# Patient Record
Sex: FEMALE | Race: BLACK OR AFRICAN AMERICAN | NOT HISPANIC OR LATINO | Employment: OTHER | URBAN - METROPOLITAN AREA
[De-identification: names, ages, dates, MRNs, and addresses within clinical notes are randomized per-mention and may not be internally consistent; named-entity substitution may affect disease eponyms.]

---

## 2017-03-09 DIAGNOSIS — J06.9 UPPER RESPIRATORY TRACT INFECTION, UNSPECIFIED TYPE: Primary | ICD-10-CM

## 2017-03-09 RX ORDER — AZITHROMYCIN 250 MG/1
TABLET, FILM COATED ORAL
Qty: 6 TABLET | Refills: 0 | Status: SHIPPED | OUTPATIENT
Start: 2017-03-09 | End: 2017-03-14

## 2017-05-11 ENCOUNTER — HOSPITAL ENCOUNTER (EMERGENCY)
Facility: HOSPITAL | Age: 42
Discharge: HOME | End: 2017-05-11
Attending: EMERGENCY MEDICINE
Payer: COMMERCIAL

## 2017-05-11 VITALS
DIASTOLIC BLOOD PRESSURE: 90 MMHG | RESPIRATION RATE: 16 BRPM | WEIGHT: 245 LBS | TEMPERATURE: 99 F | HEART RATE: 103 BPM | SYSTOLIC BLOOD PRESSURE: 188 MMHG | BODY MASS INDEX: 39.37 KG/M2 | HEIGHT: 66 IN | OXYGEN SATURATION: 98 %
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
## 2017-05-11 DIAGNOSIS — M54.6 ACUTE RIGHT-SIDED THORACIC BACK PAIN: ICD-10-CM

## 2017-05-11 DIAGNOSIS — S50.11XA CONTUSION OF RIGHT FOREARM, INITIAL ENCOUNTER: Primary | ICD-10-CM

## 2017-05-11 LAB
B-HCG UR QL: NEGATIVE
CTP QC/QA: YES

## 2017-05-11 PROCEDURE — 25000003 PHARM REV CODE 250: Performed by: EMERGENCY MEDICINE

## 2017-05-11 PROCEDURE — 99283 EMERGENCY DEPT VISIT LOW MDM: CPT | Mod: ,,, | Performed by: EMERGENCY MEDICINE

## 2017-05-11 PROCEDURE — 81025 URINE PREGNANCY TEST: CPT | Performed by: EMERGENCY MEDICINE

## 2017-05-11 PROCEDURE — 99283 EMERGENCY DEPT VISIT LOW MDM: CPT | Mod: 25

## 2017-05-11 RX ORDER — NAPROXEN 500 MG/1
500 TABLET ORAL 2 TIMES DAILY PRN
Qty: 20 TABLET | Refills: 0 | Status: SHIPPED | OUTPATIENT
Start: 2017-05-11 | End: 2017-05-21

## 2017-05-11 RX ORDER — IBUPROFEN 600 MG/1
600 TABLET ORAL
Status: COMPLETED | OUTPATIENT
Start: 2017-05-11 | End: 2017-05-11

## 2017-05-11 RX ADMIN — IBUPROFEN 600 MG: 600 TABLET, FILM COATED ORAL at 02:05

## 2017-05-11 NOTE — DISCHARGE INSTRUCTIONS
Back Pain (Acute or Chronic)    Back pain is one of the most common problems. The good news is that most people feel better in 1 to 2 weeks, and most of the rest in 1 to 2 months. Most people can remain active.  People experience and describe pain differently; not everyone is the same.  · The pain can be sharp, stabbing, shooting, aching, cramping or burning.  · Movement, standing, bending, lifting, sitting, or walking may worsen pain.  · It can be localized to one spot or area, or it can be more generalized.  · It can spread or radiate upwards, to the front, or go down your arms or legs (sciatica).  · It can cause muscle spasm.  Most of the time, mechanical problems with the muscles or spine cause the pain. Mechanical problems are usually caused by an injury to the muscles or ligaments. While illness can cause back pain, it is usually not caused by a serious illness. Mechanical problems include:   · Physical activity such as sports, exercise, work, or normal activity  · Overexertion, lifting, pushing, pulling incorrectly or too aggressively  · Sudden twisting, bending, or stretching from an accident, or accidental movement  · Poor posture  · Stretching or moving wrong, without noticing pain at the time  · Poor coordination, lack of regular exercise (check with your doctor about this)  · Spinal disc disease or arthritis  · Stress  Pain can also be related to pregnancy, or illness like appendicitis, bladder or kidney infections, pelvic infections, and many other things.  Acute back pain usually gets better in 1 to 2 weeks. Back pain related to disk disease, arthritis in the spinal joints or spinal stenosis (narrowing of the spinal canal) can become chronic and last for months or years.  Unless you had a physical injury (for example, a car accident or fall) X-rays are usually not needed for the initial evaluation of back pain. If pain continues and does not respond to medical treatment, X-rays and other tests may be  needed.  Home care  Try these home care recommendations:  · When in bed, try to find a position of comfort. A firm mattress is best. Try lying flat on your back with pillows under your knees. You can also try lying on your side with your knees bent up towards your chest and a pillow between your knees.  · At first, do not try to stretch out the sore spots. If there is a strain, it is not like the good soreness you get after exercising without an injury. In this case, stretching may make it worse.  · Avoid prolong sitting, long car rides, or travel. This puts more stress on the lower back than standing or walking.  · During the first 24 to 72 hours after an acute injury or flare up of chronic back pain, apply an ice pack to the painful area for 20 minutes and then remove it for 20 minutes. Do this over a period of 60 to 90 minutes or several times a day. This will reduce swelling and pain. Wrap the ice pack in a thin towel or plastic to protect your skin.  · You can start with ice, then switch to heat. Heat (hot shower, hot bath, or heating pad) reduces pain and works well for muscle spasms. Heat can be applied to the painful area for 20 minutes then remove it for 20 minutes. Do this over a period of 60 to 90 minutes or several times a day. Do not sleep on a heating pad. It can lead to skin burns or tissue damage.  · You can alternate ice and heat therapy. Talk with your doctor about the best treatment for your back pain.  · Therapeutic massage can help relax the back muscles without stretching them.  · Be aware of safe lifting methods and do not lift anything without stretching first.  Medicines  Talk to your doctor before using medicine, especially if you have other medical problems or are taking other medicines.  · You may use over-the-counter medicine as directed on the bottle to control pain, unless another pain medicine was prescribed. If you have chronic conditions like diabetes, liver or kidney disease,  stomach ulcers, or gastrointestinal bleeding, or are taking blood thinners, talk to your doctor before taking any medicine.  · Be careful if you are given a prescription medicines, narcotics, or medicine for muscle spasms. They can cause drowsiness, affect your coordination, reflexes, and judgement. Do not drive or operate heavy machinery.  Follow-up care  Follow up with your healthcare provider, or as advised.   A radiologist will review any X-rays that were taken. Your provide will notify you of any new findings that may affect your care.  Call 911  Call emergency services if any of the following occur:  · Trouble breathing  · Confusion  · Very drowsy or trouble awakening  · Fainting or loss of consciousness  · Rapid or very slow heart rate  · Loss of bowel or bladder control  When to seek medical advice  Call your healthcare provider right away if any of these occur:   · Pain becomes worse or spreads to your legs  · Weakness or numbness in one or both legs  · Numbness in the groin or genital area  Date Last Reviewed: 7/1/2016  © 4855-4074 [employer], [place]. [address] [place] [address], [place] [address]. All rights reserved. This information is not intended as a substitute for professional medical care. Always follow your healthcare professional's instructions.
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
## 2017-05-11 NOTE — ED AVS SNAPSHOT
[facility]  [address] [facility] [address]               Velma Lopez   2017  1:27 PM   ED    Description:  Female : 1975   Department:  [facility]           Your Care was Coordinated By:     Provider Role From To    Dana Dorman MD Attending Provider 17 1416 --      Reason for Visit     Arm Pain           Diagnoses this Visit        Comments    Contusion of right forearm, initial encounter    -  Primary     Acute right-sided thoracic back pain           ED Disposition     None           To Do List           Follow-up Information     Follow up with Aftab Martinez MD In 3 days.    Specialty:  Family Medicine    Contact information:    [address]  [facility] [address]  [facility] [address]  [phone]         These Medications        Disp Refills Start End    naproxen (NAPROSYN) 500 MG tablet 20 tablet 0 2017    Take 1 tablet (500 mg total) by mouth 2 (two) times daily as needed. - Oral    Pharmacy: [facility] - [place], [place] - [address] Ph #: [phone]         [facility]     [facility] Care Line -  Assistance  Unless otherwise directed by your provider, please contact [facility], our nurse care line that is available for  assistance.     Registered nurses in the [facility] provide: appointment scheduling, clinical advisement, health education, and other advisory services.  Call: [phone] (toll free)               Medications           Message regarding Medications     Verify the changes and/or additions to your medication regime listed below are the same as discussed with your clinician today.  If any of these changes or additions are incorrect, please notify your healthcare provider.        START taking these NEW medications        Refills    naproxen (NAPROSYN) 500 MG tablet 0    Sig: Take 1 tablet (500 mg  "total) by mouth 2 (two) times daily as needed.    Class: Print    Route: Oral      These medications were administered today        Dose Freq    ibuprofen tablet 600 mg 600 mg ED 1 Time    Sig: Take 1 tablet (600 mg total) by mouth ED 1 Time.    Class: Normal    Route: Oral           Verify that the below list of medications is an accurate representation of the medications you are currently taking.  If none reported, the list may be blank. If incorrect, please contact your healthcare provider. Carry this list with you in case of emergency.           Current Medications     medroxyPROGESTERone (DEPO-PROVERA) 150 mg/mL Syrg Inject 1 mL (150 mg total) into the muscle every 3 (three) months.    amitriptyline (ELAVIL) 25 MG tablet Take 25 mg by mouth nightly as needed.    cyclobenzaprine (FLEXERIL) 5 MG tablet Take 5 mg by mouth 3 (three) times daily as needed for Muscle spasms.    ibuprofen tablet 600 mg Take 1 tablet (600 mg total) by mouth ED 1 Time.    meloxicam (MOBIC) 15 MG tablet Take 1 tablet (15 mg total) by mouth once daily. With first meal of day for inflammation    naproxen (NAPROSYN) 500 MG tablet Take 1 tablet (500 mg total) by mouth 2 (two) times daily as needed.           Clinical Reference Information           Your Vitals Were     BP Pulse Temp Resp Height Weight    188/90 103 98.8 °F (37.1 °C) (Oral) 16 5' 6" (1.676 m) 111.1 kg (245 lb)    Last Period SpO2 BMI          (LMP Unknown) 98% 39.54 kg/m2        Allergies as of 5/11/2017     No Known Allergies      Immunizations Administered on Date of Encounter - 5/11/2017     None      ED Micro, Lab, POCT     None      ED Imaging Orders     None        Discharge Instructions         Back Pain (Acute or Chronic)    Back pain is one of the most common problems. The good news is that most people feel better in 1 to 2 weeks, and most of the rest in 1 to 2 months. Most people can remain active.  People experience and describe pain differently; not everyone is " the same.  · The pain can be sharp, stabbing, shooting, aching, cramping or burning.  · Movement, standing, bending, lifting, sitting, or walking may worsen pain.  · It can be localized to one spot or area, or it can be more generalized.  · It can spread or radiate upwards, to the front, or go down your arms or legs (sciatica).  · It can cause muscle spasm.  Most of the time, mechanical problems with the muscles or spine cause the pain. Mechanical problems are usually caused by an injury to the muscles or ligaments. While illness can cause back pain, it is usually not caused by a serious illness. Mechanical problems include:   · Physical activity such as sports, exercise, work, or normal activity  · Overexertion, lifting, pushing, pulling incorrectly or too aggressively  · Sudden twisting, bending, or stretching from an accident, or accidental movement  · Poor posture  · Stretching or moving wrong, without noticing pain at the time  · Poor coordination, lack of regular exercise (check with your doctor about this)  · Spinal disc disease or arthritis  · Stress  Pain can also be related to pregnancy, or illness like appendicitis, bladder or kidney infections, pelvic infections, and many other things.  Acute back pain usually gets better in 1 to 2 weeks. Back pain related to disk disease, arthritis in the spinal joints or spinal stenosis (narrowing of the spinal canal) can become chronic and last for months or years.  Unless you had a physical injury (for example, a car accident or fall) X-rays are usually not needed for the initial evaluation of back pain. If pain continues and does not respond to medical treatment, X-rays and other tests may be needed.  Home care  Try these home care recommendations:  · When in bed, try to find a position of comfort. A firm mattress is best. Try lying flat on your back with pillows under your knees. You can also try lying on your side with your knees bent up towards your chest and a  pillow between your knees.  · At first, do not try to stretch out the sore spots. If there is a strain, it is not like the good soreness you get after exercising without an injury. In this case, stretching may make it worse.  · Avoid prolong sitting, long car rides, or travel. This puts more stress on the lower back than standing or walking.  · During the first 24 to 72 hours after an acute injury or flare up of chronic back pain, apply an ice pack to the painful area for 20 minutes and then remove it for 20 minutes. Do this over a period of 60 to 90 minutes or several times a day. This will reduce swelling and pain. Wrap the ice pack in a thin towel or plastic to protect your skin.  · You can start with ice, then switch to heat. Heat (hot shower, hot bath, or heating pad) reduces pain and works well for muscle spasms. Heat can be applied to the painful area for 20 minutes then remove it for 20 minutes. Do this over a period of 60 to 90 minutes or several times a day. Do not sleep on a heating pad. It can lead to skin burns or tissue damage.  · You can alternate ice and heat therapy. Talk with your doctor about the best treatment for your back pain.  · Therapeutic massage can help relax the back muscles without stretching them.  · Be aware of safe lifting methods and do not lift anything without stretching first.  Medicines  Talk to your doctor before using medicine, especially if you have other medical problems or are taking other medicines.  · You may use over-the-counter medicine as directed on the bottle to control pain, unless another pain medicine was prescribed. If you have chronic conditions like diabetes, liver or kidney disease, stomach ulcers, or gastrointestinal bleeding, or are taking blood thinners, talk to your doctor before taking any medicine.  · Be careful if you are given a prescription medicines, narcotics, or medicine for muscle spasms. They can cause drowsiness, affect your coordination,  reflexes, and judgement. Do not drive or operate heavy machinery.  Follow-up care  Follow up with your healthcare provider, or as advised.   A radiologist will review any X-rays that were taken. Your provide will notify you of any new findings that may affect your care.  Call 911  Call emergency services if any of the following occur:  · Trouble breathing  · Confusion  · Very drowsy or trouble awakening  · Fainting or loss of consciousness  · Rapid or very slow heart rate  · Loss of bowel or bladder control  When to seek medical advice  Call your healthcare provider right away if any of these occur:   · Pain becomes worse or spreads to your legs  · Weakness or numbness in one or both legs  · Numbness in the groin or genital area  Date Last Reviewed: 7/1/2016 © 2000-2016 [employer]. [address] [place] [address], [place] [address]. All rights reserved. This information is not intended as a substitute for professional medical care. Always follow your healthcare professional's instructions.           [facility] complies with applicable Federal civil rights laws and does not discriminate on the basis of race, color, national origin, age, disability, or sex.        Language Assistance Services     ATTENTION: Language assistance services are available, free of charge. Please call [phone].      ATENCIÓN: Si habla español, tiene a polo disposición servicios gratuitos de asistencia lingüística. Llame al [phone].     CHÚ Ý: N?u b?n nói Ti?ng Vi?t, có các d?ch v? h? tr? ngôn ng? mi?n phí dành cho b?n. G?i s? [phone].

## 2017-05-25 ENCOUNTER — HOSPITAL ENCOUNTER (EMERGENCY)
Facility: HOSPITAL | Age: 42
Discharge: HOME | End: 2017-05-25
Attending: EMERGENCY MEDICINE
Payer: COMMERCIAL

## 2017-05-25 ENCOUNTER — TELEPHONE (OUTPATIENT)
Dept: INTERNAL MEDICINE | Facility: CLINIC | Age: 42
End: 2017-05-25

## 2017-05-25 ENCOUNTER — NURSE TRIAGE (OUTPATIENT)
Dept: ADMINISTRATIVE | Facility: CLINIC | Age: 42
End: 2017-05-25

## 2017-05-25 VITALS
SYSTOLIC BLOOD PRESSURE: 122 MMHG | HEART RATE: 82 BPM | HEIGHT: 66 IN | RESPIRATION RATE: 18 BRPM | WEIGHT: 250 LBS | TEMPERATURE: 99 F | OXYGEN SATURATION: 98 % | DIASTOLIC BLOOD PRESSURE: 74 MMHG | BODY MASS INDEX: 40.18 KG/M2

## 2017-05-25 DIAGNOSIS — M72.2 PLANTAR FASCIITIS: Primary | ICD-10-CM

## 2017-05-25 DIAGNOSIS — S46.912A LEFT SHOULDER STRAIN, INITIAL ENCOUNTER: ICD-10-CM

## 2017-05-25 DIAGNOSIS — R07.89 ATYPICAL CHEST PAIN: ICD-10-CM

## 2017-05-25 DIAGNOSIS — M79.89 LEG SWELLING: ICD-10-CM

## 2017-05-25 DIAGNOSIS — M79.662 PAIN OF LEFT CALF: ICD-10-CM

## 2017-05-25 LAB
ALBUMIN SERPL BCP-MCNC: 3.7 G/DL
ALP SERPL-CCNC: 70 U/L
ALT SERPL W/O P-5'-P-CCNC: 16 U/L
ANION GAP SERPL CALC-SCNC: 11 MMOL/L
AST SERPL-CCNC: 13 U/L
BASOPHILS # BLD AUTO: 0.07 K/UL
BASOPHILS NFR BLD: 0.7 %
BILIRUB SERPL-MCNC: 0.4 MG/DL
BUN SERPL-MCNC: 10 MG/DL
CALCIUM SERPL-MCNC: 9.8 MG/DL
CHLORIDE SERPL-SCNC: 107 MMOL/L
CO2 SERPL-SCNC: 20 MMOL/L
CREAT SERPL-MCNC: 0.8 MG/DL
DIFFERENTIAL METHOD: ABNORMAL
EOSINOPHIL # BLD AUTO: 0.5 K/UL
EOSINOPHIL NFR BLD: 5.1 %
ERYTHROCYTE [DISTWIDTH] IN BLOOD BY AUTOMATED COUNT: 13 %
EST. GFR  (AFRICAN AMERICAN): >60 ML/MIN/1.73 M^2
EST. GFR  (NON AFRICAN AMERICAN): >60 ML/MIN/1.73 M^2
GLUCOSE SERPL-MCNC: 107 MG/DL
HCT VFR BLD AUTO: 41.6 %
HGB BLD-MCNC: 13.5 G/DL
LYMPHOCYTES # BLD AUTO: 3.7 K/UL
LYMPHOCYTES NFR BLD: 37.1 %
MCH RBC QN AUTO: 30.8 PG
MCHC RBC AUTO-ENTMCNC: 32.5 %
MCV RBC AUTO: 95 FL
MONOCYTES # BLD AUTO: 0.5 K/UL
MONOCYTES NFR BLD: 5.4 %
NEUTROPHILS # BLD AUTO: 5.2 K/UL
NEUTROPHILS NFR BLD: 51.5 %
PLATELET # BLD AUTO: 388 K/UL
PMV BLD AUTO: 9.5 FL
POTASSIUM SERPL-SCNC: 4 MMOL/L
PROT SERPL-MCNC: 7.4 G/DL
RBC # BLD AUTO: 4.38 M/UL
SODIUM SERPL-SCNC: 138 MMOL/L
TROPONIN I SERPL DL<=0.01 NG/ML-MCNC: <0.006 NG/ML
WBC # BLD AUTO: 10.05 K/UL

## 2017-05-25 PROCEDURE — 96374 THER/PROPH/DIAG INJ IV PUSH: CPT

## 2017-05-25 PROCEDURE — 99284 EMERGENCY DEPT VISIT MOD MDM: CPT | Mod: 25

## 2017-05-25 PROCEDURE — 84484 ASSAY OF TROPONIN QUANT: CPT

## 2017-05-25 PROCEDURE — 85025 COMPLETE CBC W/AUTO DIFF WBC: CPT

## 2017-05-25 PROCEDURE — 63600175 PHARM REV CODE 636 W HCPCS: Performed by: EMERGENCY MEDICINE

## 2017-05-25 PROCEDURE — 93005 ELECTROCARDIOGRAM TRACING: CPT

## 2017-05-25 PROCEDURE — 80053 COMPREHEN METABOLIC PANEL: CPT

## 2017-05-25 RX ORDER — PHENTERMINE HYDROCHLORIDE 37.5 MG/1
37.5 CAPSULE ORAL EVERY MORNING
COMMUNITY
End: 2019-02-01 | Stop reason: SDUPTHER

## 2017-05-25 RX ORDER — IBUPROFEN 800 MG/1
800 TABLET ORAL 3 TIMES DAILY
Qty: 21 TABLET | Refills: 0 | Status: SHIPPED | OUTPATIENT
Start: 2017-05-25 | End: 2017-06-04

## 2017-05-25 RX ORDER — KETOROLAC TROMETHAMINE 30 MG/ML
30 INJECTION, SOLUTION INTRAMUSCULAR; INTRAVENOUS
Status: COMPLETED | OUTPATIENT
Start: 2017-05-25 | End: 2017-05-25

## 2017-05-25 RX ADMIN — KETOROLAC TROMETHAMINE 30 MG: 30 INJECTION, SOLUTION INTRAMUSCULAR at 09:05

## 2017-05-25 NOTE — TELEPHONE ENCOUNTER
I spoke to pt and advised that first available appointment for new patient would be on 05/31/2017. CF

## 2017-05-25 NOTE — TELEPHONE ENCOUNTER
Pt at work at [employer]. Had called Dr Martinez's office today for appt but since not seen in several yrs they advised the earliest appt they could give was May 31.   She is concerned about ongoing problems with pain in her left calf x 2 wks. Pain constant, rated a 5, no radiation and not worsened by walking. No swelling/discoloration reported. Also had left foot pain, on the sole, which has progressed to a numb feeling in the same time frame. In past week now has intermittent pain in left arm rated a 5. Is between elbow and shoulder-not affected by movement but when she lays in bed at night on her right side the arm throbs. Denies any known trauma.   Is concerned since sx's have been continuing for a while and wants to be seen for evaluation. Has questions about urgent care.    Reason for Disposition   Thigh or calf pain in only one leg and present > 1 hour    Protocols used: ST LEG PAIN-A-[place]    No available appt at [place] [facility]. [facility] in [place]. Advised to call back with any questions/concerns.

## 2017-05-25 NOTE — TELEPHONE ENCOUNTER
----- Message from Benedicto Shankar sent at 5/25/2017  1:36 PM CDT -----  Contact: pt  x_ 1st Request   _ 2nd Request   _ 3rd Request     Who: PATRICIO CRANE [[id number]]    Why: Former patient 9/13 of Dr Martinez is requesting a call back in reference to being seen sooner than next available new patient appointment on 5/31.    What Number to Call Back: [phone]    When to Expect a call back: (Before the end of the day)   -- if call after 3:00 call back will be tomorrow.

## 2017-05-26 NOTE — ED PROVIDER NOTES
Chief complaint:  Leg Swelling (sent from urgent care for cramping and swelling to left calf. Pt states she then began having some chest pain and SOB after going to urgent care)      HPI:  Velma Lopez is a 42 y.o. female presenting with acute onset of several complaints.  She was seen at the urgent care earlier today and was sent in here for a DVT workup.  She has been having some numbness that started in her left foot and has gone up her leg that has been present for about a week.  She also has been complaining of some discomfort that is aching in her left calf.  She did not notice any swelling but the nurse practitioner at the urgent care Center was some swelling and was concerned about a DVT.  While she was driving here she states that she had a few episodes of sharp chest pain to her anterior chest and that made her nervous and made her feel short of breath slightly.  All that has resolved.  She also has been having some soreness and pain to her left upper arm that has been present for about 2 weeks.    ROS: As per HPI and below:  Constitutional:  No fevers, no chills  Eyes: no visual changes  Cardiac: Sharp chest pain  Respiratory: Minimal shortness of breath  Abdominal: no abdominal pain, no nausea, no vomiting  Genitourinary: No dysuria, no frequency  Skin: no rash  Heme: no bleeding  Musculoskeletal: Left shoulder pain, left calf pain  Neuro: Left foot numbness, no focal weakness  Pyschological: no depression      Review of patient's allergies indicates:  No Known Allergies    No current facility-administered medications on file prior to encounter.      Current Outpatient Prescriptions on File Prior to Encounter   Medication Sig Dispense Refill    medroxyPROGESTERone (DEPO-PROVERA) 150 mg/mL Syrg Inject 1 mL (150 mg total) into the muscle every 3 (three) months. 1 mL 2       PMH:  As per HPI and below:  History reviewed. No pertinent past medical history.  Past Surgical History:   Procedure  Laterality Date    CYST REMOVAL      DILATION AND CURETTAGE OF UTERUS      WISDOM TOOTH EXTRACTION         Social History     Social History    Marital status: Single     Spouse name: N/A    Number of children: N/A    Years of education: N/A     Social History Main Topics    Smoking status: Current Every Day Smoker     Packs/day: 0.50     Types: Cigarettes    Smokeless tobacco: Never Used    Alcohol use Yes      Comment: social    Drug use: No    Sexual activity: Yes     Partners: Male     Birth control/ protection: Injection     Other Topics Concern    None     Social History Narrative    None       Family History   Problem Relation Age of Onset    Cancer Maternal Grandmother     Diabetes Mother        Physical Exam:    Vitals:    05/25/17 2126   BP: 122/74   Pulse: 82   Resp: 18   Temp: 98.8 °F (37.1 °C)     Constitutional: Well-nourished, well-developed, in no acute distress, not cachectic  Eyes: PERRLA, EOMI, normal conjunctiva, normal sclera  ENT: Moist Mucous membranes  Respiratory: Clear to auscultation bilaterally, no wheezes, no crackles, no rhonchi  Cardiovascular: Regular rate and rhythm, no murmurs, no rubs, no gallops  Abdominal: Soft, nontender, nondistended, no guarding, no rebound  Musculoskeletal: Normal range of motion, no obvious deformity, normal capillary refill, head atraumatic, neck supple, no meningismus, left sided calf tenderness, no obvious swelling, no tenderness to palpation to her left shoulder or left lower extremity  Skin: no rash, no ecchymosis, no errythema, no discharge  Neurologic: Cranial nerves II through XII intact, no motor deficits, subjective left leg numbness, no cerebellar deficits  Psychological: Alert, oriented x3, normal affect, normal mood    Orders Placed This Encounter   Procedures    US Lower Extremity Veins Left    CBC auto differential    Comprehensive metabolic panel    Troponin I    EKG 12-lead       Medications   ketorolac injection 30 mg  (30 mg Intravenous Given 5/25/17 2115)         Labs Reviewed   CBC W/ AUTO DIFFERENTIAL - Abnormal; Notable for the following:        Result Value    Platelets 388 (*)     All other components within normal limits   COMPREHENSIVE METABOLIC PANEL - Abnormal; Notable for the following:     CO2 20 (*)     All other components within normal limits   TROPONIN I                   ASSESSMENT  1. Plantar fasciitis    2. Leg swelling    3. Pain of left calf    4. Atypical chest pain    5. Left shoulder strain, initial encounter          Disposition:  Discharge    Discharge Medication List as of 5/25/2017  8:48 PM      START taking these medications    Details   ibuprofen (ADVIL,MOTRIN) 800 MG tablet Take 1 tablet (800 mg total) by mouth 3 (three) times daily., Starting Thu 5/25/2017, Until Sun 6/4/2017, Print           Discharge Medication List as of 5/25/2017  8:48 PM        Discharge Medication List as of 5/25/2017  8:48 PM        MDM  Number of Diagnoses or Management Options  Atypical chest pain:   Left shoulder strain, initial encounter:   Leg swelling:   Pain of left calf:   Plantar fasciitis:   Diagnosis management comments: Differential diagnosis includes ACS, PE, DVT, neuropathy, shoulder strain    Patient presents with multiple complaints.  I do not see any obvious swelling to her left lower extremity but she is having some calf tenderness and was sent for a DVT study so I will order an ultrasound although my suspicion is quite low.  Additionally she had fleeting episodes of chest discomfort and is concerned about the possibility of her being diabetic.  We'll obtain lab work including a troponin to rule out cardiac etiology.  My suspicion for PE is quite low given normal vitals.  She also complains of some numbness to her left foot which sounds more neuropathic in nature.  She also has some left shoulder discomfort which sounds more musculoskeletal in nature.    Patient's laboratories and ultrasound are  unremarkable.  We'll discharge home.       Amount and/or Complexity of Data Reviewed  Clinical lab tests: ordered and reviewed  Tests in the radiology section of CPT®: ordered  Tests in the medicine section of CPT®: ordered  Discussion of test results with the performing providers: yes (Lower ext US: nad)  Decide to obtain previous medical records or to obtain history from someone other than the patient: yes  Independent visualization of images, tracings, or specimens: yes (EKG: nsr at 94, nl axis, nl intervals, no st elevation or depression    Lower ext US: nad)           Gunner Cast III, MD  05/30/17 2832

## 2017-05-26 NOTE — ED NOTES
"Pt presents to ED states that X 2 weeks has been having pain to left calf and numbness to left foot. Pt reports that she called her PCP, but was unable to get an appointment. States she called the on call nurse who recommended go to urgent care. Reports that urgent care recommended pt come to ED for further evaluation. Pain is mid calf. Pt reports that she has pain to left arm X 2 weeks. States that while in route to ED she had chest pain, states pain was sharp quick pain, intermittent pain. Pt reports that she is not feeling SOB, states "I feel like I need to catch my breath every now and then". 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   "

## 2017-06-09 ENCOUNTER — HOSPITAL ENCOUNTER (OUTPATIENT)
Dept: RADIOLOGY | Facility: HOSPITAL | Age: 42
Discharge: HOME | End: 2017-06-09
Attending: ORTHOPAEDIC SURGERY
Payer: COMMERCIAL

## 2017-06-09 ENCOUNTER — OFFICE VISIT (OUTPATIENT)
Dept: ORTHOPEDICS | Facility: CLINIC | Age: 42
End: 2017-06-09
Payer: COMMERCIAL

## 2017-06-09 VITALS — HEIGHT: 66 IN | BODY MASS INDEX: 42.62 KG/M2 | WEIGHT: 265.19 LBS

## 2017-06-09 DIAGNOSIS — M79.672 LEFT FOOT PAIN: ICD-10-CM

## 2017-06-09 DIAGNOSIS — M79.672 LEFT FOOT PAIN: Primary | ICD-10-CM

## 2017-06-09 PROCEDURE — 73630 X-RAY EXAM OF FOOT: CPT | Mod: TC,LT

## 2017-06-09 PROCEDURE — 99999 PR PBB SHADOW E&M-EST. PATIENT-LVL III: CPT | Mod: PBBFAC,,, | Performed by: PHYSICIAN ASSISTANT

## 2017-06-09 PROCEDURE — 99203 OFFICE O/P NEW LOW 30 MIN: CPT | Mod: S$GLB,,, | Performed by: PHYSICIAN ASSISTANT

## 2017-06-09 PROCEDURE — 73630 X-RAY EXAM OF FOOT: CPT | Mod: 26,LT,, | Performed by: RADIOLOGY

## 2017-06-09 RX ORDER — MELOXICAM 15 MG/1
15 TABLET ORAL DAILY
Qty: 30 TABLET | Refills: 3 | Status: SHIPPED | OUTPATIENT
Start: 2017-06-09 | End: 2017-07-09

## 2017-06-09 NOTE — PROGRESS NOTES
Subjective:      Patient ID: Velma Lopez is a 42 y.o. female.    Chief Complaint: No chief complaint on file.    HPI  42 year old female presents with chief complaint of left foot pain x 3 weeks. She denies trauma. Pain is at the heel and lateral foot. It is worse with prolonged sit to stand and when she gets out of bed in the mornings. It eases as she walks it out. She took ibuprofen with no relief. She does not wear inserts.   Review of Systems   Constitution: Negative for chills, fever and night sweats.   Cardiovascular: Negative for chest pain.   Respiratory: Negative for cough and shortness of breath.    Hematologic/Lymphatic: Does not bruise/bleed easily.   Skin: Negative for color change.   Gastrointestinal: Negative for heartburn.   Genitourinary: Negative for dysuria.   Neurological: Negative for numbness and paresthesias.   Psychiatric/Behavioral: Negative for altered mental status.   Allergic/Immunologic: Negative for persistent infections.         Objective:            General    Vitals reviewed.  Constitutional: She is oriented to person, place, and time. She appears well-developed and well-nourished.   Cardiovascular: Normal rate.    Neurological: She is alert and oriented to person, place, and time.         Left Ankle/Foot Exam     Inspection  Erythema: absent    Range of Motion   The patient has normal left ankle ROM.   Hutchinson Test:  normal    Muscle Strength   The patient has normal left ankle strength.    Tests   Anterior drawer: negative  Varus tilt: negative    Other   Sensation: normal    Comments:  TTP lateral heel, lateral foot, and medial calcaneal tuberosity.       Vascular Exam       Left Pulses  Dorsalis Pedis:      2+              X-ray: ordered and reviewed by myself. There is a small ossific focus exophytically extending from the anteroinferior aspect of the calcaneus suggestive for possible degenerative spur. Clinical relation and further evaluation as warranted.. No  evidence for acute fracture or dislocation left foot. 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 Incidental prominent trigonal process.        Assessment:       Encounter Diagnosis   Name Primary?    Left foot pain Yes          Plan:       Discussed treatment options with patient. Recommend Spenco orthotics. HEP given. Ice foot. Prescription for mobic sent to pharmacy. RTC prn.

## 2017-10-12 ENCOUNTER — OFFICE VISIT (OUTPATIENT)
Dept: SLEEP MEDICINE | Facility: CLINIC | Age: 42
End: 2017-10-12
Payer: COMMERCIAL

## 2017-10-12 VITALS
HEART RATE: 98 BPM | BODY MASS INDEX: 43.29 KG/M2 | HEIGHT: 66 IN | DIASTOLIC BLOOD PRESSURE: 70 MMHG | WEIGHT: 269.38 LBS | SYSTOLIC BLOOD PRESSURE: 118 MMHG

## 2017-10-12 DIAGNOSIS — G47.30 SLEEP APNEA, UNSPECIFIED TYPE: Primary | ICD-10-CM

## 2017-10-12 DIAGNOSIS — R53.83 FATIGUE, UNSPECIFIED TYPE: ICD-10-CM

## 2017-10-12 PROCEDURE — 99999 PR PBB SHADOW E&M-EST. PATIENT-LVL III: CPT | Mod: PBBFAC,,, | Performed by: NURSE PRACTITIONER

## 2017-10-12 PROCEDURE — 99204 OFFICE O/P NEW MOD 45 MIN: CPT | Mod: S$GLB,,, | Performed by: NURSE PRACTITIONER

## 2017-10-12 NOTE — PROGRESS NOTES
"Velma Lopez was seen as a new patient,self-referred, for the evaluation of obstructive sleep apnea.     CHIEF COMPLAINT: Snoring, excessive daytime sleepiness    HISTORY OF PRESENT ILLNESS:Velma Lopez a 42 y.o.  female presents for the evaluation of obstructive sleep apnea. She has never had a sleep study. ++ snoring loudly, more noticeable the last 6-mos. Her mouth falls open when asleep now. Sleeps alone. Sleep is not refreshing. She wakes up exhausted. Always tired. "Not getting sleep". Tosses and turns during sleep, frequent disrupted sleep. Nocturia 1x. Denies oral dyring in am. Denies am headaches. Denies sinus  Congestion. 1 recent episode waking up gasping for air. SIDE sleeper mostly. Either shoulder can begin to hurt and she rotate to other side. She feels so drained, despite 9h sleep time.     Denies symptoms of restless legs or kicking during sleep.   Adipex prn, had insomnia most often despite 1/ 2tab    On todays [place] Sleepiness Scale the patient scores a 15/24.     FAMILY HISTORY: No known sleep disorders    SOCIAL HISTORY: Single, 17yo college/sees often.  Social ETOH, occas  Tobacco. 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  Otherwise, a balance of 10 systems reviewed is negative        PHYSICAL EXAM:   /70   Pulse 98   Ht 5' 6" (1.676 m)   Wt 122.2 kg (269 lb 6.4 oz)   BMI 43.48 kg/m²   GENERAL: morbid obese body habitus, well groomed   HEENT: Conjunctivae are non-erythematous; Pupils equal, round, and reactive to light; Nose is symmetrical; Nasal mucosa is normal; Septum is midline; Inferior turbinates are normal; Nasal airflow is normal; Posterior pharynx is pink; Modified Mallampati: IV; Posterior palate is low; Tonsils 1+; Uvula is long/thick and pink;Tongue is high, " broad; Dentition is fair; No TMJ tenderness; Jaw opening and protrusion without click and without discomfort.   NECK: Supple. Neck circumference is 15 inches. No thyromegaly. No palpable nodes.   SKIN: On face and neck: No abrasions, no rashes, no lesions. No subcutaneous nodules are palpable.   RESPIRATORY: Chest is clear to auscultation. Normal chest expansion and non-labored breathing at rest.   CARDIOVASCULAR: Normal S1, S2. No murmurs, gallops or rubs. No carotid bruits bilaterally.   EXTREMITIES: No edema. No clubbing. No cyanosis. Station normal. Gait normal.   NEURO/PSYCH: Oriented to time, place and person. Normal attention span and concentration. Affect is full. Mood is normal.       ASSESSMENT:     Unspecified Sleep Apnea, with symptoms of snoring, recent apneic pause, un-refreshing frequent disrupted sleep and excessive daytime sleepiness, with exam findings of a crowded oral airway, with medical comorbidities of morbid obesity, hypertension. Warrants further investigation for untreated sleep apnea.     Fatigue    PLAN:   1. Polysomnogram, discussed plan of care   2. Discussed etiology of MILEY and potential ramifications of untreated MILEY, including stroke, heart disease, HTN.  We discussed potential treatment options, which could include weight loss (10-15%), body positioning, continuous positive airway pressure (CPAP-definitive), mandibular advancement splint by dentist, or referral for surgical consideration.   3. The patient was advised to abstain from driving should she feel sleepy or drowsy.   4. Check TSH, Vit D other etiological factors

## 2017-10-12 NOTE — PATIENT INSTRUCTIONS
Obstructive Sleep Apnea  Obstructive sleep apnea is a condition that causes your air passages to become narrowed or blocked during sleep. As a result, breathing stops for short periods. Your body wakes up enough for breathing to begin again, though you don't remember it. The cycle of stopped breathing and brief awakenings can repeat dozens of times a night. This prevents the body from getting to the deeper stages of sleep that are needed for good rest and may cause your body's oxygen level to fall.  Signs of sleep apnea include loud snoring, noisy breathing, and gasping sounds during sleep. Daytime symptoms include waking up tired after a full night's sleep, waking up with headaches, feeling very sleepy or falling asleep during the day, and having problems with memory or concentration.  Risk factors for sleep apnea include:  · Being overweight  · Being a man, or a woman in menopause  · Smoking  · Using alcohol or sedating medicines  · Having enlarged structures in the nose or throat  Home care  Lifestyle changes that can help treat snoring and sleep apnea include the following:  · If you are overweight, lose weight. Talk to your healthcare provider about a weight-loss plan for you.  · Avoid alcohol for 3 to 4 hours before bedtime. Avoid sedating medications. Ask your healthcare provider about the medicines you take.  · If you smoke, talk to your healthcare provider about ways to quit.  · Sleep on your side. This can help prevent gravity from pulling relaxed throat tissues into your breathing passages.  · If you have allergies or sinus problems that block your nose, ask your healthcare provider for help.  Follow-up care  Follow up with your healthcare provider, or as advised. A diagnosis of sleep apnea is made with a sleep study. Your healthcare provider can tell you more about this test.  When to seek medical advice  Sleep apnea can make you more likely to have certain health problems. These include high blood  pressure, heart attack, stroke, and sexual dysfunction. If you have sleep apnea, talk to your healthcare provider about the best treatments for you.  Date Last Reviewed: 4/1/2017  © 4313-7210 [employer] [address] [place] [address], [place] [address]. All rights reserved. This information is not intended as a substitute for professional medical care. Always follow your healthcare professional's instructions.      Jasmine or Shiva will contact you to schedule your sleep study. Their number is [phone] (ext 2). The [facility] Lab is located on 7th floor of the [facility].    We will call you when the sleep study results are ready - if you have not heard from us by 2 weeks from the date of the study, please call [phone] (ext 1).    You are advised to abstain from driving should you feel sleepy or drowsy.

## 2017-10-13 ENCOUNTER — LAB VISIT (OUTPATIENT)
Dept: LAB | Facility: HOSPITAL | Age: 42
End: 2017-10-13
Attending: NURSE PRACTITIONER
Payer: COMMERCIAL

## 2017-10-13 DIAGNOSIS — R53.83 FATIGUE, UNSPECIFIED TYPE: ICD-10-CM

## 2017-10-13 DIAGNOSIS — E55.9 VITAMIN D DEFICIENCY: Primary | ICD-10-CM

## 2017-10-13 LAB
25(OH)D3+25(OH)D2 SERPL-MCNC: 16 NG/ML
TSH SERPL DL<=0.005 MIU/L-ACNC: 2.26 UIU/ML

## 2017-10-13 PROCEDURE — 36415 COLL VENOUS BLD VENIPUNCTURE: CPT

## 2017-10-13 PROCEDURE — 84443 ASSAY THYROID STIM HORMONE: CPT

## 2017-10-13 PROCEDURE — 82306 VITAMIN D 25 HYDROXY: CPT

## 2017-10-13 RX ORDER — ERGOCALCIFEROL 1.25 MG/1
50000 CAPSULE ORAL
Qty: 16 CAPSULE | Refills: 1 | Status: SHIPPED | OUTPATIENT
Start: 2017-10-13 | End: 2019-02-06

## 2017-10-18 ENCOUNTER — TELEPHONE (OUTPATIENT)
Dept: SLEEP MEDICINE | Facility: OTHER | Age: 42
End: 2017-10-18

## 2017-10-24 ENCOUNTER — HOSPITAL ENCOUNTER (OUTPATIENT)
Dept: SLEEP MEDICINE | Facility: OTHER | Age: 42
Discharge: HOME | End: 2017-10-24
Attending: FAMILY MEDICINE
Payer: COMMERCIAL

## 2017-10-24 DIAGNOSIS — G47.33 OSA (OBSTRUCTIVE SLEEP APNEA): ICD-10-CM

## 2017-10-24 DIAGNOSIS — G47.30 SLEEP APNEA, UNSPECIFIED TYPE: ICD-10-CM

## 2017-10-24 PROCEDURE — 95810 POLYSOM 6/> YRS 4/> PARAM: CPT

## 2017-10-24 PROCEDURE — 95810 POLYSOM 6/> YRS 4/> PARAM: CPT | Mod: 26,,, | Performed by: PSYCHIATRY & NEUROLOGY

## 2017-10-25 NOTE — PROGRESS NOTES
This is a Screen study for 42 year old Velma Lopez.  The procedure was explained to the patient upon arrival. This included the set-up process and what to expect during the night. It was also explained to the patient that the test will be interpreted by a physician and the results will be sent to her PCP.  Mild-moderate snoring observed.  EKG appeared to be NSR.  Pt did not want to sleep supine.   Sleep disorder breathing observed most significant in REM.  Pt stated the nasal cannulas and thermistor were bothering her and were keeping her awake.  A thank you letter was given to the patient upon leaving the sleep lab.

## 2017-11-01 ENCOUNTER — PATIENT MESSAGE (OUTPATIENT)
Dept: SLEEP MEDICINE | Facility: CLINIC | Age: 42
End: 2017-11-01

## 2017-11-07 ENCOUNTER — TELEPHONE (OUTPATIENT)
Dept: SLEEP MEDICINE | Facility: CLINIC | Age: 42
End: 2017-11-07

## 2017-11-09 ENCOUNTER — PATIENT MESSAGE (OUTPATIENT)
Dept: SLEEP MEDICINE | Facility: CLINIC | Age: 42
End: 2017-11-09

## 2017-11-15 ENCOUNTER — OFFICE VISIT (OUTPATIENT)
Dept: SLEEP MEDICINE | Facility: CLINIC | Age: 42
End: 2017-11-15
Payer: COMMERCIAL

## 2017-11-15 VITALS
BODY MASS INDEX: 43.3 KG/M2 | HEART RATE: 87 BPM | WEIGHT: 268.31 LBS | OXYGEN SATURATION: 99 % | DIASTOLIC BLOOD PRESSURE: 92 MMHG | SYSTOLIC BLOOD PRESSURE: 112 MMHG

## 2017-11-15 DIAGNOSIS — E55.9 VITAMIN D INSUFFICIENCY: ICD-10-CM

## 2017-11-15 DIAGNOSIS — E66.01 SEVERE OBESITY (BMI >= 40): ICD-10-CM

## 2017-11-15 DIAGNOSIS — G47.33 OBSTRUCTIVE SLEEP APNEA: Primary | ICD-10-CM

## 2017-11-15 PROCEDURE — 99214 OFFICE O/P EST MOD 30 MIN: CPT | Mod: S$GLB,,, | Performed by: NURSE PRACTITIONER

## 2017-11-15 PROCEDURE — 99999 PR PBB SHADOW E&M-EST. PATIENT-LVL III: CPT | Mod: PBBFAC,,, | Performed by: NURSE PRACTITIONER

## 2017-11-15 RX ORDER — MELOXICAM 15 MG/1
15 TABLET ORAL DAILY PRN
COMMUNITY
End: 2019-02-03 | Stop reason: ALTCHOICE

## 2017-11-15 NOTE — PROGRESS NOTES
"Velma Lopez was seen in follow-up to discuss sleep study results and potential treatment options. Last seen in clinic by ANDREA Boo NP 10/12/2017. This is her initial visit with me.     10/12/2017 ANDREA Boo NP: CHIEF COMPLAINT: Snoring, excessive daytime sleepiness    HISTORY OF PRESENT ILLNESS:Velma Lopez a 42 y.o.  female presents for the evaluation of obstructive sleep apnea. She has never had a sleep study. ++ snoring loudly, more noticeable the last 6-mos. Her mouth falls open when asleep now. Sleeps alone. Sleep is not refreshing. She wakes up exhausted. Always tired. "Not getting sleep". Tosses and turns during sleep, frequent disrupted sleep. Nocturia 1x. Denies oral dyring in am. Denies am headaches. Denies sinus  Congestion. 1 recent episode waking up gasping for air. SIDE sleeper mostly. Either shoulder can begin to hurt and she rotate to other side. She feels so drained, despite 9h sleep time.     Denies symptoms of restless legs or kicking during sleep.   Adipex prn, had insomnia most often despite 1/ 2tab    On todays [place] Sleepiness Scale the patient scores a 15/24.     INTERVAL HISTORY:    11/15/2017 DESTINY Angulo NP: Discussed 10/24/2017 in-lab sleep study results in detail. Pt complaints of snoring, excessive daytime sleepiness, and unrefreshing sleep remain the same. Pt is a student and reports difficulty retaining information. ESS 18.     Also has been taking OTC Vit D per ANDREA Boo's recommendation since Vit D levels insufficient. Unable to tell whether this has helped with sleepiness/fatigue during the day.     Baseline Sleep Study: 10/24/2017  lb. The overall AHI was 5.0 with an oxygen eneida of 91.0%. The supine AHI was 0.0 and the REM AHI was 10.0.    FAMILY HISTORY: No known sleep disorders    SOCIAL HISTORY: Single, 17yo college/sees often.  Social ETOH, occas  Tobacco. [employer]    REVIEW OF SYSTEMS:  Sleep related symptoms as per HPI; +overweight; " denies sinus congestion; Denies dyspnea; Denies palpitations; Denies acid reflux; Denies polyuria; Denies headaches;Denies mood disturbance.  Otherwise, a balance of 10 systems reviewed is negative    PHYSICAL EXAM:   BP (!) 112/92 (BP Location: Left arm, Patient Position: Sitting, BP Method: Large (Manual))   Pulse 87   Wt 121.7 kg (268 lb 4.8 oz)   SpO2 99%   BMI 43.30 kg/m²   GENERAL: morbid obese body habitus, well groomed   HEENT: Conjunctivae are non-erythematous; Pupils equal, round, and reactive to light; Nose is symmetrical; Nasal mucosa is normal; Septum is midline; Inferior turbinates are normal; Nasal airflow is normal; Posterior pharynx is pink; Modified Mallampati: IV; Posterior palate is low; Tonsils 1+; Uvula is long/thick and pink;Tongue is high, broad; Dentition is fair; No TMJ tenderness; Jaw opening and protrusion without click and without discomfort.   NECK: Supple. Neck circumference is 15 inches. No thyromegaly. No palpable nodes.   SKIN: On face and neck: No abrasions, no rashes, no lesions. No subcutaneous nodules are palpable.   RESPIRATORY: Chest is clear to auscultation. Normal chest expansion and non-labored breathing at rest.   CARDIOVASCULAR: Normal S1, S2. No murmurs, gallops or rubs. No carotid bruits bilaterally.   EXTREMITIES: No edema. No clubbing. No cyanosis. Station normal. Gait normal.   NEURO/PSYCH: Oriented to time, place and person. Normal attention span and concentration. Affect is full. Mood is normal.       ASSESSMENT:     Obstructive sleep apnea, mild by AHI. The patient symptomatically has snoring, excessive daytime sleepiness, and unrefreshing sleep. Medical co-morbidities: obesity and systemic HTN. This warrants treatment for MILEY.     Obesity, BMI >40, discussed relationship between MILEY and weight    Vitamin D insufficiency, on OTC Vit D supplements    PLAN:     Education: During our discussion today, we talked about the etiology of obstructive sleep apnea as  well as the potential ramifications of untreated sleep apnea, which could include daytime sleepiness, hypertension, heart disease and/or stroke. We discussed potential treatment options, which could include weight loss, body positioning, continuous positive airway pressure (CPAP), OA (will not be able to tolerate oral device in sleep), EPAP, or referral for surgical consideration.     Treatment:  -trial APAP 6 - 20 cm with nasal mask  -If patient has difficulty with CPAP adherence or ongoing MILEY symptoms despite CPAP adherence, then consider an in-lab titration sleep study in order to determine optimal fixed CPAP setting.  -RTC 31 - 90 days after   -continue OTC Vit D supplements; assist today with making f/u appt with PCP for continued management      Precautions: The patient was advised to abstain from driving should they feel sleepy  or drowsy.

## 2017-11-15 NOTE — PATIENT INSTRUCTIONS
Your prescription for CPAP has been sent through our system. You should receive a call from [facility] in the next few days regarding your CPAP set up.     For any questions regarding your machine or supplies, please contact [facility] at [phone] (Ext [phone]).

## 2017-11-16 ENCOUNTER — DOCUMENTATION ONLY (OUTPATIENT)
Dept: BARIATRICS | Facility: CLINIC | Age: 42
End: 2017-11-16

## 2017-11-16 NOTE — PROGRESS NOTES
Bariatric Surgery Online Course Form Submission  Someone has submitted a Bariatric Surgery Online Course Form Submission on this page.  Date: 2017-11-16 - 11:15  Patient's Name: Velma Gonzalez  YOB: 1975 Email: [email or web address]  Phone: [phone]   Patient Address: [address] [place] [address] [place] [address]  Preferred Surgical Location: [facility]   I certify that I am 18 years of age or older:   Confirmation Code: [id number]  Verification of Bariatric Seminar: y  Insurance Information  Insurance or Self Pay? Insurance - Fill out fields below  Insurance Company Name: Blue cross Blue shield   Type of Coverage/Coverage Plan (i.e. PPO, HMO): ppo   Group Name: [id number]   Subscriber Name:   Member Name (patient's name)   Member ID/Policy #:[id number]   Plan Effective Date:   Card Issuance date: 12/15/16

## 2017-11-20 ENCOUNTER — TELEPHONE (OUTPATIENT)
Dept: BARIATRICS | Facility: CLINIC | Age: 42
End: 2017-11-20

## 2017-11-20 DIAGNOSIS — G47.33 OSA (OBSTRUCTIVE SLEEP APNEA): ICD-10-CM

## 2017-11-20 DIAGNOSIS — E66.01 MORBID OBESITY: Primary | ICD-10-CM

## 2017-11-20 NOTE — TELEPHONE ENCOUNTER
----- Message from Kris Miller sent at 11/16/2017 11:43 AM CST -----  Regarding: RE: benefits please  Covered to proceed  ----- Message -----  From: Aristides Baugh RN  Sent: 11/16/2017  11:21 AM  To: Aristides Baugh RN, Kris Miller  Subject: benefits please

## 2017-11-20 NOTE — TELEPHONE ENCOUNTER
Scheduled consult appt and time /dates agreed upon. Advised of pt health [facility] and mailed packet out as well.

## 2017-11-27 DIAGNOSIS — Z12.31 VISIT FOR SCREENING MAMMOGRAM: Primary | ICD-10-CM

## 2017-12-14 ENCOUNTER — HOSPITAL ENCOUNTER (OUTPATIENT)
Dept: RADIOLOGY | Facility: HOSPITAL | Age: 42
Discharge: HOME | End: 2017-12-14
Attending: NURSE PRACTITIONER
Payer: COMMERCIAL

## 2017-12-14 VITALS — BODY MASS INDEX: 43.07 KG/M2 | HEIGHT: 66 IN | WEIGHT: 268 LBS

## 2017-12-14 DIAGNOSIS — Z12.31 VISIT FOR SCREENING MAMMOGRAM: ICD-10-CM

## 2017-12-14 PROCEDURE — 77067 SCR MAMMO BI INCL CAD: CPT | Mod: TC

## 2017-12-14 PROCEDURE — 77067 SCR MAMMO BI INCL CAD: CPT | Mod: 26,,, | Performed by: RADIOLOGY

## 2017-12-21 ENCOUNTER — TELEPHONE (OUTPATIENT)
Dept: BARIATRICS | Facility: CLINIC | Age: 42
End: 2017-12-21

## 2018-01-03 ENCOUNTER — HOSPITAL ENCOUNTER (OUTPATIENT)
Dept: RADIOLOGY | Facility: HOSPITAL | Age: 43
Discharge: HOME | End: 2018-01-03
Attending: INTERNAL MEDICINE
Payer: COMMERCIAL

## 2018-01-03 DIAGNOSIS — R05.9 COUGH: Primary | ICD-10-CM

## 2018-01-03 DIAGNOSIS — R05.9 COUGH: ICD-10-CM

## 2018-01-03 PROCEDURE — 71046 X-RAY EXAM CHEST 2 VIEWS: CPT | Mod: TC,FY

## 2018-01-03 PROCEDURE — 71046 X-RAY EXAM CHEST 2 VIEWS: CPT | Mod: FY,,, | Performed by: RADIOLOGY

## 2018-01-04 ENCOUNTER — NURSE TRIAGE (OUTPATIENT)
Dept: ADMINISTRATIVE | Facility: CLINIC | Age: 43
End: 2018-01-04

## 2018-01-04 ENCOUNTER — OFFICE VISIT (OUTPATIENT)
Dept: FAMILY MEDICINE | Facility: CLINIC | Age: 43
End: 2018-01-04
Payer: COMMERCIAL

## 2018-01-04 VITALS
SYSTOLIC BLOOD PRESSURE: 116 MMHG | OXYGEN SATURATION: 97 % | TEMPERATURE: 99 F | WEIGHT: 273.81 LBS | BODY MASS INDEX: 44.01 KG/M2 | DIASTOLIC BLOOD PRESSURE: 78 MMHG | HEIGHT: 66 IN | HEART RATE: 91 BPM

## 2018-01-04 DIAGNOSIS — J06.9 VIRAL URI WITH COUGH: Primary | ICD-10-CM

## 2018-01-04 DIAGNOSIS — R52 BODY ACHES: ICD-10-CM

## 2018-01-04 LAB
CTP QC/QA: YES
FLUAV AG NPH QL: NEGATIVE
FLUBV AG NPH QL: NEGATIVE

## 2018-01-04 PROCEDURE — 87804 INFLUENZA ASSAY W/OPTIC: CPT | Mod: QW,S$GLB,, | Performed by: NURSE PRACTITIONER

## 2018-01-04 PROCEDURE — 99213 OFFICE O/P EST LOW 20 MIN: CPT | Mod: 25,S$GLB,, | Performed by: NURSE PRACTITIONER

## 2018-01-04 PROCEDURE — 99999 PR PBB SHADOW E&M-EST. PATIENT-LVL V: CPT | Mod: PBBFAC,,, | Performed by: NURSE PRACTITIONER

## 2018-01-04 RX ORDER — BROMPHENIRAMINE MALEATE, PSEUDOEPHEDRINE HYDROCHLORIDE, AND DEXTROMETHORPHAN HYDROBROMIDE 2; 30; 10 MG/5ML; MG/5ML; MG/5ML
10 SYRUP ORAL 4 TIMES DAILY
Qty: 240 ML | Refills: 0 | Status: SHIPPED | OUTPATIENT
Start: 2018-01-04 | End: 2019-01-31

## 2018-01-04 RX ORDER — IBUPROFEN 600 MG/1
600 TABLET ORAL 4 TIMES DAILY
Qty: 40 TABLET | Refills: 0 | Status: SHIPPED | OUTPATIENT
Start: 2018-01-04 | End: 2018-02-03

## 2018-01-04 NOTE — TELEPHONE ENCOUNTER
Having some symptoms and wanting to know where she can get swabbed  for flu. appt scheduled.    Reason for Disposition   Requesting regular office appointment    Protocols used: ST INFORMATION ONLY CALL-A-AH

## 2018-01-04 NOTE — PATIENT INSTRUCTIONS
Viral Upper Respiratory Illness (Adult)  You have a viral upper respiratory illness (URI), which is another term for the common cold. This illness is contagious during the first few days. It is spread through the air by coughing and sneezing. It may also be spread by direct contact (touching the sick person and then touching your own eyes, nose, or mouth). Frequent handwashing will decrease risk of spread. Most viral illnesses go away within 7 to 10 days with rest and simple home remedies. Sometimes the illness may last for several weeks. Antibiotics will not kill a virus, and they are generally not prescribed for this condition.    Home care  · If symptoms are severe, rest at home for the first 2 to 3 days. When you resume activity, don't let yourself get too tired.  · Avoid being exposed to cigarette smoke (yours or others).  · You may use acetaminophen or ibuprofen to control pain and fever, unless another medicine was prescribed. (Note: If you have chronic liver or kidney disease, have ever had a stomach ulcer or gastrointestinal bleeding, or are taking blood-thinning medicines, talk with your healthcare provider before using these medicines.) Aspirin should never be given to anyone under 18 years of age who is ill with a viral infection or fever. It may cause severe liver or brain damage.  · Your appetite may be poor, so a light diet is fine. Avoid dehydration by drinking 6 to 8 glasses of fluids per day (water, soft drinks, juices, tea, or soup). Extra fluids will help loosen secretions in the nose and lungs.  · Over-the-counter cold medicines will not shorten the length of time youre sick, but they may be helpful for the following symptoms: cough, sore throat, and nasal and sinus congestion. (Note: Do not use decongestants if you have high blood pressure.)  Follow-up care  Follow up with your healthcare provider, or as advised.  When to seek medical advice  Call your healthcare provider right away if any  of these occur:  · Cough with lots of colored sputum (mucus)  · Severe headache; face, neck, or ear pain  · Difficulty swallowing due to throat pain  · Fever of 100.4°F (38°C)  Call 911, or get immediate medical care  Call emergency services right away if any of these occur:  · Chest pain, shortness of breath, wheezing, or difficulty breathing  · Coughing up blood  · Inability to swallow due to throat pain  Date Last Reviewed: 9/13/2015  © 6005-5055 [employer]. [address] [place] [address]. All rights reserved. This information is not intended as a substitute for professional medical care. Always follow your healthcare professional's instructions.

## 2018-01-04 NOTE — PROGRESS NOTES
Subjective:       Patient ID: Velma Lopez is a 42 y.o. female.    Chief Complaint: URI (started Tuesday with bad cough, bodyaches fever of 99.9 on yesterday)    ##URGENT CARE VISIT - patient of Dr. Martinez###      URI    This is a new problem. Episode onset: 1/2/18 - today is day 3. The problem has been rapidly worsening. Maximum temperature: low-grade 99 to 100. Associated symptoms include congestion, coughing, headaches, rhinorrhea and sinus pain. Pertinent negatives include no abdominal pain, chest pain, diarrhea, dysuria, ear pain, nausea, rash, sneezing, sore throat or vomiting. Associated symptoms comments: Cough is mostly nonproductive with occasional small amount of mucus.  Nasal congestion with minimal nasal discharge. Body aches. Treatments tried: Did have flu vaccine this season.  Nyquil, Dayquil - mild help.  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 The treatment provided mild relief.       Previous Medications    ERGOCALCIFEROL (ERGOCALCIFEROL) 50,000 UNIT CAP    Take 1 capsule (50,000 Units total) by mouth every 7 days.    MEDROXYPROGESTERONE (DEPO-PROVERA) 150 MG/ML SYRG    Inject 1 mL (150 mg total) into the muscle every 3 (three) months.    MELOXICAM (MOBIC) 15 MG TABLET    Take 15 mg by mouth daily as needed for Pain.    PHENTERMINE 37.5 MG CAPSULE    Take 37.5 mg by mouth every morning.       History reviewed. No pertinent past medical history.    Past Surgical History:   Procedure Laterality Date    CYST REMOVAL      DILATION AND CURETTAGE OF UTERUS      WISDOM TOOTH EXTRACTION         Family History   Problem Relation Age of Onset    Cancer Maternal Grandmother     Diabetes Mother     No Known Problems Sister        Social History     Social History    Marital status: Single     Spouse name: N/A    Number of children: N/A    Years of education: N/A     Social History Main Topics    Smoking status: Current Every Day Smoker     Packs/day: 0.50  "    Types: Cigarettes    Smokeless tobacco: Never Used    Alcohol use Yes      Comment: social    Drug use: No    Sexual activity: Yes     Partners: Male     Birth control/ protection: Injection     Other Topics Concern    None     Social History Narrative    None       Review of Systems   Constitutional: Positive for fatigue and fever. Negative for appetite change, chills and unexpected weight change.        Low grade temp 99 to 100   HENT: Positive for congestion, postnasal drip, rhinorrhea, sinus pain and sinus pressure. Negative for ear pain, mouth sores, nosebleeds, sneezing, sore throat, trouble swallowing and voice change.    Eyes: Negative for photophobia, pain, discharge, redness, itching and visual disturbance.   Respiratory: Positive for cough. Negative for chest tightness and shortness of breath.    Cardiovascular: Negative for chest pain, palpitations and leg swelling.   Gastrointestinal: Negative for abdominal pain, blood in stool, constipation, diarrhea, nausea and vomiting.   Genitourinary: Negative for dysuria, frequency, hematuria and urgency.   Musculoskeletal: Positive for myalgias. Negative for arthralgias, back pain and joint swelling.   Skin: Negative for color change and rash.   Allergic/Immunologic: Negative for immunocompromised state.   Neurological: Positive for headaches. Negative for dizziness, seizures, syncope and weakness.   Hematological: Negative for adenopathy. Does not bruise/bleed easily.   Psychiatric/Behavioral: Negative for agitation, dysphoric mood, sleep disturbance and suicidal ideas. The patient is not nervous/anxious.          Objective:     Vitals:    01/04/18 1058   BP: 116/78   BP Location: Right arm   Patient Position: Sitting   BP Method: Large (Manual)   Pulse: 91   Temp: 99.3 °F (37.4 °C)   TempSrc: Oral   SpO2: 97%   Weight: 124.2 kg (273 lb 13 oz)   Height: 5' 6" (1.676 m)          Physical Exam   Constitutional: She is oriented to person, place, and time. " She appears well-developed and well-nourished.   HENT:   Head: Normocephalic and atraumatic.   Right Ear: External ear normal.   Left Ear: External ear normal.   Nose: Mucosal edema and rhinorrhea present.   Mouth/Throat: Posterior oropharyngeal erythema present. No oropharyngeal exudate or posterior oropharyngeal edema.   Eyes: EOM are normal. Pupils are equal, round, and reactive to light.   Neck: Normal range of motion. Neck supple. No tracheal deviation present. No thyromegaly present.   Cardiovascular: Normal rate and regular rhythm.    Pulmonary/Chest: Effort normal and breath sounds normal. No respiratory distress. She has no wheezes. She has no rales.   Abdominal: Soft. She exhibits no distension.   Musculoskeletal: Normal range of motion. She exhibits no edema.   Lymphadenopathy:     She has no cervical adenopathy.   Neurological: She is alert and oriented to person, place, and time. No cranial nerve deficit. Coordination normal.   Skin: Skin is warm and dry. No rash noted.   Psychiatric: She has a normal mood and affect.           Assessment:         ICD-10-CM ICD-9-CM   1. Viral URI with cough J06.9 465.9    B97.89    2. Body aches R52 780.96       Plan:       Viral URI with cough  -  Swab negative for flu.  Viral URI/head cold - treat with Bromfed DM and Ibuprofen.  May take Tylenol between doses.  Rest and increase fluids, see handout.  -  Return for worsening.  -     brompheniramine-pseudoeph-DM 2-30-10 mg/5 mL Syrp; Take 10 mLs by mouth 4 (four) times daily.  Dispense: 240 mL; Refill: 0  -     ibuprofen (ADVIL,MOTRIN) 600 MG tablet; Take 1 tablet (600 mg total) by mouth 4 (four) times daily.  Dispense: 40 tablet; Refill: 0    Body aches      Return if symptoms worsen or fail to improve.     Patient's Medications   New Prescriptions    BROMPHENIRAMINE-PSEUDOEPH-DM 2-30-10 MG/5 ML SYRP    Take 10 mLs by mouth 4 (four) times daily.    IBUPROFEN (ADVIL,MOTRIN) 600 MG TABLET    Take 1 tablet (600 mg total)  by mouth 4 (four) times daily.   Previous Medications    ERGOCALCIFEROL (ERGOCALCIFEROL) 50,000 UNIT CAP    Take 1 capsule (50,000 Units total) by mouth every 7 days.    MEDROXYPROGESTERONE (DEPO-PROVERA) 150 MG/ML SYRG    Inject 1 mL (150 mg total) into the muscle every 3 (three) months.    MELOXICAM (MOBIC) 15 MG TABLET    Take 15 mg by mouth daily as needed for Pain.    PHENTERMINE 37.5 MG CAPSULE    Take 37.5 mg by mouth every morning.   Modified Medications    No medications on file   Discontinued Medications    No medications on file

## 2018-01-09 ENCOUNTER — INITIAL CONSULT (OUTPATIENT)
Dept: BARIATRICS | Facility: CLINIC | Age: 43
End: 2018-01-09
Payer: COMMERCIAL

## 2018-01-09 ENCOUNTER — CLINICAL SUPPORT (OUTPATIENT)
Dept: BARIATRICS | Facility: CLINIC | Age: 43
End: 2018-01-09
Payer: COMMERCIAL

## 2018-01-09 ENCOUNTER — HOSPITAL ENCOUNTER (OUTPATIENT)
Dept: CARDIOLOGY | Facility: CLINIC | Age: 43
Discharge: HOME | End: 2018-01-09
Payer: COMMERCIAL

## 2018-01-09 VITALS
HEART RATE: 75 BPM | DIASTOLIC BLOOD PRESSURE: 75 MMHG | BODY MASS INDEX: 43.05 KG/M2 | WEIGHT: 267.88 LBS | HEIGHT: 66 IN | SYSTOLIC BLOOD PRESSURE: 108 MMHG | BODY MASS INDEX: 43.23 KG/M2 | WEIGHT: 267.88 LBS

## 2018-01-09 DIAGNOSIS — G47.33 OSA (OBSTRUCTIVE SLEEP APNEA): ICD-10-CM

## 2018-01-09 DIAGNOSIS — E66.01 MORBID OBESITY DUE TO EXCESS CALORIES: ICD-10-CM

## 2018-01-09 DIAGNOSIS — E66.01 MORBID OBESITY: ICD-10-CM

## 2018-01-09 DIAGNOSIS — E66.01 SEVERE OBESITY (BMI >= 40): ICD-10-CM

## 2018-01-09 DIAGNOSIS — G47.33 OSA (OBSTRUCTIVE SLEEP APNEA): Primary | ICD-10-CM

## 2018-01-09 PROCEDURE — 99499 UNLISTED E&M SERVICE: CPT | Mod: S$GLB,,, | Performed by: DIETITIAN, REGISTERED

## 2018-01-09 PROCEDURE — 99204 OFFICE O/P NEW MOD 45 MIN: CPT | Mod: S$GLB,,, | Performed by: PHYSICIAN ASSISTANT

## 2018-01-09 PROCEDURE — 93000 ELECTROCARDIOGRAM COMPLETE: CPT | Mod: S$GLB,,, | Performed by: INTERNAL MEDICINE

## 2018-01-09 PROCEDURE — 99999 PR PBB SHADOW E&M-EST. PATIENT-LVL II: CPT | Mod: PBBFAC,,, | Performed by: DIETITIAN, REGISTERED

## 2018-01-09 PROCEDURE — 99999 PR PBB SHADOW E&M-EST. PATIENT-LVL III: CPT | Mod: PBBFAC,,, | Performed by: PHYSICIAN ASSISTANT

## 2018-01-09 NOTE — PATIENT INSTRUCTIONS
Prior to surgery you will need to complete:  - Dietitian consult and clearance  - PCP clearance  - Labs  - Chest X-ray  - EKG  - Psychological evaluation, Please call psychiatry [phone] to schedule    In preparation for bariatric surgery, please complete the following:   · Discuss your current medications with your primary care provider, remember your medications will need to be crushed, chewable, or in liquid form for the first 3-6 months after a gastric bypass or sleeve.  For a gastric band, your medications will need to be crushed indefinitely.    · If you take a blood thinner such as: Coumadin (warfarin), Pradaxa (dabigatran), or Plavix (clopidogrel), you will need to speak with your prescribing provider on how or if this medication can be stopped before surgery.   · If you take a medication for depression or anxiety, you will need to begin crushing or opening the capsule 1-3 months prior to surgery.  Remember to discuss this with the psychologist or psychiatrist that you see.   · If you take medication for arthritis on a daily basis that is considered a non-steroidal anti-inflammatory (NSAID), please discuss with your prescribing physician an alternative medication.  After having gastric bypass or gastric sleeve, this group of medications is not appropriate to take due to increased risk of bleeding stomach ulcers.      DEFINITIONS  Appointments: Pre-scheduled meetings or consultations with any physician, advanced practice provider, dietitian, or psychologist, and labs, imaging studies, sleep studies, etc.   Late cancellation: Cancelling an appointment 24-48 hours prior to scheduled time.  No-Show appointment:  is when    You do NOT arrive to your appointment at the time its scheduled.   You call to cancel or cancel via [employer] less than 24 hours in advance of your scheduled appointment   You show up 15 minutes AFTER your scheduled appointment time without any notification of being late.      POLICY  1. You are allowed up to 3 cancellations for appointments.    After 3 cancellations your case will be placed on hold for 2 months and after that time you can resume the program.   2. You are allowed only 1 no-show for an appointment.    You will be re-scheduled one time and if there is a 2nd no-show at any point, your case will be placed on hold for 3 months.  After 3 months you can resume the program.     3. Upon resuming the program after being placed on hold for either above mentioned reasons, if you have a late cancel or no show for any appointment, the bariatric team will review if youre an appropriate candidate for surgery at the monthly meeting.

## 2018-01-09 NOTE — PROGRESS NOTES
BARIATRIC NEW PATIENT EVALUATION    CHIEF COMPLAINT:   Morbid obesity Body mass index is 43.23 kg/m². and inability to maintain weight loss.    HISTORY OF PRESENT ILLNESS:  Velma Lopez  is a 42 y.o. female presenting for morbid obesity Body mass index is 43.23 kg/m². and inability to maintain weight loss. The patient has tried Adipex, Dee César, Tess John, Dexatrim, Tinuate (lost 62 pounds), and other fad.  She states that she is usually able to lose 10-15 pounds but it is becoming harder.  She states that the MILEY has her very tired and her energy levels are low.  She reports that she has struggled with excess weight since age 13.  She states that her mother was over 300 pounds and had gastric bypass with Dr Dolan: Latisha Lopez.       PAST MEDICAL HISTORY:  Past Medical History:   Diagnosis Date    Chronic back pain     Morbid obesity with BMI of 40.0-44.9, adult     MILEY on CPAP     Plantar fasciitis of left foot          PAST SURGICAL HISTORY:  Past Surgical History:   Procedure Laterality Date    CYST REMOVAL      ovarian cyst removal at age 12    DILATION AND CURETTAGE OF UTERUS      WISDOM TOOTH EXTRACTION         FAMILY HISTORY:  Family History   Problem Relation Age of Onset    Cancer Maternal Grandmother     Diabetes Mother     No Known Problems Sister     No Known Problems Sister     No Known Problems Sister        SOCIAL HISTORY:  Social History     Social History    Marital status: Single     Spouse name: N/A    Number of children: N/A    Years of education: N/A     Occupational History    Not on file.     Social History Main Topics    Smoking status: Current Every Day Smoker     Packs/day: 0.50     Types: Cigarettes    Smokeless tobacco: Never Used    Alcohol use Yes      Comment: social    Drug use: No    Sexual activity: Yes     Partners: Male     Birth control/ protection: Injection     Other Topics Concern    Not on file     Social History Narrative     "Single.  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        MEDICATIONS:  Current Outpatient Prescriptions   Medication Sig Dispense Refill    brompheniramine-pseudoeph-DM 2-30-10 mg/5 mL Syrp Take 10 mLs by mouth 4 (four) times daily. 240 mL 0    ergocalciferol (ERGOCALCIFEROL) 50,000 unit Cap Take 1 capsule (50,000 Units total) by mouth every 7 days. 16 capsule 1    ibuprofen (ADVIL,MOTRIN) 600 MG tablet Take 1 tablet (600 mg total) by mouth 4 (four) times daily. 40 tablet 0    medroxyPROGESTERone (DEPO-PROVERA) 150 mg/mL Syrg Inject 1 mL (150 mg total) into the muscle every 3 (three) months. 1 mL 2    meloxicam (MOBIC) 15 MG tablet Take 15 mg by mouth daily as needed for Pain.      phentermine 37.5 MG capsule Take 37.5 mg by mouth every morning.       No current facility-administered medications for this visit.        ALLERGIES:  Review of patient's allergies indicates:  No Known Allergies    ROS:  Review of Systems   Constitutional: Negative for chills, fever and weight loss.   Eyes: Negative for blurred vision, double vision and pain.   Respiratory: Negative for cough, shortness of breath and wheezing.    Cardiovascular: Negative for chest pain, palpitations and leg swelling.   Gastrointestinal: Negative for abdominal pain, blood in stool, constipation, diarrhea, heartburn, melena, nausea and vomiting.   Genitourinary: Negative for dysuria, frequency and hematuria.   Skin: Negative for itching and rash.   Neurological: Negative for headaches.   Psychiatric/Behavioral: Negative for depression and suicidal ideas.       PE:  Vitals:    01/09/18 1309   BP: 108/75   Pulse: 75   Weight: 121.5 kg (267 lb 13.7 oz)   Height: 5' 6" (1.676 m)       Physical Exam   Constitutional: She is oriented to person, place, and time. She appears well-developed and well-nourished. No distress.   Morbidly obese   HENT:   Head: Normocephalic and atraumatic.   Eyes: Conjunctivae are normal. Right eye exhibits no " discharge. Left eye exhibits no discharge. No scleral icterus.   Neck: Neck supple.   Cardiovascular: Normal rate, regular rhythm and normal heart sounds.  Exam reveals no gallop and no friction rub.    No murmur heard.  Pulmonary/Chest: Effort normal and breath sounds normal. No respiratory distress. She has no wheezes. She has no rales.   Abdominal: Soft. Bowel sounds are normal. She exhibits no distension and no mass. There is no tenderness. There is no rebound and no guarding. No hernia.   Musculoskeletal: Normal range of motion. She exhibits no edema.   Neurological: She is alert and oriented to person, place, and time.   Skin: Skin is warm, dry and intact. Capillary refill takes less than 2 seconds. No rash noted. She is not diaphoretic. No erythema. No pallor.   Psychiatric: She has a normal mood and affect. Her speech is normal and behavior is normal. Judgment and thought content normal.   Nursing note and vitals reviewed.       DIAGNOSIS:  1. Morbid obesity with Body mass index is 43.23 kg/m². and inability to lose weight.  2. Co-morbidities: MILEY on CPAP.    PLAN:  The patient is a good candidate for Bariatric Surgery. she is interested in sleeve gastrectomy with Dr. Dolan. The surgery and post-op care were discussed in detail with the patient. All questions were answered.    she understands that bariatric surgery is a tool to aid in weight loss and that she needs to be committed to the diet and exercise post-operatively for successful weight loss.  Discussed expected weight loss outcomes after surgery which is 50% of the excess weight on her frame.  Goal weight is 200#s.  Discussed with patient that bariatric surgery is not the easy way out and that it will take plenty of dedication on the patient's part to be successful. Also discussed the possibility of weight regain if the patient strays from the diet guidelines or exercise requirements. Patient verbalized understanding and wishes to proceed with  the work-up.    ORDERS:  1. Chest X-Ray and EKG.  2. Psychological Consult, Bariatric Dietician Consult and PCP Clearance.  3. Bariatric Labs: BMP, CBC, Folate Serum, H. pylori, HgA1C, Hepatic Panel/LFT, Iron & TIBC, Lipid Profile, Magnesium, Phosphate, T3, T4, TSH, Free T4, Vitamin B12, and Vitamin B1.  4. Quit Smoking.     Primary Physician: Aftab Martinez MD  RTC: As scheduled.    Blue packet reviewed, pertinent information entered in Epic.    45 minute visit, over 50% of time spent counseling patient face to face on surgical options, risks, benefits, expected diet, recommended exercise regimen, and expected weight loss.

## 2018-01-09 NOTE — Clinical Note
January 9, 2018      Aftab Martinez MD  [address]  [address]  [facility] [address]           [facility]  [address]  [facility] [address]  Phone: [phone]  Fax: [phone]          Patient: Velma Lopez   MR Number: [id number]   YOB: 1975   Date of Visit: 1/9/2018       Dear Dr. Aftab Martinez:    Thank you for referring Velma Lopez to me for evaluation. Attached you will find relevant portions of my assessment and plan of care.    If you have questions, please do not hesitate to call me. I look forward to following Velma Lopez along with you.    Sincerely,    SARAH Osunaosure  CC:  No Recipients    If you would like to receive this communication electronically, please contact [email or web address] or [phone] to request more information on [address] [facility] access.    For providers and/or their staff who would like to refer a patient to [facility], please contact us through our one-stop-shop provider referral line, [facility], at [phone].    If you feel you have received this communication in error or would no longer like to receive these types of communications, please e-mail [email or web address]

## 2018-01-09 NOTE — LETTER
[facility]  [address]  [facility] [address]  Phone: [phone]  Fax: [phone] January 9, 2018      Aftab Martinez MD  [address]  [facility] [address]  [facility] [address]    Patient: Velma Lopez   MR Number: [id number]   YOB: 1975   Date of Visit: 1/9/2018     Dear Dr. Martinez:    Thank you for referring Velma Lopez to me for evaluation. Below are the relevant portions of my assessment and plan of care.    ASSESSMENT:   Velma Lopez  is a 42-year-old female presenting for morbid obesity Body mass index is 43.23 kg/m², and inability to maintain weight loss. The patient has tried Adipex, Dee César, Tess John, Dexatrim, Tinuate (lost 62 pounds), and other fad.  She states that she is usually able to lose 10-15 pounds but it is becoming harder.  She states that the MILEY has her very tired and her energy levels are low.  She reports that she has struggled with excess weight since age 13.  She states that her mother was over 300 pounds and had gastric bypass with Dr Dolan: Latisha Lopez.     DIAGNOSIS:  1. Morbid obesity with Body mass index is 43.23 kg/m². and inability to lose weight.  2. Co-morbidities: MILEY on CPAP.     PLAN:  The patient is a good candidate for Bariatric Surgery. she is interested in sleeve gastrectomy with Dr. Dolan. The surgery and post-op care were discussed in detail with the patient. All questions were answered.     She understands that bariatric surgery is a tool to aid in weight loss and that she needs to be committed to the diet and exercise post-operatively for successful weight loss.  Discussed expected weight loss outcomes after surgery which is 50% of the excess weight on her frame.  Goal weight is 200#s.  Discussed with patient that bariatric surgery is not the easy way out and that it will take plenty of dedication on the patient's part to be successful. Also discussed the possibility of  weight regain if the patient strays from the diet guidelines or exercise requirements. Patient verbalized understanding and wishes to proceed with the work-up.     ORDERS:  1. Chest X-Ray and EKG.  2. Psychological Consult, Bariatric Dietician Consult and PCP Clearance.  3. Bariatric Labs: BMP, CBC, Folate Serum, H. pylori, HgA1C, Hepatic Panel/LFT, Iron & TIBC, Lipid Profile, Magnesium, Phosphate, T3, T4, TSH, Free T4, Vitamin B12, and Vitamin B1.  4. Quit Smoking.     If you have questions, please do not hesitate to call me. I look forward to following Velma along with you.    Sincerely,      Bernice Marie PA-C   Section of Bariatric Surgery  [facility]    ANDREW/amado

## 2018-01-09 NOTE — PATIENT INSTRUCTIONS
Continue to review Bariatric Nutrition Guidebook at home and call with any questions.  Work on Bariatric Nutrition Checklist.  Work on expanding variety of vegetables.  Work on gradually cutting back on starchy CHO in the diet.  1200-calorie diet.  5-6 meals per day.  Start including protein supplements in the diet plan daily.  Reduce frequency of dining out.  More grocery shopping and meal preparation at home.  Increase exercise.  Start shopping for bariatric vitamins & minerals.  Return to clinic.  1200- 1500 calorie Sample meal plan  80-120g protein per day.   Protein drinks and bars: 0-4 grams sugar  Drink lots of water throughout the day and exercise!  MENU # 1  Breakfast: 2 eggs, 1 turkey sausage esau, 1 apple  Snack: Atkins bar  Lunch: 2 roll-ups (sliced turkey, low-fat sliced cheese, mustard), 12 baby carrots dipped in 1 Tbsp natural peanut butter  Mid-Day Snack: ¼ cup unsalted almonds, ½ cup fruit  Dinner: 1 chicken thigh simmered in tomato sauce + 2 Tbsp mozzarella cheese, ½ cup black beans, 1/2 cup steamed carrots  Evening Snack: 1/2 cup grapes with 4 cubes low-fat cheddar cheese   ___________________________________________________  MENU # 2  Breakfast: 200 calorie protein drink  Mid-morning snack : ¼ cup unsalted nuts, medium banana  Lunch: 3oz tuna or chicken salad made with 2 tbsp light burrell, over salad with tomatoes and cucumbers.   Snack: low-fat cheese stick  Dinner: 3oz hamburger esau, slice of low-fat cheese, 1 cup boiled yellow squash and zucchini.   Snack: 6oz light yogurt  _______________________________________________________  Menu #3  Breakfast: 6oz plain Greek yogurt + fruit (½ banana, ½ cup fruit - pineapple, blueberries, strawberries, peach), may add Splenda to antione.  Lunch: Grilled  chicken breast w/ slice low-fat pepper fatou cheese, 1/2 cup grilled/baked asparagus and small salad with Salad Spritzer.    Mid-Day snack: 200 calorie protein drink   Dinner: 4oz Grilled fish, ½ cup  white beans, ½ cup cooked spinach  Evening Snack: fudgsicle no-sugar-added    Menu # 4  Breakfast: 1 scoop vanilla protein powder + 4oz skim milk + 4oz coffee   Snack: Pure protein bar  Lunch: 2 Lettuce tacos: 3oz seasoned ground turkey wrapped in a Art lettuce leaves with 1 Tbsp shredded cheese and dollop low-fat sour cream  Snack: ½ cup cottage cheese, ½ cup pineapple chunks  Dinner: Shrimp omelet: 2 eggs, ½ cup shrimp, green onions, and shredded cheese  ______________________________________________________  Menu #5  Breakfast: 1 cup low-fat cottage cheese, ½ cup peaches (no sugar added)  Snack: 1 apple, 4 cubes cheese  Lunch: 3oz baked pork chop, 1 cup okra and tomato stew  Snack: 1/2 cup black beans + salsa + dollop light sour cream  Dinner: Caprese chicken salad: 3 oz chicken breast, 1oz fresh mozzarella, sliced tomato, 1 Tbsp olive oil, basil  Snack: sugar-free popsicle    Menu #6  Breakfast: ½ cup part-skim ricotta cheese, 2 Tbsp sugar-free strawberry preserves, sprinkle of slivered almonds  Snack: 1 orange  Lunch: 3 oz canned chicken, 1oz shredded cheddar cheese, ½  cup black beans, 2 Tbsp salsa  Snack: 200 calorie Protein drink  Dinner: 4 oz grilled salmon steak, over mixed green salad with 2 tbsp light dressing    Meal Ideas for Regular Bariatric Diet  *Recipes and products available at [email or web address]      Breakfast: (15-20g protein)    - Egg white omelet: 2 egg whites or ½ cup Egg Beaters. (Optional proteins: cheese, shrimp, black beans, chicken, sliced turkey) (Optional veggies: tomatoes, salsa, spinach, mushrooms, onions, green peppers, or small slice avocado)     - Egg and sausage: 1 egg or ¼ cup Egg Beaters (any variety), with 1 esau or 2 links of Turkey sausage or Veggie breakfast sausage (Kinsights or MedClimate)    - Crust-less breakfast quiche: To make a glass pie dish, mix 4oz part skim Ricotta, 1 cup skim milk, and 2 eggs as your base. Add protein: shredded cheese, sliced lean  ham or turkey, turkey cash/sausage. Add veggies: tomato, onion, green onion, mushroom, green pepper, spinach, etc.    - Yogurt parfait: Mix 1 - 6oz container Dannon Light N Fit vanilla yogurt, with ¼ cup crushed unsalted nuts    - Cottage cheese and fruit: ½ cup part-skim cottage cheese or ricotta cheese topped with fresh fruit or sugar free preserves     - Aspen Verdugo's Vanilla Egg custard* (add 2 Tbsp instant coffee granules to make Cappuccino Custard*)    - Hi-Protein café latte (skim milk, decaf coffee, 1 scoop protein powder). Optional to add Sugar free syrup or extract flavoring.    - Breakfast Lox: spread fat free cream cheese on slices of smoked salmon. Serve over scrambled or egg over easy (sauteed with nonstick cookspray) OR on a cucumber slice    - Eggwhich: Scramble or cook 1 large egg over easy using nonstick cookspray. Place between 2 slices of R[place] cash and low fat cheese.     Lunch: (20-30g protein)    - ½ cup Black bean soup (Homemade or Progresso), with ¼ cup shredded low-fat cheese. Top with chopped tomato or fresh salsa.     - Lean deli turkey breast and low-fat sliced cheese, mustard or light burrell to moisten, rolled up together, or wrapped in a Art lettuce leaf    - Chicken salad made from dinner leftovers, moisten with low-fat salad dressing or light burrell. Also try leftover salmon, shrimp, tuna or boiled eggs. Serve ½ cup over dark green salad    - Fat-free canned refried beans, topped with ¼ cup shredded low-fat cheese. Top with chopped tomato or fresh salsa.     - Greek salad: Top mixed greens with 1-2oz grilled chicken, tomatoes, red onions, 2-3 kalamata olives, and sprinkle lightly with feta cheese. Spritz with Balsamic vinegar to taste.     - Crust-less lunch quiche: To make a glass pie dish, mix 4oz part skim Ricotta, 1 cup skim milk, and 2 eggs as your base. Add protein: shredded cheese, sliced lean ham or turkey, shrimp, chicken. Add veggies: tomato, onion, green onion,  mushroom, green pepper, spinach, artichoke, broccoli, etc.    - Pizza bake: spread a  mary ava mushroom with tomato sauce, low-fat shredded mozzarella and turkey pepperoni or Cypriot cash. Add any veggies. Roast for 10-15 minutes, until cheese melted.     - Cucumber crab bites: Spread ¼ cup crab dip (lump crabmeat + light cream cheese and green onions) over sliced cucumber.     - Chicken with light spinach and artichoke dip*: Puree in : 6oz cooked and drained spinach, 2 cloves garlic, 1 can cannelloni beans, ½ cup chopped green onions, 1 can drained artichoke hearts (not marinated in oil), lemon juice and basil. Mix in 2oz chopped up chicken.    Supper: (20-30g protein)    - Serve grilled fish over dark green salad tossed with low-fat dressing, served with grilled asparagus person     - Rotisserie chicken salad: served with sliced strawberries, walnuts, fat-free feta cheese crumbles and 1 tbsp Pedrozas Own Light Raspberry McRae Vinaigrette    - Shrimp cocktail: Dip cold boiled shrimp in homemade low-sugar cocktail sauce (1/2 cup Arnold One Carb ketchup, 2 tbsp horseradish, 1/4 tsp hot sauce, 1 tsp Worcestershire sauce, 1 tbsp freshly-squeezed lemon juice). Serve with dark green salad, walnuts, and crumbled blue cheese drizzled with olive oil and Balsamic vinegar    - Tuna Melt: Spread tuna salad onto 2 thick slices of tomato. Top with low-fat cheese and broil until cheese is melted. May also be made with chicken salad of shrimp salad. Rulo with different types of cheeses.    - Chicken or beef fajitas (no tortilla, rice, beans, chips). Top meat and veggies w/ fresh salsa, fat free sour cream.     - Homemade low-fat Chili using extra lean ground beef or ground turkey. Top with shredded cheese and salsa as desired. May add dollop fat-free sour cream if desired    - Chicken parmesan: Top chicken breast w/ low sugar marinara sauce, mozzarella cheese and bake until chicken reaches 165*.  Serve w/  spaghetti SQUASH or Persian cut green beans    - Dinner Omelet with shrimp or chicken and onion, green peppers and chives.    - No noodle lasagna: Use sliced zucchini or eggplant in place of noodles.  Layer with part skim ricotta cheese and low sugar meat sauce (use very lean ground beef or ground turkey).    - Mexican chicken bake: Bake chunks of chicken breast or thigh with taco seasoning, Pace brand enchilada sauce, green onions and low-fat cheese. Serve with ¼ cup black beans or fat free refried beans topped with chopped tomatoes or salsa.    - Jos frozen meatballs, simmered in Classico Marinara sauce. Different flavors of salsa or spaghetti sauce create different dishes! Sprinkle with parmesan cheese. Serve with grilled or steamed veggies, or a dark green salad.    - Simmer boneless skinless chicken thigh chunks in Classico Marinara sauce or roasted salsa until tender with chopped onion, bell pepper, garlic, mushrooms, spinach, etc.     - Hamburger or veggie burger, without the bun, dressed the way you like. Served with grilled or steamed veggies.    - Eggplant parmesan: Bake slices of eggplant at 350 degrees for 15 minutes. Layer tomato sauce, sliced eggplant and low-fat mozzarella cheese in a baking dish and cover with foil. Bake 30-40 more minutes or until bubbly. Uncover and bake at 400 degrees for about 15 more minutes, or until top is slightly crisp.    - Fish tacos: grilled/baked white fish, wrapped in Art lettuce leaf, topped with salsa, shredded low-fat cheese, and light coleslaw.    - Chicken aurea: Sprinkle chicken w/ 1 tsp of hidden valley ranch dip mix. Then grill chicken and top with black beans, salsa and 1 tsp fat free sour cream.     - Cauliflower pizza crust: Use cauliflower as crust (see recipe on pinterest, no flour!). Top w/ low fat cheese, turkey pepperoni and veggies and bake again    - chicken or turkey crust pizza: use ground chicken or turkey instead of cauliflower, spread  in [place] and bake at 350 for about 20-30 minutes(may want to add garlic, black pepper, oregano and other herbs to ground meat mixture).  Remove and top w/ low fat cheese, turkey pepperoni and veggies and bake again for another 10 minutes or until cheese is browned.     Snacks: (100-200 calories; >5g protein)    - 1 low-fat cheese stick with 8 cherry tomatoes or 1 serving fresh fruit  - 4 thin slices fat-free turkey breast and 1 slice low-fat cheese  - 4 thin slices fat-free honey ham with wedge of melon  - 6-8 edamame pods (equivalent to about 1/4 cup edamame without pods).   - 1/4 cup unsalted nuts with ½ cup fruit  - 6-oz container Dannon Light n Fit vanilla yogurt, topped with 1oz unsalted nuts         - apple, celery or baby carrots spread with 2 Tbsp PB2  - apple slices with 1 oz slice low-fat cheese  - Apple slices dipped in 2 Tbsp of PB2  - celery, cucumber, bell pepper or baby carrots dipped in ¼ cup hummus bean spread or light spinach and artichoke dip (*recipe in lunch section)  - celery, cucumber, baby carrots dipped in high protein greek yogurt (Mix 16 oz plain greek yogurt + 1 packet of hidden valley ranch dip mix)  - Scottie Links Beef Steak - 14g protein! (similar to beef jerky)  - 2 wedges Laughing Cow - Light Herb & Garlic Cheese with sliced cucumber or green bell pepper  - 1/2 cup low-fat cottage cheese with ¼ cup fruit or ¼ cup salsa  - RTD Protein drinks: Atkins, Low Carb Slim Fast, EAS light, Muscle Milk Light, etc.  - Homemade Protein drinks: GNC Soy95, Isopure, Nectar, UNJURY, Whey Gourmet, etc. Mix 1 scoop powder with 8oz skim/1% milk or light soymilk.  - Protein bars: Atkins, EAS, Pure Protein, Think Thin, Detour, etc. Must have 0-4 grams sugar - Read the label.    Takeout Options: No more than twice/week  Deli - Salads (no pasta or rice), meats, cheeses. Roasted chicken. Lox (salmon)    Mexican - Platters which don't include tortillas, chips, or rice. Go easy on the beans. Example: Erick  without the tortillas. Ask the  not to bring chips to the table if they are too tempting.    Greek - Meat or fish and vegetable, but no bread or rice. Including hummus, baba ganoush, etc, is OK. Most sit-down Greek restaurants can provide you with cucumber slices for dipping instead of sofya bread.    Fast Food (Avoid as much as possible) - Salads (no croutons and limit salad dressing to 2 tbsp), grilled chicken sandwich without the bun and ask for no burrell. Jessicas low fat chili or Taco Bell pintos and cheese.    BBQ - The meats are fine if you ask for sauces on the side, but most of the traditional side dishes are loaded with carbs. Kingsley slaw, baked beans and BBQ sauce are typically made with sugar.    Chinese - Nothing deep-fried, no rice or noodles. Many Chinese sauces have starch and sugar in them, so you'll have to use your judgement. If you find that these sauces trigger cravings, or cause Dumping, you can ask for the sauce to be made without sugar or just use soy sauce.

## 2018-01-09 NOTE — PROGRESS NOTES
NUTRITIONAL CONSULT    Referring Physician: Sean Dolan M.D.  Reason for MNT Referral: Initial assessment for sleeve gastrectomy work-up    PAST MEDICAL HISTORY:   42 y.o. female presents with a BMI of Body mass index is 43.23 kg/m².  Weight history includes she reports that she has struggled with excess weight since age 14-15.  She states that her mother was over 300 pounds and had gastric bypass with Dr Dolan.     Dieting attempts include The patient has tried Adipex, Dee César, Tess Ojhn, Dexatrim, Tinuate (lost 62 pounds), and other fad.  She states that she is usually able to lose 10-15 pounds but it is becoming harder.  She states that the MILEY has her very tired and her energy levels are low.     Past Medical History:   Diagnosis Date    Chronic back pain     Morbid obesity with BMI of 40.0-44.9, adult     MILEY on CPAP     Plantar fasciitis of left foot        CLINICAL DATA:  42 y.o.-year-old Black or  female.  Height: 5 ft 6 in  Weight: 267 lbs  IBW: 144 lbs  BMI: 43  The patient's goal weight (50% EBW): 205 lbs  Personal goal weight: 170 lbs    Goal for Bariatric Surgery: to improve health, to improve quality of life, to lose weight and to prevent future medical conditions    NUTRITION & HEALTH HISTORY:  Greatest challenge: dining out frequency, starchy CHO, portion control, irregular meal patterns and high-fat diet    Current diet recall:   Yesterday  Brk: skipped  Billie: tuna salad with Ritz crackers  Di: homemade large bowl spaghetti and meatballs    Current Diet:  Meal pattern: 2-3 meals/day (irregular meal pattern)  Protein supplements: protein bars in the past   Snackin / day on occasion (chocolate, chips, pig lips, Snickers, peanut M&M's, peanuts, cereal with milk)  Vegetables: Likes a variety. Eats 2-3 times per week.  Fruits: Likes a variety. Eats once per week.  Beverages: water, sugar-free beverages, diet tea and 2% milk  Dining out: Daily. Weekly. (1-3  times per week) Mostly fast food, restaurants and take-out. ([employer], [employer], [place] [facility] [place]  Cooking at home: Weekly. (cooks every Sunday, 1-2 times per week) Mostly baked, grilled and fried meat, fish, starchy CHO and vegetables.    Exercise:  Past exercise: Adequate (when she was dieting)    Current exercise: None  Restrictions to exercise: none    Vitamins / Minerals / Herbs:   Calcium  Vitamin D-3    Food Allergies:   No known    Social:  Works regular daytime shifts.  Lives with son.  Grocery shopping and food prep done by patient.  Patient believes the household will be supportive after surgery.   Alcohol: Socially. (twice/month and holidays)   Smoking: 3 packs per week. Trying to quit.    ASSESSMENT:  · Patient reports attempts at weight loss, only to regain lost weight.  · Patient demonstrated knowledge of healthy eating behaviors and exercise patterns; admits to not eating healthy and not exercising at this point.  · Patient states willingness to change lifestyle and make behavior modifications.  · Expect good  compliance after surgery at this time.    Insurance requires NO medically supervised diet prior to consideration for bariatric surgery.    BARIATRIC DIET DISCUSSION:  Discussed diet after surgery and related to patients food record.  Reviewed diet progression before and after surgery.  Reinforced that surgery is not a magic bullet and importance of low fat foods and no snacking.  Stressed importance of exercise and its role in achieving weight loss goals.  Answered all questions.    RECOMMENDATIONS:  Patient is a potential candidate for bariatric surgery-sleeve.    Needs additional visit(s) with RD for dietary modifications in preparation for bariatric surgery.    PLAN:  Continue to review Bariatric Nutrition Guidebook at home and call with any questions.  Work on Bariatric Nutrition Checklist.  Work on expanding variety of vegetables.  Work on gradually cutting back on starchy CHO in the  diet.  1200-calorie diet.  5-6 meals per day.  Start including protein supplements in the diet plan daily.  Reduce frequency of dining out.  More grocery shopping and meal preparation at home.  Increase exercise.  Start shopping for bariatric vitamins & minerals.  Return to clinic.    SESSION TIME:  60 minutes

## 2018-01-10 ENCOUNTER — PATIENT MESSAGE (OUTPATIENT)
Dept: BARIATRICS | Facility: CLINIC | Age: 43
End: 2018-01-10

## 2018-01-12 ENCOUNTER — LAB VISIT (OUTPATIENT)
Dept: LAB | Facility: HOSPITAL | Age: 43
End: 2018-01-12
Attending: SURGERY
Payer: COMMERCIAL

## 2018-01-12 DIAGNOSIS — E66.01 MORBID OBESITY: ICD-10-CM

## 2018-01-12 DIAGNOSIS — G47.33 OSA (OBSTRUCTIVE SLEEP APNEA): ICD-10-CM

## 2018-01-12 LAB
ALBUMIN SERPL BCP-MCNC: 3.3 G/DL
ALP SERPL-CCNC: 71 U/L
ALT SERPL W/O P-5'-P-CCNC: 18 U/L
ANION GAP SERPL CALC-SCNC: 9 MMOL/L
AST SERPL-CCNC: 13 U/L
BASOPHILS # BLD AUTO: 0.08 K/UL
BASOPHILS NFR BLD: 1 %
BILIRUB DIRECT SERPL-MCNC: 0.3 MG/DL
BILIRUB SERPL-MCNC: 0.5 MG/DL
BUN SERPL-MCNC: 10 MG/DL
CALCIUM SERPL-MCNC: 9.1 MG/DL
CHLORIDE SERPL-SCNC: 113 MMOL/L
CHOLEST SERPL-MCNC: 128 MG/DL
CHOLEST/HDLC SERPL: 3.4 {RATIO}
CO2 SERPL-SCNC: 21 MMOL/L
CREAT SERPL-MCNC: 0.8 MG/DL
DIFFERENTIAL METHOD: ABNORMAL
EOSINOPHIL # BLD AUTO: 0.4 K/UL
EOSINOPHIL NFR BLD: 5.1 %
ERYTHROCYTE [DISTWIDTH] IN BLOOD BY AUTOMATED COUNT: 12 %
EST. GFR  (AFRICAN AMERICAN): >60 ML/MIN/1.73 M^2
EST. GFR  (NON AFRICAN AMERICAN): >60 ML/MIN/1.73 M^2
ESTIMATED AVG GLUCOSE: 131 MG/DL
FOLATE SERPL-MCNC: 9.6 NG/ML
GLUCOSE SERPL-MCNC: 171 MG/DL
HBA1C MFR BLD HPLC: 6.2 %
HCT VFR BLD AUTO: 39.8 %
HDLC SERPL-MCNC: 38 MG/DL
HDLC SERPL: 29.7 %
HGB BLD-MCNC: 12.8 G/DL
IMM GRANULOCYTES # BLD AUTO: 0.03 K/UL
IMM GRANULOCYTES NFR BLD AUTO: 0.4 %
IRON SERPL-MCNC: 95 UG/DL
LDLC SERPL CALC-MCNC: 75.4 MG/DL
LYMPHOCYTES # BLD AUTO: 2.9 K/UL
LYMPHOCYTES NFR BLD: 36.7 %
MAGNESIUM SERPL-MCNC: 1.8 MG/DL
MCH RBC QN AUTO: 30.5 PG
MCHC RBC AUTO-ENTMCNC: 32.2 G/DL
MCV RBC AUTO: 95 FL
MONOCYTES # BLD AUTO: 0.7 K/UL
MONOCYTES NFR BLD: 8.1 %
NEUTROPHILS # BLD AUTO: 3.9 K/UL
NEUTROPHILS NFR BLD: 48.7 %
NONHDLC SERPL-MCNC: 90 MG/DL
NRBC BLD-RTO: 0 /100 WBC
PHOSPHATE SERPL-MCNC: 3.1 MG/DL
PLATELET # BLD AUTO: 377 K/UL
PMV BLD AUTO: 10.2 FL
POTASSIUM SERPL-SCNC: 3.9 MMOL/L
PROT SERPL-MCNC: 6.8 G/DL
RBC # BLD AUTO: 4.2 M/UL
SATURATED IRON: 25 %
SODIUM SERPL-SCNC: 143 MMOL/L
T3 SERPL-MCNC: 94 NG/DL
T4 FREE SERPL-MCNC: 0.95 NG/DL
T4 SERPL-MCNC: 7.3 UG/DL
TOTAL IRON BINDING CAPACITY: 382 UG/DL
TRANSFERRIN SERPL-MCNC: 258 MG/DL
TRIGL SERPL-MCNC: 73 MG/DL
TSH SERPL DL<=0.005 MIU/L-ACNC: 0.88 UIU/ML
VIT B12 SERPL-MCNC: 789 PG/ML
WBC # BLD AUTO: 8.01 K/UL

## 2018-01-12 PROCEDURE — 80048 BASIC METABOLIC PNL TOTAL CA: CPT

## 2018-01-12 PROCEDURE — 84443 ASSAY THYROID STIM HORMONE: CPT

## 2018-01-12 PROCEDURE — 84480 ASSAY TRIIODOTHYRONINE (T3): CPT

## 2018-01-12 PROCEDURE — 82607 VITAMIN B-12: CPT

## 2018-01-12 PROCEDURE — 80061 LIPID PANEL: CPT

## 2018-01-12 PROCEDURE — 83036 HEMOGLOBIN GLYCOSYLATED A1C: CPT

## 2018-01-12 PROCEDURE — 83540 ASSAY OF IRON: CPT

## 2018-01-12 PROCEDURE — 84100 ASSAY OF PHOSPHORUS: CPT

## 2018-01-12 PROCEDURE — 82746 ASSAY OF FOLIC ACID SERUM: CPT

## 2018-01-12 PROCEDURE — 84425 ASSAY OF VITAMIN B-1: CPT

## 2018-01-12 PROCEDURE — 83735 ASSAY OF MAGNESIUM: CPT

## 2018-01-12 PROCEDURE — 84436 ASSAY OF TOTAL THYROXINE: CPT

## 2018-01-12 PROCEDURE — 85025 COMPLETE CBC W/AUTO DIFF WBC: CPT

## 2018-01-12 PROCEDURE — 86677 HELICOBACTER PYLORI ANTIBODY: CPT

## 2018-01-12 PROCEDURE — 80076 HEPATIC FUNCTION PANEL: CPT

## 2018-01-12 PROCEDURE — 84439 ASSAY OF FREE THYROXINE: CPT

## 2018-01-15 LAB — H PYLORI IGG SERPL QL IA: NEGATIVE

## 2018-01-16 LAB — VIT B1 SERPL-MCNC: 43 UG/L (ref 38–122)

## 2018-01-19 ENCOUNTER — OFFICE VISIT (OUTPATIENT)
Dept: SLEEP MEDICINE | Facility: CLINIC | Age: 43
End: 2018-01-19
Payer: COMMERCIAL

## 2018-01-19 VITALS
HEART RATE: 85 BPM | BODY MASS INDEX: 43.72 KG/M2 | DIASTOLIC BLOOD PRESSURE: 82 MMHG | HEIGHT: 66 IN | SYSTOLIC BLOOD PRESSURE: 119 MMHG | WEIGHT: 272.06 LBS

## 2018-01-19 DIAGNOSIS — G47.9 SLEEP DISTURBANCE: ICD-10-CM

## 2018-01-19 DIAGNOSIS — G47.33 OBSTRUCTIVE SLEEP APNEA: Primary | ICD-10-CM

## 2018-01-19 PROCEDURE — 99999 PR PBB SHADOW E&M-EST. PATIENT-LVL III: CPT | Mod: PBBFAC,,, | Performed by: NURSE PRACTITIONER

## 2018-01-19 PROCEDURE — 99214 OFFICE O/P EST MOD 30 MIN: CPT | Mod: S$GLB,,, | Performed by: NURSE PRACTITIONER

## 2018-01-19 RX ORDER — ZOLPIDEM TARTRATE 5 MG/1
5 TABLET ORAL NIGHTLY PRN
Qty: 30 TABLET | Refills: 0 | Status: SHIPPED | OUTPATIENT
Start: 2018-01-19 | End: 2019-01-31
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
## 2018-01-23 ENCOUNTER — OFFICE VISIT (OUTPATIENT)
Dept: INTERNAL MEDICINE | Facility: CLINIC | Age: 43
End: 2018-01-23
Attending: FAMILY MEDICINE
Payer: COMMERCIAL

## 2018-01-23 VITALS
SYSTOLIC BLOOD PRESSURE: 116 MMHG | BODY MASS INDEX: 43.82 KG/M2 | HEIGHT: 66 IN | DIASTOLIC BLOOD PRESSURE: 74 MMHG | HEART RATE: 88 BPM | OXYGEN SATURATION: 100 % | WEIGHT: 272.69 LBS

## 2018-01-23 DIAGNOSIS — G47.33 OSA (OBSTRUCTIVE SLEEP APNEA): ICD-10-CM

## 2018-01-23 DIAGNOSIS — E66.01 SEVERE OBESITY (BMI >= 40): ICD-10-CM

## 2018-01-23 DIAGNOSIS — Z00.00 PREVENTATIVE HEALTH CARE: Primary | ICD-10-CM

## 2018-01-23 PROCEDURE — 99999 PR PBB SHADOW E&M-EST. PATIENT-LVL III: CPT | Mod: PBBFAC,,, | Performed by: FAMILY MEDICINE

## 2018-01-23 PROCEDURE — 99396 PREV VISIT EST AGE 40-64: CPT | Mod: S$GLB,,, | Performed by: FAMILY MEDICINE

## 2018-01-23 NOTE — PROGRESS NOTES
"CHIEF COMPLAINT: Re-establish a primary care physician    HISTORY OF PRESENT ILLNESS: The patient is a morbidly obese 43 year-old black female.  She recently began seeing bariatric surgery.  She is considering gastric sleeve.  She is really on the fence about it at this time.  There is no reason she could not undergo the procedure.  She would probably benefit substantially from it.  As part of her workup for the bariatric surgery she was found to be vitamin D deficient.  She is on high-dose vitamin D replacement at this time.    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      The patient has a long history of intermittent low back pain.    REVIEW OF SYSTEMS:  GENERAL: No fever, chills, fatigability or weight loss.  SKIN: No rashes, itching or changes in color or texture of skin.  HEAD: No headaches or recent head trauma.  EYES: Visual acuity fine. No photophobia, ocular pain or diplopia.  EARS: Denies ear pain, discharge or vertigo.  NOSE: No loss of smell, no epistaxis or postnasal drip.  MOUTH & THROAT: No hoarseness or change in voice. No excessive gum bleeding.  NODES: Denies swollen glands.  CHEST: Denies JONES, cyanosis, wheezing, cough and sputum production.  CARDIOVASCULAR: Denies chest pain, PND, orthopnea or reduced exercise tolerance.  ABDOMEN: Appetite fine. No weight loss. Denies diarrhea, abdominal pain, hematemesis or blood in stool.  URINARY: No flank pain, dysuria or hematuria.  PERIPHERAL VASCULAR: No claudication or cyanosis.  MUSCULOSKELETAL: No joint stiffness or swelling. She does have chronic intermittent back pain.  NEUROLOGIC: No history of seizures, paralysis, alteration of gait or coordination.    SOCIAL HISTORY: The patient does not smoke.  The patient consumes alcohol socially. 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    PHYSICAL EXAMINATION:     Blood pressure 116/74, pulse 88, height 5' 6" (1.676 m), weight 123.7 kg (272 lb 11.3 oz), " last menstrual period 01/09/2018, SpO2 100 %.    APPEARANCE: Well nourished, well developed, in no acute distress.    HEAD: Normocephalic, atraumatic.  EYES: PERRL. EOMI.  Conjunctivae without injection and  anicteric  EARS: TM's intact. Light reflex normal. No retraction or perforation.    NOSE: Mucosa pink. Airway clear.  MOUTH & THROAT: No tonsillar enlargement. No pharyngeal erythema or exudate. No stridor.  NECK: Supple.   NODES: No cervical, axillary or inguinal lymph node enlargement.  CHEST: Lungs clear to auscultation.  No retractions are noted.  No rales or rhonchi are present.  CARDIOVASCULAR: Normal S1, S2. No rubs, murmurs or gallops.  ABDOMEN: Bowel sounds normal. Not distended. Soft. No tenderness or masses.  No ascites is noted.  MUSCULOSKELETAL:  There is no clubbing, cyanosis, or edema of the extremities x4.  There is full range of motion of the lumbar spine.  There is full range of motion of the extremities x4.  There is no deformity noted.    NEUROLOGIC:       Normal speech development.      Hearing normal.      Normal gait.      Motor and sensory exams grossly normal.      DTR's normal.  PSYCHIATRIC: Patient is alert and oriented x3.  Thought processes are all normal.  There is no homicidality.  There is no suicidality.  There is no evidence of psychosis.    LABORATORY/RADIOLOGY:   Chart reviewed.      ASSESSMENT:   Annual exam   Morbid obesity with BMI of 44  Chronic low back pain  Concern about diabetes    PLAN:  She is medically cleared and optimized for bariatric surgery and general anesthesia  We reviewed her recent blood work.  The only abnormality is the low vitamin D.    Return to clinic in 12 months

## 2018-11-13 ENCOUNTER — PATIENT MESSAGE (OUTPATIENT)
Dept: ADMINISTRATIVE | Facility: OTHER | Age: 43
End: 2018-11-13

## 2018-11-13 ENCOUNTER — DOCUMENTATION ONLY (OUTPATIENT)
Dept: BARIATRICS | Facility: CLINIC | Age: 43
End: 2018-11-13

## 2019-01-14 ENCOUNTER — TELEPHONE (OUTPATIENT)
Dept: INTERNAL MEDICINE | Facility: CLINIC | Age: 44
End: 2019-01-14

## 2019-01-14 DIAGNOSIS — Z12.31 VISIT FOR SCREENING MAMMOGRAM: Primary | ICD-10-CM

## 2019-01-14 NOTE — TELEPHONE ENCOUNTER
----- Message from Sabina Whyte sent at 1/14/2019  4:47 PM CST -----  Contact: pt   Name of Who is Calling: PATRICIO CRANE [[id number]]      What is the request in detail:   Patient is returning a call in regards to scheduling for her annual appointment.......Please contact to further discuss and advise.       Can the clinic reply by [facility]:  no       What Number to Call Back if not in [facility]:  [phone]

## 2019-01-14 NOTE — TELEPHONE ENCOUNTER
----- Message from Richardson Perez sent at 1/14/2019  3:52 PM CST -----  Contact: PATRICIO CRANE [[id number]]  Name of Who is Calling: PATRICIO CRANE [[id number]]      What is the request in detail: (m) Patient would like to speak with staff in regards to scheduling her annual. Please call      Can the clinic reply by [facility]: yes      What Number to Call Back if not in [facility]: [phone]

## 2019-01-15 ENCOUNTER — HOSPITAL ENCOUNTER (OUTPATIENT)
Dept: RADIOLOGY | Facility: HOSPITAL | Age: 44
Discharge: HOME | End: 2019-01-15
Attending: NURSE PRACTITIONER
Payer: MEDICAID

## 2019-01-15 VITALS — WEIGHT: 272 LBS | BODY MASS INDEX: 43.71 KG/M2 | HEIGHT: 66 IN

## 2019-01-15 DIAGNOSIS — Z12.31 VISIT FOR SCREENING MAMMOGRAM: ICD-10-CM

## 2019-01-15 PROCEDURE — 77067 MAMMO DIGITAL SCREENING BILAT WITH CAD: ICD-10-PCS | Mod: 26,,, | Performed by: RADIOLOGY

## 2019-01-15 PROCEDURE — 77067 SCR MAMMO BI INCL CAD: CPT | Mod: TC

## 2019-01-15 PROCEDURE — 77067 SCR MAMMO BI INCL CAD: CPT | Mod: 26,,, | Performed by: RADIOLOGY

## 2019-01-23 ENCOUNTER — OFFICE VISIT (OUTPATIENT)
Dept: INTERNAL MEDICINE | Facility: CLINIC | Age: 44
End: 2019-01-23
Attending: FAMILY MEDICINE
Payer: MEDICAID

## 2019-01-23 VITALS
DIASTOLIC BLOOD PRESSURE: 72 MMHG | BODY MASS INDEX: 43.9 KG/M2 | SYSTOLIC BLOOD PRESSURE: 120 MMHG | HEART RATE: 89 BPM | HEIGHT: 66 IN | WEIGHT: 273.13 LBS | OXYGEN SATURATION: 96 %

## 2019-01-23 DIAGNOSIS — Z12.4 PAP SMEAR FOR CERVICAL CANCER SCREENING: ICD-10-CM

## 2019-01-23 DIAGNOSIS — L50.9 URTICARIA: ICD-10-CM

## 2019-01-23 DIAGNOSIS — R05.9 COUGH: Primary | ICD-10-CM

## 2019-01-23 PROCEDURE — 99999 PR PBB SHADOW E&M-EST. PATIENT-LVL IV: CPT | Mod: PBBFAC,,, | Performed by: FAMILY MEDICINE

## 2019-01-23 PROCEDURE — 99214 PR OFFICE/OUTPT VISIT, EST, LEVL IV, 30-39 MIN: ICD-10-PCS | Mod: S$PBB,,, | Performed by: FAMILY MEDICINE

## 2019-01-23 PROCEDURE — 99999 PR PBB SHADOW E&M-EST. PATIENT-LVL IV: ICD-10-PCS | Mod: PBBFAC,,, | Performed by: FAMILY MEDICINE

## 2019-01-23 PROCEDURE — 99214 OFFICE O/P EST MOD 30 MIN: CPT | Mod: S$PBB,,, | Performed by: FAMILY MEDICINE

## 2019-01-23 PROCEDURE — 99214 OFFICE O/P EST MOD 30 MIN: CPT | Mod: PBBFAC | Performed by: FAMILY MEDICINE

## 2019-01-23 RX ORDER — PROMETHAZINE HYDROCHLORIDE AND DEXTROMETHORPHAN HYDROBROMIDE 6.25; 15 MG/5ML; MG/5ML
5 SYRUP ORAL EVERY 4 HOURS PRN
Qty: 180 ML | Refills: 0 | Status: SHIPPED | OUTPATIENT
Start: 2019-01-23 | End: 2019-02-03 | Stop reason: ALTCHOICE

## 2019-01-23 NOTE — PROGRESS NOTES
"CHIEF COMPLAINT:  Cough and hives    HISTORY OF PRESENT ILLNESS: The patient is a morbidly obese 44 year-old black female.  She reports being told that she has high blood pressure at the health clinic.  It is normal here.  She is reassured.    She has an 8 day history of cough with no other symptoms.    She has a long history of intermittent urticaria.  She has not been able to identify a specific trigger for them.  I suspect she has chronic idiopathic urticaria.  She would like to see the allergy clinic.  We will arrange for this today.    She works as an MA outside of the health system.      The patient has a long history of intermittent low back pain.    REVIEW OF SYSTEMS:  GENERAL: No fever, chills, fatigability or weight loss.  SKIN: No rashes, itching or changes in color or texture of skin.  Except as mentioned above  HEAD: No headaches or recent head trauma.  EYES: Visual acuity fine. No photophobia, ocular pain or diplopia.  EARS: Denies ear pain, discharge or vertigo.  NOSE: No loss of smell, no epistaxis or postnasal drip.  MOUTH & THROAT: No hoarseness or change in voice. No excessive gum bleeding.  NODES: Denies swollen glands.  CHEST: Denies JONES, cyanosis, wheezing and sputum production.  CARDIOVASCULAR: Denies chest pain, PND, orthopnea or reduced exercise tolerance.  ABDOMEN: Appetite fine. No weight loss. Denies diarrhea, abdominal pain, hematemesis or blood in stool.  URINARY: No flank pain, dysuria or hematuria.  PERIPHERAL VASCULAR: No claudication or cyanosis.  MUSCULOSKELETAL: No joint stiffness or swelling. She does have chronic intermittent back pain.  NEUROLOGIC: No history of seizures, paralysis, alteration of gait or coordination.    SOCIAL HISTORY: The patient does not smoke.  The patient consumes alcohol socially. The patient works as an MA outside of the health system.    PHYSICAL EXAMINATION:     Blood pressure 120/72, pulse 89, height 5' 6" (1.676 m), weight 123.9 kg (273 lb 2.4 oz), " SpO2 96 %.    APPEARANCE: Well nourished, well developed, in no acute distress.    HEAD: Normocephalic, atraumatic.  EYES: PERRL. EOMI.  Conjunctivae without injection and  anicteric  EARS: TM's intact. Light reflex normal. No retraction or perforation.    NOSE: Mucosa pink. Airway clear.  MOUTH & THROAT: No tonsillar enlargement. No pharyngeal erythema or exudate. No stridor.  NECK: Supple.   NODES: No cervical, axillary or inguinal lymph node enlargement.  CHEST: Lungs clear to auscultation.  No retractions are noted.  No rales or rhonchi are present.  CARDIOVASCULAR: Normal S1, S2. No rubs, murmurs or gallops.  ABDOMEN: Bowel sounds normal. Not distended. Soft. No tenderness or masses.  No ascites is noted.  MUSCULOSKELETAL:  There is no clubbing, cyanosis, or edema of the extremities x4.  There is full range of motion of the lumbar spine.  There is full range of motion of the extremities x4.  There is no deformity noted.    NEUROLOGIC:       Normal speech development.      Hearing normal.      Normal gait.      Motor and sensory exams grossly normal.      DTR's normal.  PSYCHIATRIC: Patient is alert and oriented x3.  Thought processes are all normal.  There is no homicidality.  There is no suicidality.  There is no evidence of psychosis.    LABORATORY/RADIOLOGY:   Chart reviewed.      ASSESSMENT:   Morbid obesity with BMI of 44  Chronic low back pain  Chronic idiopathic urticaria  Viral URI    PLAN:  Allergy referral  Phenergan DM    Return to clinic in 12 months

## 2019-01-30 ENCOUNTER — OFFICE VISIT (OUTPATIENT)
Dept: ALLERGY | Facility: CLINIC | Age: 44
End: 2019-01-30
Payer: MEDICAID

## 2019-01-30 VITALS — HEIGHT: 66 IN | WEIGHT: 280 LBS | BODY MASS INDEX: 45 KG/M2 | TEMPERATURE: 98 F

## 2019-01-30 DIAGNOSIS — H10.423 SIMPLE CHRONIC CONJUNCTIVITIS OF BOTH EYES: ICD-10-CM

## 2019-01-30 DIAGNOSIS — G89.29 CHRONIC LOW BACK PAIN, UNSPECIFIED BACK PAIN LATERALITY, WITH SCIATICA PRESENCE UNSPECIFIED: ICD-10-CM

## 2019-01-30 DIAGNOSIS — R05.9 COUGH: ICD-10-CM

## 2019-01-30 DIAGNOSIS — L50.1 IDIOPATHIC URTICARIA: Primary | ICD-10-CM

## 2019-01-30 DIAGNOSIS — J31.0 CHRONIC RHINITIS: ICD-10-CM

## 2019-01-30 DIAGNOSIS — G47.33 OSA (OBSTRUCTIVE SLEEP APNEA): ICD-10-CM

## 2019-01-30 DIAGNOSIS — T78.3XXA ANGIOEDEMA, INITIAL ENCOUNTER: ICD-10-CM

## 2019-01-30 DIAGNOSIS — M54.5 CHRONIC LOW BACK PAIN, UNSPECIFIED BACK PAIN LATERALITY, WITH SCIATICA PRESENCE UNSPECIFIED: ICD-10-CM

## 2019-01-30 PROCEDURE — 99204 OFFICE O/P NEW MOD 45 MIN: CPT | Mod: S$PBB,,, | Performed by: ALLERGY & IMMUNOLOGY

## 2019-01-30 PROCEDURE — 99999 PR PBB SHADOW E&M-EST. PATIENT-LVL III: ICD-10-PCS | Mod: PBBFAC,,, | Performed by: ALLERGY & IMMUNOLOGY

## 2019-01-30 PROCEDURE — 99213 OFFICE O/P EST LOW 20 MIN: CPT | Mod: PBBFAC,25 | Performed by: ALLERGY & IMMUNOLOGY

## 2019-01-30 PROCEDURE — 99204 PR OFFICE/OUTPT VISIT, NEW, LEVL IV, 45-59 MIN: ICD-10-PCS | Mod: S$PBB,,, | Performed by: ALLERGY & IMMUNOLOGY

## 2019-01-30 PROCEDURE — 99999 PR PBB SHADOW E&M-EST. PATIENT-LVL III: CPT | Mod: PBBFAC,,, | Performed by: ALLERGY & IMMUNOLOGY

## 2019-01-30 NOTE — LETTER
January 30, 2019      Aftab Martinez MD  [address]  [address]  [facility] [address]           [facility]  [address]  [facility] [address]  Phone: [phone]  Fax: [phone]          Patient: Velma Lopez   MR Number: [id number]   YOB: 1975   Date of Visit: 1/30/2019       Dear Dr. Aftab Martinez:    Thank you for referring Velma Lopez to me for evaluation. Attached you will find relevant portions of my assessment and plan of care.    If you have questions, please do not hesitate to call me. I look forward to following Velma Lopez along with you.    Sincerely,    CEDRICK Alonzo III, MD    Enclosure  CC:  No Recipients    If you would like to receive this communication electronically, please contact [email or web address] or [phone] to request more information on [email or web address] Link access.    For providers and/or their staff who would like to refer a patient to [facility], please contact us through our one-stop-shop provider referral line, [facility], at [phone].    If you feel you have received this communication in error or would no longer like to receive these types of communications, please e-mail [email or web address]
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
## 2019-01-31 ENCOUNTER — PATIENT MESSAGE (OUTPATIENT)
Dept: INTERNAL MEDICINE | Facility: CLINIC | Age: 44
End: 2019-01-31

## 2019-01-31 ENCOUNTER — OFFICE VISIT (OUTPATIENT)
Dept: SLEEP MEDICINE | Facility: CLINIC | Age: 44
End: 2019-01-31
Payer: MEDICAID

## 2019-01-31 VITALS
WEIGHT: 278.31 LBS | HEIGHT: 66 IN | BODY MASS INDEX: 44.73 KG/M2 | HEART RATE: 90 BPM | SYSTOLIC BLOOD PRESSURE: 130 MMHG | DIASTOLIC BLOOD PRESSURE: 74 MMHG

## 2019-01-31 DIAGNOSIS — G47.00 INSOMNIA, UNSPECIFIED TYPE: ICD-10-CM

## 2019-01-31 DIAGNOSIS — R63.4 WEIGHT LOSS: Primary | ICD-10-CM

## 2019-01-31 DIAGNOSIS — G47.33 OSA (OBSTRUCTIVE SLEEP APNEA): Primary | ICD-10-CM

## 2019-01-31 DIAGNOSIS — E66.01 SEVERE OBESITY (BMI >= 40): ICD-10-CM

## 2019-01-31 PROCEDURE — 99999 PR PBB SHADOW E&M-EST. PATIENT-LVL III: CPT | Mod: PBBFAC,,, | Performed by: NURSE PRACTITIONER

## 2019-01-31 PROCEDURE — 99213 OFFICE O/P EST LOW 20 MIN: CPT | Mod: PBBFAC,PO | Performed by: NURSE PRACTITIONER

## 2019-01-31 PROCEDURE — 99214 OFFICE O/P EST MOD 30 MIN: CPT | Mod: S$PBB,,, | Performed by: NURSE PRACTITIONER

## 2019-01-31 PROCEDURE — 99999 PR PBB SHADOW E&M-EST. PATIENT-LVL III: ICD-10-PCS | Mod: PBBFAC,,, | Performed by: NURSE PRACTITIONER

## 2019-01-31 PROCEDURE — 99214 PR OFFICE/OUTPT VISIT, EST, LEVL IV, 30-39 MIN: ICD-10-PCS | Mod: S$PBB,,, | Performed by: NURSE PRACTITIONER
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
## 2019-02-01 ENCOUNTER — PATIENT MESSAGE (OUTPATIENT)
Dept: INTERNAL MEDICINE | Facility: CLINIC | Age: 44
End: 2019-02-01

## 2019-02-01 DIAGNOSIS — R63.4 WEIGHT LOSS: Primary | ICD-10-CM

## 2019-02-01 RX ORDER — PHENTERMINE HYDROCHLORIDE 37.5 MG/1
37.5 CAPSULE ORAL EVERY MORNING
Qty: 30 CAPSULE | Refills: 3 | Status: SHIPPED | OUTPATIENT
Start: 2019-02-01 | End: 2020-12-22

## 2019-02-01 RX ORDER — PHENTERMINE HYDROCHLORIDE 37.5 MG/1
37.5 TABLET ORAL
Qty: 30 TABLET | Refills: 1 | Status: SHIPPED | OUTPATIENT
Start: 2019-02-01 | End: 2019-08-23 | Stop reason: SDUPTHER

## 2019-02-03 ENCOUNTER — HOSPITAL ENCOUNTER (EMERGENCY)
Facility: HOSPITAL | Age: 44
Discharge: HOME | End: 2019-02-03
Attending: EMERGENCY MEDICINE
Payer: MEDICAID

## 2019-02-03 VITALS
DIASTOLIC BLOOD PRESSURE: 78 MMHG | BODY MASS INDEX: 44.39 KG/M2 | OXYGEN SATURATION: 100 % | TEMPERATURE: 99 F | RESPIRATION RATE: 20 BRPM | SYSTOLIC BLOOD PRESSURE: 129 MMHG | WEIGHT: 275 LBS | HEART RATE: 86 BPM

## 2019-02-03 DIAGNOSIS — R05.3 CHRONIC COUGH: ICD-10-CM

## 2019-02-03 DIAGNOSIS — M94.0 ACUTE COSTOCHONDRITIS: Primary | ICD-10-CM

## 2019-02-03 DIAGNOSIS — R07.9 CHEST PAIN: ICD-10-CM

## 2019-02-03 LAB
ALBUMIN SERPL BCP-MCNC: 3.6 G/DL
ALP SERPL-CCNC: 65 U/L
ALT SERPL W/O P-5'-P-CCNC: 19 U/L
ANION GAP SERPL CALC-SCNC: 8 MMOL/L
AST SERPL-CCNC: 14 U/L
BACTERIA #/AREA URNS AUTO: ABNORMAL /HPF
BASOPHILS # BLD AUTO: 0.03 K/UL
BASOPHILS NFR BLD: 0.3 %
BILIRUB SERPL-MCNC: 0.5 MG/DL
BILIRUB UR QL STRIP: NEGATIVE
BUN SERPL-MCNC: 9 MG/DL
CALCIUM SERPL-MCNC: 9.5 MG/DL
CHLORIDE SERPL-SCNC: 107 MMOL/L
CLARITY UR REFRACT.AUTO: CLEAR
CO2 SERPL-SCNC: 23 MMOL/L
COLOR UR AUTO: ABNORMAL
CREAT SERPL-MCNC: 0.7 MG/DL
DIFFERENTIAL METHOD: ABNORMAL
EOSINOPHIL # BLD AUTO: 0.2 K/UL
EOSINOPHIL NFR BLD: 2.1 %
ERYTHROCYTE [DISTWIDTH] IN BLOOD BY AUTOMATED COUNT: 13 %
EST. GFR  (AFRICAN AMERICAN): >60 ML/MIN/1.73 M^2
EST. GFR  (NON AFRICAN AMERICAN): >60 ML/MIN/1.73 M^2
GLUCOSE SERPL-MCNC: 212 MG/DL
GLUCOSE UR QL STRIP: NEGATIVE
HCT VFR BLD AUTO: 39.1 %
HGB BLD-MCNC: 12.5 G/DL
HGB UR QL STRIP: NEGATIVE
HYALINE CASTS UR QL AUTO: 2 /LPF
KETONES UR QL STRIP: ABNORMAL
LEUKOCYTE ESTERASE UR QL STRIP: ABNORMAL
LYMPHOCYTES # BLD AUTO: 3.1 K/UL
LYMPHOCYTES NFR BLD: 32 %
MCH RBC QN AUTO: 30.5 PG
MCHC RBC AUTO-ENTMCNC: 32 G/DL
MCV RBC AUTO: 95 FL
MICROSCOPIC COMMENT: ABNORMAL
MONOCYTES # BLD AUTO: 0.9 K/UL
MONOCYTES NFR BLD: 9.1 %
NEUTROPHILS # BLD AUTO: 5.4 K/UL
NEUTROPHILS NFR BLD: 56.3 %
NITRITE UR QL STRIP: NEGATIVE
NT-PROBNP: 44 PG/ML
PH UR STRIP: 5 [PH] (ref 5–8)
PLATELET # BLD AUTO: 407 K/UL
PMV BLD AUTO: 9.9 FL
POTASSIUM SERPL-SCNC: 3.9 MMOL/L
PROT SERPL-MCNC: 6.4 G/DL
PROT UR QL STRIP: NEGATIVE
RBC # BLD AUTO: 4.1 M/UL
RBC #/AREA URNS AUTO: 2 /HPF (ref 0–4)
SODIUM SERPL-SCNC: 138 MMOL/L
SP GR UR STRIP: 1.03 (ref 1–1.03)
SQUAMOUS #/AREA URNS AUTO: ABNORMAL /HPF
TROPONIN I SERPL DL<=0.01 NG/ML-MCNC: <0.012 NG/ML
URN SPEC COLLECT METH UR: ABNORMAL
UROBILINOGEN UR STRIP-ACNC: 1 EU/DL
WBC # BLD AUTO: 9.66 K/UL
WBC #/AREA URNS AUTO: 5 /HPF (ref 0–5)

## 2019-02-03 PROCEDURE — 99285 EMERGENCY DEPT VISIT HI MDM: CPT | Mod: ER

## 2019-02-03 PROCEDURE — 85025 COMPLETE CBC W/AUTO DIFF WBC: CPT | Mod: ER

## 2019-02-03 PROCEDURE — 84484 ASSAY OF TROPONIN QUANT: CPT | Mod: ER

## 2019-02-03 PROCEDURE — 93010 EKG 12-LEAD: ICD-10-PCS | Mod: ,,, | Performed by: INTERNAL MEDICINE

## 2019-02-03 PROCEDURE — 80053 COMPREHEN METABOLIC PANEL: CPT | Mod: ER

## 2019-02-03 PROCEDURE — 25000003 PHARM REV CODE 250: Mod: ER | Performed by: EMERGENCY MEDICINE

## 2019-02-03 PROCEDURE — 83880 ASSAY OF NATRIURETIC PEPTIDE: CPT | Mod: ER

## 2019-02-03 PROCEDURE — 81000 URINALYSIS NONAUTO W/SCOPE: CPT | Mod: ER

## 2019-02-03 PROCEDURE — 93005 ELECTROCARDIOGRAM TRACING: CPT | Mod: ER

## 2019-02-03 PROCEDURE — 93010 ELECTROCARDIOGRAM REPORT: CPT | Mod: ,,, | Performed by: INTERNAL MEDICINE

## 2019-02-03 RX ORDER — HYDROCODONE BITARTRATE AND ACETAMINOPHEN 5; 325 MG/1; MG/1
1 TABLET ORAL EVERY 4 HOURS PRN
Qty: 14 TABLET | Refills: 0 | Status: ON HOLD | OUTPATIENT
Start: 2019-02-03 | End: 2020-07-27 | Stop reason: HOSPADM

## 2019-02-03 RX ORDER — IBUPROFEN 800 MG/1
800 TABLET ORAL EVERY 6 HOURS PRN
Qty: 20 TABLET | Refills: 0 | Status: SHIPPED | OUTPATIENT
Start: 2019-02-03 | End: 2019-07-27

## 2019-02-03 RX ORDER — BENZONATATE 100 MG/1
200 CAPSULE ORAL 2 TIMES DAILY PRN
Qty: 20 CAPSULE | Refills: 0 | Status: SHIPPED | OUTPATIENT
Start: 2019-02-03 | End: 2019-04-24

## 2019-02-03 RX ORDER — CETIRIZINE HYDROCHLORIDE 10 MG/1
10 TABLET ORAL DAILY
COMMUNITY

## 2019-02-03 RX ORDER — ASPIRIN 325 MG
325 TABLET ORAL
Status: COMPLETED | OUTPATIENT
Start: 2019-02-03 | End: 2019-02-03

## 2019-02-03 RX ORDER — FLUTICASONE PROPIONATE 50 MCG
1 SPRAY, SUSPENSION (ML) NASAL 2 TIMES DAILY
Qty: 15 G | Refills: 1 | Status: ON HOLD | OUTPATIENT
Start: 2019-02-03 | End: 2020-07-27 | Stop reason: HOSPADM

## 2019-02-03 RX ADMIN — LIDOCAINE HYDROCHLORIDE: 20 SOLUTION ORAL; TOPICAL at 02:02

## 2019-02-03 RX ADMIN — ASPIRIN 325 MG: 325 TABLET, COATED ORAL at 02:02

## 2019-02-03 NOTE — ED PROVIDER NOTES
"Encounter Date: 2/3/2019       History     Chief Complaint   Patient presents with    Chest Pain     pt reports midsternal chest pain that radiates to back; pt reports pain began tonight. +Sensation of "feeling like I need to swallow but it won't go down" x 4 days. Chest pain described as "something sitting on my chest"     44-year-old female presents complaining of midsternal chest pain that travels to her back.  Patient reports pain began tonight.  Patient reports pain is worse with deep breath and worse with movement.  Patient also feels like she possibly has something in her throat..  Patient also has history of chronic back pain. Chest pain is described as sharp and 6/10.          Review of patient's allergies indicates:  No Known Allergies  Past Medical History:   Diagnosis Date    Chronic back pain     Morbid obesity with BMI of 40.0-44.9, adult     MILEY on CPAP     Plantar fasciitis of left foot     Urticaria      Past Surgical History:   Procedure Laterality Date    CYST REMOVAL      ovarian cyst removal at age 12    DILATION AND CURETTAGE OF UTERUS      WISDOM TOOTH EXTRACTION       Family History   Problem Relation Age of Onset    Cancer Maternal Grandmother     Diabetes Mother     No Known Problems Sister     No Known Problems Sister     No Known Problems Sister      Social History     Tobacco Use    Smoking status: Current Every Day Smoker     Packs/day: 0.50     Types: Cigarettes    Smokeless tobacco: Never Used   Substance Use Topics    Alcohol use: Yes     Comment: social    Drug use: No     Review of Systems   Cardiovascular: Positive for chest pain.   All other systems reviewed and are negative.      Physical Exam     Initial Vitals [02/03/19 0111]   BP Pulse Resp Temp SpO2   124/89 96 20 99.2 °F (37.3 °C) 100 %      MAP       --         Physical Exam    Nursing note and vitals reviewed.  Constitutional: She appears well-developed and well-nourished.   HENT:   Head: Normocephalic " and atraumatic.   Right Ear: External ear normal.   Left Ear: External ear normal.   Nose: Nose normal.   Mouth/Throat: Oropharynx is clear and moist.   Eyes: Conjunctivae and EOM are normal. Pupils are equal, round, and reactive to light.   Neck: Normal range of motion. Neck supple.   Cardiovascular: Normal rate, regular rhythm, normal heart sounds and intact distal pulses.   Pulmonary/Chest: Breath sounds normal. She exhibits tenderness.   Abdominal: Soft. Bowel sounds are normal.   Musculoskeletal: Normal range of motion.   Neurological: She is alert. GCS score is 15. GCS eye subscore is 4. GCS verbal subscore is 5. GCS motor subscore is 6.   Skin: Skin is warm. Capillary refill takes less than 2 seconds.         ED Course   Procedures  Labs Reviewed   CBC W/ AUTO DIFFERENTIAL - Abnormal; Notable for the following components:       Result Value    Platelets 407 (*)     All other components within normal limits   COMPREHENSIVE METABOLIC PANEL - Abnormal; Notable for the following components:    Glucose 212 (*)     AST 14 (*)     All other components within normal limits   URINALYSIS, REFLEX TO URINE CULTURE - Abnormal; Notable for the following components:    Ketones, UA 1+ (*)     Leukocytes, UA 1+ (*)     All other components within normal limits    Narrative:     Preferred Collection Type->Urine, Clean Catch   URINALYSIS MICROSCOPIC - Abnormal; Notable for the following components:    Bacteria, UA Few (*)     Hyaline Casts, UA 2 (*)     All other components within normal limits    Narrative:     Preferred Collection Type->Urine, Clean Catch   TROPONIN I   NT-PRO NATRIURETIC PEPTIDE     EKG Readings: (Independently Interpreted)   Initial Reading: No STEMI. Rhythm: Normal Sinus Rhythm. Heart Rate: 95. Ectopy: No Ectopy. Conduction: Normal. ST Segments: Normal ST Segments. T Waves: Normal. Axis: Normal. Other Impression: Normal EKG       Imaging Results          X-Ray Chest AP Portable (Final result)  Result time  02/03/19 09:29:09    Final result by Michele Lancaster III, MD (02/03/19 09:29:09)                 Impression:      No acute abnormality identified in the chest.      Electronically signed by: Michele Lancaster MD  Date:    02/03/2019  Time:    09:29             Narrative:    EXAMINATION:  XR CHEST AP PORTABLE    CLINICAL HISTORY:  Chest Pain;    COMPARISON:  01/03/2018    FINDINGS:  The cardiomediastinal silhouette is within normal limits for AP technique. The lungs appear clear of active disease. No acute appearing infiltrate, pleural effusion or pneumothorax identified.                                                      Clinical Impression:   The primary encounter diagnosis was Acute costochondritis. Diagnoses of Chest pain and Chronic cough were also pertinent to this visit.                             Solomon Mcdonough MD  02/04/19 [id number]

## 2019-02-03 NOTE — ED TRIAGE NOTES
"pt reports midsternal chest pain that radiates to back; pt reports pain began tonight. +Sensation of "feeling like I need to swallow but it won't go down" x 4 days. Chest pain described as "something sitting on my chest"  "

## 2019-02-03 NOTE — ED NOTES
Pt reports that she recently began taking her adipex prescription again 2 days prior. Last dose taken today.

## 2019-02-06 ENCOUNTER — OFFICE VISIT (OUTPATIENT)
Dept: ALLERGY | Facility: CLINIC | Age: 44
End: 2019-02-06
Payer: MEDICAID

## 2019-02-06 VITALS
BODY MASS INDEX: 44.26 KG/M2 | HEIGHT: 66 IN | DIASTOLIC BLOOD PRESSURE: 88 MMHG | SYSTOLIC BLOOD PRESSURE: 132 MMHG | WEIGHT: 275.38 LBS

## 2019-02-06 DIAGNOSIS — L50.1 IDIOPATHIC URTICARIA: Primary | ICD-10-CM

## 2019-02-06 DIAGNOSIS — J31.0 CHRONIC RHINITIS: ICD-10-CM

## 2019-02-06 DIAGNOSIS — R05.9 COUGH: ICD-10-CM

## 2019-02-06 DIAGNOSIS — T78.3XXD ANGIOEDEMA, SUBSEQUENT ENCOUNTER: ICD-10-CM

## 2019-02-06 PROCEDURE — 99214 PR OFFICE/OUTPT VISIT, EST, LEVL IV, 30-39 MIN: ICD-10-PCS | Mod: S$PBB,,, | Performed by: ALLERGY & IMMUNOLOGY

## 2019-02-06 PROCEDURE — 99214 OFFICE O/P EST MOD 30 MIN: CPT | Mod: S$PBB,,, | Performed by: ALLERGY & IMMUNOLOGY

## 2019-02-06 PROCEDURE — 99999 PR PBB SHADOW E&M-EST. PATIENT-LVL II: CPT | Mod: PBBFAC,,, | Performed by: ALLERGY & IMMUNOLOGY

## 2019-02-06 PROCEDURE — 99999 PR PBB SHADOW E&M-EST. PATIENT-LVL II: ICD-10-PCS | Mod: PBBFAC,,, | Performed by: ALLERGY & IMMUNOLOGY

## 2019-02-06 PROCEDURE — 99212 OFFICE O/P EST SF 10 MIN: CPT | Mod: PBBFAC | Performed by: ALLERGY & IMMUNOLOGY

## 2019-02-06 RX ORDER — CHOLECALCIFEROL (VITAMIN D3) 25 MCG
1000 TABLET ORAL DAILY
COMMUNITY
End: 2019-04-24

## 2019-02-06 NOTE — PROGRESS NOTES
Velma Lopez returns to clinic today for continued evaluation of urticaria and angioedema.  She is here alone.  She was last seen July 30, 2019.    After she left here, she developed urticarial lesions on her legs that were red, raised, and pruritic.    She started taking Zyrtec and has been taking and daily since then.  She has not had any further urticaria.  She has not had any angioedema.    There is no association with any food, contact, or ingestion.      She has continued to have a cough but it is improving in severity.  She was seen in the emergency room on February 3, 2019 for chest pain.  It was diagnosed as acute costochondritis.  That is improving as well.    She continues to have some mild rhinitis with clear rhinorrhea and congestion.    She denies any conjunctivitis.  She denies any wheezing or shortness of breath.    [facility] PEQ ALLERGY QUESTIONNAIRE SHORT 2/6/2019   Are you taking any new medications since your last visit? Yes   Constitution: No changes since my last visit with this provider   Head or facial pain: No changes since my last visit with this provider   Eyes: No symptoms   Ears: No symptoms   Nose: No symptoms   Throat: No symptoms   Sinuses: No symptoms   Lungs: Cough   Skin: Itching, Hives   Cardiovascular: Chest pain   Gastrointestinal: No symptoms   Genital/ urinary No symptoms   Musculoskeletal: No symptoms   Neurologic: No symptoms   Endocrine: No symptoms   Hematologic: No symptoms     Physical Examination:  General: Well-developed, well-nourished, no acute distress.  Head: No sinus tenderness.  Eyes: Conjunctivae:  No bulbar or palpebral conjunctival injection.  Ears: EAC's clear.  TM's clear.  No pre-auricular nodes.  Nose: Nasal Mucosa:  Pink.  Septum: No apparent deviation.  Turbinates:  No significant edema.  Polyps/Mass:  None visible.  Teeth/Gums:  No bleeding noted.  Oropharynx: No exudates.  Neck: Supple without thyromegaly. No cervical lymphadenopathy.     Respiratory/Chest: Effort: Good.  Auscultation:  Clear bilaterally.  Skin: Good turgor.  No urticaria or angioedema.  Neuro/Psych: Oriented x 3.    Pictures are reviewed on her cell phone on January 30, 2019 and they are consistent with urticaria.    Laboratory 01/30/2019:  IgE level:  609.  ImmunoCAP:  Negative.  TSH:  1.527.  Thyroid peroxidase antibody level:  Less than 6.0.  Thyroglobulin antibody level:  Less than 4.0.  Serum tryptase:  9.9.  Vitamin-D Level:  23.  Anti IgE receptor antibody level:  Pending.  SPEP:  Normal.    Assessment:  1.  Chronic urticaria angioedema, probably idiopathic, improved.  2.  Chronic rhinitis, consider allergic with elevated IgE level.  3.  Chronic conjunctivitis, consider allergic.  4.  Cough, probably viral associated, resolving.  5.  Vitamin-D insufficiency.  6.  Current smoker.  7.  Obesity.  8.  Sleep apnea.  9.  Chronic low back pain.    Recommendations:  1.  Continue cetirizine 10 mg a day.  2.  Vitamin-D 63165 units a week.  3.  Continue Tessalon Perles.  4.  Return to clinic in 2 to 3 months or sooner if needed.  5.  Consider skin testing in the future.    Allergic mechanisms and treatment options were reviewed in detail.  Urticaria angioedema were reviewed.  4.  Return to clinic in one week.

## 2019-02-08 ENCOUNTER — TELEPHONE (OUTPATIENT)
Dept: ALLERGY | Facility: CLINIC | Age: 44
End: 2019-02-08

## 2019-02-08 ENCOUNTER — PATIENT MESSAGE (OUTPATIENT)
Dept: ALLERGY | Facility: CLINIC | Age: 44
End: 2019-02-08

## 2019-02-08 RX ORDER — ERGOCALCIFEROL 1.25 MG/1
50000 CAPSULE ORAL
Qty: 12 CAPSULE | Refills: 1 | Status: SHIPPED | OUTPATIENT
Start: 2019-02-08 | End: 2019-10-22 | Stop reason: SDUPTHER

## 2019-02-12 ENCOUNTER — PATIENT MESSAGE (OUTPATIENT)
Dept: SLEEP MEDICINE | Facility: CLINIC | Age: 44
End: 2019-02-12

## 2019-02-12 ENCOUNTER — PATIENT MESSAGE (OUTPATIENT)
Dept: INTERNAL MEDICINE | Facility: CLINIC | Age: 44
End: 2019-02-12

## 2019-02-12 DIAGNOSIS — T78.40XA ALLERGIC STATE, INITIAL ENCOUNTER: Primary | ICD-10-CM

## 2019-02-12 RX ORDER — PREDNISONE 10 MG/1
10 TABLET ORAL 2 TIMES DAILY
Qty: 14 TABLET | Refills: 0 | Status: SHIPPED | OUTPATIENT
Start: 2019-02-12 | End: 2019-02-19

## 2019-02-13 DIAGNOSIS — G47.00 INSOMNIA, UNSPECIFIED TYPE: Primary | ICD-10-CM

## 2019-02-13 RX ORDER — ZOLPIDEM TARTRATE 5 MG/1
5 TABLET ORAL NIGHTLY PRN
Qty: 30 TABLET | Refills: 3 | Status: SHIPPED | OUTPATIENT
Start: 2019-02-13 | End: 2019-08-23 | Stop reason: SDUPTHER

## 2019-02-14 ENCOUNTER — PATIENT MESSAGE (OUTPATIENT)
Dept: INTERNAL MEDICINE | Facility: CLINIC | Age: 44
End: 2019-02-14

## 2019-04-04 ENCOUNTER — PATIENT MESSAGE (OUTPATIENT)
Dept: INTERNAL MEDICINE | Facility: CLINIC | Age: 44
End: 2019-04-04

## 2019-04-04 DIAGNOSIS — K21.9 GASTROESOPHAGEAL REFLUX DISEASE, ESOPHAGITIS PRESENCE NOT SPECIFIED: Primary | ICD-10-CM

## 2019-04-09 ENCOUNTER — PATIENT MESSAGE (OUTPATIENT)
Dept: INTERNAL MEDICINE | Facility: CLINIC | Age: 44
End: 2019-04-09

## 2019-04-10 ENCOUNTER — TELEPHONE (OUTPATIENT)
Dept: GASTROENTEROLOGY | Facility: CLINIC | Age: 44
End: 2019-04-10

## 2019-04-10 NOTE — TELEPHONE ENCOUNTER
Ma spoke with pt , pt was informed that the first available appt , she will be added to the waitlist if someone cxl

## 2019-04-10 NOTE — TELEPHONE ENCOUNTER
----- Message from Fariha Pearce sent at 4/10/2019  2:01 PM CDT -----  Contact: [phone]/self  Patient would like to be seen sooner than the next available appointment. Please advise.

## 2019-04-24 ENCOUNTER — OFFICE VISIT (OUTPATIENT)
Dept: GASTROENTEROLOGY | Facility: CLINIC | Age: 44
End: 2019-04-24
Payer: MEDICAID

## 2019-04-24 VITALS
DIASTOLIC BLOOD PRESSURE: 78 MMHG | WEIGHT: 275.56 LBS | SYSTOLIC BLOOD PRESSURE: 132 MMHG | HEART RATE: 100 BPM | BODY MASS INDEX: 44.29 KG/M2 | HEIGHT: 66 IN

## 2019-04-24 DIAGNOSIS — R06.02 SOB (SHORTNESS OF BREATH): ICD-10-CM

## 2019-04-24 DIAGNOSIS — R13.19 ESOPHAGEAL DYSPHAGIA: ICD-10-CM

## 2019-04-24 DIAGNOSIS — R07.9 CHEST PAIN, UNSPECIFIED TYPE: ICD-10-CM

## 2019-04-24 DIAGNOSIS — K21.9 GASTROESOPHAGEAL REFLUX DISEASE, ESOPHAGITIS PRESENCE NOT SPECIFIED: Primary | ICD-10-CM

## 2019-04-24 PROCEDURE — 99204 PR OFFICE/OUTPT VISIT, NEW, LEVL IV, 45-59 MIN: ICD-10-PCS | Mod: S$PBB,,, | Performed by: NURSE PRACTITIONER

## 2019-04-24 PROCEDURE — 99214 OFFICE O/P EST MOD 30 MIN: CPT | Mod: PBBFAC | Performed by: NURSE PRACTITIONER

## 2019-04-24 PROCEDURE — 99999 PR PBB SHADOW E&M-EST. PATIENT-LVL IV: ICD-10-PCS | Mod: PBBFAC,,, | Performed by: NURSE PRACTITIONER

## 2019-04-24 PROCEDURE — 99999 PR PBB SHADOW E&M-EST. PATIENT-LVL IV: CPT | Mod: PBBFAC,,, | Performed by: NURSE PRACTITIONER

## 2019-04-24 PROCEDURE — 99204 OFFICE O/P NEW MOD 45 MIN: CPT | Mod: S$PBB,,, | Performed by: NURSE PRACTITIONER

## 2019-04-24 RX ORDER — OMEPRAZOLE 40 MG/1
40 CAPSULE, DELAYED RELEASE ORAL DAILY
Qty: 30 CAPSULE | Refills: 11 | Status: SHIPPED | OUTPATIENT
Start: 2019-04-24 | End: 2019-11-26

## 2019-04-24 NOTE — LETTER
April 24, 2019      Aftab Martinez MD  [address]  [facility] [address]  [facility] [address]           [facility]  [address]  [facility] [address]  Phone: [phone]  Fax: [phone]          Patient: Velma Lopez   MR Number: [id number]   YOB: 1975   Date of Visit: 4/24/2019       Dear Dr. Aftab Martinez:    Thank you for referring Velma Lopez to me for evaluation. Attached you will find relevant portions of my assessment and plan of care.    If you have questions, please do not hesitate to call me. I look forward to following Velma Lopez along with you.    Sincerely,    Africa Wan, KASSIE    Enclosure  CC:  No Recipients    If you would like to receive this communication electronically, please contact [email or web address] or [phone] to request more information on [employer] Link access.    For providers and/or their staff who would like to refer a patient to [facility], please contact us through our one-stop-shop provider referral line, [facility], at [phone].    If you feel you have received this communication in error or would no longer like to receive these types of communications, please e-mail [email or web address]

## 2019-04-24 NOTE — PATIENT INSTRUCTIONS
[email or web address]  Dropping Acid The Reflux Diet Cookbook and Cure -  Shiva Spencer M.D.    GERD  Worst Foods for Acid Reflux  Chocolate (milk chocolate worse than dark chocolate)  Soda (all carbonated beverages)  Alcohol (beer, liquor, wine)  Fried foods  Pal, sausage, ribs  Cream sauce  Fatty meats (beef)  Butter, margarine, lard, shortening  Coffee, tea  Mint   High fat nuts  Hot sauces and pepper  Citrus fruit/juices      Acidic foods (pH - 1 is MORE acidic, 5 is LESS acidic)     Do not eat or drink these (lower numbers are worse)    Induction diet - For 2 weeks eat nothing below pH 5     Lemon juice 2.3  Grape cranberry juice 2.5  Stomach Acid 2.5  Gelatin Dessert 2.6  Lemon/lime 2.9/2.7  Vinegar 2.9  Gatorade 3.0  Fruits - plums, apricots, strawberries, cherries 3.0  Vitamin C (ascorbic acid) 3.0  Iced tea, Snapple 3.1  Mustard 3.2  Soft drinks 3.3  Nectarines 3.3  Pomegranate 3.3  Applesauce 3.4  Grapefruit 3.4  Kiwi 3.4  Barbecue sauce 3.4  Caesar dressing 3.5  Thousand island dressing 3.6  Strawberries 3.5  Pineapple juice 3.5  Beer 3.5  Wine 3.5  Grape 3.6  Apples 3.6  Pineapple 3.7  Pickle 3.7  Blackberries, blueberries 3.7  [place] 3.7  Orange 3.8  Cherries 3.9  Red Bull 3.9  Tomatoes 4.2  Coffee 5.1      These are Safe foods:  Agave  Aloe Vera  Apple (only red)  Bagels  Banana (worsens reflux in 1%)  Beans - black, red, lima, lentils  Bread - whole grain, rye  Caramel  Celery  Chamomile tea  Chicken - skinless, never fried  Chicken stock or bouillon  Coffee - one cup/day with milk  Fennel  Fish  [place]  Green vegetables (no green peppers)  Herbs  Honey  Melon  Milk - skin, soy, or Lactaid skim milk  Mushrooms  Oatmeal  Olive oil  Parsley  Pasta  Pears  Popcorn  Potatoes  Red bell peppers  Rice  Soups  Tofu  Turkey Breast  Turnip  Vegetables - no onion, tomatoes, peppers  Vinaigrette  Water - non carbonated  Whole grain breads, crackers, breakfast cereals      Best Foods for Acid  Reflux  Whole grain breads  Oatmeal  Aloe Vera  Salad (no tomatoes, onions, cheese, or high fat dressing)  Banana  Melon  Fennel  Chicken and turkey (skinless, never fried)  Fish/seafood (never fried)  Celery  Parsley  Couscous and Rice    Maybe bad foods (Everyone is unique)  Tomatoes  Garlic  Onion  Nuts (macadamia nuts)  Apples (especially green)  Cucumber  Green peppers  Spicy food  Some herbal teas    GERD tips  Change what you eat:  Eat smaller meals  Eat slowly and chew thoroughly until food is almost liquid  Cut down on junk carbohydrates such as sugar and white flour  Use herbs in your cooking  Eat more raw foods (more than 10 ingredients is not a raw food)  Avoid trans fats and partially hydrogenated oils  Eat more fish and switch to grass fed beef  Switch your cooking oil to macadamia nut or olive oil  Watch extremes of salt intake (too high or too low is bad)    If just cutting out acidic foods is not enough, change how you eat:  Large breakfast, medium lunch, light dinner  Dont mix fruit juices, sweet fruits, and refined starches with meats and heavy food  Dont wash your food down with a lot of liquid      Change these habits:  Stop smoking  Eat dinner earlier (3-4 hours before lying down to sleep)  Elevate the head of your bed 6 inches (blocks under the head of the bed are better than pillows) OR [employer] sells a special thrdPlace Reflux relief system  That may be purchased online for a comfortable, individual solution for raising the head of the bed.  Exercise (but wait 2 hours after eating)  Drink more water (between meals)    Take these supplements:  Multi vitamin  Probiotic  Fish oil    Most common food allergens: milk, eggs, peanuts, tree nuts, fish, shellfish, wheat, and soy    All natural immediate relief:  Chew 2-3 soft probiotic capsules - Dr. Juarez's Probiotics 12 Plus  Chew chewable DGL licorice tablet  Chew papaya tablet with high protein meal - [employer]  Drink 2 ounces of aloe  vera juice  Swedish bitters  Prelief- reduce the acid in food to keep it form burning sensitive tissue  Iberogast  [place]  Drink Chamomile Tea  Teaspoon of baking soda in water  Spoonful of vinegar in water      All natural ulcer healers:  Zinc carnosine - 75.5 mg with food twice a day x 8 weeks   Jeremy by Karissa - $8 for 60 pills  DGL (deglycyrrhizinated licorice) - 2 tablets before meals. Heals stomach lining   [employer] brand, Enzymatic therapy brand.  Aloe Vera juice  - 2 to 8 ounces a day   [place] or [place]

## 2019-04-24 NOTE — PROGRESS NOTES
[facility] Consultation Note    Reason for Consult:  The primary encounter diagnosis was Gastroesophageal reflux disease, esophagitis presence not specified. Diagnoses of Esophageal dysphagia, Chest pain, unspecified type, and SOB (shortness of breath) were also pertinent to this visit.    PCP:   Aftab Martinez   [address] / [place] [address]    Referring MD:  Aftab Martinez Md  [address]  [place] [address]    HPI:  This is a 44 y.o. female here for evaluation of GERD. She is a new patient.     GERD -   + Belching, Hiccups, a lot of gas  Onset: Jan 2019 when she had a cough for 5 weeks. Went to the ER for CP that was shooting to the back and she also felt something was stuck in her chest and that stayed for weeks.She is uncomfortable.  Prednisone for a while for the cough, cough resolved.  She use to eat late and immediately lay down.  Last sx were on Saturday. This was after she ate breakfast and laid down.  Took OTC Prilosec x 28 days.   Has tried making dietary changes. Trying to stay away form spicy foods, alcohol, fatty foods.  Pyrosis- no  Regurgitation- no  Nausea - no  Vomiting - no  Dysphagia- yes, when it was really bad.   Odynophagia- no  Treatments tried - Prilosec, fidelina seltzer and ranitidine  No abdominal pain, no melena. Good appetite  No epigastric pain  Takes ibuprofen rarely    ROS:  Constitutional: No fevers, chills, No weight loss  ENT: No allergies  CV: + chest pain, couple times a week. Associated with sweating, SOB, +JONES  Pulm: No cough, + shortness of breath  Ophtho: + vision changes, She reports she is going to call eye doctor  GI: see HPI  Derm: - rash  Heme: No lymphadenopathy, + bruising  MSK: No arthritis  : No dysuria, No hematuria  Endo: No hot or cold intolerance  Neuro: No syncope, No seizure  Psych: No anxiety, No depression    Medical History:  has a past medical history of Chronic back pain, Morbid  obesity with BMI of 40.0-44.9, adult, MILEY on CPAP, Plantar fasciitis of left foot, and Urticaria.    Surgical History:  has a past surgical history that includes Nerinx tooth extraction; Cyst Removal; and Dilation and curettage of uterus.    Family History: family history includes Cancer in her maternal grandmother; Diabetes in her mother; No Known Problems in her sister, sister, and sister..     Social History:  reports that she has been smoking cigarettes.  She has been smoking about 0.50 packs per day. She has never used smokeless tobacco. She reports that she drinks alcohol. She reports that she does not use drugs.    Review of patient's allergies indicates:  No Known Allergies    Current Outpatient Medications on File Prior to Visit   Medication Sig Dispense Refill    cetirizine (ZYRTEC) 10 MG tablet Take 10 mg by mouth once daily.      ergocalciferol (ERGOCALCIFEROL) 50,000 unit Cap Take 1 capsule (50,000 Units total) by mouth every 7 days. 12 capsule 1    fluticasone (FLONASE) 50 mcg/actuation nasal spray 1 spray (50 mcg total) by Each Nare route 2 (two) times daily. 15 g 1    HYDROcodone-acetaminophen (NORCO) 5-325 mg per tablet Take 1 tablet by mouth every 4 (four) hours as needed for Pain (or cough). 14 tablet 0    ibuprofen (ADVIL,MOTRIN) 800 MG tablet Take 1 tablet (800 mg total) by mouth every 6 (six) hours as needed for Pain. 20 tablet 0    medroxyPROGESTERone (DEPO-PROVERA) 150 mg/mL Syrg Inject 1 mL (150 mg total) into the muscle every 3 (three) months. 1 mL 2    phentermine 37.5 MG capsule Take 1 capsule (37.5 mg total) by mouth every morning. 30 capsule 3    zolpidem (AMBIEN) 5 MG Tab Take 1 tablet (5 mg total) by mouth nightly as needed. 30 tablet 3    [DISCONTINUED] benzonatate (TESSALON) 100 MG capsule Take 2 capsules (200 mg total) by mouth 2 (two) times daily as needed for Cough. 20 capsule 0    [DISCONTINUED] vitamin D (VITAMIN D3) 1000 units Tab Take 1,000 Units by mouth once  "daily.       No current facility-administered medications on file prior to visit.          Objective Findings:    Vital Signs:  /78   Pulse 100   Ht 5' 6" (1.676 m)   Wt 125 kg (275 lb 9.2 oz)   BMI 44.48 kg/m²   Body mass index is 44.48 kg/m².    Physical Exam:  General Appearance: Well appearing in no acute distress  Head:   Normocephalic, without obvious abnormality  Eyes:    No scleral icterus  ENT: Neck supple  Lungs: CTA bilaterally in anterior and posterior fields, no wheezes, no crackles.  Heart:  Regular rate and rhythm, S1, S2 normal, no murmurs heard  Abdomen: Soft, non tender, non distended with positive bowel sounds in all four quadrants. No hepatosplenomegaly, ascites, or mass  Extremities: No edema  Skin: No rash  Neurologic: AAO x 3      Labs:  Lab Results   Component Value Date    WBC 9.66 02/03/2019    HGB 12.5 02/03/2019    HCT 39.1 02/03/2019     (H) 02/03/2019    CHOL 128 01/12/2018    TRIG 73 01/12/2018    HDL 38 (L) 01/12/2018    ALT 19 02/03/2019    AST 14 (L) 02/03/2019     02/03/2019    K 3.9 02/03/2019     02/03/2019    CREATININE 0.70 02/03/2019    BUN 9 02/03/2019    CO2 23 02/03/2019    TSH 1.527 01/30/2019    GLUF 114 (H) 05/31/2010    HGBA1C 6.2 (H) 01/12/2018       Imaging:  none  Endoscopy:    None    Assessment:    Ms. Lopez is a 44 y.o. BF with:    1. Gastroesophageal reflux disease, esophagitis presence not specified   This is a new onset. Earlier this year developed cough and CP that brought her to the ER 2/2019, cardiac work up negative. She then developed a lot of belching and dysphagia. She did have some mild improvement with OTC prilosec.   2. Esophageal dysphagia   Has not had food get stuck, she just feels that it slowly passes through.   3. Chest pain, unspecified type   She states she feels she gets a CP not related to eating or other reflux sx she experiences. I discussed with patient she should see a cardiologist to workup to r/o cardio " etiology. She understood   4. SOB (shortness of breath)   It can be at rest or with exertion. Happens with her CP too          Recommendations:  1. Increase prilosec to 40 mg QD. GERD diet discussed and lifestyle modifications. Go to ER if food actually gets stuck and she is not able to swallow saliva. EGD when she gets cleared from cardiology.  2. Referral to cardiology.    F/u in 8 weeks    Order summary:  Orders Placed This Encounter    Ambulatory Referral to Cardiology    omeprazole (PRILOSEC) 40 MG capsule    Case request GI: EGD (ESOPHAGOGASTRODUODENOSCOPY)         Thank you so much for allowing me to participate in the care of Velma Wan, KARENP-C

## 2019-05-06 ENCOUNTER — OFFICE VISIT (OUTPATIENT)
Dept: CARDIOLOGY | Facility: CLINIC | Age: 44
End: 2019-05-06
Payer: MEDICAID

## 2019-05-06 VITALS
BODY MASS INDEX: 44.52 KG/M2 | HEART RATE: 89 BPM | DIASTOLIC BLOOD PRESSURE: 82 MMHG | WEIGHT: 277 LBS | OXYGEN SATURATION: 96 % | SYSTOLIC BLOOD PRESSURE: 108 MMHG | HEIGHT: 66 IN

## 2019-05-06 DIAGNOSIS — R07.89 OTHER CHEST PAIN: Primary | ICD-10-CM

## 2019-05-06 DIAGNOSIS — E66.01 SEVERE OBESITY (BMI >= 40): ICD-10-CM

## 2019-05-06 DIAGNOSIS — Z01.810 PREOPERATIVE CARDIOVASCULAR EXAMINATION: ICD-10-CM

## 2019-05-06 DIAGNOSIS — G47.33 OSA (OBSTRUCTIVE SLEEP APNEA): ICD-10-CM

## 2019-05-06 DIAGNOSIS — F17.200 SMOKING: ICD-10-CM

## 2019-05-06 DIAGNOSIS — Z91.199 NONCOMPLIANCE WITH CPAP TREATMENT: ICD-10-CM

## 2019-05-06 DIAGNOSIS — R06.09 DOE (DYSPNEA ON EXERTION): ICD-10-CM

## 2019-05-06 PROBLEM — R07.9 CHEST PAIN: Status: ACTIVE | Noted: 2019-05-06

## 2019-05-06 PROCEDURE — 99214 OFFICE O/P EST MOD 30 MIN: CPT | Mod: PBBFAC,PN | Performed by: INTERNAL MEDICINE

## 2019-05-06 PROCEDURE — 99205 PR OFFICE/OUTPT VISIT, NEW, LEVL V, 60-74 MIN: ICD-10-PCS | Mod: S$PBB,,, | Performed by: INTERNAL MEDICINE

## 2019-05-06 PROCEDURE — 99205 OFFICE O/P NEW HI 60 MIN: CPT | Mod: S$PBB,,, | Performed by: INTERNAL MEDICINE

## 2019-05-06 PROCEDURE — 99999 PR PBB SHADOW E&M-EST. PATIENT-LVL IV: ICD-10-PCS | Mod: PBBFAC,,, | Performed by: INTERNAL MEDICINE

## 2019-05-06 PROCEDURE — 99999 PR PBB SHADOW E&M-EST. PATIENT-LVL IV: CPT | Mod: PBBFAC,,, | Performed by: INTERNAL MEDICINE

## 2019-05-06 NOTE — LETTER
May 6, 2019      Africa Wan, NP  [address]  [facility] [address]           [facility]  [address]  [place]   [place] [address]  Phone: [phone]  Fax: [phone]          Patient: Velma Lopez   MR Number: [id number]   YOB: 1975   Date of Visit: 5/6/2019       Dear Africa Wan:    Thank you for referring Velma Lopez to me for evaluation. Attached you will find relevant portions of my assessment and plan of care.    If you have questions, please do not hesitate to call me. I look forward to following Velma Lopez along with you.    Sincerely,    Sean Gomez MD PhD    Enclosure  CC:  No Recipients    If you would like to receive this communication electronically, please contact [email or web address] or [phone] to request more information on [employer] Link access.    For providers and/or their staff who would like to refer a patient to [facility], please contact us through our one-stop-shop provider referral line, [facility], at [phone].    If you feel you have received this communication in error or would no longer like to receive these types of communications, please e-mail [email or web address]

## 2019-05-06 NOTE — PROGRESS NOTES
"[facility]      Chief Complaint   Patient presents with    Chest Pain     occ.    Shortness of Breath     on exertion       Patient ID: Velma Lopez is a 44 y.o. female with a past medical history of morbid obesity, MILEY (on CPAP), smoking, who presents for an initial appointment.  Pertinent history/events are as follows:     -Pt kindly referred by Africa Wan NP for evaluation of chest pain and SOB.    -Pt has upcoming EGD by GI.    HPI:  Mrs. Lopez reports first noticing chest pain approximately 3 months ago.  Chest pain occurs at rest and on exertion.  Chest pain described as "dull", located at mid-chest, non-radiating, 7/10 in intensity, lasting 30 seconds.  Positive for associated diaphoresis.  Also reports SOB when walking which started 2 months ago.  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  Reports family history of MI in maternal grandfather at age 66.  No family history of sudden cardiac death.  Ten year ASCVD risk score is low at 1.5%.    Past Medical History:   Diagnosis Date    Chronic back pain     Morbid obesity with BMI of 40.0-44.9, adult     MILEY on CPAP     Plantar fasciitis of left foot     Urticaria      Past Surgical History:   Procedure Laterality Date    CYST REMOVAL      ovarian cyst removal at age 12    DILATION AND CURETTAGE OF UTERUS      WISDOM TOOTH EXTRACTION       Social History     Socioeconomic History    Marital status: Single     Spouse name: Not on file    Number of children: Not on file    Years of education: Not on file    Highest education level: Not on file   Occupational History    Not on file   Social Needs    Financial resource strain: Not on file    Food insecurity:     Worry: Not on file     Inability: Not on file    Transportation needs:     Medical: Not on file     Non-medical: Not on file   Tobacco Use    Smoking status: Current Every Day Smoker     Packs/day: 0.50     Types: Cigarettes    Smokeless " tobacco: Never Used   Substance and Sexual Activity    Alcohol use: Yes     Comment: social    Drug use: No    Sexual activity: Yes     Partners: Male     Birth control/protection: Injection   Lifestyle    Physical activity:     Days per week: Not on file     Minutes per session: Not on file    Stress: Not on file   Relationships    Social connections:     Talks on phone: Not on file     Gets together: Not on file     Attends Islam service: Not on file     Active member of club or organization: Not on file     Attends meetings of clubs or organizations: Not on file     Relationship status: Not on file   Other Topics Concern    Not on file   Social History Narrative    Single.  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      Family History   Problem Relation Age of Onset    Cancer Maternal Grandmother     Diabetes Mother     No Known Problems Sister     No Known Problems Sister     No Known Problems Sister     Colon cancer Neg Hx     Stomach cancer Neg Hx     Inflammatory bowel disease Neg Hx        Review of patient's allergies indicates:  No Known Allergies    Medication List with Changes/Refills   Current Medications    CETIRIZINE (ZYRTEC) 10 MG TABLET    Take 10 mg by mouth once daily.    ERGOCALCIFEROL (ERGOCALCIFEROL) 50,000 UNIT CAP    Take 1 capsule (50,000 Units total) by mouth every 7 days.    FLUTICASONE (FLONASE) 50 MCG/ACTUATION NASAL SPRAY    1 spray (50 mcg total) by Each Nare route 2 (two) times daily.    HYDROCODONE-ACETAMINOPHEN (NORCO) 5-325 MG PER TABLET    Take 1 tablet by mouth every 4 (four) hours as needed for Pain (or cough).    IBUPROFEN (ADVIL,MOTRIN) 800 MG TABLET    Take 1 tablet (800 mg total) by mouth every 6 (six) hours as needed for Pain.    MEDROXYPROGESTERONE (DEPO-PROVERA) 150 MG/ML SYRG    Inject 1 mL (150 mg total) into the muscle every 3 (three) months.    OMEPRAZOLE (PRILOSEC) 40 MG CAPSULE    Take 1 capsule (40 mg total) by mouth once daily.     "PHENTERMINE 37.5 MG CAPSULE    Take 1 capsule (37.5 mg total) by mouth every morning.    ZOLPIDEM (AMBIEN) 5 MG TAB    Take 1 tablet (5 mg total) by mouth nightly as needed.       Review of Systems  Constitution: Denies chills, fever, and sweats.  HENT: Denies headaches or blurry vision.  Cardiovascular: Positive for chest pain.  Respiratory: Positive for shortness of breath on exertion.  Gastrointestinal: Denies abdominal pain, nausea, or vomiting.  Musculoskeletal: Denies muscle cramps.  Neurological: Denies dizziness or focal weakness.  Psychiatric/Behavioral: Normal mental status.  Hematologic/Lymphatic: Denies bleeding problem or easy bruising/bleeding.  Skin: Denies rash or suspicious lesions    Physical Examination  /82 (BP Location: Left arm, Patient Position: Sitting, BP Method: X-Large (Manual))   Pulse 89   Ht 5' 6" (1.676 m)   Wt 125.6 kg (277 lb)   SpO2 96%   BMI 44.71 kg/m²     Constitutional: No acute distress, conversant  HEENT: Sclera anicteric, Pupils equal, round and reactive to light, extraocular motions intact, Oropharynx clear  Neck: No JVD, no carotid bruits  Cardiovascular: regular rate and rhythm, no murmur, rubs or gallops, normal S1/S2  Pulmonary: Clear to auscultation bilaterally  Abdominal: Abdomen soft, nontender, nondistended, positive bowel sounds  Extremities: No lower extremity edema,   Pulses:  Carotid pulses are 2+ on the right side, and 2+ on the left side.  Radial pulses are 2+ on the right side, and 2+ on the left side.   Femoral pulses are 2+ on the right side, and 2+ on the left side.  Popliteal pulses are 2+ on the right side, and 2+ on the left side.   Dorsalis pedis pulses are 2+ on the right side, and 2+ on the left side.   Posterior tibial pulses are 2+ on the right side, and 2+ on the left side.    Skin: No ecchymosis, erythema, or ulcers  Psych: Alert and oriented x 3, appropriate affect  Neuro: CNII-XII intact, no focal deficits    Labs:  Most Recent " Data  CBC:   Lab Results   Component Value Date    WBC 9.66 02/03/2019    HGB 12.5 02/03/2019    HCT 39.1 02/03/2019     (H) 02/03/2019    MCV 95 02/03/2019    RDW 13.0 02/03/2019     BMP:   Lab Results   Component Value Date     02/03/2019    K 3.9 02/03/2019     02/03/2019    CO2 23 02/03/2019    BUN 9 02/03/2019    CREATININE 0.70 02/03/2019     (H) 02/03/2019    CALCIUM 9.5 02/03/2019    MG 1.8 01/12/2018    PHOS 3.1 01/12/2018     LFTS;   Lab Results   Component Value Date    PROT 6.4 02/03/2019    ALBUMIN 3.6 02/03/2019    BILITOT 0.5 02/03/2019    AST 14 (L) 02/03/2019    ALKPHOS 65 02/03/2019    ALT 19 02/03/2019     COAGS: No results found for: INR, PROTIME, PTT  FLP:   Lab Results   Component Value Date    CHOL 128 01/12/2018    HDL 38 (L) 01/12/2018    LDLCALC 75.4 01/12/2018    TRIG 73 01/12/2018    CHOLHDL 29.7 01/12/2018     CARDIAC:   Lab Results   Component Value Date    TROPONINI <0.012 02/03/2019       EKG 2/3/2019:  Normal sinus rhythm    Assessment/Plan:   Velma Lopez is a 44 y.o. female with a past medical history of morbid obesity, MILEY (on CPAP), smoking, who presents for an initial appointment.    1. Chest Pain/JONES- Pt with several risk factors for CAD, including morbid obesity.  Given body habitus, check PET stress test and echo to evaluate further.     2. Morbid Obesity- Refer to nutrition for assistance with weight loss.      3. MILEY- Pt states she's  not using CPAP because its uncomfortable.  Encourage compliance with CPAP    4. ASCVD- Ten year ASCVD risk score is low at 1.5%.  Check updated lipid panel.      5. Smoking- Continue to encourage smoking cessation.  Pt states she does not want to be referred to smoking cessation at this time.     6. Preoperative Cardiac Risk Stratification prior to EGD- Check PET stress test and echo to complete preoperative cardiac risk stratification.      7. Morbid Obesity- Refer to nutrition for assistance with weight  loss.     Follow up in 2 weeks    Total duration of face to face visit time 30 minutes.  Total time spent counseling greater than fifty percent of total visit time.  Counseling included discussion regarding imaging findings, diagnosis, possibilities, treatment options, risks and benefits.  The patient had many questions regarding the options and long-term effects.    Sean Gomez MD, PhD  Interventional Cardiology

## 2019-05-06 NOTE — PATIENT INSTRUCTIONS
Assessment/Plan:   Velma Lopez is a 44 y.o. female with a past medical history of morbid obesity, MILEY (on CPAP), smoking, who presents for an initial appointment.    1. Chest Pain/JONES- Pt with several risk factors for CAD, including morbid obesity.  Given body habitus, check PET stress test and echo to evaluate further.     2. Morbid Obesity- Refer to nutrition for assistance with weight loss.      3. MILYE- Pt states she's  not using CPAP because its uncomfortable.  Encourage compliance with CPAP    4. ASCVD- Ten year ASCVD risk score is low at 1.5%.  Check updated lipid panel.      5. Smoking- Continue to encourage smoking cessation.  Pt states she does not want to be referred to smoking cessation at this time.     6. Preoperative Cardiac Risk Stratification prior to EGD- Check PET stress test and echo to complete preoperative cardiac risk stratification.      7. Morbid Obesity- Refer to nutrition for assistance with weight loss.     Follow up in 2 weeks

## 2019-05-17 ENCOUNTER — TELEPHONE (OUTPATIENT)
Dept: BARIATRICS | Facility: CLINIC | Age: 44
End: 2019-05-17

## 2019-05-17 NOTE — TELEPHONE ENCOUNTER
----- Message from Elsy Morris sent at 5/17/2019 11:02 AM CDT -----  Contact: Patient   Needs Advice    Reason for call: Patient is currently an est patient, that would like to continue her work up for surgery. Patient lost her previous insurance and now has medicaid. I know this is a gray area, as she is an est. Patient.         Communication Preference: [phone]     Additional Information: please contact the patient to consult with her on this matter

## 2019-05-17 NOTE — TELEPHONE ENCOUNTER
Called patient an informed her we cannot accept her insurance. Referred to [facility] patient understands and states she will google number

## 2019-05-29 ENCOUNTER — TELEPHONE (OUTPATIENT)
Dept: CARDIOLOGY | Facility: CLINIC | Age: 44
End: 2019-05-29

## 2019-05-29 ENCOUNTER — HOSPITAL ENCOUNTER (OUTPATIENT)
Dept: CARDIOLOGY | Facility: HOSPITAL | Age: 44
Discharge: HOME | End: 2019-05-29
Attending: INTERNAL MEDICINE
Payer: MEDICAID

## 2019-05-29 DIAGNOSIS — R07.89 OTHER CHEST PAIN: ICD-10-CM

## 2019-05-29 DIAGNOSIS — E66.01 SEVERE OBESITY (BMI >= 40): ICD-10-CM

## 2019-05-29 DIAGNOSIS — G47.33 OSA (OBSTRUCTIVE SLEEP APNEA): ICD-10-CM

## 2019-05-29 DIAGNOSIS — F17.200 SMOKING: ICD-10-CM

## 2019-05-29 DIAGNOSIS — Z91.199 NONCOMPLIANCE WITH CPAP TREATMENT: ICD-10-CM

## 2019-05-29 DIAGNOSIS — R06.09 DOE (DYSPNEA ON EXERTION): ICD-10-CM

## 2019-05-29 LAB
AORTIC ROOT ANNULUS: 3.51 CM
AORTIC VALVE CUSP SEPERATION: 1.84 CM
AV INDEX (PROSTH): 1.21
AV MEAN GRADIENT: 2.96 MMHG
AV PEAK GRADIENT: 5.11 MMHG
AV VALVE AREA: 3.88 CM2
AV VELOCITY RATIO: 1.09
CV ECHO LV RWT: 0.31 CM
DOP CALC AO PEAK VEL: 1.13 M/S
DOP CALC AO VTI: 22.89 CM
DOP CALC LVOT AREA: 3.2 CM2
DOP CALC LVOT DIAMETER: 2.02 CM
DOP CALC LVOT PEAK VEL: 1.23 M/S
DOP CALC LVOT STROKE VOLUME: 88.82 CM3
DOP CALCLVOT PEAK VEL VTI: 27.73 CM
E WAVE DECELERATION TIME: 184.48 MSEC
E/A RATIO: 1.11
ECHO LV POSTERIOR WALL: 0.78 CM (ref 0.6–1.1)
FRACTIONAL SHORTENING: 34 % (ref 28–44)
INTERVENTRICULAR SEPTUM: 0.69 CM (ref 0.6–1.1)
IVRT: 0.07 MSEC
LA MAJOR: 4.41 CM
LA MINOR: 5.16 CM
LA WIDTH: 2.81 CM
LEFT ATRIUM SIZE: 2.92 CM
LEFT ATRIUM VOLUME: 33.17 CM3
LEFT INTERNAL DIMENSION IN SYSTOLE: 3.37 CM (ref 2.1–4)
LEFT VENTRICLE DIASTOLIC VOLUME: 123.04 ML
LEFT VENTRICLE SYSTOLIC VOLUME: 46.48 ML
LEFT VENTRICULAR INTERNAL DIMENSION IN DIASTOLE: 5.09 CM (ref 3.5–6)
LEFT VENTRICULAR MASS: 125.76 G
MV PEAK A VEL: 0.72 M/S
MV PEAK E VEL: 0.8 M/S
PISA TR MAX VEL: 1.81 M/S
PULM VEIN S/D RATIO: 1.53
PV PEAK D VEL: 0.4 M/S
PV PEAK S VEL: 0.61 M/S
PV PEAK VELOCITY: 1.15 CM/S
RA MAJOR: 4.53 CM
RA PRESSURE: 8 MMHG
RIGHT VENTRICULAR END-DIASTOLIC DIMENSION: 2.92 CM
TR MAX PG: 13.1 MMHG
TV REST PULMONARY ARTERY PRESSURE: 21 MMHG

## 2019-05-29 PROCEDURE — 93306 TTE W/DOPPLER COMPLETE: CPT

## 2019-05-29 PROCEDURE — 93306 TTE W/DOPPLER COMPLETE: CPT | Mod: 26,,, | Performed by: STUDENT IN AN ORGANIZED HEALTH CARE EDUCATION/TRAINING PROGRAM

## 2019-05-29 PROCEDURE — 93306 TRANSTHORACIC ECHO (TTE) COMPLETE (CUPID ONLY): ICD-10-PCS | Mod: 26,,, | Performed by: STUDENT IN AN ORGANIZED HEALTH CARE EDUCATION/TRAINING PROGRAM

## 2019-05-29 NOTE — TELEPHONE ENCOUNTER
----- Message from Zena Mcgovern sent at 5/29/2019  4:14 PM CDT -----  Call patient to rescheduled her Nutrition referral, pt does not want to.

## 2019-06-10 ENCOUNTER — CLINICAL SUPPORT (OUTPATIENT)
Dept: CARDIOLOGY | Facility: CLINIC | Age: 44
End: 2019-06-10
Attending: INTERNAL MEDICINE
Payer: MEDICAID

## 2019-06-10 VITALS — SYSTOLIC BLOOD PRESSURE: 130 MMHG | DIASTOLIC BLOOD PRESSURE: 75 MMHG

## 2019-06-10 DIAGNOSIS — R06.09 DOE (DYSPNEA ON EXERTION): ICD-10-CM

## 2019-06-10 DIAGNOSIS — Z91.199 NONCOMPLIANCE WITH CPAP TREATMENT: ICD-10-CM

## 2019-06-10 DIAGNOSIS — E66.01 SEVERE OBESITY (BMI >= 40): ICD-10-CM

## 2019-06-10 DIAGNOSIS — F17.200 SMOKING: ICD-10-CM

## 2019-06-10 DIAGNOSIS — G47.33 OSA (OBSTRUCTIVE SLEEP APNEA): ICD-10-CM

## 2019-06-10 DIAGNOSIS — R07.89 OTHER CHEST PAIN: ICD-10-CM

## 2019-06-10 LAB
CFR FLOW - ANTERIOR: 2.07 CC/MIN/G
CFR FLOW - INFERIOR: 2.11 CC/MIN/G
CFR FLOW - LATERAL: 1.8 CC/MIN/G
CFR FLOW - MAX: 3.2 CC/MIN/G
CFR FLOW - MIN: 1.3 CC/MIN/G
CFR FLOW - SEPTAL: 1.94 CC/MIN/G
CFR FLOW - WHOLE HEART: 1.98 CC/MIN/G
CV STRESS BASE HR: 84 BPM
DIASTOLIC BLOOD PRESSURE: 69 MMHG
END DIASTOLIC INDEX-HIGH: 170 ML/M2
END SYSTOLIC INDEX-HIGH: 70 ML/M2
NUC REST DIASTOLIC VOLUME INDEX: 84
NUC REST EJECTION FRACTION: 71
NUC REST SYSTOLIC VOLUME INDEX: 25
NUC STRESS DIASTOLIC VOLUME INDEX: 79
NUC STRESS EJECTION FRACTION: 69 %
NUC STRESS SYSTOLIC VOLUME INDEX: 24
OHS CV CPX 85 PERCENT MAX PREDICTED HEART RATE MALE: 141
OHS CV CPX MAX PREDICTED HEART RATE: 166
OHS CV CPX PATIENT IS FEMALE: 1
OHS CV CPX PATIENT IS MALE: 0
OHS CV CPX PEAK DIASTOLIC BLOOD PRESSURE: 77 MMHG
OHS CV CPX PEAK HEAR RATE: 83 BPM
OHS CV CPX PEAK RATE PRESSURE PRODUCT: 9960
OHS CV CPX PEAK SYSTOLIC BLOOD PRESSURE: 120 MMHG
OHS CV CPX PERCENT MAX PREDICTED HEART RATE ACHIEVED: 50
OHS CV CPX RATE PRESSURE PRODUCT PRESENTING: 8988
REST FLOW - ANTERIOR: 0.97 CC/MIN/G
REST FLOW - INFERIOR: 0.93 CC/MIN/G
REST FLOW - LATERAL: 1.26 CC/MIN/G
REST FLOW - MAX: 1.5 CC/MIN/G
REST FLOW - MIN: 0.5 CC/MIN/G
REST FLOW - SEPTAL: 0.94 CC/MIN/G
REST FLOW - WHOLE HEART: 1.03
RETIRED EF AND QEF - SEE NOTES: 51 %
STRESS ECHO TARGET HR: 147.9 BPM
STRESS FLOW - ANTERIOR: 1.98 CC/MIN/G
STRESS FLOW - INFERIOR: 1.9 CC/MIN/G
STRESS FLOW - LATERAL: 2.25 CC/MIN/G
STRESS FLOW - MAX: 2.7 CC/MIN/G
STRESS FLOW - MIN: 1.3 CC/MIN/G
STRESS FLOW - SEPTAL: 1.8 CC/MIN/G
STRESS FLOW - WHOLE HEART: 1.98 CC/MIN/G
SYSTOLIC BLOOD PRESSURE: 107 MMHG

## 2019-06-10 PROCEDURE — 93018 CV STRESS TEST I&R ONLY: CPT | Mod: S$PBB,,, | Performed by: INTERNAL MEDICINE

## 2019-06-10 PROCEDURE — 78492 MYOCRD IMG PET MLT RST&STRS: CPT | Mod: PBBFAC | Performed by: INTERNAL MEDICINE

## 2019-06-10 PROCEDURE — 93016 CV STRESS TEST SUPVJ ONLY: CPT | Mod: S$PBB,,, | Performed by: INTERNAL MEDICINE

## 2019-06-10 PROCEDURE — 78492 PET STRESS (CUPID ONLY): ICD-10-PCS | Mod: 26,S$PBB,, | Performed by: INTERNAL MEDICINE

## 2019-06-10 PROCEDURE — 93016 PET STRESS (CUPID ONLY): ICD-10-PCS | Mod: S$PBB,,, | Performed by: INTERNAL MEDICINE

## 2019-06-10 PROCEDURE — 99211 OFF/OP EST MAY X REQ PHY/QHP: CPT | Mod: PBBFAC,25

## 2019-06-10 PROCEDURE — 99999 PR PBB SHADOW E&M-EST. PATIENT-LVL I: CPT | Mod: PBBFAC,,,

## 2019-06-10 PROCEDURE — 99999 PR PBB SHADOW E&M-EST. PATIENT-LVL I: ICD-10-PCS | Mod: PBBFAC,,,

## 2019-06-10 PROCEDURE — 93018 PET STRESS (CUPID ONLY): ICD-10-PCS | Mod: S$PBB,,, | Performed by: INTERNAL MEDICINE

## 2019-06-10 RX ORDER — DIPYRIDAMOLE 5 MG/ML
60 INJECTION INTRAVENOUS
Status: COMPLETED | OUTPATIENT
Start: 2019-06-10 | End: 2019-06-10

## 2019-06-10 RX ADMIN — DIPYRIDAMOLE 60 MG: 5 INJECTION INTRAVENOUS at 08:06

## 2019-06-12 ENCOUNTER — OFFICE VISIT (OUTPATIENT)
Dept: CARDIOLOGY | Facility: CLINIC | Age: 44
End: 2019-06-12
Payer: MEDICAID

## 2019-06-12 VITALS
SYSTOLIC BLOOD PRESSURE: 128 MMHG | OXYGEN SATURATION: 96 % | WEIGHT: 274.81 LBS | BODY MASS INDEX: 44.16 KG/M2 | HEART RATE: 102 BPM | DIASTOLIC BLOOD PRESSURE: 82 MMHG | HEIGHT: 66 IN

## 2019-06-12 DIAGNOSIS — R07.89 OTHER CHEST PAIN: ICD-10-CM

## 2019-06-12 DIAGNOSIS — E66.01 SEVERE OBESITY (BMI >= 40): ICD-10-CM

## 2019-06-12 DIAGNOSIS — Z01.810 PREOPERATIVE CARDIOVASCULAR EXAMINATION: Primary | ICD-10-CM

## 2019-06-12 DIAGNOSIS — G47.33 OSA (OBSTRUCTIVE SLEEP APNEA): ICD-10-CM

## 2019-06-12 DIAGNOSIS — Z91.199 NONCOMPLIANCE WITH CPAP TREATMENT: ICD-10-CM

## 2019-06-12 DIAGNOSIS — F17.200 SMOKING: ICD-10-CM

## 2019-06-12 DIAGNOSIS — R06.09 DOE (DYSPNEA ON EXERTION): ICD-10-CM

## 2019-06-12 PROCEDURE — 99215 PR OFFICE/OUTPT VISIT, EST, LEVL V, 40-54 MIN: ICD-10-PCS | Mod: S$PBB,,, | Performed by: INTERNAL MEDICINE

## 2019-06-12 PROCEDURE — 99999 PR PBB SHADOW E&M-EST. PATIENT-LVL III: CPT | Mod: PBBFAC,,, | Performed by: INTERNAL MEDICINE

## 2019-06-12 PROCEDURE — 99213 OFFICE O/P EST LOW 20 MIN: CPT | Mod: PBBFAC,PN | Performed by: INTERNAL MEDICINE

## 2019-06-12 PROCEDURE — 99999 PR PBB SHADOW E&M-EST. PATIENT-LVL III: ICD-10-PCS | Mod: PBBFAC,,, | Performed by: INTERNAL MEDICINE

## 2019-06-12 PROCEDURE — 99215 OFFICE O/P EST HI 40 MIN: CPT | Mod: S$PBB,,, | Performed by: INTERNAL MEDICINE

## 2019-06-12 NOTE — PATIENT INSTRUCTIONS
"Assessment/Plan:   Velma Lopez is a 44 y.o. female with a past medical history of morbid obesity, MILEY (on CPAP), smoking, who presents for a follow up appointment.    1. Chest Pain/JONES- Mrs. Lopez reports continued episodes of chest pain, which she states " I think this is acid reflux".  PET stress test from 6/10/2019 shows no clinically significant regional resting or stress induced perfusion defects.  Echo from 5/29/2019 shows normal left ventricular systolic function with EF of 55%; eccentric left ventricular hypertrophy; normal LV diastolic function; normal right ventricular systolic function; estimated PA systolic pressure is 21 mm Hg; intermediate central venous pressure (8 mm Hg).  Continue aggressive risk factor modification, including weight loss, smoking cessation, and CPAP compliance.     2. Morbid Obesity- Pt referred to nutrition for assistance with weight loss.      3. MILEY- Pt states she's  not using CPAP because its uncomfortable.  Encourage compliance with CPAP    4. ASCVD- Ten year ASCVD risk score is low at 1.5%.  Check updated lipid panel.      5. Smoking- Continue to encourage smoking cessation.  Pt states she does not want to be referred to smoking cessation at this time.     6. Preoperative Cardiac Risk Stratification prior to EGD- Mrs. Lopez has no heart failure symptoms and no documented arrhythmias.  PET stress test from 6/10/2019 shows no clinically significant regional resting or stress induced perfusion defects.  Echo from 5/29/2019 shows normal left ventricular systolic function with EF of 55%; eccentric left ventricular hypertrophy; normal LV diastolic function; normal right ventricular systolic function; estimated PA systolic pressure is 21 mm Hg; intermediate central venous pressure (8 mm Hg).  Hence, she is at low risk for a perioperative major adverse cardiac event during this low risk procedure.  Revised cardiac risk index of 3.9%.  No further testing indicated.  "     7. Morbid Obesity- Refer to nutrition for assistance with weight loss.

## 2019-06-12 NOTE — PROGRESS NOTES
"[facility]      Chief Complaint   Patient presents with    Results     Echo. PET       Patient ID: Velma Lopez is a 44 y.o. female with a past medical history of morbid obesity, MILEY (on CPAP), smoking, who presents for a follow up appointment.  Pertinent history/events are as follows:     -Pt kindly referred by Africa Wan NP for evaluation of chest pain and SOB.     -Pt has upcoming EGD by GI.    -At our initial clinic visit on 5/6/2019, Mrs. Lopez reported first noticing chest pain approximately 3 months ago.  Chest pain occurs at rest and on exertion.  Chest pain described as "dull", located at mid-chest, non-radiating, 7/10 in intensity, lasting 30 seconds.  Positive for associated diaphoresis.  Also reports SOB when walking which started 2 months ago.  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  Reports family history of MI in maternal grandfather at age 66.  No family history of sudden cardiac death.  Ten year ASCVD risk score is low at 1.5%.  Plan:   Chest Pain/JONES- Pt with several risk factors for CAD, including morbid obesity.  Given body habitus, check PET stress test and echo to evaluate further.   Morbid Obesity- Refer to nutrition for assistance with weight loss.    MILEY- Pt states she's  not using CPAP because its uncomfortable.  Encourage compliance with CPAP  ASCVD- Ten year ASCVD risk score is low at 1.5%.  Check updated lipid panel.    Smoking- Continue to encourage smoking cessation.  Pt states she does not want to be referred to smoking cessation at this time.   Preoperative Cardiac Risk Stratification prior to EGD- Check PET stress test and echo to complete preoperative cardiac risk stratification.    Morbid Obesity- Refer to nutrition for assistance with weight loss.     HPI:  Mrs. Lopez reports continued episodes of chest pain, which she states " I think this is acid reflux".  PET stress test from 6/10/2019 shows no clinically significant " regional resting or stress induced perfusion defects.  Echo from 5/29/2019 shows normal left ventricular systolic function with EF of 55%; eccentric left ventricular hypertrophy; normal LV diastolic function; normal right ventricular systolic function; estimated PA systolic pressure is 21 mm Hg; intermediate central venous pressure (8 mm Hg).    Past Medical History:   Diagnosis Date    Chronic back pain     Morbid obesity with BMI of 40.0-44.9, adult     MILEY on CPAP     Plantar fasciitis of left foot     Urticaria      Past Surgical History:   Procedure Laterality Date    CYST REMOVAL      ovarian cyst removal at age 12    DILATION AND CURETTAGE OF UTERUS      WISDOM TOOTH EXTRACTION       Social History     Socioeconomic History    Marital status: Single     Spouse name: Not on file    Number of children: Not on file    Years of education: Not on file    Highest education level: Not on file   Occupational History    Not on file   Social Needs    Financial resource strain: Not on file    Food insecurity:     Worry: Not on file     Inability: Not on file    Transportation needs:     Medical: Not on file     Non-medical: Not on file   Tobacco Use    Smoking status: Current Every Day Smoker     Packs/day: 0.50     Types: Cigarettes    Smokeless tobacco: Never Used   Substance and Sexual Activity    Alcohol use: Yes     Comment: social    Drug use: No    Sexual activity: Yes     Partners: Male     Birth control/protection: Injection   Lifestyle    Physical activity:     Days per week: Not on file     Minutes per session: Not on file    Stress: Not on file   Relationships    Social connections:     Talks on phone: Not on file     Gets together: Not on file     Attends Yarsanism service: Not on file     Active member of club or organization: Not on file     Attends meetings of clubs or organizations: Not on file     Relationship status: Not on file   Other Topics Concern    Not on file   Social  History Narrative    Single.  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      Family History   Problem Relation Age of Onset    Cancer Maternal Grandmother     Diabetes Mother     No Known Problems Sister     No Known Problems Sister     No Known Problems Sister     Colon cancer Neg Hx     Stomach cancer Neg Hx     Inflammatory bowel disease Neg Hx        Review of patient's allergies indicates:  No Known Allergies    Medication List with Changes/Refills   Current Medications    CETIRIZINE (ZYRTEC) 10 MG TABLET    Take 10 mg by mouth once daily.    ERGOCALCIFEROL (ERGOCALCIFEROL) 50,000 UNIT CAP    Take 1 capsule (50,000 Units total) by mouth every 7 days.    FLUTICASONE (FLONASE) 50 MCG/ACTUATION NASAL SPRAY    1 spray (50 mcg total) by Each Nare route 2 (two) times daily.    HYDROCODONE-ACETAMINOPHEN (NORCO) 5-325 MG PER TABLET    Take 1 tablet by mouth every 4 (four) hours as needed for Pain (or cough).    IBUPROFEN (ADVIL,MOTRIN) 800 MG TABLET    Take 1 tablet (800 mg total) by mouth every 6 (six) hours as needed for Pain.    MEDROXYPROGESTERONE (DEPO-PROVERA) 150 MG/ML SYRG    Inject 1 mL (150 mg total) into the muscle every 3 (three) months.    OMEPRAZOLE (PRILOSEC) 40 MG CAPSULE    Take 1 capsule (40 mg total) by mouth once daily.    PHENTERMINE 37.5 MG CAPSULE    Take 1 capsule (37.5 mg total) by mouth every morning.    ZOLPIDEM (AMBIEN) 5 MG TAB    Take 1 tablet (5 mg total) by mouth nightly as needed.       Review of Systems  Constitution: Denies chills, fever, and sweats.  HENT: Denies headaches or blurry vision.  Cardiovascular: Positive for chest pain.  Respiratory: Positive for shortness of breath on exertion.  Gastrointestinal: Denies abdominal pain, nausea, or vomiting.  Musculoskeletal: Denies muscle cramps.  Neurological: Denies dizziness or focal weakness.  Psychiatric/Behavioral: Normal mental status.  Hematologic/Lymphatic: Denies bleeding problem or easy  "bruising/bleeding.  Skin: Denies rash or suspicious lesions    Physical Examination  /82 (BP Location: Left arm, Patient Position: Sitting, BP Method: X-Large (Manual))   Pulse 102   Ht 5' 6" (1.676 m)   Wt 124.6 kg (274 lb 12.8 oz)   SpO2 96%   BMI 44.35 kg/m²     Constitutional: No acute distress, conversant  HEENT: Sclera anicteric, Pupils equal, round and reactive to light, extraocular motions intact, Oropharynx clear  Neck: No JVD, no carotid bruits  Cardiovascular: regular rate and rhythm, no murmur, rubs or gallops, normal S1/S2  Pulmonary: Clear to auscultation bilaterally  Abdominal: Abdomen soft, nontender, nondistended, positive bowel sounds  Extremities: No lower extremity edema,   Pulses:  Carotid pulses are 2+ on the right side, and 2+ on the left side.  Radial pulses are 2+ on the right side, and 2+ on the left side.   Femoral pulses are 2+ on the right side, and 2+ on the left side.  Popliteal pulses are 2+ on the right side, and 2+ on the left side.   Dorsalis pedis pulses are 2+ on the right side, and 2+ on the left side.   Posterior tibial pulses are 2+ on the right side, and 2+ on the left side.    Skin: No ecchymosis, erythema, or ulcers  Psych: Alert and oriented x 3, appropriate affect  Neuro: CNII-XII intact, no focal deficits    Labs:  Most Recent Data  CBC:   Lab Results   Component Value Date    WBC 9.66 02/03/2019    HGB 12.5 02/03/2019    HCT 39.1 02/03/2019     (H) 02/03/2019    MCV 95 02/03/2019    RDW 13.0 02/03/2019     BMP:   Lab Results   Component Value Date     02/03/2019    K 3.9 02/03/2019     02/03/2019    CO2 23 02/03/2019    BUN 9 02/03/2019    CREATININE 0.70 02/03/2019     (H) 02/03/2019    CALCIUM 9.5 02/03/2019    MG 1.8 01/12/2018    PHOS 3.1 01/12/2018     LFTS;   Lab Results   Component Value Date    PROT 6.4 02/03/2019    ALBUMIN 3.6 02/03/2019    BILITOT 0.5 02/03/2019    AST 14 (L) 02/03/2019    ALKPHOS 65 02/03/2019    ALT 19 " "02/03/2019     COAGS: No results found for: INR, PROTIME, PTT  FLP:   Lab Results   Component Value Date    CHOL 128 01/12/2018    HDL 38 (L) 01/12/2018    LDLCALC 75.4 01/12/2018    TRIG 73 01/12/2018    CHOLHDL 29.7 01/12/2018     CARDIAC:   Lab Results   Component Value Date    TROPONINI <0.012 02/03/2019       EKG 2/3/2019:  Normal sinus rhythm    PET Stress Test 6/10/2019:  The relative PET images are normal showing no clinically significant regional resting or stress induced perfusion defects.    Whole heart absolute myocardial perfusion (cc/min/g) averaged 1.03  at rest (which is elevated), 1.98 cc/min/g at stress (which is low normal), and 1.98 cc/min/g CFR (which is mildly reduced in part due to elevated resting flow).    Gated perfusion images showed an ejection fraction of 71.0 % at rest and 69 % during stress. Normal is greater than 51%.    Wall motion was normal at rest and during stress.    LV cavity size is normal at rest and stress.    The EKG portion of this study is negative for ischemia.    There were no arrhythmias during stress.    The patient reported no chest pain during the stress test.    There are no prior studies for comparison.      Echo 5/29/2019:  · Normal left ventricular systolic function. The estimated ejection fraction is 55%  · Eccentric left ventricular hypertrophy.  · Normal LV diastolic function.  · Normal right ventricular systolic function.  · The estimated PA systolic pressure is 21 mm Hg  · Intermediate central venous pressure (8 mm Hg).      Assessment/Plan:   Velma Lopez is a 44 y.o. female with a past medical history of morbid obesity, MILEY (on CPAP), smoking, who presents for a follow up appointment.    1. Chest Pain/JONES- Mrs. Lopez reports continued episodes of chest pain, which she states " I think this is acid reflux".  PET stress test from 6/10/2019 shows no clinically significant regional resting or stress induced perfusion defects.  Echo from " 5/29/2019 shows normal left ventricular systolic function with EF of 55%; eccentric left ventricular hypertrophy; normal LV diastolic function; normal right ventricular systolic function; estimated PA systolic pressure is 21 mm Hg; intermediate central venous pressure (8 mm Hg).  Continue aggressive risk factor modification, including weight loss, smoking cessation, and CPAP compliance.     2. Morbid Obesity- Pt referred to nutrition for assistance with weight loss.      3. MILEY- Pt states she's  not using CPAP because its uncomfortable.  Encourage compliance with CPAP    4. ASCVD- Ten year ASCVD risk score is low at 1.5%.  Check updated lipid panel.      5. Smoking- Continue to encourage smoking cessation.  Pt states she does not want to be referred to smoking cessation at this time.     6. Preoperative Cardiac Risk Stratification prior to EGD- Mrs. Lopez has no heart failure symptoms and no documented arrhythmias.  PET stress test from 6/10/2019 shows no clinically significant regional resting or stress induced perfusion defects.  Echo from 5/29/2019 shows normal left ventricular systolic function with EF of 55%; eccentric left ventricular hypertrophy; normal LV diastolic function; normal right ventricular systolic function; estimated PA systolic pressure is 21 mm Hg; intermediate central venous pressure (8 mm Hg).  Hence, she is at low risk for a perioperative major adverse cardiac event during this low risk procedure.  Revised cardiac risk index of 3.9%.  No further testing indicated.      7. Morbid Obesity- Refer to nutrition for assistance with weight loss.     Follow up in 6 months    Total duration of face to face visit time 30 minutes.  Total time spent counseling greater than fifty percent of total visit time.  Counseling included discussion regarding imaging findings, diagnosis, possibilities, treatment options, risks and benefits.  The patient had many questions regarding the options and long-term  effects.    Sean Gomez MD, PhD  Interventional Cardiology

## 2019-06-14 ENCOUNTER — ANESTHESIA EVENT (OUTPATIENT)
Dept: ENDOSCOPY | Facility: HOSPITAL | Age: 44
End: 2019-06-14
Payer: MEDICAID

## 2019-06-14 ENCOUNTER — ANESTHESIA (OUTPATIENT)
Dept: ENDOSCOPY | Facility: HOSPITAL | Age: 44
End: 2019-06-14
Payer: MEDICAID

## 2019-06-14 ENCOUNTER — HOSPITAL ENCOUNTER (OUTPATIENT)
Facility: HOSPITAL | Age: 44
Discharge: HOME | End: 2019-06-14
Attending: INTERNAL MEDICINE | Admitting: INTERNAL MEDICINE
Payer: MEDICAID

## 2019-06-14 VITALS
OXYGEN SATURATION: 99 % | TEMPERATURE: 98 F | WEIGHT: 273 LBS | DIASTOLIC BLOOD PRESSURE: 81 MMHG | BODY MASS INDEX: 43.87 KG/M2 | SYSTOLIC BLOOD PRESSURE: 142 MMHG | RESPIRATION RATE: 18 BRPM | HEART RATE: 96 BPM | HEIGHT: 66 IN

## 2019-06-14 DIAGNOSIS — R13.10 DYSPHAGIA: ICD-10-CM

## 2019-06-14 DIAGNOSIS — R13.10 DYSPHAGIA, UNSPECIFIED TYPE: Primary | ICD-10-CM

## 2019-06-14 LAB
B-HCG UR QL: NEGATIVE
CTP QC/QA: YES

## 2019-06-14 PROCEDURE — 27201012 HC FORCEPS, HOT/COLD, DISP: Performed by: INTERNAL MEDICINE

## 2019-06-14 PROCEDURE — 25000003 PHARM REV CODE 250: Performed by: INTERNAL MEDICINE

## 2019-06-14 PROCEDURE — 81025 URINE PREGNANCY TEST: CPT | Performed by: INTERNAL MEDICINE

## 2019-06-14 PROCEDURE — 37000008 HC ANESTHESIA 1ST 15 MINUTES: Performed by: INTERNAL MEDICINE

## 2019-06-14 PROCEDURE — 43239 EGD BIOPSY SINGLE/MULTIPLE: CPT | Performed by: INTERNAL MEDICINE

## 2019-06-14 PROCEDURE — 43239 PR EGD, FLEX, W/BIOPSY, SGL/MULTI: ICD-10-PCS | Mod: ,,, | Performed by: INTERNAL MEDICINE

## 2019-06-14 PROCEDURE — 43239 EGD BIOPSY SINGLE/MULTIPLE: CPT | Mod: ,,, | Performed by: INTERNAL MEDICINE

## 2019-06-14 PROCEDURE — 37000009 HC ANESTHESIA EA ADD 15 MINS: Performed by: INTERNAL MEDICINE

## 2019-06-14 PROCEDURE — 25000003 PHARM REV CODE 250: Performed by: NURSE ANESTHETIST, CERTIFIED REGISTERED

## 2019-06-14 PROCEDURE — 88305 TISSUE EXAM BY PATHOLOGIST: CPT | Performed by: PATHOLOGY

## 2019-06-14 PROCEDURE — E9220 PRA ENDO ANESTHESIA: HCPCS | Mod: ,,, | Performed by: NURSE ANESTHETIST, CERTIFIED REGISTERED

## 2019-06-14 PROCEDURE — E9220 PRA ENDO ANESTHESIA: ICD-10-PCS | Mod: ,,, | Performed by: NURSE ANESTHETIST, CERTIFIED REGISTERED

## 2019-06-14 PROCEDURE — 00731 ANES UPR GI NDSC PX NOS: CPT | Performed by: INTERNAL MEDICINE

## 2019-06-14 PROCEDURE — 88305 TISSUE EXAM BY PATHOLOGIST: CPT | Mod: 26,,, | Performed by: PATHOLOGY

## 2019-06-14 PROCEDURE — 63600175 PHARM REV CODE 636 W HCPCS: Performed by: NURSE ANESTHETIST, CERTIFIED REGISTERED

## 2019-06-14 PROCEDURE — 88305 TISSUE SPECIMEN TO PATHOLOGY - SURGERY: ICD-10-PCS | Mod: 26,,, | Performed by: PATHOLOGY

## 2019-06-14 RX ORDER — GLYCOPYRROLATE 0.2 MG/ML
INJECTION INTRAMUSCULAR; INTRAVENOUS
Status: DISCONTINUED | OUTPATIENT
Start: 2019-06-14 | End: 2019-06-14

## 2019-06-14 RX ORDER — LIDOCAINE HCL/PF 100 MG/5ML
SYRINGE (ML) INTRAVENOUS
Status: DISCONTINUED | OUTPATIENT
Start: 2019-06-14 | End: 2019-06-14

## 2019-06-14 RX ORDER — PROPOFOL 10 MG/ML
VIAL (ML) INTRAVENOUS
Status: DISCONTINUED | OUTPATIENT
Start: 2019-06-14 | End: 2019-06-14

## 2019-06-14 RX ORDER — SODIUM CHLORIDE 9 MG/ML
INJECTION, SOLUTION INTRAVENOUS CONTINUOUS
Status: DISCONTINUED | OUTPATIENT
Start: 2019-06-14 | End: 2019-06-14 | Stop reason: HOSPADM

## 2019-06-14 RX ORDER — SODIUM CHLORIDE 0.9 % (FLUSH) 0.9 %
10 SYRINGE (ML) INJECTION
Status: DISCONTINUED | OUTPATIENT
Start: 2019-06-14 | End: 2019-06-14 | Stop reason: HOSPADM

## 2019-06-14 RX ADMIN — SODIUM CHLORIDE: 0.9 INJECTION, SOLUTION INTRAVENOUS at 01:06

## 2019-06-14 RX ADMIN — PROPOFOL 30 MG: 10 INJECTION, EMULSION INTRAVENOUS at 02:06

## 2019-06-14 RX ADMIN — PROPOFOL 100 MG: 10 INJECTION, EMULSION INTRAVENOUS at 02:06

## 2019-06-14 RX ADMIN — LIDOCAINE HYDROCHLORIDE 100 MG: 20 INJECTION, SOLUTION INTRAVENOUS at 02:06

## 2019-06-14 RX ADMIN — GLYCOPYRROLATE 0.2 MG: 0.2 INJECTION, SOLUTION INTRAMUSCULAR; INTRAVENOUS at 02:06

## 2019-06-14 RX ADMIN — PROPOFOL 50 MG: 10 INJECTION, EMULSION INTRAVENOUS at 02:06

## 2019-06-14 NOTE — DISCHARGE INSTRUCTIONS

## 2019-06-14 NOTE — TRANSFER OF CARE
"Anesthesia Transfer of Care Note    Patient: Velma Lopez    Procedure(s) Performed: Procedure(s) (LRB):  EGD (ESOPHAGOGASTRODUODENOSCOPY) (N/A)    Patient location: GI    Anesthesia Type: general    Transport from OR: Transported from OR on room air with adequate spontaneous ventilation    Post pain: adequate analgesia    Post assessment: tolerated procedure well and no apparent anesthetic complications    Post vital signs: stable    Level of consciousness: awake    Nausea/Vomiting: no nausea/vomiting    Complications: none    Transfer of care protocol was followed      Last vitals:   Visit Vitals  /68   Pulse 89   Temp 36.7 °C (98.1 °F)   Resp 20   Ht 5' 6" (1.676 m)   Wt 123.8 kg (273 lb)   SpO2 99%   Breastfeeding? No   BMI 44.06 kg/m²     "

## 2019-06-14 NOTE — [employer] PATIENT INSTRUCTIONS
Discharge Summary/Instructions after an Endoscopic Procedure  Patient Name: Velma Lopez  Patient MRN: [id number]  Patient YOB: 1975  Friday, June 14, 2019  Ced Guevara MD  RESTRICTIONS:  During your procedure today, you received medications for sedation.  These   medications may affect your judgment, balance and coordination.  Therefore,   for 24 hours, you have the following restrictions:   - DO NOT drive a car, operate machinery, make legal/financial decisions,   sign important papers or drink alcohol.    ACTIVITY:  Today: no heavy lifting, straining or running due to procedural   sedation/anesthesia.  The following day: return to full activity including work.  DIET:  Eat and drink normally unless instructed otherwise.     TREATMENT FOR COMMON SIDE EFFECTS:  - Mild abdominal pain, nausea, belching, bloating or excessive gas:  rest,   eat lightly and use a heating pad.  - Sore Throat: treat with throat lozenges and/or gargle with warm salt   water.  - Because air was used during the procedure, expelling large amounts of air   from your rectum or belching is normal.  - If a bowel prep was taken, you may not have a bowel movement for 1-3 days.    This is normal.  SYMPTOMS TO WATCH FOR AND REPORT TO YOUR PHYSICIAN:  1. Abdominal pain or bloating, other than gas cramps.  2. Chest pain.  3. Back pain.  4. Signs of infection such as: chills or fever occurring within 24 hours   after the procedure.  5. Rectal bleeding, which would show as bright red, maroon, or black stools.   (A tablespoon of blood from the rectum is not serious, especially if   hemorrhoids are present.)  6. Vomiting.  7. Weakness or dizziness.  GO DIRECTLY TO THE NEAREST EMERGENCY ROOM IF YOU HAVE ANY OF THE FOLLOWING:      Difficulty breathing              Chills and/or fever over 101 F   Persistent vomiting and/or vomiting blood   Severe abdominal pain   Severe chest pain   Black, tarry stools   Bleeding- more than one  tablespoon   Any other symptom or condition that you feel may need urgent attention  Your doctor recommends these additional instructions:  If any biopsies were taken, your doctors clinic will contact you in 1 to 2   weeks with any results.  - Discharge patient to home.   - Patient has a contact number available for emergencies.  The signs and   symptoms of potential delayed complications were discussed with the   patient.  Return to normal activities tomorrow.  Written discharge   instructions were provided to the patient.   - Resume previous diet.   - Continue present medications.   - Await pathology results.   - Telephone GI clinic for pathology results in 1 week.  For questions, problems or results please call your physician - Ced uGevara MD at Work:  [phone].  [facility], EMERGENCY ROOM PHONE NUMBER: [phone]  IF A COMPLICATION OR EMERGENCY SITUATION ARISES AND YOU ARE UNABLE TO REACH   YOUR PHYSICIAN - GO DIRECTLY TO THE EMERGENCY ROOM.  Ced Guevara MD  6/14/2019 2:11:59 PM  This report has been verified and signed electronically.  [employer]

## 2019-06-14 NOTE — ANESTHESIA PREPROCEDURE EVALUATION
"                                                                                                             06/14/2019  Velma Lopez is a 44 y.o., female presenting for an EGD for c/o reflux and chest pain.    Past Medical History:   Diagnosis Date    Chronic back pain     Morbid obesity with BMI of 40.0-44.9, adult     MILEY on CPAP     Plantar fasciitis of left foot     Urticaria      Past Surgical History:   Procedure Laterality Date    CYST REMOVAL      ovarian cyst removal at age 12    DILATION AND CURETTAGE OF UTERUS      WISDOM TOOTH EXTRACTION           Anesthesia Evaluation    I have reviewed the Patient Summary Reports.     I have reviewed the Medications.     Review of Systems  Anesthesia Hx:  No problems with previous Anesthesia Denies Hx of Anesthetic complications  Denies Family Hx of Anesthesia complications.   Denies Personal Hx of Anesthesia complications.   Social:  Smoker    Cardiovascular:   See cardiology note: "Preoperative Cardiac Risk Stratification prior to EGD- Mrs. Lopez has no heart failure symptoms and no documented arrhythmias.  PET stress test from 6/10/2019 shows no clinically significant regional resting or stress induced perfusion defects.  Echo from 5/29/2019 shows normal left ventricular systolic function with EF of 55%; eccentric left ventricular hypertrophy; normal LV diastolic function; normal right ventricular systolic function; estimated PA systolic pressure is 21 mm Hg; intermediate central venous pressure (8 mm Hg).  Hence, she is at low risk for a perioperative major adverse cardiac event during this low risk procedure.  Revised cardiac risk index of 3.9%.  No further testing indicated. "   Pulmonary:   Sleep Apnea (Denies use of CPAP as she finds it "uncomfortable")     TTE 5/29/19  · Normal left ventricular systolic function. The estimated ejection fraction is 55%  · Eccentric left ventricular hypertrophy.  · Normal LV diastolic function.  · Normal " right ventricular systolic function.  · The estimated PA systolic pressure is 21 mm Hg  · Intermediate central venous pressure (8 mm Hg).        Physical Exam  General:  Morbid Obesity    Airway/Jaw/Neck:  Airway Findings: Mouth Opening: Normal Tongue: Normal  General Airway Assessment: Adult  Mallampati: II  TM Distance: Normal, at least 6 cm  Jaw/Neck Findings:  Neck ROM: Normal ROM     Eyes/Ears/Nose:  EYES/EARS/NOSE FINDINGS: Normal   Dental:  Dental Findings: In tact   Chest/Lungs:  Chest/Lungs Findings: Clear to auscultation, Normal Respiratory Rate     Heart/Vascular:  Heart Findings: Rate: Normal  Rhythm: Regular Rhythm  Sounds: Normal        Mental Status:  Mental Status Findings:  Cooperative, Alert and Oriented         Anesthesia Plan  Type of Anesthesia, risks & benefits discussed:  Anesthesia Type:  general  Patient's Preference: general  Intra-op Monitoring Plan: standard ASA monitors  Intra-op Monitoring Plan Comments:   Post Op Pain Control Plan:   Post Op Pain Control Plan Comments:   Induction:   IV  Beta Blocker:  Patient is not currently on a Beta-Blocker (No further documentation required).       Informed Consent: Patient understands risks and agrees with Anesthesia plan.  Questions answered. Anesthesia consent signed with patient.  ASA Score: 2     Day of Surgery Review of History & Physical: I have interviewed and examined the patient. I have reviewed the patient's H&P dated: 6/14/2019.   H&P update referred to the provider.         Ready For Surgery From Anesthesia Perspective.

## 2019-06-14 NOTE — ANESTHESIA POSTPROCEDURE EVALUATION
Anesthesia Post Evaluation    Patient: Velma Lopez    Procedure(s) Performed: Procedure(s) (LRB):  EGD (ESOPHAGOGASTRODUODENOSCOPY) (N/A)    Final Anesthesia Type: general  Patient location during evaluation: PACU  Patient participation: Yes- Able to Participate  Level of consciousness: awake and alert and oriented  Post-procedure vital signs: reviewed and stable  Pain management: adequate  Airway patency: patent  PONV status at discharge: No PONV  Anesthetic complications: no      Cardiovascular status: blood pressure returned to baseline  Respiratory status: unassisted  Hydration status: euvolemic  Follow-up not needed.          Vitals Value Taken Time   /81 6/14/2019  2:36 PM   Temp 36.6 °C (97.9 °F) 6/14/2019  2:16 PM   Pulse 96 6/14/2019  2:36 PM   Resp 18 6/14/2019  2:36 PM   SpO2 99 % 6/14/2019  2:36 PM         Event Time     Out of Recovery 14:45:19          Pain/Anabell Score: Anabell Score: 10 (6/14/2019  2:36 PM)

## 2019-06-14 NOTE — H&P
Short Stay Endoscopy History and Physical    PCP - Aftab Martinez MD    Procedure - EGD  ASA - per anesthesia  Mallampati - per anesthesia  History of Anesthesia problems - no  Family history Anesthesia problems -  no   Plan of anesthesia - MAC    HPI:  This is a 44 y.o. female here for evaluation of chest pain and dysphagia.        ROS:  Constitutional: No fevers, chills, No weight loss  CV: No chest pain  Pulm: No cough, No shortness of breath  Ophtho: No vision changes  GI: see HPI    Medical History:  has a past medical history of Chronic back pain, Morbid obesity with BMI of 40.0-44.9, adult, MILEY on CPAP, Plantar fasciitis of left foot, and Urticaria.    Surgical History:  has a past surgical history that includes [place] tooth extraction; Cyst Removal; and Dilation and curettage of uterus.    Family History: family history includes Cancer in her maternal grandmother; Diabetes in her mother; No Known Problems in her sister, sister, and sister.. Otherwise no colon cancer, inflammatory bowel disease, or GI malignancies.    Social History:  reports that she has been smoking cigarettes.  She has been smoking about 0.50 packs per day. She has never used smokeless tobacco. She reports that she drinks alcohol. She reports that she does not use drugs.    Review of patient's allergies indicates:  No Known Allergies    Medications:   Medications Prior to Admission   Medication Sig Dispense Refill Last Dose    cetirizine (ZYRTEC) 10 MG tablet Take 10 mg by mouth once daily.   6/13/2019 at Unknown time    ergocalciferol (ERGOCALCIFEROL) 50,000 unit Cap Take 1 capsule (50,000 Units total) by mouth every 7 days. 12 capsule 1 Past Week at Unknown time    fluticasone (FLONASE) 50 mcg/actuation nasal spray 1 spray (50 mcg total) by Each Nare route 2 (two) times daily. 15 g 1 Past Week at Unknown time    HYDROcodone-acetaminophen (NORCO) 5-325 mg per tablet Take 1 tablet by mouth every 4 (four) hours as needed for  Pain (or cough). 14 tablet 0 Past Month at Unknown time    ibuprofen (ADVIL,MOTRIN) 800 MG tablet Take 1 tablet (800 mg total) by mouth every 6 (six) hours as needed for Pain. 20 tablet 0 Past Month at Unknown time    omeprazole (PRILOSEC) 40 MG capsule Take 1 capsule (40 mg total) by mouth once daily. 30 capsule 11 6/13/2019 at Unknown time    phentermine 37.5 MG capsule Take 1 capsule (37.5 mg total) by mouth every morning. 30 capsule 3 6/13/2019 at Unknown time    zolpidem (AMBIEN) 5 MG Tab Take 1 tablet (5 mg total) by mouth nightly as needed. 30 tablet 3 6/13/2019 at Unknown time    medroxyPROGESTERone (DEPO-PROVERA) 150 mg/mL Syrg Inject 1 mL (150 mg total) into the muscle every 3 (three) months. 1 mL 2 Unknown at Unknown time       Physical Exam:    Vital Signs:   Vitals:    06/14/19 1329   BP: 137/66   Pulse: 82   Resp: 20   Temp: 98.1 °F (36.7 °C)       General Appearance: Well appearing in no acute distress  Eyes:    No scleral icterus  ENT: Neck supple, Lips, mucosa, and tongue normal; teeth and gums normal  Lungs: CTA anteriorly  Heart:  Regular rate, S1, S2 normal, no murmurs heard.  Abdomen: Soft, non tender, non distended with normal bowel sounds. No hepatosplenomegaly, ascites, or mass.      Labs:  Lab Results   Component Value Date    WBC 9.66 02/03/2019    HGB 12.5 02/03/2019    HCT 39.1 02/03/2019     (H) 02/03/2019    CHOL 128 01/12/2018    TRIG 73 01/12/2018    HDL 38 (L) 01/12/2018    ALT 19 02/03/2019    AST 14 (L) 02/03/2019     02/03/2019    K 3.9 02/03/2019     02/03/2019    CREATININE 0.70 02/03/2019    BUN 9 02/03/2019    CO2 23 02/03/2019    TSH 1.527 01/30/2019    GLUF 114 (H) 05/31/2010    HGBA1C 6.2 (H) 01/12/2018         Assessment:  44 y.o. female with non-cardiac chest pain and dysphagia.     Plan:  Proceed with EGD today.  I have explained the risks and benefits of endoscopy procedures to the patient including but not limited to bleeding, perforation,  infection, and death.  All questions answered.      Ced Guevara MD

## 2019-06-21 ENCOUNTER — PATIENT MESSAGE (OUTPATIENT)
Dept: GASTROENTEROLOGY | Facility: CLINIC | Age: 44
End: 2019-06-21

## 2019-06-21 ENCOUNTER — TELEPHONE (OUTPATIENT)
Dept: ENDOSCOPY | Facility: HOSPITAL | Age: 44
End: 2019-06-21

## 2019-06-26 ENCOUNTER — OFFICE VISIT (OUTPATIENT)
Dept: GASTROENTEROLOGY | Facility: CLINIC | Age: 44
End: 2019-06-26
Payer: MEDICAID

## 2019-06-26 ENCOUNTER — TELEPHONE (OUTPATIENT)
Dept: ENDOSCOPY | Facility: HOSPITAL | Age: 44
End: 2019-06-26

## 2019-06-26 VITALS
WEIGHT: 272.25 LBS | SYSTOLIC BLOOD PRESSURE: 137 MMHG | BODY MASS INDEX: 43.75 KG/M2 | HEIGHT: 66 IN | HEART RATE: 96 BPM | DIASTOLIC BLOOD PRESSURE: 92 MMHG

## 2019-06-26 DIAGNOSIS — R13.19 ESOPHAGEAL DYSPHAGIA: Primary | ICD-10-CM

## 2019-06-26 DIAGNOSIS — K21.9 GERD WITHOUT ESOPHAGITIS: ICD-10-CM

## 2019-06-26 PROCEDURE — 99214 OFFICE O/P EST MOD 30 MIN: CPT | Mod: PBBFAC | Performed by: NURSE PRACTITIONER

## 2019-06-26 PROCEDURE — 99999 PR PBB SHADOW E&M-EST. PATIENT-LVL IV: CPT | Mod: PBBFAC,,, | Performed by: NURSE PRACTITIONER

## 2019-06-26 PROCEDURE — 99999 PR PBB SHADOW E&M-EST. PATIENT-LVL IV: ICD-10-PCS | Mod: PBBFAC,,, | Performed by: NURSE PRACTITIONER

## 2019-06-26 PROCEDURE — 99214 OFFICE O/P EST MOD 30 MIN: CPT | Mod: S$PBB,,, | Performed by: NURSE PRACTITIONER

## 2019-06-26 PROCEDURE — 99214 PR OFFICE/OUTPT VISIT, EST, LEVL IV, 30-39 MIN: ICD-10-PCS | Mod: S$PBB,,, | Performed by: NURSE PRACTITIONER

## 2019-06-26 NOTE — PROGRESS NOTES
[facility] Consultation Note    Reason for Consult:  The primary encounter diagnosis was Esophageal dysphagia. A diagnosis of GERD without esophagitis was also pertinent to this visit.    PCP:   Aftab Martinez       Referring MD:  No referring provider defined for this encounter.    HPI:  This is a 44 y.o. female here for follow-up for her GERD and dysphagia.  She is an established patient last seen by me in clinic 4/24/19.  She then had an EGD with Dr. Guevara 6/14/19.  Her EGD was unremarkable.  At her last visit I increased her Prilosec to 40 mg every day.    She states she feels better than our last visit. She made dietary and lifestyle changes.  She experiences the pyrosis 1-2x per week, she was having it every day.  At times she is still having the dysphagia.  No nausea or vomiting.  Still taking the Prilosec every morning.  No abdominal pain, but sometimes when she eats or drinks something she feels pain in her back.      ROS:  Constitutional: No fevers, chills, No unintentional weight loss  ENT: No allergies  CV: No chest pain  GI: see HPI  Derm: No rash  MSK: No arthritis  Neuro: No syncope, No seizure  Psych: No anxiety, No depression    Medical History:  has a past medical history of Chronic back pain, Morbid obesity with BMI of 40.0-44.9, adult, MILEY on CPAP, Plantar fasciitis of left foot, and Urticaria.    Surgical History:  has a past surgical history that includes [place] tooth extraction; Cyst Removal; Dilation and curettage of uterus; Esophagogastroduodenoscopy (N/A, 6/14/2019); and Upper gastrointestinal endoscopy.    Family History: family history includes Cancer in her maternal grandmother; Diabetes in her mother; No Known Problems in her sister, sister, and sister..     Social History:  reports that she has been smoking cigarettes.  She has been smoking about 0.50 packs per day. She has never used smokeless tobacco. She reports that she drinks alcohol. She reports  "that she does not use drugs.    Review of patient's allergies indicates:  No Known Allergies    Current Outpatient Medications on File Prior to Visit   Medication Sig Dispense Refill    cetirizine (ZYRTEC) 10 MG tablet Take 10 mg by mouth once daily.      ergocalciferol (ERGOCALCIFEROL) 50,000 unit Cap Take 1 capsule (50,000 Units total) by mouth every 7 days. 12 capsule 1    fluticasone (FLONASE) 50 mcg/actuation nasal spray 1 spray (50 mcg total) by Each Nare route 2 (two) times daily. 15 g 1    HYDROcodone-acetaminophen (NORCO) 5-325 mg per tablet Take 1 tablet by mouth every 4 (four) hours as needed for Pain (or cough). 14 tablet 0    ibuprofen (ADVIL,MOTRIN) 800 MG tablet Take 1 tablet (800 mg total) by mouth every 6 (six) hours as needed for Pain. 20 tablet 0    medroxyPROGESTERone (DEPO-PROVERA) 150 mg/mL Syrg Inject 1 mL (150 mg total) into the muscle every 3 (three) months. 1 mL 2    omeprazole (PRILOSEC) 40 MG capsule Take 1 capsule (40 mg total) by mouth once daily. 30 capsule 11    phentermine 37.5 MG capsule Take 1 capsule (37.5 mg total) by mouth every morning. 30 capsule 3    zolpidem (AMBIEN) 5 MG Tab Take 1 tablet (5 mg total) by mouth nightly as needed. 30 tablet 3     No current facility-administered medications on file prior to visit.          Objective Findings:    Vital Signs:  BP (!) 140/99 (BP Location: Left arm)   Pulse 93   Ht 5' 6" (1.676 m)   Wt 123.5 kg (272 lb 4.3 oz)   BMI 43.95 kg/m²   Body mass index is 43.95 kg/m².    Physical Exam:  General Appearance: Well appearing in no acute distress  Head:   Normocephalic, without obvious abnormality  Eyes:    No scleral icterus  ENT: Neck supple  Neurologic: AAO x 3      Labs:  Lab Results   Component Value Date    WBC 9.66 02/03/2019    HGB 12.5 02/03/2019    HCT 39.1 02/03/2019     (H) 02/03/2019    CHOL 128 01/12/2018    TRIG 73 01/12/2018    HDL 38 (L) 01/12/2018    ALT 19 02/03/2019    AST 14 (L) 02/03/2019     " 02/03/2019    K 3.9 02/03/2019     02/03/2019    CREATININE 0.70 02/03/2019    BUN 9 02/03/2019    CO2 23 02/03/2019    TSH 1.527 01/30/2019    GLUF 114 (H) 05/31/2010    HGBA1C 6.2 (H) 01/12/2018       Imaging:  None reviewed    Endoscopy:    EGD 06/14/2019 with Dr. Guevara.  Unremarkable.    Assessment:    Ms. Lopez is a 44-year-old BF with:    1. Esophageal dysphagia    2. GERD without esophagitis       She reports her symptoms have improved with lifestyle and dietary modifications as well as daily omeprazole use.  She is now having 1-2 episodes of reflux per week, she was having every day sx.  Her EGD was unremarkable.  We will order esophagram and esophageal manometry as she is still having esophageal dysphagia.     Recommendations:  1.  Continue PPI  2. Esophagram  3. Esophageal Manometry    Follow-up pending above    Order summary:  Orders Placed This Encounter    FL Esophagram With Barium Tablet    Case request GI: MOTILITY STUDY, ESOPHAGUS, USING HIGH RESOLUTION MANOMETRY         Thank you so much for allowing me to participate in the care of Velma Lopez    CLAYTON Scales

## 2019-07-18 ENCOUNTER — TELEPHONE (OUTPATIENT)
Dept: RADIOLOGY | Facility: HOSPITAL | Age: 44
End: 2019-07-18

## 2019-07-18 ENCOUNTER — PATIENT MESSAGE (OUTPATIENT)
Dept: INTERNAL MEDICINE | Facility: CLINIC | Age: 44
End: 2019-07-18

## 2019-07-18 DIAGNOSIS — M65.30 TRIGGER FINGER OF LEFT HAND, UNSPECIFIED FINGER: Primary | ICD-10-CM

## 2019-07-19 ENCOUNTER — HOSPITAL ENCOUNTER (OUTPATIENT)
Dept: RADIOLOGY | Facility: HOSPITAL | Age: 44
Discharge: HOME | End: 2019-07-19
Attending: NURSE PRACTITIONER
Payer: MEDICAID

## 2019-07-19 DIAGNOSIS — R13.19 ESOPHAGEAL DYSPHAGIA: ICD-10-CM

## 2019-07-19 PROCEDURE — 74220 FL ESOPHAGRAM WITH BARIUM TABLET: ICD-10-PCS | Mod: 26,,, | Performed by: RADIOLOGY

## 2019-07-19 PROCEDURE — 74220 X-RAY XM ESOPHAGUS 1CNTRST: CPT | Mod: 26,,, | Performed by: RADIOLOGY

## 2019-07-19 PROCEDURE — 74220 X-RAY XM ESOPHAGUS 1CNTRST: CPT | Mod: TC

## 2019-07-23 ENCOUNTER — TELEPHONE (OUTPATIENT)
Dept: GASTROENTEROLOGY | Facility: CLINIC | Age: 44
End: 2019-07-23

## 2019-07-23 NOTE — PROGRESS NOTES
Her esophagram showed Mild esophageal dysmotility,  otherwise normal esophagram evaluation. She is already set up for a manometry test which is what evaluates the esophageal dysmotility.

## 2019-07-23 NOTE — TELEPHONE ENCOUNTER
MA called Pt to give her test results per [place] and Pt said she saw where it was released and also was aware she is setup for a manometry .Pt verbalized understanding.     ----- Message from Bianca Marinelli MA sent at 7/23/2019  4:10 PM CDT -----      ----- Message -----  From: Africa Wan NP  Sent: 7/23/2019   3:55 PM  To: [facility] Staff    Her esophagram showed Mild esophageal dysmotility,  otherwise normal esophagram evaluation. She is already set up for a manometry test which is what evaluates the esophageal dysmotility.

## 2019-07-27 ENCOUNTER — HOSPITAL ENCOUNTER (EMERGENCY)
Facility: HOSPITAL | Age: 44
Discharge: HOME | End: 2019-07-27
Attending: EMERGENCY MEDICINE
Payer: MEDICAID

## 2019-07-27 VITALS
TEMPERATURE: 99 F | HEIGHT: 66 IN | SYSTOLIC BLOOD PRESSURE: 135 MMHG | RESPIRATION RATE: 18 BRPM | HEART RATE: 89 BPM | OXYGEN SATURATION: 99 % | WEIGHT: 275 LBS | DIASTOLIC BLOOD PRESSURE: 73 MMHG | BODY MASS INDEX: 44.2 KG/M2

## 2019-07-27 DIAGNOSIS — M54.12 CERVICAL RADICULOPATHY AT C7: Primary | ICD-10-CM

## 2019-07-27 PROCEDURE — 96372 THER/PROPH/DIAG INJ SC/IM: CPT | Mod: ER

## 2019-07-27 PROCEDURE — 25000003 PHARM REV CODE 250: Mod: ER | Performed by: EMERGENCY MEDICINE

## 2019-07-27 PROCEDURE — 99284 EMERGENCY DEPT VISIT MOD MDM: CPT | Mod: 25,ER

## 2019-07-27 PROCEDURE — 63600175 PHARM REV CODE 636 W HCPCS: Mod: ER | Performed by: EMERGENCY MEDICINE

## 2019-07-27 RX ORDER — KETOROLAC TROMETHAMINE 30 MG/ML
10 INJECTION, SOLUTION INTRAMUSCULAR; INTRAVENOUS
Status: COMPLETED | OUTPATIENT
Start: 2019-07-27 | End: 2019-07-27

## 2019-07-27 RX ORDER — CYCLOBENZAPRINE HCL 10 MG
10 TABLET ORAL 3 TIMES DAILY PRN
Qty: 15 TABLET | Refills: 0 | Status: SHIPPED | OUTPATIENT
Start: 2019-07-27 | End: 2019-08-01

## 2019-07-27 RX ORDER — KETOROLAC TROMETHAMINE 10 MG/1
10 TABLET, FILM COATED ORAL 3 TIMES DAILY PRN
Qty: 20 TABLET | Refills: 0 | Status: SHIPPED | OUTPATIENT
Start: 2019-07-27 | End: 2019-10-16

## 2019-07-27 RX ORDER — KETOROLAC TROMETHAMINE 10 MG/1
10 TABLET, FILM COATED ORAL 3 TIMES DAILY PRN
Qty: 20 TABLET | Refills: 0 | Status: SHIPPED | OUTPATIENT
Start: 2019-07-27 | End: 2019-07-27 | Stop reason: SDUPTHER

## 2019-07-27 RX ORDER — CYCLOBENZAPRINE HCL 10 MG
10 TABLET ORAL
Status: COMPLETED | OUTPATIENT
Start: 2019-07-27 | End: 2019-07-27

## 2019-07-27 RX ORDER — CYCLOBENZAPRINE HCL 10 MG
10 TABLET ORAL 3 TIMES DAILY PRN
Qty: 15 TABLET | Refills: 0 | Status: SHIPPED | OUTPATIENT
Start: 2019-07-27 | End: 2019-07-27 | Stop reason: SDUPTHER

## 2019-07-27 RX ADMIN — KETOROLAC TROMETHAMINE 10 MG: 30 INJECTION, SOLUTION INTRAMUSCULAR at 02:07

## 2019-07-27 RX ADMIN — CYCLOBENZAPRINE HYDROCHLORIDE 10 MG: 10 TABLET, FILM COATED ORAL at 02:07

## 2019-07-30 NOTE — ED PROVIDER NOTES
Encounter Date: 7/27/2019       History     Chief Complaint   Patient presents with    Back Pain     Patient stated she started having intermittent upper back pain/spasms that radiates to both shoulders down to her arms x4 days. Patient stated pain increased when laying down. Denies numbness or tingling to extremities.      Patient reports a chief complaint of back pain.  This is isolated to the upper bilat back.  This pain does radiate down both arms.  There has been no trauma.  Onset was noted 4 - 5 days agp.  There is no numbness, weakness, or incontinence reported.  Patient has not attempted treatment at home with over-the-counter medications.  Urinary complaints are denied.        Review of patient's allergies indicates:  No Known Allergies  Past Medical History:   Diagnosis Date    Chronic back pain     Morbid obesity with BMI of 40.0-44.9, adult     MILEY on CPAP     Plantar fasciitis of left foot     Urticaria      Past Surgical History:   Procedure Laterality Date    CYST REMOVAL      ovarian cyst removal at age 12    DILATION AND CURETTAGE OF UTERUS      EGD (ESOPHAGOGASTRODUODENOSCOPY) N/A 6/14/2019    Performed by Ced Guevara MD at [facility] ([address] [place] [address])    UPPER GASTROINTESTINAL ENDOSCOPY      WISDOM TOOTH EXTRACTION       Family History   Problem Relation Age of Onset    Cancer Maternal Grandmother     Diabetes Mother     No Known Problems Sister     No Known Problems Sister     No Known Problems Sister     Colon cancer Neg Hx     Stomach cancer Neg Hx     Inflammatory bowel disease Neg Hx     Esophageal cancer Neg Hx      Social History     Tobacco Use    Smoking status: Current Every Day Smoker     Packs/day: 0.50     Types: Cigarettes    Smokeless tobacco: Never Used   Substance Use Topics    Alcohol use: Yes     Comment: social    Drug use: No     Review of Systems   Constitutional: Negative for chills and fever.   HENT: Negative for congestion and rhinorrhea.   "  Respiratory: Negative for cough, chest tightness, shortness of breath and wheezing.    Cardiovascular: Negative for chest pain, palpitations and leg swelling.   Gastrointestinal: Negative for abdominal pain, constipation, diarrhea, nausea and vomiting.   Genitourinary: Negative for dysuria, frequency, urgency, vaginal bleeding and vaginal discharge.   Musculoskeletal: Positive for back pain.   Skin: Negative for color change and rash.   Allergic/Immunologic: Negative for immunocompromised state.   Neurological: Negative for dizziness, weakness and numbness.   Hematological: Negative for adenopathy. Does not bruise/bleed easily.   All other systems reviewed and are negative.    Physical Exam     Initial Vitals [07/27/19 0117]   BP Pulse Resp Temp SpO2   (!) 144/79 98 20 98.5 °F (36.9 °C) 99 %      MAP       --         Vitals:    07/27/19 0117 07/27/19 0230   BP: (!) 144/79 135/73   Pulse: 98 89   Resp: 20 18   Temp: 98.5 °F (36.9 °C)    TempSrc: Oral    SpO2: 99% 99%   Weight: 124.7 kg (275 lb)    Height: 5' 6" (1.676 m)          Physical Exam    Nursing note and vitals reviewed.  Constitutional: She appears well-developed and well-nourished. She is not diaphoretic. No distress.   HENT:   Head: Normocephalic and atraumatic.   Cardiovascular: Normal rate, regular rhythm, normal heart sounds and intact distal pulses.   Pulmonary/Chest: Breath sounds normal. No respiratory distress.   Musculoskeletal:        Cervical back: She exhibits pain. She exhibits normal range of motion, no tenderness, no bony tenderness, no spasm and normal pulse.        Back:    Neurological: She is alert and oriented to person, place, and time.   Skin: Skin is warm and dry.         ED Course   Procedures  Labs Reviewed - No data to display       Imaging Results    None          Medical Decision Making:   ED Management:  All findings were reviewed with the patient/family in detail along with the diagnosis.  I see no indication of an emergent " process beyond that addressed during our encounter but have duly counseled the patient/family regarding the need for prompt follow-up as well as the indications that should prompt immediate return to the emergency room should new or worrisome developments occur.  The patient/family communicates understanding of all this information and all remaining questions and concerns were addressed at this time.                          Clinical Impression:       ICD-10-CM ICD-9-CM   1. Cervical radiculopathy at C7 M54.12 723.4                                Gamal House MD  07/30/19 0536

## 2019-07-31 ENCOUNTER — TELEPHONE (OUTPATIENT)
Dept: ORTHOPEDICS | Facility: CLINIC | Age: 44
End: 2019-07-31

## 2019-07-31 DIAGNOSIS — M79.641 RIGHT HAND PAIN: Primary | ICD-10-CM

## 2019-08-01 ENCOUNTER — HOSPITAL ENCOUNTER (OUTPATIENT)
Dept: RADIOLOGY | Facility: HOSPITAL | Age: 44
Discharge: HOME | End: 2019-08-01
Attending: ORTHOPAEDIC SURGERY
Payer: MEDICAID

## 2019-08-01 DIAGNOSIS — M79.641 RIGHT HAND PAIN: ICD-10-CM

## 2019-08-01 PROCEDURE — 73130 X-RAY EXAM OF HAND: CPT | Mod: TC,RT

## 2019-08-01 PROCEDURE — 73130 X-RAY EXAM OF HAND: CPT | Mod: 26,RT,, | Performed by: RADIOLOGY

## 2019-08-01 PROCEDURE — 73130 XR HAND COMPLETE 3 VIEW RIGHT: ICD-10-PCS | Mod: 26,RT,, | Performed by: RADIOLOGY

## 2019-08-02 ENCOUNTER — PATIENT OUTREACH (OUTPATIENT)
Dept: ADMINISTRATIVE | Facility: OTHER | Age: 44
End: 2019-08-02

## 2019-08-05 ENCOUNTER — HOSPITAL ENCOUNTER (OUTPATIENT)
Facility: HOSPITAL | Age: 44
Discharge: HOME | End: 2019-08-05
Attending: INTERNAL MEDICINE | Admitting: INTERNAL MEDICINE
Payer: MEDICAID

## 2019-08-05 ENCOUNTER — OFFICE VISIT (OUTPATIENT)
Dept: ORTHOPEDICS | Facility: CLINIC | Age: 44
End: 2019-08-05
Payer: MEDICAID

## 2019-08-05 VITALS
OXYGEN SATURATION: 100 % | SYSTOLIC BLOOD PRESSURE: 135 MMHG | BODY MASS INDEX: 44.03 KG/M2 | DIASTOLIC BLOOD PRESSURE: 78 MMHG | RESPIRATION RATE: 16 BRPM | WEIGHT: 274 LBS | HEART RATE: 91 BPM | HEIGHT: 66 IN | TEMPERATURE: 98 F

## 2019-08-05 VITALS — HEIGHT: 66 IN | BODY MASS INDEX: 44.04 KG/M2 | WEIGHT: 274.06 LBS

## 2019-08-05 DIAGNOSIS — M65.311 TRIGGER THUMB, RIGHT THUMB: Primary | ICD-10-CM

## 2019-08-05 DIAGNOSIS — R13.10 DYSPHAGIA: ICD-10-CM

## 2019-08-05 PROCEDURE — 20550 TENDON SHEATH: R THUMB MCP: ICD-10-PCS | Mod: S$PBB,F5,, | Performed by: ORTHOPAEDIC SURGERY

## 2019-08-05 PROCEDURE — 25000003 PHARM REV CODE 250: Performed by: INTERNAL MEDICINE

## 2019-08-05 PROCEDURE — 20550 NJX 1 TENDON SHEATH/LIGAMENT: CPT | Mod: PBBFAC | Performed by: ORTHOPAEDIC SURGERY

## 2019-08-05 PROCEDURE — 99999 PR PBB SHADOW E&M-EST. PATIENT-LVL III: ICD-10-PCS | Mod: PBBFAC,,, | Performed by: ORTHOPAEDIC SURGERY

## 2019-08-05 PROCEDURE — 99203 PR OFFICE/OUTPT VISIT, NEW, LEVL III, 30-44 MIN: ICD-10-PCS | Mod: S$PBB,25,, | Performed by: ORTHOPAEDIC SURGERY

## 2019-08-05 PROCEDURE — 99999 PR PBB SHADOW E&M-EST. PATIENT-LVL III: CPT | Mod: PBBFAC,,, | Performed by: ORTHOPAEDIC SURGERY

## 2019-08-05 PROCEDURE — 99213 OFFICE O/P EST LOW 20 MIN: CPT | Mod: PBBFAC,25 | Performed by: ORTHOPAEDIC SURGERY

## 2019-08-05 PROCEDURE — 99203 OFFICE O/P NEW LOW 30 MIN: CPT | Mod: S$PBB,25,, | Performed by: ORTHOPAEDIC SURGERY

## 2019-08-05 RX ORDER — DEXAMETHASONE SODIUM PHOSPHATE 4 MG/ML
4 INJECTION, SOLUTION INTRA-ARTICULAR; INTRALESIONAL; INTRAMUSCULAR; INTRAVENOUS; SOFT TISSUE
Status: DISCONTINUED | OUTPATIENT
Start: 2019-08-05 | End: 2019-08-05 | Stop reason: HOSPADM

## 2019-08-05 RX ORDER — LIDOCAINE HYDROCHLORIDE 20 MG/ML
JELLY TOPICAL ONCE
Status: COMPLETED | OUTPATIENT
Start: 2019-08-05 | End: 2019-08-05

## 2019-08-05 RX ADMIN — DEXAMETHASONE SODIUM PHOSPHATE 4 MG: 4 INJECTION INTRA-ARTICULAR; INTRALESIONAL; INTRAMUSCULAR; INTRAVENOUS; SOFT TISSUE at 02:08

## 2019-08-05 RX ADMIN — LIDOCAINE HYDROCHLORIDE 10 ML: 20 JELLY TOPICAL at 08:08

## 2019-08-05 NOTE — DISCHARGE INSTRUCTIONS
Esophageal Manometry     A catheter measures pressure along the esophagus.     Esophageal manometry is a test to measure the strength and function of the esophagus (the food pipe). Results can help identify causes of heartburn, swallowing problems, or chest pain. The test can also help plan surgery and determine the success of previous surgery.  Preparing for the test  Be sure to talk to your healthcare provider about any medicines you take. Some medicines can affect the test results. Also ask any questions you have about the risks of the test. These include irritation to the nose and throat. Be sure not to smoke, eat, or drink for up to 12 hours before the test.  During the test  Manometry takes about an hour. Usually you lie down during the test. Your nose and throat are numbed. Then a soft, thin tube is placed through the nose and down the esophagus. At first you may notice a gagging feeling. You will be asked to swallow several times. Holes along the tube measure the pressure while you swallow. Measurements are printed out as tracings, much like a heart test tracing. After the test, another catheter may be left in the esophagus for up to 24 hours to measure acid (pH) levels.  After esophageal manometry  Youll probably discuss the results of the test with your healthcare provider at another appointment. This is because time is needed to review the tracings. You may have a mild sore throat for a short time. As soon as the numbness in your throat is gone, you can return to eating and your normal activities.  Date Last Reviewed: 6/1/2016  © 0857-7941 [employer]. [address] [place] [address], [place] [address]. All rights reserved. This information is not intended as a substitute for professional medical care. Always follow your healthcare professional's instructions.

## 2019-08-05 NOTE — PROGRESS NOTES
[facility]  History & Physical  Orthopedics    SUBJECTIVE:      Chief Complaint: right thumb pain    Referring Provider: N/A     History of Present Illness:  Patient is a 44 y.o. right hand dominant female who presents today with complaints of right thumb pain that began three weeks ago.  The pain has progressively gotten worse.  She has not tried bracing or physical therapy she points to the volar aspect at the base of P2/ 1st metacarpal head. Patient does not have diabetes, she is overall healthy.  She reports that sometimes her right thumb will catch and lock and she has to passively extend the digit manually.    The patient is a/an small business owner childcare.    Onset of symptoms/DOI was 3 weeks ago.    Symptoms are aggravated by activity.    Symptoms are alleviated by rest.    Symptoms consist of pain.    The patient rates their pain as a 5/10.    Attempted treatment(s) and/or interventions include anti-inflammatory medications.     The patient denies any fevers, chills, N/V, D/C and presents for evaluation.       Past Medical History:   Diagnosis Date    Chronic back pain     Morbid obesity with BMI of 40.0-44.9, adult     MILEY on CPAP     Plantar fasciitis of left foot     Urticaria      Past Surgical History:   Procedure Laterality Date    CYST REMOVAL      ovarian cyst removal at age 12    DILATION AND CURETTAGE OF UTERUS      EGD (ESOPHAGOGASTRODUODENOSCOPY) N/A 6/14/2019    Performed by Ced Guevara MD at [facility] ([address] [place] [address])    UPPER GASTROINTESTINAL ENDOSCOPY      WISDOM TOOTH EXTRACTION       Review of patient's allergies indicates:  No Known Allergies  Social History     Social History Narrative    Single.  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      Family History   Problem Relation Age of Onset    Cancer Maternal Grandmother     Diabetes Mother     No Known Problems Sister     No Known Problems Sister     No Known Problems Sister     Colon  "cancer Neg Hx     Stomach cancer Neg Hx     Inflammatory bowel disease Neg Hx     Esophageal cancer Neg Hx          Current Outpatient Medications:     cetirizine (ZYRTEC) 10 MG tablet, Take 10 mg by mouth once daily., Disp: , Rfl:     ergocalciferol (ERGOCALCIFEROL) 50,000 unit Cap, Take 1 capsule (50,000 Units total) by mouth every 7 days., Disp: 12 capsule, Rfl: 1    fluticasone (FLONASE) 50 mcg/actuation nasal spray, 1 spray (50 mcg total) by Each Nare route 2 (two) times daily., Disp: 15 g, Rfl: 1    HYDROcodone-acetaminophen (NORCO) 5-325 mg per tablet, Take 1 tablet by mouth every 4 (four) hours as needed for Pain (or cough)., Disp: 14 tablet, Rfl: 0    ketorolac (TORADOL) 10 mg tablet, Take 1 tablet (10 mg total) by mouth 3 (three) times daily as needed for Pain., Disp: 20 tablet, Rfl: 0    medroxyPROGESTERone (DEPO-PROVERA) 150 mg/mL Syrg, Inject 1 mL (150 mg total) into the muscle every 3 (three) months., Disp: 1 mL, Rfl: 2    omeprazole (PRILOSEC) 40 MG capsule, Take 1 capsule (40 mg total) by mouth once daily., Disp: 30 capsule, Rfl: 11    phentermine 37.5 MG capsule, Take 1 capsule (37.5 mg total) by mouth every morning., Disp: 30 capsule, Rfl: 3    zolpidem (AMBIEN) 5 MG Tab, Take 1 tablet (5 mg total) by mouth nightly as needed., Disp: 30 tablet, Rfl: 3  No current facility-administered medications for this visit.       Review of Systems:  Constitutional: no fever or chills  Eyes: no visual changes  ENT: no nasal congestion or sore throat  Respiratory: no cough or shortness of breath  Cardiovascular: no chest pain  Gastrointestinal: no nausea or vomiting, tolerating diet  Musculoskeletal: pain    OBJECTIVE:      Vital Signs (Most Recent):  Vitals:    08/05/19 1412   Weight: 124.3 kg (274 lb 0.5 oz)   Height: 5' 6" (1.676 m)     Body mass index is 44.23 kg/m².      Physical Exam:  Constitutional: The patient appears well-developed and well-nourished. No distress.   Head: Normocephalic and " atraumatic.   Nose: Nose normal.   Eyes: Conjunctivae and EOM are normal.   Neck: No tracheal deviation present.   Cardiovascular: Normal rate and intact distal pulses.    Pulmonary/Chest: Effort normal. No respiratory distress.   Abdominal: There is no guarding.   Neurological: The patient is alert.   Psychiatric: The patient has a normal mood and affect.     Right Hand/Wrist Examination:    Observation/Inspection:  Swelling  none    Deformity  none  Discoloration  none     Scars   none    Atrophy  none    HAND/WRIST EXAMINATION:  Finkelstein's Test   Neg  WHAT Test    Neg  Snuff box tenderness   Neg  Pope's Test    Neg  Hook of Hamate Tenderness  Neg  CMC grind    Neg  Circumduction test   Neg  No triggering of the 1st digit was appreciated, but she did have tenderness over the A1 pulley  Neurovascular Exam:  Digits WWP, brisk CR < 3s throughout  NVI motor/LTS to M/R/U nerves, radial pulse 2+  Tinel's Test - Carpal Tunnel  Neg  Tinel's Test - Cubital Tunnel  Neg  Phalen's Test    Neg  Median Nerve Compression Test Neg    ROM hand/wrist/elbow full, painless    RRR  CTAB  Abd S/NT/ND +BS    Diagnostic Results:     Xray -  x-ray right hand no fracture dislocation  EMG - none    ASSESSMENT/PLAN:      44 y.o. yo female with right 1st phalanx triggering  Plan:  Patient was offered injection in the office.  She was injected today.  The patient will continue nonsteroidal anti-inflammatory medications and activity modification.  She will follow up in 6 weeks for evaluation of symptoms. At this point the patient is not ready for surgery, I think it is reasonable to begin with a trial of steroid injection.          Craig Tom M.D.     Please be aware that this note has been generated with the assistance of [employer] voice-to-text.  Please excuse any spelling or grammatical errors.

## 2019-08-05 NOTE — PROCEDURES
Tendon Sheath: R thumb MCP  Date/Time: 8/5/2019 2:36 PM  Performed by: Craig Tom MD  Authorized by: Craig Tom MD     Consent Done?: Yes (Verbal)  Timeout: prior to procedure the correct patient, procedure, and site was verified   Indications:  Pain  Site marked: the procedure site was marked    Timeout: prior to procedure the correct patient, procedure, and site was verified    Location:  Thumb  Site:  R thumb MCP  Prep: patient was prepped and draped in usual sterile fashion    Ultrasonic guidance for needle placement?: No    Needle size:  25 G  Approach:  Volar  Medications:  4 mg dexamethasone 4 mg/mL  Patient tolerance:  Patient tolerated the procedure well with no immediate complications

## 2019-08-05 NOTE — OR NURSING
Pt unable to tolerate catheter placement. Ptrequested catheter to be removed and decided not to go through with procedure.

## 2019-08-07 ENCOUNTER — PATIENT MESSAGE (OUTPATIENT)
Dept: GASTROENTEROLOGY | Facility: CLINIC | Age: 44
End: 2019-08-07

## 2019-08-21 ENCOUNTER — TELEPHONE (OUTPATIENT)
Dept: GASTROENTEROLOGY | Facility: CLINIC | Age: 44
End: 2019-08-21

## 2019-08-21 ENCOUNTER — PATIENT MESSAGE (OUTPATIENT)
Dept: GASTROENTEROLOGY | Facility: CLINIC | Age: 44
End: 2019-08-21

## 2019-08-21 DIAGNOSIS — R13.19 ESOPHAGEAL DYSPHAGIA: Primary | ICD-10-CM

## 2019-08-21 DIAGNOSIS — F41.9 ANXIETY: Primary | ICD-10-CM

## 2019-08-21 RX ORDER — ALPRAZOLAM 0.25 MG/1
0.25 TABLET ORAL
Qty: 2 TABLET | Refills: 0 | Status: SHIPPED | OUTPATIENT
Start: 2019-08-21 | End: 2019-10-03 | Stop reason: ALTCHOICE

## 2019-08-21 NOTE — TELEPHONE ENCOUNTER
MA contacted [facility] in [place] to call in prescription, MA spoke with Tess .          ----- Message from Africa Wan NP sent at 8/21/2019 10:18 AM CDT -----  Please call in her Xanax prescription. If you have any questions please let me know.

## 2019-08-21 NOTE — TELEPHONE ENCOUNTER
MA contacted [facility] pharmacy in [place] at [employer] and canceled prescription , MA spoke to Tess.     MA called pt prescription in to the pt preferred [facility] location in [place] and spoke with Rand.

## 2019-08-22 ENCOUNTER — PATIENT MESSAGE (OUTPATIENT)
Dept: INTERNAL MEDICINE | Facility: CLINIC | Age: 44
End: 2019-08-22

## 2019-08-22 DIAGNOSIS — G47.39 OTHER SLEEP APNEA: Primary | ICD-10-CM

## 2019-08-23 ENCOUNTER — PATIENT MESSAGE (OUTPATIENT)
Dept: SLEEP MEDICINE | Facility: CLINIC | Age: 44
End: 2019-08-23

## 2019-08-23 DIAGNOSIS — G47.00 INSOMNIA, UNSPECIFIED TYPE: ICD-10-CM

## 2019-08-23 DIAGNOSIS — R63.4 WEIGHT LOSS: ICD-10-CM

## 2019-08-23 RX ORDER — ZOLPIDEM TARTRATE 5 MG/1
5 TABLET ORAL NIGHTLY PRN
Qty: 30 TABLET | Refills: 4 | Status: SHIPPED | OUTPATIENT
Start: 2019-08-23 | End: 2019-09-11

## 2019-08-23 RX ORDER — ZOLPIDEM TARTRATE 5 MG/1
TABLET ORAL
Qty: 30 TABLET | Refills: 4 | Status: ON HOLD | OUTPATIENT
Start: 2019-08-23 | End: 2023-07-14

## 2019-08-23 RX ORDER — PHENTERMINE HYDROCHLORIDE 37.5 MG/1
TABLET ORAL
Qty: 30 TABLET | Refills: 0 | Status: SHIPPED | OUTPATIENT
Start: 2019-08-23 | End: 2019-09-11

## 2019-08-27 ENCOUNTER — OFFICE VISIT (OUTPATIENT)
Dept: URGENT CARE | Facility: CLINIC | Age: 44
End: 2019-08-27
Payer: MEDICAID

## 2019-08-27 VITALS
SYSTOLIC BLOOD PRESSURE: 147 MMHG | HEIGHT: 66 IN | TEMPERATURE: 98 F | RESPIRATION RATE: 18 BRPM | BODY MASS INDEX: 44.03 KG/M2 | OXYGEN SATURATION: 100 % | HEART RATE: 82 BPM | DIASTOLIC BLOOD PRESSURE: 78 MMHG | WEIGHT: 274 LBS

## 2019-08-27 DIAGNOSIS — J02.9 SORE THROAT: ICD-10-CM

## 2019-08-27 DIAGNOSIS — K04.7 DENTAL ABSCESS: Primary | ICD-10-CM

## 2019-08-27 LAB
CTP QC/QA: YES
S PYO RRNA THROAT QL PROBE: NEGATIVE

## 2019-08-27 PROCEDURE — 87880 POCT RAPID STREP A: ICD-10-PCS | Mod: QW,S$GLB,, | Performed by: NURSE PRACTITIONER

## 2019-08-27 PROCEDURE — 99214 PR OFFICE/OUTPT VISIT, EST, LEVL IV, 30-39 MIN: ICD-10-PCS | Mod: S$GLB,,, | Performed by: NURSE PRACTITIONER

## 2019-08-27 PROCEDURE — 99214 OFFICE O/P EST MOD 30 MIN: CPT | Mod: S$GLB,,, | Performed by: NURSE PRACTITIONER

## 2019-08-27 PROCEDURE — 87880 STREP A ASSAY W/OPTIC: CPT | Mod: QW,S$GLB,, | Performed by: NURSE PRACTITIONER

## 2019-08-27 RX ORDER — PREDNISONE 20 MG/1
40 TABLET ORAL DAILY
Qty: 6 TABLET | Refills: 0 | Status: SHIPPED | OUTPATIENT
Start: 2019-08-27 | End: 2019-08-30

## 2019-08-27 RX ORDER — NAPROXEN 500 MG/1
500 TABLET ORAL 2 TIMES DAILY WITH MEALS
Qty: 14 TABLET | Refills: 0 | Status: SHIPPED | OUTPATIENT
Start: 2019-08-27 | End: 2019-09-03

## 2019-08-27 RX ORDER — PENICILLIN V POTASSIUM 500 MG/1
500 TABLET, FILM COATED ORAL EVERY 6 HOURS
Qty: 28 TABLET | Refills: 0 | Status: SHIPPED | OUTPATIENT
Start: 2019-08-27 | End: 2019-09-03

## 2019-08-27 NOTE — PATIENT INSTRUCTIONS
"  Dental Abscess    An abscess is a pocket of pus at the tip of a tooth root in your jaw bone. It is caused by an infection at the root of the tooth. It can cause pain and swelling of the gum, cheek, or jaw. Pain may spread from the tooth to your ear or the area of your jaw on the same side. If the abscess isnt treated, it appears as a bubble or swelling on the gum near the tooth. The pressure that builds in this swelling is the source of the pain. More serious infections cause your face to swell.  An abscess can be caused by a crack in the tooth, a cavity, a gum infection, or a combination of these. Once the pulp of the tooth is exposed, bacteria can spread down the roots to the tip. If the bacteria are not stopped, they can damage the bone and soft tissue, and an abscess can form.  Home care  Follow these guidelines when caring for yourself at home:  · Avoid hot and cold foods and drinks. Your tooth may be sensitive to changes in temperature. Dont chew on the side of the infected tooth.  · If your tooth is chipped or cracked, or if there is a large open cavity, put oil of cloves directly on the tooth to relieve pain. You can buy oil of cloves at drugstores. Some pharmacies carry an over-the-counter "toothache kit." This contains a paste that you can put on the exposed tooth to make it less sensitive.  · Put a cold pack on your jaw over the sore area to help reduce pain.  · You may use over-the-counter medicine to ease pain, unless another medicine was prescribed. If you have chronic liver or kidney disease, talk with your healthcare provider before using acetaminophen or ibuprofen. Also talk with your provider if youve had a stomach ulcer or GI bleeding.  · An antibiotic will be prescribed. Take it until finished, even if you are feeling better after a few days.  Follow-up care  Follow up with your dentist or an oral surgeon, or as advised. Once an infection occurs in a tooth, it will continue to be a problem " until the infection is drained. This is done through surgery or a root canal. Or you may need to have your tooth pulled.  Call 911  Call 911 if any of these occur:  · Unusual drowsiness  · Headache or stiff neck  · Weakness or fainting  · Difficulty swallowing, breathing, or opening your mouth  · Swollen eyelids  When to seek medical advice  Call your healthcare provider right away if any of these occur:  · Your face becomes more swollen or red  · Pain gets worse or spreads to your neck  · Fever of 100.4º F (38.0º C) or higher, or as directed by your healthcare provider  · Pus drains from the tooth  Date Last Reviewed: 10/1/2016  © 4344-4034 [employer]. [address] [place] [address], [place] [address]. All rights reserved. This information is not intended as a substitute for professional medical care. Always follow your healthcare professional's instructions.      -Strep test is negative today.  -7 days of antibiotics.  -Naproxen as needed for pain.  -3 days of steroids.  -Contact [facility] and establish follow up. Phone number [phone].  -If symptoms do not improve or worsen go to the ER.  Please follow up with your Primary care provider within 2-5 days if your signs and symptoms have not resolved or worsen.     If your condition worsens or fails to improve we recommend that you receive another evaluation at the emergency room immediately or contact your primary medical clinic to discuss your concerns.   You must understand that you have received an Urgent Care treatment only and that you may be released before all of your medical problems are known or treated. You, the patient, will arrange for follow up care as instructed.

## 2019-08-27 NOTE — PROGRESS NOTES
"Subjective:       Patient ID: Velma Lopez is a 44 y.o. female.    Vitals:  height is 5' 6" (1.676 m) and weight is 124.3 kg (274 lb). Her temperature is 98.3 °F (36.8 °C). Her blood pressure is 147/78 (abnormal) and her pulse is 82. Her respiration is 18 and oxygen saturation is 100%.     Chief Complaint: Sore Throat    Patient presents to clinic today with complaints of worsening dental and gum pain. Patient reports he has also had a mild sore throat.  Patient is taking over-the-counter Tylenol and NSAIDs with minimal relief.  Denies any fever or chills.  Patient reports that she has been due to have dental work done by [facility] dental keeps pushing her appointment back. Patient complains of worsening swelling and tenderness of this 1st and 2nd molars of the right lower jaw.    Dental Pain    This is a new problem. The current episode started today. The problem occurs constantly. The problem has been unchanged. The pain is at a severity of 10/10. The pain is mild. Associated symptoms include difficulty swallowing and a fever. She has tried acetaminophen and NSAIDs for the symptoms. The treatment provided mild relief.       Constitution: Positive for fever. Negative for chills and fatigue.   HENT: Positive for dental problem.    Neck: Negative for painful lymph nodes.   Cardiovascular: Negative for chest pain and leg swelling.   Eyes: Negative for double vision and blurred vision.   Gastrointestinal: Negative for nausea.   Genitourinary: Negative for dysuria, frequency, urgency and history of kidney stones.   Musculoskeletal: Negative for joint pain, joint swelling, muscle cramps and muscle ache.   Skin: Positive for abscess. Negative for color change, pale, rash and bruising.   Allergic/Immunologic: Negative for seasonal allergies.   Neurological: Negative for dizziness, history of vertigo, light-headedness and passing out.   Hematologic/Lymphatic: Negative for swollen lymph nodes.   Psychiatric/Behavioral: " Negative for nervous/anxious, sleep disturbance and depression. The patient is not nervous/anxious.        Objective:      Physical Exam   Constitutional: She is oriented to person, place, and time. She appears well-developed and well-nourished. She is cooperative.  Non-toxic appearance. She does not appear ill. No distress.   HENT:   Head: Normocephalic and atraumatic.   Right Ear: Hearing, tympanic membrane, external ear and ear canal normal.   Left Ear: Hearing, tympanic membrane, external ear and ear canal normal.   Nose: Nose normal. No mucosal edema, rhinorrhea or nasal deformity. No epistaxis. Right sinus exhibits no maxillary sinus tenderness and no frontal sinus tenderness. Left sinus exhibits no maxillary sinus tenderness and no frontal sinus tenderness.   Mouth/Throat: Uvula is midline and mucous membranes are normal. No trismus in the jaw. Abnormal dentition. Dental abscesses and dental caries present. No uvula swelling. Posterior oropharyngeal erythema present.       Eyes: Conjunctivae and lids are normal. No scleral icterus.   Sclera clear bilat   Neck: Trachea normal, full passive range of motion without pain and phonation normal. Neck supple.   Cardiovascular: Normal rate, regular rhythm, normal heart sounds, intact distal pulses and normal pulses.   Pulmonary/Chest: Effort normal and breath sounds normal. No respiratory distress.   Abdominal: Soft. Normal appearance and bowel sounds are normal. She exhibits no distension. There is no tenderness.   Musculoskeletal: Normal range of motion. She exhibits no edema or deformity.   Lymphadenopathy:        Head (right side): Tonsillar adenopathy present.        Head (left side): Tonsillar adenopathy present.   Neurological: She is alert and oriented to person, place, and time. She exhibits normal muscle tone. Coordination normal.   Skin: Skin is warm, dry and intact. She is not diaphoretic. No pallor.   Psychiatric: She has a normal mood and affect. Her  speech is normal and behavior is normal. Judgment and thought content normal. Cognition and memory are normal.   Nursing note and vitals reviewed.        Results for orders placed or performed in visit on 08/27/19   POCT rapid strep A   Result Value Ref Range    Rapid Strep A Screen Negative Negative     Acceptable Yes        Assessment:       1. Dental abscess    2. Sore throat        Plan:       [place] [facility] is contacted.  Patient is given contact information.  Patient is given antibiotics as well as steroids for treatment of the abscess.  Patient is advised to contact the dental office in established follow-up this week.  ER precautions red flag warnings were discussed in length with the patient.  Symptoms worsen she is advised to go the ER.  She verbalized understanding is in agreement.    Dental abscess  -     Ambulatory consult to Dentistry  -     penicillin v potassium (VEETID) 500 MG tablet; Take 1 tablet (500 mg total) by mouth every 6 (six) hours. for 7 days  Dispense: 28 tablet; Refill: 0  -     naproxen (NAPROSYN) 500 MG tablet; Take 1 tablet (500 mg total) by mouth 2 (two) times daily with meals. for 7 days  Dispense: 14 tablet; Refill: 0  -     predniSONE (DELTASONE) 20 MG tablet; Take 2 tablets (40 mg total) by mouth once daily. for 3 days  Dispense: 6 tablet; Refill: 0    Sore throat  -     POCT rapid strep A  -     naproxen (NAPROSYN) 500 MG tablet; Take 1 tablet (500 mg total) by mouth 2 (two) times daily with meals. for 7 days  Dispense: 14 tablet; Refill: 0  -     predniSONE (DELTASONE) 20 MG tablet; Take 2 tablets (40 mg total) by mouth once daily. for 3 days  Dispense: 6 tablet; Refill: 0      Patient Instructions     Dental Abscess    An abscess is a pocket of pus at the tip of a tooth root in your jaw bone. It is caused by an infection at the root of the tooth. It can cause pain and swelling of the gum, cheek, or jaw. Pain may spread from the tooth to your ear or the area  "of your jaw on the same side. If the abscess isnt treated, it appears as a bubble or swelling on the gum near the tooth. The pressure that builds in this swelling is the source of the pain. More serious infections cause your face to swell.  An abscess can be caused by a crack in the tooth, a cavity, a gum infection, or a combination of these. Once the pulp of the tooth is exposed, bacteria can spread down the roots to the tip. If the bacteria are not stopped, they can damage the bone and soft tissue, and an abscess can form.  Home care  Follow these guidelines when caring for yourself at home:  · Avoid hot and cold foods and drinks. Your tooth may be sensitive to changes in temperature. Dont chew on the side of the infected tooth.  · If your tooth is chipped or cracked, or if there is a large open cavity, put oil of cloves directly on the tooth to relieve pain. You can buy oil of cloves at drugstores. Some pharmacies carry an over-the-counter "toothache kit." This contains a paste that you can put on the exposed tooth to make it less sensitive.  · Put a cold pack on your jaw over the sore area to help reduce pain.  · You may use over-the-counter medicine to ease pain, unless another medicine was prescribed. If you have chronic liver or kidney disease, talk with your healthcare provider before using acetaminophen or ibuprofen. Also talk with your provider if youve had a stomach ulcer or GI bleeding.  · An antibiotic will be prescribed. Take it until finished, even if you are feeling better after a few days.  Follow-up care  Follow up with your dentist or an oral surgeon, or as advised. Once an infection occurs in a tooth, it will continue to be a problem until the infection is drained. This is done through surgery or a root canal. Or you may need to have your tooth pulled.  Call 911  Call 911 if any of these occur:  · Unusual drowsiness  · Headache or stiff neck  · Weakness or fainting  · Difficulty swallowing, " breathing, or opening your mouth  · Swollen eyelids  When to seek medical advice  Call your healthcare provider right away if any of these occur:  · Your face becomes more swollen or red  · Pain gets worse or spreads to your neck  · Fever of 100.4º F (38.0º C) or higher, or as directed by your healthcare provider  · Pus drains from the tooth  Date Last Reviewed: 10/1/2016  © 9217-2548 [employer]. [address] [place] [address], [place] [address]. All rights reserved. This information is not intended as a substitute for professional medical care. Always follow your healthcare professional's instructions.      -Strep test is negative today.  -7 days of antibiotics.  -Naproxen as needed for pain.  -3 days of steroids.  -[facility] and [facility] Phone number [phone].  -If symptoms do not improve or worsen go to the ER.  Please follow up with your Primary care provider within 2-5 days if your signs and symptoms have not resolved or worsen.     If your condition worsens or fails to improve we recommend that you receive another evaluation at the emergency room immediately or contact your primary medical clinic to discuss your concerns.   You must understand that you have received an Urgent Care treatment only and that you may be released before all of your medical problems are known or treated. You, the patient, will arrange for follow up care as instructed.

## 2019-09-03 ENCOUNTER — PATIENT MESSAGE (OUTPATIENT)
Dept: INTERNAL MEDICINE | Facility: CLINIC | Age: 44
End: 2019-09-03

## 2019-09-03 DIAGNOSIS — N39.0 URINARY TRACT INFECTION WITHOUT HEMATURIA, SITE UNSPECIFIED: ICD-10-CM

## 2019-09-03 DIAGNOSIS — R73.01 ELEVATED FASTING BLOOD SUGAR: Primary | ICD-10-CM

## 2019-09-03 RX ORDER — CIPROFLOXACIN 500 MG/1
500 TABLET ORAL 2 TIMES DAILY
Qty: 14 TABLET | Refills: 0 | Status: CANCELLED | OUTPATIENT
Start: 2019-09-03

## 2019-09-04 ENCOUNTER — PATIENT MESSAGE (OUTPATIENT)
Dept: INTERNAL MEDICINE | Facility: CLINIC | Age: 44
End: 2019-09-04

## 2019-09-05 ENCOUNTER — PATIENT MESSAGE (OUTPATIENT)
Dept: INTERNAL MEDICINE | Facility: CLINIC | Age: 44
End: 2019-09-05

## 2019-09-05 DIAGNOSIS — R73.01 ELEVATED FASTING BLOOD SUGAR: Primary | ICD-10-CM

## 2019-09-06 ENCOUNTER — PATIENT MESSAGE (OUTPATIENT)
Dept: INTERNAL MEDICINE | Facility: CLINIC | Age: 44
End: 2019-09-06

## 2019-09-06 DIAGNOSIS — R73.01 ELEVATED FASTING BLOOD SUGAR: Primary | ICD-10-CM

## 2019-09-10 ENCOUNTER — LAB VISIT (OUTPATIENT)
Dept: LAB | Facility: HOSPITAL | Age: 44
End: 2019-09-10
Attending: FAMILY MEDICINE
Payer: MEDICAID

## 2019-09-10 ENCOUNTER — PATIENT MESSAGE (OUTPATIENT)
Dept: INTERNAL MEDICINE | Facility: CLINIC | Age: 44
End: 2019-09-10

## 2019-09-10 DIAGNOSIS — R73.01 ELEVATED FASTING BLOOD SUGAR: ICD-10-CM

## 2019-09-10 LAB
ALBUMIN SERPL BCP-MCNC: 3.5 G/DL (ref 3.5–5.2)
ALP SERPL-CCNC: 87 U/L (ref 55–135)
ALT SERPL W/O P-5'-P-CCNC: 14 U/L (ref 10–44)
ANION GAP SERPL CALC-SCNC: 8 MMOL/L (ref 8–16)
AST SERPL-CCNC: 10 U/L (ref 10–40)
BASOPHILS # BLD AUTO: 0.08 K/UL (ref 0–0.2)
BASOPHILS NFR BLD: 0.7 % (ref 0–1.9)
BILIRUB SERPL-MCNC: 0.5 MG/DL (ref 0.1–1)
BUN SERPL-MCNC: 9 MG/DL (ref 6–20)
CALCIUM SERPL-MCNC: 9.6 MG/DL (ref 8.7–10.5)
CHLORIDE SERPL-SCNC: 104 MMOL/L (ref 95–110)
CO2 SERPL-SCNC: 25 MMOL/L (ref 23–29)
CREAT SERPL-MCNC: 0.9 MG/DL (ref 0.5–1.4)
DIFFERENTIAL METHOD: ABNORMAL
EOSINOPHIL # BLD AUTO: 0.4 K/UL (ref 0–0.5)
EOSINOPHIL NFR BLD: 3.3 % (ref 0–8)
ERYTHROCYTE [DISTWIDTH] IN BLOOD BY AUTOMATED COUNT: 13.1 % (ref 11.5–14.5)
EST. GFR  (AFRICAN AMERICAN): >60 ML/MIN/1.73 M^2
EST. GFR  (NON AFRICAN AMERICAN): >60 ML/MIN/1.73 M^2
ESTIMATED AVG GLUCOSE: 232 MG/DL (ref 68–131)
GLUCOSE SERPL-MCNC: 283 MG/DL (ref 70–110)
HBA1C MFR BLD HPLC: 9.7 % (ref 4–5.6)
HCT VFR BLD AUTO: 40.7 % (ref 37–48.5)
HGB BLD-MCNC: 13.4 G/DL (ref 12–16)
LYMPHOCYTES # BLD AUTO: 3.7 K/UL (ref 1–4.8)
LYMPHOCYTES NFR BLD: 34.2 % (ref 18–48)
MCH RBC QN AUTO: 30.2 PG (ref 27–31)
MCHC RBC AUTO-ENTMCNC: 32.9 G/DL (ref 32–36)
MCV RBC AUTO: 92 FL (ref 82–98)
MONOCYTES # BLD AUTO: 0.7 K/UL (ref 0.3–1)
MONOCYTES NFR BLD: 6.1 % (ref 4–15)
NEUTROPHILS # BLD AUTO: 6 K/UL (ref 1.8–7.7)
NEUTROPHILS NFR BLD: 55.7 % (ref 38–73)
PLATELET # BLD AUTO: 434 K/UL (ref 150–350)
PMV BLD AUTO: 10.4 FL (ref 9.2–12.9)
POTASSIUM SERPL-SCNC: 4.4 MMOL/L (ref 3.5–5.1)
PROT SERPL-MCNC: 6.9 G/DL (ref 6–8.4)
RBC # BLD AUTO: 4.43 M/UL (ref 4–5.4)
SODIUM SERPL-SCNC: 137 MMOL/L (ref 136–145)
WBC # BLD AUTO: 10.77 K/UL (ref 3.9–12.7)

## 2019-09-10 PROCEDURE — 85025 COMPLETE CBC W/AUTO DIFF WBC: CPT

## 2019-09-10 PROCEDURE — 83036 HEMOGLOBIN GLYCOSYLATED A1C: CPT

## 2019-09-10 PROCEDURE — 80053 COMPREHEN METABOLIC PANEL: CPT

## 2019-09-10 PROCEDURE — 36415 COLL VENOUS BLD VENIPUNCTURE: CPT

## 2019-09-11 ENCOUNTER — TELEPHONE (OUTPATIENT)
Dept: INTERNAL MEDICINE | Facility: CLINIC | Age: 44
End: 2019-09-11

## 2019-09-11 ENCOUNTER — PATIENT MESSAGE (OUTPATIENT)
Dept: INTERNAL MEDICINE | Facility: CLINIC | Age: 44
End: 2019-09-11

## 2019-09-11 ENCOUNTER — CLINICAL SUPPORT (OUTPATIENT)
Dept: OPHTHALMOLOGY | Facility: CLINIC | Age: 44
End: 2019-09-11
Payer: MEDICAID

## 2019-09-11 ENCOUNTER — OFFICE VISIT (OUTPATIENT)
Dept: INTERNAL MEDICINE | Facility: CLINIC | Age: 44
End: 2019-09-11
Attending: FAMILY MEDICINE
Payer: MEDICAID

## 2019-09-11 ENCOUNTER — PATIENT OUTREACH (OUTPATIENT)
Dept: ADMINISTRATIVE | Facility: OTHER | Age: 44
End: 2019-09-11

## 2019-09-11 VITALS
WEIGHT: 270.94 LBS | HEIGHT: 66 IN | OXYGEN SATURATION: 98 % | HEART RATE: 100 BPM | SYSTOLIC BLOOD PRESSURE: 122 MMHG | BODY MASS INDEX: 43.54 KG/M2 | DIASTOLIC BLOOD PRESSURE: 70 MMHG

## 2019-09-11 DIAGNOSIS — E11.9 TYPE 2 DIABETES MELLITUS WITHOUT COMPLICATION, WITHOUT LONG-TERM CURRENT USE OF INSULIN: Primary | ICD-10-CM

## 2019-09-11 DIAGNOSIS — Z23 IMMUNIZATION DUE: Primary | ICD-10-CM

## 2019-09-11 DIAGNOSIS — E66.01 SEVERE OBESITY (BMI >= 40): ICD-10-CM

## 2019-09-11 DIAGNOSIS — G47.33 OSA (OBSTRUCTIVE SLEEP APNEA): ICD-10-CM

## 2019-09-11 DIAGNOSIS — K21.9 GASTROESOPHAGEAL REFLUX DISEASE, ESOPHAGITIS PRESENCE NOT SPECIFIED: ICD-10-CM

## 2019-09-11 DIAGNOSIS — E11.8 TYPE 2 DIABETES MELLITUS WITH COMPLICATION, UNSPECIFIED WHETHER LONG TERM INSULIN USE: Primary | ICD-10-CM

## 2019-09-11 PROCEDURE — 99214 PR OFFICE/OUTPT VISIT, EST, LEVL IV, 30-39 MIN: ICD-10-PCS | Mod: S$PBB,,, | Performed by: FAMILY MEDICINE

## 2019-09-11 PROCEDURE — 99999 PR PBB SHADOW E&M-EST. PATIENT-LVL IV: ICD-10-PCS | Mod: PBBFAC,,, | Performed by: FAMILY MEDICINE

## 2019-09-11 PROCEDURE — 99214 OFFICE O/P EST MOD 30 MIN: CPT | Mod: PBBFAC | Performed by: FAMILY MEDICINE

## 2019-09-11 PROCEDURE — 99211 OFF/OP EST MAY X REQ PHY/QHP: CPT | Mod: PBBFAC,27,25

## 2019-09-11 PROCEDURE — 99999 PR PBB SHADOW E&M-EST. PATIENT-LVL IV: CPT | Mod: PBBFAC,,, | Performed by: FAMILY MEDICINE

## 2019-09-11 PROCEDURE — 99214 OFFICE O/P EST MOD 30 MIN: CPT | Mod: S$PBB,,, | Performed by: FAMILY MEDICINE

## 2019-09-11 PROCEDURE — 90471 IMMUNIZATION ADMIN: CPT | Mod: PBBFAC

## 2019-09-11 PROCEDURE — 99999 PR PBB SHADOW E&M-EST. PATIENT-LVL I: ICD-10-PCS | Mod: PBBFAC,,,

## 2019-09-11 PROCEDURE — 99999 PR PBB SHADOW E&M-EST. PATIENT-LVL I: CPT | Mod: PBBFAC,,,

## 2019-09-11 RX ORDER — CHLORHEXIDINE GLUCONATE ORAL RINSE 1.2 MG/ML
SOLUTION DENTAL
Refills: 0 | COMMUNITY
Start: 2019-08-27 | End: 2020-12-22

## 2019-09-11 RX ORDER — ATORVASTATIN CALCIUM 10 MG/1
10 TABLET, FILM COATED ORAL DAILY
Qty: 90 TABLET | Refills: 3 | Status: SHIPPED | OUTPATIENT
Start: 2019-09-11 | End: 2020-09-04

## 2019-09-11 RX ORDER — LOSARTAN POTASSIUM 25 MG/1
25 TABLET ORAL DAILY
Qty: 90 TABLET | Refills: 3 | Status: SHIPPED | OUTPATIENT
Start: 2019-09-11 | End: 2020-09-04

## 2019-09-11 RX ORDER — METFORMIN HYDROCHLORIDE 500 MG/1
500 TABLET ORAL 2 TIMES DAILY WITH MEALS
Qty: 180 TABLET | Refills: 3 | Status: SHIPPED | OUTPATIENT
Start: 2019-09-11 | End: 2020-09-04

## 2019-09-11 RX ORDER — INSULIN PUMP SYRINGE, 3 ML
EACH MISCELLANEOUS
Qty: 1 EACH | Refills: 0 | Status: SHIPPED | OUTPATIENT
Start: 2019-09-11 | End: 2022-08-25

## 2019-09-11 RX ORDER — LANCETS
1 EACH MISCELLANEOUS 2 TIMES DAILY
Qty: 200 EACH | Refills: 3 | Status: ON HOLD | OUTPATIENT
Start: 2019-09-11 | End: 2022-08-25 | Stop reason: HOSPADM

## 2019-09-11 NOTE — PROGRESS NOTES
"CHIEF COMPLAINT: Newly dx'd T2 DM    HISTORY OF PRESENT ILLNESS: The patient is a morbidly obese 44 year-old black female.  She recently developed urinary frequency and fatigue.  A1c is elevated.    She would like to see bariatric surgery.  She is considering gastric bypass.      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      The patient has a long history of intermittent low back pain.    REVIEW OF SYSTEMS:  GENERAL: No fever, chills, fatigability or weight loss.  SKIN: No rashes, itching or changes in color or texture of skin.  HEAD: No headaches or recent head trauma.  EYES: Visual acuity fine. No photophobia, ocular pain or diplopia.  EARS: Denies ear pain, discharge or vertigo.  NOSE: No loss of smell, no epistaxis or postnasal drip.  MOUTH & THROAT: No hoarseness or change in voice. No excessive gum bleeding.  NODES: Denies swollen glands.  CHEST: Denies JONES, cyanosis, wheezing, cough and sputum production.  CARDIOVASCULAR: Denies chest pain, PND, orthopnea or reduced exercise tolerance.  ABDOMEN: Appetite fine. No weight loss. Denies diarrhea, abdominal pain, hematemesis or blood in stool.  URINARY: No flank pain, dysuria or hematuria.  PERIPHERAL VASCULAR: No claudication or cyanosis.  MUSCULOSKELETAL: No joint stiffness or swelling. She does have chronic intermittent back pain.  NEUROLOGIC: No history of seizures, paralysis, alteration of gait or coordination.    SOCIAL HISTORY: The patient does not smoke.  The patient consumes alcohol socially. 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    PHYSICAL EXAMINATION:     Blood pressure 122/70, pulse 100, height 5' 6" (1.676 m), weight 122.9 kg (270 lb 15.1 oz), SpO2 98 %.    APPEARANCE: Well nourished, well developed, in no acute distress.    HEAD: Normocephalic, atraumatic.  EYES: PERRL. EOMI.  Conjunctivae without injection and  anicteric  EARS: TM's intact. Light reflex normal. No retraction or perforation. "    NOSE: Mucosa pink. Airway clear.  MOUTH & THROAT: No tonsillar enlargement. No pharyngeal erythema or exudate. No stridor.  NECK: Supple.   NODES: No cervical, axillary or inguinal lymph node enlargement.  CHEST: Lungs clear to auscultation.  No retractions are noted.  No rales or rhonchi are present.  CARDIOVASCULAR: Normal S1, S2. No rubs, murmurs or gallops.  ABDOMEN: Bowel sounds normal. Not distended. Soft. No tenderness or masses.  No ascites is noted.  MUSCULOSKELETAL:  There is no clubbing, cyanosis, or edema of the extremities x4.  There is full range of motion of the lumbar spine.  There is full range of motion of the extremities x4.  There is no deformity noted.    NEUROLOGIC:       Normal speech development.      Hearing normal.      Normal gait.      Motor and sensory exams grossly normal.  PSYCHIATRIC: Patient is alert and oriented x3.  Thought processes are all normal.  There is no homicidality.  There is no suicidality.  There is no evidence of psychosis.    LABORATORY/RADIOLOGY:   Chart reviewed.      ASSESSMENT:   Newly dx'd T2 DM  MILEY  GERD  Morbid obesity with BMI of 44  Chronic low back pain    PLAN:  We reviewed her recent blood work.    Metformin and diabetic edu  lipitor  losartan  Return to clinic in 3 months

## 2019-09-11 NOTE — PROGRESS NOTES
"Patient was given vaccine information sheet for the Tdap immunization. The area of injection was palpated using the acromion process as a landmark. This area was cleaned with alcohol. Using a 25g 1" safety needle, 0.5mL of the vaccine was placed into the left deltoid muscle. The injection site was dressed with a bandage. Patient experienced no complications and was discharged in stable condition. Tdap Lot: [id number] Exp: 04/26/2021.      "

## 2019-09-12 ENCOUNTER — IMMUNIZATION (OUTPATIENT)
Dept: PHARMACY | Facility: CLINIC | Age: 44
End: 2019-09-12
Payer: MEDICAID

## 2019-09-12 ENCOUNTER — IMMUNIZATION (OUTPATIENT)
Dept: PHARMACY | Facility: CLINIC | Age: 44
End: 2019-09-12

## 2019-09-13 DIAGNOSIS — E11.9 TYPE 2 DIABETES MELLITUS WITHOUT COMPLICATION: ICD-10-CM

## 2019-09-16 ENCOUNTER — OFFICE VISIT (OUTPATIENT)
Dept: ORTHOPEDICS | Facility: CLINIC | Age: 44
End: 2019-09-16
Payer: MEDICAID

## 2019-09-16 VITALS — BODY MASS INDEX: 43.54 KG/M2 | WEIGHT: 270.94 LBS | HEIGHT: 66 IN

## 2019-09-16 DIAGNOSIS — M65.311 TRIGGER THUMB, RIGHT THUMB: Primary | ICD-10-CM

## 2019-09-16 PROCEDURE — 99999 PR PBB SHADOW E&M-EST. PATIENT-LVL IV: CPT | Mod: PBBFAC,,, | Performed by: ORTHOPAEDIC SURGERY

## 2019-09-16 PROCEDURE — 99999 PR PBB SHADOW E&M-EST. PATIENT-LVL IV: ICD-10-PCS | Mod: PBBFAC,,, | Performed by: ORTHOPAEDIC SURGERY

## 2019-09-16 PROCEDURE — 99214 OFFICE O/P EST MOD 30 MIN: CPT | Mod: S$PBB,,, | Performed by: ORTHOPAEDIC SURGERY

## 2019-09-16 PROCEDURE — 99214 OFFICE O/P EST MOD 30 MIN: CPT | Mod: PBBFAC | Performed by: ORTHOPAEDIC SURGERY

## 2019-09-16 PROCEDURE — 99214 PR OFFICE/OUTPT VISIT, EST, LEVL IV, 30-39 MIN: ICD-10-PCS | Mod: S$PBB,,, | Performed by: ORTHOPAEDIC SURGERY

## 2019-09-16 RX ORDER — SODIUM CHLORIDE 9 MG/ML
INJECTION, SOLUTION INTRAVENOUS CONTINUOUS
Status: CANCELLED | OUTPATIENT
Start: 2019-09-16

## 2019-09-16 RX ORDER — MUPIROCIN 20 MG/G
OINTMENT TOPICAL
Status: CANCELLED | OUTPATIENT
Start: 2019-09-16

## 2019-09-16 NOTE — PROGRESS NOTES
[facility]  History & Physical  Orthopedics    SUBJECTIVE:      Chief Complaint: right thumb pain    Referring Provider: N/A     History of Present Illness:  Patient is a 44 y.o. right hand dominant female who presents today with complaints of right thumb pain that began three weeks ago.  The pain has progressively gotten worse.  She has not tried bracing or physical therapy she points to the volar aspect at the base of P2/ 1st metacarpal head. Patient does not have diabetes, she is overall healthy.  She reports that sometimes her right thumb will catch and lock and she has to passively extend the digit manually.    Interval history September 16, 2019:  The patient returns today for re-evaluation of a right trigger thumb.  She notes that her injection received 6 weeks ago did not significantly help her symptoms.  She does note locking and triggering in the right thumb and limitations of her activities of daily living.  She still has significant pain and would like to discuss definitive surgical options.    The patient is a/an small business owner childcare.    Onset of symptoms/DOI was 3 weeks ago.    Symptoms are aggravated by activity.    Symptoms are alleviated by rest.    Symptoms consist of pain.    The patient rates their pain as a 5/10.    Attempted treatment(s) and/or interventions include anti-inflammatory medications.     The patient denies any fevers, chills, N/V, D/C and presents for evaluation.       Past Medical History:   Diagnosis Date    Chronic back pain     Morbid obesity with BMI of 40.0-44.9, adult     MILEY on CPAP     Plantar fasciitis of left foot     Urticaria      Past Surgical History:   Procedure Laterality Date    CYST REMOVAL      ovarian cyst removal at age 12    DILATION AND CURETTAGE OF UTERUS      EGD (ESOPHAGOGASTRODUODENOSCOPY) N/A 6/14/2019    Performed by Ced Guevara MD at [facility] ([facility] FLR)    UPPER GASTROINTESTINAL ENDOSCOPY      WISDOM TOOTH  EXTRACTION       Review of patient's allergies indicates:  No Known Allergies  Social History     Social History Narrative    Single.  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      Family History   Problem Relation Age of Onset    Cancer Maternal Grandmother     Diabetes Mother     No Known Problems Sister     No Known Problems Sister     No Known Problems Sister     Colon cancer Neg Hx     Stomach cancer Neg Hx     Inflammatory bowel disease Neg Hx     Esophageal cancer Neg Hx          Current Outpatient Medications:     atorvastatin (LIPITOR) 10 MG tablet, Take 1 tablet (10 mg total) by mouth once daily., Disp: 90 tablet, Rfl: 3    blood sugar diagnostic Strp, 1 strip by Misc.(Non-Drug; Combo Route) route 2 (two) times daily., Disp: 200 strip, Rfl: 3    blood-glucose meter kit, Please provide with insurance covered meter, Disp: 1 each, Rfl: 0    cetirizine (ZYRTEC) 10 MG tablet, Take 10 mg by mouth once daily., Disp: , Rfl:     chlorhexidine (PERIDEX) 0.12 % solution, RINSE WITH 1/2 OZ FOR 30 SECONDS THEN SPIT AFTER BREAKFAST AND BEFORE BEDTIME, Disp: , Rfl: 0    ergocalciferol (ERGOCALCIFEROL) 50,000 unit Cap, Take 1 capsule (50,000 Units total) by mouth every 7 days., Disp: 12 capsule, Rfl: 1    fluticasone (FLONASE) 50 mcg/actuation nasal spray, 1 spray (50 mcg total) by Each Nare route 2 (two) times daily., Disp: 15 g, Rfl: 1    HYDROcodone-acetaminophen (NORCO) 5-325 mg per tablet, Take 1 tablet by mouth every 4 (four) hours as needed for Pain (or cough)., Disp: 14 tablet, Rfl: 0    ketorolac (TORADOL) 10 mg tablet, Take 1 tablet (10 mg total) by mouth 3 (three) times daily as needed for Pain., Disp: 20 tablet, Rfl: 0    lancets Misc, 1 Device by Misc.(Non-Drug; Combo Route) route 2 (two) times daily., Disp: 200 each, Rfl: 3    losartan (COZAAR) 25 MG tablet, Take 1 tablet (25 mg total) by mouth once daily., Disp: 90 tablet, Rfl: 3    medroxyPROGESTERone (DEPO-PROVERA) 150  "mg/mL Syrg, Inject 1 mL (150 mg total) into the muscle every 3 (three) months., Disp: 1 mL, Rfl: 2    metFORMIN (GLUCOPHAGE) 500 MG tablet, Take 1 tablet (500 mg total) by mouth 2 (two) times daily with meals., Disp: 180 tablet, Rfl: 3    omeprazole (PRILOSEC) 40 MG capsule, Take 1 capsule (40 mg total) by mouth once daily., Disp: 30 capsule, Rfl: 11    phentermine 37.5 MG capsule, Take 1 capsule (37.5 mg total) by mouth every morning., Disp: 30 capsule, Rfl: 3    zolpidem (AMBIEN) 5 MG Tab, TAKE 1 TABLET(5 MG) BY MOUTH EVERY NIGHT AS NEEDED, Disp: 30 tablet, Rfl: 4    ALPRAZolam (XANAX) 0.25 MG tablet, Take 1 tablet (0.25 mg total) by mouth On call Procedure for Anxiety (one tab 1.5 hrs before procedure. take other 30 mins before procedure)., Disp: 2 tablet, Rfl: 0      Review of Systems:  Constitutional: no fever or chills  Eyes: no visual changes  ENT: no nasal congestion or sore throat  Respiratory: no cough or shortness of breath  Cardiovascular: no chest pain  Gastrointestinal: no nausea or vomiting, tolerating diet  Musculoskeletal: pain    OBJECTIVE:      Vital Signs (Most Recent):  Vitals:    09/16/19 1305   Weight: 122.9 kg (270 lb 15.1 oz)   Height: 5' 6" (1.676 m)     Body mass index is 43.73 kg/m².      Physical Exam:  Constitutional: The patient appears well-developed and well-nourished. No distress.   Head: Normocephalic and atraumatic.   Nose: Nose normal.   Eyes: Conjunctivae and EOM are normal.   Neck: No tracheal deviation present.   Cardiovascular: Normal rate and intact distal pulses.    Pulmonary/Chest: Effort normal. No respiratory distress.   Abdominal: There is no guarding.   Neurological: The patient is alert.   Psychiatric: The patient has a normal mood and affect.     Right Hand/Wrist Examination:    Observation/Inspection:  Swelling  none    Deformity  none  Discoloration  none     Scars   none    Atrophy  None  Patient with active flexion and extension of the right thumb IP joint " limited but intact.  Full passive flexion of the thumb IP joint does reproduce jennifer locking and clicking felt at the A1 pulley which required manual reduction.  The patient does have significant tenderness to palpation at the right thumb A1 pulley.    HAND/WRIST EXAMINATION:  Finkelstein's Test   Neg  WHAT Test    Neg  Snuff box tenderness   Neg  Pope's Test    Neg  Hook of Hamate Tenderness  Neg  CMC grind    Neg  Circumduction test   Neg      Neurovascular Exam:  Digits WWP, brisk CR < 3s throughout  NVI motor/LTS to M/R/U nerves, radial pulse 2+  Tinel's Test - Carpal Tunnel  Neg  Tinel's Test - Cubital Tunnel  Neg  Phalen's Test    Neg  Median Nerve Compression Test Neg    ROM hand/wrist/elbow full, painless    RRR  CTAB  Abd S/NT/ND +BS    Diagnostic Results:     Xray -  x-ray right hand no fracture dislocation  EMG - none    ASSESSMENT/PLAN:      44 y.o. yo female with right trigger thumb  Plan:  Treatment options discussed.  The patient has not responded to activity modifications, NSAIDs, and steroid injection.  She would like to proceed with a right thumb A1 pulley release on October 4, 2019 which I feel is reasonable.  She was recently diagnosed with diabetes and has an elevated hemoglobin A1c.  We discussed proceeding with surgery verses postponing until improvement in her blood glucose levels.  She understands that poorly controlled diabetes increases her risk of complications and infection and she wishes to proceed without further delay and except these risks.    The patient has not responded to adequate non operative treatment at this time and/or non operative treatment is not indicated. Thus, the risks, benefits and alternatives to surgery were discussed with the patient in detail.  Specific risks include but are not limited to bleeding, infection, vessel and/or nerve damage, pain, numbness, tingling, complex regional pain syndrome, compartment syndrome, failure to return to pre-injury and/or  preoperative functional status, scar sensitivity, delayed healing, inability to return to work, pulley injury, tendon injury, bowstringing, partial and/or incomplete relief of symptoms, weakness, persistence of and/or worsening of symptoms, surgical failure, osteomyelitis, amputation, loss of function, stiffness, functional debility, dysfunction, decreased  strength, need for prolonged postoperative rehabilitation, need for further surgery, deep venous thrombosis, pulmonary embolism, arthritis and death.  The patient states an understanding and wishes to proceed with surgery.   All questions were answered.  No guarantees were implied or stated.  Written informed consent was obtained.          Craig Tom M.D.     Please be aware that this note has been generated with the assistance of [employer] voice-to-text.  Please excuse any spelling or grammatical errors.

## 2019-09-16 NOTE — H&P (VIEW-ONLY)
[facility]  History & Physical  Orthopedics     SUBJECTIVE:       Chief Complaint: right thumb pain     Referring Provider: N/A      History of Present Illness:  Patient is a 44 y.o. right hand dominant female who presents today with complaints of right thumb pain that began three weeks ago.  The pain has progressively gotten worse.  She has not tried bracing or physical therapy she points to the volar aspect at the base of P2/ 1st metacarpal head. Patient does not have diabetes, she is overall healthy.  She reports that sometimes her right thumb will catch and lock and she has to passively extend the digit manually.     Interval history September 16, 2019:  The patient returns today for re-evaluation of a right trigger thumb.  She notes that her injection received 6 weeks ago did not significantly help her symptoms.  She does note locking and triggering in the right thumb and limitations of her activities of daily living.  She still has significant pain and would like to discuss definitive surgical options.     The patient is a/an small business owner childcare.     Onset of symptoms/DOI was 3 weeks ago.     Symptoms are aggravated by activity.     Symptoms are alleviated by rest.     Symptoms consist of pain.     The patient rates their pain as a 5/10.     Attempted treatment(s) and/or interventions include anti-inflammatory medications.     The patient denies any fevers, chills, N/V, D/C and presents for evaluation.             Past Medical History:   Diagnosis Date    Chronic back pain      Morbid obesity with BMI of 40.0-44.9, adult      MILEY on CPAP      Plantar fasciitis of left foot      Urticaria              Past Surgical History:   Procedure Laterality Date    CYST REMOVAL         ovarian cyst removal at age 12    DILATION AND CURETTAGE OF UTERUS        EGD (ESOPHAGOGASTRODUODENOSCOPY) N/A 6/14/2019     Performed by [facility] at [facility] (4TH FLR)    UPPER  GASTROINTESTINAL ENDOSCOPY        WISDOM TOOTH EXTRACTION          Review of patient's allergies indicates:  No Known Allergies  Social History          Social History Narrative     Single.  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             Family History   Problem Relation Age of Onset    Cancer Maternal Grandmother      Diabetes Mother      No Known Problems Sister      No Known Problems Sister      No Known Problems Sister      Colon cancer Neg Hx      Stomach cancer Neg Hx      Inflammatory bowel disease Neg Hx      Esophageal cancer Neg Hx              Current Outpatient Medications:     atorvastatin (LIPITOR) 10 MG tablet, Take 1 tablet (10 mg total) by mouth once daily., Disp: 90 tablet, Rfl: 3    blood sugar diagnostic Strp, 1 strip by Misc.(Non-Drug; Combo Route) route 2 (two) times daily., Disp: 200 strip, Rfl: 3    blood-glucose meter kit, Please provide with insurance covered meter, Disp: 1 each, Rfl: 0    cetirizine (ZYRTEC) 10 MG tablet, Take 10 mg by mouth once daily., Disp: , Rfl:     chlorhexidine (PERIDEX) 0.12 % solution, RINSE WITH 1/2 OZ FOR 30 SECONDS THEN SPIT AFTER BREAKFAST AND BEFORE BEDTIME, Disp: , Rfl: 0    ergocalciferol (ERGOCALCIFEROL) 50,000 unit Cap, Take 1 capsule (50,000 Units total) by mouth every 7 days., Disp: 12 capsule, Rfl: 1    fluticasone (FLONASE) 50 mcg/actuation nasal spray, 1 spray (50 mcg total) by Each Nare route 2 (two) times daily., Disp: 15 g, Rfl: 1    HYDROcodone-acetaminophen (NORCO) 5-325 mg per tablet, Take 1 tablet by mouth every 4 (four) hours as needed for Pain (or cough)., Disp: 14 tablet, Rfl: 0    ketorolac (TORADOL) 10 mg tablet, Take 1 tablet (10 mg total) by mouth 3 (three) times daily as needed for Pain., Disp: 20 tablet, Rfl: 0    lancets Misc, 1 Device by Misc.(Non-Drug; Combo Route) route 2 (two) times daily., Disp: 200 each, Rfl: 3    losartan (COZAAR) 25 MG tablet, Take 1 tablet (25 mg total) by mouth once  "daily., Disp: 90 tablet, Rfl: 3    medroxyPROGESTERone (DEPO-PROVERA) 150 mg/mL Syrg, Inject 1 mL (150 mg total) into the muscle every 3 (three) months., Disp: 1 mL, Rfl: 2    metFORMIN (GLUCOPHAGE) 500 MG tablet, Take 1 tablet (500 mg total) by mouth 2 (two) times daily with meals., Disp: 180 tablet, Rfl: 3    omeprazole (PRILOSEC) 40 MG capsule, Take 1 capsule (40 mg total) by mouth once daily., Disp: 30 capsule, Rfl: 11    phentermine 37.5 MG capsule, Take 1 capsule (37.5 mg total) by mouth every morning., Disp: 30 capsule, Rfl: 3    zolpidem (AMBIEN) 5 MG Tab, TAKE 1 TABLET(5 MG) BY MOUTH EVERY NIGHT AS NEEDED, Disp: 30 tablet, Rfl: 4    ALPRAZolam (XANAX) 0.25 MG tablet, Take 1 tablet (0.25 mg total) by mouth On call Procedure for Anxiety (one tab 1.5 hrs before procedure. take other 30 mins before procedure)., Disp: 2 tablet, Rfl: 0        Review of Systems:  Constitutional: no fever or chills  Eyes: no visual changes  ENT: no nasal congestion or sore throat  Respiratory: no cough or shortness of breath  Cardiovascular: no chest pain  Gastrointestinal: no nausea or vomiting, tolerating diet  Musculoskeletal: pain     OBJECTIVE:       Vital Signs (Most Recent):  Vitals       Vitals:     09/16/19 1305   Weight: 122.9 kg (270 lb 15.1 oz)   Height: 5' 6" (1.676 m)         Body mass index is 43.73 kg/m².        Physical Exam:  Constitutional: The patient appears well-developed and well-nourished. No distress.   Head: Normocephalic and atraumatic.   Nose: Nose normal.   Eyes: Conjunctivae and EOM are normal.   Neck: No tracheal deviation present.   Cardiovascular: Normal rate and intact distal pulses.    Pulmonary/Chest: Effort normal. No respiratory distress.   Abdominal: There is no guarding.   Neurological: The patient is alert.   Psychiatric: The patient has a normal mood and affect.      Right Hand/Wrist Examination:     Observation/Inspection:  Swelling                       none                "   Deformity                     none  Discoloration               none                  Scars                           none                  Atrophy                        None  Patient with active flexion and extension of the right thumb IP joint limited but intact.  Full passive flexion of the thumb IP joint does reproduce jennifer locking and clicking felt at the A1 pulley which required manual reduction.  The patient does have significant tenderness to palpation at the right thumb A1 pulley.     HAND/WRIST EXAMINATION:  Finkelstein's Test                                Neg  WHAT Test                                         Neg  Snuff box tenderness                          Neg  Pope's Test                                     Neg  Hook of Hamate Tenderness              Neg  CMC grind                                           Neg  Circumduction test                              Neg        Neurovascular Exam:  Digits WWP, brisk CR < 3s throughout  NVI motor/LTS to M/R/U nerves, radial pulse 2+  Tinel's Test - Carpal Tunnel                Neg  Tinel's Test - Cubital Tunnel               Neg  Phalen's Test                                      Neg  Median Nerve Compression Test       Neg     ROM hand/wrist/elbow full, painless     RRR  CTAB  Abd S/NT/ND +BS     Diagnostic Results:     Xray -  x-ray right hand no fracture dislocation  EMG - none     ASSESSMENT/PLAN:       44 y.o. yo female with right trigger thumb  Plan:  Treatment options discussed.  The patient has not responded to activity modifications, NSAIDs, and steroid injection.  She would like to proceed with a right thumb A1 pulley release on October 4, 2019 which I feel is reasonable.  She was recently diagnosed with diabetes and has an elevated hemoglobin A1c.  We discussed proceeding with surgery verses postponing until improvement in her blood glucose levels.  She understands that poorly controlled diabetes increases her risk of complications and  infection and she wishes to proceed without further delay and except these risks.     The patient has not responded to adequate non operative treatment at this time and/or non operative treatment is not indicated. Thus, the risks, benefits and alternatives to surgery were discussed with the patient in detail.  Specific risks include but are not limited to bleeding, infection, vessel and/or nerve damage, pain, numbness, tingling, complex regional pain syndrome, compartment syndrome, failure to return to pre-injury and/or preoperative functional status, scar sensitivity, delayed healing, inability to return to work, pulley injury, tendon injury, bowstringing, partial and/or incomplete relief of symptoms, weakness, persistence of and/or worsening of symptoms, surgical failure, osteomyelitis, amputation, loss of function, stiffness, functional debility, dysfunction, decreased  strength, need for prolonged postoperative rehabilitation, need for further surgery, deep venous thrombosis, pulmonary embolism, arthritis and death.  The patient states an understanding and wishes to proceed with surgery.   All questions were answered.  No guarantees were implied or stated.  Written informed consent was obtained.             Craig Tom M.D.     · Please be aware that this note has been generated with the assistance of [employer] voice-to-text.  Please excuse any spelling or grammatical errors.

## 2019-09-16 NOTE — H&P
[facility]  History & Physical  Orthopedics     SUBJECTIVE:       Chief Complaint: right thumb pain     Referring Provider: N/A      History of Present Illness:  Patient is a 44 y.o. right hand dominant female who presents today with complaints of right thumb pain that began three weeks ago.  The pain has progressively gotten worse.  She has not tried bracing or physical therapy she points to the volar aspect at the base of P2/ 1st metacarpal head. Patient does not have diabetes, she is overall healthy.  She reports that sometimes her right thumb will catch and lock and she has to passively extend the digit manually.     Interval history September 16, 2019:  The patient returns today for re-evaluation of a right trigger thumb.  She notes that her injection received 6 weeks ago did not significantly help her symptoms.  She does note locking and triggering in the right thumb and limitations of her activities of daily living.  She still has significant pain and would like to discuss definitive surgical options.     The patient is a/an small business owner childcare.     Onset of symptoms/DOI was 3 weeks ago.     Symptoms are aggravated by activity.     Symptoms are alleviated by rest.     Symptoms consist of pain.     The patient rates their pain as a 5/10.     Attempted treatment(s) and/or interventions include anti-inflammatory medications.     The patient denies any fevers, chills, N/V, D/C and presents for evaluation.             Past Medical History:   Diagnosis Date    Chronic back pain      Morbid obesity with BMI of 40.0-44.9, adult      MILEY on CPAP      Plantar fasciitis of left foot      Urticaria              Past Surgical History:   Procedure Laterality Date    CYST REMOVAL         ovarian cyst removal at age 12    DILATION AND CURETTAGE OF UTERUS        EGD (ESOPHAGOGASTRODUODENOSCOPY) N/A 6/14/2019     Performed by Ced Guevara MD at [facility] (4TH FLR)    UPPER  GASTROINTESTINAL ENDOSCOPY        WISDOM TOOTH EXTRACTION          Review of patient's allergies indicates:  No Known Allergies  Social History          Social History Narrative     Single.  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             Family History   Problem Relation Age of Onset    Cancer Maternal Grandmother      Diabetes Mother      No Known Problems Sister      No Known Problems Sister      No Known Problems Sister      Colon cancer Neg Hx      Stomach cancer Neg Hx      Inflammatory bowel disease Neg Hx      Esophageal cancer Neg Hx              Current Outpatient Medications:     atorvastatin (LIPITOR) 10 MG tablet, Take 1 tablet (10 mg total) by mouth once daily., Disp: 90 tablet, Rfl: 3    blood sugar diagnostic Strp, 1 strip by Misc.(Non-Drug; Combo Route) route 2 (two) times daily., Disp: 200 strip, Rfl: 3    blood-glucose meter kit, Please provide with insurance covered meter, Disp: 1 each, Rfl: 0    cetirizine (ZYRTEC) 10 MG tablet, Take 10 mg by mouth once daily., Disp: , Rfl:     chlorhexidine (PERIDEX) 0.12 % solution, RINSE WITH 1/2 OZ FOR 30 SECONDS THEN SPIT AFTER BREAKFAST AND BEFORE BEDTIME, Disp: , Rfl: 0    ergocalciferol (ERGOCALCIFEROL) 50,000 unit Cap, Take 1 capsule (50,000 Units total) by mouth every 7 days., Disp: 12 capsule, Rfl: 1    fluticasone (FLONASE) 50 mcg/actuation nasal spray, 1 spray (50 mcg total) by Each Nare route 2 (two) times daily., Disp: 15 g, Rfl: 1    HYDROcodone-acetaminophen (NORCO) 5-325 mg per tablet, Take 1 tablet by mouth every 4 (four) hours as needed for Pain (or cough)., Disp: 14 tablet, Rfl: 0    ketorolac (TORADOL) 10 mg tablet, Take 1 tablet (10 mg total) by mouth 3 (three) times daily as needed for Pain., Disp: 20 tablet, Rfl: 0    lancets Misc, 1 Device by Misc.(Non-Drug; Combo Route) route 2 (two) times daily., Disp: 200 each, Rfl: 3    losartan (COZAAR) 25 MG tablet, Take 1 tablet (25 mg total) by mouth once  "daily., Disp: 90 tablet, Rfl: 3    medroxyPROGESTERone (DEPO-PROVERA) 150 mg/mL Syrg, Inject 1 mL (150 mg total) into the muscle every 3 (three) months., Disp: 1 mL, Rfl: 2    metFORMIN (GLUCOPHAGE) 500 MG tablet, Take 1 tablet (500 mg total) by mouth 2 (two) times daily with meals., Disp: 180 tablet, Rfl: 3    omeprazole (PRILOSEC) 40 MG capsule, Take 1 capsule (40 mg total) by mouth once daily., Disp: 30 capsule, Rfl: 11    phentermine 37.5 MG capsule, Take 1 capsule (37.5 mg total) by mouth every morning., Disp: 30 capsule, Rfl: 3    zolpidem (AMBIEN) 5 MG Tab, TAKE 1 TABLET(5 MG) BY MOUTH EVERY NIGHT AS NEEDED, Disp: 30 tablet, Rfl: 4    ALPRAZolam (XANAX) 0.25 MG tablet, Take 1 tablet (0.25 mg total) by mouth On call Procedure for Anxiety (one tab 1.5 hrs before procedure. take other 30 mins before procedure)., Disp: 2 tablet, Rfl: 0        Review of Systems:  Constitutional: no fever or chills  Eyes: no visual changes  ENT: no nasal congestion or sore throat  Respiratory: no cough or shortness of breath  Cardiovascular: no chest pain  Gastrointestinal: no nausea or vomiting, tolerating diet  Musculoskeletal: pain     OBJECTIVE:       Vital Signs (Most Recent):  Vitals       Vitals:     09/16/19 1305   Weight: 122.9 kg (270 lb 15.1 oz)   Height: 5' 6" (1.676 m)         Body mass index is 43.73 kg/m².        Physical Exam:  Constitutional: The patient appears well-developed and well-nourished. No distress.   Head: Normocephalic and atraumatic.   Nose: Nose normal.   Eyes: Conjunctivae and EOM are normal.   Neck: No tracheal deviation present.   Cardiovascular: Normal rate and intact distal pulses.    Pulmonary/Chest: Effort normal. No respiratory distress.   Abdominal: There is no guarding.   Neurological: The patient is alert.   Psychiatric: The patient has a normal mood and affect.      Right Hand/Wrist Examination:     Observation/Inspection:  Swelling                       none                "   Deformity                     none  Discoloration               none                  Scars                           none                  Atrophy                        None  Patient with active flexion and extension of the right thumb IP joint limited but intact.  Full passive flexion of the thumb IP joint does reproduce jennifer locking and clicking felt at the A1 pulley which required manual reduction.  The patient does have significant tenderness to palpation at the right thumb A1 pulley.     HAND/WRIST EXAMINATION:  Finkelstein's Test                                Neg  WHAT Test                                         Neg  Snuff box tenderness                          Neg  Pope's Test                                     Neg  Hook of Hamate Tenderness              Neg  CMC grind                                           Neg  Circumduction test                              Neg        Neurovascular Exam:  Digits WWP, brisk CR < 3s throughout  NVI motor/LTS to M/R/U nerves, radial pulse 2+  Tinel's Test - Carpal Tunnel                Neg  Tinel's Test - Cubital Tunnel               Neg  Phalen's Test                                      Neg  Median Nerve Compression Test       Neg     ROM hand/wrist/elbow full, painless     RRR  CTAB  Abd S/NT/ND +BS     Diagnostic Results:     Xray -  x-ray right hand no fracture dislocation  EMG - none     ASSESSMENT/PLAN:       44 y.o. yo female with right trigger thumb  Plan:  Treatment options discussed.  The patient has not responded to activity modifications, NSAIDs, and steroid injection.  She would like to proceed with a right thumb A1 pulley release on October 4, 2019 which I feel is reasonable.  She was recently diagnosed with diabetes and has an elevated hemoglobin A1c.  We discussed proceeding with surgery verses postponing until improvement in her blood glucose levels.  She understands that poorly controlled diabetes increases her risk of complications and  infection and she wishes to proceed without further delay and except these risks.     The patient has not responded to adequate non operative treatment at this time and/or non operative treatment is not indicated. Thus, the risks, benefits and alternatives to surgery were discussed with the patient in detail.  Specific risks include but are not limited to bleeding, infection, vessel and/or nerve damage, pain, numbness, tingling, complex regional pain syndrome, compartment syndrome, failure to return to pre-injury and/or preoperative functional status, scar sensitivity, delayed healing, inability to return to work, pulley injury, tendon injury, bowstringing, partial and/or incomplete relief of symptoms, weakness, persistence of and/or worsening of symptoms, surgical failure, osteomyelitis, amputation, loss of function, stiffness, functional debility, dysfunction, decreased  strength, need for prolonged postoperative rehabilitation, need for further surgery, deep venous thrombosis, pulmonary embolism, arthritis and death.  The patient states an understanding and wishes to proceed with surgery.   All questions were answered.  No guarantees were implied or stated.  Written informed consent was obtained.             Craig Tom M.D.     · Please be aware that this note has been generated with the assistance of MModal voice-to-text.  Please excuse any spelling or grammatical errors.

## 2019-09-17 ENCOUNTER — HOSPITAL ENCOUNTER (OUTPATIENT)
Facility: HOSPITAL | Age: 44
Discharge: HOME | End: 2019-09-17
Attending: INTERNAL MEDICINE | Admitting: INTERNAL MEDICINE
Payer: MEDICAID

## 2019-09-17 VITALS
HEART RATE: 92 BPM | RESPIRATION RATE: 18 BRPM | SYSTOLIC BLOOD PRESSURE: 140 MMHG | DIASTOLIC BLOOD PRESSURE: 81 MMHG | TEMPERATURE: 98 F | HEIGHT: 66 IN | WEIGHT: 270 LBS | BODY MASS INDEX: 43.39 KG/M2 | OXYGEN SATURATION: 97 %

## 2019-09-17 DIAGNOSIS — R13.10 DYSPHAGIA: ICD-10-CM

## 2019-09-17 PROCEDURE — 91037 ESOPH IMPED FUNCTION TEST: CPT | Mod: 26,,, | Performed by: INTERNAL MEDICINE

## 2019-09-17 PROCEDURE — 91010 ESOPHAGUS MOTILITY STUDY: CPT | Mod: 26,,, | Performed by: INTERNAL MEDICINE

## 2019-09-17 PROCEDURE — 25000003 PHARM REV CODE 250: Performed by: INTERNAL MEDICINE

## 2019-09-17 PROCEDURE — 91010 ESOPHAGUS MOTILITY STUDY: CPT | Performed by: INTERNAL MEDICINE

## 2019-09-17 PROCEDURE — 91037 ESOPH IMPED FUNCTION TEST: CPT | Performed by: INTERNAL MEDICINE

## 2019-09-17 PROCEDURE — 91037 PR GERD TST W/ NASAL IMPEDENCE ELECTROD: ICD-10-PCS | Mod: 26,,, | Performed by: INTERNAL MEDICINE

## 2019-09-17 PROCEDURE — 91010 PR ESOPHAGEAL MOTILITY STUDY, MA2METRY: ICD-10-PCS | Mod: 26,,, | Performed by: INTERNAL MEDICINE

## 2019-09-17 RX ORDER — LIDOCAINE HYDROCHLORIDE 20 MG/ML
JELLY TOPICAL ONCE
Status: COMPLETED | OUTPATIENT
Start: 2019-09-17 | End: 2019-09-17

## 2019-09-17 RX ADMIN — LIDOCAINE HYDROCHLORIDE 10 ML: 20 JELLY TOPICAL at 08:09

## 2019-09-17 NOTE — DISCHARGE INSTRUCTIONS
Esophageal Manometry     A catheter measures pressure along the esophagus.     Esophageal manometry is a test to measure the strength and function of the esophagus (the food pipe). Results can help identify causes of heartburn, swallowing problems, or chest pain. The test can also help plan surgery and determine the success of previous surgery.  Preparing for the test  Be sure to talk to your healthcare provider about any medicines you take. Some medicines can affect the test results. Also ask any questions you have about the risks of the test. These include irritation to the nose and throat. Be sure not to smoke, eat, or drink for up to 12 hours before the test.  During the test  Manometry takes about an hour. Usually you lie down during the test. Your nose and throat are numbed. Then a soft, thin tube is placed through the nose and down the esophagus. At first you may notice a gagging feeling. You will be asked to swallow several times. Holes along the tube measure the pressure while you swallow. Measurements are printed out as tracings, much like a heart test tracing. After the test, another catheter may be left in the esophagus for up to 24 hours to measure acid (pH) levels.  After esophageal manometry  Youll probably discuss the results of the test with your healthcare provider at another appointment. This is because time is needed to review the tracings. You may have a mild sore throat for a short time. As soon as the numbness in your throat is gone, you can return to eating and your normal activities.  Date Last Reviewed: 6/1/2016  © 3929-9409 [employer]. [address] [place] [address], [place] [address]. All rights reserved. This information is not intended as a substitute for professional medical care. Always follow your healthcare professional's instructions.

## 2019-09-24 ENCOUNTER — PATIENT MESSAGE (OUTPATIENT)
Dept: GASTROENTEROLOGY | Facility: CLINIC | Age: 44
End: 2019-09-24

## 2019-09-24 ENCOUNTER — HOSPITAL ENCOUNTER (OUTPATIENT)
Dept: DIABETES | Facility: OTHER | Age: 44
Discharge: HOME | End: 2019-09-24
Attending: FAMILY MEDICINE
Payer: MEDICAID

## 2019-09-24 ENCOUNTER — TELEPHONE (OUTPATIENT)
Dept: ENDOSCOPY | Facility: HOSPITAL | Age: 44
End: 2019-09-24

## 2019-09-24 VITALS — HEIGHT: 66 IN | WEIGHT: 271.19 LBS | BODY MASS INDEX: 43.58 KG/M2

## 2019-09-24 DIAGNOSIS — E11.9 TYPE 2 DIABETES MELLITUS WITHOUT COMPLICATION, WITHOUT LONG-TERM CURRENT USE OF INSULIN: ICD-10-CM

## 2019-09-24 DIAGNOSIS — E66.01 SEVERE OBESITY (BMI >= 40): Primary | ICD-10-CM

## 2019-09-24 PROCEDURE — G0108 DIAB MANAGE TRN  PER INDIV: HCPCS

## 2019-09-25 NOTE — PROGRESS NOTES
Diabetes Education  Author: Nichole Boyer RN  Date: 2019    Diabetes Care Management Summary  Diabetes Education Record Assessment/Progress: Initial         Diabetes Type  Diabetes Type : Type II    Diabetes History  Current Treatment: Oral Medication  Reviewed Problem List with Patient: Yes    Health Maintenance was reviewed today with patient. Discussed with patient importance of routine eye exams, foot exams/foot care, blood work (i.e.: A1c, microalbumin, and lipid), dental visits, yearly flu vaccine, and pneumonia vaccine as indicated by PCP. Patient verbalized understanding.     Health Maintenance Topics with due status: Not Due       Topic Last Completion Date    Pap Smear with HPV Cotest 2018    Mammogram 01/15/2019    Hemoglobin A1c 09/10/2019    TETANUS VACCINE 2019    Low Dose Statin 2019     Health Maintenance Due   Topic Date Due    Foot Exam  01/15/1983    Eye Exam  01/15/1983    Lipid Panel  2019       Nutrition  Meal Plan 24 Hour Recall - Breakfast: skipped, usually skips   Meal Plan 24 Hour Recall - Lunch: burger, fries from [employer] - sprite   Meal Plan 24 Hour Recall - Dinner: navy beans, rice, pork tenderloin - water   Meal Plan 24 Hour Recall - Snack: no snacks today     Monitoring   Self Monitoring : SMBG: AM fastin, 245, 201, 229, 215, 220, 256, 251, 253, 268, 345, 294, 346, 344, 357, 350, 351 2 hr PP: 241, 182, 316   Blood Glucose Logs: Yes  Do you use a personal continuous glucose monitor?: No  In the last month, how often have you had a low blood sugar reaction?: never    Exercise   Exercise Type: (has not been having energy)  Frequency: Never    Current Diabetes Treatment   Current Treatment: Oral Medication    Social History  Preferred Learning Method: Face to Face  Primary Support: Self  Educational Level: College Graduate  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   Smoking Status: Current Smoker(1 ppd )  Alcohol Use: Monthly(inconsistent, has been trying  to cut back)                                Barriers to Change  Barriers to Change: None  Learning Challenges : None    Readiness to Learn   Readiness to Learn : Eager    Cultural Influences  Cultural Influences: No    Diabetes Education Assessment/Progress  Diabetes Disease Process (diabetes disease process and treatment options): Discussion, Requires Assistance Family/SO, Written Materials Provided, Individual Session(ed on insulin resistance, beta cell burnout and importance of using lifestyle changes + appropriate meds to get BG controlled)  Nutrition (Incorporating nutritional management into one's lifestyle): Discussion, Requires Assistance Family/SO, Individual Session, Written Materials Provided(ed on the importance of eliminating added sugar + concentrated sweets, ed on the plate method, importance of increasing veggies, choosing lean protein, healthy fats and portioned complex cho's w/ each meal - stressed importance of not skipping meals )  Physical Activity (incorporating physical activity into one's lifestyle): Discussion, Requires Assistance Family/SO, Written Materials Provided, Individual Session(ed on ADA recs for physical activity and benefit of reducing insulin resistance)  Medications (states correct name, dose, onset, peak, duration, side effects & timing of meds): Discussion, Requires Assistance Family/SO, Individual Session, Written Materials Provided(ed on how metformin is working to reduce insulin resistance and keep BG controlled; ed on GLP-1 and benefit for weight loss )  Monitoring (monitoring blood glucose/other parameters & using results): Discussion, Requires Assistance Family/SO, Individual Session, Written Materials Provided(ed on SMBG schedule, BG goals and how to use number to trend effectiveness of current regime)  Acute Complications (preventing, detecting, and treating acute complications): Discussion, Requires Assistance Family/SO, Individual Session, Written Materials  Provided(ed on s/s of low BG and how to prevent and treat and s/s of high BG and when to seek medical attention)  Chronic Complications (preventing, detecting, and treating chronic complications): Discussion, Requires Assistance Family/SO, Individual Session, Written Materials Provided(ed on current A1C, goal A1C and importance of keeping BG well controlled to prevent complications r/t DM)  Clinical (diabetes, other pertinent medical history, and relevant comorbidities reviewed during visit): Discussion, Requires Assistance Family/SO, Individual Session, Written Materials Provided(pt wanting to get bariatric surgery)  Cognitive (knowledge of self-management skills, functional health literacy): Discussion, Requires Assistance Family/SO, Individual Session  Psychosocial (emotional response to diabetes): Discussion, Requires Assistance Family/SO, Individual Session, Written Materials Provided  Diabetes Distress and Support Systems: Discussion, Requires Assistance Family/SO, Individual Session, Written Materials Provided  Behavioral (readiness for change, lifestyle practices, self-care behaviors): Discussion, Requires Assistance Family/SO, Individual Session, Written Materials Provided(pt motivated to make lifestyle changes to help w/ weight loss and getting BG controlled prior to trying bariatric surgery)    Goals  Patient has selected/evaluated goals during today's session: Yes, selected  Healthy Eating: Set(pt will work to eating within 1 hour of rising and every 4-6 hours throughout the day)  Start Date: 09/24/19  Monitoring: Set(pt will SMBG BID and bring logs to all future appointments)  Start Date: 09/24/19         Diabetes Care Plan/Intervention  Education Plan/Intervention: Individual Follow-Up DSMT    Diabetes Meal Plan  Restrictions: Restricted Carbohydrate  Carbohydrate Per Meal: 30-45g    Today's Self-Management Care Plan was developed with the patient's input and is based on barriers identified during  today's assessment.    The long and short-term goals in the care plan were written with the patient/caregiver's input. The patient has agreed to work toward these goals to improve her overall diabetes control.      The patient received a copy of today's self-management plan and verbalized understanding of the care plan, goals, and all of today's instructions.      The patient was encouraged to communicate with her physician and care team regarding her condition(s) and treatment.  I provided the patient with my contact information today and encouraged her to contact me via phone or patient portal as needed.     Education Units of Time   Time Spent: 60 min

## 2019-09-26 NOTE — [employer]ATION PATIENT INSTRUCTIONS
Discharge Summary/Instructions after an Endoscopic Procedure  Patient Name: Velma Lopez  Patient MRN: [id number]  Patient YOB: 1975  Tuesday, September 17, 2019  Ced Guevara MD  RESTRICTIONS:  During your procedure today, you received medications for sedation.  These   medications may affect your judgment, balance and coordination.  Therefore,   for 24 hours, you have the following restrictions:   - DO NOT drive a car, operate machinery, make legal/financial decisions,   sign important papers or drink alcohol.    ACTIVITY:  Today: no heavy lifting, straining or running due to procedural   sedation/anesthesia.  The following day: return to full activity including work.  DIET:  Eat and drink normally unless instructed otherwise.     TREATMENT FOR COMMON SIDE EFFECTS:  - Mild abdominal pain, nausea, belching, bloating or excessive gas:  rest,   eat lightly and use a heating pad.  - Sore Throat: treat with throat lozenges and/or gargle with warm salt   water.  - Because air was used during the procedure, expelling large amounts of air   from your rectum or belching is normal.  - If a bowel prep was taken, you may not have a bowel movement for 1-3 days.    This is normal.  SYMPTOMS TO WATCH FOR AND REPORT TO YOUR PHYSICIAN:  1. Abdominal pain or bloating, other than gas cramps.  2. Chest pain.  3. Back pain.  4. Signs of infection such as: chills or fever occurring within 24 hours   after the procedure.  5. Rectal bleeding, which would show as bright red, maroon, or black stools.   (A tablespoon of blood from the rectum is not serious, especially if   hemorrhoids are present.)  6. Vomiting.  7. Weakness or dizziness.  GO DIRECTLY TO THE NEAREST EMERGENCY ROOM IF YOU HAVE ANY OF THE FOLLOWING:      Difficulty breathing              Chills and/or fever over 101 F   Persistent vomiting and/or vomiting blood   Severe abdominal pain   Severe chest pain   Black, tarry stools   Bleeding- more than one  tablespoon   Any other symptom or condition that you feel may need urgent attention  Your doctor recommends these additional instructions:  If any biopsies were taken, your doctors clinic will contact you in 1 to 2   weeks with any results.  - Return to GI clinic.   - Would approach this as a functional disorder.  For questions, problems or results please call your physician - Ced Guevara MD at Work:  [phone].  [facility], EMERGENCY ROOM PHONE NUMBER: [phone]  IF A COMPLICATION OR EMERGENCY SITUATION ARISES AND YOU ARE UNABLE TO REACH   YOUR PHYSICIAN - GO DIRECTLY TO THE EMERGENCY ROOM.  Ced Guevara MD  9/26/2019 5:12:15 PM  This report has been verified and signed electronically.  [employer]

## 2019-09-27 ENCOUNTER — PATIENT MESSAGE (OUTPATIENT)
Dept: GASTROENTEROLOGY | Facility: CLINIC | Age: 44
End: 2019-09-27

## 2019-09-30 ENCOUNTER — TELEPHONE (OUTPATIENT)
Dept: GASTROENTEROLOGY | Facility: CLINIC | Age: 44
End: 2019-09-30

## 2019-09-30 NOTE — TELEPHONE ENCOUNTER
Ma contacted pt to tell her test results per africa. pt said she had already saw the message africa sent on portal. Pt also scheduled her f/u for oct. 16 at 1 pm.       ----- Message from Africa Wan NP sent at 9/27/2019  4:20 PM CDT -----  See My chart encounter

## 2019-10-03 ENCOUNTER — TELEPHONE (OUTPATIENT)
Dept: ORTHOPEDICS | Facility: CLINIC | Age: 44
End: 2019-10-03

## 2019-10-03 NOTE — PRE-PROCEDURE INSTRUCTIONS
PREOP INSTRUCTIONS:No solid food ,milk or milk products for 8 hours prior to procedure.Clear liquids are allowed up to 2 hours before arrival time.Clear liquids are:water,apple juice,pedialyte,gatorade,& jello.Shower instructions were given.Patient encouraged to wear loose fitting,comfortable clothing that preferably buttons up the front.Medication instructions for pm prior to and am of procedure reviewed.Instructed patient to avoid taking vitamins,supplements,aspirin and ibuprofen the morning of surgery.Patient stated an understanding.    Patient denies any side effects or issues with anesthesia or sedation.    Patient does not know arrival time.Explained that this information comes from the surgeon's office and if they haven't heard from them by 2 or 3 pm to call the office.Patient stated an understanding.

## 2019-10-03 NOTE — TELEPHONE ENCOUNTER
Called patient to give her time of arrival for surgery on 10/4/19 with Dr. Tom at [facility]. Time of arrival is 5:00 am with surgery scheduled for 7:00 am. Patient verbalized understanding.     2 week post op appt has been made as well.

## 2019-10-04 ENCOUNTER — ANESTHESIA EVENT (OUTPATIENT)
Dept: SURGERY | Facility: HOSPITAL | Age: 44
End: 2019-10-04
Payer: MEDICAID

## 2019-10-04 ENCOUNTER — ANESTHESIA (OUTPATIENT)
Dept: SURGERY | Facility: HOSPITAL | Age: 44
End: 2019-10-04
Payer: MEDICAID

## 2019-10-04 ENCOUNTER — HOSPITAL ENCOUNTER (OUTPATIENT)
Facility: HOSPITAL | Age: 44
Discharge: HOME | End: 2019-10-04
Attending: ORTHOPAEDIC SURGERY | Admitting: ORTHOPAEDIC SURGERY
Payer: MEDICAID

## 2019-10-04 DIAGNOSIS — M65.311 TRIGGER THUMB, RIGHT THUMB: Primary | ICD-10-CM

## 2019-10-04 LAB
B-HCG UR QL: NEGATIVE
CTP QC/QA: YES
POCT GLUCOSE: 193 MG/DL (ref 70–110)

## 2019-10-04 PROCEDURE — 94760 N-INVAS EAR/PLS OXIMETRY 1: CPT

## 2019-10-04 PROCEDURE — 82962 GLUCOSE BLOOD TEST: CPT | Performed by: ORTHOPAEDIC SURGERY

## 2019-10-04 PROCEDURE — 94761 N-INVAS EAR/PLS OXIMETRY MLT: CPT | Mod: 59

## 2019-10-04 PROCEDURE — D9220A PRA ANESTHESIA: ICD-10-PCS | Mod: CRNA,,, | Performed by: NURSE ANESTHETIST, CERTIFIED REGISTERED

## 2019-10-04 PROCEDURE — 37000008 HC ANESTHESIA 1ST 15 MINUTES: Performed by: ORTHOPAEDIC SURGERY

## 2019-10-04 PROCEDURE — 63600175 PHARM REV CODE 636 W HCPCS: Performed by: ORTHOPAEDIC SURGERY

## 2019-10-04 PROCEDURE — 36000706: Performed by: ORTHOPAEDIC SURGERY

## 2019-10-04 PROCEDURE — D9220A PRA ANESTHESIA: Mod: ANES,,, | Performed by: ANESTHESIOLOGY

## 2019-10-04 PROCEDURE — D9220A PRA ANESTHESIA: Mod: CRNA,,, | Performed by: NURSE ANESTHETIST, CERTIFIED REGISTERED

## 2019-10-04 PROCEDURE — 81025 URINE PREGNANCY TEST: CPT | Performed by: ANESTHESIOLOGY

## 2019-10-04 PROCEDURE — 63600175 PHARM REV CODE 636 W HCPCS: Performed by: NURSE ANESTHETIST, CERTIFIED REGISTERED

## 2019-10-04 PROCEDURE — 01810 ANES PX NRV MUSC F/ARM WRST: CPT | Performed by: ORTHOPAEDIC SURGERY

## 2019-10-04 PROCEDURE — 26055 PR INCISE FINGER TENDON SHEATH: ICD-10-PCS | Mod: F5,,, | Performed by: ORTHOPAEDIC SURGERY

## 2019-10-04 PROCEDURE — 27201423 OPTIME MED/SURG SUP & DEVICES STERILE SUPPLY: Performed by: ORTHOPAEDIC SURGERY

## 2019-10-04 PROCEDURE — 26055 INCISE FINGER TENDON SHEATH: CPT | Mod: F5,,, | Performed by: ORTHOPAEDIC SURGERY

## 2019-10-04 PROCEDURE — 37000009 HC ANESTHESIA EA ADD 15 MINS: Performed by: ORTHOPAEDIC SURGERY

## 2019-10-04 PROCEDURE — 99900035 HC TECH TIME PER 15 MIN (STAT)

## 2019-10-04 PROCEDURE — 71000015 HC POSTOP RECOV 1ST HR: Performed by: ORTHOPAEDIC SURGERY

## 2019-10-04 PROCEDURE — D9220A PRA ANESTHESIA: ICD-10-PCS | Mod: ANES,,, | Performed by: ANESTHESIOLOGY

## 2019-10-04 PROCEDURE — 25000003 PHARM REV CODE 250: Performed by: ORTHOPAEDIC SURGERY

## 2019-10-04 PROCEDURE — 36000707: Performed by: ORTHOPAEDIC SURGERY

## 2019-10-04 RX ORDER — MEPERIDINE HYDROCHLORIDE 50 MG/ML
12.5 INJECTION INTRAMUSCULAR; INTRAVENOUS; SUBCUTANEOUS ONCE AS NEEDED
Status: DISCONTINUED | OUTPATIENT
Start: 2019-10-04 | End: 2019-10-04 | Stop reason: HOSPADM

## 2019-10-04 RX ORDER — CEFAZOLIN SODIUM 1 G/3ML
2 INJECTION, POWDER, FOR SOLUTION INTRAMUSCULAR; INTRAVENOUS
Status: DISCONTINUED | OUTPATIENT
Start: 2019-10-04 | End: 2019-10-04 | Stop reason: HOSPADM

## 2019-10-04 RX ORDER — PROPOFOL 10 MG/ML
VIAL (ML) INTRAVENOUS CONTINUOUS PRN
Status: DISCONTINUED | OUTPATIENT
Start: 2019-10-04 | End: 2019-10-04

## 2019-10-04 RX ORDER — LIDOCAINE HCL/PF 100 MG/5ML
SYRINGE (ML) INTRAVENOUS
Status: DISCONTINUED | OUTPATIENT
Start: 2019-10-04 | End: 2019-10-04

## 2019-10-04 RX ORDER — MIDAZOLAM HYDROCHLORIDE 1 MG/ML
INJECTION, SOLUTION INTRAMUSCULAR; INTRAVENOUS
Status: DISCONTINUED | OUTPATIENT
Start: 2019-10-04 | End: 2019-10-04

## 2019-10-04 RX ORDER — HYDROMORPHONE HYDROCHLORIDE 1 MG/ML
0.5 INJECTION, SOLUTION INTRAMUSCULAR; INTRAVENOUS; SUBCUTANEOUS
Status: DISCONTINUED | OUTPATIENT
Start: 2019-10-04 | End: 2019-10-04 | Stop reason: HOSPADM

## 2019-10-04 RX ORDER — SODIUM CHLORIDE 9 MG/ML
INJECTION, SOLUTION INTRAVENOUS CONTINUOUS
Status: DISCONTINUED | OUTPATIENT
Start: 2019-10-04 | End: 2019-10-04 | Stop reason: HOSPADM

## 2019-10-04 RX ORDER — LIDOCAINE HYDROCHLORIDE 10 MG/ML
INJECTION INFILTRATION; PERINEURAL
Status: DISCONTINUED | OUTPATIENT
Start: 2019-10-04 | End: 2019-10-04 | Stop reason: HOSPADM

## 2019-10-04 RX ORDER — MUPIROCIN 20 MG/G
OINTMENT TOPICAL
Status: DISCONTINUED | OUTPATIENT
Start: 2019-10-04 | End: 2019-10-04 | Stop reason: HOSPADM

## 2019-10-04 RX ORDER — OXYCODONE HYDROCHLORIDE 5 MG/1
10 TABLET ORAL EVERY 4 HOURS PRN
Status: DISCONTINUED | OUTPATIENT
Start: 2019-10-04 | End: 2019-10-04 | Stop reason: HOSPADM

## 2019-10-04 RX ORDER — FENTANYL CITRATE 50 UG/ML
INJECTION, SOLUTION INTRAMUSCULAR; INTRAVENOUS
Status: DISCONTINUED | OUTPATIENT
Start: 2019-10-04 | End: 2019-10-04

## 2019-10-04 RX ORDER — ONDANSETRON 2 MG/ML
INJECTION INTRAMUSCULAR; INTRAVENOUS
Status: DISCONTINUED | OUTPATIENT
Start: 2019-10-04 | End: 2019-10-04

## 2019-10-04 RX ORDER — FENTANYL CITRATE 50 UG/ML
25 INJECTION, SOLUTION INTRAMUSCULAR; INTRAVENOUS EVERY 5 MIN PRN
Status: DISCONTINUED | OUTPATIENT
Start: 2019-10-04 | End: 2019-10-04 | Stop reason: HOSPADM

## 2019-10-04 RX ORDER — DEXAMETHASONE SODIUM PHOSPHATE 4 MG/ML
INJECTION, SOLUTION INTRA-ARTICULAR; INTRALESIONAL; INTRAMUSCULAR; INTRAVENOUS; SOFT TISSUE
Status: DISCONTINUED | OUTPATIENT
Start: 2019-10-04 | End: 2019-10-04

## 2019-10-04 RX ORDER — PROPOFOL 10 MG/ML
VIAL (ML) INTRAVENOUS
Status: DISCONTINUED | OUTPATIENT
Start: 2019-10-04 | End: 2019-10-04

## 2019-10-04 RX ORDER — ONDANSETRON 4 MG/1
8 TABLET, ORALLY DISINTEGRATING ORAL EVERY 8 HOURS PRN
Status: DISCONTINUED | OUTPATIENT
Start: 2019-10-04 | End: 2019-10-04 | Stop reason: HOSPADM

## 2019-10-04 RX ORDER — HYDROCODONE BITARTRATE AND ACETAMINOPHEN 7.5; 325 MG/1; MG/1
1 TABLET ORAL EVERY 6 HOURS PRN
Qty: 28 TABLET | Refills: 0 | Status: SHIPPED | OUTPATIENT
Start: 2019-10-04 | End: 2019-10-11

## 2019-10-04 RX ADMIN — SODIUM CHLORIDE 1000 ML: 0.9 INJECTION, SOLUTION INTRAVENOUS at 05:10

## 2019-10-04 RX ADMIN — FENTANYL CITRATE 50 MCG: 50 INJECTION, SOLUTION INTRAMUSCULAR; INTRAVENOUS at 07:10

## 2019-10-04 RX ADMIN — ONDANSETRON 4 MG: 2 INJECTION INTRAMUSCULAR; INTRAVENOUS at 07:10

## 2019-10-04 RX ADMIN — PROPOFOL 50 MCG/KG/MIN: 10 INJECTION, EMULSION INTRAVENOUS at 07:10

## 2019-10-04 RX ADMIN — PROPOFOL 40 MG: 10 INJECTION, EMULSION INTRAVENOUS at 07:10

## 2019-10-04 RX ADMIN — MUPIROCIN: 20 OINTMENT TOPICAL at 05:10

## 2019-10-04 RX ADMIN — CEFAZOLIN 2 G: 330 INJECTION, POWDER, FOR SOLUTION INTRAMUSCULAR; INTRAVENOUS at 07:10

## 2019-10-04 RX ADMIN — MIDAZOLAM 2 MG: 1 INJECTION INTRAMUSCULAR; INTRAVENOUS at 06:10

## 2019-10-04 RX ADMIN — DEXAMETHASONE SODIUM PHOSPHATE 4 MG: 4 INJECTION, SOLUTION INTRAMUSCULAR; INTRAVENOUS at 07:10

## 2019-10-04 RX ADMIN — LIDOCAINE HYDROCHLORIDE 60 MG: 20 INJECTION, SOLUTION INTRAVENOUS at 07:10

## 2019-10-04 NOTE — TRANSFER OF CARE
"Anesthesia Transfer of Care Note    Patient: Velma Lopez    Procedure(s) Performed: Procedure(s) (LRB):  RELEASE, TRIGGER FINGER - right thumb (Right)    Patient location: PACU    Anesthesia Type: general    Transport from OR: Transported from OR on room air with adequate spontaneous ventilation    Post pain: adequate analgesia    Post assessment: no apparent anesthetic complications and tolerated procedure well    Post vital signs: stable    Level of consciousness: awake, alert and oriented    Nausea/Vomiting: no nausea/vomiting    Complications: none    Transfer of care protocol was followed      Last vitals:   Visit Vitals  BP (!) 126/59   Pulse 88   Temp (P) 36.7 °C (98.1 °F) (Oral)   Resp (P) 16   Ht 5' 6" (1.676 m)   Wt 122.5 kg (270 lb)   SpO2 (P) 98%   Breastfeeding? No   BMI 43.58 kg/m²     "

## 2019-10-04 NOTE — DISCHARGE INSTRUCTIONS

## 2019-10-04 NOTE — OP NOTE
DATE OF PROCEDURE:  10/04/2019     SERVICE:  Orthopedics.     SURGEON:  Craig Tom M.D.     FIRST ASSISTANT:  None     PREOPERATIVE DIAGNOSIS:  Right thumb trigger finger.     POSTOPERATIVE DIAGNOSIS:  right thumb trigger finger.     PROCEDURE PERFORMED:  A1 pulley release of right thumb.     ANESTHESIA:  Local MAC.     ESTIMATED BLOOD LOSS:  1 mL.     SPECIMENS:  None.     IMPLANTS:  None.     COMPLICATIONS:  None.     INTRAVENOUS FLUIDS:  Crystalloid.     TOURNIQUET TIME:  12 minutes at 250mmHg.     INDICATIONS FOR PROCEDURE:  The patient is a 44-year-old female who   presented to the Orthopedics Clinic complaining of a significantly symptomatic   Right thumb trigger finger.  The risks, benefits and alternatives to surgery were discussed with the patient in detail.  Specific risks discussed include but are not limited to bleeding, infection, vessel and/or nerve damage, pain, numbness, tingling, complex regional pain syndrome, compartment syndrome, failure to return to pre-injury and/or preoperative functional status, scar sensitivity, delayed healing, inability to return to work, pulley injury, tendon injury, bowstringing, partial and/or incomplete relief of symptoms, weakness, persistence of and/or worsening of symptoms, surgical failure, osteomyelitis, amputation, loss of function, stiffness, functional debility, dysfunction, decreased  strength, need for prolonged postoperative rehabilitation, need for further surgery, deep venous thrombosis, pulmonary embolism, arthritis and death.  The patient states an understanding and wishes to proceed with surgery.   All questions were answered.  No guarantees were implied or stated.  Written informed consent was obtained.       PROCEDURE IN DETAIL:  On the date of the operative intervention, the patient was   evaluated in the preoperative holding area.  With their  participation, the operative finger was marked as the operative site.  The patient was then  wheeled to the   Operating Room with the right  upper extremity placed on a hand table.  A   nonsterile tourniquet was placed on the patient's forearm.  The extremity was prepped and draped in the usual sterile fashion.  A timeout   was taken to confirm the correct patient, site and procedure.  All were in   agreement, so I proceeded.  I utilized 1% plain lidocaine for local anesthesia   in the area overlying the right thumb A1 pulley.  After adequate   analgesia, an Esmarch was utilized to exsanguinate the extremity and   then tourniquet was insufflated to 250 mmHg where it remained for the duration   of the procedure.  I then marked out an approximately 1 cm incision overlying   the patient's A1 pulley.  This incision was made   sharply with a scalpel and dissection was carried down through the skin and   subcutaneous tissues.  The neurovascular bundles were retracted both radially   and ulnarly and protected for the duration of the procedure.  The radial digital nerve was specifically identified, retracted, and protected.  Dissection was   carried down more deeply to the level of the A1 pulley.    This was identified and exposed and then a scalpel was utilized to enter the   flexor tendon sheath with a small poke hole in the pulley.  At this time,   scissor dissection was then utilized to complete both proximal and distal   division of the A1 pulley in its entirety.  After direct visual confirmation   that the pulley transection was complete, I then utilized a Ragnell retractor to   retract the flexor tendons out of the wound, which they did so without any   resistance and the fingers were taken through range of motion and were noted to   glide smoothly without any evidence of further constriction.  Sedation was then   lightened and the patient was asked to actively make a full fist.  At this time, there was no recurrence of any triggering and the patient   was able to participate fully and confirm that there was  no triggering or   clicking in the operative finger.  They reported that the preop mechanical   symptoms felt resolved and their intraop range of motion was far better than preop.  At this time, the wound was then irrigated copiously   with sterile saline and closed with 4-0 nylon in horizontal mattress fashion.    Sterile dressings were then applied consisting of Xeroform, 4 x 4 gauze, cast   padding and a 2-inch Ace wrap.  The tourniquet was then   deflated and brisk capillary refill ensued throughout the patient's hand,   specifically to the operative finger.  The patient was then awakened from anesthesia   and returned to the Postanesthesia Care Unit in stable condition.  There were no   complications.  As the attending surgeon, I was present and performed the   critical portion of the procedure.     POSTOPERATIVE PLAN FOR THE PATIENT:  The patient will be discharged home in   stable condition.  I will reevaluate the patient in clinic in approximately 2 weeks for   suture removal and reevaluation of the postoperative plan.

## 2019-10-04 NOTE — INTERVAL H&P NOTE
The patient has been examined and the H&P has been reviewed:    I concur with the findings and no changes have occurred since H&P was written.    Anesthesia/Surgery risks, benefits and alternative options discussed and understood by patient/family.          Active Hospital Problems    Diagnosis  POA    Trigger thumb, right thumb [M65.311]  Yes      Resolved Hospital Problems   No resolved problems to display.

## 2019-10-04 NOTE — DISCHARGE SUMMARY
[facility]  Brief Operative Note     SUMMARY     Surgery Date: 10/4/2019     Surgeon(s) and Role:     * Craig Tom MD - Primary    Assisting Surgeon: None    Pre-op Diagnosis:  Trigger thumb, right thumb [M65.311]    Post-op Diagnosis:  Post-Op Diagnosis Codes:     * Trigger thumb, right thumb [M65.311]    Procedure(s) (LRB):  RELEASE, TRIGGER FINGER - right thumb (Right)    Anesthesia: Local MAC    Description of the findings of the procedure: see op note    Findings/Key Components: same    Estimated Blood Loss: * No values recorded between 10/4/2019  7:25 AM and 10/4/2019  7:43 AM *         Specimens:   Specimen (12h ago, onward)    None          Discharge Note    SUMMARY     Admit Date: 10/4/2019    Discharge Date and Time:  10/04/2019 7:43 AM    Hospital Course (synopsis of major diagnoses, care, treatment, and services provided during the course of the hospital stay): see op note     Final Diagnosis: Post-Op Diagnosis Codes:     * Trigger thumb, right thumb [M65.311]    Disposition: Home or Self Care    Follow Up/Patient Instructions:     Medications:  Reconciled Home Medications:      Medication List      CHANGE how you take these medications    * HYDROcodone-acetaminophen 5-325 mg per tablet  Commonly known as:  NORCO  Take 1 tablet by mouth every 4 (four) hours as needed for Pain (or cough).  What changed:  Another medication with the same name was added. Make sure you understand how and when to take each.     * HYDROcodone-acetaminophen 7.5-325 mg per tablet  Commonly known as:  NORCO  Take 1 tablet by mouth every 6 (six) hours as needed for Pain.  What changed:  You were already taking a medication with the same name, and this prescription was added. Make sure you understand how and when to take each.         * This list has 2 medication(s) that are the same as other medications prescribed for you. Read the directions carefully, and ask your doctor or other care provider to  review them with you.            CONTINUE taking these medications    atorvastatin 10 MG tablet  Commonly known as:  LIPITOR  Take 1 tablet (10 mg total) by mouth once daily.     blood sugar diagnostic Strp  1 strip by Misc.(Non-Drug; Combo Route) route 2 (two) times daily.     blood-glucose meter kit  Please provide with insurance covered meter     cetirizine 10 MG tablet  Commonly known as:  ZYRTEC  Take 10 mg by mouth once daily.     chlorhexidine 0.12 % solution  Commonly known as:  PERIDEX  RINSE WITH 1/2 OZ FOR 30 SECONDS THEN SPIT AFTER BREAKFAST AND BEFORE BEDTIME     ergocalciferol 50,000 unit Cap  Commonly known as:  ERGOCALCIFEROL  Take 1 capsule (50,000 Units total) by mouth every 7 days.     fluticasone propionate 50 mcg/actuation nasal spray  Commonly known as:  FLONASE  1 spray (50 mcg total) by Each Nare route 2 (two) times daily.     ketorolac 10 mg tablet  Commonly known as:  TORADOL  Take 1 tablet (10 mg total) by mouth 3 (three) times daily as needed for Pain.     lancets Misc  1 Device by Misc.(Non-Drug; Combo Route) route 2 (two) times daily.     losartan 25 MG tablet  Commonly known as:  COZAAR  Take 1 tablet (25 mg total) by mouth once daily.     medroxyPROGESTERone 150 mg/mL Syrg  Commonly known as:  DEPO-PROVERA  Inject 1 mL (150 mg total) into the muscle every 3 (three) months.     metFORMIN 500 MG tablet  Commonly known as:  GLUCOPHAGE  Take 1 tablet (500 mg total) by mouth 2 (two) times daily with meals.     omeprazole 40 MG capsule  Commonly known as:  PRILOSEC  Take 1 capsule (40 mg total) by mouth once daily.     phentermine 37.5 MG capsule  Take 1 capsule (37.5 mg total) by mouth every morning.     zolpidem 5 MG Tab  Commonly known as:  AMBIEN  TAKE 1 TABLET(5 MG) BY MOUTH EVERY NIGHT AS NEEDED          Discharge Procedure Orders   Diet general     Keep surgical extremity elevated     Ice to affected area     Lifting restrictions   Order Comments: No lifting with operative extremity  and no exercise     No driving, operating heavy equipment or signing legal documents while taking pain medication     Call MD for:  temperature >100.4     Call MD for:  persistent nausea and vomiting     Call MD for:  severe uncontrolled pain     Call MD for:  difficulty breathing, headache or visual disturbances     Call MD for:  redness, tenderness, or signs of infection (pain, swelling, redness, odor or green/yellow discharge around incision site)     Call MD for:  hives     Call MD for:  persistent dizziness or light-headedness     Call MD for:  extreme fatigue     Leave dressing on - Keep it clean, dry, and intact until clinic visit

## 2019-10-04 NOTE — ANESTHESIA PREPROCEDURE EVALUATION
10/04/2019  Velma Lopez is a 44 y.o., female.  Pre-operative evaluation for Procedure(s) (LRB):  RELEASE, TRIGGER FINGER - right thumb (Right)    Velma Lopez is a 44 y.o. female       Active problems:  Patient Active Problem List    Diagnosis Date Noted    Trigger thumb, right thumb 10/04/2019    Dysphagia 06/14/2019    JONES (dyspnea on exertion) 05/06/2019    Chest pain 05/06/2019    Smoking 05/06/2019    Noncompliance with CPAP treatment 05/06/2019    Preoperative cardiovascular examination 05/06/2019    Insomnia 01/31/2019    Plantar fasciitis of left foot     MILEY (obstructive sleep apnea)     Chronic low back pain 09/27/2013    Severe obesity (BMI >= 40) 09/27/2013         Prev airway:       Review of patient's allergies indicates:  No Known Allergies     No current facility-administered medications on file prior to encounter.      Current Outpatient Medications on File Prior to Encounter   Medication Sig Dispense Refill    atorvastatin (LIPITOR) 10 MG tablet Take 1 tablet (10 mg total) by mouth once daily. 90 tablet 3    blood sugar diagnostic Strp 1 strip by Misc.(Non-Drug; Combo Route) route 2 (two) times daily. 200 strip 3    blood-glucose meter kit Please provide with insurance covered meter 1 each 0    cetirizine (ZYRTEC) 10 MG tablet Take 10 mg by mouth once daily.      chlorhexidine (PERIDEX) 0.12 % solution RINSE WITH 1/2 OZ FOR 30 SECONDS THEN SPIT AFTER BREAKFAST AND BEFORE BEDTIME  0    ergocalciferol (ERGOCALCIFEROL) 50,000 unit Cap Take 1 capsule (50,000 Units total) by mouth every 7 days. 12 capsule 1    fluticasone (FLONASE) 50 mcg/actuation nasal spray 1 spray (50 mcg total) by Each Nare route 2 (two) times daily. 15 g 1    losartan (COZAAR) 25 MG tablet Take 1 tablet (25 mg total) by mouth once daily. 90 tablet 3    metFORMIN (GLUCOPHAGE) 500 MG  tablet Take 1 tablet (500 mg total) by mouth 2 (two) times daily with meals. 180 tablet 3    omeprazole (PRILOSEC) 40 MG capsule Take 1 capsule (40 mg total) by mouth once daily. 30 capsule 11    phentermine 37.5 MG capsule Take 1 capsule (37.5 mg total) by mouth every morning. 30 capsule 3    zolpidem (AMBIEN) 5 MG Tab TAKE 1 TABLET(5 MG) BY MOUTH EVERY NIGHT AS NEEDED 30 tablet 4    HYDROcodone-acetaminophen (NORCO) 5-325 mg per tablet Take 1 tablet by mouth every 4 (four) hours as needed for Pain (or cough). 14 tablet 0    ketorolac (TORADOL) 10 mg tablet Take 1 tablet (10 mg total) by mouth 3 (three) times daily as needed for Pain. 20 tablet 0    lancets Misc 1 Device by Misc.(Non-Drug; Combo Route) route 2 (two) times daily. 200 each 3    medroxyPROGESTERone (DEPO-PROVERA) 150 mg/mL Syrg Inject 1 mL (150 mg total) into the muscle every 3 (three) months. 1 mL 2       Past Surgical History:   Procedure Laterality Date    CYST REMOVAL      ovarian cyst removal at age 12    DILATION AND CURETTAGE OF UTERUS      ESOPHAGEAL MOTILITY STUDY USING HIGH RESOLUTION MANOMETRY N/A 9/17/2019    Procedure: MOTILITY STUDY, ESOPHAGUS, USING HIGH RESOLUTION MANOMETRY;  Surgeon: Leopoldo Swanson MD;  Location: [address]);  Service: Endoscopy;  Laterality: N/A;  Pt will be taking xanax before procedure to hopefull help with anxiety.    ESOPHAGOGASTRODUODENOSCOPY N/A 6/14/2019    Procedure: EGD (ESOPHAGOGASTRODUODENOSCOPY);  Surgeon: Ced Guevara MD;  Location: [address]);  Service: Endoscopy;  Laterality: N/A;  Please make sure it is scheduled after her cardiology appt.- see cardiology note dated 6/13/19 for clearance - ERW    UPPER GASTROINTESTINAL ENDOSCOPY      WISDOM TOOTH EXTRACTION         Social History     Socioeconomic History    Marital status: Single     Spouse name: Not on file    Number of children: Not on file    Years of education: Not on file    Highest education level: Not on file    Occupational History    Not on file   Social Needs    Financial resource strain: Not on file    Food insecurity:     Worry: Not on file     Inability: Not on file    Transportation needs:     Medical: Not on file     Non-medical: Not on file   Tobacco Use    Smoking status: Current Every Day Smoker     Packs/day: 0.50     Types: Cigarettes    Smokeless tobacco: Never Used   Substance and Sexual Activity    Alcohol use: Yes     Comment: social    Drug use: No    Sexual activity: Yes     Partners: Male     Birth control/protection: Injection   Lifestyle    Physical activity:     Days per week: Not on file     Minutes per session: Not on file    Stress: Not on file   Relationships    Social connections:     Talks on phone: Not on file     Gets together: Not on file     Attends [employer] service: Not on file     Active member of club or organization: Not on file     Attends meetings of clubs or organizations: Not on file     Relationship status: Not on file   Other Topics Concern    Not on file   Social History Narrative    Single.  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          Vital Signs Range (Last 24H):  Wt Readings from Last 3 Encounters:   10/04/19 122.5 kg (270 lb)   09/24/19 123 kg (271 lb 2.7 oz)   09/17/19 122.5 kg (270 lb)     Temp Readings from Last 3 Encounters:   10/04/19 36.9 °C (98.4 °F) (Oral)   09/17/19 36.6 °C (97.9 °F) (Temporal)   08/27/19 36.8 °C (98.3 °F)     BP Readings from Last 3 Encounters:   10/04/19 (!) 126/59   09/17/19 (!) 140/81   09/11/19 122/70     Pulse Readings from Last 3 Encounters:   10/04/19 88   09/17/19 92   09/11/19 100         CBC:   Lab Results   Component Value Date    WBC 10.77 09/10/2019    HGB 13.4 09/10/2019    HCT 40.7 09/10/2019    MCV 92 09/10/2019     (H) 09/10/2019       CMP: CMP  Sodium   Date Value Ref Range Status   09/10/2019 137 136 - 145 mmol/L Final     Potassium   Date Value Ref Range Status   09/10/2019 4.4 3.5 - 5.1  mmol/L Final     Chloride   Date Value Ref Range Status   09/10/2019 104 95 - 110 mmol/L Final     CO2   Date Value Ref Range Status   09/10/2019 25 23 - 29 mmol/L Final     Glucose   Date Value Ref Range Status   09/10/2019 283 (H) 70 - 110 mg/dL Final     BUN, Bld   Date Value Ref Range Status   09/10/2019 9 6 - 20 mg/dL Final     Creatinine   Date Value Ref Range Status   09/10/2019 0.9 0.5 - 1.4 mg/dL Final     Calcium   Date Value Ref Range Status   09/10/2019 9.6 8.7 - 10.5 mg/dL Final     Total Protein   Date Value Ref Range Status   09/10/2019 6.9 6.0 - 8.4 g/dL Final     Albumin   Date Value Ref Range Status   09/10/2019 3.5 3.5 - 5.2 g/dL Final     Total Bilirubin   Date Value Ref Range Status   09/10/2019 0.5 0.1 - 1.0 mg/dL Final     Comment:     For infants and newborns, interpretation of results should be based  on gestational age, weight and in agreement with clinical  observations.  Premature Infant recommended reference ranges:  Up to 24 hours.............<8.0 mg/dL  Up to 48 hours............<12.0 mg/dL  3-5 days..................<15.0 mg/dL  6-29 days.................<15.0 mg/dL       Alkaline Phosphatase   Date Value Ref Range Status   09/10/2019 87 55 - 135 U/L Final     AST   Date Value Ref Range Status   09/10/2019 10 10 - 40 U/L Final     ALT   Date Value Ref Range Status   09/10/2019 14 10 - 44 U/L Final     Anion Gap   Date Value Ref Range Status   09/10/2019 8 8 - 16 mmol/L Final     eGFR if    Date Value Ref Range Status   09/10/2019 >60 >60 mL/min/1.73 m^2 Final     eGFR if non    Date Value Ref Range Status   09/10/2019 >60 >60 mL/min/1.73 m^2 Final     Comment:     Calculation used to obtain the estimated glomerular filtration  rate (eGFR) is the CKD-EPI equation.          INR  No results found for: INR, PROTIME        Diagnostic Studies  PET scan 2/10:The relative PET images are normal showing no clinically significant regional resting or stress induced  perfusion defects.    Whole heart absolute myocardial perfusion (cc/min/g) averaged 1.03  at rest (which is elevated), 1.98 cc/min/g at stress (which is low normal), and 1.98 cc/min/g CFR (which is mildly reduced in part due to elevated resting flow).    Gated perfusion images showed an ejection fraction of 71.0 % at rest and 69 % during stress. Normal is greater than 51%.    Wall motion was normal at rest and during stress.    LV cavity size is normal at rest and stress.    The EKG portion of this study is negative for ischemia.    There were no arrhythmias during stress.    The patient reported no chest pain during the stress test.    There are no prior studies for comparison.      EKG:Vent. Rate : 095 BPM     Atrial Rate : 095 BPM     P-R Int : 134 ms          QRS Dur : 074 ms      QT Int : 348 ms       P-R-T Axes : 069 053 052 degrees     QTc Int : 437 ms    Normal sinus rhythm  Normal ECG  When compared with ECG of 09-JAN-2018 15:07,  No significant change was found  Confirmed by Melissa Will MD ([id number]) on 2/4/2019 10:36:25 AM      2D Echo:            Anesthesia Evaluation         Review of Systems  Anesthesia Hx:  No problems with previous Anesthesia History of prior surgery of interest to airway management or planning: Denies Family Hx of Anesthesia complications.   Denies Personal Hx of Anesthesia complications.   Social:  Smoker    Hematology/Oncology:  Hematology Normal        Pulmonary:   Sleep Apnea, CPAP    Neurological:  Neurology Normal    Endocrine:   Diabetes, well controlled, type 2        Physical Exam  General:  Well nourished, Obesity    Airway/Jaw/Neck:  Airway Findings: Mouth Opening: Normal Tongue: Normal  General Airway Assessment: Adult  Mallampati: I  Jaw/Neck Findings:  Neck ROM: Normal ROM      Dental:  Dental Findings: In tact   Chest/Lungs:  Chest/Lungs Findings: Clear to auscultation     Heart/Vascular:  Heart Findings: Rate: Normal  Rhythm: Regular Rhythm  Sounds: Normal         Mental Status:  Mental Status Findings:  Cooperative         Anesthesia Plan  Type of Anesthesia, risks & benefits discussed:  Anesthesia Type:  MAC  Patient's Preference:   Intra-op Monitoring Plan:   Intra-op Monitoring Plan Comments:   Post Op Pain Control Plan:   Post Op Pain Control Plan Comments:   Induction:   IV  Beta Blocker:  Patient is not currently on a Beta-Blocker (No further documentation required).       Informed Consent: Patient understands risks and agrees with Anesthesia plan.  Questions answered. Anesthesia consent signed with patient.  ASA Score: 3     Day of Surgery Review of History & Physical:    H&P update referred to the surgeon.         Ready For Surgery From Anesthesia Perspective.

## 2019-10-04 NOTE — ANESTHESIA POSTPROCEDURE EVALUATION
Anesthesia Post Evaluation    Patient: Velma Lopez    Procedure(s) Performed: Procedure(s) (LRB):  RELEASE, TRIGGER FINGER - right thumb (Right)    Final Anesthesia Type: MAC  Patient location during evaluation: PACU  Patient participation: Yes- Able to Participate  Level of consciousness: awake and alert  Post-procedure vital signs: reviewed and stable  Pain management: adequate  Airway patency: patent  PONV status at discharge: No PONV  Anesthetic complications: no      Cardiovascular status: blood pressure returned to baseline  Respiratory status: unassisted  Hydration status: euvolemic  Follow-up not needed.          Vitals Value Taken Time   /72 10/4/2019  7:48 AM   Temp 36.7 °C (98.1 °F) 10/4/2019  7:47 AM   Pulse 88 10/4/2019  7:48 AM   Resp 16 10/4/2019  7:47 AM   SpO2 97 % 10/4/2019  7:48 AM   Vitals shown include unvalidated device data.      No case tracking events are documented in the log.      Pain/Anabell Score: No data recorded

## 2019-10-07 VITALS
SYSTOLIC BLOOD PRESSURE: 147 MMHG | OXYGEN SATURATION: 98 % | BODY MASS INDEX: 43.39 KG/M2 | WEIGHT: 270 LBS | HEART RATE: 84 BPM | DIASTOLIC BLOOD PRESSURE: 84 MMHG | RESPIRATION RATE: 16 BRPM | TEMPERATURE: 98 F | HEIGHT: 66 IN

## 2019-10-15 ENCOUNTER — PATIENT OUTREACH (OUTPATIENT)
Dept: ADMINISTRATIVE | Facility: OTHER | Age: 44
End: 2019-10-15

## 2019-10-16 ENCOUNTER — TELEPHONE (OUTPATIENT)
Dept: INTERNAL MEDICINE | Facility: CLINIC | Age: 44
End: 2019-10-16

## 2019-10-16 ENCOUNTER — OFFICE VISIT (OUTPATIENT)
Dept: GASTROENTEROLOGY | Facility: CLINIC | Age: 44
End: 2019-10-16
Payer: MEDICAID

## 2019-10-16 VITALS
DIASTOLIC BLOOD PRESSURE: 79 MMHG | SYSTOLIC BLOOD PRESSURE: 120 MMHG | HEART RATE: 97 BPM | WEIGHT: 272.25 LBS | HEIGHT: 66 IN | BODY MASS INDEX: 43.75 KG/M2

## 2019-10-16 DIAGNOSIS — R13.19 ESOPHAGEAL DYSPHAGIA: Primary | ICD-10-CM

## 2019-10-16 DIAGNOSIS — R13.12 OROPHARYNGEAL DYSPHAGIA: ICD-10-CM

## 2019-10-16 DIAGNOSIS — E11.9 TYPE 2 DIABETES MELLITUS WITHOUT COMPLICATION, WITHOUT LONG-TERM CURRENT USE OF INSULIN: Primary | ICD-10-CM

## 2019-10-16 DIAGNOSIS — K21.9 GERD WITHOUT ESOPHAGITIS: ICD-10-CM

## 2019-10-16 PROCEDURE — 99214 OFFICE O/P EST MOD 30 MIN: CPT | Mod: S$PBB,,, | Performed by: NURSE PRACTITIONER

## 2019-10-16 PROCEDURE — 99213 OFFICE O/P EST LOW 20 MIN: CPT | Mod: PBBFAC | Performed by: NURSE PRACTITIONER

## 2019-10-16 PROCEDURE — 99999 PR PBB SHADOW E&M-EST. PATIENT-LVL III: CPT | Mod: PBBFAC,,, | Performed by: NURSE PRACTITIONER

## 2019-10-16 PROCEDURE — 99214 PR OFFICE/OUTPT VISIT, EST, LEVL IV, 30-39 MIN: ICD-10-PCS | Mod: S$PBB,,, | Performed by: NURSE PRACTITIONER

## 2019-10-16 PROCEDURE — 99999 PR PBB SHADOW E&M-EST. PATIENT-LVL III: ICD-10-PCS | Mod: PBBFAC,,, | Performed by: NURSE PRACTITIONER

## 2019-10-16 RX ORDER — AMITRIPTYLINE HYDROCHLORIDE 10 MG/1
10 TABLET, FILM COATED ORAL NIGHTLY PRN
Qty: 45 TABLET | Refills: 0 | Status: SHIPPED | OUTPATIENT
Start: 2019-10-16 | End: 2019-11-30

## 2019-10-16 NOTE — PATIENT INSTRUCTIONS
Try taking the elavil every night to see if this helps the difficulty swallowing.    Continue with the omeprazole.

## 2019-10-16 NOTE — TELEPHONE ENCOUNTER
----- Message from Shala Brito MA sent at 10/16/2019  2:08 PM CDT -----  Pt would like and order placed for diabetic eye exam .

## 2019-10-16 NOTE — PROGRESS NOTES
[facility] Consultation Note    Reason for Consult:  The primary encounter diagnosis was Esophageal dysphagia. Diagnoses of Oropharyngeal dysphagia and GERD without esophagitis were also pertinent to this visit.    PCP:   Aftab Martinez       Referring MD:  No referring provider defined for this encounter.    HPI:  NOTE from 6/26/2019  This is a 44 y.o. female here for follow-up for her GERD and dysphagia.  She is an established patient last seen by me in clinic 4/24/19.  She then had an EGD with Dr. Guevara 6/14/19.  Her EGD was unremarkable.  At her last visit I increased her Prilosec to 40 mg every day.    She states she feels better than our last visit. She made dietary and lifestyle changes.  She experiences the pyrosis 1-2x per week, she was having it every day.  At times she is still having the dysphagia.  No nausea or vomiting.  Still taking the Prilosec every morning.  No abdominal pain, but sometimes when she eats or drinks something she feels pain in her back.    Today 10/16/2019  Esophagram was normal. Manometry may have suggested a mild EGJ outflow obstruction, however this is less likely since esophagram was normal.  Changed diet bc that was a contributing factor. Was having GERD a couple of times a week. Now it is maybe once a week. Also on omeprazole 40 mg.   Now having orophyrangeal dysphagia.  Esophageal dysphagia is present.   No n/v    ROS:  Constitutional: No fevers, chills, No unintentional weight loss  ENT: No allergies  CV: No chest pain  GI: see HPI  Derm: No rash  MSK: + R hand pain  Neuro: No syncope, No seizure  Psych: No anxiety, No depression    Medical History:  has a past medical history of Chronic back pain, Diabetes type 2, uncontrolled, Morbid obesity with BMI of 40.0-44.9, adult, MILEY on CPAP, Plantar fasciitis of left foot, and Urticaria.    Surgical History:  has a past surgical history that includes [place] tooth extraction; Cyst Removal; Dilation and  curettage of uterus; Esophagogastroduodenoscopy (N/A, 6/14/2019); Upper gastrointestinal endoscopy; Esophageal motility study using high resolution manometry (N/A, 9/17/2019); and Trigger finger release (Right, 10/4/2019).    Family History: family history includes Cancer in her maternal grandmother; Diabetes in her mother; No Known Problems in her sister, sister, and sister..     Social History:  reports that she has been smoking cigarettes. She has been smoking about 0.50 packs per day. She has never used smokeless tobacco. She reports that she drinks alcohol. She reports that she does not use drugs.    Review of patient's allergies indicates:  No Known Allergies    Current Outpatient Medications on File Prior to Visit   Medication Sig Dispense Refill    atorvastatin (LIPITOR) 10 MG tablet Take 1 tablet (10 mg total) by mouth once daily. 90 tablet 3    blood sugar diagnostic Strp 1 strip by Misc.(Non-Drug; Combo Route) route 2 (two) times daily. 200 strip 3    blood-glucose meter kit Please provide with insurance covered meter 1 each 0    cetirizine (ZYRTEC) 10 MG tablet Take 10 mg by mouth once daily.      chlorhexidine (PERIDEX) 0.12 % solution RINSE WITH 1/2 OZ FOR 30 SECONDS THEN SPIT AFTER BREAKFAST AND BEFORE BEDTIME  0    ergocalciferol (ERGOCALCIFEROL) 50,000 unit Cap Take 1 capsule (50,000 Units total) by mouth every 7 days. 12 capsule 1    fluticasone (FLONASE) 50 mcg/actuation nasal spray 1 spray (50 mcg total) by Each Nare route 2 (two) times daily. 15 g 1    HYDROcodone-acetaminophen (NORCO) 5-325 mg per tablet Take 1 tablet by mouth every 4 (four) hours as needed for Pain (or cough). 14 tablet 0    lancets Misc 1 Device by Misc.(Non-Drug; Combo Route) route 2 (two) times daily. 200 each 3    losartan (COZAAR) 25 MG tablet Take 1 tablet (25 mg total) by mouth once daily. 90 tablet 3    medroxyPROGESTERone (DEPO-PROVERA) 150 mg/mL Syrg Inject 1 mL (150 mg total) into the muscle every 3  "(three) months. 1 mL 2    metFORMIN (GLUCOPHAGE) 500 MG tablet Take 1 tablet (500 mg total) by mouth 2 (two) times daily with meals. 180 tablet 3    omeprazole (PRILOSEC) 40 MG capsule Take 1 capsule (40 mg total) by mouth once daily. 30 capsule 11    zolpidem (AMBIEN) 5 MG Tab TAKE 1 TABLET(5 MG) BY MOUTH EVERY NIGHT AS NEEDED 30 tablet 4    phentermine 37.5 MG capsule Take 1 capsule (37.5 mg total) by mouth every morning. (Patient not taking: Reported on 10/16/2019) 30 capsule 3    [DISCONTINUED] ketorolac (TORADOL) 10 mg tablet Take 1 tablet (10 mg total) by mouth 3 (three) times daily as needed for Pain. 20 tablet 0     No current facility-administered medications on file prior to visit.          Objective Findings:    Vital Signs:  /79   Pulse 97   Ht 5' 6" (1.676 m)   Wt 123.5 kg (272 lb 4.3 oz)   BMI 43.95 kg/m²   Body mass index is 43.95 kg/m².    Physical Exam:  General Appearance: Well appearing in no acute distress  Head:   Normocephalic, without obvious abnormality  Eyes:    No scleral icterus  ENT: Neck supple  Neurologic: AAO x 3      Labs:  Lab Results   Component Value Date    WBC 10.77 09/10/2019    HGB 13.4 09/10/2019    HCT 40.7 09/10/2019     (H) 09/10/2019    CHOL 128 01/12/2018    TRIG 73 01/12/2018    HDL 38 (L) 01/12/2018    ALT 14 09/10/2019    AST 10 09/10/2019     09/10/2019    K 4.4 09/10/2019     09/10/2019    CREATININE 0.9 09/10/2019    BUN 9 09/10/2019    CO2 25 09/10/2019    TSH 1.527 01/30/2019    GLUF 114 (H) 05/31/2010    HGBA1C 9.7 (H) 09/10/2019       Imaging reviewed:  Esophageal Manometry 9/17/2019  - [place] Classification: esophagogastric junction                         (EGJ) outflow obstruction. However, this does not                         appear to be clinically significant. The findings                         on manometry may be due to an anatomic anomoly.  Suggested that this is a functional dysphagia.    Esophagram 6/2019  Mild " esophageal dysmotility,  otherwise normal esophagram evaluation.    Endoscopy reviewed:    EGD 06/14/2019 with Dr. Guevara.  Unremarkable.    Assessment:    Ms. Lopez is a 44-year-old BF with:    1. Esophageal dysphagia    2. Oropharyngeal dysphagia    3. GERD without esophagitis       She reports her symptoms have improved with lifestyle and dietary modifications as well as daily omeprazole use.  She is now having 1-2 episodes of reflux per week, she was having every day sx.    Esophagram showed mild dysmotility.  Manometry showed normal motility but possible EGJ outflow obstruction, however she is most likely an incidental finding and not the cause of her symptoms.  Today she actually reports that her esophageal dysphagia has resolved and now she has a oropharyngeal dysphagia.  This is likely a functional disorder.  Will try to prescribed Elavil low-dose to see if this helps her symptoms.  Today we did discuss reflux and long-term use of PPI therapy.  Right now patient wants to stay on current dose of PPI however we will discuss lowering omeprazole dose to 20 mg at f/u.  If patient is still having oropharyngeal dysphagia in the future may order modified barium swallow study at follow-up.    Recommendations:  1.  Continue PPI as well as GERD diet and lifestyle my modifications.  2.  Elavil 10 mg every night to see if this helps with dysphagia    Follow-up in 4-5 weeks    Order summary:  Orders Placed This Encounter    amitriptyline (ELAVIL) 10 MG tablet         Thank you so much for allowing me to participate in the care of Velma Lopez    Africa Wan, GRACE-C

## 2019-10-17 ENCOUNTER — PATIENT MESSAGE (OUTPATIENT)
Dept: INTERNAL MEDICINE | Facility: CLINIC | Age: 44
End: 2019-10-17

## 2019-10-18 ENCOUNTER — PATIENT MESSAGE (OUTPATIENT)
Dept: GASTROENTEROLOGY | Facility: CLINIC | Age: 44
End: 2019-10-18

## 2019-10-21 ENCOUNTER — OFFICE VISIT (OUTPATIENT)
Dept: ORTHOPEDICS | Facility: CLINIC | Age: 44
End: 2019-10-21
Payer: MEDICAID

## 2019-10-21 ENCOUNTER — HOSPITAL ENCOUNTER (OUTPATIENT)
Dept: DIABETES | Facility: OTHER | Age: 44
Discharge: HOME | End: 2019-10-21
Attending: FAMILY MEDICINE
Payer: MEDICAID

## 2019-10-21 ENCOUNTER — CLINICAL SUPPORT (OUTPATIENT)
Dept: INTERNAL MEDICINE | Facility: CLINIC | Age: 44
End: 2019-10-21
Attending: FAMILY MEDICINE
Payer: MEDICAID

## 2019-10-21 ENCOUNTER — PATIENT MESSAGE (OUTPATIENT)
Dept: INTERNAL MEDICINE | Facility: CLINIC | Age: 44
End: 2019-10-21

## 2019-10-21 VITALS
HEIGHT: 66 IN | BODY MASS INDEX: 43.9 KG/M2 | WEIGHT: 273.13 LBS | WEIGHT: 272.25 LBS | HEIGHT: 66 IN | BODY MASS INDEX: 43.75 KG/M2

## 2019-10-21 DIAGNOSIS — E11.69 TYPE 2 DIABETES MELLITUS WITH OTHER SPECIFIED COMPLICATION, WITHOUT LONG-TERM CURRENT USE OF INSULIN: Primary | ICD-10-CM

## 2019-10-21 DIAGNOSIS — M65.311 TRIGGER THUMB, RIGHT THUMB: Primary | ICD-10-CM

## 2019-10-21 DIAGNOSIS — E11.9 TYPE 2 DIABETES MELLITUS WITHOUT COMPLICATION, WITHOUT LONG-TERM CURRENT USE OF INSULIN: ICD-10-CM

## 2019-10-21 DIAGNOSIS — E11.9 TYPE 2 DIABETES MELLITUS WITHOUT COMPLICATION, WITHOUT LONG-TERM CURRENT USE OF INSULIN: Primary | ICD-10-CM

## 2019-10-21 DIAGNOSIS — E11.3292 MILD NONPROLIFERATIVE DIABETIC RETINOPATHY OF LEFT EYE WITHOUT MACULAR EDEMA ASSOCIATED WITH TYPE 2 DIABETES MELLITUS: Primary | ICD-10-CM

## 2019-10-21 PROCEDURE — 99213 OFFICE O/P EST LOW 20 MIN: CPT | Mod: PBBFAC | Performed by: ORTHOPAEDIC SURGERY

## 2019-10-21 PROCEDURE — 99999 PR PBB SHADOW E&M-EST. PATIENT-LVL I: ICD-10-PCS | Mod: PBBFAC,,,

## 2019-10-21 PROCEDURE — 99999 PR PBB SHADOW E&M-EST. PATIENT-LVL III: CPT | Mod: PBBFAC,,, | Performed by: ORTHOPAEDIC SURGERY

## 2019-10-21 PROCEDURE — G0108 DIAB MANAGE TRN  PER INDIV: HCPCS

## 2019-10-21 PROCEDURE — 99024 PR POST-OP FOLLOW-UP VISIT: ICD-10-PCS | Mod: ,,, | Performed by: ORTHOPAEDIC SURGERY

## 2019-10-21 PROCEDURE — 99999 PR PBB SHADOW E&M-EST. PATIENT-LVL III: ICD-10-PCS | Mod: PBBFAC,,, | Performed by: ORTHOPAEDIC SURGERY

## 2019-10-21 PROCEDURE — 99211 OFF/OP EST MAY X REQ PHY/QHP: CPT | Mod: PBBFAC,27

## 2019-10-21 PROCEDURE — 99024 POSTOP FOLLOW-UP VISIT: CPT | Mod: ,,, | Performed by: ORTHOPAEDIC SURGERY

## 2019-10-21 PROCEDURE — 99999 PR PBB SHADOW E&M-EST. PATIENT-LVL I: CPT | Mod: PBBFAC,,,

## 2019-10-21 RX ORDER — PEN NEEDLE, DIABETIC 30 GX3/16"
1 NEEDLE, DISPOSABLE MISCELLANEOUS NIGHTLY
Qty: 100 EACH | Refills: 3 | Status: SHIPPED | OUTPATIENT
Start: 2019-10-21 | End: 2020-08-14

## 2019-10-21 NOTE — PROGRESS NOTES
Velma Lopez is a 44 y.o. female here for a diabetic eye screening with non-dilated fundus photos per Dr. Martinez    Patient cooperative?: Yes  Small pupils?: Yes  Last eye exam: Unknown per pt.    For exam results, see Encounter Report.

## 2019-10-21 NOTE — PROGRESS NOTES
Velma Lopez presents for post-operative evaluation.  The patient is now 2 weeks s/p right trigger thumb release.  Overall the patient reports doing well.  The patient reports appropriate postoperative soreness with well controlled overall pain.  She denies any numbness or tingling and notes that her thumb range of motion is better than it was preop.  She is happy thus far.    PE:    AA&O x 4.  NAD  HEENT:  NCAT, sclera nonicteric  Lungs:  Respirations are equal and unlabored.  CV:  2+ bilateral upper and lower extremity pulses.  MSK: The wound is healing well with no signs of erythema or warmth.  There is no drainage.  No clinical signs or symptoms of infection are present.  All sutures were removed today.  RUE NVI.  Pt can oppose the small finger and make a full fist without difficulty.  ROM full.    A/P: Status post above, doing well  1) Continue with full weight bearing  2) F/U 4 weeks  3) Call with any questions/concerns in the interim     Please be aware that this note has been generated with the assistance of [facility] voice-to-text.  Please excuse any spelling or grammatical errors.

## 2019-10-22 NOTE — PROGRESS NOTES
Diabetes Education  Author: Nichole Boyer RN  Date: 10/22/2019    Diabetes Care Management Summary  Diabetes Education Record Assessment/Progress: Comprehensive/Group  Current Diabetes Risk Level: Low         Diabetes Type  Diabetes Type : Type II    Diabetes History  Diabetes Diagnosis: 0-1 year  Current Treatment: Oral Medication  Reviewed Problem List with Patient: Yes    Health Maintenance was reviewed today with patient. Discussed with patient importance of routine eye exams, foot exams/foot care, blood work (i.e.: A1c, microalbumin, and lipid), dental visits, yearly flu vaccine, and pneumonia vaccine as indicated by PCP. Patient verbalized understanding.     Health Maintenance Topics with due status: Not Due       Topic Last Completion Date    Pap Smear with HPV Cotest 2018    Mammogram 01/15/2019    Hemoglobin A1c 09/10/2019    TETANUS VACCINE 2019    Low Dose Statin 10/21/2019     Health Maintenance Due   Topic Date Due    Foot Exam  01/15/1983    Eye Exam  01/15/1983    Lipid Panel  2019       Nutrition  Meal Plan 24 Hour Recall - Breakfast: 1 piece fried fish, 1 small scoop eggs w/ cheese - water   Meal Plan 24 Hour Recall - Lunch: broccoli and cheese casserole w/ steak - water   Meal Plan 24 Hour Recall - Dinner: broccoli and cheese casserole w/ steak - water   Meal Plan 24 Hour Recall - Snack: cheese crackers, almond bar     Monitoring   Self Monitoring : AM [place], 179, 199, 180, 187, 257, 276, 196, 150, 164, 175, 181, 173, 173, 225, 198, 184, 163  Blood Glucose Logs: Yes  Do you use a personal continuous glucose monitor?: No  In the last month, how often have you had a low blood sugar reaction?: never    Exercise   Frequency: Never    Current Diabetes Treatment   Current Treatment: Oral Medication                                                    Diabetes Education Assessment/Progress  Diabetes Disease Process (diabetes disease process and treatment options): Not  Covered/Deferred  Nutrition (Incorporating nutritional management into one's lifestyle): Discussion, Requires Assistance Family/SO, Individual Session(pt has slowly been making diet changes, working on cutting back on cho's - thought smoothies were a good choice - label reading exercise reviewing added sugars; feels she needs help w/ appetite control)  Physical Activity (incorporating physical activity into one's lifestyle): Not Covered/Deferred  Medications (states correct name, dose, onset, peak, duration, side effects & timing of meds): Discussion, Requires Assistance Family/SO, Individual Session(pt ready to try GLP-1 for added BG control + weight loss benefit; looks like insurance prefers victoza, ed on victoza injection, titration scale - discussed w/ PCP)  Monitoring (monitoring blood glucose/other parameters & using results): Discussion, Requires Assistance Family/SO, Individual Session(BG no longer in 3-400; still some values in 200's - reviewed goal BG and how GLP-1 + diet changes can help get BG to goal)  Acute Complications (preventing, detecting, and treating acute complications): Discussion, Requires Assistance Family/SO, Individual Session(reviewed s/s of low BG and how to prevent low BG and treat if they occur )  Chronic Complications (preventing, detecting, and treating chronic complications): Not Covered/Deferred  Clinical (diabetes, other pertinent medical history, and relevant comorbidities reviewed during visit): Not Covered/Deferred  Cognitive (knowledge of self-management skills, functional health literacy): Not Covered/Deferred  Psychosocial (emotional response to diabetes): Not Covered/Deferred  Diabetes Distress and Support Systems: Not Covered/Deferred  Behavioral (readiness for change, lifestyle practices, self-care behaviors): Discussion, Requires Assistance Family/SO, Individual Session(pt motivated to continue making changes and trying new things to figure out regime)    Goals  Patient  has selected/evaluated goals during today's session: Yes, evaluated  Healthy Eating: In Progress  Monitoring: In Progress         Diabetes Care Plan/Intervention  Education Plan/Intervention: Individual Follow-Up DSMT    Diabetes Meal Plan  Restrictions: Restricted Carbohydrate  Carbohydrate Per Meal: 30-45g    Today's Self-Management Care Plan was developed with the patient's input and is based on barriers identified during today's assessment.    The long and short-term goals in the care plan were written with the patient/caregiver's input. The patient has agreed to work toward these goals to improve her overall diabetes control.      The patient received a copy of today's self-management plan and verbalized understanding of the care plan, goals, and all of today's instructions.      The patient was encouraged to communicate with her physician and care team regarding her condition(s) and treatment.  I provided the patient with my contact information today and encouraged her to contact me via phone or patient portal as needed.     Education Units of Time   Time Spent: 30 min

## 2019-10-23 RX ORDER — ERGOCALCIFEROL 1.25 MG/1
CAPSULE ORAL
Qty: 12 CAPSULE | Refills: 0 | Status: ON HOLD | OUTPATIENT
Start: 2019-10-23 | End: 2020-07-27 | Stop reason: HOSPADM

## 2019-10-24 ENCOUNTER — PATIENT MESSAGE (OUTPATIENT)
Dept: INTERNAL MEDICINE | Facility: CLINIC | Age: 44
End: 2019-10-24

## 2019-10-24 DIAGNOSIS — Z01.00 ROUTINE EYE EXAM: Primary | ICD-10-CM

## 2019-10-29 ENCOUNTER — PATIENT MESSAGE (OUTPATIENT)
Dept: INTERNAL MEDICINE | Facility: CLINIC | Age: 44
End: 2019-10-29

## 2019-10-29 DIAGNOSIS — E11.9 TYPE 2 DIABETES MELLITUS WITHOUT COMPLICATION, WITHOUT LONG-TERM CURRENT USE OF INSULIN: ICD-10-CM

## 2019-10-29 DIAGNOSIS — Z01.00 ROUTINE EYE EXAM: Primary | ICD-10-CM

## 2019-11-03 ENCOUNTER — PATIENT MESSAGE (OUTPATIENT)
Dept: INTERNAL MEDICINE | Facility: CLINIC | Age: 44
End: 2019-11-03

## 2019-11-05 ENCOUNTER — PATIENT MESSAGE (OUTPATIENT)
Dept: INTERNAL MEDICINE | Facility: CLINIC | Age: 44
End: 2019-11-05

## 2019-11-05 DIAGNOSIS — E11.69 TYPE 2 DIABETES MELLITUS WITH OTHER SPECIFIED COMPLICATION, WITHOUT LONG-TERM CURRENT USE OF INSULIN: ICD-10-CM

## 2019-11-09 DIAGNOSIS — R13.19 ESOPHAGEAL DYSPHAGIA: ICD-10-CM

## 2019-11-10 RX ORDER — AMITRIPTYLINE HYDROCHLORIDE 10 MG/1
10 TABLET, FILM COATED ORAL NIGHTLY PRN
Qty: 30 TABLET | Refills: 1 | OUTPATIENT
Start: 2019-11-10 | End: 2019-12-25

## 2019-11-11 ENCOUNTER — PATIENT MESSAGE (OUTPATIENT)
Dept: GASTROENTEROLOGY | Facility: CLINIC | Age: 44
End: 2019-11-11

## 2019-11-11 ENCOUNTER — TELEPHONE (OUTPATIENT)
Dept: PHARMACY | Facility: CLINIC | Age: 44
End: 2019-11-11

## 2019-11-11 DIAGNOSIS — R13.19 ESOPHAGEAL DYSPHAGIA: ICD-10-CM

## 2019-11-12 ENCOUNTER — PATIENT MESSAGE (OUTPATIENT)
Dept: GASTROENTEROLOGY | Facility: CLINIC | Age: 44
End: 2019-11-12

## 2019-11-12 RX ORDER — AMITRIPTYLINE HYDROCHLORIDE 10 MG/1
10 TABLET, FILM COATED ORAL NIGHTLY PRN
Qty: 30 TABLET | Refills: 1 | OUTPATIENT
Start: 2019-11-12 | End: 2019-12-27

## 2019-11-14 ENCOUNTER — PATIENT MESSAGE (OUTPATIENT)
Dept: INTERNAL MEDICINE | Facility: CLINIC | Age: 44
End: 2019-11-14

## 2019-11-14 NOTE — TELEPHONE ENCOUNTER
Spoke with patient.  She will have the pharmacy fax to us the denial and number to contact if a PA is needed.

## 2019-11-18 ENCOUNTER — PATIENT OUTREACH (OUTPATIENT)
Dept: ADMINISTRATIVE | Facility: OTHER | Age: 44
End: 2019-11-18

## 2019-11-20 ENCOUNTER — OFFICE VISIT (OUTPATIENT)
Dept: GASTROENTEROLOGY | Facility: CLINIC | Age: 44
End: 2019-11-20
Payer: MEDICAID

## 2019-11-20 VITALS
BODY MASS INDEX: 43.93 KG/M2 | HEIGHT: 66 IN | SYSTOLIC BLOOD PRESSURE: 135 MMHG | WEIGHT: 273.38 LBS | HEART RATE: 96 BPM | DIASTOLIC BLOOD PRESSURE: 82 MMHG

## 2019-11-20 DIAGNOSIS — R13.19 ESOPHAGEAL DYSPHAGIA: Primary | ICD-10-CM

## 2019-11-20 PROCEDURE — 99214 OFFICE O/P EST MOD 30 MIN: CPT | Mod: S$PBB,,, | Performed by: NURSE PRACTITIONER

## 2019-11-20 PROCEDURE — 99214 PR OFFICE/OUTPT VISIT, EST, LEVL IV, 30-39 MIN: ICD-10-PCS | Mod: S$PBB,,, | Performed by: NURSE PRACTITIONER

## 2019-11-20 PROCEDURE — 99213 OFFICE O/P EST LOW 20 MIN: CPT | Mod: PBBFAC | Performed by: NURSE PRACTITIONER

## 2019-11-20 PROCEDURE — 99999 PR PBB SHADOW E&M-EST. PATIENT-LVL III: CPT | Mod: PBBFAC,,, | Performed by: NURSE PRACTITIONER

## 2019-11-20 PROCEDURE — 99999 PR PBB SHADOW E&M-EST. PATIENT-LVL III: ICD-10-PCS | Mod: PBBFAC,,, | Performed by: NURSE PRACTITIONER

## 2019-11-20 RX ORDER — AMITRIPTYLINE HYDROCHLORIDE 10 MG/1
10 TABLET, FILM COATED ORAL NIGHTLY PRN
Qty: 90 TABLET | Refills: 3 | Status: SHIPPED | OUTPATIENT
Start: 2019-11-20 | End: 2020-12-17

## 2019-11-20 NOTE — PATIENT INSTRUCTIONS
Start taking Pepcid every night. Continue with the omeprazole in the morning.    Http://www.refluxcookbook.com/  Dropping Acid The Reflux Diet Cookbook and Cure -  Shiva Spencer M.D.    GERD  Worst Foods for Acid Reflux  Chocolate (milk chocolate worse than dark chocolate)  Soda (all carbonated beverages)  Alcohol (beer, liquor, wine)  Fried foods  Pal, sausage, ribs  Cream sauce  Fatty meats (beef)  Butter, margarine, lard, shortening  Coffee, tea  Mint   High fat nuts  Hot sauces and pepper  Citrus fruit/juices      Acidic foods (pH - 1 is MORE acidic, 5 is LESS acidic)     Do not eat or drink these (lower numbers are worse)    Induction diet - For 2 weeks eat nothing below pH 5     Lemon juice 2.3  Grape cranberry juice 2.5  Stomach Acid 2.5  Gelatin Dessert 2.6  Lemon/lime 2.9/2.7  Vinegar 2.9  Gatorade 3.0  Fruits - plums, apricots, strawberries, cherries 3.0  Vitamin C (ascorbic acid) 3.0  Iced tea, Snapple 3.1  Mustard 3.2  Soft drinks 3.3  Nectarines 3.3  Pomegranate 3.3  Applesauce 3.4  Grapefruit 3.4  Kiwi 3.4  Barbecue sauce 3.4  Caesar dressing 3.5  [place] dressing 3.6  Strawberries 3.5  Pineapple juice 3.5  Beer 3.5  Wine 3.5  Grape 3.6  Apples 3.6  Pineapple 3.7  Pickle 3.7  Blackberries, blueberries 3.7  [place] 3.7  Orange 3.8  Cherries 3.9  Red Bull 3.9  Tomatoes 4.2  Coffee 5.1      These are Safe foods:  Agave  Aloe Vera  Apple (only red)  Bagels  Banana (worsens reflux in 1%)  Beans - black, red, lima, lentils  Bread - whole grain, rye  Caramel  Celery  Chamomile tea  Chicken - skinless, never fried  Chicken stock or bouillon  Coffee - one cup/day with milk  Fennel  Fish  [place]  Green vegetables (no green peppers)  Herbs  Honey  Melon  Milk - skin, soy, or Lactaid skim milk  Mushrooms  Oatmeal  Olive oil  Parsley  Pasta  Pears  Popcorn  Potatoes  Red bell peppers  Rice  Soups  Tofu  Turkey Breast  Turnip  Vegetables - no onion, tomatoes, peppers  Vinaigrette  Water - non  carbonated  Whole grain breads, crackers, breakfast cereals      Best Foods for Acid Reflux  Whole grain breads  Oatmeal  Aloe Vera  Salad (no tomatoes, onions, cheese, or high fat dressing)  Banana  Melon  Fennel  Chicken and turkey (skinless, never fried)  Fish/seafood (never fried)  Celery  Parsley  Couscous and Rice    Maybe bad foods (Everyone is unique)  Tomatoes  Garlic  Onion  Nuts (macadamia nuts)  Apples (especially green)  Cucumber  Green peppers  Spicy food  Some herbal teas    GERD tips  Change what you eat:  Eat smaller meals  Eat slowly and chew thoroughly until food is almost liquid  Cut down on junk carbohydrates such as sugar and white flour  Use herbs in your cooking  Eat more raw foods (more than 10 ingredients is not a raw food)  Avoid trans fats and partially hydrogenated oils  Eat more fish and switch to grass fed beef  Switch your cooking oil to macadamia nut or olive oil  Watch extremes of salt intake (too high or too low is bad)    If just cutting out acidic foods is not enough, change how you eat:  Large breakfast, medium lunch, light dinner  Dont mix fruit juices, sweet fruits, and refined starches with meats and heavy food  Dont wash your food down with a lot of liquid      Change these habits:  Stop smoking  Eat dinner earlier (3-4 hours before lying down to sleep)  Elevate the head of your bed 6 inches (blocks under the head of the bed are better than pillows) OR [facility] sells a special MedCline Reflux relief system  That may be purchased online for a comfortable, individual solution for raising the head of the bed.  Exercise (but wait 2 hours after eating)  Drink more water (between meals)    Take these supplements:  Multi vitamin  Probiotic  Fish oil    Most common food allergens: milk, eggs, peanuts, tree nuts, fish, shellfish, wheat, and soy    All natural immediate relief:  Chew 2-3 soft probiotic capsules - Dr. Juarez's Probiotics 12 Plus  Chew chewable DGL licorice  tablet  Chew papaya tablet with high protein meal - [employer]  Drink 2 ounces of aloe vera juice  Swedish bitters  Prelief- reduce the acid in food to keep it form burning sensitive tissue  Iberogast  [place]  Drink Chamomile Tea  Teaspoon of baking soda in water  Spoonful of vinegar in water      All natural ulcer healers:  Zinc carnosine - 75.5 mg with food twice a day x 8 weeks   PepZinGI by Karissa - $8 for 60 pills  DGL (deglycyrrhizinated licorice) - 2 tablets before meals. Heals stomach lining   Natural Factors brand, Enzymatic therapy brand.  Aloe Vera juice  - 2 to 8 ounces a day   [place] or [place]

## 2019-11-20 NOTE — PROGRESS NOTES
[facility] Consultation Note    Reason for Consult:  The encounter diagnosis was Esophageal dysphagia.    PCP:   Aftab Martinez       Referring MD:  No referring provider defined for this encounter.    HPI:  NOTE from 6/26/2019  This is a 44 y.o. female here for follow-up for her GERD and dysphagia.  She is an established patient last seen by me in clinic 4/24/19.  She then had an EGD with Dr. Guevara 6/14/19.  Her EGD was unremarkable.  At her last visit I increased her Prilosec to 40 mg every day.    She states she feels better than our last visit. She made dietary and lifestyle changes.  She experiences the pyrosis 1-2x per week, she was having it every day.  At times she is still having the dysphagia.  No nausea or vomiting.  Still taking the Prilosec every morning.  No abdominal pain, but sometimes when she eats or drinks something she feels pain in her back.    10/16/2019  Esophagram was normal. Manometry may have suggested a mild EGJ outflow obstruction, however this is less likely since esophagram was normal.  Changed diet bc that was a contributing factor. Was having GERD a couple of times a week. Now it is maybe once a week. Also on omeprazole 40 mg.   Now having orophyrangeal dysphagia.  Esophageal dysphagia is present.   No n/v    Today 11/20/19  Elavil was prescribed for her dysphagia. She has had great improvement and she is satisfied. Only one dysphagia episode w/in past month. Use to be a couple times per week.  GERD night    ROS:  Constitutional: No fevers, chills, No unintentional weight loss  GI: see HPI      Medical History:  has a past medical history of Chronic back pain, Diabetes type 2, uncontrolled, Mild nonproliferative diabetic retinopathy of left eye without macular edema associated with type 2 diabetes mellitus (10/21/2019), Morbid obesity with BMI of 40.0-44.9, adult, MILEY on CPAP, Plantar fasciitis of left foot, and Urticaria.    Surgical History:  has a  past surgical history that includes [place] tooth extraction; Cyst Removal; Dilation and curettage of uterus; Esophagogastroduodenoscopy (N/A, 6/14/2019); Upper gastrointestinal endoscopy; Esophageal motility study using high resolution manometry (N/A, 9/17/2019); and Trigger finger release (Right, 10/4/2019).    Family History: family history includes Cancer in her maternal grandmother; Diabetes in her mother; No Known Problems in her sister, sister, and sister..     Social History:  reports that she has been smoking cigarettes. She has been smoking about 0.50 packs per day. She has never used smokeless tobacco. She reports that she drinks alcohol. She reports that she does not use drugs.    Review of patient's allergies indicates:  No Known Allergies    Current Outpatient Medications on File Prior to Visit   Medication Sig Dispense Refill    amitriptyline (ELAVIL) 10 MG tablet Take 1 tablet (10 mg total) by mouth nightly as needed for Insomnia. 45 tablet 0    atorvastatin (LIPITOR) 10 MG tablet Take 1 tablet (10 mg total) by mouth once daily. 90 tablet 3    blood sugar diagnostic Strp 1 strip by Misc.(Non-Drug; Combo Route) route 2 (two) times daily. 200 strip 3    blood-glucose meter kit Please provide with insurance covered meter 1 each 0    cetirizine (ZYRTEC) 10 MG tablet Take 10 mg by mouth once daily.      chlorhexidine (PERIDEX) 0.12 % solution RINSE WITH 1/2 OZ FOR 30 SECONDS THEN SPIT AFTER BREAKFAST AND BEFORE BEDTIME  0    ergocalciferol (ERGOCALCIFEROL) 50,000 unit Cap TAKE ONE CAPSULE BY MOUTH EVERY 7 DAYS 12 capsule 0    fluticasone (FLONASE) 50 mcg/actuation nasal spray 1 spray (50 mcg total) by Each Nare route 2 (two) times daily. 15 g 1    HYDROcodone-acetaminophen (NORCO) 5-325 mg per tablet Take 1 tablet by mouth every 4 (four) hours as needed for Pain (or cough). 14 tablet 0    lancets Misc 1 Device by Misc.(Non-Drug; Combo Route) route 2 (two) times daily. 200 each 3    liraglutide  "0.6 mg/0.1 mL, 18 mg/3 mL, subq PNIJ (VICTOZA 2-ANGELES) 0.6 mg/0.1 mL (18 mg/3 mL) PnIj Inject 1.8 mg into the skin once daily. 9 mL 11    losartan (COZAAR) 25 MG tablet Take 1 tablet (25 mg total) by mouth once daily. 90 tablet 3    medroxyPROGESTERone (DEPO-PROVERA) 150 mg/mL Syrg Inject 1 mL (150 mg total) into the muscle every 3 (three) months. 1 mL 2    metFORMIN (GLUCOPHAGE) 500 MG tablet Take 1 tablet (500 mg total) by mouth 2 (two) times daily with meals. 180 tablet 3    omeprazole (PRILOSEC) 40 MG capsule Take 1 capsule (40 mg total) by mouth once daily. 30 capsule 11    pen needle, diabetic (BD ULTRA-FINE DEVAUGHN PEN NEEDLE) 32 gauge x 5/32" Ndle 1 Device by Misc.(Non-Drug; Combo Route) route every evening. 100 each 3    zolpidem (AMBIEN) 5 MG Tab TAKE 1 TABLET(5 MG) BY MOUTH EVERY NIGHT AS NEEDED 30 tablet 4    phentermine 37.5 MG capsule Take 1 capsule (37.5 mg total) by mouth every morning. (Patient not taking: Reported on 11/20/2019) 30 capsule 3     No current facility-administered medications on file prior to visit.          Objective Findings:    Vital Signs:  /82 (BP Location: Left arm)   Pulse 96   Ht 5' 6" (1.676 m)   Wt 124 kg (273 lb 5.9 oz)   BMI 44.12 kg/m²   Body mass index is 44.12 kg/m².    Physical Exam:  General Appearance: Well appearing in no acute distress  Head:   Normocephalic, without obvious abnormality  Eyes:    No scleral icterus  ENT: Neck supple  Neurologic: AAO x 3      Labs:  Lab Results   Component Value Date    WBC 10.77 09/10/2019    HGB 13.4 09/10/2019    HCT 40.7 09/10/2019     (H) 09/10/2019    CHOL 128 01/12/2018    TRIG 73 01/12/2018    HDL 38 (L) 01/12/2018    ALT 14 09/10/2019    AST 10 09/10/2019     09/10/2019    K 4.4 09/10/2019     09/10/2019    CREATININE 0.9 09/10/2019    BUN 9 09/10/2019    CO2 25 09/10/2019    TSH 1.527 01/30/2019    GLUF 114 (H) 05/31/2010    HGBA1C 9.7 (H) 09/10/2019       Imaging reviewed:  Esophageal " Manometry 9/17/2019  - Langlois Classification: esophagogastric junction                         (EGJ) outflow obstruction. However, this does not                         appear to be clinically significant. The findings                         on manometry may be due to an anatomic anomoly.  Suggested that this is a functional dysphagia.    Esophagram 6/2019  Mild esophageal dysmotility,  otherwise normal esophagram evaluation.    Endoscopy reviewed:    EGD 06/14/2019 with Dr. Guevara.  Unremarkable.    Assessment:    Ms. Lopez is a 44-year-old BF with:    1. Esophageal dysphagia      Functional dysphagia, Responding well the Elavil.    Recommendations:  1.  Continue PPI as well as GERD diet and lifestyle my modifications.  2.  Elavil 10 mg every night     Follow-up in PRN    Order summary:  Orders Placed This Encounter    amitriptyline (ELAVIL) 10 MG tablet         Thank you so much for allowing me to participate in the care of Velma Lopez    Africa Wan, KARENP-C

## 2019-11-25 ENCOUNTER — PATIENT MESSAGE (OUTPATIENT)
Dept: GASTROENTEROLOGY | Facility: CLINIC | Age: 44
End: 2019-11-25

## 2019-11-25 DIAGNOSIS — K21.9 GERD WITHOUT ESOPHAGITIS: Primary | ICD-10-CM

## 2019-11-26 RX ORDER — OMEPRAZOLE 40 MG/1
40 CAPSULE, DELAYED RELEASE ORAL DAILY
Qty: 90 CAPSULE | Refills: 3 | Status: SHIPPED | OUTPATIENT
Start: 2019-11-26 | End: 2020-12-16

## 2019-12-02 ENCOUNTER — PATIENT MESSAGE (OUTPATIENT)
Dept: INTERNAL MEDICINE | Facility: CLINIC | Age: 44
End: 2019-12-02

## 2019-12-02 DIAGNOSIS — E11.69 TYPE 2 DIABETES MELLITUS WITH OTHER SPECIFIED COMPLICATION, WITHOUT LONG-TERM CURRENT USE OF INSULIN: Primary | ICD-10-CM

## 2019-12-10 ENCOUNTER — LAB VISIT (OUTPATIENT)
Dept: LAB | Facility: OTHER | Age: 44
End: 2019-12-10
Attending: FAMILY MEDICINE
Payer: MEDICAID

## 2019-12-10 DIAGNOSIS — E11.9 TYPE 2 DIABETES MELLITUS WITHOUT COMPLICATION: ICD-10-CM

## 2019-12-10 DIAGNOSIS — E11.69 TYPE 2 DIABETES MELLITUS WITH OTHER SPECIFIED COMPLICATION, WITHOUT LONG-TERM CURRENT USE OF INSULIN: ICD-10-CM

## 2019-12-10 LAB
CHOLEST SERPL-MCNC: 119 MG/DL (ref 120–199)
CHOLEST/HDLC SERPL: 2.5 {RATIO} (ref 2–5)
ESTIMATED AVG GLUCOSE: 163 MG/DL (ref 68–131)
HBA1C MFR BLD HPLC: 7.3 % (ref 4–5.6)
HDLC SERPL-MCNC: 48 MG/DL (ref 40–75)
HDLC SERPL: 40.3 % (ref 20–50)
LDLC SERPL CALC-MCNC: 51.8 MG/DL (ref 63–159)
NONHDLC SERPL-MCNC: 71 MG/DL
TRIGL SERPL-MCNC: 96 MG/DL (ref 30–150)

## 2019-12-10 PROCEDURE — 36415 COLL VENOUS BLD VENIPUNCTURE: CPT

## 2019-12-10 PROCEDURE — 83036 HEMOGLOBIN GLYCOSYLATED A1C: CPT

## 2019-12-10 PROCEDURE — 80061 LIPID PANEL: CPT

## 2019-12-11 ENCOUNTER — HOSPITAL ENCOUNTER (OUTPATIENT)
Dept: DIABETES | Facility: OTHER | Age: 44
Discharge: HOME | End: 2019-12-11
Attending: FAMILY MEDICINE
Payer: MEDICAID

## 2019-12-11 ENCOUNTER — OFFICE VISIT (OUTPATIENT)
Dept: INTERNAL MEDICINE | Facility: CLINIC | Age: 44
End: 2019-12-11
Attending: FAMILY MEDICINE
Payer: MEDICAID

## 2019-12-11 VITALS
SYSTOLIC BLOOD PRESSURE: 110 MMHG | DIASTOLIC BLOOD PRESSURE: 72 MMHG | HEIGHT: 66 IN | BODY MASS INDEX: 43.4 KG/M2 | OXYGEN SATURATION: 98 % | WEIGHT: 270.06 LBS | WEIGHT: 270.06 LBS | HEIGHT: 66 IN | HEART RATE: 95 BPM | BODY MASS INDEX: 43.4 KG/M2

## 2019-12-11 DIAGNOSIS — E11.9 TYPE 2 DIABETES MELLITUS WITHOUT COMPLICATION, WITHOUT LONG-TERM CURRENT USE OF INSULIN: Primary | ICD-10-CM

## 2019-12-11 DIAGNOSIS — G47.33 OSA (OBSTRUCTIVE SLEEP APNEA): ICD-10-CM

## 2019-12-11 DIAGNOSIS — E66.01 SEVERE OBESITY (BMI >= 40): ICD-10-CM

## 2019-12-11 PROCEDURE — G0108 DIAB MANAGE TRN  PER INDIV: HCPCS

## 2019-12-11 PROCEDURE — 99999 PR PBB SHADOW E&M-EST. PATIENT-LVL III: CPT | Mod: PBBFAC,,, | Performed by: FAMILY MEDICINE

## 2019-12-11 PROCEDURE — 99999 PR PBB SHADOW E&M-EST. PATIENT-LVL III: ICD-10-PCS | Mod: PBBFAC,,, | Performed by: FAMILY MEDICINE

## 2019-12-11 PROCEDURE — 99214 PR OFFICE/OUTPT VISIT, EST, LEVL IV, 30-39 MIN: ICD-10-PCS | Mod: S$PBB,,, | Performed by: FAMILY MEDICINE

## 2019-12-11 PROCEDURE — 99214 OFFICE O/P EST MOD 30 MIN: CPT | Mod: S$PBB,,, | Performed by: FAMILY MEDICINE

## 2019-12-11 PROCEDURE — 99213 OFFICE O/P EST LOW 20 MIN: CPT | Mod: PBBFAC | Performed by: FAMILY MEDICINE

## 2019-12-12 ENCOUNTER — TELEPHONE (OUTPATIENT)
Dept: SLEEP MEDICINE | Facility: CLINIC | Age: 44
End: 2019-12-12

## 2019-12-12 NOTE — TELEPHONE ENCOUNTER
----- Message from Candice Muller sent at 12/12/2019 10:14 AM CST -----  Contact: Pt   Name of Who is Calling: PATRICIO CRANE [[id number]]    What is the request in detail: Pt is requesting a call back regards to scheduling an appt..... Please contact to further discuss and advise      Can the clinic reply by [facility]: Yes     What Number to Call Back if not in [facility]:  [phone]

## 2019-12-12 NOTE — TELEPHONE ENCOUNTER
Adelita Frankel discharged to home accompanied by family member and . Patient provided with the following educational materials upon discharge:  Instructions on taking antibiotics and general information and explanation on how to apply ear drops. Valuables and belongings sent with patient. discharge summary, discharge instructions, medications and follow up appointments reviewed with patient and family member,  and son. Patient and family member,  and son verbalized understanding.  and AVS and discharge education given in Urdu.    Will call to schedule candace.

## 2019-12-15 NOTE — PROGRESS NOTES
Diabetes Education  Author: Nichole Boyer RN  Date: 2019    Diabetes Care Management Summary  Diabetes Education Record Assessment/Progress: Comprehensive/Group  Current Diabetes Risk Level: Low         Diabetes Type  Diabetes Type : Type II    Diabetes History  Current Treatment: Oral Medication, Injectable  Reviewed Problem List with Patient: Yes    Health Maintenance was reviewed today with patient. Discussed with patient importance of routine eye exams, foot exams/foot care, blood work (i.e.: A1c, microalbumin, and lipid), dental visits, yearly flu vaccine, and pneumonia vaccine as indicated by PCP. Patient verbalized understanding.     Health Maintenance Topics with due status: Not Due       Topic Last Completion Date    Pap Smear with HPV Cotest 2018    Mammogram 01/15/2019    TETANUS VACCINE 2019    Lipid Panel 12/10/2019    Hemoglobin A1c 12/10/2019    Low Dose Statin 2019     Health Maintenance Due   Topic Date Due    Foot Exam  01/15/1983    Eye Exam  01/15/1983       Nutrition  Meal Plan 24 Hour Recall - Breakfast: bag of chips - water   Meal Plan 24 Hour Recall - Lunch: skipped, yesterday: luiz's chicken, 2 pieces, red beans (didn't finish) - strawberry drink   Meal Plan 24 Hour Recall - Dinner: bow of cereal (frosted flakes) w/ milk   Meal Plan 24 Hour Recall - Snack: no snacks yesterday     Monitoring   Self Monitoring : AM Fastin, 159, 143, 153, 150, 139, 130, 120, 125, 171, 204, 140, 140, 190, 120, 143, 140 Before Dinner: 155, 100, 169, 83, 92, 114, 137, 99   Blood Glucose Logs: Yes  Do you use a personal continuous glucose monitor?: No    Exercise   Frequency: Never    Current Diabetes Treatment   Current Treatment: Oral Medication, Injectable                                                    Diabetes Education Assessment/Progress  Diabetes Disease Process (diabetes disease process and treatment options): Not Covered/Deferred  Nutrition (Incorporating nutritional  management into one's lifestyle): Discussion, Individual Session, Requires Assistance Family/SO, Written Materials Provided(pt struggling to start making dietary changes, made a plan for adding convienent, grab n go, healthy choices for breakfast and lunch, discussed convenient options for dinner/meal prepping ideas to help make avoiding fast food easier )  Physical Activity (incorporating physical activity into one's lifestyle): Not Covered/Deferred  Medications (states correct name, dose, onset, peak, duration, side effects & timing of meds): Discussion, Individual Session, Requires Assistance Family/SO(pt on metformin and victoza @ 1.8mg - tolerating victoza well )  Monitoring (monitoring blood glucose/other parameters & using results): Discussion, Individual Session, Requires Assistance Family/SO(pt BG continuing to improve - is doing well SMBG BID)  Acute Complications (preventing, detecting, and treating acute complications): Not Covered/Deferred  Chronic Complications (preventing, detecting, and treating chronic complications): Discussion, Individual Session, Requires Assistance Family/SO(new A1C much improved to 7.3%! is motivated to continue working on getting BG better controlled to prevent complications)  Clinical (diabetes, other pertinent medical history, and relevant comorbidities reviewed during visit): Discussion, Individual Session, Requires Assistance Family/SO(pt has started losing weight slowly, 3lbs since 10/21/19 visit - plans on focusing on weight loss to continue to improve BG control)  Cognitive (knowledge of self-management skills, functional health literacy): Not Covered/Deferred  Psychosocial (emotional response to diabetes): Not Covered/Deferred  Diabetes Distress and Support Systems: Not Covered/Deferred  Behavioral (readiness for change, lifestyle practices, self-care behaviors): Discussion, Individual Session, Requires Assistance Family/SO(pt wants to work on changing lifestyle, is  slowly working on changes - is motivated to continue making healthy habits)    Goals  Patient has selected/evaluated goals during today's session: Yes, evaluated  Healthy Eating: In Progress  Monitoring: In Progress         Diabetes Care Plan/Intervention  Education Plan/Intervention: Individual Follow-Up DSMT    Diabetes Meal Plan  Restrictions: Restricted Carbohydrate  Carbohydrate Per Meal: 30-45g    Today's Self-Management Care Plan was developed with the patient's input and is based on barriers identified during today's assessment.    The long and short-term goals in the care plan were written with the patient/caregiver's input. The patient has agreed to work toward these goals to improve her overall diabetes control.      The patient received a copy of today's self-management plan and verbalized understanding of the care plan, goals, and all of today's instructions.      The patient was encouraged to communicate with her physician and care team regarding her condition(s) and treatment.  I provided the patient with my contact information today and encouraged her to contact me via phone or patient portal as needed.     Education Units of Time   Time Spent: 60 min

## 2019-12-18 ENCOUNTER — PATIENT OUTREACH (OUTPATIENT)
Dept: ADMINISTRATIVE | Facility: OTHER | Age: 44
End: 2019-12-18

## 2019-12-18 ENCOUNTER — PATIENT MESSAGE (OUTPATIENT)
Dept: SLEEP MEDICINE | Facility: CLINIC | Age: 44
End: 2019-12-18

## 2019-12-18 NOTE — PROGRESS NOTES
Updates requested from Care everywhere. HM updated. Patient had mild diabetic retinopathy and there is an open referal for optometry on file.

## 2019-12-23 ENCOUNTER — OFFICE VISIT (OUTPATIENT)
Dept: SLEEP MEDICINE | Facility: CLINIC | Age: 44
End: 2019-12-23
Payer: MEDICAID

## 2019-12-23 VITALS
DIASTOLIC BLOOD PRESSURE: 72 MMHG | WEIGHT: 273.81 LBS | BODY MASS INDEX: 44.01 KG/M2 | HEART RATE: 97 BPM | SYSTOLIC BLOOD PRESSURE: 114 MMHG | HEIGHT: 66 IN

## 2019-12-23 DIAGNOSIS — E66.01 SEVERE OBESITY (BMI >= 40): ICD-10-CM

## 2019-12-23 DIAGNOSIS — G47.33 OSA (OBSTRUCTIVE SLEEP APNEA): Primary | ICD-10-CM

## 2019-12-23 PROCEDURE — 99214 OFFICE O/P EST MOD 30 MIN: CPT | Mod: PBBFAC | Performed by: NURSE PRACTITIONER

## 2019-12-23 PROCEDURE — 99999 PR PBB SHADOW E&M-EST. PATIENT-LVL IV: ICD-10-PCS | Mod: PBBFAC,,, | Performed by: NURSE PRACTITIONER

## 2019-12-23 PROCEDURE — 99214 PR OFFICE/OUTPT VISIT, EST, LEVL IV, 30-39 MIN: ICD-10-PCS | Mod: S$PBB,,, | Performed by: NURSE PRACTITIONER

## 2019-12-23 PROCEDURE — 99999 PR PBB SHADOW E&M-EST. PATIENT-LVL IV: CPT | Mod: PBBFAC,,, | Performed by: NURSE PRACTITIONER

## 2019-12-23 PROCEDURE — 99214 OFFICE O/P EST MOD 30 MIN: CPT | Mod: S$PBB,,, | Performed by: NURSE PRACTITIONER
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
## 2019-12-23 NOTE — PATIENT INSTRUCTIONS
Dentist options for an oral appliance    1. Dandy Lara DDS ([email or web address], [place] based) [phone]   2. Michele Garcia DDS ([employer], [place] based, but an office in [facility]) [phone]   3. Martell Galdamez DDS ([facility]) [phone]   4. Shameka Vernon DDS ([facility] - [address] the [address] from [facility]) [address], [phone] [[email or web address]   5. Rudy Power DDS ([place]) [phone]

## 2020-01-13 ENCOUNTER — PATIENT MESSAGE (OUTPATIENT)
Dept: ALLERGY | Facility: CLINIC | Age: 45
End: 2020-01-13

## 2020-01-13 ENCOUNTER — PATIENT MESSAGE (OUTPATIENT)
Dept: INTERNAL MEDICINE | Facility: CLINIC | Age: 45
End: 2020-01-13

## 2020-01-13 DIAGNOSIS — Z12.31 ENCOUNTER FOR SCREENING MAMMOGRAM FOR BREAST CANCER: Primary | ICD-10-CM

## 2020-01-13 DIAGNOSIS — E55.9 VITAMIN D INSUFFICIENCY: Primary | ICD-10-CM

## 2020-01-14 ENCOUNTER — LAB VISIT (OUTPATIENT)
Dept: LAB | Facility: OTHER | Age: 45
End: 2020-01-14
Attending: ALLERGY & IMMUNOLOGY
Payer: MEDICAID

## 2020-01-14 DIAGNOSIS — E55.9 VITAMIN D INSUFFICIENCY: ICD-10-CM

## 2020-01-14 LAB — 25(OH)D3+25(OH)D2 SERPL-MCNC: 32 NG/ML (ref 30–96)

## 2020-01-14 PROCEDURE — 82306 VITAMIN D 25 HYDROXY: CPT

## 2020-01-14 PROCEDURE — 36415 COLL VENOUS BLD VENIPUNCTURE: CPT

## 2020-01-28 ENCOUNTER — PATIENT MESSAGE (OUTPATIENT)
Dept: INTERNAL MEDICINE | Facility: CLINIC | Age: 45
End: 2020-01-28

## 2020-01-31 ENCOUNTER — HOSPITAL ENCOUNTER (OUTPATIENT)
Dept: RADIOLOGY | Facility: OTHER | Age: 45
Discharge: HOME | End: 2020-01-31
Attending: FAMILY MEDICINE
Payer: MEDICAID

## 2020-01-31 DIAGNOSIS — Z12.31 ENCOUNTER FOR SCREENING MAMMOGRAM FOR BREAST CANCER: ICD-10-CM

## 2020-01-31 PROCEDURE — 77063 MAMMO DIGITAL SCREENING BILAT WITH TOMOSYNTHESIS_CAD: ICD-10-PCS | Mod: 26,,, | Performed by: INTERNAL MEDICINE

## 2020-01-31 PROCEDURE — 77063 BREAST TOMOSYNTHESIS BI: CPT | Mod: 26,,, | Performed by: INTERNAL MEDICINE

## 2020-01-31 PROCEDURE — 77067 SCR MAMMO BI INCL CAD: CPT | Mod: TC

## 2020-01-31 PROCEDURE — 77067 SCR MAMMO BI INCL CAD: CPT | Mod: 26,,, | Performed by: INTERNAL MEDICINE

## 2020-01-31 PROCEDURE — 77067 MAMMO DIGITAL SCREENING BILAT WITH TOMOSYNTHESIS_CAD: ICD-10-PCS | Mod: 26,,, | Performed by: INTERNAL MEDICINE

## 2020-02-12 ENCOUNTER — PATIENT MESSAGE (OUTPATIENT)
Dept: INTERNAL MEDICINE | Facility: CLINIC | Age: 45
End: 2020-02-12

## 2020-02-12 DIAGNOSIS — M79.601 RIGHT ARM PAIN: Primary | ICD-10-CM

## 2020-02-23 ENCOUNTER — PATIENT OUTREACH (OUTPATIENT)
Dept: ADMINISTRATIVE | Facility: OTHER | Age: 45
End: 2020-02-23

## 2020-02-24 ENCOUNTER — OFFICE VISIT (OUTPATIENT)
Dept: ORTHOPEDICS | Facility: CLINIC | Age: 45
End: 2020-02-24
Attending: FAMILY MEDICINE
Payer: MEDICAID

## 2020-02-24 ENCOUNTER — TELEPHONE (OUTPATIENT)
Dept: ORTHOPEDICS | Facility: CLINIC | Age: 45
End: 2020-02-24

## 2020-02-24 ENCOUNTER — HOSPITAL ENCOUNTER (OUTPATIENT)
Dept: RADIOLOGY | Facility: OTHER | Age: 45
Discharge: HOME | End: 2020-02-24
Attending: PHYSICIAN ASSISTANT
Payer: MEDICAID

## 2020-02-24 VITALS — BODY MASS INDEX: 44.01 KG/M2 | WEIGHT: 273.81 LBS | HEIGHT: 66 IN

## 2020-02-24 DIAGNOSIS — M79.601 RIGHT ARM PAIN: ICD-10-CM

## 2020-02-24 DIAGNOSIS — M25.511 RIGHT SHOULDER PAIN, UNSPECIFIED CHRONICITY: Primary | ICD-10-CM

## 2020-02-24 DIAGNOSIS — M25.511 RIGHT SHOULDER PAIN, UNSPECIFIED CHRONICITY: ICD-10-CM

## 2020-02-24 DIAGNOSIS — M75.31 CALCIFIC TENDINITIS OF RIGHT SHOULDER: Primary | ICD-10-CM

## 2020-02-24 PROCEDURE — 20610 DRAIN/INJ JOINT/BURSA W/O US: CPT | Mod: PBBFAC | Performed by: PHYSICIAN ASSISTANT

## 2020-02-24 PROCEDURE — 73030 XR SHOULDER TRAUMA 3 VIEW RIGHT: ICD-10-PCS | Mod: 26,RT,, | Performed by: RADIOLOGY

## 2020-02-24 PROCEDURE — 73030 X-RAY EXAM OF SHOULDER: CPT | Mod: TC,FY,RT

## 2020-02-24 PROCEDURE — 99213 OFFICE O/P EST LOW 20 MIN: CPT | Mod: PBBFAC,25 | Performed by: PHYSICIAN ASSISTANT

## 2020-02-24 PROCEDURE — 99214 PR OFFICE/OUTPT VISIT, EST, LEVL IV, 30-39 MIN: ICD-10-PCS | Mod: 25,S$PBB,, | Performed by: PHYSICIAN ASSISTANT

## 2020-02-24 PROCEDURE — 73030 X-RAY EXAM OF SHOULDER: CPT | Mod: 26,RT,, | Performed by: RADIOLOGY

## 2020-02-24 PROCEDURE — 20610 PR DRAIN/INJECT LARGE JOINT/BURSA: ICD-10-PCS | Mod: S$PBB,RT,, | Performed by: PHYSICIAN ASSISTANT

## 2020-02-24 PROCEDURE — 99999 PR PBB SHADOW E&M-EST. PATIENT-LVL III: ICD-10-PCS | Mod: PBBFAC,,, | Performed by: PHYSICIAN ASSISTANT

## 2020-02-24 PROCEDURE — 99214 OFFICE O/P EST MOD 30 MIN: CPT | Mod: 25,S$PBB,, | Performed by: PHYSICIAN ASSISTANT

## 2020-02-24 PROCEDURE — 99999 PR PBB SHADOW E&M-EST. PATIENT-LVL III: CPT | Mod: PBBFAC,,, | Performed by: PHYSICIAN ASSISTANT

## 2020-02-24 PROCEDURE — 20610 DRAIN/INJ JOINT/BURSA W/O US: CPT | Mod: S$PBB,RT,, | Performed by: PHYSICIAN ASSISTANT

## 2020-02-24 RX ORDER — TRIAMCINOLONE ACETONIDE 40 MG/ML
80 INJECTION, SUSPENSION INTRA-ARTICULAR; INTRAMUSCULAR
Status: COMPLETED | OUTPATIENT
Start: 2020-02-24 | End: 2020-02-24

## 2020-02-24 RX ORDER — LIDOCAINE HYDROCHLORIDE 10 MG/ML
4 INJECTION, SOLUTION EPIDURAL; INFILTRATION; INTRACAUDAL; PERINEURAL ONCE
Status: COMPLETED | OUTPATIENT
Start: 2020-02-24 | End: 2020-02-24

## 2020-02-24 RX ADMIN — TRIAMCINOLONE ACETONIDE 80 MG: 40 INJECTION, SUSPENSION INTRA-ARTICULAR; INTRAMUSCULAR at 12:02

## 2020-02-24 RX ADMIN — LIDOCAINE HYDROCHLORIDE 40 MG: 10 INJECTION, SOLUTION EPIDURAL; INFILTRATION; INTRACAUDAL; PERINEURAL at 01:02

## 2020-02-24 NOTE — LETTER
February 24, 2020      Aftab Martinez MD  [address]  [facility] [address]           [facility] [place] [address]  [address], [address]  [facility] [address]  Phone: [phone]          Patient: Velma Lopez   MR Number: [id number]   YOB: 1975   Date of Visit: 2/24/2020       Dear Dr. Aftab Martinez:    Thank you for referring Velma Lopez to me for evaluation. Attached you will find relevant portions of my assessment and plan of care.    If you have questions, please do not hesitate to call me. I look forward to following Velma Lopez along with you.    Sincerely,    Eden Beaver PA-C    Enclosure  CC:  No Recipients    If you would like to receive this communication electronically, please contact [email or web address] or [phone] to request more information on [employer] access.    For providers and/or their staff who would like to refer a patient to [facility], please contact us through our one-stop-shop provider referral line, [facility], at [phone].    If you feel you have received this communication in error or would no longer like to receive these types of communications, please e-mail [email or web address]

## 2020-02-24 NOTE — PROGRESS NOTES
Chart reviewed.   Immunizations: Triggered Imm Registry     Orders placed: n/a  Upcoming appts to satisfy AMMY topics: n/a

## 2020-02-24 NOTE — PROGRESS NOTES
Subjective:      Patient ID: Velma Lopez is a 45 y.o. female.    Chief Complaint: right shoulder pain     HPI  Velma Lopez is a  45 y.o. female with DMII presenting today for right shoulder pain. Onset 3 weeks ago. 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 She reports pain in the right shoulder which is increased with motion. She was seen at urgent care about 2 weeks ago, she reports minimal improvement since this time. Notes difficulty with dressing secondary to pain. Denies numbness.       Review of patient's allergies indicates:  No Known Allergies      Current Outpatient Medications   Medication Sig Dispense Refill    amitriptyline (ELAVIL) 10 MG tablet Take 1 tablet (10 mg total) by mouth nightly as needed for Insomnia. 90 tablet 3    atorvastatin (LIPITOR) 10 MG tablet Take 1 tablet (10 mg total) by mouth once daily. 90 tablet 3    blood sugar diagnostic Strp 1 strip by Misc.(Non-Drug; Combo Route) route 2 (two) times daily. 200 strip 3    blood-glucose meter kit Please provide with insurance covered meter 1 each 0    cetirizine (ZYRTEC) 10 MG tablet Take 10 mg by mouth once daily.      chlorhexidine (PERIDEX) 0.12 % solution RINSE WITH 1/2 OZ FOR 30 SECONDS THEN SPIT AFTER BREAKFAST AND BEFORE BEDTIME  0    ergocalciferol (ERGOCALCIFEROL) 50,000 unit Cap TAKE ONE CAPSULE BY MOUTH EVERY 7 DAYS 12 capsule 0    fluticasone (FLONASE) 50 mcg/actuation nasal spray 1 spray (50 mcg total) by Each Nare route 2 (two) times daily. 15 g 1    HYDROcodone-acetaminophen (NORCO) 5-325 mg per tablet Take 1 tablet by mouth every 4 (four) hours as needed for Pain (or cough). 14 tablet 0    lancets Misc 1 Device by Misc.(Non-Drug; Combo Route) route 2 (two) times daily. 200 each 3    liraglutide 0.6 mg/0.1 mL, 18 mg/3 mL, subq PNIJ (VICTOZA 2-ANGELES) 0.6 mg/0.1 mL (18 mg/3 mL) PnIj Inject 1.8 mg into the skin once daily. 9 mL 11    losartan (COZAAR) 25 MG tablet Take 1  "tablet (25 mg total) by mouth once daily. 90 tablet 3    medroxyPROGESTERone (DEPO-PROVERA) 150 mg/mL Syrg Inject 1 mL (150 mg total) into the muscle every 3 (three) months. 1 mL 2    metFORMIN (GLUCOPHAGE) 500 MG tablet Take 1 tablet (500 mg total) by mouth 2 (two) times daily with meals. 180 tablet 3    omeprazole (PRILOSEC) 40 MG capsule Take 1 capsule (40 mg total) by mouth once daily. 90 capsule 3    pen needle, diabetic (BD ULTRA-FINE DEVAUHGN PEN NEEDLE) 32 gauge x 5/32" Ndle 1 Device by Misc.(Non-Drug; Combo Route) route every evening. 100 each 3    phentermine 37.5 MG capsule Take 1 capsule (37.5 mg total) by mouth every morning. 30 capsule 3    zolpidem (AMBIEN) 5 MG Tab TAKE 1 TABLET(5 MG) BY MOUTH EVERY NIGHT AS NEEDED 30 tablet 4     Current Facility-Administered Medications   Medication Dose Route Frequency Provider Last Rate Last Dose    [COMPLETED] lidocaine (PF) 10 mg/ml (1%) injection 40 mg  4 mL Other Once Eden Beaver PA-C   40 mg at 02/24/20 1315       Past Medical History:   Diagnosis Date    Chronic back pain     Diabetes type 2, uncontrolled     Mild nonproliferative diabetic retinopathy of left eye without macular edema associated with type 2 diabetes mellitus 10/21/2019    Morbid obesity with BMI of 40.0-44.9, adult     MILEY on CPAP     Plantar fasciitis of left foot     Urticaria        Past Surgical History:   Procedure Laterality Date    CYST REMOVAL      ovarian cyst removal at age 12    DILATION AND CURETTAGE OF UTERUS      ESOPHAGEAL MOTILITY STUDY USING HIGH RESOLUTION MANOMETRY N/A 9/17/2019    Procedure: MOTILITY STUDY, ESOPHAGUS, USING HIGH RESOLUTION MANOMETRY;  Surgeon: Leopoldo Swanson MD;  Location: [address]);  Service: Endoscopy;  Laterality: N/A;  Pt will be taking xanax before procedure to hopefull help with anxiety.    ESOPHAGOGASTRODUODENOSCOPY N/A 6/14/2019    Procedure: EGD (ESOPHAGOGASTRODUODENOSCOPY);  Surgeon: Ced Guevara MD;  Location: [facility]" "ENDO (4TH FLR);  Service: Endoscopy;  Laterality: N/A;  Please make sure it is scheduled after her cardiology appt.- see cardiology note dated 6/13/19 for clearance - ERW    TRIGGER FINGER RELEASE Right 10/4/2019    Procedure: RELEASE, TRIGGER FINGER - right thumb;  Surgeon: Craig Tom MD;  [facility];  Service: Orthopedics;  Laterality: Right;    UPPER GASTROINTESTINAL ENDOSCOPY      WISDOM TOOTH EXTRACTION         Review of Systems:  Constitutional: Negative for chills and fever.   Respiratory: Negative for cough and shortness of breath.    Gastrointestinal: Negative for nausea and vomiting.   Skin: Negative for rash.   Neurological: Negative for dizziness and headaches.   Psychiatric/Behavioral: Negative for depression.   MSK as in HPI       OBJECTIVE:     PHYSICAL EXAM:  Ht 5' 6" (1.676 m)   Wt 124.2 kg (273 lb 13 oz)   BMI 44.19 kg/m²     GEN:  NAD, well-developed, well-groomed.  NEURO: Awake, alert, and oriented. Normal attention and concentration.    PSYCH: Normal mood and affect. Behavior is normal.  HEENT: No cervical lymphadenopathy noted.  CARDIOVASCULAR: Radial pulses 2+ bilaterally. No LE edema noted.  PULMONARY: Breath sounds normal. No respiratory distress.  SKIN: Intact, no rashes.      MSK:   RUE:  Good active ROM of the wrist and fingers. Fair shoulder motion. She has limited FF to about 110 secondary to pain, good abduction but painful, IR to gluts, good ER. No significant ttp over the shoulder region. Positive impingement. AIN/PIN/Radial/Median/Ulnar Nerves assessed in isolation without deficit. Radial & Ulnar arteries palpated 2+. Capillary Refill <3s.    RADIOGRAPHS:  Xray right shoulder 2/24/20  Impression     1. No acute radiographic abnormalities of the right shoulder.  2. Suspected calcifications about the expected location of the infraspinatus tendon, possibly related to calcium hydroxyapatite deposition/calcific.   Comments: I have personally reviewed the imaging and I " agree with the above radiologist's report.    ASSESSMENT/PLAN:       ICD-10-CM ICD-9-CM   1. Calcific tendinitis of right shoulder M75.31 726.11   2. Right arm pain M79.601 729.5       Orders Placed This Encounter    Ambulatory referral/consult to Physical/Occupational Therapy    lidocaine (PF) 10 mg/ml (1%) injection 40 mg    triamcinolone acetonide injection 80 mg     Plan:   -plan for right shoulder injection today and PT   -RTC 6 weeks       I have explained the risks, benefits, and alternatives of the procedure in detail.  The patient voices understanding and all questions have been answered.  The patient agrees to proceed as planned. After a sterile prep of the skin in the normal the right shoulder is injected from the posterior approach using a 22 gauge needle with a combination of 4cc 1% lidocaine and 80 mg of kenalog. The patient is cautioned and immediate relief of pain is secondary to the local anesthetic and will be temporary.  After the anesthetic wears off there may be a increase in pain that may last for a few hours or a few days and they should use ice to help alleviate this flair up of pain.       The patient indicates understanding of these issues and agrees to the plan.    Eden Beaver PA-C  [facility]   [facility] [place]

## 2020-02-27 ENCOUNTER — PATIENT MESSAGE (OUTPATIENT)
Dept: SLEEP MEDICINE | Facility: CLINIC | Age: 45
End: 2020-02-27

## 2020-02-28 ENCOUNTER — PATIENT MESSAGE (OUTPATIENT)
Dept: ORTHOPEDICS | Facility: CLINIC | Age: 45
End: 2020-02-28

## 2020-03-10 ENCOUNTER — CLINICAL SUPPORT (OUTPATIENT)
Dept: REHABILITATION | Facility: OTHER | Age: 45
End: 2020-03-10
Payer: MEDICAID

## 2020-03-10 DIAGNOSIS — M25.511 ACUTE PAIN OF RIGHT SHOULDER: ICD-10-CM

## 2020-03-10 DIAGNOSIS — Z74.09 DECREASED STRENGTH, ENDURANCE, AND MOBILITY: ICD-10-CM

## 2020-03-10 DIAGNOSIS — R53.1 DECREASED STRENGTH, ENDURANCE, AND MOBILITY: ICD-10-CM

## 2020-03-10 DIAGNOSIS — R68.89 DECREASED STRENGTH, ENDURANCE, AND MOBILITY: ICD-10-CM

## 2020-03-10 DIAGNOSIS — M75.31 CALCIFIC TENDINITIS OF RIGHT SHOULDER: ICD-10-CM

## 2020-03-10 PROCEDURE — 97162 PT EVAL MOD COMPLEX 30 MIN: CPT | Mod: PN

## 2020-03-10 NOTE — PLAN OF CARE
[facility]  Physical Therapy Initial Evaluation    Date: 3/10/2020   Name: Velma Lopez  Clinic Number: [id number]    Therapy Diagnosis:   Encounter Diagnoses   Name Primary?    Calcific tendinitis of right shoulder     Acute pain of right shoulder     Decreased strength, endurance, and mobility      Physician: Eden Beaver PA-C    Physician Orders: PT Eval and Treat -- Right shoulder calcific tendinitis   Medical Diagnosis from Referral: M75.31 (ICD-10-CM) - Calcific tendinitis of right shoulder  Evaluation Date: 3/10/2020  Authorization Period Expiration: 04/01/2020  Plan of Care Expiration: 06/10/2020  Visit # / Visits authorized: 1/ 1    Time In: 2:00  Time Out: 3:00  Total Appointment Time (timed & untimed codes): 60 minutes    Precautions: Standard, Diabetes and morbid obesity    Subjective   Date of onset: 3-4 weeks ago  History of current condition - Velma reports: insidious onset of R shoulder pain 3-4 weeks ago without ANUJ or trauma. 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 Since then she has had extreme shoulder pain down to but not below the elbow that limits her ability to reach OH with dressing and grooming activities, as well as lifting and sleeping on the R side.   Denies numbness, tingling. Pt is R hand dominant.       Medical History:   Past Medical History:   Diagnosis Date    Chronic back pain     Diabetes type 2, uncontrolled     Mild nonproliferative diabetic retinopathy of left eye without macular edema associated with type 2 diabetes mellitus 10/21/2019    Morbid obesity with BMI of 40.0-44.9, adult     MILEY on CPAP     Plantar fasciitis of left foot     Urticaria        Surgical History:   Velma Lopez  has a past surgical history that includes [place] tooth extraction; Cyst Removal; Dilation and curettage of uterus; Esophagogastroduodenoscopy (N/A,  6/14/2019); Upper gastrointestinal endoscopy; Esophageal motility study using high resolution manometry (N/A, 9/17/2019); and Trigger finger release (Right, 10/4/2019).    Medications:   Velma has a current medication list which includes the following prescription(s): amitriptyline, atorvastatin, blood sugar diagnostic, blood-glucose meter, cetirizine, chlorhexidine, ergocalciferol, fluticasone propionate, hydrocodone-acetaminophen, lancets, liraglutide 0.6 mg/0.1 ml (18 mg/3 ml) subq pnij, losartan, medroxyprogesterone, metformin, omeprazole, pen needle, diabetic, phentermine, and zolpidem.    Allergies:   Review of patient's allergies indicates:  No Known Allergies     Imaging, bone scan films, shoulder, 2020: 1. No acute radiographic abnormalities of the right shoulder.  2. Suspected calcifications about the expected location of the infraspinatus tendon, possibly related to calcium hydroxyapatite deposition/calcific.    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  Prior Level of Function: independent  Current Level of Function: pain and difficulty with use of RUE with all functional     Pain:  Current 7/10, worst 10/10, best 7/10   Location: right shoulder (top and down the arm)   Description: Aching, Dull, Throbbing and Sharp  Aggravating Factors: reach/lift OH, lift and carry at waist, using RUE to drive, dressing/grooming, occasional with work (lift clients), sleeping   Easing Factors: medication, injection -- says that it did not help    Pts goals: reduce pain so that she can sleep a full night and work without pain disturbance    Objective     Posture Alignment: FHP, rounded shoulders, (+) body habitus  DTR's: intact  Dermatomes: Sensation: Light Touch: Intact    CERVICAL SPINE AROM: WNL all directions, no pain    Range of motion: Right  Left  *indicates pain   Shoulder  Flexion   120*/130* 140/-  Abduction  80*/100* 140/-  ER @ 0  "deg  55*/-  60/-  ER @ 90 deg  -/80  -/90  IR (fxn'l)  T12*/-  T8/-  IR (@ 90 deg  -/30*  -/65         Elbow   WNL  WNL -- mod herrera supination arnulfo  Wrist   WNL  WNL  Hand   WNL  WNL       UPPER EXTREMITY STRENGTH:   Right Left   Shoulder Flexion 3-/5 5/5   Shoulder Abduction 2/5 5/5   Shoulder Internal Rotation 4-/5 5/5   Shoulder External Rotation 4-/5 5/5   Elbow Flexion  5/5 5/5   Elbow Extension 5/5 5/5   Wrist Flexion 5/5 5/5   Wrist Extension 5/5 5/5    5/5 5/5       Scapular Strength: MMT limited by pain/ difficulty getting into test postion  Upper Trap good  Mid Trap poor  Low Trap poor  Rhomboids poor  Serratus Anterior poor     Flexibility:   Pectoralis Major: poor  Pectoralis Minor: poor  Latissimus Dorsi: fair  Subscapularis: NT  Upper Traps: fair+  Levator Scap: good    Joint Mobility: GHJ inf/AP glides = min hypo     Special Tests:    Clonus: -  Vertebral artery test: -  Supraspinatus (Empty Can Test): UA to assess 2* to pain/inability to tolerate test position  Kilo: pain with min IR/Add past neutral   Neers: UA to assess 2* to pain/inability to tolerate test position  Speeds: UA to assess 2* to pain/inability to tolerate test position      Limitation/Restriction for FOTO shoulder Survey    Therapist reviewed FOTO scores for Velma Lopez on 3/10/2020.   FOTO documents entered into [employer] - see Media section.    Limitation Score: 58%         TREATMENT   Treatment Time In: 2:30  Treatment Time Out: 3:00  Total Treatment time (time-based codes) separate from Evaluation: 30 minutes    Velma received therapeutic exercises to develop strength, endurance, ROM, flexibility, posture and core stabilization for 15 minutes including:  SL sleeper stretch 5 x 10"  Standing sleeper stretch -- demo'd for HEP  Seated scap squeezes 10 x 10"    Velma received the following manual therapy techniques:   5 min x Joint mobilizations were applied to the: R shoulder, including: GHJ glides AP, inf  10 " min x preparation and application of kinesiotape for biceps LH inhibition. Patient received education regarding appropriate care and removal of Kinesiotape. Patient instructed in proper removal techniques if skin irritation occurs.     Home Exercises and Patient Education Provided    Education provided:   - Patient educated regarding pathogenesis, diagnosis, protocol, prognosis, POC, and HEP. Written Home Exercises Provided with written and verbal instructions for frequency and duration of the following exercises: see list above. Pt educated on HEP and activity modifications to reduce c/o pain and improve overall function. Educated on lifting mechanics and use of slide board with client care without help for lifting assistance  - Pt was educated in posture and body mechanics.  Use of a lumbar roll was recommended and demonstrated here today.  Purchase information provided.   - Pt also educated on use of modalities prn to reduce c/o pain and dysfunction.     Written Home Exercises Provided: yes.  Exercises were reviewed and Velma was able to demonstrate them prior to the end of the session.  Velma demonstrated good  understanding of the education provided.     See EMR under Patient Instructions for exercises provided 3/10/2020.    Assessment   Velma is a 45 y.o. female referred to outpatient Physical Therapy with a medical diagnosis of right shoulder pain. Pt presents with severe limitations in ROM, flexibility, strength, postural awareness and endurance. Painful arc, pain with ER MMT, and (+) special testing would indicate s/s are associated with referring diagnosis. Impairments limit pt with all functional activities, including self care, sleeping, driving, and work-related activities.    Pt prognosis is Good.   Pt will benefit from skilled outpatient Physical Therapy to address the deficits stated above and in the chart below, provide pt/family education, and to maximize pt's level of independence.     Plan  of care discussed with patient: Yes  Pt's spiritual, cultural and educational needs considered and patient is agreeable to the plan of care and goals as stated below:     Anticipated Barriers for therapy: body habitus, occupation    Medical Necessity is demonstrated by the following  History  Co-morbidities and personal factors that may impact the plan of care Co-morbidities:   coping style/mechanism, difficulty sleeping, education level, excessive commute time/distance, financial considerations, high BMI and level of undertstanding of current condition    Personal Factors:   age  coping style  social background  lifestyle     high   Examination  Body Structures and Functions, activity limitations and participation restrictions that may impact the plan of care Body Regions:   back  upper extremities  trunk    Body Systems:    gross symmetry  ROM  strength  gross coordinated movement  balance  transitions  motor control  motor learning  joint mobility, flexibility, myofascial mobility, postural awareness and endurance    Participation Restrictions:   ADLs, self care, driving, sleeping, cooking, work activities    Activity limitations:   Learning and applying knowledge  no deficits    General Tasks and Commands  no deficits    Communication  no deficits    Mobility  lifting and carrying objects  driving (bike, car, motorcycle)    Self care  washing oneself (bathing, drying, washing hands)  caring for body parts (brushing teeth, shaving, grooming)  toileting  dressing  looking after one's health    Domestic Life  shopping  cooking  doing house work (cleaning house, washing dishes, laundry)  assisting others    Interactions/Relationships  no deficits    Life Areas  employment    Community and Social Life  community life  recreation and leisure  Synagogue and spirituality         high   Clinical Presentation evolving clinical presentation with changing clinical characteristics moderate   Decision Making/ Complexity Score:  moderate     Goals:  Short Term Goals (6 Weeks):   1. Pt to demonstrate improved ROM by 5% to allow pt to perform self care activities with increased ease.  2. Pt to demonstrate improved flexibility by a half grade to allow improved ADLs with increased ease.   3. Pt to demonstrate improvement in strength by a half grade to allow pt to perform HHCs with minimal c/o symptoms.  4. Pt to demonstrate improved functional ability with FOTO limitation <=48% disability.    Long Term Goals (12 Weeks):   1. Pt to demonstrate improved ROM to WNL, R=L, to allow pt to perform self care with increased ease.  2. Pt to demonstrate improved flexibility by a full grade to allow improved postural alignment with increased ease.  3. Pt to demonstrate improvement in strength to 5/5 to allow pt to perform HHCs with minimal c/o symptoms  4. Pt to demonstrate improved functional ability with FOTO limitation <=38% disability.  5. Pt independent with HEP and demonstrates good return technique.      Plan   Plan of care Certification: 3/10/2020 to 06/10/2020.    Outpatient Physical Therapy 2 times weekly for 12 weeks to include the following interventions: Aquatic Therapy, Cervical/Lumbar Traction, Electrical Stimulation prn, Iontophoresis (with dexamethasone prn), Manual Therapy, Moist Heat/ Ice, Neuromuscular Re-ed, Patient Education, Self Care, Therapeutic Activites, Therapeutic Exercise and IASTYM, therapeutic taping, dry needling, cupping. Progress HEP towards D/C. Recommend F/U with MD if symptoms worsen or do not resolve. Patient may be seen by a PTA for treatment to carry out their plan of care.  Face-to-face conferences will be held.    Lourdes Orellana, PT

## 2020-03-12 ENCOUNTER — CLINICAL SUPPORT (OUTPATIENT)
Dept: REHABILITATION | Facility: OTHER | Age: 45
End: 2020-03-12
Payer: MEDICAID

## 2020-03-12 DIAGNOSIS — R53.1 DECREASED STRENGTH, ENDURANCE, AND MOBILITY: ICD-10-CM

## 2020-03-12 DIAGNOSIS — M25.511 ACUTE PAIN OF RIGHT SHOULDER: ICD-10-CM

## 2020-03-12 DIAGNOSIS — R68.89 DECREASED STRENGTH, ENDURANCE, AND MOBILITY: ICD-10-CM

## 2020-03-12 DIAGNOSIS — Z74.09 DECREASED STRENGTH, ENDURANCE, AND MOBILITY: ICD-10-CM

## 2020-03-12 PROCEDURE — 97110 THERAPEUTIC EXERCISES: CPT | Mod: PN,CQ

## 2020-03-12 NOTE — PROGRESS NOTES
Physical Therapy Daily Treatment Note     Name: Velma Lopez  Clinic Number: [id number]    Therapy Diagnosis:   Encounter Diagnoses   Name Primary?    Acute pain of right shoulder     Decreased strength, endurance, and mobility      Physician: Eden Beaver PA-C    Visit Date: 3/12/2020    Physician Orders: PT Eval and Treat -- Right shoulder calcific tendinitis   Medical Diagnosis from Referral: M75.31 (ICD-10-CM) - Calcific tendinitis of right shoulder  Evaluation Date: 3/10/2020  Authorization Period Expiration: 04/01/2020  Plan of Care Expiration: 06/10/2020  Visit # / Visits authorized: 1/ 8     Time In: 1300  Time Out: 1400  Total Appointment Time (timed & untimed codes): 50 minutes     Precautions: Standard, Diabetes and morbid obesity       Subjective     Pt reports: 5/10 pn in R shld.    She was compliant with home exercise program.  Response to previous treatment: no adverse effects  Functional change: no change     Pain: 5/10  Location: right shoulder      Objective     Velma received therapeutic exercises to develop strength, endurance, ROM, flexibility, posture and core stabilization for 42 minutes including:  IR stretch w/ dowel 10 x 10 sec hold (stopped 2* L shld pn)   Sleeper stretch   IR stretchtowel 10 x 10 sec hold   scap retractions 30 x 3 sec hold   PROM R shld  IR/ER step outs 3 x 10 otb   SHld rows 3 x 10 otb   Prone SA punch w/ dowel 3 x 10    Velma received the following manual therapy techniques: Joint mobilizations were applied to the: R shld for 8 minutes, including:  Joint mobes R shld    Velma received cold pack for 10 minutes to R shld.      Home Exercises Provided and Patient Education Provided     Education provided:   - Pt edu on proper exercise technique.     Written Home Exercises Provided: Patient instructed to cont prior HEP.  Exercises were reviewed and Velma was able to demonstrate them prior to the end of the session.  Velma demonstrated good   understanding of the education provided.     See EMR under Patient Instructions for exercises provided prior visit.    Assessment     Pt nany tx well.  Pn level remained same prior to and after tx session.  Pt demonstrated increased strength and endurance during therex.  Pt would cont to benefit from skilled care to improve scap control and shld ROM.    Velma is progressing well towards her goals.   Pt prognosis is Good.     Pt will continue to benefit from skilled outpatient physical therapy to address the deficits listed in the problem list box on initial evaluation, provide pt/family education and to maximize pt's level of independence in the home and community environment.     Pt's spiritual, cultural and educational needs considered and pt agreeable to plan of care and goals.     Anticipated barriers to physical therapy: none    Goals: Goals:  Short Term Goals (6 Weeks):   1. Pt to demonstrate improved ROM by 5% to allow pt to perform self care activities with increased ease. ( progressing, not met)   2. Pt to demonstrate improved flexibility by a half grade to allow improved ADLs with increased ease. ( progressing, not met)   3. Pt to demonstrate improvement in strength by a half grade to allow pt to perform HHCs with minimal c/o symptoms.( progressing, not met)   4. Pt to demonstrate improved functional ability with FOTO limitation <=48% disability.( progressing, not met)      Long Term Goals (12 Weeks):   1. Pt to demonstrate improved ROM to WNL, R=L, to allow pt to perform self care with increased ease.( progressing, not met)   2. Pt to demonstrate improved flexibility by a full grade to allow improved postural alignment with increased ease.( progressing, not met)   3. Pt to demonstrate improvement in strength to 5/5 to allow pt to perform HHCs with minimal c/o symptoms( progressing, not met)   4. Pt to demonstrate improved functional ability with FOTO limitation <=38% disability.( progressing, not met)    5. Pt independent with HEP and demonstrates good return technique.( progressing, not met)          Plan     Cont to progress towards goals set by PT.  Work to increase scap control and ROM next visit.      Shaan Benavides, PTA

## 2020-03-18 ENCOUNTER — PATIENT MESSAGE (OUTPATIENT)
Dept: INTERNAL MEDICINE | Facility: CLINIC | Age: 45
End: 2020-03-18

## 2020-03-18 RX ORDER — METHYLPREDNISOLONE 4 MG/1
TABLET ORAL
Qty: 21 TABLET | Refills: 0 | Status: SHIPPED | OUTPATIENT
Start: 2020-03-18 | End: 2020-12-22

## 2020-03-18 NOTE — TELEPHONE ENCOUNTER
Pt states she has been having a cough for the last 7 days , first couple of days coughing up yellow mucus. Experiencing now dry cough , sore throat, weakness, sneezing and headaches started last night. Pt denies fever, chills or SOB.   Pt stated last time she had these symptoms a Medrol Dose Pack was ordered , and it helped. Have not tried any OTC medications. Please advise.  Lov: 12-  Veronica Armenta LPN

## 2020-03-20 ENCOUNTER — TELEPHONE (OUTPATIENT)
Dept: REHABILITATION | Facility: OTHER | Age: 45
End: 2020-03-20

## 2020-03-20 NOTE — TELEPHONE ENCOUNTER
Postponed Appointments  Patient: Velma Lopez  Date: 3/20/2020  MRN: [id number]        Spoke with patient due to therapy following updates regarding COVID-19 closely and taking every precaution to ensure the safety of our patients, staff, and community. In an abundance of caution and in an effort to help reduce risk and limit community spread, we have decided to temporarily postpone appointments for patients who may be at increased risk to attend in-person therapy or where therapy is not critically needed at this time. Plan of care and home exercise program were reviewed and patient has what they need to continue therapy at home. All patient questions were answered. Also stated to patient that we are exploring virtual methods of providing care and will be in touch over the next few weeks. Patient verbalized understanding to all.        Lourdes Orellana, PT    3/20/2020

## 2020-04-08 ENCOUNTER — TELEPHONE (OUTPATIENT)
Dept: PHARMACY | Facility: CLINIC | Age: 45
End: 2020-04-08

## 2020-04-13 ENCOUNTER — TELEPHONE (OUTPATIENT)
Dept: PHARMACY | Facility: CLINIC | Age: 45
End: 2020-04-13

## 2020-04-16 ENCOUNTER — PATIENT MESSAGE (OUTPATIENT)
Dept: ORTHOPEDICS | Facility: CLINIC | Age: 45
End: 2020-04-16

## 2020-04-22 ENCOUNTER — CLINICAL SUPPORT (OUTPATIENT)
Dept: REHABILITATION | Facility: OTHER | Age: 45
End: 2020-04-22
Payer: MEDICAID

## 2020-04-22 DIAGNOSIS — M25.511 ACUTE PAIN OF RIGHT SHOULDER: ICD-10-CM

## 2020-04-22 DIAGNOSIS — R68.89 DECREASED STRENGTH, ENDURANCE, AND MOBILITY: ICD-10-CM

## 2020-04-22 DIAGNOSIS — R53.1 DECREASED STRENGTH, ENDURANCE, AND MOBILITY: ICD-10-CM

## 2020-04-22 DIAGNOSIS — Z74.09 DECREASED STRENGTH, ENDURANCE, AND MOBILITY: ICD-10-CM

## 2020-04-22 PROCEDURE — 97110 THERAPEUTIC EXERCISES: CPT | Mod: PN | Performed by: INTERNAL MEDICINE

## 2020-04-23 NOTE — PLAN OF CARE
Please sign and return plan of care. Thank you for this referral to the [facility].    Physical Therapy Daily Treatment Note     Name: Velma Lopez  Clinic Number: [id number]    Therapy Diagnosis:   Encounter Diagnoses   Name Primary?    Acute pain of right shoulder     Decreased strength, endurance, and mobility      Physician: Eden Beaver PA-C    Visit Date: 4/22/2020    Physician Orders: PT Eval and Treat -- Right shoulder calcific tendinitis   Medical Diagnosis from Referral: M75.31 (ICD-10-CM) - Calcific tendinitis of right shoulder  Evaluation Date: 3/10/2020  Authorization Period Expiration: 04/01/2020  Plan of Care Expiration: 06/10/2020  Visit # / Visits authorized: 3/ 8     Time In: 12 p  Time Out: 12: 55 P  Total Appointment Time (timed & untimed codes): 55 minutes     Precautions: Standard, Diabetes and morbid obesity       Subjective     Pt reports: ant shoulder pain. Worse than last time she was here. Only had 2 visits so far. Doing ok with hep. No L shoulder c/o. Not really working due to pandemic but has an interview today. Unable to lift clients right now.      She was compliant with home exercise program.  Response to previous treatment: no adverse effects  Functional change: no change     Pain: 8/10  Location: right shoulder      Objective        Posture Alignment: FHP, rounded shoulders, (+) body habitus        4/23/2020     Range of motion:      Right               Left      *indicates pain            Shoulder  Flexion                         100*/130*        140/-  Abduction                    85*/120*          140/-  ER @ 0 deg                55*/-                 60/-  ER @ 90 deg              50 p                 -/90  IR (fxn'l)                       NA         T8  IR (@ 90 deg               -/30* p                -/65                                                              UPPER EXTREMITY STRENGTH:  4/23/2020     Left   Shoulder Flexion 3-/5  5/5   Shoulder Abduction 3-/5 5/5   Shoulder Internal Rotation 4-/5 5/5   Shoulder External Rotation 4-/5 5/5                                3/10/2020 Strength:    MMT limited by pain/ difficulty getting into test postion  Upper Trap good  Mid Trap poor  Low Trap poor  Rhomboids poor  Serratus Anterior poor     4/22/2020 Joint Mobility: GHJ inf/AP glides = min hypo      3/10/2020 Special Tests:    Clonus: -  Vertebral artery test: -  Supraspinatus (Empty Can Test): UA to assess 2* to pain/inability to tolerate test position  Kilo: pain with min IR/Add past neutral   Neers: UA to assess 2* to pain/inability to tolerate test position  Speeds: UA to assess 2* to pain/inability to tolerate test position        Limitation/Restriction for FOTO shoulder Survey     Therapist reviewed FOTO scores for Velma Lopez on 3/10/2020.   FOTO documents entered into [employer] - see Media section.     Limitation Score: 58%            Velma received therapeutic exercises to develop strength, endurance, ROM, flexibility, posture and core stabilization for 45 minutes including:    Pendulum 2 min   Table prom flex/ scaption 20 sec x 4  Sleeper stretch  20 sec x 4  IR stretchtowel 10 x 10 sec hold nv  Sl er 15x  scap retractions 10 sec x 3 sec hold   PROM R shld 10 min  Scap abd prom 10 x    I Velma received the following manual therapy techniques: Joint mobilizations were applied to the: R shld for 10 minutes, including:  Joint mobes R shld  PROM R shoulder gently all directions- Flex, Abd, Er, MR    Velma received cold pack for  0 minutes to R shld.      Home Exercises Provided and Patient Education Provided     Education provided:   - Pt edu on proper exercise technique- performed sleeper stretch incorrectly..     Written Home Exercises Provided: SL er ,  Table slides flex/ scaption   Exercises were reviewed and Velma was able to demonstrate them prior to the end of the session.  Velma demonstrated good   understanding of the education provided.     See EMR under Patient Instructions for exercises provided prior visit.    Assessment     Pt with improved MR rom from   approx 30 to 40 after prom/ JM . Weakness throughout R shoulder.     Velma is progressing well towards her goals.   Pt prognosis is Good.     Pt will continue to benefit from skilled outpatient physical therapy to address the deficits listed in the problem list box on initial evaluation, provide pt/family education and to maximize pt's level of independence in the home and community environment.     Pt's spiritual, cultural and educational needs considered and pt agreeable to plan of care and goals.     Anticipated barriers to physical therapy: none    Goals: Goals:  Short Term Goals (12 Weeks):   1. Pt to demonstrate improved ROM by 5% to allow pt to perform self care activities with increased ease. ( progressing, not met)   2. Pt to demonstrate improved flexibility by a half grade to allow improved ADLs with increased ease. ( progressing, not met)   3. Pt to demonstrate improvement in strength by a half grade to allow pt to perform HHCs with minimal c/o symptoms.( progressing, not met)   4. Pt to demonstrate improved functional ability with FOTO limitation <=48% disability.( progressing, not met)      Long Term Goals (15 Weeks):   1. Pt to demonstrate improved ROM to WNL, R=L, to allow pt to perform self care with increased ease.( progressing, not met)   2. Pt to demonstrate improved flexibility by a full grade to allow improved postural alignment with increased ease.( progressing, not met)   3. Pt to demonstrate improvement in strength to 5/5 to allow pt to perform HHCs with minimal c/o symptoms( progressing, not met)   4. Pt to demonstrate improved functional ability with FOTO limitation <=38% disability.( progressing, not met)   5. Pt independent with HEP and demonstrates good return technique.( progressing, not met)          Plan     Cont to  progress towards goals set by PT.  Work to increase scap control and ROM next visit.  2x per week for 4 weeks.   Kayla Vang, PT    I certify the need for these services furnished under this plan of treatment and while under my care    ____________________________________  Physician/Referring Practitioner    _______________  Date of Signature

## 2020-04-27 ENCOUNTER — CLINICAL SUPPORT (OUTPATIENT)
Dept: REHABILITATION | Facility: OTHER | Age: 45
End: 2020-04-27
Payer: MEDICAID

## 2020-04-27 DIAGNOSIS — M25.511 ACUTE PAIN OF RIGHT SHOULDER: ICD-10-CM

## 2020-04-27 DIAGNOSIS — R53.1 DECREASED STRENGTH, ENDURANCE, AND MOBILITY: ICD-10-CM

## 2020-04-27 DIAGNOSIS — Z74.09 DECREASED STRENGTH, ENDURANCE, AND MOBILITY: ICD-10-CM

## 2020-04-27 DIAGNOSIS — R68.89 DECREASED STRENGTH, ENDURANCE, AND MOBILITY: ICD-10-CM

## 2020-04-27 PROCEDURE — 97110 THERAPEUTIC EXERCISES: CPT | Mod: PN | Performed by: PHYSICAL THERAPIST

## 2020-04-27 NOTE — PROGRESS NOTES
Physical Therapy Daily Treatment Note      Name: Velma Lopez  Clinic Number: [id number]     Therapy Diagnosis:        Encounter Diagnoses   Name Primary?    Acute pain of right shoulder      Decreased strength, endurance, and mobility        Physician: Eden Beaver PA-C     Visit Date: 4/27/2020     Physician Orders: PT Eval and Treat -- Right shoulder calcific tendinitis   Medical Diagnosis from Referral: M75.31 (ICD-10-CM) - Calcific tendinitis of right shoulder  Evaluation Date: 3/10/2020  Authorization Period Expiration: 04/01/2020  Plan of Care Expiration: 06/10/2020  Visit # / Visits authorized: 4/ 8     Time In: 0900am  Time Out: 0945am  Total Appointment Time (timed & untimed codes): 45 minutes     Precautions: Standard, Diabetes and morbid obesity        Subjective      Pt reports: Having a 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 Reports R shoulder pain is unchanged and worse with inactivity. Initially hurt her shoulder lifting but didn't have pain until a week following. Pt reports that she has been doing a lot of painting and housechores since the stay at home mandate. She notes increased night pain.      She was compliant with home exercise program.  Response to previous treatment: no adverse effects  Functional change: no change      Pain: 5/10 current 10/10 worst  Location: right shoulder       Objective         Posture Alignment: FHP, rounded shoulders, (+) body habitus        4/23/2020     Range of motion:      Right               Left      *indicates pain            Shoulder  Flexion                         100*/130*        140/-  Abduction                    85*/120*          140/-  ER @ 0 deg                55*/-                 60/-  ER @ 90 deg              50 p                 -/90  IR (fxn'l)                       NA         T8  IR (@ 90 deg               -/30* p                -/65                           UPPER EXTREMITY STRENGTH:  4/23/2020       Left   Shoulder Flexion 3-/5 5/5   Shoulder Abduction 3-/5 5/5   Shoulder Internal Rotation 4-/5 5/5   Shoulder External Rotation 4-/5 5/5                                              3/10/2020 Strength:    MMT limited by pain/ difficulty getting into test postion  Upper Trap good  Mid Trap poor  Low Trap poor  Rhomboids poor  Serratus Anterior poor     4/22/2020 Joint Mobility: GHJ inf/AP glides = min hypo      3/10/2020 Special Tests:    Clonus: -  Vertebral artery test: -  Supraspinatus (Empty Can Test): UA to assess 2* to pain/inability to tolerate test position  Kilo: pain with min IR/Add past neutral   Neers: UA to assess 2* to pain/inability to tolerate test position  Speeds: UA to assess 2* to pain/inability to tolerate test position        Limitation/Restriction for FOTO shoulder Survey     Therapist reviewed FOTO scores for Velma Lopez on 3/10/2020.   FOTO documents entered into [employer] - see Media section.     Limitation Score: 58%               Velma received therapeutic exercises to develop strength, endurance, ROM, flexibility, posture and core stabilization for 35 minutes including:     Pendulum 2 min   Table prom flex/ scaption 20 sec x 4  Sleeper stretch  20 sec x 4  IR stretchtowel 10 x 10 sec hold   Sl er 10 x 3  scap retractions 10 sec x 3 sec hold   Prone rows x 10 (painful, deferred 2nd set)  S/L scapula isometrics PT assist 10 x ea       I Velma received the following manual therapy techniques: Joint mobilizations were applied to the: R shld for 10 minutes, including:  Joint mobs R shoulder Inferior, posterior glides and distraction  PROM R shoulder gently all directions- Flex, Abd, Er, MR, scap     Velma received cold pack for  0 minutes to R shld.        Home Exercises Provided and Patient Education Provided      Education provided:   - Pt educ in frequent short bouts of therapeutic exercise to help mange pain and improve functional ROM     Written Home Exercises  Provided: SL er ,  Table slides flex/ scaption   Exercises were reviewed and Velma was able to demonstrate them prior to the end of the session.  Velma demonstrated good  understanding of the education provided.      See EMR under Patient Instructions for exercises provided prior visit.     Assessment      Pt with muscular imbalance with weakness in scapula depression and retraction contributing to poor scapulohumeral rhythm.  Fair tolerance to progression of scapular strengthening and modified to submaximal isometrics manual resistance.      Velma is progressing well towards her goals.   Pt prognosis is Good.      Pt will continue to benefit from skilled outpatient physical therapy to address the deficits listed in the problem list box on initial evaluation, provide pt/family education and to maximize pt's level of independence in the home and community environment.      Pt's spiritual, cultural and educational needs considered and pt agreeable to plan of care and goals.     Anticipated barriers to physical therapy: none     Goals: Goals:  Short Term Goals (12 Weeks):   1. Pt to demonstrate improved ROM by 5% to allow pt to perform self care activities with increased ease. ( progressing, not met)   2. Pt to demonstrate improved flexibility by a half grade to allow improved ADLs with increased ease. ( progressing, not met)   3. Pt to demonstrate improvement in strength by a half grade to allow pt to perform HHCs with minimal c/o symptoms.( progressing, not met)   4. Pt to demonstrate improved functional ability with FOTO limitation <=48% disability.( progressing, not met)      Long Term Goals (15 Weeks):   1. Pt to demonstrate improved ROM to WNL, R=L, to allow pt to perform self care with increased ease.( progressing, not met)   2. Pt to demonstrate improved flexibility by a full grade to allow improved postural alignment with increased ease.( progressing, not met)   3. Pt to demonstrate improvement in  strength to 5/5 to allow pt to perform HHCs with minimal c/o symptoms( progressing, not met)   4. Pt to demonstrate improved functional ability with FOTO limitation <=38% disability.( progressing, not met)   5. Pt independent with HEP and demonstrates good return technique.( progressing, not met)            Plan      Cont to progress towards goals set by PT.  Work to increase scap control and ROM next visit.  2x per week for 4 weeks.       Shante Brumfield, PT

## 2020-04-28 ENCOUNTER — PATIENT MESSAGE (OUTPATIENT)
Dept: INTERNAL MEDICINE | Facility: CLINIC | Age: 45
End: 2020-04-28

## 2020-04-29 ENCOUNTER — CLINICAL SUPPORT (OUTPATIENT)
Dept: REHABILITATION | Facility: OTHER | Age: 45
End: 2020-04-29
Payer: MEDICAID

## 2020-04-29 DIAGNOSIS — Z74.09 DECREASED STRENGTH, ENDURANCE, AND MOBILITY: ICD-10-CM

## 2020-04-29 DIAGNOSIS — M25.511 ACUTE PAIN OF RIGHT SHOULDER: ICD-10-CM

## 2020-04-29 DIAGNOSIS — R53.1 DECREASED STRENGTH, ENDURANCE, AND MOBILITY: ICD-10-CM

## 2020-04-29 DIAGNOSIS — R68.89 DECREASED STRENGTH, ENDURANCE, AND MOBILITY: ICD-10-CM

## 2020-04-29 PROCEDURE — 97110 THERAPEUTIC EXERCISES: CPT | Mod: PN | Performed by: INTERNAL MEDICINE

## 2020-04-29 NOTE — PROGRESS NOTES
Physical Therapy Daily Treatment Note      Name: Velma Lopez  Clinic Number: [id number]     Therapy Diagnosis:        Encounter Diagnoses   Name Primary?    Acute pain of right shoulder      Decreased strength, endurance, and mobility        Physician: Eden Beaver PA-C     Visit Date: 4/29/2020       Physician Orders: PT Eval and Treat -- Right shoulder calcific tendinitis   Medical Diagnosis from Referral: M75.31 (ICD-10-CM) - Calcific tendinitis of right shoulder  Evaluation Date: 3/10/2020  Authorization Period Expiration: 05/11/2020  Plan of Care Expiration: 06/10/2020  Visit # / Visits authorized: 5/ 8     Time In: 1 pm   Time Out: 1:45 pm   Total Appointment Time (timed & untimed codes): 45 minutes     Precautions: Standard, Diabetes and morbid obesity        Subjective      Pt reports: shoulder not improving.  Cont with ice occasionally. MD visit tomorrow.     She was compliant with home exercise program.  Response to previous treatment: no adverse effects  Functional change: no change      Pain: 5/10 current 10/10 worst  Location: right shoulder       Objective         Posture Alignment: FHP, rounded shoulders, (+) body habitus        4/29/2020 in bold  Range of motion:      Right               Left      *indicates pain            Shoulder  Flexion                         100*/130*        140/-  Abduction                   90/140*          140/-  ER @ 0 deg                55*/                  60/-  ER @ 90 deg              55 p                 -/90  IR (fxn'l)                       NA         T8  IR (@ 90 deg                /50* p                -/65                           UPPER EXTREMITY STRENGTH:  4/23/2020      Left   Shoulder Flexion 3-/5 5/5   Shoulder Abduction 3-/5 5/5   Shoulder Internal Rotation 4-/5 5/5   Shoulder External Rotation 4-/5 5/5                                             4/29/2020   Strength:    MMT limited by pain/ difficulty getting into test postion  Upper Trap R  2+  Mid Trap 2+  Low Trap 3-    abd  Flex  Er  Mr  scaption     4/22/2020 Joint Mobility: GHJ inf/AP glides = min hypo      3/10/2020 Special Tests:    Clonus: -  Vertebral artery test: -  Supraspinatus (Empty Can Test): UA to assess 2* to pain/inability to tolerate test position  Kilo: pain with min IR/Add past neutral   Neers: UA to assess 2* to pain/inability to tolerate test position  Speeds: UA to assess 2* to pain/inability to tolerate test position        Limitation/Restriction for FOTO shoulder Survey     Therapist reviewed FOTO scores for Velma Lopez on 3/10/2020.   FOTO documents entered into [employer] - see Media section.     Limitation Score: 58%               Velma received therapeutic exercises to develop strength, endurance, ROM, flexibility, posture and core stabilization for 35 minutes including:     Pendulum 2 min   +otb er 20x  +Prone rows 20x  Table prom flex/ scaption 20 sec x 4  Sleeper stretch  20 sec x 4  IR stretchtowel 20 sec x 3  sec hold   Sl er 10 x 3  scap retractions 10 sec x 3 sec hold   Pulley flex/ abd 2 min each     S/L scapula isometrics PT assist 10 x ea np        I Velma received the following manual therapy techniques: Joint mobilizations were applied to the: R shld for 10 minutes, including:  Joint mobs R shoulder Inferior, posterior glides and distraction  PROM R shoulder gently all directions- Flex, Abd, Er, MR, scap     Velma received cold pack for  0 minutes to R shld.        Home Exercises Provided and Patient Education Provided      Education provided:   - Pt educ in frequent short bouts of therapeutic exercise to help mange pain and improve functional ROM     Written Home Exercises Provided: prone rows   Exercises were reviewed and Velma was able to demonstrate them prior to the end of the session.  Velma demonstrated good  understanding of the education provided.      See EMR under Patient Instructions for exercises provided prior  visit.     Assessment      Progressing with arom/ prom but cont with scap   And shoulder muscle weakness .   Velma is progressing well towards her goals.   Pt prognosis is Good.      Pt will continue to benefit from skilled outpatient physical therapy to address the deficits listed in the problem list box on initial evaluation, provide pt/family education and to maximize pt's level of independence in the home and community environment.      Pt's spiritual, cultural and educational needs considered and pt agreeable to plan of care and goals.     Anticipated barriers to physical therapy: none     Goals: Goals:  Short Term Goals (12 Weeks):   1. Pt to demonstrate improved ROM by 5% to allow pt to perform self care activities with increased ease. ( progressing, not met)   2. Pt to demonstrate improved flexibility by a half grade to allow improved ADLs with increased ease. ( progressing, not met)   3. Pt to demonstrate improvement in strength by a half grade to allow pt to perform HHCs with minimal c/o symptoms.( progressing, not met)   4. Pt to demonstrate improved functional ability with FOTO limitation <=48% disability.( progressing, not met)      Long Term Goals (15 Weeks):   1. Pt to demonstrate improved ROM to WNL, R=L, to allow pt to perform self care with increased ease.( progressing, not met)   2. Pt to demonstrate improved flexibility by a full grade to allow improved postural alignment with increased ease.( progressing, not met)   3. Pt to demonstrate improvement in strength to 5/5 to allow pt to perform HHCs with minimal c/o symptoms( progressing, not met)   4. Pt to demonstrate improved functional ability with FOTO limitation <=38% disability.( progressing, not met)   5. Pt independent with HEP and demonstrates good return technique.( progressing, not met)            Plan      Cont to progress towards goals set by PT 2x per week for 6 weeks if MD / PA concurs.  Work to increase scap control and ROM next  visit.   .     Kayla Vang, PT

## 2020-04-29 NOTE — TELEPHONE ENCOUNTER
We are not currently doing antibody testing patients.  This will not start for several weeks or months.  Testing indications have not been released yet.

## 2020-04-30 ENCOUNTER — PATIENT OUTREACH (OUTPATIENT)
Dept: ADMINISTRATIVE | Facility: OTHER | Age: 45
End: 2020-04-30

## 2020-04-30 ENCOUNTER — OFFICE VISIT (OUTPATIENT)
Dept: ORTHOPEDICS | Facility: CLINIC | Age: 45
End: 2020-04-30
Payer: MEDICAID

## 2020-04-30 DIAGNOSIS — M25.511 RIGHT SHOULDER PAIN, UNSPECIFIED CHRONICITY: Primary | ICD-10-CM

## 2020-04-30 PROCEDURE — 99214 PR OFFICE/OUTPT VISIT, EST, LEVL IV, 30-39 MIN: ICD-10-PCS | Mod: 95,,, | Performed by: ORTHOPAEDIC SURGERY

## 2020-04-30 PROCEDURE — 99214 OFFICE O/P EST MOD 30 MIN: CPT | Mod: 95,,, | Performed by: ORTHOPAEDIC SURGERY

## 2020-04-30 NOTE — PROGRESS NOTES
Subjective:      Patient ID: Velma Lopez is a 45 y.o. female.    Chief Complaint: right shoulder pain     HPI at last visit  Velma Lopez is a  45 y.o. female with DMII presenting today for right shoulder pain. Onset 3 weeks ago. 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 She reports pain in the right shoulder which is increased with motion. She was seen at urgent care about 2 weeks ago, she reports minimal improvement since this time. Notes difficulty with dressing secondary to pain. Denies numbness.       Today's visit was done by telemedicine  Time 15 min  Location: [place]  Note: 44 yo female with diagnosis of right calcific tendonitis s/p PT and injection.Pt is having a lot of pain. Pain at resst when not doing anything. Pain at night. No relief from injection  Pt interested in another injection. Was doing ice and taking ibuprofen. Does not help. Pain level 8 or a 10/10. During day at rest- needs a pillow to plae under it- it will start throbbing.     Review of patient's allergies indicates:  No Known Allergies      Current Outpatient Medications   Medication Sig Dispense Refill    amitriptyline (ELAVIL) 10 MG tablet Take 1 tablet (10 mg total) by mouth nightly as needed for Insomnia. 90 tablet 3    atorvastatin (LIPITOR) 10 MG tablet Take 1 tablet (10 mg total) by mouth once daily. 90 tablet 3    blood sugar diagnostic Strp 1 strip by Misc.(Non-Drug; Combo Route) route 2 (two) times daily. 200 strip 3    blood-glucose meter kit Please provide with insurance covered meter 1 each 0    cetirizine (ZYRTEC) 10 MG tablet Take 10 mg by mouth once daily.      chlorhexidine (PERIDEX) 0.12 % solution RINSE WITH 1/2 OZ FOR 30 SECONDS THEN SPIT AFTER BREAKFAST AND BEFORE BEDTIME  0    ergocalciferol (ERGOCALCIFEROL) 50,000 unit Cap TAKE ONE CAPSULE BY MOUTH EVERY 7 DAYS 12 capsule 0    fluticasone (FLONASE) 50 mcg/actuation nasal spray 1 spray (50 mcg total) by Each  "Nare route 2 (two) times daily. 15 g 1    HYDROcodone-acetaminophen (NORCO) 5-325 mg per tablet Take 1 tablet by mouth every 4 (four) hours as needed for Pain (or cough). 14 tablet 0    lancets Misc 1 Device by Misc.(Non-Drug; Combo Route) route 2 (two) times daily. 200 each 3    liraglutide 0.6 mg/0.1 mL, 18 mg/3 mL, subq PNIJ (VICTOZA 2-ANGELES) 0.6 mg/0.1 mL (18 mg/3 mL) PnIj Inject 1.8 mg into the skin once daily. 9 mL 11    losartan (COZAAR) 25 MG tablet Take 1 tablet (25 mg total) by mouth once daily. 90 tablet 3    medroxyPROGESTERone (DEPO-PROVERA) 150 mg/mL Syrg Inject 1 mL (150 mg total) into the muscle every 3 (three) months. 1 mL 2    metFORMIN (GLUCOPHAGE) 500 MG tablet Take 1 tablet (500 mg total) by mouth 2 (two) times daily with meals. 180 tablet 3    methylPREDNISolone (MEDROL DOSEPACK) 4 mg tablet Take as directed 21 tablet 0    omeprazole (PRILOSEC) 40 MG capsule Take 1 capsule (40 mg total) by mouth once daily. 90 capsule 3    pen needle, diabetic (BD ULTRA-FINE DEVAUGHN PEN NEEDLE) 32 gauge x 5/32" Ndle 1 Device by Misc.(Non-Drug; Combo Route) route every evening. 100 each 3    phentermine 37.5 MG capsule Take 1 capsule (37.5 mg total) by mouth every morning. 30 capsule 3    zolpidem (AMBIEN) 5 MG Tab TAKE 1 TABLET(5 MG) BY MOUTH EVERY NIGHT AS NEEDED 30 tablet 4     No current facility-administered medications for this visit.        Past Medical History:   Diagnosis Date    Chronic back pain     Diabetes type 2, uncontrolled     Mild nonproliferative diabetic retinopathy of left eye without macular edema associated with type 2 diabetes mellitus 10/21/2019    Morbid obesity with BMI of 40.0-44.9, adult     MILEY on CPAP     Plantar fasciitis of left foot     Urticaria        Past Surgical History:   Procedure Laterality Date    CYST REMOVAL      ovarian cyst removal at age 12    DILATION AND CURETTAGE OF UTERUS      ESOPHAGEAL MOTILITY STUDY USING HIGH RESOLUTION MANOMETRY N/A " 9/17/2019    Procedure: MOTILITY STUDY, ESOPHAGUS, USING HIGH RESOLUTION MANOMETRY;  Surgeon: Leopoldo Swanson MD;  Location: [facility] ENDO (4TH FLR);  Service: Endoscopy;  Laterality: N/A;  Pt will be taking xanax before procedure to hopefull help with anxiety.    ESOPHAGOGASTRODUODENOSCOPY N/A 6/14/2019    Procedure: EGD (ESOPHAGOGASTRODUODENOSCOPY);  Surgeon: Ced Guevara MD;  Location: [facility] ENDO (4TH FLR);  Service: Endoscopy;  Laterality: N/A;  Please make sure it is scheduled after her cardiology appt.- see cardiology note dated 6/13/19 for clearance - ERW    TRIGGER FINGER RELEASE Right 10/4/2019    Procedure: RELEASE, TRIGGER FINGER - right thumb;  Surgeon: Craig Tom MD;  Location: [facility];  Service: Orthopedics;  Laterality: Right;    UPPER GASTROINTESTINAL ENDOSCOPY      WISDOM TOOTH EXTRACTION         Review of Systems:  Constitutional: Negative for chills and fever.   Respiratory: Negative for cough and shortness of breath.    Gastrointestinal: Negative for nausea and vomiting.   Skin: Negative for rash.   Neurological: Negative for dizziness and headaches.   Psychiatric/Behavioral: Negative for depression.   MSK as in HPI       OBJECTIVE:     PHYSICAL EXAM:  There were no vitals taken for this visit.    GEN:  NAD, well-developed, well-groomed.  NEURO: Awake, alert, and oriented. Normal attention and concentration.    PSYCH: Normal mood and affect. Behavior is normal.  HEENT: No cervical lymphadenopathy noted.  CARDIOVASCULAR: Radial pulses 2+ bilaterally. No LE edema noted.  PULMONARY: Breath sounds normal. No respiratory distress.  SKIN: Intact, no rashes.      MSK:   RUE:  90 FF ER to 35 IR to hip seated, IR to glut standing. VEry painful secondary to pain, good abduction but painful, IR to gluts, good ER. No significant ttp over the shoulder region. Positive impingement. AIN/PIN/Radial/Median/Ulnar Nerves assessed in isolation without deficit. Radial & Ulnar arteries palpated 2+. Capillary Refill  <3s.  FROM finger wrist.     RADIOGRAPHS:  Xray right shoulder 2/24/20  Impression     1. No acute radiographic abnormalities of the right shoulder.  2. Suspected calcifications about the expected location of the infraspinatus tendon, possibly related to calcium hydroxyapatite deposition/calcific.   Comments: I have personally reviewed the imaging and I agree with the above radiologist's report.    ASSESSMENT/PLAN:     No diagnosis found.       Plan:   -plan for right shoulder injection on Thursday. Pt is in severe pain. If second injection does not work plan on MRI and possible arthroscopy.   Pt was seen by telemedicine during the COVID 19 crisis.

## 2020-05-01 ENCOUNTER — TELEPHONE (OUTPATIENT)
Dept: ORTHOPEDICS | Facility: CLINIC | Age: 45
End: 2020-05-01

## 2020-05-01 NOTE — TELEPHONE ENCOUNTER
Spoke c pt. Scheduled f/u for R shoulder CSI c Dr. Lockhart 05/07/20. Confirmed appt location & time. Pt expressed understanding & was thankful.

## 2020-05-07 ENCOUNTER — OFFICE VISIT (OUTPATIENT)
Dept: ORTHOPEDICS | Facility: CLINIC | Age: 45
End: 2020-05-07
Payer: MEDICAID

## 2020-05-07 ENCOUNTER — PATIENT OUTREACH (OUTPATIENT)
Dept: ADMINISTRATIVE | Facility: OTHER | Age: 45
End: 2020-05-07

## 2020-05-07 VITALS
WEIGHT: 273 LBS | HEART RATE: 92 BPM | SYSTOLIC BLOOD PRESSURE: 125 MMHG | BODY MASS INDEX: 43.87 KG/M2 | HEIGHT: 66 IN | DIASTOLIC BLOOD PRESSURE: 87 MMHG

## 2020-05-07 DIAGNOSIS — M65.20 CALCIFIC TENDONITIS: Primary | ICD-10-CM

## 2020-05-07 PROCEDURE — 20610 DRAIN/INJ JOINT/BURSA W/O US: CPT | Mod: PBBFAC | Performed by: ORTHOPAEDIC SURGERY

## 2020-05-07 PROCEDURE — 99214 OFFICE O/P EST MOD 30 MIN: CPT | Mod: 25,S$PBB,, | Performed by: ORTHOPAEDIC SURGERY

## 2020-05-07 PROCEDURE — 99214 PR OFFICE/OUTPT VISIT, EST, LEVL IV, 30-39 MIN: ICD-10-PCS | Mod: 25,S$PBB,, | Performed by: ORTHOPAEDIC SURGERY

## 2020-05-07 PROCEDURE — 99214 OFFICE O/P EST MOD 30 MIN: CPT | Mod: PBBFAC,25 | Performed by: ORTHOPAEDIC SURGERY

## 2020-05-07 PROCEDURE — 99999 PR PBB SHADOW E&M-EST. PATIENT-LVL IV: ICD-10-PCS | Mod: PBBFAC,,, | Performed by: ORTHOPAEDIC SURGERY

## 2020-05-07 PROCEDURE — 20610 LARGE JOINT ASPIRATION/INJECTION: R SUBACROMIAL BURSA: ICD-10-PCS | Mod: S$PBB,RT,, | Performed by: ORTHOPAEDIC SURGERY

## 2020-05-07 PROCEDURE — 99999 PR PBB SHADOW E&M-EST. PATIENT-LVL IV: CPT | Mod: PBBFAC,,, | Performed by: ORTHOPAEDIC SURGERY

## 2020-05-07 RX ORDER — TRIAMCINOLONE ACETONIDE 40 MG/ML
80 INJECTION, SUSPENSION INTRA-ARTICULAR; INTRAMUSCULAR
Status: DISCONTINUED | OUTPATIENT
Start: 2020-05-07 | End: 2020-05-07 | Stop reason: HOSPADM

## 2020-05-07 RX ADMIN — TRIAMCINOLONE ACETONIDE 80 MG: 40 INJECTION, SUSPENSION INTRA-ARTICULAR; INTRAMUSCULAR at 10:05

## 2020-05-07 NOTE — PROGRESS NOTES
Subjective:      Patient ID: Velma Lopez is a 45 y.o. female.    Chief Complaint: right shoulder pain     HPI at last visit  Velma Lopez is a  45 y.o. female with DMII presenting today for right shoulder pain. Onset 3 weeks ago. 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 She reports pain in the right shoulder which is increased with motion. She was seen at urgent care about 2 weeks ago, she reports minimal improvement since this time. Notes difficulty with dressing secondary to pain. Denies numbness.   Today's visit was done by telemedicine  Time 15 min  Location: [place]  Note: 44 yo female with diagnosis of right calcific tendonitis s/p PT and injection.Pt is having a lot of pain. Pain at resst when not doing anything. Pain at night. No relief from injection  Pt interested in another injection. Was doing ice and taking ibuprofen. Does not help. Pain level 8 or a 10/10. During day at rest- needs a pillow to plae under it- it will start throbbing.     Interval history 5/7/20: patient reports that after her last injection, had minimal improvement. Only had 4 sessions of PT. Denies numbness or tingling.   Review of patient's allergies indicates:  No Known Allergies      Current Outpatient Medications   Medication Sig Dispense Refill    amitriptyline (ELAVIL) 10 MG tablet Take 1 tablet (10 mg total) by mouth nightly as needed for Insomnia. 90 tablet 3    atorvastatin (LIPITOR) 10 MG tablet Take 1 tablet (10 mg total) by mouth once daily. 90 tablet 3    blood sugar diagnostic Strp 1 strip by Misc.(Non-Drug; Combo Route) route 2 (two) times daily. 200 strip 3    blood-glucose meter kit Please provide with insurance covered meter 1 each 0    cetirizine (ZYRTEC) 10 MG tablet Take 10 mg by mouth once daily.      chlorhexidine (PERIDEX) 0.12 % solution RINSE WITH 1/2 OZ FOR 30 SECONDS THEN SPIT AFTER BREAKFAST AND BEFORE BEDTIME  0    ergocalciferol (ERGOCALCIFEROL)  "50,000 unit Cap TAKE ONE CAPSULE BY MOUTH EVERY 7 DAYS 12 capsule 0    fluticasone (FLONASE) 50 mcg/actuation nasal spray 1 spray (50 mcg total) by Each Nare route 2 (two) times daily. 15 g 1    HYDROcodone-acetaminophen (NORCO) 5-325 mg per tablet Take 1 tablet by mouth every 4 (four) hours as needed for Pain (or cough). 14 tablet 0    lancets Misc 1 Device by Misc.(Non-Drug; Combo Route) route 2 (two) times daily. 200 each 3    liraglutide 0.6 mg/0.1 mL, 18 mg/3 mL, subq PNIJ (VICTOZA 2-ANGELES) 0.6 mg/0.1 mL (18 mg/3 mL) PnIj Inject 1.8 mg into the skin once daily. 9 mL 11    losartan (COZAAR) 25 MG tablet Take 1 tablet (25 mg total) by mouth once daily. 90 tablet 3    medroxyPROGESTERone (DEPO-PROVERA) 150 mg/mL Syrg Inject 1 mL (150 mg total) into the muscle every 3 (three) months. 1 mL 2    metFORMIN (GLUCOPHAGE) 500 MG tablet Take 1 tablet (500 mg total) by mouth 2 (two) times daily with meals. 180 tablet 3    methylPREDNISolone (MEDROL DOSEPACK) 4 mg tablet Take as directed 21 tablet 0    omeprazole (PRILOSEC) 40 MG capsule Take 1 capsule (40 mg total) by mouth once daily. 90 capsule 3    pen needle, diabetic (BD ULTRA-FINE DEVAUGHN PEN NEEDLE) 32 gauge x 5/32" Ndle 1 Device by Misc.(Non-Drug; Combo Route) route every evening. 100 each 3    phentermine 37.5 MG capsule Take 1 capsule (37.5 mg total) by mouth every morning. 30 capsule 3    zolpidem (AMBIEN) 5 MG Tab TAKE 1 TABLET(5 MG) BY MOUTH EVERY NIGHT AS NEEDED 30 tablet 4     No current facility-administered medications for this visit.        Past Medical History:   Diagnosis Date    Chronic back pain     Diabetes type 2, uncontrolled     Mild nonproliferative diabetic retinopathy of left eye without macular edema associated with type 2 diabetes mellitus 10/21/2019    Morbid obesity with BMI of 40.0-44.9, adult     MILEY on CPAP     Plantar fasciitis of left foot     Urticaria        Past Surgical History:   Procedure Laterality Date    CYST " "REMOVAL      ovarian cyst removal at age 12    DILATION AND CURETTAGE OF UTERUS      ESOPHAGEAL MOTILITY STUDY USING HIGH RESOLUTION MANOMETRY N/A 9/17/2019    Procedure: MOTILITY STUDY, ESOPHAGUS, USING HIGH RESOLUTION MANOMETRY;  Surgeon: Leopoldo Swanson MD;  Location: [facility] (4TH FLR);  Service: Endoscopy;  Laterality: N/A;  Pt will be taking xanax before procedure to hopefull help with anxiety.    ESOPHAGOGASTRODUODENOSCOPY N/A 6/14/2019    Procedure: EGD (ESOPHAGOGASTRODUODENOSCOPY);  Surgeon: Ced Guevara MD;  Location: [facility] ENDO (4TH FLR);  Service: Endoscopy;  Laterality: N/A;  Please make sure it is scheduled after her cardiology appt.- see cardiology note dated 6/13/19 for clearance - ERW    TRIGGER FINGER RELEASE Right 10/4/2019    Procedure: RELEASE, TRIGGER FINGER - right thumb;  Surgeon: Craig Tom MD;  Location: [facility];  Service: Orthopedics;  Laterality: Right;    UPPER GASTROINTESTINAL ENDOSCOPY      WISDOM TOOTH EXTRACTION         Review of Systems:  Constitutional: Negative for chills and fever.   Respiratory: Negative for cough and shortness of breath.    Gastrointestinal: Negative for nausea and vomiting.   Skin: Negative for rash.   Neurological: Negative for dizziness and headaches.   Psychiatric/Behavioral: Negative for depression.   MSK as in HPI       OBJECTIVE:     PHYSICAL EXAM:  /87 (BP Location: Left arm, Patient Position: Sitting, BP Method: Large (Automatic))   Pulse 92   Ht 5' 6" (1.676 m)   Wt 123.8 kg (273 lb)   BMI 44.06 kg/m²     GEN:  NAD, well-developed, well-groomed.  NEURO: Awake, alert, and oriented. Normal attention and concentration.    PSYCH: Normal mood and affect. Behavior is normal.  HEENT: No cervical lymphadenopathy noted.  CARDIOVASCULAR: Radial pulses 2+ bilaterally. No LE edema noted.  PULMONARY: Breath sounds normal. No respiratory distress.  SKIN: Intact, no rashes.      MSK:   RUE:  Active  ER to 35 IR to hip seated, IR to superior " buttocks standing. Very painful secondary to pain, abduction to 90 but painful. No significant ttp over the shoulder region. Positive impingement. Pos speed test AIN/PIN/Radial/Median/Ulnar Nerves assessed in isolation without deficit. Radial & Ulnar arteries palpated 2+. Capillary Refill <3s.  FROM finger wrist.     RADIOGRAPHS:  Xray right shoulder 2/24/20  Impression     1. No acute radiographic abnormalities of the right shoulder.  2. Suspected calcifications about the expected location of the infraspinatus tendon, possibly related to calcium hydroxyapatite deposition/calcific.   Comments: I have personally reviewed the imaging and I agree with the above radiologist's report.    ASSESSMENT/PLAN:     No diagnosis found.       Plan:   -Injection in right shoulder in clinic  -FU after injection  -If symptoms persists, will consider MRI shoulder

## 2020-05-07 NOTE — PROGRESS NOTES
I have personally taken the history and examined this patient. I agree with the resident's note as stated above. Plan for injection today due to 10 out of 10 pain.  Pt will undergo MRI if this injection nicholas not help

## 2020-05-07 NOTE — PROGRESS NOTES
Care Everywhere: updated  Immunization: n/a  Health Maintenance: n/a  Media Review: reviewed for possible outside eye exam report  Legacy Review: n/a  Order placed: n/a  Upcoming appts:n/a  Optometry referral 11.5.2019

## 2020-05-07 NOTE — PROCEDURES
Large Joint Aspiration/Injection: R subacromial bursa  Date/Time: 5/7/2020 10:45 AM  Performed by: Marcela Garza MD  Authorized by: Marcela Garza MD     Consent Done?:  Yes (Verbal)  Indications:  Pain  Timeout: prior to procedure the correct patient, procedure, and site was verified    Prep: patient was prepped and draped in usual sterile fashion      Local anesthesia used?: Yes    Anesthesia:  Local infiltration    Details:  Needle Size:  22 G  Approach:  Posterior  Location:  Shoulder  Site:  R subacromial bursa  Medications:  80 mg triamcinolone acetonide 40 mg/mL

## 2020-05-21 ENCOUNTER — PATIENT OUTREACH (OUTPATIENT)
Dept: ADMINISTRATIVE | Facility: HOSPITAL | Age: 45
End: 2020-05-21

## 2020-05-21 DIAGNOSIS — E11.9 TYPE 2 DIABETES MELLITUS WITHOUT COMPLICATION, WITH LONG-TERM CURRENT USE OF INSULIN: Primary | ICD-10-CM

## 2020-05-21 DIAGNOSIS — Z79.4 TYPE 2 DIABETES MELLITUS WITHOUT COMPLICATION, WITH LONG-TERM CURRENT USE OF INSULIN: Primary | ICD-10-CM

## 2020-05-21 DIAGNOSIS — E11.3292 MILD NONPROLIFERATIVE DIABETIC RETINOPATHY OF LEFT EYE WITHOUT MACULAR EDEMA ASSOCIATED WITH TYPE 2 DIABETES MELLITUS: ICD-10-CM

## 2020-05-21 NOTE — PROGRESS NOTES
Call placed to pt to schedule Health Maintenance appts. Pt stated she is not having any problems at this time so she don't need any appts. I explained to pt she should schedule appts, this will help prevent any problems getting worse.  Pt stated she will discuss these appts with PCP when she sees him. I offered to schedule her follow up appt that will be due in 2 weeks. Pt declined.  Veronica Armenta LPN Care Coordinator

## 2020-07-06 ENCOUNTER — PATIENT MESSAGE (OUTPATIENT)
Dept: ORTHOPEDICS | Facility: CLINIC | Age: 45
End: 2020-07-06

## 2020-07-06 DIAGNOSIS — M75.31 CALCIFIC TENDINITIS OF RIGHT SHOULDER: Primary | ICD-10-CM

## 2020-07-06 DIAGNOSIS — M25.511 CHRONIC RIGHT SHOULDER PAIN: ICD-10-CM

## 2020-07-06 DIAGNOSIS — G89.29 CHRONIC RIGHT SHOULDER PAIN: ICD-10-CM

## 2020-07-06 NOTE — TELEPHONE ENCOUNTER
Spoke c pt. Scheduled MRI & f/u appt c Dr. Lockhart. Confirmed appt location & time. Pt expressed understanding & was thankful.

## 2020-07-10 ENCOUNTER — HOSPITAL ENCOUNTER (OUTPATIENT)
Dept: RADIOLOGY | Facility: HOSPITAL | Age: 45
Discharge: HOME | End: 2020-07-10
Attending: ORTHOPAEDIC SURGERY
Payer: MEDICAID

## 2020-07-10 DIAGNOSIS — M75.31 CALCIFIC TENDINITIS OF RIGHT SHOULDER: ICD-10-CM

## 2020-07-10 DIAGNOSIS — G89.29 CHRONIC RIGHT SHOULDER PAIN: ICD-10-CM

## 2020-07-10 DIAGNOSIS — M25.511 CHRONIC RIGHT SHOULDER PAIN: ICD-10-CM

## 2020-07-10 PROCEDURE — 73221 MRI SHOULDER WITHOUT CONTRAST RIGHT: ICD-10-PCS | Mod: 26,RT,, | Performed by: RADIOLOGY

## 2020-07-10 PROCEDURE — 73221 MRI JOINT UPR EXTREM W/O DYE: CPT | Mod: 26,RT,, | Performed by: RADIOLOGY

## 2020-07-10 PROCEDURE — 73221 MRI JOINT UPR EXTREM W/O DYE: CPT | Mod: TC,RT

## 2020-07-13 ENCOUNTER — PATIENT OUTREACH (OUTPATIENT)
Dept: ADMINISTRATIVE | Facility: OTHER | Age: 45
End: 2020-07-13

## 2020-07-14 ENCOUNTER — OFFICE VISIT (OUTPATIENT)
Dept: ORTHOPEDICS | Facility: CLINIC | Age: 45
End: 2020-07-14
Payer: MEDICAID

## 2020-07-14 VITALS
WEIGHT: 273 LBS | HEIGHT: 66 IN | HEART RATE: 86 BPM | BODY MASS INDEX: 43.87 KG/M2 | DIASTOLIC BLOOD PRESSURE: 87 MMHG | SYSTOLIC BLOOD PRESSURE: 118 MMHG

## 2020-07-14 DIAGNOSIS — M12.811 ROTATOR CUFF TEAR ARTHROPATHY OF RIGHT SHOULDER: Primary | ICD-10-CM

## 2020-07-14 DIAGNOSIS — M75.31 CALCIFIC TENDINITIS OF RIGHT SHOULDER: Primary | ICD-10-CM

## 2020-07-14 DIAGNOSIS — M65.312 TRIGGER FINGER OF LEFT THUMB: ICD-10-CM

## 2020-07-14 DIAGNOSIS — Z01.818 PRE-OP TESTING: Primary | ICD-10-CM

## 2020-07-14 DIAGNOSIS — M75.101 ROTATOR CUFF TEAR ARTHROPATHY OF RIGHT SHOULDER: Primary | ICD-10-CM

## 2020-07-14 PROCEDURE — 99214 PR OFFICE/OUTPT VISIT, EST, LEVL IV, 30-39 MIN: ICD-10-PCS | Mod: S$PBB,,, | Performed by: ORTHOPAEDIC SURGERY

## 2020-07-14 PROCEDURE — 99999 PR PBB SHADOW E&M-EST. PATIENT-LVL IV: ICD-10-PCS | Mod: PBBFAC,,, | Performed by: ORTHOPAEDIC SURGERY

## 2020-07-14 PROCEDURE — 99214 OFFICE O/P EST MOD 30 MIN: CPT | Mod: PBBFAC | Performed by: ORTHOPAEDIC SURGERY

## 2020-07-14 PROCEDURE — 99999 PR PBB SHADOW E&M-EST. PATIENT-LVL IV: CPT | Mod: PBBFAC,,, | Performed by: ORTHOPAEDIC SURGERY

## 2020-07-14 PROCEDURE — 99214 OFFICE O/P EST MOD 30 MIN: CPT | Mod: S$PBB,,, | Performed by: ORTHOPAEDIC SURGERY
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
## 2020-07-20 ENCOUNTER — ANESTHESIA EVENT (OUTPATIENT)
Dept: SURGERY | Facility: HOSPITAL | Age: 45
End: 2020-07-20
Payer: MEDICAID

## 2020-07-20 NOTE — ANESTHESIA PREPROCEDURE EVALUATION
07/27/2020  Velma Lopez is a 45 y.o., female.  Pre-operative evaluation for Procedure(s) (LRB):  RELEASE, TRIGGER FINGER - right thumb (Right)    Velma Lopez is a 45 y.o. female       Active problems:  Patient Active Problem List    Diagnosis Date Noted    Right rotator cuff tear 07/27/2020    Acute pain of right shoulder 03/10/2020    Decreased strength, endurance, and mobility 03/10/2020    Mild nonproliferative diabetic retinopathy of left eye without macular edema associated with type 2 diabetes mellitus 10/21/2019    Trigger thumb, right thumb 10/04/2019    Dysphagia 06/14/2019    JONES (dyspnea on exertion) 05/06/2019    Chest pain 05/06/2019    Smoking 05/06/2019    Noncompliance with CPAP treatment 05/06/2019    Preoperative cardiovascular examination 05/06/2019    Insomnia 01/31/2019    Plantar fasciitis of left foot     MILEY (obstructive sleep apnea)     Chronic low back pain 09/27/2013    Severe obesity (BMI >= 40) 09/27/2013         Prev airway:       Review of patient's allergies indicates:  No Known Allergies     Current Facility-Administered Medications on File Prior to Visit   Medication Dose Route Frequency Provider Last Rate Last Dose    acetaminophen tablet 1,000 mg  1,000 mg Oral Once Pre-Op Ced Patino MD        ceFAZolin injection 2 g  2 g Intravenous On Call Procedure Brenna Livingston MD        celecoxib capsule 400 mg  400 mg Oral Once Ced Patino MD        fentaNYL injection 100 mcg  100 mcg Intravenous Q5 Min PRN Ced Patino MD        lidocaine (PF) 10 mg/ml (1%) injection 10 mg  1 mL Intradermal Once Brenna Livingston MD        midazolam (VERSED) 1 mg/mL injection 0.5 mg  0.5 mg Intravenous PRN Ced Patino MD         Current Outpatient Medications on File Prior to Visit   Medication Sig  "Dispense Refill    amitriptyline (ELAVIL) 10 MG tablet Take 1 tablet (10 mg total) by mouth nightly as needed for Insomnia. 90 tablet 3    atorvastatin (LIPITOR) 10 MG tablet Take 1 tablet (10 mg total) by mouth once daily. 90 tablet 3    blood sugar diagnostic Strp 1 strip by Misc.(Non-Drug; Combo Route) route 2 (two) times daily. 200 strip 3    blood-glucose meter kit Please provide with insurance covered meter 1 each 0    cetirizine (ZYRTEC) 10 MG tablet Take 10 mg by mouth once daily.      chlorhexidine (PERIDEX) 0.12 % solution RINSE WITH 1/2 OZ FOR 30 SECONDS THEN SPIT AFTER BREAKFAST AND BEFORE BEDTIME  0    ergocalciferol (ERGOCALCIFEROL) 50,000 unit Cap TAKE ONE CAPSULE BY MOUTH EVERY 7 DAYS 12 capsule 0    fluticasone (FLONASE) 50 mcg/actuation nasal spray 1 spray (50 mcg total) by Each Nare route 2 (two) times daily. 15 g 1    HYDROcodone-acetaminophen (NORCO) 5-325 mg per tablet Take 1 tablet by mouth every 4 (four) hours as needed for Pain (or cough). 14 tablet 0    lancets Misc 1 Device by Misc.(Non-Drug; Combo Route) route 2 (two) times daily. 200 each 3    liraglutide 0.6 mg/0.1 mL, 18 mg/3 mL, subq PNIJ (VICTOZA 2-ANGELES) 0.6 mg/0.1 mL (18 mg/3 mL) PnIj pen Inject 1.8 mg into the skin once daily. 9 mL 11    losartan (COZAAR) 25 MG tablet Take 1 tablet (25 mg total) by mouth once daily. 90 tablet 3    medroxyPROGESTERone (DEPO-PROVERA) 150 mg/mL Syrg Inject 1 mL (150 mg total) into the muscle every 3 (three) months. 1 mL 2    metFORMIN (GLUCOPHAGE) 500 MG tablet Take 1 tablet (500 mg total) by mouth 2 (two) times daily with meals. 180 tablet 3    methylPREDNISolone (MEDROL DOSEPACK) 4 mg tablet Take as directed 21 tablet 0    omeprazole (PRILOSEC) 40 MG capsule Take 1 capsule (40 mg total) by mouth once daily. 90 capsule 3    pen needle, diabetic (BD ULTRA-FINE DEVAUGHN PEN NEEDLE) 32 gauge x 5/32" Ndle 1 Device by Misc.(Non-Drug; Combo Route) route every evening. 100 each 3    phentermine " 37.5 MG capsule Take 1 capsule (37.5 mg total) by mouth every morning. 30 capsule 3    zolpidem (AMBIEN) 5 MG Tab TAKE 1 TABLET(5 MG) BY MOUTH EVERY NIGHT AS NEEDED 30 tablet 4       Past Surgical History:   Procedure Laterality Date    CYST REMOVAL      ovarian cyst removal at age 12    DILATION AND CURETTAGE OF UTERUS      ESOPHAGEAL MOTILITY STUDY USING HIGH RESOLUTION MANOMETRY N/A 9/17/2019    Procedure: MOTILITY STUDY, ESOPHAGUS, USING HIGH RESOLUTION MANOMETRY;  Surgeon: Leopoldo Swanson MD;  [facility]  Service: Endoscopy;  Laterality: N/A;  Pt will be taking xanax before procedure to hopefull help with anxiety.    ESOPHAGOGASTRODUODENOSCOPY N/A 6/14/2019    Procedure: EGD (ESOPHAGOGASTRODUODENOSCOPY);  Surgeon: Ced Guevara MD;  Location: [facility] ([address] [place] [address]);  Service: Endoscopy;  Laterality: N/A;  Please make sure it is scheduled after her cardiology appt.- see cardiology note dated 6/13/19 for clearance - ERW    TRIGGER FINGER RELEASE Right 10/4/2019    Procedure: RELEASE, TRIGGER FINGER - right thumb;  Surgeon: Craig Tom MD;  Location: [facility];  Service: Orthopedics;  Laterality: Right;    UPPER GASTROINTESTINAL ENDOSCOPY      WISDOM TOOTH EXTRACTION         Social History     Socioeconomic History    Marital status: Single     Spouse name: Not on file    Number of children: Not on file    Years of education: Not on file    Highest education level: Not on file   Occupational History    Not on file   Social Needs    Financial resource strain: Not hard at all    Food insecurity     Worry: Never true     Inability: Never true    Transportation needs     Medical: No     Non-medical: No   Tobacco Use    Smoking status: Former Smoker     Packs/day: 0.50     Types: Cigarettes    Smokeless tobacco: Never Used   Substance and Sexual Activity    Alcohol use: Yes     Frequency: 2-4 times a month     Drinks per session: 1 or 2     Binge frequency: Never     Comment: social     Drug use: No    Sexual activity: Yes     Partners: Male     Birth control/protection: Injection   Lifestyle    Physical activity     Days per week: 0 days     Minutes per session: 0 min    Stress: Not at all   Relationships    Social connections     Talks on phone: Three times a week     Gets together: Once a week     Attends [employer] service: Not on file     Active member of club or organization: No     Attends meetings of clubs or organizations: Never     Relationship status: Never    Other Topics Concern    Not on file   Social History Narrative    Single.  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          Vital Signs Range (Last 24H):  Wt Readings from Last 3 Encounters:   07/14/20 123.8 kg (273 lb)   05/07/20 123.8 kg (273 lb)   02/24/20 124.2 kg (273 lb 13 oz)     Temp Readings from Last 3 Encounters:   10/04/19 36.7 °C (98.1 °F) (Oral)   09/17/19 36.6 °C (97.9 °F) (Temporal)   08/27/19 36.8 °C (98.3 °F)     BP Readings from Last 3 Encounters:   07/14/20 118/87   05/07/20 125/87   12/23/19 114/72     Pulse Readings from Last 3 Encounters:   07/14/20 86   05/07/20 92   12/23/19 97         CBC:   Lab Results   Component Value Date    WBC 10.77 09/10/2019    HGB 13.4 09/10/2019    HCT 40.7 09/10/2019    MCV 92 09/10/2019     (H) 09/10/2019       CMP: CMP  Sodium   Date Value Ref Range Status   09/10/2019 137 136 - 145 mmol/L Final     Potassium   Date Value Ref Range Status   09/10/2019 4.4 3.5 - 5.1 mmol/L Final     Chloride   Date Value Ref Range Status   09/10/2019 104 95 - 110 mmol/L Final     CO2   Date Value Ref Range Status   09/10/2019 25 23 - 29 mmol/L Final     Glucose   Date Value Ref Range Status   09/10/2019 283 (H) 70 - 110 mg/dL Final     BUN, Bld   Date Value Ref Range Status   09/10/2019 9 6 - 20 mg/dL Final     Creatinine   Date Value Ref Range Status   09/10/2019 0.9 0.5 - 1.4 mg/dL Final     Calcium   Date Value Ref Range Status   09/10/2019 9.6 8.7 - 10.5  mg/dL Final     Total Protein   Date Value Ref Range Status   09/10/2019 6.9 6.0 - 8.4 g/dL Final     Albumin   Date Value Ref Range Status   09/10/2019 3.5 3.5 - 5.2 g/dL Final     Total Bilirubin   Date Value Ref Range Status   09/10/2019 0.5 0.1 - 1.0 mg/dL Final     Comment:     For infants and newborns, interpretation of results should be based  on gestational age, weight and in agreement with clinical  observations.  Premature Infant recommended reference ranges:  Up to 24 hours.............<8.0 mg/dL  Up to 48 hours............<12.0 mg/dL  3-5 days..................<15.0 mg/dL  6-29 days.................<15.0 mg/dL       Alkaline Phosphatase   Date Value Ref Range Status   09/10/2019 87 55 - 135 U/L Final     AST   Date Value Ref Range Status   09/10/2019 10 10 - 40 U/L Final     ALT   Date Value Ref Range Status   09/10/2019 14 10 - 44 U/L Final     Anion Gap   Date Value Ref Range Status   09/10/2019 8 8 - 16 mmol/L Final     eGFR if    Date Value Ref Range Status   09/10/2019 >60 >60 mL/min/1.73 m^2 Final     eGFR if non    Date Value Ref Range Status   09/10/2019 >60 >60 mL/min/1.73 m^2 Final     Comment:     Calculation used to obtain the estimated glomerular filtration  rate (eGFR) is the CKD-EPI equation.          INR  No results found for: INR, PROTIME        Diagnostic Studies  PET scan 2/10:The relative PET images are normal showing no clinically significant regional resting or stress induced perfusion defects.    Whole heart absolute myocardial perfusion (cc/min/g) averaged 1.03  at rest (which is elevated), 1.98 cc/min/g at stress (which is low normal), and 1.98 cc/min/g CFR (which is mildly reduced in part due to elevated resting flow).    Gated perfusion images showed an ejection fraction of 71.0 % at rest and 69 % during stress. Normal is greater than 51%.    Wall motion was normal at rest and during stress.    LV cavity size is normal at rest and stress.     The EKG portion of this study is negative for ischemia.    There were no arrhythmias during stress.    The patient reported no chest pain during the stress test.    There are no prior studies for comparison.      EKG:Vent. Rate : 095 BPM     Atrial Rate : 095 BPM     P-R Int : 134 ms          QRS Dur : 074 ms      QT Int : 348 ms       P-R-T Axes : 069 053 052 degrees     QTc Int : 437 ms    Normal sinus rhythm  Normal ECG  When compared with ECG of 09-JAN-2018 15:07,  No significant change was found  Confirmed by Melissa Will MD ([id number]) on 2/4/2019 10:36:25 AM      2D Echo:            Anesthesia Evaluation         Review of Systems  Anesthesia Hx:  No problems with previous Anesthesia History of prior surgery of interest to airway management or planning: Denies Family Hx of Anesthesia complications.   Denies Personal Hx of Anesthesia complications.   Social:  Smoker    Hematology/Oncology:  Hematology Normal        Pulmonary:   Sleep Apnea, CPAP    Neurological:  Neurology Normal    Endocrine:   Diabetes, well controlled, type 2        Physical Exam  General:  Well nourished, Obesity    Airway/Jaw/Neck:  Airway Findings: Mouth Opening: Normal Tongue: Normal  General Airway Assessment: Adult  Mallampati: I  Jaw/Neck Findings:  Neck ROM: Normal ROM      Dental:  Dental Findings: In tact   Chest/Lungs:  Chest/Lungs Findings: Clear to auscultation     Heart/Vascular:  Heart Findings: Rate: Normal  Rhythm: Regular Rhythm  Sounds: Normal        Mental Status:  Mental Status Findings:  Cooperative         Anesthesia Plan  Type of Anesthesia, risks & benefits discussed:  Anesthesia Type:  MAC  Patient's Preference:   Intra-op Monitoring Plan:   Intra-op Monitoring Plan Comments:   Post Op Pain Control Plan:   Post Op Pain Control Plan Comments:   Induction:   IV  Beta Blocker:  Patient is not currently on a Beta-Blocker (No further documentation required).       Informed Consent: Patient understands risks and agrees  with Anesthesia plan.  Questions answered. Anesthesia consent signed with patient.  ASA Score: 3     Day of Surgery Review of History & Physical:    H&P update referred to the surgeon.         Ready For Surgery From Anesthesia Perspective.                                                                                                                  07/20/2020  Velma Lopez is a 45 y.o., female.  Patient history reviewed, suitable for [place].  Anesthesia Evaluation          Review of Systems         Anesthesia Plan  Type of Anesthesia, risks & benefits discussed:  Anesthesia Type:  general, MAC, regional  Patient's Preference:   Intra-op Monitoring Plan: standard ASA monitors  Intra-op Monitoring Plan Comments:   Post Op Pain Control Plan:   Post Op Pain Control Plan Comments:   Induction:   IV  Beta Blocker:  Patient is not currently on a Beta-Blocker (No further documentation required).       Informed Consent: Patient understands risks and agrees with Anesthesia plan.  Questions answered. Anesthesia consent signed with patient.  ASA Score: 3     Day of Surgery Review of History & Physical:    H&P update referred to the surgeon.         Ready For Surgery From Anesthesia Perspective.

## 2020-07-24 ENCOUNTER — TELEPHONE (OUTPATIENT)
Dept: ORTHOPEDICS | Facility: CLINIC | Age: 45
End: 2020-07-24

## 2020-07-24 ENCOUNTER — CLINICAL SUPPORT (OUTPATIENT)
Dept: URGENT CARE | Facility: CLINIC | Age: 45
End: 2020-07-24
Payer: MEDICAID

## 2020-07-24 DIAGNOSIS — Z98.890 POST-OPERATIVE STATE: Primary | ICD-10-CM

## 2020-07-24 DIAGNOSIS — Z01.818 PRE-OP TESTING: ICD-10-CM

## 2020-07-24 PROCEDURE — U0003 INFECTIOUS AGENT DETECTION BY NUCLEIC ACID (DNA OR RNA); SEVERE ACUTE RESPIRATORY SYNDROME CORONAVIRUS 2 (SARS-COV-2) (CORONAVIRUS DISEASE [COVID-19]), AMPLIFIED PROBE TECHNIQUE, MAKING USE OF HIGH THROUGHPUT TECHNOLOGIES AS DESCRIBED BY CMS-2020-01-R: HCPCS

## 2020-07-24 RX ORDER — OXYCODONE AND ACETAMINOPHEN 5; 325 MG/1; MG/1
1 TABLET ORAL
Qty: 42 TABLET | Refills: 0 | Status: SHIPPED | OUTPATIENT
Start: 2020-07-24 | End: 2020-08-10 | Stop reason: SDUPTHER

## 2020-07-24 NOTE — TELEPHONE ENCOUNTER
Left patient a voicemail to be at the surgery center 7/27/20 for 10:00 a.m. Also reminded patient of post op appointment on 8/10/20.Please give us a call back to let us know you received this message at [phone].

## 2020-07-24 NOTE — TELEPHONE ENCOUNTER
Informed patient to be at the surgery center 07/27/20 for   10:00 a.m. Also reminded patient of post op appointment on 8/10/20.Patient verbalized understanding

## 2020-07-25 LAB — SARS-COV-2 RNA RESP QL NAA+PROBE: NOT DETECTED

## 2020-07-27 ENCOUNTER — HOSPITAL ENCOUNTER (OUTPATIENT)
Facility: HOSPITAL | Age: 45
Discharge: HOME | End: 2020-07-27
Attending: ORTHOPAEDIC SURGERY | Admitting: ORTHOPAEDIC SURGERY
Payer: MEDICAID

## 2020-07-27 ENCOUNTER — ANESTHESIA (OUTPATIENT)
Dept: SURGERY | Facility: HOSPITAL | Age: 45
End: 2020-07-27
Payer: MEDICAID

## 2020-07-27 VITALS
RESPIRATION RATE: 18 BRPM | SYSTOLIC BLOOD PRESSURE: 138 MMHG | DIASTOLIC BLOOD PRESSURE: 72 MMHG | HEART RATE: 70 BPM | OXYGEN SATURATION: 96 % | BODY MASS INDEX: 43.39 KG/M2 | HEIGHT: 66 IN | WEIGHT: 270 LBS | TEMPERATURE: 98 F

## 2020-07-27 DIAGNOSIS — M75.101 RIGHT ROTATOR CUFF TEAR: ICD-10-CM

## 2020-07-27 DIAGNOSIS — M25.511 ACUTE PAIN OF RIGHT SHOULDER: Primary | ICD-10-CM

## 2020-07-27 LAB
B-HCG UR QL: NEGATIVE
CTP QC/QA: YES
POCT GLUCOSE: 138 MG/DL (ref 70–110)
POCT GLUCOSE: 149 MG/DL (ref 70–110)

## 2020-07-27 PROCEDURE — 27100025 HC TUBING, SET FLUID WARMER: Performed by: ANESTHESIOLOGY

## 2020-07-27 PROCEDURE — 63600175 PHARM REV CODE 636 W HCPCS: Performed by: ORTHOPAEDIC SURGERY

## 2020-07-27 PROCEDURE — 71000033 HC RECOVERY, INTIAL HOUR: Performed by: ORTHOPAEDIC SURGERY

## 2020-07-27 PROCEDURE — 25000003 PHARM REV CODE 250: Performed by: NURSE ANESTHETIST, CERTIFIED REGISTERED

## 2020-07-27 PROCEDURE — 63600175 PHARM REV CODE 636 W HCPCS: Performed by: ANESTHESIOLOGY

## 2020-07-27 PROCEDURE — 76942 ECHO GUIDE FOR BIOPSY: CPT | Mod: 26,,, | Performed by: ANESTHESIOLOGY

## 2020-07-27 PROCEDURE — 01630 ANES OPN/ARTHR PX SHO JT NOS: CPT | Performed by: ORTHOPAEDIC SURGERY

## 2020-07-27 PROCEDURE — 29826 PR SHLDR ARTHROSCOP,PART ACROMIOPLAS: ICD-10-PCS | Mod: RT,,, | Performed by: ORTHOPAEDIC SURGERY

## 2020-07-27 PROCEDURE — 37000009 HC ANESTHESIA EA ADD 15 MINS: Performed by: ORTHOPAEDIC SURGERY

## 2020-07-27 PROCEDURE — 94770 HC EXHALED C02 TEST: CPT

## 2020-07-27 PROCEDURE — 64416 NJX AA&/STRD BRCH PL NFS IMG: CPT | Performed by: ANESTHESIOLOGY

## 2020-07-27 PROCEDURE — 25000003 PHARM REV CODE 250: Performed by: ANESTHESIOLOGY

## 2020-07-27 PROCEDURE — 82962 GLUCOSE BLOOD TEST: CPT | Performed by: ORTHOPAEDIC SURGERY

## 2020-07-27 PROCEDURE — 29823 SHO ARTHRS SRG XTNSV DBRDMT: CPT | Mod: 51,RT,, | Performed by: ORTHOPAEDIC SURGERY

## 2020-07-27 PROCEDURE — D9220A PRA ANESTHESIA: ICD-10-PCS | Mod: CRNA,,, | Performed by: NURSE ANESTHETIST, CERTIFIED REGISTERED

## 2020-07-27 PROCEDURE — 29827 PR SHLDR ARTHROSCOP,SURG,W/ROTAT CUFF REPR: ICD-10-PCS | Mod: RT,,, | Performed by: ORTHOPAEDIC SURGERY

## 2020-07-27 PROCEDURE — 29826 SHO ARTHRS SRG DECOMPRESSION: CPT | Mod: RT,,, | Performed by: ORTHOPAEDIC SURGERY

## 2020-07-27 PROCEDURE — 64416 NJX AA&/STRD BRCH PL NFS IMG: CPT | Mod: 59,RT,, | Performed by: ANESTHESIOLOGY

## 2020-07-27 PROCEDURE — 20550 PR INJECT TENDON SHEATH/LIGAMENT: ICD-10-PCS | Mod: 51,FA,, | Performed by: ORTHOPAEDIC SURGERY

## 2020-07-27 PROCEDURE — 27201423 OPTIME MED/SURG SUP & DEVICES STERILE SUPPLY: Performed by: ORTHOPAEDIC SURGERY

## 2020-07-27 PROCEDURE — 20550 NJX 1 TENDON SHEATH/LIGAMENT: CPT | Mod: 51,FA,, | Performed by: ORTHOPAEDIC SURGERY

## 2020-07-27 PROCEDURE — 76942 ECHO GUIDE FOR BIOPSY: CPT | Performed by: ANESTHESIOLOGY

## 2020-07-27 PROCEDURE — 64416 PR NERVE BLOCK INJ, ANES/STEROID, BRACHIAL PLEXUS, CONT INFUSION, INCL IMAG GUIDANCE: ICD-10-PCS | Mod: 59,RT,, | Performed by: ANESTHESIOLOGY

## 2020-07-27 PROCEDURE — 36000711: Performed by: ORTHOPAEDIC SURGERY

## 2020-07-27 PROCEDURE — 76942 PR U/S GUIDANCE FOR NEEDLE GUIDANCE: ICD-10-PCS | Mod: 26,,, | Performed by: ANESTHESIOLOGY

## 2020-07-27 PROCEDURE — 99900035 HC TECH TIME PER 15 MIN (STAT)

## 2020-07-27 PROCEDURE — 71000015 HC POSTOP RECOV 1ST HR: Performed by: ORTHOPAEDIC SURGERY

## 2020-07-27 PROCEDURE — 29823 PR SHLDR ARTHROSCOP,EXTEN DEBRIDE: ICD-10-PCS | Mod: 51,RT,, | Performed by: ORTHOPAEDIC SURGERY

## 2020-07-27 PROCEDURE — S0028 INJECTION, FAMOTIDINE, 20 MG: HCPCS | Performed by: NURSE ANESTHETIST, CERTIFIED REGISTERED

## 2020-07-27 PROCEDURE — E0781 EXTERNAL AMBULATORY INFUS PU: HCPCS | Performed by: ANESTHESIOLOGY

## 2020-07-27 PROCEDURE — 63600175 PHARM REV CODE 636 W HCPCS: Performed by: NURSE ANESTHETIST, CERTIFIED REGISTERED

## 2020-07-27 PROCEDURE — 36000710: Performed by: ORTHOPAEDIC SURGERY

## 2020-07-27 PROCEDURE — 37000008 HC ANESTHESIA 1ST 15 MINUTES: Performed by: ORTHOPAEDIC SURGERY

## 2020-07-27 PROCEDURE — 29827 SHO ARTHRS SRG RT8TR CUF RPR: CPT | Mod: RT,,, | Performed by: ORTHOPAEDIC SURGERY

## 2020-07-27 PROCEDURE — D9220A PRA ANESTHESIA: ICD-10-PCS | Mod: ANES,,, | Performed by: ANESTHESIOLOGY

## 2020-07-27 PROCEDURE — C1713 ANCHOR/SCREW BN/BN,TIS/BN: HCPCS | Performed by: ORTHOPAEDIC SURGERY

## 2020-07-27 PROCEDURE — D9220A PRA ANESTHESIA: Mod: ANES,,, | Performed by: ANESTHESIOLOGY

## 2020-07-27 PROCEDURE — 94761 N-INVAS EAR/PLS OXIMETRY MLT: CPT | Mod: 59

## 2020-07-27 PROCEDURE — D9220A PRA ANESTHESIA: Mod: CRNA,,, | Performed by: NURSE ANESTHETIST, CERTIFIED REGISTERED

## 2020-07-27 PROCEDURE — 25000003 PHARM REV CODE 250: Performed by: ORTHOPAEDIC SURGERY

## 2020-07-27 DEVICE — ANCHOR SUT BIOCOM 5.5X19.1MM: Type: IMPLANTABLE DEVICE | Site: SHOULDER | Status: FUNCTIONAL

## 2020-07-27 RX ORDER — CEFAZOLIN SODIUM 1 G/3ML
2 INJECTION, POWDER, FOR SOLUTION INTRAMUSCULAR; INTRAVENOUS
Status: COMPLETED | OUTPATIENT
Start: 2020-07-27 | End: 2020-07-27

## 2020-07-27 RX ORDER — EPINEPHRINE 1 MG/ML
INJECTION, SOLUTION INTRACARDIAC; INTRAMUSCULAR; INTRAVENOUS; SUBCUTANEOUS
Status: DISCONTINUED | OUTPATIENT
Start: 2020-07-27 | End: 2020-07-27 | Stop reason: HOSPADM

## 2020-07-27 RX ORDER — MIDAZOLAM HYDROCHLORIDE 1 MG/ML
0.5 INJECTION INTRAMUSCULAR; INTRAVENOUS
Status: DISPENSED | OUTPATIENT
Start: 2020-07-27

## 2020-07-27 RX ORDER — SODIUM CHLORIDE 9 MG/ML
INJECTION, SOLUTION INTRAVENOUS CONTINUOUS
Status: DISCONTINUED | OUTPATIENT
Start: 2020-07-27 | End: 2020-07-27 | Stop reason: HOSPADM

## 2020-07-27 RX ORDER — OXYCODONE HYDROCHLORIDE 5 MG/1
5 TABLET ORAL
Status: DISCONTINUED | OUTPATIENT
Start: 2020-07-27 | End: 2020-07-27 | Stop reason: HOSPADM

## 2020-07-27 RX ORDER — DEXAMETHASONE SODIUM PHOSPHATE 4 MG/ML
INJECTION, SOLUTION INTRA-ARTICULAR; INTRALESIONAL; INTRAMUSCULAR; INTRAVENOUS; SOFT TISSUE
Status: DISCONTINUED | OUTPATIENT
Start: 2020-07-27 | End: 2020-07-27 | Stop reason: HOSPADM

## 2020-07-27 RX ORDER — LIDOCAINE HYDROCHLORIDE 10 MG/ML
INJECTION INFILTRATION; PERINEURAL
Status: DISCONTINUED | OUTPATIENT
Start: 2020-07-27 | End: 2020-07-27 | Stop reason: HOSPADM

## 2020-07-27 RX ORDER — FENTANYL CITRATE 50 UG/ML
100 INJECTION, SOLUTION INTRAMUSCULAR; INTRAVENOUS EVERY 5 MIN PRN
Status: DISCONTINUED | OUTPATIENT
Start: 2020-07-27 | End: 2022-08-24

## 2020-07-27 RX ORDER — FENTANYL CITRATE 50 UG/ML
INJECTION, SOLUTION INTRAMUSCULAR; INTRAVENOUS
Status: DISCONTINUED | OUTPATIENT
Start: 2020-07-27 | End: 2020-07-27

## 2020-07-27 RX ORDER — LIDOCAINE HCL/PF 100 MG/5ML
SYRINGE (ML) INTRAVENOUS
Status: DISCONTINUED | OUTPATIENT
Start: 2020-07-27 | End: 2020-07-27

## 2020-07-27 RX ORDER — ROCURONIUM BROMIDE 10 MG/ML
INJECTION, SOLUTION INTRAVENOUS
Status: DISCONTINUED | OUTPATIENT
Start: 2020-07-27 | End: 2020-07-27

## 2020-07-27 RX ORDER — ROPIVACAINE HYDROCHLORIDE 2 MG/ML
4 INJECTION, SOLUTION EPIDURAL; INFILTRATION; PERINEURAL CONTINUOUS
Status: DISPENSED | OUTPATIENT
Start: 2020-07-27

## 2020-07-27 RX ORDER — CELECOXIB 200 MG/1
400 CAPSULE ORAL ONCE
Status: COMPLETED | OUTPATIENT
Start: 2020-07-27 | End: 2020-07-27

## 2020-07-27 RX ORDER — ONDANSETRON HYDROCHLORIDE 2 MG/ML
INJECTION, SOLUTION INTRAMUSCULAR; INTRAVENOUS
Status: DISCONTINUED | OUTPATIENT
Start: 2020-07-27 | End: 2020-07-27

## 2020-07-27 RX ORDER — FENTANYL CITRATE 50 UG/ML
25 INJECTION, SOLUTION INTRAMUSCULAR; INTRAVENOUS EVERY 5 MIN PRN
Status: DISCONTINUED | OUTPATIENT
Start: 2020-07-27 | End: 2020-07-27 | Stop reason: HOSPADM

## 2020-07-27 RX ORDER — ACETAMINOPHEN 500 MG
1000 TABLET ORAL
Status: COMPLETED | OUTPATIENT
Start: 2020-07-27 | End: 2020-07-27

## 2020-07-27 RX ORDER — GLYCOPYRROLATE 0.2 MG/ML
INJECTION INTRAMUSCULAR; INTRAVENOUS
Status: DISCONTINUED | OUTPATIENT
Start: 2020-07-27 | End: 2020-07-27

## 2020-07-27 RX ORDER — BACITRACIN ZINC 500 UNIT/G
OINTMENT (GRAM) TOPICAL
Status: DISCONTINUED | OUTPATIENT
Start: 2020-07-27 | End: 2020-07-27 | Stop reason: HOSPADM

## 2020-07-27 RX ORDER — DEXAMETHASONE SODIUM PHOSPHATE 4 MG/ML
INJECTION, SOLUTION INTRA-ARTICULAR; INTRALESIONAL; INTRAMUSCULAR; INTRAVENOUS; SOFT TISSUE
Status: DISCONTINUED | OUTPATIENT
Start: 2020-07-27 | End: 2020-07-27

## 2020-07-27 RX ORDER — FAMOTIDINE 10 MG/ML
INJECTION INTRAVENOUS
Status: DISCONTINUED | OUTPATIENT
Start: 2020-07-27 | End: 2020-07-27

## 2020-07-27 RX ORDER — LIDOCAINE HYDROCHLORIDE 10 MG/ML
1 INJECTION, SOLUTION EPIDURAL; INFILTRATION; INTRACAUDAL; PERINEURAL ONCE
Status: ACTIVE | OUTPATIENT
Start: 2020-07-27

## 2020-07-27 RX ORDER — CHOLECALCIFEROL (VITAMIN D3) 25 MCG
50000 TABLET ORAL WEEKLY
Status: ON HOLD | COMMUNITY
End: 2022-10-27

## 2020-07-27 RX ORDER — NEOSTIGMINE METHYLSULFATE 1 MG/ML
INJECTION, SOLUTION INTRAVENOUS
Status: DISCONTINUED | OUTPATIENT
Start: 2020-07-27 | End: 2020-07-27

## 2020-07-27 RX ORDER — PROPOFOL 10 MG/ML
VIAL (ML) INTRAVENOUS
Status: DISCONTINUED | OUTPATIENT
Start: 2020-07-27 | End: 2020-07-27

## 2020-07-27 RX ADMIN — SODIUM CHLORIDE 1000 ML: 0.9 INJECTION, SOLUTION INTRAVENOUS at 10:07

## 2020-07-27 RX ADMIN — CEFAZOLIN 3 G: 330 INJECTION, POWDER, FOR SOLUTION INTRAMUSCULAR; INTRAVENOUS at 01:07

## 2020-07-27 RX ADMIN — MIDAZOLAM HYDROCHLORIDE 2 MG: 1 INJECTION, SOLUTION INTRAMUSCULAR; INTRAVENOUS at 10:07

## 2020-07-27 RX ADMIN — MIDAZOLAM HYDROCHLORIDE 2 MG: 1 INJECTION, SOLUTION INTRAMUSCULAR; INTRAVENOUS at 01:07

## 2020-07-27 RX ADMIN — SODIUM CHLORIDE, SODIUM GLUCONATE, SODIUM ACETATE, POTASSIUM CHLORIDE, MAGNESIUM CHLORIDE, SODIUM PHOSPHATE, DIBASIC, AND POTASSIUM PHOSPHATE: .53; .5; .37; .037; .03; .012; .00082 INJECTION, SOLUTION INTRAVENOUS at 02:07

## 2020-07-27 RX ADMIN — NEOSTIGMINE METHYLSULFATE 4 MG: 1 INJECTION INTRAVENOUS at 02:07

## 2020-07-27 RX ADMIN — FENTANYL CITRATE 100 MCG: 50 INJECTION, SOLUTION INTRAMUSCULAR; INTRAVENOUS at 01:07

## 2020-07-27 RX ADMIN — FAMOTIDINE 20 MG: 10 INJECTION, SOLUTION INTRAVENOUS at 01:07

## 2020-07-27 RX ADMIN — LIDOCAINE HYDROCHLORIDE 100 MG: 20 INJECTION, SOLUTION INTRAVENOUS at 01:07

## 2020-07-27 RX ADMIN — ACETAMINOPHEN 1000 MG: 500 TABLET ORAL at 09:07

## 2020-07-27 RX ADMIN — CELECOXIB 400 MG: 200 CAPSULE ORAL at 09:07

## 2020-07-27 RX ADMIN — FENTANYL CITRATE 25 MCG: 50 INJECTION INTRAMUSCULAR; INTRAVENOUS at 03:07

## 2020-07-27 RX ADMIN — DEXAMETHASONE SODIUM PHOSPHATE 8 MG: 4 INJECTION, SOLUTION INTRAMUSCULAR; INTRAVENOUS at 01:07

## 2020-07-27 RX ADMIN — PROPOFOL 200 MG: 10 INJECTION, EMULSION INTRAVENOUS at 01:07

## 2020-07-27 RX ADMIN — FENTANYL CITRATE 100 MCG: 50 INJECTION INTRAMUSCULAR; INTRAVENOUS at 10:07

## 2020-07-27 RX ADMIN — ONDANSETRON 4 MG: 2 INJECTION, SOLUTION INTRAMUSCULAR; INTRAVENOUS at 02:07

## 2020-07-27 RX ADMIN — Medication 4 ML/HR: at 03:07

## 2020-07-27 RX ADMIN — OXYCODONE HYDROCHLORIDE 5 MG: 5 TABLET ORAL at 03:07

## 2020-07-27 RX ADMIN — GLYCOPYRROLATE 0.4 MG: 0.2 INJECTION, SOLUTION INTRAMUSCULAR; INTRAVENOUS at 02:07

## 2020-07-27 RX ADMIN — ROCURONIUM BROMIDE 50 MG: 10 INJECTION, SOLUTION INTRAVENOUS at 01:07

## 2020-07-27 NOTE — ANESTHESIA PROCEDURE NOTES
Isc    Patient location during procedure: pre-op   Block not for primary anesthetic.  Reason for block: at surgeon's request and post-op pain management   Post-op Pain Location: right shoulder  Start time: 7/27/2020 10:56 AM  Timeout: 7/27/2020 10:55 AM   End time: 7/27/2020 11:12 AM    Staffing  Authorizing Provider: Yinka Doe MD  Performing Provider: Yinka Doe MD    Preanesthetic Checklist  Completed: patient identified, site marked, surgical consent, pre-op evaluation, timeout performed, IV checked, risks and benefits discussed and monitors and equipment checked  Peripheral Block  Patient position: sitting  Prep: ChloraPrep and site prepped and draped  Patient monitoring: heart rate, cardiac monitor, continuous pulse ox, continuous capnometry and frequent blood pressure checks  Block type: interscalene  Laterality: right  Injection technique: continuous  Needle  Needle type: Tuohy   Needle gauge: 18 G  Needle length: 2 in  Needle localization: anatomical landmarks and ultrasound guidance  Catheter type: non-stimulating  Catheter size: 20 G  Test dose: lidocaine 1.5% with Epi 1-to-200,000 and negative   -ultrasound image captured on disc.  Assessment  Injection assessment: negative aspiration, negative parasthesia and local visualized surrounding nerve  Paresthesia pain: none  Heart rate change: no  Slow fractionated injection: yes  Additional Notes  VSS.  [facility] RN monitoring vitals throughout procedure.  Patient tolerated procedure well.

## 2020-07-27 NOTE — TRANSFER OF CARE
"Anesthesia Transfer of Care Note    Patient: Velma Lopez    Procedure(s) Performed: Procedure(s) (LRB):  REPAIR, ROTATOR CUFF, ARTHROSCOPIC (Right)  INJECTION, STEROID LEFT THUMB TRIGGER FINGER (Left)  DECOMPRESSION, SUBACROMIAL SPACE (Left)  ACROMIOPLASTY, ARTHROSCOPIC (Left)    Patient location: PACU    Anesthesia Type: general    Transport from OR: Transported from OR on 6-10 L/min O2 by face mask with adequate spontaneous ventilation    Post pain: adequate analgesia    Post assessment: no apparent anesthetic complications and tolerated procedure well    Post vital signs: stable    Level of consciousness: awake, alert and oriented    Nausea/Vomiting: no nausea/vomiting    Complications: none    Transfer of care protocol was followed      Last vitals:   Visit Vitals  BP (!) 152/92 (BP Location: Left arm, Patient Position: Lying)   Pulse 61   Temp 36.5 °C (97.7 °F) (Temporal)   Resp 16   Ht 5' 6" (1.676 m)   Wt 122.5 kg (270 lb)   SpO2 100%   Breastfeeding No   BMI 43.58 kg/m²     "

## 2020-07-27 NOTE — BRIEF OP NOTE
[facility]  Brief Operative Note    Surgery Date: 7/27/2020     Surgeon(s) and Role:     * Marcela Garza MD - Primary    Assisting Surgeon: None    Pre-op Diagnosis:  Rotator cuff tear arthropathy of right shoulder [M75.101, M12.811]  Trigger finger of left thumb [M65.312]    Post-op Diagnosis:  Post-Op Diagnosis Codes:     * Rotator cuff tear arthropathy of right shoulder [M75.101, M12.811]     * Trigger finger of left thumb [M65.312]    Procedure(s) (LRB):  REPAIR, ROTATOR CUFF, ARTHROSCOPIC (Right)  INJECTION, STEROID LEFT THUMB TRIGGER FINGER (Left)  DECOMPRESSION, SUBACROMIAL SPACE (Left)  ACROMIOPLASTY, ARTHROSCOPIC (Left)    Anesthesia: General    Description of the findings of the procedure(s): see op note    Estimated Blood Loss: minimal         Specimens:   Specimen (12h ago, onward)    None            Discharge Note    OUTCOME: Patient tolerated treatment/procedure well without complication and is now ready for discharge.    DISPOSITION: Home or Self Care    FINAL DIAGNOSIS:  Right rotator cuff tear    FOLLOWUP: In clinic    DISCHARGE INSTRUCTIONS:    Discharge Procedure Orders   Keep surgical extremity elevated     Lifting restrictions     Notify your health care provider if you experience any of the following:  temperature >100.4     Notify your health care provider if you experience any of the following:  severe uncontrolled pain     Notify your health care provider if you experience any of the following:  redness, tenderness, or signs of infection (pain, swelling, redness, odor or green/yellow discharge around incision site)     Notify your health care provider if you experience any of the following:  worsening rash     Leave dressing on - Keep it clean, dry, and intact until clinic visit     Activity as tolerated

## 2020-07-27 NOTE — ANESTHESIA PROCEDURE NOTES
Intubation  Performed by: Bushra Durbin CRNA  Authorized by: Yinka Doe MD     Intubation:     Induction:  Inhalational - mask    Intubated:  Postinduction    Mask Ventilation:  Easy mask    Attempts:  1    Attempted By:  CRNA    Method of Intubation:  Direct    Blade:  Mehta 2    Laryngeal View Grade: Grade I - full view of chords      Difficult Airway Encountered?: No      Complications:  None    Airway Device:  Oral endotracheal tube    Airway Device Size:  7.5    Style/Cuff Inflation:  Cuffed    Placement Verified By:  Capnometry    Complicating Factors:  None    Findings Post-Intubation:  BS equal bilateral

## 2020-07-27 NOTE — PLAN OF CARE
VSS.  Patient tolerating oral liquids without difficulty. No apparent s&s of distress noted at this time, no complaints voiced at this time. Discharge instructions reviewed with patient and son with good verbal feedback received.   Polar ice, arm sling and nimbus intact.  Patient ready for discharge.

## 2020-07-27 NOTE — PROGRESS NOTES
[facility] teaching done with patient and patient's son at bedside. Verbalizes understanding. Son agrees to stay with patient for the next 72 hours while medication is infusing. All questions answered. [facility] contract signed, home care instruction pamphlet and extra home care supplies provided to patient upon discharge. Encouraged to contact anesthesia with any questions/concerns.

## 2020-07-27 NOTE — PROGRESS NOTES
Called son to come to facility for dc instructions and [facility] teaching.

## 2020-07-27 NOTE — PLAN OF CARE
preop complete. Pt states she is a  difficult IV stick. One attempt without success. Waiting on anesth to attempt with US. Pt in no distress

## 2020-07-28 DIAGNOSIS — M75.101 ROTATOR CUFF TEAR ARTHROPATHY OF RIGHT SHOULDER: Primary | ICD-10-CM

## 2020-07-28 DIAGNOSIS — M12.811 ROTATOR CUFF TEAR ARTHROPATHY OF RIGHT SHOULDER: Primary | ICD-10-CM

## 2020-07-28 NOTE — ANESTHESIA POSTPROCEDURE EVALUATION
Anesthesia Post Evaluation    Patient: Velma Lopez    Procedure(s) Performed: Procedure(s) (LRB):  REPAIR, ROTATOR CUFF, ARTHROSCOPIC (Right)  INJECTION, STEROID LEFT THUMB TRIGGER FINGER (Left)  DECOMPRESSION, SUBACROMIAL SPACE (Left)  ACROMIOPLASTY, ARTHROSCOPIC (Left)    Final Anesthesia Type: general    Patient location during evaluation: PACU  Patient participation: Yes- Able to Participate  Level of consciousness: awake and alert and oriented  Post-procedure vital signs: reviewed and stable  Pain management: adequate  Airway patency: patent    PONV status at discharge: No PONV  Anesthetic complications: no      Cardiovascular status: stable  Respiratory status: unassisted  Hydration status: euvolemic  Follow-up not needed.          Vitals Value Taken Time   /72 07/27/20 1615   Temp 36.5 °C (97.7 °F) 07/27/20 1510   Pulse 71 07/27/20 1619   Resp 14 07/27/20 1619   SpO2 96 % 07/27/20 1619   Vitals shown include unvalidated device data.      Event Time   Out of Recovery 16:00:00         Pain/Anabell Score: Pain Rating Prior to Med Admin: 7 (7/27/2020  3:48 PM)  Pain Rating Post Med Admin: 5 (7/27/2020  4:21 PM)  Anabell Score: 10 (7/27/2020  3:10 PM)

## 2020-07-28 NOTE — PROGRESS NOTES
7/28/2020 1630     Patient called at home regarding [facility] follow up. Reports pain to the top of shoulder currently a 7/10. Encouraged patient to utilize bolus feature on pump for additional pain relief in this area if needed. Verbalizes understanding. Patient denies signs of local anesthetic toxicity. PNC dressing remains clean, dry, and intact. All questions answered. Encouraged to call if any issues arise.

## 2020-07-29 NOTE — PROGRESS NOTES
7/29/2020 1513    Patient called at home for [facility] follow up. Reports pain well controlled with today, frequently using her bolus button for additional medication as needed. Currently rating pain to right shoulder a 4/10. Patient denies metallic taste, ringing in her ears or SOB. Does report some numbness to the tip of the right side of her tongue that began yesterday. Denies any swelling of tongue, redness, or difficulty swallowing. Upon patient assessment she noticed a small white patch in the area of the numbness. Dr. Sewell notified. Instructed patient to call anesthesia for any new or worsening symptoms, will also follow up with patient again tomorrow to assess improvement. PNC dressing remains clean, dry, and intact. All questions answered. Encouraged to call if any issues arise.

## 2020-07-30 NOTE — PROGRESS NOTES
7/30/2020 1047    Called patient for Nimbus follow up. Reporting pain to right shoulder a 5/10. Denies s/s of local toxicity. PNC dressing dry and intact. Reviewed plan to turn Nimbus pump off today at 12pm and to remove PNC at this time, as infusion will be completed. Verbalizes understanding. Son plans to assist with removal process. Patient plans to take a pain pill today at 1100 in preparation of nerve block wearing off. Encouraged to contact anesthesia for any further questions/concerns related to Nimbus/nerve block.

## 2020-07-30 NOTE — ADDENDUM NOTE
Addendum  created 07/30/20 1051 by Jamel Elliott RN    Clinical Note Signed, Intraprocedure Event edited

## 2020-08-10 ENCOUNTER — CLINICAL SUPPORT (OUTPATIENT)
Dept: REHABILITATION | Facility: HOSPITAL | Age: 45
End: 2020-08-10
Payer: MEDICAID

## 2020-08-10 ENCOUNTER — OFFICE VISIT (OUTPATIENT)
Dept: ORTHOPEDICS | Facility: CLINIC | Age: 45
End: 2020-08-10
Payer: MEDICAID

## 2020-08-10 VITALS
HEART RATE: 89 BPM | BODY MASS INDEX: 43.39 KG/M2 | HEIGHT: 66 IN | DIASTOLIC BLOOD PRESSURE: 81 MMHG | SYSTOLIC BLOOD PRESSURE: 116 MMHG | WEIGHT: 270 LBS

## 2020-08-10 DIAGNOSIS — M75.101 ROTATOR CUFF TEAR ARTHROPATHY OF RIGHT SHOULDER: Primary | ICD-10-CM

## 2020-08-10 DIAGNOSIS — Z47.89 ORTHOPEDIC AFTERCARE: ICD-10-CM

## 2020-08-10 DIAGNOSIS — Z98.890 POST-OPERATIVE STATE: ICD-10-CM

## 2020-08-10 DIAGNOSIS — R29.898 WEAKNESS OF RIGHT ARM: ICD-10-CM

## 2020-08-10 DIAGNOSIS — M12.811 ROTATOR CUFF TEAR ARTHROPATHY OF RIGHT SHOULDER: Primary | ICD-10-CM

## 2020-08-10 DIAGNOSIS — M25.511 ACUTE PAIN OF RIGHT SHOULDER: Primary | ICD-10-CM

## 2020-08-10 DIAGNOSIS — M25.611 DECREASED RANGE OF MOTION OF RIGHT SHOULDER: ICD-10-CM

## 2020-08-10 PROCEDURE — 99214 OFFICE O/P EST MOD 30 MIN: CPT | Mod: PBBFAC | Performed by: PHYSICIAN ASSISTANT

## 2020-08-10 PROCEDURE — 99999 PR PBB SHADOW E&M-EST. PATIENT-LVL IV: ICD-10-PCS | Mod: PBBFAC,,, | Performed by: PHYSICIAN ASSISTANT

## 2020-08-10 PROCEDURE — 99024 PR POST-OP FOLLOW-UP VISIT: ICD-10-PCS | Mod: ,,, | Performed by: PHYSICIAN ASSISTANT

## 2020-08-10 PROCEDURE — 97162 PT EVAL MOD COMPLEX 30 MIN: CPT | Mod: PN

## 2020-08-10 PROCEDURE — 99024 POSTOP FOLLOW-UP VISIT: CPT | Mod: ,,, | Performed by: PHYSICIAN ASSISTANT

## 2020-08-10 PROCEDURE — 99999 PR PBB SHADOW E&M-EST. PATIENT-LVL IV: CPT | Mod: PBBFAC,,, | Performed by: PHYSICIAN ASSISTANT

## 2020-08-10 PROCEDURE — 97110 THERAPEUTIC EXERCISES: CPT | Mod: PN

## 2020-08-10 RX ORDER — OXYCODONE AND ACETAMINOPHEN 5; 325 MG/1; MG/1
1 TABLET ORAL
Qty: 42 TABLET | Refills: 0 | Status: SHIPPED | OUTPATIENT
Start: 2020-08-10 | End: 2020-09-08 | Stop reason: ALTCHOICE

## 2020-08-10 NOTE — PATIENT INSTRUCTIONS
Wrist Flexion/Extension    Rest your arm on your thigh and bend at your wrist up and down with your palm face up as shown. Return to original position and repeat. Repeat 30 times, 2-3 times a day.    Forearm and Elbow Supination/Pronation    Bend your elbow to 90 degrees as shown. Begin by rotating your forearm so the palm of your hand is facing upwards and then rotate your forearm the opposite direction so that your palm is facing downward. DO NOT MOVE YOUR SHOULDER. Repeat 30 times, 2-3 times a day.    Elbow Flexion/Extension    Start with your arm at your side. Bend at your elbow to raise your forearm/hand upwards as shown. Then return to starting position and repeat. DO NOT MOVE YOUR SHOULDER. Repeat 30 times, 2-3 times a day.     Shoulder Blade Squeeze        Rotate shoulders back, then squeeze shoulder blades together.  Repeat 30 times. Hold for 5 seconds each.   Do 2-3 sessions per day.     https://[email or web address].[employer]umTV.us/846     Copyright © I. All rights reserved.

## 2020-08-10 NOTE — PROGRESS NOTES
"Ms. Lopez is here today for a post-operative visit.  She is 14 days status post right shoulder arthroscopy with subacromial decompression, rotator cuff repair, labral debridement, and chondroplasty by Dr. Garza on 7/27/2020.  She also underwent left trigger thumb injection that day.  She reports that she is intact pain in the left shoulder.  Pain is moderate to severe.  She is taking pain medication twice daily, she is requesting a refill today.  She denies fever, chills, and sweats since the time of the surgery.  She is scheduled start PT afternoon    Physical exam:    Vitals:    08/10/20 0927   BP: 116/81   Pulse: 89   Weight: 122.5 kg (270 lb)   Height: 5' 6" (1.676 m)   PainSc:   6     Vital signs are stable, patient is afebrile.  Patient is well dressed and well groomed, no acute distress.  Alert and oriented to person, place, and time.  Post op dressing taken down.  Patient is in her sling, no abduction pillow.  Incisions clean, dry, and intact; the incision is slightly opened after suture removal, Steri-Strips placed.  There is no erythema or exudate.  There is no sign of any infection. She is NVI. Sutures removed without difficulty.      Assessment: 14 days status post right shoulder arthroscopy with subacromial decompression, rotator cuff repair, labral debridement, and chondroplasty    Plan:  Velma was seen today for post-op evaluation.    Diagnoses and all orders for this visit:    Rotator cuff tear arthropathy of right shoulder    Orthopedic aftercare    Post-operative state  -     oxyCODONE-acetaminophen (PERCOCET) 5-325 mg per tablet; Take 1 tablet by mouth every 4 to 6 hours as needed for Pain.        - PO instruction reviewed and provided to patient  - Percocet refill provided  - PT as scheduled, rotator cuff protocol  - Full time use of sling and pillow, wean out in OT  - Ice as needed  - No lifting  - Follow up in 4 weeks  - Call with questions or concerns      "

## 2020-08-11 PROBLEM — R29.898 WEAKNESS OF RIGHT ARM: Status: ACTIVE | Noted: 2020-08-11

## 2020-08-11 PROBLEM — M25.611 DECREASED RANGE OF MOTION OF RIGHT SHOULDER: Status: ACTIVE | Noted: 2020-08-11
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
## 2020-08-14 ENCOUNTER — CLINICAL SUPPORT (OUTPATIENT)
Dept: REHABILITATION | Facility: HOSPITAL | Age: 45
End: 2020-08-14
Payer: MEDICAID

## 2020-08-14 DIAGNOSIS — M25.611 DECREASED RANGE OF MOTION OF RIGHT SHOULDER: ICD-10-CM

## 2020-08-14 DIAGNOSIS — R29.898 WEAKNESS OF RIGHT ARM: ICD-10-CM

## 2020-08-14 DIAGNOSIS — M25.511 ACUTE PAIN OF RIGHT SHOULDER: ICD-10-CM

## 2020-08-14 PROCEDURE — 97110 THERAPEUTIC EXERCISES: CPT | Mod: PN

## 2020-08-14 NOTE — PROGRESS NOTES
"  Physical Therapy Treatment Note     Name: Velma Lopez  Clinic Number: [id number]    Therapy Diagnosis:   Encounter Diagnoses   Name Primary?    Decreased range of motion of right shoulder     Weakness of right arm     Acute pain of right shoulder      Physician: Mandi Larios PA    Visit Date: 8/14/2020    Physician Orders: PT Eval and Treat:  S/p right rotator cuff repair  Dr. Garza RTC repair protocol  Eval and Treat, modalities as needed  8+ visits  Start PT in 2 weeks     Medical Diagnosis from Referral: M75.101,M12.811 (ICD-10-CM) - Rotator cuff tear arthropathy of right shoulder  Right shoulder arthroscopic subacromial decompression.  Right shoulder arthroscopic rotator cuff repair.  Right shoulder labral debridement  Right shoulder chondroplasty  Left trigger thumb A1 pulley corticosteroid injection      Evaluation Date: 8/10/2020  Authorization Period Expiration: 10/30/2020  Plan of Care Expiration: 8/10/2020 to 9/25/2020  Visit # / Visits authorized: 1/12 (2 total visits)     Time In: 1115  Time Out: 1200  Total Billable Time: 40 minutes (3 TE)  Precautions: DM II    Subjective     Pt reports: R shoulder pain but well-controlled right now.  Has her brace donned with abduction pillow in place. .  She was compliant with home exercise program.  Response to previous treatment: eval and HEP  Functional change: none voiced at this time.     Pain: 5/10  Location: right shoulder     Objective     Shoulder Right     PROM 08/14     flexion  130     abduction NT     Internal rotation  NT     ER scapular plane 44     ER at 0° abd 40         Velma received therapeutic exercises to develop strength, endurance, ROM, flexibility and posture for 25 minutes including:  - shoulder rolls   15x  - scap squeezes   15x5" holds  - elbow flex/ext AROM   20x  - forearm supination/pro AROM 20x  - wrist extension/flex AROM  20x  - supine passive ER T-bar  Arm at side; 15x5"  - table slides " "flexion   15x5"    Velma received the following manual therapy techniques:total time 15 minutes  - PROM R shoulder flexion/scaption, ER to patient tolerance    Velma received cold pack for 5' minutes to R shoulder complex for pain and inflammation control.      Home Exercises Provided and Patient Education Provided   Education provided:   - continue HEP  - HEP 3x/day  - compliance with sling wear.   - reviewed surgical precautions, healing time frames, progression with PT in regards to PROM --> AROM and lifting restrictions    Written Home Exercises Provided: yes.  Exercises were reviewed and Velma was able to demonstrate them prior to the end of the session.  Velma demonstrated good  understanding of the education provided.     See EMR under Media for exercises provided 8/14/2020.    Assessment   A.  Mrs. Lopez is a 46 y/o F with multi co-morbidities now s/p R shoulder arthroscopy including supraspinatus (1 cm) full-thickness repair with concomitant procedures.  Surgical date: 07/27/2020.  Post-op week 3.  PROM rehab phase at this time.  Pain well controlled.  Semi-compliance with sling wear.  Educated patient on surgical precautions including lifting restrictions, healing time frames, and sling/HEP compliance.      Velma is progressing well towards her goals.   Pt prognosis is Excellent.     Pt will continue to benefit from skilled outpatient physical therapy to address the deficits listed in the problem list box on initial evaluation, provide pt/family education and to maximize pt's level of independence in the home and community environment.     Pt's spiritual, cultural and educational needs considered and pt agreeable to plan of care and goals.     Anticipated barriers to physical therapy: co-morbidities    Goals:   Short Term Goals (4 Weeks):   1. Pt will be compliant with HEP to assist PT treatment in restoring pain free motion of the R shoulder.   2. Pt will improve impaired shoulder MMTs 1/2 " grade B to improve strength for functional tasks.  3. Pt will improve R shoulder flexion to >/= 20 deg to improve functional mobility of UEs   4. Pt will improveR shoulder abduction to >/= 20 deg to improve functional mobility of UEs     Long Term Goals (8 Weeks):   1. Pt will improve FOTO score to </= 35% to demonstrate improvements in carrying, moving, and handling objects  2. Pt will improve impaired shoulder MMTs 1 grade B to improve strength for household duties.  3. Pt will improve R shoulder flexion to >/= 160 deg to improve functional mobility of UEs   4. Pt will improve R shoulder abduction to >/= 160 deg to improve functional mobility of UEs  5. Pt will move R shoulder through functional ROM in all planes without pain to improve functional QOL.  6. Pt will perform prior level of household duties c/o pain to improve functional QOL         Plan     Progress PROM at patient tolerates.   Check compliance with sling/HEP.     Jean Pérez, PT

## 2020-08-17 ENCOUNTER — CLINICAL SUPPORT (OUTPATIENT)
Dept: REHABILITATION | Facility: HOSPITAL | Age: 45
End: 2020-08-17
Payer: MEDICAID

## 2020-08-17 DIAGNOSIS — M25.511 ACUTE PAIN OF RIGHT SHOULDER: ICD-10-CM

## 2020-08-17 DIAGNOSIS — R29.898 WEAKNESS OF RIGHT ARM: ICD-10-CM

## 2020-08-17 DIAGNOSIS — M25.611 DECREASED RANGE OF MOTION OF RIGHT SHOULDER: ICD-10-CM

## 2020-08-17 PROCEDURE — 97110 THERAPEUTIC EXERCISES: CPT | Mod: PN

## 2020-08-17 NOTE — PROGRESS NOTES
"  Physical Therapy Treatment Note     Name: Velma Lopez  Clinic Number: [id number]    Therapy Diagnosis:   Encounter Diagnoses   Name Primary?    Decreased range of motion of right shoulder     Weakness of right arm     Acute pain of right shoulder      Physician: Mandi Larios PA    Visit Date: 8/17/2020    Physician Orders: PT Eval and Treat:  S/p right rotator cuff repair  Dr. Garza RTC repair protocol  Eval and Treat, modalities as needed  8+ visits  Start PT in 2 weeks     Medical Diagnosis from Referral: M75.101,M12.811 (ICD-10-CM) - Rotator cuff tear arthropathy of right shoulder  Right shoulder arthroscopic subacromial decompression.  Right shoulder arthroscopic rotator cuff repair.  Right shoulder labral debridement  Right shoulder chondroplasty  Left trigger thumb A1 pulley corticosteroid injection      Evaluation Date: 8/10/2020  Authorization Period Expiration: 10/30/2020  Plan of Care Expiration: 8/10/2020 to 9/25/2020  Visit # / Visits authorized: 2/12 (3 total visits)     Time In: 11:15 AM  Time Out: 12:00 PM  Total Billable Time: 40 minutes (3 TE)  Precautions: DM II    Subjective     Pt reports: that she's feeling a lot better than right after surgery. Mild to moderate pain.   She was compliant with home exercise program.  Response to previous treatment: "Felt really good."  Functional change: Ongoing.     Pain: 4/10  Location: right shoulder     Objective     Shoulder Right     PROM 08/14     flexion  130     abduction NT     Internal rotation  NT     ER scapular plane 44     ER at 0° abd 40         Velma received therapeutic exercises to develop strength, endurance, ROM, flexibility and posture for 20 minutes including:  - Pendulums    20x in each direction (fwd/bckwd, R/L, clockwise/cntrclockwise)  - Shoulder rolls   20x  - Scap squeezes   20x5" holds  - Elbow flex/ext AROM  30x  - Forearm supination/pro AROM 30x  - Wrist extension/flex AROM  30x  - Supine AAROM Shldr " "Flexion 2x5  - Supine passive ER T-bar  0 degrees of ABD; 2x10x5", unweighted dowel  - Table slides flexion   20x5"    Velma received the following manual therapy techniques:total time 20 minutes  - PROM R shoulder flexion/scaption, ER to patient tolerance  - Grade II GH anterior and posterior joint mobilizations    Velma received cold pack for 5 minutes in supine position to R shoulder complex for pain and inflammation control.      Home Exercises Provided and Patient Education Provided   Education provided:   - continue HEP  - HEP 3x/day  - compliance with sling wear.   - reviewed surgical precautions, healing time frames, progression with PT in regards to PROM --> AROM and lifting restrictions    Written Home Exercises Provided: yes.  Exercises were reviewed and Velma was able to demonstrate them prior to the end of the session.  Velma demonstrated good  understanding of the education provided.     See EMR under Media for exercises provided 8/14/2020.    Assessment   A.  Mrs. Lopez is a 44 y/o F with multi co-morbidities now s/p R shoulder arthroscopy including supraspinatus (1 cm) full-thickness repair with concomitant procedures.  Surgical date: 07/27/2020.  Post-op week 3.    Patient presented to therapy with brace and abductor pillow donned. Post-op week 3 and is able to begin AAROM and AROM within limitations, per protocol. Tolerated the addition of pendulums well without adverse reactions. Some discomfort noted with AAROM shoulder flexion. Some increase in pain with performance of PROM shoulder flexion when coming back to neutral position only. Pain relief noted with performance of anterior and posterior GH mobilizations.     Velma is progressing well towards her goals.   Pt prognosis is Excellent.     Pt will continue to benefit from skilled outpatient physical therapy to address the deficits listed in the problem list box on initial evaluation, provide pt/family education and to maximize pt's " level of independence in the home and community environment.     Pt's spiritual, cultural and educational needs considered and pt agreeable to plan of care and goals.     Anticipated barriers to physical therapy: co-morbidities    Goals:   Short Term Goals (4 Weeks):   1. Pt will be compliant with HEP to assist PT treatment in restoring pain free motion of the R shoulder. PROGRESSING, NOT MET 8/17/2020  2. Pt will improve impaired shoulder MMTs 1/2 grade B to improve strength for functional tasks. PROGRESSING, NOT MET 8/17/2020  3. Pt will improve R shoulder flexion to >/= 20 deg to improve functional mobility of UEs. PROGRESSING, NOT MET 8/17/2020  4. Pt will improveR shoulder abduction to >/= 20 deg to improve functional mobility of UEs. PROGRESSING, NOT MET 8/17/2020     Long Term Goals (8 Weeks):   1. Pt will improve FOTO score to </= 35% to demonstrate improvements in carrying, moving, and handling objects. PROGRESSING, NOT MET 8/17/2020  2. Pt will improve impaired shoulder MMTs 1 grade B to improve strength for household duties. PROGRESSING, NOT MET 8/17/2020  3. Pt will improve R shoulder flexion to >/= 160 deg to improve functional mobility of UEs . PROGRESSING, NOT MET 8/17/2020  4. Pt will improve R shoulder abduction to >/= 160 deg to improve functional mobility of UEs. PROGRESSING, NOT MET 8/17/2020  5. Pt will move R shoulder through functional ROM in all planes without pain to improve functional QOL. PROGRESSING, NOT MET 8/17/2020  6. Pt will perform prior level of household duties c/o pain to improve functional QOL. PROGRESSING, NOT MET 8/17/2020       Plan     Progress PROM at patient tolerates.   Check compliance with sling/HEP.   Progress per Dr. Garza's RTC repair protocol as tolerated.      Vandana Momin, PT

## 2020-08-17 NOTE — PATIENT INSTRUCTIONS
Pendulum Circular        Bend forward 90° at waist, leaning on table for support. Rock body in a circular pattern to move arm clockwise 30 times then counterclockwise 30 times.  Do 2-3 sessions per day.    Copyright © I. All rights reserved.   Pendulum Forward/Back        Bend forward 90º at waist, using table for support. Rock body forward and back to swing arm.  Repeat 30 times. Do 2-3 sessions per day.      Pendulum Side to Side        Bend forward 90º at waist, leaning on table for support. Rock body from side to side and let arm swing freely.  Repeat 30 times. Do 2- 3 sessions per day.      Wrist Flexion/Extension    Rest your arm on your thigh and bend at your wrist up and down with your palm face up as shown. Return to original position and repeat. Repeat 30 times, 2-3 times a day.     Forearm and Elbow Supination/Pronation    Bend your elbow to 90 degrees as shown. Begin by rotating your forearm so the palm of your hand is facing upwards and then rotate your forearm the opposite direction so that your palm is facing downward. DO NOT MOVE YOUR SHOULDER. Repeat 30 times, 2-3 times a day.     Elbow Flexion/Extension    Start with your arm at your side. Bend at your elbow to raise your forearm/hand upwards as shown. Then return to starting position and repeat. DO NOT MOVE YOUR SHOULDER. Repeat 30 times, 2-3 times a day.     Table Slides    Sitting in a chair, rest your injured arm on a table and gently slide it forward and hold it for 5 seconds and then back. Repeat

## 2020-09-02 ENCOUNTER — CLINICAL SUPPORT (OUTPATIENT)
Dept: REHABILITATION | Facility: HOSPITAL | Age: 45
End: 2020-09-02
Payer: MEDICAID

## 2020-09-02 DIAGNOSIS — R29.898 WEAKNESS OF RIGHT ARM: ICD-10-CM

## 2020-09-02 DIAGNOSIS — M25.611 DECREASED RANGE OF MOTION OF RIGHT SHOULDER: ICD-10-CM

## 2020-09-02 DIAGNOSIS — M25.511 ACUTE PAIN OF RIGHT SHOULDER: ICD-10-CM

## 2020-09-02 PROCEDURE — 97110 THERAPEUTIC EXERCISES: CPT | Mod: PN

## 2020-09-04 ENCOUNTER — CLINICAL SUPPORT (OUTPATIENT)
Dept: REHABILITATION | Facility: HOSPITAL | Age: 45
End: 2020-09-04
Payer: MEDICAID

## 2020-09-04 DIAGNOSIS — R29.898 WEAKNESS OF RIGHT ARM: ICD-10-CM

## 2020-09-04 DIAGNOSIS — M25.611 DECREASED RANGE OF MOTION OF RIGHT SHOULDER: ICD-10-CM

## 2020-09-04 DIAGNOSIS — M25.511 ACUTE PAIN OF RIGHT SHOULDER: ICD-10-CM

## 2020-09-04 PROCEDURE — 97110 THERAPEUTIC EXERCISES: CPT | Mod: PN

## 2020-09-04 NOTE — PROGRESS NOTES
"  Physical Therapy Treatment Note     Name: Velma Lopez  Clinic Number: [id number]    Therapy Diagnosis:   Encounter Diagnoses   Name Primary?    Decreased range of motion of right shoulder     Weakness of right arm     Acute pain of right shoulder      Physician: Mandi Larios PA    Visit Date: 9/4/2020    Physician Orders: PT Eval and Treat:  S/p right rotator cuff repair  Dr. Garza RTC repair protocol  Eval and Treat, modalities as needed  8+ visits  Start PT in 2 weeks     Medical Diagnosis from Referral: M75.101,M12.811 (ICD-10-CM) - Rotator cuff tear arthropathy of right shoulder  Right shoulder arthroscopic subacromial decompression.  Right shoulder arthroscopic rotator cuff repair.  Right shoulder labral debridement  Right shoulder chondroplasty  Left trigger thumb A1 pulley corticosteroid injection      Evaluation Date: 8/10/2020  Authorization Period Expiration: 10/30/2020  Plan of Care Expiration: 8/10/2020 to 9/25/2020  Visit # / Visits authorized: 3/12 (4 total visits)     Time In: 11:15 am  Time Out: 12:00 pm  Total Billable Time: 45 minutes (3 TE)  Precautions: DM II    Subjective     Pt reports: she is seeing some slow improvements with her (R) shoulder. She has been keeping up with the exercises at home with no issues.   She was compliant with home exercise program.  Response to previous treatment: no adverse effects.   Functional change: none voiced at this time.     Pain: 5/10  Location: right shoulder     Objective     Shoulder Right     PROM 08/14 09/02    flexion  130 136    abduction NT 80    Internal rotation  NT 40    ER scapular plane 44 56    ER at 0° abd 40 52        Velma received therapeutic exercises to develop strength, endurance, ROM, flexibility and posture for 20 minutes including:  - shoulder rolls   15x  - scap squeezes   15x5" holds  - elbow flex/ext AROM   30x  - forearm supination/pro AROM 20x  - wrist extension/flex AROM  20x    - sub-max isos - " "FLX/EXT/IR/ER 5" x 10  - supine passive ER T-bar  15x5" - shoulder neutral   - supine flexion rhyth stab  4 rounds  - table slides flexion   15 x 5" - [place]    Velma received the following manual therapy techniques:total time 20 minutes  - PROM R shoulder flexion/scaption, ER to patient tolerance  - grade I/II shoulder mobs  - scapular mobs sidelying    Velma received cold pack for 5' minutes to R shoulder complex for pain and inflammation control.      Home Exercises Provided and Patient Education Provided   Education provided:   - continue HEP  - HEP 3x/day  - compliance with sling wear.   - reviewed surgical precautions, healing time frames, progression with PT in regards to PROM --> AROM and lifting restrictions    Written Home Exercises Provided: yes.  Exercises were reviewed and Velma was able to demonstrate them prior to the end of the session.  Velma demonstrated good  understanding of the education provided.     See EMR under Media for exercises provided 8/14/2020.    Assessment   A.  Mrs. Lopez is a 46 y/o F with multi co-morbidities now s/p R shoulder arthroscopy including supraspinatus (1 cm) full-thickness repair with concomitant procedures.  Surgical date: 07/27/2020.  Post-op week 6.  Continue to progress PROM gains at this time.  Pain well controlled.  Semi-compliance with sling wear.  Re-educated patient on surgical precautions including lifting restrictions, healing time frames, and sling/HEP compliance.  Has follow-up with Ortho next week.  Although small-to-medium tear, but does have heavy arms and DM II - expected delayed healing; progress to  AAROM cautiously.      Velma is progressing well towards her goals.   Pt prognosis is Excellent.     Pt will continue to benefit from skilled outpatient physical therapy to address the deficits listed in the problem list box on initial evaluation, provide pt/family education and to maximize pt's level of independence in the home and community " environment.   Pt's spiritual, cultural and educational needs considered and pt agreeable to plan of care and goals.     Anticipated barriers to physical therapy: co-morbidities    Goals:   Short Term Goals (4 Weeks):   1. Pt will be compliant with HEP to assist PT treatment in restoring pain free motion of the R shoulder. Progressing towards; not met  2. Pt will improve impaired shoulder MMTs 1/2 grade B to improve strength for functional tasks. Progressing towards; not met  3. Pt will improve R shoulder flexion to >/= 20 deg to improve functional mobility of UEs.  Progressing towards; not met  4. Pt will improveR shoulder abduction to >/= 20 deg to improve functional mobility of UEs.  Progressing towards; not met     Long Term Goals (8 Weeks):   1. Pt will improve FOTO score to </= 35% to demonstrate improvements in carrying, moving, and handling objects.  Progressing towards; not met  2. Pt will improve impaired shoulder MMTs 1 grade B to improve strength for household duties.  Progressing towards; not met  3. Pt will improve R shoulder flexion to >/= 160 deg to improve functional mobility of UEs.  Progressing towards; not met  4. Pt will improve R shoulder abduction to >/= 160 deg to improve functional mobility of UEs.  Progressing towards; not met  5. Pt will move R shoulder through functional ROM in all planes without pain to improve functional QOL.  Progressing towards; not met  6. Pt will perform prior level of household duties c/o pain to improve functional QOL. Progressing towards; not met       Plan     Progress PROM at patient tolerates.   Check compliance with sling/HEP.     Jacinto García, PT

## 2020-09-08 ENCOUNTER — OFFICE VISIT (OUTPATIENT)
Dept: ORTHOPEDICS | Facility: CLINIC | Age: 45
End: 2020-09-08
Payer: MEDICAID

## 2020-09-08 VITALS
SYSTOLIC BLOOD PRESSURE: 133 MMHG | DIASTOLIC BLOOD PRESSURE: 75 MMHG | HEIGHT: 66 IN | BODY MASS INDEX: 43.39 KG/M2 | HEART RATE: 85 BPM | WEIGHT: 270 LBS

## 2020-09-08 DIAGNOSIS — M75.101 ROTATOR CUFF TEAR ARTHROPATHY OF RIGHT SHOULDER: Primary | ICD-10-CM

## 2020-09-08 DIAGNOSIS — Z47.89 ORTHOPEDIC AFTERCARE: ICD-10-CM

## 2020-09-08 DIAGNOSIS — M12.811 ROTATOR CUFF TEAR ARTHROPATHY OF RIGHT SHOULDER: Primary | ICD-10-CM

## 2020-09-08 PROCEDURE — 99024 PR POST-OP FOLLOW-UP VISIT: ICD-10-PCS | Mod: ,,, | Performed by: PHYSICIAN ASSISTANT

## 2020-09-08 PROCEDURE — 99999 PR PBB SHADOW E&M-EST. PATIENT-LVL IV: ICD-10-PCS | Mod: PBBFAC,,, | Performed by: PHYSICIAN ASSISTANT

## 2020-09-08 PROCEDURE — 99999 PR PBB SHADOW E&M-EST. PATIENT-LVL IV: CPT | Mod: PBBFAC,,, | Performed by: PHYSICIAN ASSISTANT

## 2020-09-08 PROCEDURE — 99024 POSTOP FOLLOW-UP VISIT: CPT | Mod: ,,, | Performed by: PHYSICIAN ASSISTANT

## 2020-09-08 PROCEDURE — 99214 OFFICE O/P EST MOD 30 MIN: CPT | Mod: PBBFAC | Performed by: PHYSICIAN ASSISTANT

## 2020-09-08 RX ORDER — HYDROCODONE BITARTRATE AND ACETAMINOPHEN 5; 325 MG/1; MG/1
1 TABLET ORAL EVERY 6 HOURS PRN
Qty: 28 TABLET | Refills: 0 | Status: ON HOLD | OUTPATIENT
Start: 2020-09-08 | End: 2022-08-25 | Stop reason: HOSPADM

## 2020-09-08 NOTE — PROGRESS NOTES
"Ms. Lopez is here today for a post-operative visit.  She is 43 days status post right shoulder arthroscopy with subacromial decompression, rotator cuff repair, labral debridement, and chondroplasty by Dr. Garza on 7/27/2020.  She also underwent left trigger thumb injection that day.  She reports improvement in shoulder pain.  She is taking pain medication nightly and as needed after PT, she is requesting a refill today.  She discontinued the sling and pillow last week.  She is attending PT, reports that it is going well.  She denies fever, chills, and sweats since the time of the surgery.      Physical exam:    Vitals:    09/08/20 1329   BP: 133/75   Pulse: 85   Weight: 122.5 kg (270 lb)   Height: 5' 6" (1.676 m)   PainSc:   3     Vital signs are stable, patient is afebrile.  Patient is well dressed and well groomed, no acute distress.  Alert and oriented to person, place, and time.  Incisions healing well - clean, dry, and intact.  There is no erythema or exudate.  There is no sign of any infection. She is NVI.  Good elbow, wrist, finger ROM.    Assessment: 43 days status post right shoulder arthroscopy with subacromial decompression, rotator cuff repair, labral debridement, and chondroplasty    Plan:  Velma was seen today for post-op evaluation.    Diagnoses and all orders for this visit:    Rotator cuff tear arthropathy of right shoulder    Orthopedic aftercare    Other orders  -     HYDROcodone-acetaminophen (NORCO) 5-325 mg per tablet; Take 1 tablet by mouth every 6 (six) hours as needed for Pain (moderate to severe).          - PT as scheduled, rotator cuff protocol  - Sling for public  - Ice as needed  - No lifting/weight bearing  - Follow up in 6 weeks  - norco 5 sent to pharmacy  - Call with questions or concerns        "

## 2020-09-16 ENCOUNTER — CLINICAL SUPPORT (OUTPATIENT)
Dept: REHABILITATION | Facility: HOSPITAL | Age: 45
End: 2020-09-16
Payer: MEDICAID

## 2020-09-16 DIAGNOSIS — M25.511 ACUTE PAIN OF RIGHT SHOULDER: ICD-10-CM

## 2020-09-16 DIAGNOSIS — R29.898 WEAKNESS OF RIGHT ARM: ICD-10-CM

## 2020-09-16 DIAGNOSIS — M25.611 DECREASED RANGE OF MOTION OF RIGHT SHOULDER: ICD-10-CM

## 2020-09-16 PROCEDURE — 97110 THERAPEUTIC EXERCISES: CPT | Mod: PN

## 2020-09-16 NOTE — PROGRESS NOTES
"  Physical Therapy Treatment Note     Name: Velma Lopez  Clinic Number: [id number]    Therapy Diagnosis:   Encounter Diagnoses   Name Primary?    Decreased range of motion of right shoulder     Weakness of right arm     Acute pain of right shoulder      Physician: Mandi Larios PA    Visit Date: 9/16/2020    Physician Orders: PT Eval and Treat:  S/p right rotator cuff repair  Dr. Garza RTC repair protocol  Eval and Treat, modalities as needed  8+ visits  Start PT in 2 weeks     Medical Diagnosis from Referral: M75.101,M12.811 (ICD-10-CM) - Rotator cuff tear arthropathy of right shoulder  Right shoulder arthroscopic subacromial decompression.  Right shoulder arthroscopic rotator cuff repair.  Right shoulder labral debridement  Right shoulder chondroplasty  Left trigger thumb A1 pulley corticosteroid injection      Evaluation Date: 8/10/2020  Authorization Period Expiration: 10/30/2020  Plan of Care Expiration: 8/10/2020 to 9/25/2020  Visit # / Visits authorized: 4/12 (5 total visits)     Time In: 11:15 am  Time Out: 11:55 pm  Total Billable Time: 40 minutes (3 TE)  Precautions: DM II    S/P (R) RTC repair: 7/27/2020    Subjective     Pt reports: she is continuing to see some slow improvements with her (R) shoulder.  She was compliant with home exercise program.  Response to previous treatment: no adverse effects.   Functional change: none voiced at this time.     Pain: 5/10 - when pain starts, 0/10 at rest  Location: right shoulder     Objective     Velma received therapeutic exercises to develop strength, endurance, ROM, flexibility and posture for 20 minutes including:  - Shoulder rolls   20 x  - Scap squeezes   15 x 5" holds  - Elbow flex/ext AROM  30 x  - Shoulder flexion slides  20 x w/ UE ranger   - Shoulder scaption slides  20 x w/ UE ranger  - Pulley - FLX/Scaption  2' each    - Sub-max isos - er   5" x 10    HELD:  - Supine passive ER T-bar  15x5" - shoulder neutral   - Supine flexion " rhyth stab  4 rounds    Velma received the following manual therapy techniques:total time 20 minutes  - PROM R shoulder flexion/scaption, ER to patient tolerance  - grade I/II shoulder mobs  - scapular mobs sidelying - not today    Velma received cold pack for 0' minutes to R shoulder complex for pain and inflammation control.      Home Exercises Provided and Patient Education Provided   Education provided:   - continue HEP  - HEP 3x/day  - compliance with sling wear.   - reviewed surgical precautions, healing time frames, progression with PT in regards to PROM --> AROM and lifting restrictions    Written Home Exercises Provided: yes.  Exercises were reviewed and Velma was able to demonstrate them prior to the end of the session.  Velma demonstrated good  understanding of the education provided.     See EMR under Media for exercises provided 8/14/2020.    Assessment   A.  Mrs. Lopez is a 46 y/o F with multi co-morbidities now s/p R shoulder arthroscopy including supraspinatus (1 cm) full-thickness repair with concomitant procedures.  Surgical date: 07/27/2020.  Post-op week 7.  Continue to progress PROM gains at this time.  Pain well controlled.  Semi-compliance with sling wear.  Re-educated patient on surgical precautions including lifting restrictions, healing time frames, and sling/HEP compliance.  Has follow-up with Ortho next week.  Although small-to-medium tear, but does have heavy arms and DM II - expected delayed healing; progress to  AAROM cautiously.      Velma is progressing well towards her goals.   Pt prognosis is Excellent.     Pt will continue to benefit from skilled outpatient physical therapy to address the deficits listed in the problem list box on initial evaluation, provide pt/family education and to maximize pt's level of independence in the home and community environment.   Pt's spiritual, cultural and educational needs considered and pt agreeable to plan of care and goals.      Anticipated barriers to physical therapy: co-morbidities    Goals:   Short Term Goals (4 Weeks):   1. Pt will be compliant with HEP to assist PT treatment in restoring pain free motion of the R shoulder. Progressing towards; not met  2. Pt will improve impaired shoulder MMTs 1/2 grade B to improve strength for functional tasks. Progressing towards; not met  3. Pt will improve R shoulder flexion to >/= 20 deg to improve functional mobility of UEs.  Progressing towards; not met  4. Pt will improveR shoulder abduction to >/= 20 deg to improve functional mobility of UEs.  Progressing towards; not met     Long Term Goals (8 Weeks):   1. Pt will improve FOTO score to </= 35% to demonstrate improvements in carrying, moving, and handling objects.  Progressing towards; not met  2. Pt will improve impaired shoulder MMTs 1 grade B to improve strength for household duties.  Progressing towards; not met  3. Pt will improve R shoulder flexion to >/= 160 deg to improve functional mobility of UEs.  Progressing towards; not met  4. Pt will improve R shoulder abduction to >/= 160 deg to improve functional mobility of UEs.  Progressing towards; not met  5. Pt will move R shoulder through functional ROM in all planes without pain to improve functional QOL.  Progressing towards; not met  6. Pt will perform prior level of household duties c/o pain to improve functional QOL. Progressing towards; not met       Plan     Progress PROM at patient tolerates.   Check compliance with sling/HEP.     Jacinto García, PT

## 2020-09-21 ENCOUNTER — CLINICAL SUPPORT (OUTPATIENT)
Dept: REHABILITATION | Facility: HOSPITAL | Age: 45
End: 2020-09-21
Payer: MEDICAID

## 2020-09-21 DIAGNOSIS — M25.611 DECREASED RANGE OF MOTION OF RIGHT SHOULDER: ICD-10-CM

## 2020-09-21 DIAGNOSIS — R29.898 WEAKNESS OF RIGHT ARM: ICD-10-CM

## 2020-09-21 DIAGNOSIS — M25.511 ACUTE PAIN OF RIGHT SHOULDER: ICD-10-CM

## 2020-09-21 PROCEDURE — 97110 THERAPEUTIC EXERCISES: CPT | Mod: PN

## 2020-09-21 NOTE — PLAN OF CARE
Outpatient Therapy Updated Plan of Care     Visit Date: 9/21/2020    Name: Velma Lopez  Clinic Number: [id number]    Therapy Diagnosis:   Encounter Diagnoses   Name Primary?    Decreased range of motion of right shoulder     Weakness of right arm     Acute pain of right shoulder      Physician: Mandi Larios PA    Physician Orders: PT Eval and Treat:  S/p right rotator cuff repair  Dr. Garza RTC repair protocol  Eval and Treat, modalities as needed  8+ visits  Start PT in 2 weeks     Medical Diagnosis from Referral: M75.101,M12.811 (ICD-10-CM) - Rotator cuff tear arthropathy of right shoulder  Right shoulder arthroscopic subacromial decompression.  Right shoulder arthroscopic rotator cuff repair.  Right shoulder labral debridement  Right shoulder chondroplasty  Left trigger thumb A1 pulley corticosteroid injection      Evaluation Date: 8/10/2020  Current Certification Period:  8/10/2020 to 9/25/2020  Authorization Period Expiration: 10/30/2020  Plan of Care Expiration: 8/10/2020 to 9/25/2020  Visit # / Visits authorized: 6/12    Precautions: DM II; s/p R RTC repair on 7/27/2020  Functional Level Prior to Evaluation:  Independent    Subjective     Update:   Pt reports: Her shoulder is gradually getting stronger and motion is getting better. Is having some increased pain today, but is attributing it to sleeping the wrong way. Able to lift her arm to do her hair, carry groceries from her car, and even mowed her lawn.   She was compliant with home exercise program.  Response to previous treatment: no adverse effects.   Functional change: Able to lift arm high enough to do her hair, carry groceries from car to home, and mow lawn.      Pain: 6/10 - when pain starts, 0/10 at rest  Location: right shoulder     Objective     Update:     Improvements bolded below:     Range of Motion/Strength:      In Standing:  Shoulder Right Left Pain/Dysfunction with Movement   AROM/PROM         Flexion  68/72   135/148     Extension 43*/NT  155/160     Abduction  80*/NT  138/142     Adduction  NT/NT NT     Internal Rotation  NT/NT L1     ER at 0° abd  50/55  T2        Other[facility]        Assessment     Update: A.  Mrs. Lopez is a 44 y/o F with multi co-morbidities now s/p R shoulder arthroscopy including supraspinatus (1 cm) full-thickness repair with concomitant procedures.  Surgical date: 07/27/2020.  Post-op week 8.  Objective measurements taken today and patient has improved in shoulder range of motion as bolded above. Continues to have some pain with shoulder flexion, abduction, and external rotation. Difficulty with performance of supine shoulder flexion as patient has increased pain today secondary to sleeping on her shoulder. Will continue to progress as able to improve shoulder range of motion and UE strength.      Previous Short Term Goals Status:   1. Pt will be compliant with HEP to assist PT treatment in restoring pain free motion of the R shoulder. Progressing towards; not met  2. Pt will improve impaired shoulder MMTs 1/2 grade B to improve strength for functional tasks. Progressing towards; not met  3. Pt will improve R shoulder flexion to >/= 20 deg to improve functional mobility of UEs.  MET 9/21/2020  4. Pt will improveR shoulder abduction to >/= 20 deg to improve functional mobility of UEs.  MET 9/21/2020    New Short Term Goals Status:   D/c goals 3 and 4, continue goals 1 and 2.     Previous Long Term Goals Status:   1. Pt will improve FOTO score to </= 35% to demonstrate improvements in carrying, moving, and handling objects.  Progressing towards; not met  2. Pt will improve impaired shoulder MMTs 1 grade B to improve strength for household duties.  Progressing towards; not met  3. Pt will improve R shoulder flexion to >/= 160 deg to improve functional mobility of UEs.  Progressing towards; not met  4. Pt will improve R shoulder abduction to >/= 160 deg to  improve functional mobility of UEs.  Progressing towards; not met  5. Pt will move R shoulder through functional ROM in all planes without pain to improve functional QOL.  Progressing towards; not met  6. Pt will perform prior level of household duties c/o pain to improve functional QOL. Progressing towards; not met    Long Term Goal Status:   continue per initial plan of care.    Reasons for Recertification of Therapy:   Expiration of current POC.     Plan     Updated Certification Period: 9/21/2020 to 11/6/2020  Recommended Treatment Plan: 2 times per week for 6 weeks: Cervical/Lumbar Traction, Electrical Stimulation , FDN prn, Manual Therapy, Moist Heat/ Ice, Neuromuscular Re-ed, Patient Education, Therapeutic Activites, Therapeutic Exercise, Ultrasound and Kinesiotape prn  Other Recommendations: Continue to follow RTC repair protocol.     Vandana Momin, PT  9/21/2020      I CERTIFY THE NEED FOR THESE SERVICES FURNISHED UNDER THIS PLAN OF TREATMENT AND WHILE UNDER MY CARE    Physician's comments:        Physician's Signature: ___________________________________________________

## 2020-09-21 NOTE — PROGRESS NOTES
Physical Therapy Treatment Note     Name: Velma Lopez  Clinic Number: [id number]    Therapy Diagnosis:   Encounter Diagnoses   Name Primary?    Decreased range of motion of right shoulder     Weakness of right arm     Acute pain of right shoulder      Physician: Mandi Larios PA    Visit Date: 9/21/2020    Physician Orders: PT Eval and Treat:  S/p right rotator cuff repair  Dr. Garza RTC repair protocol  Eval and Treat, modalities as needed  8+ visits  Start PT in 2 weeks     Medical Diagnosis from Referral: M75.101,M12.811 (ICD-10-CM) - Rotator cuff tear arthropathy of right shoulder  Right shoulder arthroscopic subacromial decompression.  Right shoulder arthroscopic rotator cuff repair.  Right shoulder labral debridement  Right shoulder chondroplasty  Left trigger thumb A1 pulley corticosteroid injection      Evaluation Date: 8/10/2020  Authorization Period Expiration: 10/30/2020  Plan of Care Expiration: 8/10/2020 to 9/25/2020  Visit # / Visits authorized: 6/12    FOTO: 6/10, DONE     Time In: 10:30 AM  Time Out: 11:15 AM  Total Billable Time: 45 minutes (3 TE)  Precautions: DM II    S/P (R) RTC repair: 7/27/2020    Subjective     Pt reports: Her shoulder is gradually getting stronger and motion is getting better. Is having some increased pain today, but is attributing it to sleeping the wrong way. Able to lift her arm to do her hair, carry groceries from her car, and even mowed her lawn.   She was compliant with home exercise program.  Response to previous treatment: no adverse effects.   Functional change: Able to lift arm high enough to do her hair, carry groceries from car to home, and mow lawn.     Pain: 6/10 - when pain starts, 0/10 at rest  Location: right shoulder     Objective     Velma received therapeutic exercises to develop strength, endurance, ROM, flexibility and posture for 35 minutes including objective measurements:    - Shoulder Rolls   20x forward, 20x backward  - Scap  "squeezes   20 x 5" holds  - Elbow flex/ext AROM  30 x, 1# dumbbells  - Supination/Pronation  30x, 1# dumbbells  - Shoulder Flexion   30x, w/ UE ranger  - Sidelying Shoulder ABD  2x10, pain free range   - Sidelying Shoulder ER  2x10, pain free range  - Supine Shoulder Flexion  Attempted, but held due to pain and stiffness  - Supine flexion rhyth stab  4 rounds - NOT TODAY  - Pulley - FLX/Scaption  2' each      Velma received the following manual therapy techniques:total time 10 minutes  - PROM R shoulder flexion/scaption, ER to patient tolerance  - grade III/IV shoulder mobs      Velma received cold pack for 0 minutes to R shoulder complex for pain and inflammation control.      Home Exercises Provided and Patient Education Provided   Education provided:   - continue HEP  - HEP 3x/day  - compliance with sling wear.   - reviewed surgical precautions, healing time frames, progression with PT in regards to PROM --> AROM and lifting restrictions    Written Home Exercises Provided: yes.  Exercises were reviewed and Velma was able to demonstrate them prior to the end of the session.  Velma demonstrated good  understanding of the education provided.     See EMR under Media for exercises provided 8/14/2020.    Assessment   See updated POC.     Velma is progressing well towards her goals.   Pt prognosis is Excellent.     Pt will continue to benefit from skilled outpatient physical therapy to address the deficits listed in the problem list box on initial evaluation, provide pt/family education and to maximize pt's level of independence in the home and community environment.   Pt's spiritual, cultural and educational needs considered and pt agreeable to plan of care and goals.     Anticipated barriers to physical therapy: co-morbidities    Goals:   See updated POC.        Plan     See updated POC.     Vandana Momin, PT       "

## 2020-10-05 DIAGNOSIS — E11.9 TYPE 2 DIABETES MELLITUS WITHOUT COMPLICATION, WITHOUT LONG-TERM CURRENT USE OF INSULIN: ICD-10-CM

## 2020-10-05 DIAGNOSIS — E11.8 TYPE 2 DIABETES MELLITUS WITH COMPLICATION: ICD-10-CM

## 2020-10-05 RX ORDER — METFORMIN HYDROCHLORIDE 500 MG/1
TABLET ORAL
Qty: 60 TABLET | Refills: 0 | Status: SHIPPED | OUTPATIENT
Start: 2020-10-05 | End: 2020-11-09

## 2020-10-05 RX ORDER — LOSARTAN POTASSIUM 25 MG/1
TABLET ORAL
Qty: 30 TABLET | Refills: 0 | Status: SHIPPED | OUTPATIENT
Start: 2020-10-05 | End: 2020-11-09

## 2020-10-07 ENCOUNTER — PATIENT MESSAGE (OUTPATIENT)
Dept: ADMINISTRATIVE | Facility: HOSPITAL | Age: 45
End: 2020-10-07

## 2020-10-07 ENCOUNTER — CLINICAL SUPPORT (OUTPATIENT)
Dept: REHABILITATION | Facility: HOSPITAL | Age: 45
End: 2020-10-07
Payer: MEDICAID

## 2020-10-07 DIAGNOSIS — M25.511 ACUTE PAIN OF RIGHT SHOULDER: ICD-10-CM

## 2020-10-07 DIAGNOSIS — M25.611 DECREASED RANGE OF MOTION OF RIGHT SHOULDER: ICD-10-CM

## 2020-10-07 DIAGNOSIS — R29.898 WEAKNESS OF RIGHT ARM: ICD-10-CM

## 2020-10-07 PROCEDURE — 97110 THERAPEUTIC EXERCISES: CPT | Mod: PN

## 2020-10-07 NOTE — PROGRESS NOTES
Physical Therapy Treatment Note     Name: Velma Lopez  Clinic Number: [id number]    Therapy Diagnosis:   Encounter Diagnoses   Name Primary?    Decreased range of motion of right shoulder     Weakness of right arm     Acute pain of right shoulder      Physician: Mandi Larios PA    Visit Date: 10/7/2020    Physician Orders: PT Eval and Treat:  S/p right rotator cuff repair  Dr. Garza RTC repair protocol  Eval and Treat, modalities as needed  8+ visits  Start PT in 2 weeks     Medical Diagnosis from Referral: M75.101,M12.811 (ICD-10-CM) - Rotator cuff tear arthropathy of right shoulder  Right shoulder arthroscopic subacromial decompression.  Right shoulder arthroscopic rotator cuff repair.  Right shoulder labral debridement  Right shoulder chondroplasty  Left trigger thumb A1 pulley corticosteroid injection      Evaluation Date: 8/10/2020  Authorization Period Expiration: 10/30/2020  Plan of Care Expiration: 9/21/2020 to 11/6/2020  Visit # / Visits authorized: 7/12  FOTO: 7/10     Time In: 1315  Time Out: 1405   Total Billable Time: 40 minutes (3 TE)  Precautions: DM II  S/P (R) RTC repair: 7/27/2020    Subjective     Pt reports: mild shoulder stiffness overall today but feels like her shoulder is improving.   She was compliant with home exercise program.  Response to previous treatment: no adverse effects.   Functional change: none voiced today.     Pain: 2/10 with occasional sharp pains  Location: right shoulder     Objective     Rec'd moist hot pack to R shoulder complex x5' to R shoulder for increased tissue extensibility.     Velma received therapeutic exercises to develop strength, endurance, ROM, flexibility and posture for 25 minutes including objective measurements:  - Sidelying Shoulder ABD  2x10, pain free range   - Sidelying Shoulder ER  2x10, pain free range  - Supine Shoulder Flexion  2x10  - Supine flexion rhyth stab  4 rounds   - supine  ER/IR rhyt stab  4 rounds  - prone  rows    2x10  - standing rows   GTB; 2x12  - scapular punches   GTB; 2x12  - Pulley - FLX/Scaption  2' each  (not today)    Velma received the following manual therapy techniques:total time 15 minutes  - PROM R shoulder flexion/scaption, ER to patient tolerance  - grade III/IV shoulder mobs      Velma received cold pack for 5 minutes to R shoulder complex for pain and inflammation control.      Home Exercises Provided and Patient Education Provided   Education provided:   - continue HEP  - HEP 3x/day  - compliance with sling wear.   - reviewed surgical precautions, healing time frames, progression with PT in regards to PROM --> AROM and lifting restrictions    Written Home Exercises Provided: yes.  Exercises were reviewed and Velma was able to demonstrate them prior to the end of the session.  Velma demonstrated good  understanding of the education provided.     See EMR under Media for exercises provided 8/14/2020.    Assessment   A: Mrs. Lopez is a 44 y/o F with multi co-morbidities now s/p R shoulder arthroscopy including supraspinatus (1 cm) full-thickness repair with concomitant procedures.  Surgical date: 07/27/2020.  Post-op week 11.  Lacks terminal flexion and ER ROM.  No pain at rest.  Pain superolaterally with end-range flexion.  Decreased scapular strength.  Graded linear strength progression.  Not able to tolerate supine eccentric flexion at this time.      Velma is progressing well towards her goals.   Pt prognosis is Excellent.     Pt will continue to benefit from skilled outpatient physical therapy to address the deficits listed in the problem list box on initial evaluation, provide pt/family education and to maximize pt's level of independence in the home and community environment.   Pt's spiritual, cultural and educational needs considered and pt agreeable to plan of care and goals.     Anticipated barriers to physical therapy: co-morbidities    Goals:   Short-term goals  1. Pt will be  compliant with HEP to assist PT treatment in restoring pain free motion of the R shoulder. Progressing towards; not met  2. Pt will improve impaired shoulder MMTs 1/2 grade B to improve strength for functional tasks. Progressing towards; not met  3. Pt will improve R shoulder flexion to >/= 20 deg to improve functional mobility of UEs.  MET 9/21/2020  4. Pt will improveR shoulder abduction to >/= 20 deg to improve functional mobility of UEs.  MET 9/21/2020    Long Term Goals Status:   1. Pt will improve FOTO score to </= 35% to demonstrate improvements in carrying, moving, and handling objects.  Progressing towards; not met  2. Pt will improve impaired shoulder MMTs 1 grade B to improve strength for household duties.  Progressing towards; not met  3. Pt will improve R shoulder flexion to >/= 160 deg to improve functional mobility of UEs.  Progressing towards; not met  4. Pt will improve R shoulder abduction to >/= 160 deg to improve functional mobility of UEs.  Progressing towards; not met  5. Pt will move R shoulder through functional ROM in all planes without pain to improve functional QOL.  Progressing towards; not met  6. Pt will perform prior level of household duties c/o pain to improve functional QOL. Progressing towards; not met          Plan   Phase II of post-op RTC repair (small) program.     Updated Certification Period: 9/21/2020 to 11/6/2020  Recommended Treatment Plan: 2 times per week for 6 weeks: Cervical/Lumbar Traction, Electrical Stimulation , FDN prn, Manual Therapy, Moist Heat/ Ice, Neuromuscular Re-ed, Patient Education, Therapeutic Activites, Therapeutic Exercise, Ultrasound and Kinesiotape prn  Other Recommendations: Continue to follow RTC repair protocol.   Jean Pérez, PT

## 2020-10-08 ENCOUNTER — PATIENT MESSAGE (OUTPATIENT)
Dept: ORTHOPEDICS | Facility: CLINIC | Age: 45
End: 2020-10-08

## 2020-10-08 ENCOUNTER — HOSPITAL ENCOUNTER (OUTPATIENT)
Dept: RADIOLOGY | Facility: HOSPITAL | Age: 45
Discharge: HOME | End: 2020-10-08
Attending: PHYSICIAN ASSISTANT
Payer: MEDICAID

## 2020-10-08 DIAGNOSIS — R52 PAIN: ICD-10-CM

## 2020-10-08 DIAGNOSIS — R52 PAIN: Primary | ICD-10-CM

## 2020-10-08 PROCEDURE — 73030 X-RAY EXAM OF SHOULDER: CPT | Mod: TC,FY,LT

## 2020-10-08 PROCEDURE — 73030 X-RAY EXAM OF SHOULDER: CPT | Mod: 26,LT,, | Performed by: RADIOLOGY

## 2020-10-08 PROCEDURE — 73030 XR SHOULDER TRAUMA 3 VIEW LEFT: ICD-10-PCS | Mod: 26,LT,, | Performed by: RADIOLOGY

## 2020-10-09 ENCOUNTER — PATIENT OUTREACH (OUTPATIENT)
Dept: ADMINISTRATIVE | Facility: OTHER | Age: 45
End: 2020-10-09

## 2020-10-09 NOTE — PROGRESS NOTES
Health Maintenance Due   Topic Date Due    Hepatitis C Screening  1975    Foot Exam  01/15/1983    Eye Exam  01/15/1983    HIV Screening  01/15/1988    Influenza Vaccine (1) 08/01/2020    Hemoglobin A1c  09/03/2020     Updates were requested from care everywhere.  Chart was reviewed for overdue Proactive [facility] Encounters ([facility]) topics (CRS, Breast Cancer Screening, Eye exam)  Health Maintenance has been updated.  [facility] immunization registry triggered.  Immunizations were reconciled.

## 2020-10-12 ENCOUNTER — OFFICE VISIT (OUTPATIENT)
Dept: ORTHOPEDICS | Facility: CLINIC | Age: 45
End: 2020-10-12
Payer: MEDICAID

## 2020-10-12 VITALS
BODY MASS INDEX: 43.39 KG/M2 | DIASTOLIC BLOOD PRESSURE: 79 MMHG | HEART RATE: 86 BPM | SYSTOLIC BLOOD PRESSURE: 121 MMHG | WEIGHT: 270 LBS | HEIGHT: 66 IN

## 2020-10-12 DIAGNOSIS — M25.611 IMPAIRED RANGE OF MOTION OF BOTH SHOULDERS: ICD-10-CM

## 2020-10-12 DIAGNOSIS — M25.612 IMPAIRED RANGE OF MOTION OF BOTH SHOULDERS: ICD-10-CM

## 2020-10-12 DIAGNOSIS — Z47.89 ORTHOPEDIC AFTERCARE: ICD-10-CM

## 2020-10-12 DIAGNOSIS — M12.811 ROTATOR CUFF TEAR ARTHROPATHY OF RIGHT SHOULDER: Primary | ICD-10-CM

## 2020-10-12 DIAGNOSIS — M75.101 ROTATOR CUFF TEAR ARTHROPATHY OF RIGHT SHOULDER: Primary | ICD-10-CM

## 2020-10-12 PROCEDURE — 99999 PR PBB SHADOW E&M-EST. PATIENT-LVL V: CPT | Mod: PBBFAC,,, | Performed by: PHYSICIAN ASSISTANT

## 2020-10-12 PROCEDURE — 20610 PR DRAIN/INJECT LARGE JOINT/BURSA: ICD-10-PCS | Mod: S$PBB,79,LT, | Performed by: PHYSICIAN ASSISTANT

## 2020-10-12 PROCEDURE — 99024 PR POST-OP FOLLOW-UP VISIT: ICD-10-PCS | Mod: ,,, | Performed by: PHYSICIAN ASSISTANT

## 2020-10-12 PROCEDURE — 20610 DRAIN/INJ JOINT/BURSA W/O US: CPT | Mod: S$PBB,79,LT, | Performed by: PHYSICIAN ASSISTANT

## 2020-10-12 PROCEDURE — 99214 OFFICE O/P EST MOD 30 MIN: CPT | Mod: 24,25,S$PBB, | Performed by: PHYSICIAN ASSISTANT

## 2020-10-12 PROCEDURE — 99024 POSTOP FOLLOW-UP VISIT: CPT | Mod: ,,, | Performed by: PHYSICIAN ASSISTANT

## 2020-10-12 PROCEDURE — 99215 OFFICE O/P EST HI 40 MIN: CPT | Mod: PBBFAC,25 | Performed by: PHYSICIAN ASSISTANT

## 2020-10-12 PROCEDURE — S0020 INJECTION, BUPIVICAINE HYDRO: HCPCS | Mod: PBBFAC | Performed by: PHYSICIAN ASSISTANT

## 2020-10-12 PROCEDURE — 99999 PR PBB SHADOW E&M-EST. PATIENT-LVL V: ICD-10-PCS | Mod: PBBFAC,,, | Performed by: PHYSICIAN ASSISTANT

## 2020-10-12 PROCEDURE — 99214 PR OFFICE/OUTPT VISIT, EST, LEVL IV, 30-39 MIN: ICD-10-PCS | Mod: 24,25,S$PBB, | Performed by: PHYSICIAN ASSISTANT

## 2020-10-12 PROCEDURE — 20610 DRAIN/INJ JOINT/BURSA W/O US: CPT | Mod: PBBFAC | Performed by: PHYSICIAN ASSISTANT

## 2020-10-12 RX ORDER — TRIAMCINOLONE ACETONIDE 40 MG/ML
80 INJECTION, SUSPENSION INTRA-ARTICULAR; INTRAMUSCULAR
Status: COMPLETED | OUTPATIENT
Start: 2020-10-12 | End: 2020-10-12

## 2020-10-12 RX ORDER — BUPIVACAINE HYDROCHLORIDE 2.5 MG/ML
4 INJECTION, SOLUTION INFILTRATION; PERINEURAL
Status: COMPLETED | OUTPATIENT
Start: 2020-10-12 | End: 2020-10-12

## 2020-10-12 RX ADMIN — TRIAMCINOLONE ACETONIDE 80 MG: 40 INJECTION, SUSPENSION INTRA-ARTICULAR; INTRAMUSCULAR at 11:10

## 2020-10-12 RX ADMIN — BUPIVACAINE HYDROCHLORIDE 10 MG: 2.5 INJECTION, SOLUTION INFILTRATION; PERINEURAL at 11:10

## 2020-10-12 NOTE — PROGRESS NOTES
"Subjective:      Patient ID: Velma Lopez is a 45 y.o. female.    Chief Complaint: Pain of the Left Shoulder      HPI  Velma Lopez is a right hand dominant 45 y.o. female presenting today for left shoulder pain.  She reports that she started to have new pain in the left shoulder 4 days ago.  Pain began the day after her last PT session, she is unaware of any trauma or injury.  She reports that the pain was most severe Thursday and Friday of last week.  Pain has improved with use of ibuprofen and ice, she reports that she is slightly better motion in the left shoulder today.  Current pain reported to be 5/10.  She reports that currently her right shoulder motion is better than the left.  She is status post right shoulder arthroscopy with subacromial decompression, rotator cuff repair, labral debridement, and chondroplasty by Dr. Garza on 7/27/2020.  She has been regularly attending PT to rehab the right shoulder.      Review of patient's allergies indicates:  No Known Allergies      Current Outpatient Medications   Medication Sig Dispense Refill    amitriptyline (ELAVIL) 10 MG tablet Take 1 tablet (10 mg total) by mouth nightly as needed for Insomnia. 90 tablet 3    atorvastatin (LIPITOR) 10 MG tablet TAKE 1 TABLET BY MOUTH EVERY DAY 30 tablet 0    BD ULTRA-FINE DEVAUGHN PEN NEEDLE 32 gauge x 5/32" Ndle USE IN THE EVENING DAILY AS DIRECTED 100 each 11    blood sugar diagnostic Strp 1 strip by Misc.(Non-Drug; Combo Route) route 2 (two) times daily. 200 strip 3    blood-glucose meter kit Please provide with insurance covered meter 1 each 0    cetirizine (ZYRTEC) 10 MG tablet Take 10 mg by mouth once daily.      chlorhexidine (PERIDEX) 0.12 % solution RINSE WITH 1/2 OZ FOR 30 SECONDS THEN SPIT AFTER BREAKFAST AND BEFORE BEDTIME  0    HYDROcodone-acetaminophen (NORCO) 5-325 mg per tablet Take 1 tablet by mouth every 6 (six) hours as needed for Pain (moderate to severe). 28 tablet 0    lancets " Misc 1 Device by Misc.(Non-Drug; Combo Route) route 2 (two) times daily. 200 each 3    liraglutide 0.6 mg/0.1 mL, 18 mg/3 mL, subq PNIJ (VICTOZA 2-ANGELES) 0.6 mg/0.1 mL (18 mg/3 mL) PnIj pen Inject 1.8 mg into the skin once daily. 9 mL 11    losartan (COZAAR) 25 MG tablet TAKE 1 TABLET BY MOUTH EVERY DAY 30 tablet 0    medroxyPROGESTERone (DEPO-PROVERA) 150 mg/mL Syrg Inject 1 mL (150 mg total) into the muscle every 3 (three) months. 1 mL 2    metFORMIN (GLUCOPHAGE) 500 MG tablet TAKE 1 TABLET BY MOUTH TWICE A DAY WITH MEALS 60 tablet 0    methylPREDNISolone (MEDROL DOSEPACK) 4 mg tablet Take as directed 21 tablet 0    omeprazole (PRILOSEC) 40 MG capsule Take 1 capsule (40 mg total) by mouth once daily. 90 capsule 3    phentermine 37.5 MG capsule Take 1 capsule (37.5 mg total) by mouth every morning. 30 capsule 3    vitamin D (VITAMIN D3) 1000 units Tab Take 1,000 Units by mouth once daily.      zolpidem (AMBIEN) 5 MG Tab TAKE 1 TABLET(5 MG) BY MOUTH EVERY NIGHT AS NEEDED 30 tablet 4     No current facility-administered medications for this visit.      Facility-Administered Medications Ordered in Other Visits   Medication Dose Route Frequency Provider Last Rate Last Dose    fentaNYL injection 100 mcg  100 mcg Intravenous Q5 Min PRN Ced Patino MD   100 mcg at 07/27/20 1056    lidocaine (PF) 10 mg/ml (1%) injection 10 mg  1 mL Intradermal Once Brenna Bhavya Livingston MD        midazolam (VERSED) 1 mg/mL injection 0.5 mg  0.5 mg Intravenous PRN Ced Patino MD   2 mg at 07/27/20 1322    ropivacaine (Naropin) 1000 mg/500 mL (2 mg/mL) Nimbus PainPRO Pump infusion  4 mL/hr Perineural Continuous Ced Patino MD 4 mL/hr at 07/27/20 1519 4 mL/hr at 07/27/20 1519       Past Medical History:   Diagnosis Date    Chronic back pain     Diabetes type 2, uncontrolled     Mild nonproliferative diabetic retinopathy of left eye without macular edema associated with type 2 diabetes mellitus  10/21/2019    Morbid obesity with BMI of 40.0-44.9, adult     MILEY on CPAP     Plantar fasciitis of left foot     Urticaria        Past Surgical History:   Procedure Laterality Date    ARTHROSCOPIC ACROMIOPLASTY OF SHOULDER Left 7/27/2020    Procedure: ACROMIOPLASTY, ARTHROSCOPIC;  Surgeon: Marcela Garza MD;  Location: [facility] OR;  Service: Orthopedics;  Laterality: Left;    ARTHROSCOPIC REPAIR OF ROTATOR CUFF OF SHOULDER Right 7/27/2020    Procedure: REPAIR, ROTATOR CUFF, ARTHROSCOPIC;  Surgeon: Marcela Garza MD;  Location: [facility] OR;  Service: Orthopedics;  Laterality: Right;  GENERAL/REGIONAL    CYST REMOVAL      ovarian cyst removal at age 12    DECOMPRESSION OF SUBACROMIAL SPACE Left 7/27/2020    Procedure: DECOMPRESSION, SUBACROMIAL SPACE;  Surgeon: Marcela Garza MD;  Location: [facility] OR;  Service: Orthopedics;  Laterality: Left;    DILATION AND CURETTAGE OF UTERUS      ESOPHAGEAL MOTILITY STUDY USING HIGH RESOLUTION MANOMETRY N/A 9/17/2019    Procedure: MOTILITY STUDY, ESOPHAGUS, USING HIGH RESOLUTION MANOMETRY;  Surgeon: Leopoldo Swanson MD;  Location: [facility] ([address] [place] [address]);  Service: Endoscopy;  Laterality: N/A;  Pt will be taking xanax before procedure to hopefull help with anxiety.    ESOPHAGOGASTRODUODENOSCOPY N/A 6/14/2019    Procedure: EGD (ESOPHAGOGASTRODUODENOSCOPY);  Surgeon: Ced Guevraa MD;  Location: [facility] ([address] [place] [address]);  Service: Endoscopy;  Laterality: N/A;  Please make sure it is scheduled after her cardiology appt.- see cardiology note dated 6/13/19 for clearance - ERW    INJECTION OF STEROID Left 7/27/2020    Procedure: INJECTION, STEROID LEFT THUMB TRIGGER FINGER;  Surgeon: Marcela Garza MD;  Location: [facility] OR;  Service: Orthopedics;  Laterality: Left;  LEFT THUMB, TRIGGER FINGER    TRIGGER FINGER RELEASE Right 10/4/2019    Procedure: RELEASE, TRIGGER FINGER - right thumb;  Surgeon: Craig Tom MD;  Location: [facility] OR;  Service: Orthopedics;  Laterality:  "Right;    UPPER GASTROINTESTINAL ENDOSCOPY      WISDOM TOOTH EXTRACTION           Review of Systems:  Review of Systems   Constitution: Negative for chills and fever.   Skin: Negative for rash and suspicious lesions.   Musculoskeletal:        See HPI   Neurological: Negative for dizziness, headaches, light-headedness, numbness and paresthesias.   Psychiatric/Behavioral: Negative for depression. The patient is not nervous/anxious.          OBJECTIVE:     PHYSICAL EXAM:  Height: 5' 6" (167.6 cm) Weight: 122.5 kg (270 lb)  Vitals:    10/12/20 1006   BP: 121/79   Pulse: 86   Weight: 122.5 kg (270 lb)   Height: 5' 6" (1.676 m)   PainSc:   5     General    Vitals reviewed.  Constitutional: She is oriented to person, place, and time. She appears well-developed and well-nourished.   HENT:   Head: Normocephalic and atraumatic.   Neck: Normal range of motion.   Cardiovascular: Normal rate.    Pulmonary/Chest: Effort normal. No respiratory distress.   Neurological: She is alert and oriented to person, place, and time.   Psychiatric: She has a normal mood and affect. Her behavior is normal. Judgment and thought content normal.             Musculoskeletal:  Surgical incisions right shoulder healing well.  No ecchymosis, no edema.  Right shoulder nontender to palpation, left shoulder mildly tender posteriorly.  She also notes mild tenderness over the lateral epicondyle of the left elbow.  Decreased shoulder range of motion bilaterally, right shoulder motion is improving status post surgery.  Left shoulder motion limited to 45° of forward flexion and abduction, limited IR, ER, adduction.  Difficult to assess impingement sign due to patient's extremely limited left shoulder motion.  Good elbow, wrist, and finger range of motion.  Patient reports mild discomfort with elbow flexion and extension. Neurovascularly intact-good sensation and motor function, good capillary refill.    RADIOGRAPHS:  Left Shoulder XRay, " 10/8/2020  FINDINGS:  There is no acute fracture or dislocation the left shoulder.  The humeral head is well seated within the glenoid.  There is subacromial spurring.  There are osteophytes along the greater tuberosity of the humerus.  There are degenerative changes of the acromioclavicular joint.  The remaining visualized osseous structures are unremarkable.     Impression:   Degenerative changes of the left shoulder as above without acute osseous abnormality.    Comments: I have personally reviewed the imaging and I agree with the above radiologist's report.    ASSESSMENT/PLAN:   Velma was seen today for pain.    Diagnoses and all orders for this visit:    Rotator cuff tear arthropathy of right shoulder  -     Ambulatory referral/consult to Physical/Occupational Therapy    Orthopedic aftercare    Impaired range of motion of both shoulders  -     Ambulatory referral/consult to Physical/Occupational Therapy    Other orders  -     triamcinolone acetonide injection 80 mg  -     bupivacaine 0.25% (2.5 mg/ml) injection 10 mg        - discussed continued postsurgical rehab process, also discussed new left shoulder pain and decreased motion.  Discussed conservative treatment options.  - continue regular PT for right shoulder postsurgical rehab.  - start PT for left shoulder  - patient is interested in a left shoulder steroid injection today.  - continue ice and ibuprofen as needed  - call with questions or concerns  - 3 month follow-up for right shoulder postsurgical follow-up    PROCEDURE NOTE:  I have explained the risks, benefits, and alternatives of the procedure in detail.  The patient voices understanding and all questions have been answered.  The patient agrees to proceed as planned, consents to injection. Pause for timeout. After a sterile prep of the skin performed in the normal fashion, the left shoulder is injected from the posterior approach using a 22 gauge needle with a combination of 4 cc 0.25% marcaine  and 80 mg of kenalog. The patient is cautioned and immediate relief of pain is secondary to the local anesthetic and will be temporary.  After the anesthetic wears off there may be a increase in pain that may last for a few hours or a few days and they should use ice to help alleviate this flair up of pain.       Disclaimer: This note has been generated using voice-recognition software. There may be typographical errors that have been missed during proof-reading.

## 2020-10-13 ENCOUNTER — PATIENT MESSAGE (OUTPATIENT)
Dept: REHABILITATION | Facility: HOSPITAL | Age: 45
End: 2020-10-13

## 2020-10-14 ENCOUNTER — CLINICAL SUPPORT (OUTPATIENT)
Dept: REHABILITATION | Facility: HOSPITAL | Age: 45
End: 2020-10-14
Payer: MEDICAID

## 2020-10-14 ENCOUNTER — TELEPHONE (OUTPATIENT)
Dept: REHABILITATION | Facility: HOSPITAL | Age: 45
End: 2020-10-14

## 2020-10-14 DIAGNOSIS — R29.898 WEAKNESS OF RIGHT ARM: ICD-10-CM

## 2020-10-14 DIAGNOSIS — M25.511 ACUTE PAIN OF RIGHT SHOULDER: ICD-10-CM

## 2020-10-14 DIAGNOSIS — M25.611 DECREASED RANGE OF MOTION OF RIGHT SHOULDER: ICD-10-CM

## 2020-10-14 PROCEDURE — 97140 MANUAL THERAPY 1/> REGIONS: CPT | Mod: PN

## 2020-10-14 NOTE — PROGRESS NOTES
Physical Therapy Treatment Note     Name: Velma Lopez  Clinic Number: [id number]    Therapy Diagnosis:   Encounter Diagnoses   Name Primary?    Decreased range of motion of right shoulder     Weakness of right arm     Acute pain of right shoulder      Physician: Mandi Larios PA    Visit Date: 10/14/2020    Physician Orders: PT Eval and Treat:  S/p right rotator cuff repair  Dr. Garza RTC repair protocol  Eval and Treat, modalities as needed  8+ visits  Start PT in 2 weeks     Medical Diagnosis from Referral: M75.101,M12.811 (ICD-10-CM) - Rotator cuff tear arthropathy of right shoulder  Right shoulder arthroscopic subacromial decompression.  Right shoulder arthroscopic rotator cuff repair.  Right shoulder labral debridement  Right shoulder chondroplasty  Left trigger thumb A1 pulley corticosteroid injection      Evaluation Date: 8/10/2020  Authorization Period Expiration: 10/30/2020  Plan of Care Expiration: 9/21/2020 to 11/6/2020  Visit # / Visits authorized: 8/12  FOTO: 8/10     Time In: 1:05 pm - pt arrived late  Time Out: 1:35 pm  Total Billable Time: 30 minutes (2 MT)  Precautions: DM II  S/P (R) RTC repair: 7/27/2020    Subjective     Pt reports: she was late to appointment because she thought it was for 1:15 pm. Patient reports since last visit her (L) shoulder was hurting more after laying on her (L) side. After clarifying, patient reported she did not have any increase in pain at PT or following (L) side lying exercise - (L) shoulder pain onset the next day when she attempted to reach for something overhead. Since then, patient went to the MD and received an x-ray with reports of bursitis along with cortisone injection, which helped tremendously with her (L) shoulder pain as NPRS decreased from 10/10 to 2/10.   She was compliant with home exercise program.  Response to previous treatment: no adverse effects.   Functional change: none voiced today.     Pain: (R) shoulder: 5/10; (L)  shoulder: 2/10  Location: right shoulder     Objective       Velma received therapeutic exercises to develop strength, endurance, ROM, flexibility and posture for 0 minutes including objective measurements:  HELD DUE TO LATE ARRIVAL  - Sidelying Shoulder ABD  2x10, pain free range   - Sidelying Shoulder ER  2x10, pain free range  - Supine Shoulder Flexion  2x10  - Supine flexion rhyth stab  4 rounds   - supine  ER/IR rhyt stab  4 rounds  - prone rows    2x10  - standing rows   GTB; 2x12  - scapular punches   GTB; 2x12  - Pulley - FLX/Scaption  2' each  (not today)    Velma received the following manual therapy techniques:total time 25 minutes  - PROM (R) shoulder flexion/scaption, ER to patient tolerance  - (R) grade III/IV shoulder mobs  - (L) Median/ulnar/radial nerve glides  - (L) posterior shoulder mobs   - Light cupping to (L) biceps proximal long head and anterior/lateral shoulder    Velma received cold pack for 5 minutes to R shoulder complex for pain and inflammation control.      Home Exercises Provided and Patient Education Provided   Education provided:   - continue HEP  - HEP 3x/day  - compliance with sling wear.   - reviewed surgical precautions, healing time frames, progression with PT in regards to PROM --> AROM and lifting restrictions    Written Home Exercises Provided: yes.  Exercises were reviewed and Velma was able to demonstrate them prior to the end of the session.  Velma demonstrated good  understanding of the education provided.     See EMR under Media for exercises provided 8/14/2020.    Assessment   A: Mrs. Lopez is a 44 y/o F with multi co-morbidities now s/p R shoulder arthroscopy including supraspinatus (1 cm) full-thickness repair with concomitant procedures.  Surgical date: 07/27/2020.  Post-op week 11.  Lacks terminal flexion and ER ROM.  No pain at rest.  Pain superolaterally with end-range flexion.  Decreased scapular strength.  Graded linear strength progression.  Not  able to tolerate supine eccentric flexion at this time.  Patient recently experienced (L) shoulder pain after reaching overhead. Patient's signs and symptoms for (L) shoulder are consistent with bursitis and possible biceps tendinitis. Patient arrived 20 minutes late to appointment, which limited treatment to manual therapy and assessment of (L) shoulder pain.     Velma is progressing well towards her goals.   Pt prognosis is Excellent.     Pt will continue to benefit from skilled outpatient physical therapy to address the deficits listed in the problem list box on initial evaluation, provide pt/family education and to maximize pt's level of independence in the home and community environment.   Pt's spiritual, cultural and educational needs considered and pt agreeable to plan of care and goals.     Anticipated barriers to physical therapy: co-morbidities    Goals:   Short-term goals  1. Pt will be compliant with HEP to assist PT treatment in restoring pain free motion of the R shoulder. Progressing towards; not met  2. Pt will improve impaired shoulder MMTs 1/2 grade B to improve strength for functional tasks. Progressing towards; not met  3. Pt will improve R shoulder flexion to >/= 20 deg to improve functional mobility of UEs.  MET 9/21/2020  4. Pt will improveR shoulder abduction to >/= 20 deg to improve functional mobility of UEs.  MET 9/21/2020    Long Term Goals Status:   1. Pt will improve FOTO score to </= 35% to demonstrate improvements in carrying, moving, and handling objects.  Progressing towards; not met  2. Pt will improve impaired shoulder MMTs 1 grade B to improve strength for household duties.  Progressing towards; not met  3. Pt will improve R shoulder flexion to >/= 160 deg to improve functional mobility of UEs.  Progressing towards; not met  4. Pt will improve R shoulder abduction to >/= 160 deg to improve functional mobility of UEs.  Progressing towards; not met  5. Pt will move R shoulder  through functional ROM in all planes without pain to improve functional QOL.  Progressing towards; not met  6. Pt will perform prior level of household duties c/o pain to improve functional QOL. Progressing towards; not met          Plan   Phase II of post-op RTC repair (small) program.     Updated Certification Period: 9/21/2020 to 11/6/2020  Recommended Treatment Plan: 2 times per week for 6 weeks: Cervical/Lumbar Traction, Electrical Stimulation , FDN prn, Manual Therapy, Moist Heat/ Ice, Neuromuscular Re-ed, Patient Education, Therapeutic Activites, Therapeutic Exercise, Ultrasound and Kinesiotape prn  Other Recommendations: Continue to follow RTC repair protocol.   Jacinto García, PT

## 2020-10-16 ENCOUNTER — CLINICAL SUPPORT (OUTPATIENT)
Dept: REHABILITATION | Facility: HOSPITAL | Age: 45
End: 2020-10-16
Payer: MEDICAID

## 2020-10-16 DIAGNOSIS — R29.898 WEAKNESS OF RIGHT ARM: ICD-10-CM

## 2020-10-16 DIAGNOSIS — M25.511 ACUTE PAIN OF RIGHT SHOULDER: ICD-10-CM

## 2020-10-16 DIAGNOSIS — M25.611 DECREASED RANGE OF MOTION OF RIGHT SHOULDER: ICD-10-CM

## 2020-10-16 PROCEDURE — 97110 THERAPEUTIC EXERCISES: CPT | Mod: PN

## 2020-10-16 NOTE — PROGRESS NOTES
Physical Therapy Treatment Note     Name: Velma Lopez  Clinic Number: [id number]    Therapy Diagnosis:   Encounter Diagnoses   Name Primary?    Decreased range of motion of right shoulder     Weakness of right arm     Acute pain of right shoulder      Physician: Mandi Larios PA    Visit Date: 10/16/2020    Physician Orders: PT Eval and Treat:  S/p right rotator cuff repair  Dr. Garza RTC repair protocol  Eval and Treat, modalities as needed  8+ visits  Start PT in 2 weeks     Medical Diagnosis from Referral: M75.101,M12.811 (ICD-10-CM) - Rotator cuff tear arthropathy of right shoulder  Right shoulder arthroscopic subacromial decompression.  Right shoulder arthroscopic rotator cuff repair.  Right shoulder labral debridement  Right shoulder chondroplasty  Left trigger thumb A1 pulley corticosteroid injection      Evaluation Date: 8/10/2020  Authorization Period Expiration: 10/30/2020  Plan of Care Expiration: 9/21/2020 to 11/6/2020  Visit # / Visits authorized: 9/12  FOTO: 9/10     Time In: 1120  Time Out: 1200  Total Billable Time: 35 minutes (2 TE)  Precautions: DM II  S/P (R) RTC repair: 7/27/2020    Subjective     Pt reports: that her R shoulder feels sore and tight but better than before surgery.  Continues to have less L shoulder pain but still unable to reach overhead without pain   She was compliant with home exercise program.  Response to previous treatment: no adverse effects.   Functional change: none voiced today.     Pain: (R) shoulder: 4/10; (L) shoulder: 2/10  Location: right shoulder     Objective     Shoulder Right       PROM 08/14 09/02  10/16   flexion  130 136 135    abduction NT 80 90    Internal rotation  NT 40 45   ER scapular plane 44 56 -   ER at 0° abd 40 52 60 (34 at 90/90)         Velma received therapeutic exercises to develop strength, endurance, ROM, flexibility and posture for 10 minutes including objective measurements:  - supine wand ER   T-bar at 90 abd;  "15x5"  - Supine flexion rhyth stab  4 rounds   - supine  ER/IR rhyt stab  4 rounds    Not today:  - Sidelying Shoulder ABD  2x10, pain free range   - Sidelying Shoulder ER  2x10, pain free range  - Supine Shoulder Flexion  2x10  - prone rows    2x10  - standing rows   GTB; 2x12  - scapular punches   GTB; 2x12  - Pulley - FLX/Scaption  2' each  (not today)    Velma received the following manual therapy techniques:total time 25 minutes  - PROM (R) shoulder flexion/scaption, ER to patient tolerance  - (R) grade III/IV shoulder mobs    Velma received cold pack for 5 minutes to R shoulder complex for pain and inflammation control.      Home Exercises Provided and Patient Education Provided   Education provided:   - continue HEP  - HEP 3x/day  - compliance with sling wear.   - reviewed surgical precautions, healing time frames, progression with PT in regards to PROM --> AROM and lifting restrictions    Written Home Exercises Provided: yes.  Exercises were reviewed and Velma was able to demonstrate them prior to the end of the session.  Velma demonstrated good  understanding of the education provided.     See EMR under Media for exercises provided 8/14/2020.    Assessment   A: Mrs. Lopez is a 44 y/o F with multi co-morbidities now s/p R shoulder arthroscopy including supraspinatus (1 cm) full-thickness repair with concomitant procedures.  Surgical date: 07/27/2020.  Post-op week 12.  Has complete 9 total visits since her initial evaluation in August.  Lacks terminal flexion and ER ROM; stiff in ER but muscle guarding into flexion.  No pain at rest but TTP along anterior shoulder and biciptal groove area.    Decreased scapular strength.  Graded linear strength progression.  Not able to tolerate supine eccentric flexion at this time.  Limited positioning due to L shoulder pain.      Velma is progressing well towards her goals.   Pt prognosis is Excellent.     Pt will continue to benefit from skilled outpatient " physical therapy to address the deficits listed in the problem list box on initial evaluation, provide pt/family education and to maximize pt's level of independence in the home and community environment.   Pt's spiritual, cultural and educational needs considered and pt agreeable to plan of care and goals.     Anticipated barriers to physical therapy: co-morbidities    Goals:   Short-term goals  1. Pt will be compliant with HEP to assist PT treatment in restoring pain free motion of the R shoulder. Progressing towards; not met  2. Pt will improve impaired shoulder MMTs 1/2 grade B to improve strength for functional tasks. Progressing towards; not met  3. Pt will improve R shoulder flexion to >/= 20 deg to improve functional mobility of UEs.  MET 9/21/2020  4. Pt will improveR shoulder abduction to >/= 20 deg to improve functional mobility of UEs.  MET 9/21/2020    Long Term Goals Status:   1. Pt will improve FOTO score to </= 35% to demonstrate improvements in carrying, moving, and handling objects.  Progressing towards; not met  2. Pt will improve impaired shoulder MMTs 1 grade B to improve strength for household duties.  Progressing towards; not met  3. Pt will improve R shoulder flexion to >/= 160 deg to improve functional mobility of UEs.  Progressing towards; not met  4. Pt will improve R shoulder abduction to >/= 160 deg to improve functional mobility of UEs.  Progressing towards; not met  5. Pt will move R shoulder through functional ROM in all planes without pain to improve functional QOL.  Progressing towards; not met  6. Pt will perform prior level of household duties c/o pain to improve functional QOL. Progressing towards; not met          Plan   Phase II of post-op RTC repair (small) program.   Needs work to achieve terminal flexion and ER    Updated Certification Period: 9/21/2020 to 11/6/2020  Recommended Treatment Plan: 2 times per week for 6 weeks: Cervical/Lumbar Traction, Electrical Stimulation ,  FDN prn, Manual Therapy, Moist Heat/ Ice, Neuromuscular Re-ed, Patient Education, Therapeutic Activites, Therapeutic Exercise, Ultrasound and Kinesiotape prn  Other Recommendations: Continue to follow RTC repair protocol.     Jean Pérez, PT

## 2020-10-23 ENCOUNTER — CLINICAL SUPPORT (OUTPATIENT)
Dept: REHABILITATION | Facility: HOSPITAL | Age: 45
End: 2020-10-23
Payer: MEDICAID

## 2020-10-23 DIAGNOSIS — R29.898 WEAKNESS OF RIGHT ARM: ICD-10-CM

## 2020-10-23 DIAGNOSIS — M25.511 ACUTE PAIN OF RIGHT SHOULDER: ICD-10-CM

## 2020-10-23 DIAGNOSIS — M25.611 DECREASED RANGE OF MOTION OF RIGHT SHOULDER: ICD-10-CM

## 2020-10-23 PROCEDURE — 97110 THERAPEUTIC EXERCISES: CPT | Mod: PN

## 2020-10-26 NOTE — PROGRESS NOTES
Physical Therapy Treatment Note     Name: Velma Lopez  Clinic Number: [id number]    Therapy Diagnosis:   Encounter Diagnoses   Name Primary?    Decreased range of motion of right shoulder     Weakness of right arm     Acute pain of right shoulder      Physician: Mandi Larios PA    Visit Date: 10/23/2020    Physician Orders: PT Eval and Treat:  S/p right rotator cuff repair  Dr. Garza RTC repair protocol  Eval and Treat, modalities as needed  8+ visits  Start PT in 2 weeks     Medical Diagnosis from Referral: M75.101,M12.811 (ICD-10-CM) - Rotator cuff tear arthropathy of right shoulder  Right shoulder arthroscopic subacromial decompression.  Right shoulder arthroscopic rotator cuff repair.  Right shoulder labral debridement  Right shoulder chondroplasty  Left trigger thumb A1 pulley corticosteroid injection      Evaluation Date: 8/10/2020  Authorization Period Expiration: 10/30/2020  Plan of Care Expiration: 9/21/2020 to 11/6/2020  Visit # / Visits authorized: 10/12  FOTO: 10/10     Time In: 1205  Time Out: 1250  Total Billable Time: 45 minutes (3 TE)  Precautions: DM II  S/P (R) RTC repair: 7/27/2020    Subjective     Pt reports: she doesn't want a hot pack to start today.  Reports that her L shoulder seems to be back to normal.   She was compliant with home exercise program.  Response to previous treatment: no adverse effects.   Functional change: none voiced today.     Pain: (R) shoulder: 4/10; (L) shoulder: 2/10  Location: right shoulder     Objective     Shoulder Right        PROM 08/14 09/02  10/16 10/23   flexion  130 136 135  131   abduction NT 80 90  90   Internal rotation  NT 40 45 50   ER scapular plane 44 56 - 60   ER at 0° abd 40 52 60 (34 at 90/90) 45 at 90/90       Velma received therapeutic exercises to develop strength, endurance, ROM, flexibility and posture for 20 minutes including objective measurements:  - supine ER/IR  in scap plane  3x10 2# dumbell; mid-range IR and  full range ER  - Supine flexion rhyth stab  3 rounds   - supine  ER/IR rhyt stab  3 rounds  - supine wand flexion   T-bar; 2x10  - standing IR walk-outs  RTB; 10x  - standing ER theraband  YTB; 3x10  - standing rows   GTB; 2x12    Not today: ** no L sidelying at this time.   - Sidelying Shoulder ABD  2x10, pain free range   - Sidelying Shoulder ER  2x10, pain free range  - Supine Shoulder Flexion  2x10  - prone rows    2x10  - scapular punches   GTB; 2x12  - Pulley - FLX/Scaption  2' each  (not today)    Velma received the following manual therapy techniques:total time 20 minutes  - PROM (R) shoulder flexion/scaption, ER to patient tolerance  - (R) grade III/IV shoulder mobs  - scapular pinning release with flexion    Velma received cold pack for 5 minutes to R shoulder complex for pain and inflammation control.      Home Exercises Provided and Patient Education Provided   Education provided:   - continue HEP  - HEP 3x/day  - compliance with sling wear.   - reviewed surgical precautions, healing time frames, progression with PT in regards to PROM --> AROM and lifting restrictions    Written Home Exercises Provided: yes.  Exercises were reviewed and Velma was able to demonstrate them prior to the end of the session.  Velma demonstrated good  understanding of the education provided.     See EMR under Media for exercises provided 8/14/2020.    Assessment   A: Mrs. Lopez is a 46 y/o F with multi co-morbidities now s/p R shoulder arthroscopy including supraspinatus (1 cm) full-thickness repair with concomitant procedures.  Surgical date: 07/27/2020.  Post-op week 13.  Has complete 10 total visits since her initial evaluation in August.  Lacks terminal flexion and ER ROM; stiff in ER but muscle guarding into flexion.  No pain at rest but TTP along anterior shoulder and biciptal groove area.    Decreased scapular strength.  Graded linear strength progression.  Not able to tolerate supine eccentric flexion at this  time.  Limited positioning due to recent history of L shoulder pain.   She is very distracted during her PT session today taking multiple phone calls and texting during her session today.  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  PT tried to reiterate the importance of these sessions especially considering her ROM is still limited and that she cannot yet performed full gravity eliminated ER/flexion demonstrating strength deficts.      Velma is progressing well towards her goals.   Pt prognosis is Excellent.     Pt will continue to benefit from skilled outpatient physical therapy to address the deficits listed in the problem list box on initial evaluation, provide pt/family education and to maximize pt's level of independence in the home and community environment.   Pt's spiritual, cultural and educational needs considered and pt agreeable to plan of care and goals.     Anticipated barriers to physical therapy: co-morbidities    Goals:   Short-term goals  1. Pt will be compliant with HEP to assist PT treatment in restoring pain free motion of the R shoulder. Progressing towards; not met  2. Pt will improve impaired shoulder MMTs 1/2 grade B to improve strength for functional tasks. Progressing towards; not met  3. Pt will improve R shoulder flexion to >/= 20 deg to improve functional mobility of UEs.  MET 9/21/2020  4. Pt will improveR shoulder abduction to >/= 20 deg to improve functional mobility of UEs.  MET 9/21/2020    Long Term Goals Status:   1. Pt will improve FOTO score to </= 35% to demonstrate improvements in carrying, moving, and handling objects.  Progressing towards; not met  2. Pt will improve impaired shoulder MMTs 1 grade B to improve strength for household duties.  Progressing towards; not met  3. Pt will improve R shoulder flexion to >/= 160 deg to improve functional mobility of UEs.  Progressing towards; not met  4. Pt will improve R shoulder abduction to >/= 160 deg to improve functional mobility of UEs.   Progressing towards; not met  5. Pt will move R shoulder through functional ROM in all planes without pain to improve functional QOL.  Progressing towards; not met  6. Pt will perform prior level of household duties c/o pain to improve functional QOL. Progressing towards; not met          Plan   Phase II of post-op RTC repair (small) program.   Needs work to achieve terminal flexion and ER    Updated Certification Period: 9/21/2020 to 11/6/2020  Recommended Treatment Plan: 2 times per week for 6 weeks: Cervical/Lumbar Traction, Electrical Stimulation , FDN prn, Manual Therapy, Moist Heat/ Ice, Neuromuscular Re-ed, Patient Education, Therapeutic Activites, Therapeutic Exercise, Ultrasound and Kinesiotape prn  Other Recommendations: Continue to follow RTC repair protocol.     Jean Pérez, PT

## 2020-10-27 ENCOUNTER — PATIENT MESSAGE (OUTPATIENT)
Dept: ORTHOPEDICS | Facility: CLINIC | Age: 45
End: 2020-10-27

## 2020-10-28 ENCOUNTER — HOSPITAL ENCOUNTER (EMERGENCY)
Facility: HOSPITAL | Age: 45
Discharge: HOME | End: 2020-10-28
Attending: EMERGENCY MEDICINE
Payer: MEDICAID

## 2020-10-28 ENCOUNTER — TELEPHONE (OUTPATIENT)
Dept: ORTHOPEDICS | Facility: CLINIC | Age: 45
End: 2020-10-28

## 2020-10-28 VITALS
DIASTOLIC BLOOD PRESSURE: 88 MMHG | HEART RATE: 84 BPM | WEIGHT: 268 LBS | BODY MASS INDEX: 43.07 KG/M2 | TEMPERATURE: 98 F | RESPIRATION RATE: 18 BRPM | HEIGHT: 66 IN | SYSTOLIC BLOOD PRESSURE: 175 MMHG | OXYGEN SATURATION: 98 %

## 2020-10-28 DIAGNOSIS — R52 PAIN: ICD-10-CM

## 2020-10-28 DIAGNOSIS — M25.512 ACUTE PAIN OF LEFT SHOULDER: Primary | ICD-10-CM

## 2020-10-28 DIAGNOSIS — R25.2 SPASM: ICD-10-CM

## 2020-10-28 LAB
ALBUMIN SERPL BCP-MCNC: 3.9 G/DL (ref 3.5–5.2)
ALP SERPL-CCNC: 85 U/L (ref 38–126)
ALT SERPL W/O P-5'-P-CCNC: 14 U/L (ref 10–44)
ANION GAP SERPL CALC-SCNC: 8 MMOL/L (ref 8–16)
AST SERPL-CCNC: 18 U/L (ref 15–46)
BASOPHILS # BLD AUTO: 0.09 K/UL (ref 0–0.2)
BASOPHILS NFR BLD: 0.7 % (ref 0–1.9)
BILIRUB SERPL-MCNC: 0.6 MG/DL (ref 0.1–1)
BUN SERPL-MCNC: 12 MG/DL (ref 7–17)
CALCIUM SERPL-MCNC: 9.2 MG/DL (ref 8.7–10.5)
CHLORIDE SERPL-SCNC: 105 MMOL/L (ref 95–110)
CO2 SERPL-SCNC: 26 MMOL/L (ref 23–29)
CREAT SERPL-MCNC: 0.69 MG/DL (ref 0.5–1.4)
CRP SERPL-MCNC: 2.28 MG/DL (ref 0–1)
DIFFERENTIAL METHOD: ABNORMAL
EOSINOPHIL # BLD AUTO: 0.4 K/UL (ref 0–0.5)
EOSINOPHIL NFR BLD: 3 % (ref 0–8)
ERYTHROCYTE [DISTWIDTH] IN BLOOD BY AUTOMATED COUNT: 12.8 % (ref 11.5–14.5)
ERYTHROCYTE [SEDIMENTATION RATE] IN BLOOD BY WESTERGREN METHOD: 37 MM/HR (ref 0–20)
EST. GFR  (AFRICAN AMERICAN): >60 ML/MIN/1.73 M^2
EST. GFR  (NON AFRICAN AMERICAN): >60 ML/MIN/1.73 M^2
GLUCOSE SERPL-MCNC: 127 MG/DL (ref 70–110)
HCT VFR BLD AUTO: 42.3 % (ref 37–48.5)
HGB BLD-MCNC: 13.6 G/DL (ref 12–16)
IMM GRANULOCYTES # BLD AUTO: 0.04 K/UL (ref 0–0.04)
IMM GRANULOCYTES NFR BLD AUTO: 0.3 % (ref 0–0.5)
LYMPHOCYTES # BLD AUTO: 5 K/UL (ref 1–4.8)
LYMPHOCYTES NFR BLD: 36.8 % (ref 18–48)
MCH RBC QN AUTO: 30.8 PG (ref 27–31)
MCHC RBC AUTO-ENTMCNC: 32.2 G/DL (ref 32–36)
MCV RBC AUTO: 96 FL (ref 82–98)
MONOCYTES # BLD AUTO: 1 K/UL (ref 0.3–1)
MONOCYTES NFR BLD: 7.5 % (ref 4–15)
NEUTROPHILS # BLD AUTO: 7 K/UL (ref 1.8–7.7)
NEUTROPHILS NFR BLD: 51.7 % (ref 38–73)
NRBC BLD-RTO: 0 /100 WBC
PLATELET # BLD AUTO: 470 K/UL (ref 150–350)
PMV BLD AUTO: 9.7 FL (ref 9.2–12.9)
POTASSIUM SERPL-SCNC: 3.9 MMOL/L (ref 3.5–5.1)
PROT SERPL-MCNC: 7.2 G/DL (ref 6–8.4)
RBC # BLD AUTO: 4.42 M/UL (ref 4–5.4)
SODIUM SERPL-SCNC: 139 MMOL/L (ref 136–145)
TROPONIN I SERPL DL<=0.01 NG/ML-MCNC: 0.03 NG/ML (ref 0.01–0.03)
WBC # BLD AUTO: 13.46 K/UL (ref 3.9–12.7)

## 2020-10-28 PROCEDURE — 85652 RBC SED RATE AUTOMATED: CPT

## 2020-10-28 PROCEDURE — 93005 ELECTROCARDIOGRAM TRACING: CPT | Mod: ER

## 2020-10-28 PROCEDURE — 99285 EMERGENCY DEPT VISIT HI MDM: CPT | Mod: 25,ER

## 2020-10-28 PROCEDURE — 84484 ASSAY OF TROPONIN QUANT: CPT | Mod: ER

## 2020-10-28 PROCEDURE — 80053 COMPREHEN METABOLIC PANEL: CPT | Mod: ER

## 2020-10-28 PROCEDURE — 63600175 PHARM REV CODE 636 W HCPCS: Mod: ER | Performed by: EMERGENCY MEDICINE

## 2020-10-28 PROCEDURE — 96374 THER/PROPH/DIAG INJ IV PUSH: CPT | Mod: ER

## 2020-10-28 PROCEDURE — 93010 ELECTROCARDIOGRAM REPORT: CPT | Mod: ,,, | Performed by: INTERNAL MEDICINE

## 2020-10-28 PROCEDURE — 86140 C-REACTIVE PROTEIN: CPT | Mod: ER

## 2020-10-28 PROCEDURE — 93010 EKG 12-LEAD: ICD-10-PCS | Mod: ,,, | Performed by: INTERNAL MEDICINE

## 2020-10-28 PROCEDURE — 85025 COMPLETE CBC W/AUTO DIFF WBC: CPT | Mod: ER

## 2020-10-28 PROCEDURE — 96375 TX/PRO/DX INJ NEW DRUG ADDON: CPT | Mod: ER

## 2020-10-28 RX ORDER — DICLOFENAC SODIUM 10 MG/G
2 GEL TOPICAL 4 TIMES DAILY
Qty: 100 G | Refills: 0 | Status: SHIPPED | OUTPATIENT
Start: 2020-10-28 | End: 2022-08-25

## 2020-10-28 RX ORDER — ONDANSETRON 2 MG/ML
4 INJECTION INTRAMUSCULAR; INTRAVENOUS
Status: COMPLETED | OUTPATIENT
Start: 2020-10-28 | End: 2020-10-28

## 2020-10-28 RX ORDER — MORPHINE SULFATE 4 MG/ML
6 INJECTION, SOLUTION INTRAMUSCULAR; INTRAVENOUS
Status: COMPLETED | OUTPATIENT
Start: 2020-10-28 | End: 2020-10-28

## 2020-10-28 RX ORDER — OXYCODONE AND ACETAMINOPHEN 5; 325 MG/1; MG/1
1 TABLET ORAL EVERY 4 HOURS PRN
Qty: 12 TABLET | Refills: 0 | Status: ON HOLD | OUTPATIENT
Start: 2020-10-28 | End: 2022-08-25 | Stop reason: HOSPADM

## 2020-10-28 RX ORDER — KETOROLAC TROMETHAMINE 30 MG/ML
10 INJECTION, SOLUTION INTRAMUSCULAR; INTRAVENOUS
Status: COMPLETED | OUTPATIENT
Start: 2020-10-28 | End: 2020-10-28

## 2020-10-28 RX ADMIN — KETOROLAC TROMETHAMINE 10 MG: 30 INJECTION, SOLUTION INTRAMUSCULAR; INTRAVENOUS at 09:10

## 2020-10-28 RX ADMIN — ONDANSETRON 4 MG: 2 INJECTION INTRAMUSCULAR; INTRAVENOUS at 09:10

## 2020-10-28 RX ADMIN — MORPHINE SULFATE 6 MG: 4 INJECTION INTRAVENOUS at 09:10

## 2020-10-28 NOTE — ED PROVIDER NOTES
Encounter Date: 10/28/2020       History     Chief Complaint   Patient presents with    Shoulder Pain     Pt with c/o L arm and shoulder pain that started on Monday. Pt denies injury.      45-year-old female who presents for intense left shoulder pain.  The pain feels like spasms shooting from the shoulder into the arm.  She has had similar pains in the past though not quite as intense along the right side for which in the past she has undergone a rotator cuff repair.  Because of the rotator cuff repair she had had on the right side she has been in therapy for some time for both of her upper arms.  She attempted to contact her primary orthopedic surgeon but prior to being able to discuss her issues with them the pain became too intense so she decided to come to the emergency department.  She denies any recent falls or injuries or other traumas.  She denies any fevers or chills, she denies any chest pain or shortness of breath.  Movement seems to make it worse, nothing has seemed to make it better.  She has not tried to take anything to make it better.  She has had intense pain in this shoulder in the past for which she was seen 2 weeks prior and underwent to steroid injection in the shoulder which did offer near complete resolution of her symptoms for 2-3 days but then worsened afterwards.        Review of patient's allergies indicates:  No Known Allergies  Past Medical History:   Diagnosis Date    Chronic back pain     Diabetes type 2, uncontrolled     Mild nonproliferative diabetic retinopathy of left eye without macular edema associated with type 2 diabetes mellitus 10/21/2019    Morbid obesity with BMI of 40.0-44.9, adult     MILEY on CPAP     Plantar fasciitis of left foot     Urticaria      Past Surgical History:   Procedure Laterality Date    ARTHROSCOPIC ACROMIOPLASTY OF SHOULDER Left 7/27/2020    Procedure: ACROMIOPLASTY, ARTHROSCOPIC;  Surgeon: Marcela Garza MD;  Location: [facility] OR;  Service:  Orthopedics;  Laterality: Left;    ARTHROSCOPIC REPAIR OF ROTATOR CUFF OF SHOULDER Right 7/27/2020    Procedure: REPAIR, ROTATOR CUFF, ARTHROSCOPIC;  Surgeon: Marcela Garza MD;  Location: [facility] OR;  Service: Orthopedics;  Laterality: Right;  GENERAL/REGIONAL    CYST REMOVAL      ovarian cyst removal at age 12    DECOMPRESSION OF SUBACROMIAL SPACE Left 7/27/2020    Procedure: DECOMPRESSION, SUBACROMIAL SPACE;  Surgeon: Marcela Garza MD;  Location: [facility] OR;  Service: Orthopedics;  Laterality: Left;    DILATION AND CURETTAGE OF UTERUS      ESOPHAGEAL MOTILITY STUDY USING HIGH RESOLUTION MANOMETRY N/A 9/17/2019    Procedure: MOTILITY STUDY, ESOPHAGUS, USING HIGH RESOLUTION MANOMETRY;  Surgeon: Leopoldo Swanson MD;  [facility]  Service: Endoscopy;  Laterality: N/A;  Pt will be taking xanax before procedure to hopefull help with anxiety.    ESOPHAGOGASTRODUODENOSCOPY N/A 6/14/2019    Procedure: EGD (ESOPHAGOGASTRODUODENOSCOPY);  Surgeon: Ced Guevara MD;  Location: [facility] ([address] [place] [address]);  Service: Endoscopy;  Laterality: N/A;  Please make sure it is scheduled after her cardiology appt.- see cardiology note dated 6/13/19 for clearance - ERW    INJECTION OF STEROID Left 7/27/2020    Procedure: INJECTION, STEROID LEFT THUMB TRIGGER FINGER;  Surgeon: Marcela Garza MD;  Location: [facility] OR;  Service: Orthopedics;  Laterality: Left;  LEFT THUMB, TRIGGER FINGER    TRIGGER FINGER RELEASE Right 10/4/2019    Procedure: RELEASE, TRIGGER FINGER - right thumb;  Surgeon: Criag Tom MD;  Location: [facility];  Service: Orthopedics;  Laterality: Right;    UPPER GASTROINTESTINAL ENDOSCOPY      WISDOM TOOTH EXTRACTION       Family History   Problem Relation Age of Onset    Cancer Maternal Grandmother     Diabetes Mother     No Known Problems Sister     No Known Problems Sister     No Known Problems Sister     Breast cancer Maternal Aunt 70    Colon cancer Neg Hx     Stomach cancer  Neg Hx     Inflammatory bowel disease Neg Hx     Esophageal cancer Neg Hx      Social History     Tobacco Use    Smoking status: Former Smoker     Packs/day: 0.50     Types: Cigarettes    Smokeless tobacco: Never Used   Substance Use Topics    Alcohol use: Yes     Frequency: 2-4 times a month     Drinks per session: 1 or 2     Binge frequency: Never     Comment: social    Drug use: No     Review of Systems  Constitutional-no fever  HEENT-no congestion  Eyes-no redness  Respiratory-no shortness of breath  Cardio-no chest pain  GI-no abdominal pain  Endocrine-no cold intolerance  -no difficulty urinating  MSK-positive shoulder pain  Skin-no rashes  Allergy-no environmental allergy  Neurologic-, no headache  Hematology-no swollen nodes  Behavioral-no confusion  Physical Exam     Initial Vitals [10/28/20 0826]   BP Pulse Resp Temp SpO2   (!) 181/94 88 18 98.2 °F (36.8 °C) 97 %      MAP       --         Physical Exam  Constitutional:  Uncomfortable appearing, obvious distress.  Eyes: Conjunctivae normal.  ENT       Head: Normocephalic, atraumatic.       Nose: Normal external appearance        Mouth/Throat: no strigulous respirations   Hematological/Lymphatic/Immunilogical: no visible lymphadenopathy   Cardiovascular: Normal rate,   Respiratory: Normal respiratory effort.   Gastrointestinal: non distended   Musculoskeletal:  Diminished range of motion in left upper extremity.  Profound tenderness to palpation over the left deltoid  Intact sensation in the ulnar median radial nerves in the left upper extremity  Brisk radial pulse on the left upper extremity    Neurologic: Alert, oriented. Normal speech and language. No gross focal neurologic deficits are appreciated.  Skin: Skin is warm, dry. No rash noted.  Psychiatric: Mood and affect are normal.   ED Course   Procedures  Labs Reviewed   CBC W/ AUTO DIFFERENTIAL - Abnormal; Notable for the following components:       Result Value    WBC 13.46 (*)     Platelets  470 (*)     Lymph # 5.0 (*)     All other components within normal limits   COMPREHENSIVE METABOLIC PANEL - Abnormal; Notable for the following components:    Glucose 127 (*)     All other components within normal limits   SEDIMENTATION RATE - Abnormal; Notable for the following components:    Sed Rate 37 (*)     All other components within normal limits   C-REACTIVE PROTEIN - Abnormal; Notable for the following components:    CRP 2.28 (*)     All other components within normal limits   TROPONIN I        ECG Results          EKG 12-lead (Final result)  Result time 10/28/20 11:40:51    Final result by Interface, Lab In [facility] (10/28/20 11:40:51)                 Narrative:    Test Reason : R52,    Vent. Rate : 083 BPM     Atrial Rate : 083 BPM     P-R Int : 140 ms          QRS Dur : 074 ms      QT Int : 370 ms       P-R-T Axes : 065 039 041 degrees     QTc Int : 434 ms    Normal sinus rhythm  Normal ECG  When compared with ECG of 10-FACUNDO-2019 08:02,  Previous ECG has undetermined rhythm, needs review  Confirmed by Mario CANTU MD, Gibran KAHN ([id number]) on 10/28/2020 11:40:36 AM    Referred By: [email or web address]   SELF           Confirmed By:Gibran Martin III, MD                            Imaging Results          X-Ray Shoulder 2 or More Views Left (Final result)  Result time 10/28/20 09:05:27    Final result by AI Correa Sr., MD (10/28/20 09:05:27)                 Impression:      There is a persistent irregular spur off of the lateral aspect of the left humeral head.  If additional imaging evaluation is clinically indicated, I recommend consideration of an MRI examination of the left shoulder without IV contrast.      Electronically signed by: Abdirahman Correa MD  Date:    10/28/2020  Time:    09:05             Narrative:    EXAMINATION:  XR SHOULDER COMPLETE 2 OR MORE VIEWS LEFT    CLINICAL HISTORY:  pain;    COMPARISON:  10/08/2020    FINDINGS:  There is no fracture. There is no dislocation.  There is a persistent  irregular spur off of the lateral aspect of the left humeral head.                               X-Ray Chest AP Portable (Final result)  Result time 10/28/20 09:02:53    Final result by AI Correa Sr., MD (10/28/20 09:02:53)                 Impression:      Normal study.      Electronically signed by: Abdirahman Correa MD  Date:    10/28/2020  Time:    09:02             Narrative:    EXAMINATION:  XR CHEST AP PORTABLE    CLINICAL HISTORY:  Pain, unspecified    COMPARISON:  02/03/2019    FINDINGS:  The size of the heart is normal. The lungs are clear. There is no pneumothorax.  The costophrenic angles are sharp.                                 Medical Decision Making:   History:   Old Medical Records: I decided to obtain old medical records.  Old Records Summarized: records from clinic visits and records from previous admission(s).       <> Summary of Records: Recent injection in the shoulder  Telephone phone note today from nurse's office unable to reach prior to arrival to emergency department  Previous rotator cuff injury and repair along the right shoulder  Differential Diagnosis:   Shoulder spasm, angina, septic arthritis, Charcot shoulder, septic bursitis, bursitis, rotator cuff injury, chronic pain  Independently Interpreted Test(s):   I have ordered and independently interpreted X-rays - see prior notes.  I have ordered and independently interpreted EKG Reading(s) - see prior notes  Clinical Tests:   Lab Tests: Ordered and Reviewed  Radiological Study: Ordered and Reviewed  Medical Tests: Ordered and Reviewed  ED Management:  Imaging is relatively unchanged, obtained markers for potential septic bursitis or arthritis given the smoldering intense nature of the pain however CRP is only mildly above the reference range.  Imaging is relatively unchanged with little suggestion of an effusion or large bursitis which should be amenable for drainage at this time.  After administration of analgesics in the  emergency department this patient did have an incredible amount of improvement in her symptoms.  Likely she has rotator cuff injury a biceps tendon injury or something similar the because of the location and some for comorbidities cardiac concerns were considered a troponin was taken because the duration of her symptoms of greater than 2-3 hours her single troponin which was within the normal range as well as an unremarkable EKG was reassuring that this was not ACS or another serious underlying cause of her chest pain.  We discussed treatment options and I believe she will benefit from MRI imaging potentially the shoulder and close follow-up in Orthopedics Clinic.  She was amenable to this.  We also discussed the importance of returning case of any worsening in the chest or shoulder.                   ED Course as of Oct 29 0626   Wed Oct 28, 2020   0844 My EKG interpretation, sinus rhythm, 83 beats per minute, normal axis, no ST segment changes,    [TK]      ED Course User Index  [TK] Hair Guerrero MD            Clinical Impression:       ICD-10-CM ICD-9-CM   1. Acute pain of left shoulder  M25.512 719.41   2. Pain  R52 780.96   3. Spasm  R25.2 781.0                          ED Disposition Condition    Discharge Stable        ED Prescriptions     Medication Sig Dispense Start Date End Date Auth. Provider    oxyCODONE-acetaminophen (PERCOCET) 5-325 mg per tablet Take 1 tablet by mouth every 4 (four) hours as needed for Pain. 12 tablet 10/28/2020  Hair Guerrero MD    diclofenac sodium (VOLTAREN) 1 % Gel Apply 2 g topically 4 (four) times daily. 100 g 10/28/2020  aHir Guerrero MD        Follow-up Information     Follow up With Specialties Details Why Contact Info    Marcela Garza MD Hand Surgery, Orthopedic Surgery Call today If symptoms worsen, For a follow up visit about today [address] [facility]  [address]  [facility] [id number] [phone]                                          Hair Guerrero MD  10/29/20 0626

## 2020-10-28 NOTE — DISCHARGE INSTRUCTIONS
Please use the sling only as needed for the pain in your shoulder.  Use the pain medication as needed for the pain in your shoulder.  Schedule appointment as soon as possible with your orthopedic surgeon.  Try to get an MRI scheduled for your left shoulder to see what structurally may be wrong.

## 2020-10-28 NOTE — TELEPHONE ENCOUNTER
Spoke to patient and she is currently still in the emergency room she will call us upon discharge to schedule----- Message from Oneyda Kapadia sent at 10/28/2020  7:55 AM CDT -----  Type: Patient Call Back       What is the request in detail:  pt calling to speak to a nurse regarding shoulder pain. Pt rate pain a 10     Can the clinic reply by [facility]? No       Would the patient rather a call back or a response via [facility]? Call back       Best call back number: [phone]        Thank you.

## 2020-10-28 NOTE — ED NOTES
Repeat troponin collected and sent to lab. Vitals checked, warm blankets given. Cardiac, BP, and O2 monitoring continued.

## 2020-10-29 ENCOUNTER — PATIENT MESSAGE (OUTPATIENT)
Dept: ORTHOPEDICS | Facility: CLINIC | Age: 45
End: 2020-10-29

## 2020-10-29 DIAGNOSIS — M25.612 IMPAIRED RANGE OF MOTION OF BOTH SHOULDERS: ICD-10-CM

## 2020-10-29 DIAGNOSIS — M25.611 IMPAIRED RANGE OF MOTION OF BOTH SHOULDERS: ICD-10-CM

## 2020-10-29 DIAGNOSIS — M65.20 CALCIFIC TENDONITIS: Primary | ICD-10-CM

## 2020-10-29 DIAGNOSIS — M25.512 ACUTE PAIN OF LEFT SHOULDER: ICD-10-CM

## 2020-10-29 DIAGNOSIS — M25.519 PAIN IN UNSPECIFIED SHOULDER: ICD-10-CM

## 2020-10-29 DIAGNOSIS — M25.512 LEFT SHOULDER PAIN: ICD-10-CM

## 2020-11-02 NOTE — TELEPHONE ENCOUNTER
Spoke to patient and offered to schedule the patient for an appointment with Dr. Garza. Patient declined the appointment, and would like to know if an MRI of the left shoulder can be ordered prior to being seen by Dr. Garza since it was recommended by the MD in the ER. Patient states that she will follow-up with Dr. Garza once she has the MRI done.     Please advise. Thank you.

## 2020-11-04 ENCOUNTER — CLINICAL SUPPORT (OUTPATIENT)
Dept: REHABILITATION | Facility: HOSPITAL | Age: 45
End: 2020-11-04
Payer: MEDICAID

## 2020-11-04 DIAGNOSIS — M25.611 DECREASED RANGE OF MOTION OF RIGHT SHOULDER: ICD-10-CM

## 2020-11-04 DIAGNOSIS — M25.511 ACUTE PAIN OF RIGHT SHOULDER: ICD-10-CM

## 2020-11-04 DIAGNOSIS — R29.898 WEAKNESS OF RIGHT ARM: ICD-10-CM

## 2020-11-04 PROCEDURE — 97110 THERAPEUTIC EXERCISES: CPT | Mod: PN

## 2020-11-04 NOTE — PLAN OF CARE
Outpatient Therapy Updated Plan of Care     Visit Date: 11/4/2020  Name: Velma Lopez  Clinic Number: [id number]    Therapy Diagnosis:   Encounter Diagnoses   Name Primary?    Decreased range of motion of right shoulder     Weakness of right arm     Acute pain of right shoulder      Physician: Mandi Larios PA    Physician Orders: PT Eval and Treat:  S/p right rotator cuff repair  Dr. Garza RTC repair protocol  Eval and Treat, modalities as needed  8+ visits  Start PT in 2 weeks     Medical Diagnosis from Referral: M75.101,M12.811 (ICD-10-CM) - Rotator cuff tear arthropathy of right shoulder  Right shoulder arthroscopic subacromial decompression.  Right shoulder arthroscopic rotator cuff repair.  Right shoulder labral debridement  Right shoulder chondroplasty  Left trigger thumb A1 pulley corticosteroid injection        Evaluation Date: 8/10/2020  Total Visits Received: 11  Cancelled Visits: 2+  No Show Visits: none    Current Certification Period:  9/21/2020 to 11/6/2020  Precautions:  Surgical precautions  Visits from Evaluation Date:  10  Functional Level Prior to Evaluation:  See initial evaluation    Subjective     Update: Pt reports: that she had another episode of increased L shoulder pain last week; cannot correlate with any activity or position.  She is scheduled to have an MRI of this shoulder soon; waiting to schedule.  Her R shoulder continues to lack strength but overall is feeling better  She was compliant with home exercise program.  Response to previous treatment: no adverse effects.   Functional change: none voiced today.     Pain: (R) shoulder: 4/10; (L) shoulder: 6/10  Location: right shoulder     Objective     Update:      Shoulder Right           PROM 08/14 09/02  10/16 10/23 11/4   flexion  130 136 135  131 142   abduction NT 80 90  90     Internal rotation  NT 40 45 50     ER scapular plane 44 56 - 60 64   ER at 0° abd 40 52 60 (34 at 90/90) 60 (45 at 90/90) 60 (47 at  90/90)      Rhythmic stabilization flexion: fair; quick too fatigue       Assessment     Update:   Mrs. Lopez is a 44 y/o F with multi co-morbidities now s/p R shoulder arthroscopy including supraspinatus (1 cm) full-thickness repair with concomitant procedures.  Surgical date: 07/27/2020.  Post-op week 15.  Has complete 11 total visits since her initial evaluation in August.      Lacks terminal flexion and ER ROM; stiff in ER with muscle guarding.  No pain at rest but TTP along anterior shoulder and biciptal groove area.    Graded linear strength progression. Limited positioning due to L shoulder acute, episodic pain.  Not able to tolerate supine eccentric flexion at this time.  Limited positioning due to recent history of L shoulder pain.       Velma is progressing well towards her goals.   Pt prognosis is Excellent.     Previous Short Term Goals Status:     Short-term goals  1. Pt will be compliant with HEP to assist PT treatment in restoring pain free motion of the R shoulder. MET 11/4/2020  2. Pt will improve impaired shoulder MMTs 1/2 grade B to improve strength for functional tasks. MET 11/4/2020  3. Pt will improve R shoulder flexion to >/= 20 deg to improve functional mobility of UEs.  MET 9/21/2020  4. Pt will improveR shoulder abduction to >/= 20 deg to improve functional mobility of UEs.  MET 9/21/2020    New Short Term Goals Status:  none  Long Term Goal Status:   continue per initial plan of care.  Long Term Goals Status:   1. Pt will improve FOTO score to </= 35% to demonstrate improvements in carrying, moving, and handling objects.  Progressing towards; not met  2. Pt will improve impaired shoulder MMTs 1 grade B to improve strength for household duties.  Progressing towards; not met  3. Pt will improve R shoulder flexion to >/= 160 deg to improve functional mobility of UEs.  Progressing towards; not met  4. Pt will improve R shoulder abduction to >/= 160 deg to improve functional mobility of  UEs.  Progressing towards; not met  5. Pt will move R shoulder through functional ROM in all planes without pain to improve functional QOL.  Progressing towards; not met  6. Pt will perform prior level of household duties c/o pain to improve functional QOL. Progressing towards; not met     Reasons for Recertification of Therapy:   Mrs. Lopez would continue to benefit from skilled physical therapy services to address the above listed PT problems related to her R shoulder dysfunction and to complete her R shoulder post-op outpatient physical therapy rehab course.       Plan     Updated Certification Period: 11/4/2020 to 12/18/2020  Recommended Treatment Plan: 2-3 times per week for 6 weeks: Electrical Stimulation IFC/NMES, Manual Therapy, Moist Heat/ Ice, Neuromuscular Re-ed, Patient Education, Self Care, Therapeutic Activites and Therapeutic Exercise  Other Recommendations: IRVIN Casanova, PT  11/4/2020      I CERTIFY THE NEED FOR THESE SERVICES FURNISHED UNDER THIS PLAN OF TREATMENT AND WHILE UNDER MY CARE    Physician's comments:        Physician's Signature: ___________________________________________________

## 2020-11-04 NOTE — PROGRESS NOTES
Physical Therapy Treatment Note     Name: Velma Lopez  Clinic Number: [id number]    Therapy Diagnosis:   Encounter Diagnoses   Name Primary?    Decreased range of motion of right shoulder     Weakness of right arm     Acute pain of right shoulder      Physician: Mandi Larios PA    Visit Date: 11/4/2020    Physician Orders: PT Eval and Treat:  S/p right rotator cuff repair  Dr. Garza RTC repair protocol  Eval and Treat, modalities as needed  8+ visits  Start PT in 2 weeks     Medical Diagnosis from Referral: M75.101,M12.811 (ICD-10-CM) - Rotator cuff tear arthropathy of right shoulder  Right shoulder arthroscopic subacromial decompression.  Right shoulder arthroscopic rotator cuff repair.  Right shoulder labral debridement  Right shoulder chondroplasty  Left trigger thumb A1 pulley corticosteroid injection      Evaluation Date: 8/10/2020  Authorization Period Expiration: 10/30/2020  Plan of Care Expiration: 9/21/2020 to 11/6/2020  Visit # / Visits authorized: 11/12  FOTO: at dc     Time In: 1315  Time Out: 1405  Total Billable Time: 45 minutes (3 TE)  Precautions: DM II  S/P (R) RTC repair: 7/27/2020    Subjective     Pt reports: that she had another episode of increased L shoulder pain last week; cannot correlate with any activity or position.  She is scheduled to have an MRI of this shoulder soon; waiting to schedule.  Her R shoulder continues to lack strength but overall is feeling better  She was compliant with home exercise program.  Response to previous treatment: no adverse effects.   Functional change: none voiced today.     Pain: (R) shoulder: 4/10; (L) shoulder: 6/10  Location: right shoulder     Objective     Shoulder Right         PROM 08/14 09/02  10/16 10/23 11/4   flexion  130 136 135  131 142   abduction NT 80 90  90    Internal rotation  NT 40 45 50    ER scapular plane 44 56 - 60 64   ER at 0° abd 40 52 60 (34 at 90/90) 60 (45 at 90/90) 60 (47 at 90/90)     Rhythmic  stabilization flexion: fair; quick too fatigue    Velma received therapeutic exercises to develop strength, endurance, ROM, flexibility and posture for 25 minutes including objective measurements:  - supine ER/IR  in scap plane  3x10 2# dumbell; pain-free ROM  - Supine flexion rhyth stab  3 rounds   - supine  ER/IR rhyt stab  3 rounds  - supine wand flexion   T-bar; 2x10 (held due to L shoulder pain today)  - standing ball rolls   Ball on high mat; 2x10  - standing IR/ER walk-outs  TYB; 10x  - standing ER theraband  YTB; 3x10  - standing rows   GTB; 2x12    Not today: ** no L sidelying at this time.   - Sidelying Shoulder ABD  2x10, pain free range   - Sidelying Shoulder ER  2x10, pain free range  - Supine Shoulder Flexion  2x10  - prone rows    2x10  - scapular punches   GTB; 2x12  - Pulley - FLX/Scaption  2' each  (not today)    Velma received the following manual therapy techniques:total time 20 minutes  - PROM (R) shoulder flexion/scaption, ER to patient tolerance  - (R) grade III/IV shoulder mobs  - scapular pinning release with flexion and horizontal abd.    Velma received cold pack for 5 minutes to R shoulder complex for pain and inflammation control.      Home Exercises Provided and Patient Education Provided   Education provided:   - continue HEP  - HEP 3x/day  - compliance with sling wear.   - reviewed surgical precautions, healing time frames, progression with PT in regards to PROM --> AROM and lifting restrictions    Written Home Exercises Provided: yes.  Exercises were reviewed and Velma was able to demonstrate them prior to the end of the session.  Velma demonstrated good  understanding of the education provided.     See EMR under Media for exercises provided 8/14/2020.    Assessment   A: Mrs. Lopez is a 44 y/o F with multi co-morbidities now s/p R shoulder arthroscopy including supraspinatus (1 cm) full-thickness repair with concomitant procedures.  Surgical date: 07/27/2020.  Post-op week  15.  Has complete 11 total visits since her initial evaluation in August.     Lacks terminal flexion and ER ROM; stiff in ER with muscle guarding.  No pain at rest but TTP along anterior shoulder and biciptal groove area.    Graded linear strength progression. Limited positioning due to L shoulder acute, episodic pain.  Not able to tolerate supine eccentric flexion at this time.  Limited positioning due to recent history of L shoulder pain.       Velma is progressing well towards her goals.   Pt prognosis is Excellent.     Pt will continue to benefit from skilled outpatient physical therapy to address the deficits listed in the problem list box on initial evaluation, provide pt/family education and to maximize pt's level of independence in the home and community environment.   Pt's spiritual, cultural and educational needs considered and pt agreeable to plan of care and goals.     Anticipated barriers to physical therapy: co-morbidities    Goals:   Short-term goals  1. Pt will be compliant with HEP to assist PT treatment in restoring pain free motion of the R shoulder. MET 11/4/2020  2. Pt will improve impaired shoulder MMTs 1/2 grade B to improve strength for functional tasks. MET 11/4/2020  3. Pt will improve R shoulder flexion to >/= 20 deg to improve functional mobility of UEs.  MET 9/21/2020  4. Pt will improveR shoulder abduction to >/= 20 deg to improve functional mobility of UEs.  MET 9/21/2020    Long Term Goals Status:   1. Pt will improve FOTO score to </= 35% to demonstrate improvements in carrying, moving, and handling objects.  Progressing towards; not met  2. Pt will improve impaired shoulder MMTs 1 grade B to improve strength for household duties.  Progressing towards; not met  3. Pt will improve R shoulder flexion to >/= 160 deg to improve functional mobility of UEs.  Progressing towards; not met  4. Pt will improve R shoulder abduction to >/= 160 deg to improve functional mobility of UEs.   Progressing towards; not met  5. Pt will move R shoulder through functional ROM in all planes without pain to improve functional QOL.  Progressing towards; not met  6. Pt will perform prior level of household duties c/o pain to improve functional QOL. Progressing towards; not met          Plan   Phase II/III of post-op RTC repair (small) program.   Needs work to achieve terminal flexion and ER    Updated Certification Period: 9/21/2020 to 11/6/2020  Recommended Treatment Plan: 2 times per week for 6 weeks: Cervical/Lumbar Traction, Electrical Stimulation , FDN prn, Manual Therapy, Moist Heat/ Ice, Neuromuscular Re-ed, Patient Education, Therapeutic Activites, Therapeutic Exercise, Ultrasound and Kinesiotape prn  Other Recommendations: Continue to follow RTC repair protocol.     Jean Pérez, PT

## 2020-11-06 ENCOUNTER — CLINICAL SUPPORT (OUTPATIENT)
Dept: REHABILITATION | Facility: HOSPITAL | Age: 45
End: 2020-11-06
Payer: MEDICAID

## 2020-11-06 DIAGNOSIS — M25.511 ACUTE PAIN OF RIGHT SHOULDER: ICD-10-CM

## 2020-11-06 DIAGNOSIS — M25.611 DECREASED RANGE OF MOTION OF RIGHT SHOULDER: ICD-10-CM

## 2020-11-06 DIAGNOSIS — R29.898 WEAKNESS OF RIGHT ARM: ICD-10-CM

## 2020-11-06 PROCEDURE — 97110 THERAPEUTIC EXERCISES: CPT | Mod: PN,CQ

## 2020-11-06 NOTE — PROGRESS NOTES
Physical Therapy Treatment Note     Name: Velma Lopez  Clinic Number: [id number]    Therapy Diagnosis:   Encounter Diagnoses   Name Primary?    Decreased range of motion of right shoulder     Weakness of right arm     Acute pain of right shoulder      Physician: Mandi Larois PA    Visit Date: 11/6/2020    Physician Orders: PT Eval and Treat:  S/p right rotator cuff repair  Dr. Garza RTC repair protocol  Eval and Treat, modalities as needed  8+ visits  Start PT in 2 weeks     Medical Diagnosis from Referral: M75.101,M12.811 (ICD-10-CM) - Rotator cuff tear arthropathy of right shoulder  Right shoulder arthroscopic subacromial decompression.  Right shoulder arthroscopic rotator cuff repair.  Right shoulder labral debridement  Right shoulder chondroplasty  Left trigger thumb A1 pulley corticosteroid injection      Evaluation Date: 8/10/2020  Authorization Period Expiration: 12/30/2020  Plan of Care Expiration: 9/21/2020 to 11/6/2020  Visit # / Visits authorized: 2/6  FOTO: at dc     Time In: 1200  Time Out: 1240  Total Billable Time: 40 minutes (3 TE)  Precautions: DM II  S/P (R) RTC repair: 7/27/2020    Subjective     Pt reports: L shoulder continues to feel the same. MRI for L shoulder is scheduled for next week.  She was compliant with home exercise program.  Response to previous treatment: no adverse effects.   Functional change: none voiced today.     Pain: (R) shoulder: 3/10; (L) shoulder: 6/10  Location: right shoulder     Objective     Shoulder Right         PROM 08/14 09/02  10/16 10/23 11/4   flexion  130 136 135  131 142   abduction NT 80 90  90    Internal rotation  NT 40 45 50    ER scapular plane 44 56 - 60 64   ER at 0° abd 40 52 60 (34 at 90/90) 60 (45 at 90/90) 60 (47 at 90/90)     Rhythmic stabilization flexion: fair; quick too fatigue    Velma received therapeutic exercises to develop strength, endurance, ROM, flexibility and posture for 20 minutes including objective  measurements:  - supine ER/IR  in scap plane  3x10 2# dumbell; pain-free ROM  - Supine flexion rhyth stab  3 rounds   - supine  ER/IR rhyt stab  3 rounds  - supine wand flexion   T-bar; 2x10 (held due to L shoulder pain today)  - standing ball rolls   Ball on high mat; 2x10  - standing IR/ER walk-outs  TYB; 2x10 R  - standing ER theraband  YTB; 3x10 (held due to patient feeling fatigued)  - standing rows   GTB; 2x12 (held due to patient feeling fatigued)    Not today: ** no L sidelying at this time.   - Sidelying Shoulder ABD  2x10, pain free range   - Sidelying Shoulder ER  2x10, pain free range  - Supine Shoulder Flexion  2x10  - prone rows    2x10  - scapular punches   GTB; 2x12  - Pulley - FLX/Scaption  2' each  (not today)    Velma received the following manual therapy techniques:total time 20 minutes  - PROM (R) shoulder flexion/scaption, ER to patient tolerance  - (R) grade III/IV shoulder mobs  - scapular pinning release with flexion and horizontal abd.    Velma received cold pack for 0 minutes to R shoulder complex for pain and inflammation control.      Home Exercises Provided and Patient Education Provided   Education provided:   - continue HEP  - HEP 3x/day  - reviewed surgical precautions, healing time frames, progression with PT in regards to PROM --> AROM and lifting restrictions    Written Home Exercises Provided: yes.  Exercises were reviewed and Velma was able to demonstrate them prior to the end of the session.  Velma demonstrated good  understanding of the education provided.     See EMR under Media for exercises provided 8/14/2020.    Assessment   A: Mrs. Lopez is a 46 y/o F with multi co-morbidities now s/p R shoulder arthroscopy including supraspinatus (1 cm) full-thickness repair with concomitant procedures.  Surgical date: 07/27/2020.  Post-op week 16.  Has complete 11 total visits since her initial evaluation in August.     Lacks terminal flexion and ER ROM; stiff in ER with  muscle guarding.  No pain at rest but TTP along anterior shoulder and biciptal groove area. Graded linear strength progression. Patient continues to be limited in positioning due to L shoulder pain, but MRI for L shoulder is scheduled for next week. Towards end of session patient requested to sit due to fatigue and then disclosed that she had not eaten breakfast that morning. Patient said she did not feel lightheaded just a little weak. Patient was given some water and crackers and agreed to end session there.     Velma is progressing well towards her goals.   Pt prognosis is Excellent.     Pt will continue to benefit from skilled outpatient physical therapy to address the deficits listed in the problem list box on initial evaluation, provide pt/family education and to maximize pt's level of independence in the home and community environment.   Pt's spiritual, cultural and educational needs considered and pt agreeable to plan of care and goals.     Anticipated barriers to physical therapy: co-morbidities    Goals:   Short-term goals  1. Pt will be compliant with HEP to assist PT treatment in restoring pain free motion of the R shoulder. MET 11/4/2020  2. Pt will improve impaired shoulder MMTs 1/2 grade B to improve strength for functional tasks. MET 11/4/2020  3. Pt will improve R shoulder flexion to >/= 20 deg to improve functional mobility of UEs.  MET 9/21/2020  4. Pt will improveR shoulder abduction to >/= 20 deg to improve functional mobility of UEs.  MET 9/21/2020    Long Term Goals Status:   1. Pt will improve FOTO score to </= 35% to demonstrate improvements in carrying, moving, and handling objects.  Progressing towards; not met  2. Pt will improve impaired shoulder MMTs 1 grade B to improve strength for household duties.  Progressing towards; not met  3. Pt will improve R shoulder flexion to >/= 160 deg to improve functional mobility of UEs.  Progressing towards; not met  4. Pt will improve R shoulder  abduction to >/= 160 deg to improve functional mobility of UEs.  Progressing towards; not met  5. Pt will move R shoulder through functional ROM in all planes without pain to improve functional QOL.  Progressing towards; not met  6. Pt will perform prior level of household duties c/o pain to improve functional QOL. Progressing towards; not met          Plan   Phase II/III of post-op RTC repair (small) program.   Needs work to achieve terminal flexion and ER    Updated Certification Period: 9/21/2020 to 11/6/2020  Recommended Treatment Plan: 2 times per week for 6 weeks: Cervical/Lumbar Traction, Electrical Stimulation , FDN prn, Manual Therapy, Moist Heat/ Ice, Neuromuscular Re-ed, Patient Education, Therapeutic Activites, Therapeutic Exercise, Ultrasound and Kinesiotape prn  Other Recommendations: Continue to follow RTC repair protocol.     Alondra Omer, PTA

## 2020-11-11 ENCOUNTER — CLINICAL SUPPORT (OUTPATIENT)
Dept: REHABILITATION | Facility: HOSPITAL | Age: 45
End: 2020-11-11
Payer: MEDICAID

## 2020-11-11 DIAGNOSIS — M25.511 ACUTE PAIN OF RIGHT SHOULDER: ICD-10-CM

## 2020-11-11 DIAGNOSIS — M25.611 DECREASED RANGE OF MOTION OF RIGHT SHOULDER: ICD-10-CM

## 2020-11-11 DIAGNOSIS — R29.898 WEAKNESS OF RIGHT ARM: ICD-10-CM

## 2020-11-11 PROCEDURE — 97110 THERAPEUTIC EXERCISES: CPT | Mod: PN

## 2020-11-11 NOTE — PROGRESS NOTES
Physical Therapy Treatment Note     Name: Velma Lopez  Clinic Number: [id number]    Therapy Diagnosis:   Encounter Diagnoses   Name Primary?    Decreased range of motion of right shoulder     Weakness of right arm     Acute pain of right shoulder      Physician: Mandi Larios PA    Visit Date: 11/11/2020    Physician Orders: PT Eval and Treat:  S/p right rotator cuff repair  Dr. Garza RTC repair protocol  Eval and Treat, modalities as needed  8+ visits  Start PT in 2 weeks     Medical Diagnosis from Referral: M75.101,M12.811 (ICD-10-CM) - Rotator cuff tear arthropathy of right shoulder  Right shoulder arthroscopic subacromial decompression.  Right shoulder arthroscopic rotator cuff repair.  Right shoulder labral debridement  Right shoulder chondroplasty  Left trigger thumb A1 pulley corticosteroid injection      Evaluation Date: 8/10/2020  Authorization Period Expiration: 12/30/2020  Plan of Care Expiration: 11/4/2020 to 12/18/2020  Visit # / Visits authorized: 3/6 (12 prior visits) (15 total)  FOTO: at dc     Time In: 1200  Time Out: 1245  Total Billable Time: 40 minutes (3 TE)  Precautions: DM II  S/P (R) RTC repair: 7/27/2020    Subjective     Pt reports: no L shoulder pain today.  R shoulder stiffness and pain with certain movements but overall improvement.   She was compliant with home exercise program.  Response to previous treatment: no adverse effects.   Functional change: none voiced today.     Pain: (R) shoulder: 3/10; (L) shoulder: 0/10  Location: right shoulder     Objective     Shoulder Right          PROM 08/14 09/02  10/16 10/23 11/4 11/11   flexion  130 136 135  131 142 154   abduction NT 80 90  90     Internal rotation  NT 40 45 50  50; guarding   ER scapular plane 44 56 - 60 64 55; guarded   ER at 0° abd 40 52 60 (34 at 90/90) 60 (45 at 90/90) 60 (47 at 90/90)      Rhythmic stabilization flexion: good    Velma received therapeutic exercises to develop strength, endurance,  ROM, flexibility and posture for 25 minutes including objective measurements:  - supine ER/IR  in scap plane   3x10 2# dumbell; pain-free ROM  - Supine flexion rhyth stab   3 rounds   - supine  ER/IR rhyt stab   3 rounds (not today)  - HOB elevated supine wand flexion   T-bar; 2x10   - HOB elevated ER rhyth stab   YTB; 2x10  - standing IR/ER walk-outs   TYB; 2x10 R (not today)  - standing ER theraband   YTB; 3x10 (not today)  - standing rows    GTB; 2x12   - standing SAPD    GTB; 3x10  - standing wall slides    2x10  - Pulley - FLX/Scaption   2' each  (not today)    Velma received the following manual therapy techniques:total time 15 minutes  - PROM (R) shoulder flexion/scaption, ER to patient tolerance  - (R) grade III/IV shoulder mobs  - scapular pinning release with flexion and horizontal add    Velma received cold pack for 5 minutes to R shoulder complex for pain and inflammation control.      Home Exercises Provided and Patient Education Provided   Education provided:   - continue HEP  - HEP 3x/day  - reviewed surgical precautions, healing time frames, progression with PT in regards to PROM --> AROM and lifting restrictions    Written Home Exercises Provided: yes.  Exercises were reviewed and Velma was able to demonstrate them prior to the end of the session.  Velma demonstrated good  understanding of the education provided.     See EMR under Media for exercises provided 8/14/2020.    Assessment   A: Mrs. Lopez is a 44 y/o F with multi co-morbidities now s/p R shoulder arthroscopy including supraspinatus (1 cm) full-thickness repair with concomitant procedures.  Surgical date: 07/27/2020.  Post-op week 16-17.  Has complete 13 total visits since her initial evaluation in August.     Lacks terminal flexion and ER ROM; stiff in ER with muscle guarding.  No pain at rest but TTP along anterior shoulder and biciptal groove area. Graded linear strength progression. Patient continues to be limited in  positioning due to L shoulder pain, but MRI for L shoulder is scheduled for next week. Improved shoulder flexion ROM today.  Progressing anti-gravity shoulder strengthening.     Velma is progressing well towards her goals.   Pt prognosis is Excellent.     Pt will continue to benefit from skilled outpatient physical therapy to address the deficits listed in the problem list box on initial evaluation, provide pt/family education and to maximize pt's level of independence in the home and community environment.   Pt's spiritual, cultural and educational needs considered and pt agreeable to plan of care and goals.     Anticipated barriers to physical therapy: co-morbidities    Goals:   Short-term goals  1. Pt will be compliant with HEP to assist PT treatment in restoring pain free motion of the R shoulder. MET 11/4/2020  2. Pt will improve impaired shoulder MMTs 1/2 grade B to improve strength for functional tasks. MET 11/4/2020  3. Pt will improve R shoulder flexion to >/= 20 deg to improve functional mobility of UEs.  MET 9/21/2020  4. Pt will improveR shoulder abduction to >/= 20 deg to improve functional mobility of UEs.  MET 9/21/2020    Long Term Goals Status:   1. Pt will improve FOTO score to </= 35% to demonstrate improvements in carrying, moving, and handling objects.  Progressing towards; not met  2. Pt will improve impaired shoulder MMTs 1 grade B to improve strength for household duties.  Progressing towards; not met  3. Pt will improve R shoulder flexion to >/= 160 deg to improve functional mobility of UEs.  Progressing towards; not met  4. Pt will improve R shoulder abduction to >/= 160 deg to improve functional mobility of UEs.  Progressing towards; not met  5. Pt will move R shoulder through functional ROM in all planes without pain to improve functional QOL.  Progressing towards; not met  6. Pt will perform prior level of household duties c/o pain to improve functional QOL. Progressing towards; not  met        Plan   Phase II/III of post-op RTC repair (small) program.   Needs work to achieve terminal flexion and ER       Updated Certification Period: 11/4/2020 to 12/18/2020  Recommended Treatment Plan: 2-3 times per week for 6 weeks: Electrical Stimulation IFC/NMES, Manual Therapy, Moist Heat/ Ice, Neuromuscular Re-ed, Patient Education, Self Care, Therapeutic Activites and Therapeutic Exercise  Other Recommendations: Jennifer Johnson, IASMIRIAM Pérez, PT

## 2020-11-12 ENCOUNTER — HOSPITAL ENCOUNTER (OUTPATIENT)
Dept: RADIOLOGY | Facility: HOSPITAL | Age: 45
Discharge: HOME | End: 2020-11-12
Attending: PHYSICIAN ASSISTANT
Payer: MEDICAID

## 2020-11-12 ENCOUNTER — PATIENT MESSAGE (OUTPATIENT)
Dept: REHABILITATION | Facility: HOSPITAL | Age: 45
End: 2020-11-12

## 2020-11-12 DIAGNOSIS — M25.512 ACUTE PAIN OF LEFT SHOULDER: ICD-10-CM

## 2020-11-12 DIAGNOSIS — M65.20 CALCIFIC TENDONITIS: ICD-10-CM

## 2020-11-12 DIAGNOSIS — M25.611 IMPAIRED RANGE OF MOTION OF BOTH SHOULDERS: ICD-10-CM

## 2020-11-12 DIAGNOSIS — M25.612 IMPAIRED RANGE OF MOTION OF BOTH SHOULDERS: ICD-10-CM

## 2020-11-12 PROCEDURE — 73221 MRI JOINT UPR EXTREM W/O DYE: CPT | Mod: 26,LT,, | Performed by: RADIOLOGY

## 2020-11-12 PROCEDURE — 73221 MRI SHOULDER WITHOUT CONTRAST LEFT: ICD-10-PCS | Mod: 26,LT,, | Performed by: RADIOLOGY

## 2020-11-12 PROCEDURE — 73221 MRI JOINT UPR EXTREM W/O DYE: CPT | Mod: TC,LT

## 2020-11-14 ENCOUNTER — PATIENT MESSAGE (OUTPATIENT)
Dept: ORTHOPEDICS | Facility: CLINIC | Age: 45
End: 2020-11-14

## 2020-11-16 ENCOUNTER — TELEPHONE (OUTPATIENT)
Dept: ORTHOPEDICS | Facility: CLINIC | Age: 45
End: 2020-11-16

## 2020-11-16 NOTE — TELEPHONE ENCOUNTER
----- Message from Corine Garcia sent at 11/16/2020  8:37 AM CST -----  Regarding: Return call  Type:  Test Results    Who Called: PATRICIO CRANE [[id number]]    Name of Test (Lab/Mammo/Etc): MRI    Date of Test: 11/12     Can the clinic reply in [facility]: N    Best Call Back Number:  [phone]    Additional Information:

## 2020-11-16 NOTE — TELEPHONE ENCOUNTER
Spoke with pt and she is requesting her results from her MRI.  Pt was informed she may have to come in the office for results.  Pt understood.

## 2020-11-17 ENCOUNTER — TELEPHONE (OUTPATIENT)
Dept: ORTHOPEDICS | Facility: CLINIC | Age: 45
End: 2020-11-17

## 2020-11-17 NOTE — TELEPHONE ENCOUNTER
Lm on vm informing her that she is schedule to see Dr Garza on 11/24/2020 at 815 am.  Pt was advised to call office back if this appt does not work for her.

## 2020-11-18 ENCOUNTER — PATIENT MESSAGE (OUTPATIENT)
Dept: INTERNAL MEDICINE | Facility: CLINIC | Age: 45
End: 2020-11-18

## 2020-11-18 DIAGNOSIS — E11.9 TYPE 2 DIABETES MELLITUS WITHOUT COMPLICATION, WITHOUT LONG-TERM CURRENT USE OF INSULIN: ICD-10-CM

## 2020-11-18 RX ORDER — ATORVASTATIN CALCIUM 10 MG/1
10 TABLET, FILM COATED ORAL DAILY
Qty: 30 TABLET | Refills: 0 | Status: SHIPPED | OUTPATIENT
Start: 2020-11-18 | End: 2020-12-21

## 2020-11-21 ENCOUNTER — PATIENT OUTREACH (OUTPATIENT)
Dept: ADMINISTRATIVE | Facility: OTHER | Age: 45
End: 2020-11-21

## 2020-11-21 NOTE — PROGRESS NOTES
Health Maintenance Due   Topic Date Due    Hepatitis C Screening  1975    Foot Exam  01/15/1983    Eye Exam  01/15/1983    HIV Screening  01/15/1988    Sign Pain Contract  01/15/1991    Complete Opioid Risk Tool  01/15/1991    Influenza Vaccine (1) 08/01/2020    Hemoglobin A1c  09/03/2020     Updates were requested from care everywhere.  Chart was reviewed for overdue Proactive [facility] Encounters (AMMY) topics (CRS, Breast Cancer Screening, Eye exam)  Health Maintenance has been updated.  LINKS immunization registry triggered.  Immunizations were reconciled.

## 2020-11-23 ENCOUNTER — TELEPHONE (OUTPATIENT)
Dept: ORTHOPEDICS | Facility: CLINIC | Age: 45
End: 2020-11-23

## 2020-11-24 ENCOUNTER — OFFICE VISIT (OUTPATIENT)
Dept: ORTHOPEDICS | Facility: CLINIC | Age: 45
End: 2020-11-24
Payer: MEDICAID

## 2020-11-24 VITALS
BODY MASS INDEX: 43.08 KG/M2 | HEIGHT: 66 IN | DIASTOLIC BLOOD PRESSURE: 80 MMHG | HEART RATE: 86 BPM | WEIGHT: 268.06 LBS | SYSTOLIC BLOOD PRESSURE: 137 MMHG

## 2020-11-24 DIAGNOSIS — M75.32 CALCIFIC TENDONITIS OF LEFT SHOULDER: ICD-10-CM

## 2020-11-24 DIAGNOSIS — G89.29 CHRONIC LEFT SHOULDER PAIN: Primary | ICD-10-CM

## 2020-11-24 DIAGNOSIS — M25.512 CHRONIC LEFT SHOULDER PAIN: Primary | ICD-10-CM

## 2020-11-24 PROCEDURE — 99214 OFFICE O/P EST MOD 30 MIN: CPT | Mod: 25,S$PBB,, | Performed by: ORTHOPAEDIC SURGERY

## 2020-11-24 PROCEDURE — 99214 PR OFFICE/OUTPT VISIT, EST, LEVL IV, 30-39 MIN: ICD-10-PCS | Mod: 25,S$PBB,, | Performed by: ORTHOPAEDIC SURGERY

## 2020-11-24 PROCEDURE — 99999 PR PBB SHADOW E&M-EST. PATIENT-LVL IV: CPT | Mod: PBBFAC,,, | Performed by: ORTHOPAEDIC SURGERY

## 2020-11-24 PROCEDURE — 99214 OFFICE O/P EST MOD 30 MIN: CPT | Mod: PBBFAC | Performed by: ORTHOPAEDIC SURGERY

## 2020-11-24 PROCEDURE — 99999 PR PBB SHADOW E&M-EST. PATIENT-LVL IV: ICD-10-PCS | Mod: PBBFAC,,, | Performed by: ORTHOPAEDIC SURGERY

## 2020-11-24 RX ORDER — TRIAMCINOLONE ACETONIDE 40 MG/ML
80 INJECTION, SUSPENSION INTRA-ARTICULAR; INTRAMUSCULAR
Status: DISCONTINUED | OUTPATIENT
Start: 2020-11-24 | End: 2020-11-24 | Stop reason: HOSPADM

## 2020-11-24 RX ADMIN — TRIAMCINOLONE ACETONIDE 80 MG: 40 INJECTION, SUSPENSION INTRA-ARTICULAR; INTRAMUSCULAR at 08:11
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
## 2020-11-24 NOTE — PROCEDURES
Large Joint Aspiration/Injection    Date/Time: 11/24/2020 8:15 AM  Performed by: Marcela Garza MD  Authorized by: Marcela Garza MD     Consent Done?:  Yes (Verbal)  Indications:  Pain  Timeout: prior to procedure the correct patient, procedure, and site was verified    Prep: patient was prepped and draped in usual sterile fashion      Local anesthesia used?: Yes    Anesthesia:  Local infiltration  Local anesthetic:  Lidocaine 1% without epinephrine    Details:  Needle Size:  22 G  Approach:  Posterior  Medications:  80 mg triamcinolone acetonide 40 mg/mL

## 2020-12-04 ENCOUNTER — PATIENT MESSAGE (OUTPATIENT)
Dept: INTERNAL MEDICINE | Facility: CLINIC | Age: 45
End: 2020-12-04

## 2020-12-04 ENCOUNTER — CLINICAL SUPPORT (OUTPATIENT)
Dept: REHABILITATION | Facility: HOSPITAL | Age: 45
End: 2020-12-04
Payer: MEDICAID

## 2020-12-04 DIAGNOSIS — M25.511 ACUTE PAIN OF RIGHT SHOULDER: ICD-10-CM

## 2020-12-04 DIAGNOSIS — R29.898 WEAKNESS OF RIGHT ARM: ICD-10-CM

## 2020-12-04 DIAGNOSIS — M25.611 DECREASED RANGE OF MOTION OF RIGHT SHOULDER: ICD-10-CM

## 2020-12-04 PROCEDURE — 97110 THERAPEUTIC EXERCISES: CPT | Mod: PN

## 2020-12-04 NOTE — PROGRESS NOTES
Physical Therapy Treatment Note     Name: Velma Lopez  Clinic Number: [id number]    Therapy Diagnosis:   Encounter Diagnoses   Name Primary?    Decreased range of motion of right shoulder     Weakness of right arm     Acute pain of right shoulder      Physician: Mandi Larios PA    Visit Date: 12/4/2020    Physician Orders: PT Eval and Treat:  S/p right rotator cuff repair  Dr. Garza RTC repair protocol  Eval and Treat, modalities as needed  8+ visits  Start PT in 2 weeks     Medical Diagnosis from Referral: M75.101,M12.811 (ICD-10-CM) - Rotator cuff tear arthropathy of right shoulder  Right shoulder arthroscopic subacromial decompression.  Right shoulder arthroscopic rotator cuff repair.  Right shoulder labral debridement  Right shoulder chondroplasty  Left trigger thumb A1 pulley corticosteroid injection      Evaluation Date: 8/10/2020  Authorization Period Expiration: 12/30/2020  Plan of Care Expiration: 11/4/2020 to 12/18/2020  Visit # / Visits authorized: 4/6 (12 prior visits) (14 total)  FOTO: at dc     Time In: 1330  Time Out: 1415  Total Billable Time: 45 minutes (3 TE)  Precautions: DM II  S/P (R) RTC repair: 7/27/2020    Subjective     Pt reports: to PT for the first time in 3 weeks.  Had a follow up with her surgery on 11/24/2020.  She voices that she was not able to get an appointment with PT.  Also voices L shoulder pain with recent MRI revealing calcific tendinitis and small RC tear.    She was compliant with home exercise program.  Response to previous treatment: no adverse effects.   Functional change: none voiced today.     Pain: (R) shoulder: 3/10; (L) shoulder: 3/10  Location: right shoulder     Objective     Shoulder Right           PROM 08/14 09/02  10/16 10/23 11/4 11/11 12/4   flexion  130 136 135  131 142 154 154   abduction NT 80 90  90   115   Internal rotation  NT 40 45 50  50; guarding 45; guarded   ER scapular plane 44 56 - 60 64 55; guarded 65; guarding      Rhythmic stabilization flexion: good    Velma received therapeutic exercises to develop strength, endurance, ROM, flexibility and posture for 25 minutes including objective measurements:  - supine shoulder wand flexion   1# SA; 2x12  - supine SA punches    3#; 3x10  - Sidelying shoulder ER   2x30  - HOB elevated supine wand flexion  T-bar; 2x10   - standing rows    GTB; 2x12   - standing SAPD    GTB; 3x10  - standing wall slides    2x10    Velma received the following manual therapy techniques:total time 15 minutes  - PROM (R) shoulder flexion/scaption, ER to patient tolerance  - (R) grade III/IV shoulder mobs  - scapular pinning release with flexion and horizontal add      Home Exercises Provided and Patient Education Provided   Education provided:   - continue HEP  - HEP 3x/day  - reviewed surgical precautions, healing time frames, progression with PT in regards to PROM --> AROM and lifting restrictions    Written Home Exercises Provided: yes.  Exercises were reviewed and Velma was able to demonstrate them prior to the end of the session.  Velma demonstrated good  understanding of the education provided.     See EMR under Media for exercises provided 8/14/2020.    Assessment   A: Mrs. Lopez is a 44 y/o F with multi co-morbidities now s/p R shoulder arthroscopy including supraspinatus (1 cm) full-thickness repair with concomitant procedures.  Surgical date: 07/27/2020.  Post-op week 16-17.  Has complete 14 total visits since her initial evaluation in August.   Most recent visit was 3 weeks ago despite multiple attempts to schedule.  Limitation mostly with IR.  Pain with terminal shoulder flexion ER superoposteriorly.  Heavy arms.  Demonstrates AROM <PROM (<3/5 MMT) flexion and ER AROM=PROM. Continue graded linear strength progression. Patient continues to be limited in positioning due to L shoulder pain.   Discussed the need for more consistency with her OPPT appointments as most patients are  completed with their PT course by now.  Total of 7 missed PT appointments as compared to 14 total visits.     Velma is progressing well towards her goals.   Pt prognosis is Excellent.     Pt will continue to benefit from skilled outpatient physical therapy to address the deficits listed in the problem list box on initial evaluation, provide pt/family education and to maximize pt's level of independence in the home and community environment.   Pt's spiritual, cultural and educational needs considered and pt agreeable to plan of care and goals.     Anticipated barriers to physical therapy: co-morbidities    Goals:   Short-term goals  1. Pt will be compliant with HEP to assist PT treatment in restoring pain free motion of the R shoulder. MET 11/4/2020  2. Pt will improve impaired shoulder MMTs 1/2 grade B to improve strength for functional tasks. MET 11/4/2020  3. Pt will improve R shoulder flexion to >/= 20 deg to improve functional mobility of UEs.  MET 9/21/2020  4. Pt will improveR shoulder abduction to >/= 20 deg to improve functional mobility of UEs.  MET 9/21/2020    Long Term Goals Status:   1. Pt will improve FOTO score to </= 35% to demonstrate improvements in carrying, moving, and handling objects.  Progressing towards; not met  2. Pt will improve impaired shoulder MMTs 1 grade B to improve strength for household duties.  Progressing towards; not met  3. Pt will improve R shoulder flexion to >/= 160 deg to improve functional mobility of UEs.  Progressing towards; not met  4. Pt will improve R shoulder abduction to >/= 160 deg to improve functional mobility of UEs.  Progressing towards; not met  5. Pt will move R shoulder through functional ROM in all planes without pain to improve functional QOL.  Progressing towards; not met  6. Pt will perform prior level of household duties c/o pain to improve functional QOL. Progressing towards; not met        Plan   Phase II/III of post-op RTC repair (small) program.    Needs work to achieve terminal flexion and ER       Updated Certification Period: 11/4/2020 to 12/18/2020  Recommended Treatment Plan: 2-3 times per week for 6 weeks: Electrical Stimulation IFC/NMES, Manual Therapy, Moist Heat/ Ice, Neuromuscular Re-ed, Patient Education, Self Care, Therapeutic Activites and Therapeutic Exercise  Other Recommendations: Jennifer Johnson, IRVIN Pérez, PT

## 2020-12-08 ENCOUNTER — OFFICE VISIT (OUTPATIENT)
Dept: INTERNAL MEDICINE | Facility: CLINIC | Age: 45
End: 2020-12-08
Attending: FAMILY MEDICINE
Payer: MEDICAID

## 2020-12-08 VITALS
SYSTOLIC BLOOD PRESSURE: 120 MMHG | OXYGEN SATURATION: 98 % | HEART RATE: 96 BPM | DIASTOLIC BLOOD PRESSURE: 82 MMHG | BODY MASS INDEX: 43.4 KG/M2 | WEIGHT: 270.06 LBS | HEIGHT: 66 IN

## 2020-12-08 DIAGNOSIS — Z12.31 ENCOUNTER FOR SCREENING MAMMOGRAM FOR MALIGNANT NEOPLASM OF BREAST: Primary | ICD-10-CM

## 2020-12-08 PROCEDURE — 99214 OFFICE O/P EST MOD 30 MIN: CPT | Mod: S$PBB,,, | Performed by: FAMILY MEDICINE

## 2020-12-08 PROCEDURE — 99214 OFFICE O/P EST MOD 30 MIN: CPT | Mod: PBBFAC | Performed by: FAMILY MEDICINE

## 2020-12-08 PROCEDURE — 99999 PR PBB SHADOW E&M-EST. PATIENT-LVL IV: ICD-10-PCS | Mod: PBBFAC,,, | Performed by: FAMILY MEDICINE

## 2020-12-08 PROCEDURE — 99214 PR OFFICE/OUTPT VISIT, EST, LEVL IV, 30-39 MIN: ICD-10-PCS | Mod: S$PBB,,, | Performed by: FAMILY MEDICINE

## 2020-12-08 PROCEDURE — 99999 PR PBB SHADOW E&M-EST. PATIENT-LVL IV: CPT | Mod: PBBFAC,,, | Performed by: FAMILY MEDICINE

## 2020-12-08 NOTE — PROGRESS NOTES
"CHIEF COMPLAINT:  T2 DM    HISTORY OF PRESENT ILLNESS: The patient is a morbidly obese 45 year-old black female.  She recently had a good A1c.    She is seeing bariatric surgery.  She is set up for gastric bypass.      REVIEW OF SYSTEMS:  GENERAL: No fever, chills, fatigability or weight loss.  SKIN: No rashes, itching or changes in color or texture of skin.  HEAD: No headaches or recent head trauma.  EYES: Visual acuity fine. No photophobia, ocular pain or diplopia.  EARS: Denies ear pain, discharge or vertigo.  NOSE: No loss of smell, no epistaxis or postnasal drip.  MOUTH & THROAT: No hoarseness or change in voice. No excessive gum bleeding.  NODES: Denies swollen glands.  CHEST: Denies JONES, cyanosis, wheezing, cough and sputum production.  CARDIOVASCULAR: Denies chest pain, PND, orthopnea or reduced exercise tolerance.  ABDOMEN: Appetite fine. No weight loss. Denies diarrhea, abdominal pain, hematemesis or blood in stool.  URINARY: No flank pain, dysuria or hematuria.  PERIPHERAL VASCULAR: No claudication or cyanosis.  MUSCULOSKELETAL: No joint stiffness or swelling. She does have chronic intermittent back pain.  NEUROLOGIC: No history of seizures, paralysis, alteration of gait or coordination.    SOCIAL HISTORY: The patient does not smoke.  The patient consumes alcohol socially.     PHYSICAL EXAMINATION:     Blood pressure 120/82, pulse 96, height 5' 6" (1.676 m), weight 122.5 kg (270 lb 1 oz), SpO2 98 %.    APPEARANCE: Well nourished, well developed, in no acute distress.    HEAD: Normocephalic, atraumatic.  EYES: PERRL. EOMI.  Conjunctivae without injection and  anicteric   NOSE: Mucosa pink. Airway clear.  MOUTH & THROAT: No tonsillar enlargement. No pharyngeal erythema or exudate. No stridor.  NECK: Supple.   NODES: No cervical, axillary or inguinal lymph node enlargement.  CHEST: Lungs clear to auscultation.  No retractions are noted.  No rales or rhonchi are present.  CARDIOVASCULAR: Normal S1, S2. No " rubs, murmurs or gallops.  ABDOMEN: Bowel sounds normal. Not distended. Soft. No tenderness or masses.  No ascites is noted.  MUSCULOSKELETAL:  There is no clubbing, cyanosis, or edema of the extremities x4.  There is full range of motion of the lumbar spine.  There is full range of motion of the extremities x4.  There is no deformity noted.    NEUROLOGIC:       Normal speech development.      Hearing normal.      Normal gait.      Motor and sensory exams grossly normal.  PSYCHIATRIC: Patient is alert and oriented x3.  Thought processes are all normal.  There is no homicidality.  There is no suicidality.  There is no evidence of psychosis.    LABORATORY/RADIOLOGY:   Chart reviewed.      ASSESSMENT:   T2 DM, well controlled  MILEY  GERD  Morbid obesity with BMI of 44  Chronic low back pain    PLAN:  She is medically optimized and cleared for bariatric surgery at low cardiac risk.  We reviewed her recent blood work.    Metformin and diabetic edu  lipitor  losartan  Return to clinic in 6 months with A1c prior

## 2020-12-09 ENCOUNTER — IMMUNIZATION (OUTPATIENT)
Dept: PHARMACY | Facility: CLINIC | Age: 45
End: 2020-12-09
Payer: MEDICAID

## 2020-12-10 ENCOUNTER — TELEPHONE (OUTPATIENT)
Dept: ORTHOPEDICS | Facility: CLINIC | Age: 45
End: 2020-12-10

## 2020-12-10 NOTE — TELEPHONE ENCOUNTER
----- Message from Jean Pérez, PT sent at 12/9/2020 11:37 AM CST -----  Regarding: PT appointments  Hey Dr. Garza,    Just want to  reach out to you about Mrs. Lopez.  She was initially doing great after her surgery with PT, but she has been very inconsistent with her appointments.  She cancelled her appt again today with PT.  She has 10 total no-shows/cancellations since starting in August.  She is also very difficult to schedule based on her work, etc.  I called and confirmed her next appointment again today, but if she misses again we will be forced to take her off the schedule.  Hopefully she will come.     Jean Pérez, DPT  [facility]

## 2020-12-11 ENCOUNTER — CLINICAL SUPPORT (OUTPATIENT)
Dept: REHABILITATION | Facility: HOSPITAL | Age: 45
End: 2020-12-11
Payer: MEDICAID

## 2020-12-11 DIAGNOSIS — M25.511 ACUTE PAIN OF RIGHT SHOULDER: ICD-10-CM

## 2020-12-11 DIAGNOSIS — M25.611 DECREASED RANGE OF MOTION OF RIGHT SHOULDER: ICD-10-CM

## 2020-12-11 DIAGNOSIS — R29.898 WEAKNESS OF RIGHT ARM: ICD-10-CM

## 2020-12-11 PROCEDURE — 97110 THERAPEUTIC EXERCISES: CPT | Mod: PN,CQ

## 2020-12-11 NOTE — PROGRESS NOTES
Physical Therapy Treatment Note     Name: Velma Lopez  Clinic Number: [id number]    Therapy Diagnosis:   Encounter Diagnoses   Name Primary?    Decreased range of motion of right shoulder     Weakness of right arm     Acute pain of right shoulder      Physician: Mandi Larios PA    Visit Date: 12/11/2020    Physician Orders: PT Eval and Treat:  S/p right rotator cuff repair  Dr. Garza RTC repair protocol  Eval and Treat, modalities as needed  8+ visits  Start PT in 2 weeks     Medical Diagnosis from Referral: M75.101,M12.811 (ICD-10-CM) - Rotator cuff tear arthropathy of right shoulder  Right shoulder arthroscopic subacromial decompression.  Right shoulder arthroscopic rotator cuff repair.  Right shoulder labral debridement  Right shoulder chondroplasty  Left trigger thumb A1 pulley corticosteroid injection      Evaluation Date: 8/10/2020  Authorization Period Expiration: 12/30/2020  Plan of Care Expiration: 11/4/2020 to 12/18/2020  Visit # / Visits authorized: 5/6 (12 prior visits) (14 total)  FOTO: at dc     Time In: 1200  Time Out: 1250  Total Billable Time: 40 minutes (3 TE)  Precautions: DM II  S/P (R) RTC repair: 7/27/2020    Subjective     Pt reports: having more pain in L shoulder than R this date.     She was compliant with home exercise program.  Response to previous treatment: no adverse effects.   Functional change: none voiced today.     Pain: (R) shoulder: 3/10; (L) shoulder: 5-6/10  Location: right shoulder     Objective     Velma received therapeutic exercises to develop strength, endurance, ROM, flexibility and posture for 20 minutes including objective measurements:  - supine shoulder wand flexion   1# SA; 2x12 R only  - supine SA punches    3#; 3x10 R only  - Sidelying shoulder ER   2x30  - HOB elevated supine wand flexion  T-bar; 2x10   - standing rows    GTB; 2x12   - standing SAPD    GTB; 2x10 R only  - standing wall slides    2x10 R only    Velma received the  "following manual therapy techniques:total time 20 minutes  - PROM (R) shoulder flexion/scaption, ER to patient tolerance  - (R) grade III/IV shoulder mobs  - scapular pinning release with flexion and horizontal add    Velma received hot pack for 10 minutes to R shoulder.        Home Exercises Provided and Patient Education Provided   Education provided:   - continue HEP  - HEP 3x/day    Written Home Exercises Provided: yes.  Exercises were reviewed and Velma was able to demonstrate them prior to the end of the session.  Velma demonstrated good  understanding of the education provided.     See EMR under Media for exercises provided 8/14/2020.    Assessment   A: Mrs. Lopez is a 44 y/o F with multi co-morbidities now s/p R shoulder arthroscopy including supraspinatus (1 cm) full-thickness repair with concomitant procedures.  Surgical date: 07/27/2020.  Post-op week 16-17. Limitation mostly with IR but showing improvements this date. Patient continues to be limited in positioning due to L shoulder pain. Exercises such as straight arm pull downs only done on R today due to causing pain on L shoulder. Following treatment session patient states she is not hurting more but feels "worked out".     Velma is progressing well towards her goals.   Pt prognosis is Excellent.     Pt will continue to benefit from skilled outpatient physical therapy to address the deficits listed in the problem list box on initial evaluation, provide pt/family education and to maximize pt's level of independence in the home and community environment.   Pt's spiritual, cultural and educational needs considered and pt agreeable to plan of care and goals.     Anticipated barriers to physical therapy: co-morbidities    Goals:   Short-term goals  1. Pt will be compliant with HEP to assist PT treatment in restoring pain free motion of the R shoulder. MET 11/4/2020  2. Pt will improve impaired shoulder MMTs 1/2 grade B to improve strength for " functional tasks. MET 11/4/2020  3. Pt will improve R shoulder flexion to >/= 20 deg to improve functional mobility of UEs.  MET 9/21/2020  4. Pt will improveR shoulder abduction to >/= 20 deg to improve functional mobility of UEs.  MET 9/21/2020    Long Term Goals Status:   1. Pt will improve FOTO score to </= 35% to demonstrate improvements in carrying, moving, and handling objects.  Progressing towards; not met  2. Pt will improve impaired shoulder MMTs 1 grade B to improve strength for household duties.  Progressing towards; not met  3. Pt will improve R shoulder flexion to >/= 160 deg to improve functional mobility of UEs.  Progressing towards; not met  4. Pt will improve R shoulder abduction to >/= 160 deg to improve functional mobility of UEs.  Progressing towards; not met  5. Pt will move R shoulder through functional ROM in all planes without pain to improve functional QOL.  Progressing towards; not met  6. Pt will perform prior level of household duties c/o pain to improve functional QOL. Progressing towards; not met        Plan   Phase II/III of post-op RTC repair (small) program.   Needs work to achieve terminal flexion and ER       Updated Certification Period: 11/4/2020 to 12/18/2020  Recommended Treatment Plan: 2-3 times per week for 6 weeks: Electrical Stimulation IFC/NMES, Manual Therapy, Moist Heat/ Ice, Neuromuscular Re-ed, Patient Education, Self Care, Therapeutic Activites and Therapeutic Exercise  Other Recommendations: Dry Needling, IASMIRIAM Omer, PTA

## 2020-12-14 ENCOUNTER — PATIENT MESSAGE (OUTPATIENT)
Dept: GASTROENTEROLOGY | Facility: CLINIC | Age: 45
End: 2020-12-14

## 2020-12-14 DIAGNOSIS — K21.9 GERD WITHOUT ESOPHAGITIS: Primary | ICD-10-CM

## 2020-12-16 ENCOUNTER — PATIENT MESSAGE (OUTPATIENT)
Dept: GASTROENTEROLOGY | Facility: CLINIC | Age: 45
End: 2020-12-16

## 2020-12-16 ENCOUNTER — TELEPHONE (OUTPATIENT)
Dept: REHABILITATION | Facility: HOSPITAL | Age: 45
End: 2020-12-16

## 2020-12-16 DIAGNOSIS — K21.9 GERD WITHOUT ESOPHAGITIS: ICD-10-CM

## 2020-12-16 RX ORDER — OMEPRAZOLE 40 MG/1
40 CAPSULE, DELAYED RELEASE ORAL DAILY
Qty: 90 CAPSULE | Refills: 3 | OUTPATIENT
Start: 2020-12-16 | End: 2021-12-16

## 2020-12-16 RX ORDER — OMEPRAZOLE 40 MG/1
40 CAPSULE, DELAYED RELEASE ORAL DAILY
Qty: 30 CAPSULE | Refills: 0 | Status: SHIPPED | OUTPATIENT
Start: 2020-12-16 | End: 2020-12-22

## 2020-12-16 NOTE — TELEPHONE ENCOUNTER
Spoke with Mrs. Lopez about cancelled appointment today.  She has exceeded the no-show/cancellation visit limit but is refusing at this time to be discharged.  She would like to continue to be seen.  Affirmed next appointment and which we will further discuss appointments moving forward.      Jean Pérez, MARIOT

## 2020-12-17 DIAGNOSIS — E11.9 TYPE 2 DIABETES MELLITUS WITHOUT COMPLICATION: ICD-10-CM

## 2020-12-18 ENCOUNTER — CLINICAL SUPPORT (OUTPATIENT)
Dept: REHABILITATION | Facility: HOSPITAL | Age: 45
End: 2020-12-18
Payer: MEDICAID

## 2020-12-18 DIAGNOSIS — M25.511 ACUTE PAIN OF RIGHT SHOULDER: ICD-10-CM

## 2020-12-18 DIAGNOSIS — M25.611 DECREASED RANGE OF MOTION OF RIGHT SHOULDER: ICD-10-CM

## 2020-12-18 DIAGNOSIS — R29.898 WEAKNESS OF RIGHT ARM: ICD-10-CM

## 2020-12-18 PROCEDURE — 97110 THERAPEUTIC EXERCISES: CPT | Mod: PN

## 2020-12-18 NOTE — PROGRESS NOTES
Physical Therapy Treatment Note     Name: Velma Lopez  Clinic Number: [id number]    Therapy Diagnosis:   Encounter Diagnoses   Name Primary?    Decreased range of motion of right shoulder     Weakness of right arm     Acute pain of right shoulder      Physician: Mandi Larios PA    Visit Date: 12/18/2020    Physician Orders: PT Eval and Treat:  S/p right rotator cuff repair  Dr. Garza RTC repair protocol     Medical Diagnosis from Referral: M75.101,M12.811 (ICD-10-CM) - Rotator cuff tear arthropathy of right shoulder  Right shoulder arthroscopic subacromial decompression.  Right shoulder arthroscopic rotator cuff repair.  Right shoulder labral debridement  Right shoulder chondroplasty  Left trigger thumb A1 pulley corticosteroid injection      Evaluation Date: 8/10/2020  Authorization Period Expiration: 12/30/2020  Plan of Care Expiration: 11/4/2020 to 12/18/2020  Visit # / Visits authorized: 6/6 (15 prior visits) (16 total)  FOTO: at dc     Time In: 1115  Time Out: 1200  Total Billable Time: 45 minutes (3 TE)  Precautions: DM II  S/P (R) RTC repair: 7/27/2020    Subjective     Pt reports: mild R shoulder pain and limitation of ROM with reaching behind her back.  Limited position due to L shoulder pain and flare-ups.     She was compliant with home exercise program.  Response to previous treatment: no adverse effects.   Functional change: none voiced today.     Pain: (R) shoulder: 3/10; (L) shoulder: 3/10  Location: right shoulder     Objective     Shoulder Right            PROM 08/14 09/02  10/16 10/23 11/4 11/11 12/4 12/18   flexion  130 136 135  131 142 154 154 152 (P), 120 (A)   abduction NT 80 90  90   115    Internal rotation  NT 40 45 50  50; guarding 45; guarded 60   ER scapular plane 44 56 - 60 64 55; guarded 65; guarding 60; guarding     Posterior capsule length:  Normal as compared to R.   Rhythmic stabilization flexion: good  R shoulder MMT: IR 4/5, ER, 3/5, flexion  3-/5    Velma received therapeutic exercises to develop strength, endurance, ROM, flexibility and posture for 25 minutes including objective measurements:  - supine shoulder wand flexion   1# SA; 2x12  - HOB elevated supine SA punches  3#; 3x10  - HOB elevated supine wand flexion  T-bar; 2x10     - standing rows    GTB; 2x12   - standing SAPD    GTB; 3x10  - standing wall slides    2x10  - standing horizontal abd   YTB; 2x10  - standing shoulder ER/IR   YTB ER; 2x15 and GTB 2x15 IR    Velma received the following manual therapy techniques:total time 15 minutes  - PROM (R) shoulder flexion/scaption, ER to patient tolerance  - (R) grade III/IV shoulder mobs  - scapular pinning release with flexion and horizontal add      Home Exercises Provided and Patient Education Provided   Education provided:   - continue HEP  - HEP 3x/day  - reviewed surgical precautions, healing time frames, progression with PT in regards to PROM --> AROM and lifting restrictions    Written Home Exercises Provided: yes.  Exercises were reviewed and Velma was able to demonstrate them prior to the end of the session.  Velma demonstrated good  understanding of the education provided.     See EMR under Media for exercises provided 8/14/2020.    Assessment   A: Mrs. Lopez is a 46 y/o F with multi co-morbidities now s/p R shoulder arthroscopy including supraspinatus (1 cm) full-thickness repair with concomitant procedures.  Surgical date: 07/27/2020.  Post-op week 21. Limitation of ROM mostly with IR. Flexion and ER seem to have normalized.  Heavy arms.  Demonstrates AROM <PROM (<3/5 MMT) flexion.  ER muscle endurance quick too fatigue. Continue graded linear strength progression. Patient continues to be limited in positioning due to L shoulder pain.       Velma is progressing well towards her goals.   Pt prognosis is Excellent.     Pt will continue to benefit from skilled outpatient physical therapy to address the deficits listed in the  problem list box on initial evaluation, provide pt/family education and to maximize pt's level of independence in the home and community environment.   Pt's spiritual, cultural and educational needs considered and pt agreeable to plan of care and goals.     Anticipated barriers to physical therapy: co-morbidities    Goals:   Short-term goals  1. Pt will be compliant with HEP to assist PT treatment in restoring pain free motion of the R shoulder. MET 11/4/2020  2. Pt will improve impaired shoulder MMTs 1/2 grade B to improve strength for functional tasks. MET 11/4/2020  3. Pt will improve R shoulder flexion to >/= 20 deg to improve functional mobility of UEs.  MET 9/21/2020  4. Pt will improveR shoulder abduction to >/= 20 deg to improve functional mobility of UEs.  MET 9/21/2020    Long Term Goals Status:   1. Pt will improve FOTO score to </= 35% to demonstrate improvements in carrying, moving, and handling objects.  Progressing towards; not met  2. Pt will improve impaired shoulder MMTs 1 grade B to improve strength for household duties.  Progressing towards; not met  3. Pt will improve R shoulder flexion to >/= 160 deg to improve functional mobility of UEs.  Progressing towards; not met  4. Pt will improve R shoulder abduction to >/= 160 deg to improve functional mobility of UEs.  Progressing towards; not met  5. Pt will move R shoulder through functional ROM in all planes without pain to improve functional QOL.  Progressing towards; not met  6. Pt will perform prior level of household duties c/o pain to improve functional QOL. Progressing towards; not met        Plan   Phase III of post-op RTC repair (small) program.   Needs work to achieve improved functional IR, ER/IR strength, and anti-gravity shoulder flexion strength.         Updated Certification Period: 11/4/2020 to 12/18/2020  Recommended Treatment Plan: 2-3 times per week for 6 weeks: Electrical Stimulation IFC/NMES, Manual Therapy, Moist Heat/ Ice,  Neuromuscular Re-ed, Patient Education, Self Care, Therapeutic Activites and Therapeutic Exercise  Other Recommendations: Jennifer Johnosn, IRVIN Pérez, PT

## 2020-12-20 NOTE — PLAN OF CARE
Outpatient Therapy Updated Plan of Care     Visit Date: 12/18/2020  Name: Velma Lopez  Clinic Number: [id number]    Therapy Diagnosis:   Encounter Diagnoses   Name Primary?    Decreased range of motion of right shoulder     Weakness of right arm     Acute pain of right shoulder      Physician: Mandi Larios PA    Physician Orders: PT Eval and Treat:  S/p right rotator cuff repair  Dr. Garza RTC repair protocol  Eval and Treat, modalities as needed  8+ visits  Start PT in 2 weeks     Medical Diagnosis from Referral: M75.101,M12.811 (ICD-10-CM) - Rotator cuff tear arthropathy of right shoulder  Right shoulder arthroscopic subacromial decompression.  Right shoulder arthroscopic rotator cuff repair.  Right shoulder labral debridement  Right shoulder chondroplasty  Left trigger thumb A1 pulley corticosteroid injection        Evaluation Date: 8/10/2020  Total Visits Received: 16  Cancelled Visits: 5+  No Show Visits: none    Current Certification Period: 11/4/2020 to 12/18/2020  Precautions:  Surgical precautions  Visits from Evaluation Date:  15  Functional Level Prior to Evaluation:  See initial evaluation    Subjective     Update:Pt reports: mild R shoulder pain and limitation of ROM with reaching behind her back.  Limited position due to L shoulder pain and flare-ups.   Missed two appointments last week due to illness per patient.  Recent MRI for L shoulder pain.     She was compliant with home exercise program.  Response to previous treatment: no adverse effects.   Functional change: none voiced today.      Pain: (R) shoulder: 3/10; (L) shoulder: 3/10  Location: right shoulder        Objective      Shoulder Right                 PROM 08/14 09/02  10/16 10/23 11/4 11/11 12/4 12/18   flexion  130 136 135  131 142 154 154 152 (P), 120 (A)   abduction NT 80 90  90     115     Internal rotation  NT 40 45 50   50; guarding 45; guarded 60   ER scapular plane 44 56 - 60 64 55; guarded 65; guarding 60;  guarding      Posterior capsule length:  Normal as compared to R.   Rhythmic stabilization flexion: good  R shoulder MMT: IR 4/5, ER, 3/5, flexion 3-/5      Assessment     Update:   A: Mrs. Lopez is a 46 y/o F with multi co-morbidities now s/p R shoulder arthroscopy including supraspinatus (1 cm) full-thickness repair with concomitant procedures.  Surgical date: 07/27/2020.  Post-op week 21. Limitation of ROM mostly with IR. Flexion and ER seem to have normalized.  Heavy arms.  Demonstrates AROM <PROM (<3/5 MMT) flexion.  ER muscle endurance quick too fatigue. Continue graded linear strength progression. Patient continues to be limited in positioning due to L shoulder pain.        Velma is progressing well towards her goals.   Pt prognosis is Good.     Previous Short Term Goals Status:     Short-term goals  1. Pt will be compliant with HEP to assist PT treatment in restoring pain free motion of the R shoulder. MET 11/4/2020  2. Pt will improve impaired shoulder MMTs 1/2 grade B to improve strength for functional tasks. MET 11/4/2020  3. Pt will improve R shoulder flexion to >/= 20 deg to improve functional mobility of UEs.  MET 9/21/2020  4. Pt will improveR shoulder abduction to >/= 20 deg to improve functional mobility of UEs.  MET 9/21/2020    New Short Term Goals Status:  none  Long Term Goal Status:   continue per initial plan of care.  Long Term Goals Status:   1. Pt will improve FOTO score to </= 35% to demonstrate improvements in carrying, moving, and handling objects.  Progressing towards; not met  2. Pt will improve impaired shoulder MMTs 1 grade B to improve strength for household duties.  MET 12/18/2020  3. Pt will improve R shoulder flexion to >/= 160 deg to improve functional mobility of UEs.  Progressing towards; not met  4. Pt will improve R shoulder abduction to >/= 160 deg to improve functional mobility of UEs.  Progressing towards; not met  5. Pt will move R shoulder through functional ROM in  all planes without pain to improve functional QOL.  Progressing towards; not met  6. Pt will perform prior level of household duties c/o pain to improve functional QOL. Progressing towards; not met     Reasons for Recertification of Therapy:   Mrs. Lopez would continue to benefit from skilled physical therapy services to address the above listed PT problems related to her R shoulder dysfunction and to complete her R shoulder post-op outpatient physical therapy rehab course.       Plan     Updated Certification Period: 12/18/2020 to 01/15/2021  Recommended Treatment Plan: 2-3 times per week for 5 weeks: Electrical Stimulation IFC/NMES, Manual Therapy, Moist Heat/ Ice, Neuromuscular Re-ed, Patient Education, Self Care, Therapeutic Activites and Therapeutic Exercise  Other Recommendations: IRVIN Casanova, PT  12/18/2020      I CERTIFY THE NEED FOR THESE SERVICES FURNISHED UNDER THIS PLAN OF TREATMENT AND WHILE UNDER MY CARE    Physician's comments:        Physician's Signature: ___________________________________________________

## 2020-12-22 ENCOUNTER — OFFICE VISIT (OUTPATIENT)
Dept: GASTROENTEROLOGY | Facility: CLINIC | Age: 45
End: 2020-12-22
Payer: MEDICAID

## 2020-12-22 DIAGNOSIS — K21.9 GERD WITHOUT ESOPHAGITIS: ICD-10-CM

## 2020-12-22 DIAGNOSIS — R13.19 ESOPHAGEAL DYSPHAGIA: Primary | ICD-10-CM

## 2020-12-22 PROCEDURE — 99214 OFFICE O/P EST MOD 30 MIN: CPT | Mod: 95,,, | Performed by: NURSE PRACTITIONER

## 2020-12-22 PROCEDURE — 99214 PR OFFICE/OUTPT VISIT, EST, LEVL IV, 30-39 MIN: ICD-10-PCS | Mod: 95,,, | Performed by: NURSE PRACTITIONER

## 2020-12-22 RX ORDER — OMEPRAZOLE 40 MG/1
40 CAPSULE, DELAYED RELEASE ORAL EVERY MORNING
Qty: 90 CAPSULE | Refills: 3 | Status: SHIPPED | OUTPATIENT
Start: 2020-12-22 | End: 2021-04-19

## 2020-12-22 RX ORDER — AMITRIPTYLINE HYDROCHLORIDE 10 MG/1
10 TABLET, FILM COATED ORAL NIGHTLY
Qty: 90 TABLET | Refills: 3 | Status: SHIPPED | OUTPATIENT
Start: 2020-12-22 | End: 2022-01-24 | Stop reason: SDUPTHER

## 2020-12-22 NOTE — PROGRESS NOTES
[facility] Consultation Note    Reason for Consult:  The primary encounter diagnosis was Esophageal dysphagia. A diagnosis of GERD without esophagitis was also pertinent to this visit.    PCP:   Aftab Martinez       Referring MD:  No referring provider defined for this encounter.    HPI:  NOTE from 6/26/2019  This is a 45 y.o. female here for follow-up for her GERD and dysphagia.  She is an established patient last seen by me in clinic 4/24/19.  She then had an EGD with Dr. Guevara 6/14/19.  Her EGD was unremarkable.  At her last visit I increased her Prilosec to 40 mg every day.    She states she feels better than our last visit. She made dietary and lifestyle changes.  She experiences the pyrosis 1-2x per week, she was having it every day.  At times she is still having the dysphagia.  No nausea or vomiting.  Still taking the Prilosec every morning.  No abdominal pain, but sometimes when she eats or drinks something she feels pain in her back.    10/16/2019  Esophagram was normal. Manometry may have suggested a mild EGJ outflow obstruction, however this is less likely since esophagram was normal.  Changed diet bc that was a contributing factor. Was having GERD a couple of times a week. Now it is maybe once a week. Also on omeprazole 40 mg.   Now having orophyrangeal dysphagia.  Esophageal dysphagia is present.   No n/v    Interval Hx  11/20/19  Elavil was prescribed for her dysphagia. She has had great improvement and she is satisfied. Only one dysphagia episode w/in past month. Use to be a couple times per week.  GERD night    Interval hx 12/22/2020  The patient location is:  Patient Home   The chief complaint leading to consultation is: dysphagia and GERD  Visit type: Virtual visit with synchronous audio and video  Total time spent with patient: 10 mins  Each patient to whom he or she provides medical services by telemedicine is:  (1) informed of the relationship between the physician  and patient and the respective role of any other health care provider with respect to management of the patient; and (2) notified that he or she may decline to receive medical services by telemedicine and may withdraw from such care at any time.      gerd stable on omeprazole  dysphagia resolved with elavil 10 mg QPM  Bowel habits okay, no overt GIB, N/v        ROS:  Constitutional: No fevers, chills, No unintentional weight loss  GI: see HPI      Medical History:  has a past medical history of Chronic back pain, Diabetes type 2, uncontrolled, Mild nonproliferative diabetic retinopathy of left eye without macular edema associated with type 2 diabetes mellitus (10/21/2019), Morbid obesity with BMI of 40.0-44.9, adult, MILEY on CPAP, Plantar fasciitis of left foot, and Urticaria.    Surgical History:  has a past surgical history that includes [place] tooth extraction; Cyst Removal; Dilation and curettage of uterus; Esophagogastroduodenoscopy (N/A, 6/14/2019); Upper gastrointestinal endoscopy; Esophageal motility study using high resolution manometry (N/A, 9/17/2019); Trigger finger release (Right, 10/4/2019); Arthroscopic repair of rotator cuff of shoulder (Right, 7/27/2020); Injection of steroid (Left, 7/27/2020); Decompression of subacromial space (Left, 7/27/2020); and Arthroscopic acromioplasty of shoulder (Left, 7/27/2020).    Family History: family history includes Breast cancer (age of onset: 70) in her maternal aunt; Cancer in her maternal grandmother; Diabetes in her mother; No Known Problems in her sister, sister, and sister..     Social History:  reports that she has quit smoking. Her smoking use included cigarettes. She smoked 0.50 packs per day. She has never used smokeless tobacco. She reports current alcohol use. She reports that she does not use drugs.    Review of patient's allergies indicates:  No Known Allergies    Current Outpatient Medications on File Prior to Visit   Medication Sig Dispense Refill  "   atorvastatin (LIPITOR) 10 MG tablet TAKE 1 TABLET BY MOUTH EVERY DAY 30 tablet 0    BD ULTRA-FINE DEVAUGHN PEN NEEDLE 32 gauge x 5/32" Ndle USE IN THE EVENING DAILY AS DIRECTED 100 each 11    blood sugar diagnostic Strp 1 strip by Misc.(Non-Drug; Combo Route) route 2 (two) times daily. 200 strip 3    blood-glucose meter kit Please provide with insurance covered meter 1 each 0    cetirizine (ZYRTEC) 10 MG tablet Take 10 mg by mouth once daily.      diclofenac sodium (VOLTAREN) 1 % Gel Apply 2 g topically 4 (four) times daily. 100 g 0    HYDROcodone-acetaminophen (NORCO) 5-325 mg per tablet Take 1 tablet by mouth every 6 (six) hours as needed for Pain (moderate to severe). 28 tablet 0    lancets Misc 1 Device by Misc.(Non-Drug; Combo Route) route 2 (two) times daily. 200 each 3    losartan (COZAAR) 25 MG tablet TAKE 1 TABLET BY MOUTH EVERY DAY 30 tablet 0    medroxyPROGESTERone (DEPO-PROVERA) 150 mg/mL Syrg Inject 1 mL (150 mg total) into the muscle every 3 (three) months. 1 mL 2    metFORMIN (GLUCOPHAGE) 500 MG tablet TAKE 1 TABLET BY MOUTH TWICE A DAY WITH MEALS 60 tablet 0    oxyCODONE-acetaminophen (PERCOCET) 5-325 mg per tablet Take 1 tablet by mouth every 4 (four) hours as needed for Pain. 12 tablet 0    vitamin D (VITAMIN D3) 1000 units Tab Take 1,000 Units by mouth once daily.      zolpidem (AMBIEN) 5 MG Tab TAKE 1 TABLET(5 MG) BY MOUTH EVERY NIGHT AS NEEDED 30 tablet 4    [DISCONTINUED] amitriptyline (ELAVIL) 10 MG tablet TAKE 1 TABLET (10 MG TOTAL) BY MOUTH NIGHTLY AS NEEDED FOR INSOMNIA. 30 tablet 1    [DISCONTINUED] omeprazole (PRILOSEC) 40 MG capsule Take 1 capsule (40 mg total) by mouth once daily. 30 capsule 0    liraglutide 0.6 mg/0.1 mL, 18 mg/3 mL, subq PNIJ (VICTOZA 2-ANGELES) 0.6 mg/0.1 mL (18 mg/3 mL) PnIj pen Inject 1.8 mg into the skin once daily. 9 mL 11    [DISCONTINUED] chlorhexidine (PERIDEX) 0.12 % solution RINSE WITH 1/2 OZ FOR 30 SECONDS THEN SPIT AFTER BREAKFAST AND BEFORE " BEDTIME  0    [DISCONTINUED] methylPREDNISolone (MEDROL DOSEPACK) 4 mg tablet Take as directed 21 tablet 0    [DISCONTINUED] phentermine 37.5 MG capsule Take 1 capsule (37.5 mg total) by mouth every morning. 30 capsule 3     Current Facility-Administered Medications on File Prior to Visit   Medication Dose Route Frequency Provider Last Rate Last Dose    fentaNYL injection 100 mcg  100 mcg Intravenous Q5 Min PRN Ced Patino MD   100 mcg at 07/27/20 1056    lidocaine (PF) 10 mg/ml (1%) injection 10 mg  1 mL Intradermal Once Brenna Bhavya Livnigston MD        midazolam (VERSED) 1 mg/mL injection 0.5 mg  0.5 mg Intravenous PRN Ced Patino MD   2 mg at 07/27/20 1322    ropivacaine (Naropin) 1000 mg/500 mL (2 mg/mL) Nimbus PainPRO Pump infusion  4 mL/hr Perineural Continuous Ced Patino MD 4 mL/hr at 07/27/20 1519 4 mL/hr at 07/27/20 1519         Objective Findings:    Vital Signs:  There were no vitals taken for this visit.  There is no height or weight on file to calculate BMI.    Physical Exam:  General Appearance: Well appearing in no acute distress  Head:   Normocephalic, without obvious abnormality  Eyes:    No scleral icterus  ENT: Neck supple  Neurologic: AAO x 3      Labs:  Lab Results   Component Value Date    WBC 13.46 (H) 10/28/2020    HGB 13.6 10/28/2020    HCT 42.3 10/28/2020     (H) 10/28/2020    CRP 2.28 (H) 10/28/2020    CHOL 122 06/03/2020    TRIG 67 06/03/2020    HDL 63 (H) 06/03/2020    ALT 14 10/28/2020    AST 18 10/28/2020     10/28/2020    K 3.9 10/28/2020     10/28/2020    CREATININE 0.69 10/28/2020    BUN 12 10/28/2020    CO2 26 10/28/2020    TSH 1.527 01/30/2019    GLUF 114 (H) 05/31/2010    HGBA1C 6.3 (H) 06/03/2020       Imaging reviewed:  Esophageal Manometry 9/17/2019  - [place] Classification: esophagogastric junction                         (EGJ) outflow obstruction. However, this does not                         appear to be clinically  significant. The findings                         on manometry may be due to an anatomic anomoly.  Suggested that this is a functional dysphagia.    Esophagram 6/2019  Mild esophageal dysmotility,  otherwise normal esophagram evaluation.    Endoscopy reviewed:    EGD 06/14/2019 with Dr. Guevara.  Unremarkable.    Assessment:    Ms. Lopez is a 44-year-old BF with:    1. Esophageal dysphagia    2. GERD without esophagitis      Functional dysphagia, Responding well the Elavil.  GERD well controlled on PPI  Has gastric bypass surgery coming up.    Recommendations:  1.  Continue PPI as well as GERD diet and lifestyle my modifications.  2.  Elavil 10 mg every night   3. We did discuss colon cancer screening for next year since screening recommendations begin at 45 starting 2021. She is open to this and we will discuss next year    Follow-up in PRN    Order summary:  Orders Placed This Encounter    amitriptyline (ELAVIL) 10 MG tablet    omeprazole (PRILOSEC) 40 MG capsule         Thank you so much for allowing me to participate in the care of Velma Lopez    Africa Wan, KARENP-C

## 2020-12-23 ENCOUNTER — CLINICAL SUPPORT (OUTPATIENT)
Dept: REHABILITATION | Facility: HOSPITAL | Age: 45
End: 2020-12-23
Payer: MEDICAID

## 2020-12-23 DIAGNOSIS — M25.611 DECREASED RANGE OF MOTION OF RIGHT SHOULDER: ICD-10-CM

## 2020-12-23 DIAGNOSIS — M25.511 ACUTE PAIN OF RIGHT SHOULDER: ICD-10-CM

## 2020-12-23 DIAGNOSIS — R29.898 WEAKNESS OF RIGHT ARM: ICD-10-CM

## 2020-12-23 PROCEDURE — 97110 THERAPEUTIC EXERCISES: CPT | Mod: PN

## 2020-12-23 NOTE — PROGRESS NOTES
Physical Therapy Treatment Note     Name: Velma Lopez  Clinic Number: [id number]    Therapy Diagnosis:   Encounter Diagnoses   Name Primary?    Decreased range of motion of right shoulder     Weakness of right arm     Acute pain of right shoulder      Physician: Mandi Larios PA    Visit Date: 12/23/2020    Physician Orders: PT Eval and Treat:  S/p right rotator cuff repair  Dr. Garza RTC repair protocol     Medical Diagnosis from Referral: M75.101,M12.811 (ICD-10-CM) - Rotator cuff tear arthropathy of right shoulder  Right shoulder arthroscopic subacromial decompression.  Right shoulder arthroscopic rotator cuff repair.  Right shoulder labral debridement  Right shoulder chondroplasty  Left trigger thumb A1 pulley corticosteroid injection      Evaluation Date: 8/10/2020  Authorization Period Expiration: 12/30/2020  Plan of Care Expiration: 11/4/2020 to 12/18/2020  Visit # / Visits authorized: 7/6 (16 prior visits) (17 total)  FOTO: at dc     Time In: 1315  Time Out: 1400  Total Billable Time: 40 minutes (3 TE)  Precautions: DM II  S/P (R) RTC repair: 7/27/2020    Subjective     Pt reports: that she does feel she is getting more strength and ROM with her shoulder reaching overhead.      She was compliant with home exercise program.  Response to previous treatment: no adverse effects.   Functional change: none voiced today.     Pain: (R) shoulder: 3/10; (L) shoulder: 3/10  Location: right shoulder     Objective     Shoulder Right            PROM 08/14 09/02  10/16 10/23 11/4 11/11 12/4 12/18   flexion  130 136 135  131 142 154 154 152 (P), 120 (A)   abduction NT 80 90  90   115    Internal rotation  NT 40 45 50  50; guarding 45; guarded 60   ER scapular plane 44 56 - 60 64 55; guarded 65; guarding 60; guarding     Posterior capsule length:  Normal as compared to R.   Rhythmic stabilization flexion: good  R shoulder MMT: IR 4/5, ER, 3/5, flexion 3-/5    Velma received therapeutic exercises to  develop strength, endurance, ROM, flexibility and posture for 25 minutes including objective measurements:  - supine shoulder wand flexion   1# SA; 2x12  - HOB elevated supine SA punches  3#; 3x10  - HOB elevated supine wand flexion  T-bar; 2x10     - standing rows    GTB; 2x12   - standing SAPD    GTB; 3x10  - standing wall slides    2x10  - standing horizontal abd   YTB; 2x10  - standing shoulder ER/IR   YTB ER; 2x15 and GTB 2x15 IR    Velma received the following manual therapy techniques:total time 15 minutes  - PROM (R) shoulder flexion/scaption, ER to patient tolerance  - (R) grade III/IV shoulder mobs  - scapular pinning release with flexion and horizontal add      Home Exercises Provided and Patient Education Provided   Education provided:   - continue HEP  - HEP 3x/day  - reviewed surgical precautions, healing time frames, progression with PT in regards to PROM --> AROM and lifting restrictions    Written Home Exercises Provided: yes.  Exercises were reviewed and Velma was able to demonstrate them prior to the end of the session.  Velma demonstrated good  understanding of the education provided.     See EMR under Media for exercises provided 8/14/2020.    Assessment   A: Mrs. Lopez is a 44 y/o F with multi co-morbidities now s/p R shoulder arthroscopy including supraspinatus (1 cm) full-thickness repair with concomitant procedures.  Surgical date: 07/27/2020.  Post-op week 21. Limitation of ROM mostly with IR. Flexion and ER seem to have normalized.  Heavy arms.  Demonstrates AROM = PROM (3/5 MMT) flexion.  ER muscle endurance quick too fatigue. Continue graded linear strength progression. Patient continues to be limited in positioning due to L shoulder pain.        Velma is progressing well towards her goals.   Pt prognosis is Excellent.     Pt will continue to benefit from skilled outpatient physical therapy to address the deficits listed in the problem list box on initial evaluation, provide  pt/family education and to maximize pt's level of independence in the home and community environment.   Pt's spiritual, cultural and educational needs considered and pt agreeable to plan of care and goals.     Anticipated barriers to physical therapy: co-morbidities    Goals:   Short-term goals  1. Pt will be compliant with HEP to assist PT treatment in restoring pain free motion of the R shoulder. MET 11/4/2020  2. Pt will improve impaired shoulder MMTs 1/2 grade B to improve strength for functional tasks. MET 11/4/2020  3. Pt will improve R shoulder flexion to >/= 20 deg to improve functional mobility of UEs.  MET 9/21/2020  4. Pt will improveR shoulder abduction to >/= 20 deg to improve functional mobility of UEs.  MET 9/21/2020    Long Term Goals Status:   1. Pt will improve FOTO score to </= 35% to demonstrate improvements in carrying, moving, and handling objects.  Progressing towards; not met  2. Pt will improve impaired shoulder MMTs 1 grade B to improve strength for household duties.  Progressing towards; not met  3. Pt will improve R shoulder flexion to >/= 160 deg to improve functional mobility of UEs.  Progressing towards; not met  4. Pt will improve R shoulder abduction to >/= 160 deg to improve functional mobility of UEs.  Progressing towards; not met  5. Pt will move R shoulder through functional ROM in all planes without pain to improve functional QOL.  Progressing towards; not met  6. Pt will perform prior level of household duties c/o pain to improve functional QOL. Progressing towards; not met        Plan   Phase III of post-op RTC repair (small) program.   Needs work to achieve improved functional IR, ER/IR strength, and anti-gravity shoulder flexion strength.         Updated Certification Period: 11/4/2020 to 12/18/2020  Recommended Treatment Plan: 2-3 times per week for 6 weeks: Electrical Stimulation IFC/NMES, Manual Therapy, Moist Heat/ Ice, Neuromuscular Re-ed, Patient Education, Self Care,  Therapeutic Activites and Therapeutic Exercise  Other Recommendations: [place]ling, [facility] P[facility]rez, PT

## 2020-12-29 ENCOUNTER — PATIENT MESSAGE (OUTPATIENT)
Dept: REHABILITATION | Facility: HOSPITAL | Age: 45
End: 2020-12-29

## 2020-12-30 ENCOUNTER — PATIENT OUTREACH (OUTPATIENT)
Dept: ADMINISTRATIVE | Facility: OTHER | Age: 45
End: 2020-12-30

## 2020-12-30 NOTE — PROGRESS NOTES
Health Maintenance Due   Topic Date Due    Hepatitis C Screening  1975    Foot Exam  01/15/1983    Eye Exam  01/15/1983    HIV Screening  01/15/1988    Hemoglobin A1c  09/03/2020     Updates were requested from care everywhere.  Chart was reviewed for overdue Proactive [facility] Encounters ([facility]) topics (CRS, Breast Cancer Screening, Eye exam)  Health Maintenance has been updated.  LINKS immunization registry triggered.  Immunizations were reconciled.

## 2020-12-31 ENCOUNTER — HOSPITAL ENCOUNTER (EMERGENCY)
Facility: HOSPITAL | Age: 45
Discharge: HOME | End: 2020-12-31
Attending: EMERGENCY MEDICINE
Payer: MEDICAID

## 2020-12-31 ENCOUNTER — TELEPHONE (OUTPATIENT)
Dept: ORTHOPEDICS | Facility: CLINIC | Age: 45
End: 2020-12-31

## 2020-12-31 VITALS
WEIGHT: 278.25 LBS | HEART RATE: 81 BPM | RESPIRATION RATE: 20 BRPM | OXYGEN SATURATION: 100 % | DIASTOLIC BLOOD PRESSURE: 85 MMHG | HEIGHT: 66 IN | BODY MASS INDEX: 44.72 KG/M2 | SYSTOLIC BLOOD PRESSURE: 165 MMHG | TEMPERATURE: 99 F

## 2020-12-31 DIAGNOSIS — J06.9 VIRAL URI: ICD-10-CM

## 2020-12-31 DIAGNOSIS — M79.89 LEG SWELLING: Primary | ICD-10-CM

## 2020-12-31 DIAGNOSIS — K04.7 DENTAL ABSCESS: ICD-10-CM

## 2020-12-31 LAB
ALBUMIN SERPL BCP-MCNC: 4 G/DL (ref 3.5–5.2)
ALP SERPL-CCNC: 79 U/L (ref 38–126)
ALT SERPL W/O P-5'-P-CCNC: 13 U/L (ref 10–44)
ANION GAP SERPL CALC-SCNC: 6 MMOL/L (ref 8–16)
AST SERPL-CCNC: 22 U/L (ref 15–46)
B-HCG UR QL: NEGATIVE
BACTERIA #/AREA URNS AUTO: NORMAL /HPF
BASOPHILS # BLD AUTO: 0.09 K/UL (ref 0–0.2)
BASOPHILS NFR BLD: 0.7 % (ref 0–1.9)
BILIRUB SERPL-MCNC: 0.6 MG/DL (ref 0.1–1)
BILIRUB UR QL STRIP: NEGATIVE
CALCIUM SERPL-MCNC: 9.2 MG/DL (ref 8.7–10.5)
CHLORIDE SERPL-SCNC: 106 MMOL/L (ref 95–110)
CLARITY UR REFRACT.AUTO: CLEAR
CO2 SERPL-SCNC: 28 MMOL/L (ref 23–29)
COLOR UR AUTO: YELLOW
CREAT SERPL-MCNC: 0.6 MG/DL (ref 0.5–1.4)
DIFFERENTIAL METHOD: ABNORMAL
EOSINOPHIL # BLD AUTO: 0.3 K/UL (ref 0–0.5)
EOSINOPHIL NFR BLD: 2.2 % (ref 0–8)
ERYTHROCYTE [DISTWIDTH] IN BLOOD BY AUTOMATED COUNT: 13.1 % (ref 11.5–14.5)
EST. GFR  (AFRICAN AMERICAN): >60 ML/MIN/1.73 M^2
EST. GFR  (NON AFRICAN AMERICAN): >60 ML/MIN/1.73 M^2
GLUCOSE SERPL-MCNC: 105 MG/DL (ref 70–110)
GLUCOSE UR QL STRIP: NEGATIVE
HCT VFR BLD AUTO: 37.7 % (ref 37–48.5)
HGB BLD-MCNC: 12.1 G/DL (ref 12–16)
HGB UR QL STRIP: NEGATIVE
IMM GRANULOCYTES # BLD AUTO: 0.05 K/UL (ref 0–0.04)
IMM GRANULOCYTES NFR BLD AUTO: 0.4 % (ref 0–0.5)
KETONES UR QL STRIP: NEGATIVE
LEUKOCYTE ESTERASE UR QL STRIP: ABNORMAL
LYMPHOCYTES # BLD AUTO: 4.3 K/UL (ref 1–4.8)
LYMPHOCYTES NFR BLD: 31.7 % (ref 18–48)
MCH RBC QN AUTO: 31.6 PG (ref 27–31)
MCHC RBC AUTO-ENTMCNC: 32.1 G/DL (ref 32–36)
MCV RBC AUTO: 98 FL (ref 82–98)
MICROSCOPIC COMMENT: NORMAL
MONOCYTES # BLD AUTO: 1.1 K/UL (ref 0.3–1)
MONOCYTES NFR BLD: 7.8 % (ref 4–15)
NEUTROPHILS # BLD AUTO: 7.8 K/UL (ref 1.8–7.7)
NEUTROPHILS NFR BLD: 57.2 % (ref 38–73)
NITRITE UR QL STRIP: NEGATIVE
NRBC BLD-RTO: 0 /100 WBC
NT-PROBNP SERPL-MCNC: 253 PG/ML (ref 5–450)
PH UR STRIP: 7 [PH] (ref 5–8)
PLATELET # BLD AUTO: 335 K/UL (ref 150–350)
PMV BLD AUTO: 11.2 FL (ref 9.2–12.9)
POTASSIUM SERPL-SCNC: 4.2 MMOL/L (ref 3.5–5.1)
PROT SERPL-MCNC: 7.4 G/DL (ref 6–8.4)
PROT UR QL STRIP: NEGATIVE
RBC # BLD AUTO: 3.83 M/UL (ref 4–5.4)
SARS-COV-2 RDRP RESP QL NAA+PROBE: NEGATIVE
SODIUM SERPL-SCNC: 140 MMOL/L (ref 136–145)
SP GR UR STRIP: 1.01 (ref 1–1.03)
TROPONIN I SERPL-MCNC: <0.012 NG/ML (ref 0.01–0.03)
URN SPEC COLLECT METH UR: ABNORMAL
UROBILINOGEN UR STRIP-ACNC: 1 EU/DL
UUN UR-MCNC: 10 MG/DL (ref 7–17)
WBC # BLD AUTO: 13.61 K/UL (ref 3.9–12.7)
WBC #/AREA URNS AUTO: 1 /HPF (ref 0–5)

## 2020-12-31 PROCEDURE — U0002 COVID-19 LAB TEST NON-CDC: HCPCS | Mod: ER

## 2020-12-31 PROCEDURE — 84484 ASSAY OF TROPONIN QUANT: CPT | Mod: ER

## 2020-12-31 PROCEDURE — 99284 EMERGENCY DEPT VISIT MOD MDM: CPT | Mod: 25,ER

## 2020-12-31 PROCEDURE — 93010 ELECTROCARDIOGRAM REPORT: CPT | Mod: ,,, | Performed by: INTERNAL MEDICINE

## 2020-12-31 PROCEDURE — 93010 EKG 12-LEAD: ICD-10-PCS | Mod: ,,, | Performed by: INTERNAL MEDICINE

## 2020-12-31 PROCEDURE — 81000 URINALYSIS NONAUTO W/SCOPE: CPT | Mod: ER

## 2020-12-31 PROCEDURE — 83880 ASSAY OF NATRIURETIC PEPTIDE: CPT | Mod: ER

## 2020-12-31 PROCEDURE — 25000003 PHARM REV CODE 250: Mod: ER | Performed by: PHYSICIAN ASSISTANT

## 2020-12-31 PROCEDURE — 85025 COMPLETE CBC W/AUTO DIFF WBC: CPT | Mod: ER

## 2020-12-31 PROCEDURE — 80053 COMPREHEN METABOLIC PANEL: CPT | Mod: ER

## 2020-12-31 PROCEDURE — 81025 URINE PREGNANCY TEST: CPT | Mod: ER

## 2020-12-31 PROCEDURE — 93005 ELECTROCARDIOGRAM TRACING: CPT | Mod: ER

## 2020-12-31 RX ORDER — CLINDAMYCIN HYDROCHLORIDE 150 MG/1
300 CAPSULE ORAL 4 TIMES DAILY
Qty: 56 CAPSULE | Refills: 0 | Status: SHIPPED | OUTPATIENT
Start: 2020-12-31 | End: 2020-12-31 | Stop reason: SDUPTHER

## 2020-12-31 RX ORDER — CLINDAMYCIN HYDROCHLORIDE 150 MG/1
300 CAPSULE ORAL 4 TIMES DAILY
Qty: 56 CAPSULE | Refills: 0 | Status: SHIPPED | OUTPATIENT
Start: 2020-12-31 | End: 2021-01-07

## 2020-12-31 RX ORDER — CLINDAMYCIN HYDROCHLORIDE 150 MG/1
300 CAPSULE ORAL
Status: COMPLETED | OUTPATIENT
Start: 2020-12-31 | End: 2020-12-31

## 2020-12-31 RX ADMIN — CLINDAMYCIN HYDROCHLORIDE 300 MG: 150 CAPSULE ORAL at 04:12

## 2021-01-05 ENCOUNTER — PATIENT MESSAGE (OUTPATIENT)
Dept: ADMINISTRATIVE | Facility: HOSPITAL | Age: 46
End: 2021-01-05

## 2021-01-13 ENCOUNTER — OFFICE VISIT (OUTPATIENT)
Dept: ORTHOPEDICS | Facility: CLINIC | Age: 46
End: 2021-01-13
Payer: MEDICAID

## 2021-01-13 VITALS
WEIGHT: 278 LBS | HEIGHT: 66 IN | BODY MASS INDEX: 44.68 KG/M2 | DIASTOLIC BLOOD PRESSURE: 79 MMHG | HEART RATE: 102 BPM | SYSTOLIC BLOOD PRESSURE: 145 MMHG

## 2021-01-13 DIAGNOSIS — M75.101 ROTATOR CUFF TEAR ARTHROPATHY OF RIGHT SHOULDER: Primary | ICD-10-CM

## 2021-01-13 DIAGNOSIS — M75.32 CALCIFIC TENDONITIS OF LEFT SHOULDER: ICD-10-CM

## 2021-01-13 DIAGNOSIS — M12.811 ROTATOR CUFF TEAR ARTHROPATHY OF RIGHT SHOULDER: Primary | ICD-10-CM

## 2021-01-13 DIAGNOSIS — Z47.89 ORTHOPEDIC AFTERCARE: ICD-10-CM

## 2021-01-13 PROCEDURE — 99999 PR PBB SHADOW E&M-EST. PATIENT-LVL V: ICD-10-PCS | Mod: PBBFAC,,, | Performed by: PHYSICIAN ASSISTANT

## 2021-01-13 PROCEDURE — 99999 PR PBB SHADOW E&M-EST. PATIENT-LVL V: CPT | Mod: PBBFAC,,, | Performed by: PHYSICIAN ASSISTANT

## 2021-01-13 PROCEDURE — 99215 OFFICE O/P EST HI 40 MIN: CPT | Mod: PBBFAC | Performed by: PHYSICIAN ASSISTANT

## 2021-01-13 PROCEDURE — 99213 OFFICE O/P EST LOW 20 MIN: CPT | Mod: S$PBB,,, | Performed by: PHYSICIAN ASSISTANT

## 2021-01-13 PROCEDURE — 99213 PR OFFICE/OUTPT VISIT, EST, LEVL III, 20-29 MIN: ICD-10-PCS | Mod: S$PBB,,, | Performed by: PHYSICIAN ASSISTANT

## 2021-01-14 DIAGNOSIS — E11.9 TYPE 2 DIABETES MELLITUS WITHOUT COMPLICATION, WITHOUT LONG-TERM CURRENT USE OF INSULIN: ICD-10-CM

## 2021-01-14 DIAGNOSIS — E11.8 TYPE 2 DIABETES MELLITUS WITH COMPLICATION: ICD-10-CM

## 2021-01-14 RX ORDER — METFORMIN HYDROCHLORIDE 500 MG/1
TABLET ORAL
Qty: 60 TABLET | Refills: 0 | Status: SHIPPED | OUTPATIENT
Start: 2021-01-14 | End: 2021-02-17

## 2021-01-14 RX ORDER — LOSARTAN POTASSIUM 25 MG/1
TABLET ORAL
Qty: 30 TABLET | Refills: 0 | Status: SHIPPED | OUTPATIENT
Start: 2021-01-14 | End: 2021-02-17

## 2021-01-21 ENCOUNTER — CLINICAL SUPPORT (OUTPATIENT)
Dept: REHABILITATION | Facility: HOSPITAL | Age: 46
End: 2021-01-21
Payer: MEDICAID

## 2021-01-21 ENCOUNTER — PATIENT MESSAGE (OUTPATIENT)
Dept: REHABILITATION | Facility: HOSPITAL | Age: 46
End: 2021-01-21

## 2021-01-21 DIAGNOSIS — M25.511 ACUTE PAIN OF RIGHT SHOULDER: ICD-10-CM

## 2021-01-21 DIAGNOSIS — R29.898 WEAKNESS OF RIGHT ARM: ICD-10-CM

## 2021-01-21 DIAGNOSIS — M25.611 DECREASED RANGE OF MOTION OF RIGHT SHOULDER: ICD-10-CM

## 2021-01-21 PROCEDURE — 97110 THERAPEUTIC EXERCISES: CPT | Mod: PN

## 2021-02-01 ENCOUNTER — HOSPITAL ENCOUNTER (OUTPATIENT)
Dept: RADIOLOGY | Facility: HOSPITAL | Age: 46
Discharge: HOME | End: 2021-02-01
Attending: FAMILY MEDICINE
Payer: MEDICAID

## 2021-02-01 DIAGNOSIS — Z12.31 ENCOUNTER FOR SCREENING MAMMOGRAM FOR MALIGNANT NEOPLASM OF BREAST: ICD-10-CM

## 2021-02-01 PROCEDURE — 77067 SCR MAMMO BI INCL CAD: CPT | Mod: TC

## 2021-02-01 PROCEDURE — 77063 BREAST TOMOSYNTHESIS BI: CPT | Mod: 26,,, | Performed by: RADIOLOGY

## 2021-02-01 PROCEDURE — 77067 SCR MAMMO BI INCL CAD: CPT | Mod: 26,,, | Performed by: RADIOLOGY

## 2021-02-01 PROCEDURE — 77063 MAMMO DIGITAL SCREENING BILAT WITH TOMO: ICD-10-PCS | Mod: 26,,, | Performed by: RADIOLOGY

## 2021-02-01 PROCEDURE — 77067 MAMMO DIGITAL SCREENING BILAT WITH TOMO: ICD-10-PCS | Mod: 26,,, | Performed by: RADIOLOGY

## 2021-02-05 ENCOUNTER — CLINICAL SUPPORT (OUTPATIENT)
Dept: REHABILITATION | Facility: HOSPITAL | Age: 46
End: 2021-02-05
Payer: MEDICAID

## 2021-02-05 DIAGNOSIS — R29.898 WEAKNESS OF RIGHT ARM: ICD-10-CM

## 2021-02-05 DIAGNOSIS — M25.611 DECREASED RANGE OF MOTION OF RIGHT SHOULDER: ICD-10-CM

## 2021-02-05 DIAGNOSIS — M25.511 ACUTE PAIN OF RIGHT SHOULDER: ICD-10-CM

## 2021-02-05 PROCEDURE — 97110 THERAPEUTIC EXERCISES: CPT | Mod: PN,CQ

## 2021-02-10 ENCOUNTER — CLINICAL SUPPORT (OUTPATIENT)
Dept: REHABILITATION | Facility: HOSPITAL | Age: 46
End: 2021-02-10
Payer: MEDICAID

## 2021-02-10 DIAGNOSIS — M25.611 DECREASED RANGE OF MOTION OF RIGHT SHOULDER: ICD-10-CM

## 2021-02-10 DIAGNOSIS — M25.511 ACUTE PAIN OF RIGHT SHOULDER: ICD-10-CM

## 2021-02-10 DIAGNOSIS — R29.898 WEAKNESS OF RIGHT ARM: ICD-10-CM

## 2021-02-10 PROCEDURE — 97110 THERAPEUTIC EXERCISES: CPT | Mod: PN

## 2021-02-17 ENCOUNTER — CLINICAL SUPPORT (OUTPATIENT)
Dept: REHABILITATION | Facility: HOSPITAL | Age: 46
End: 2021-02-17
Payer: MEDICAID

## 2021-02-17 DIAGNOSIS — M25.511 ACUTE PAIN OF RIGHT SHOULDER: ICD-10-CM

## 2021-02-17 DIAGNOSIS — R29.898 WEAKNESS OF RIGHT ARM: ICD-10-CM

## 2021-02-17 DIAGNOSIS — M25.611 DECREASED RANGE OF MOTION OF RIGHT SHOULDER: ICD-10-CM

## 2021-02-17 PROCEDURE — 97110 THERAPEUTIC EXERCISES: CPT | Mod: PN

## 2021-02-20 ENCOUNTER — PATIENT MESSAGE (OUTPATIENT)
Dept: INTERNAL MEDICINE | Facility: CLINIC | Age: 46
End: 2021-02-20

## 2021-02-20 DIAGNOSIS — F17.209 NICOTINE DEPENDENCE WITH NICOTINE-INDUCED DISORDER, UNSPECIFIED NICOTINE PRODUCT TYPE: Primary | ICD-10-CM

## 2021-02-22 RX ORDER — IBUPROFEN 200 MG
1 TABLET ORAL DAILY
Qty: 28 PATCH | Refills: 1 | Status: SHIPPED | OUTPATIENT
Start: 2021-02-22 | End: 2022-08-26

## 2021-02-24 ENCOUNTER — CLINICAL SUPPORT (OUTPATIENT)
Dept: REHABILITATION | Facility: HOSPITAL | Age: 46
End: 2021-02-24
Payer: MEDICAID

## 2021-02-24 DIAGNOSIS — M25.511 ACUTE PAIN OF RIGHT SHOULDER: ICD-10-CM

## 2021-02-24 DIAGNOSIS — R29.898 WEAKNESS OF RIGHT ARM: ICD-10-CM

## 2021-02-24 DIAGNOSIS — M25.611 DECREASED RANGE OF MOTION OF RIGHT SHOULDER: ICD-10-CM

## 2021-02-24 PROCEDURE — 97110 THERAPEUTIC EXERCISES: CPT | Mod: PN

## 2021-03-02 DIAGNOSIS — E11.9 TYPE 2 DIABETES MELLITUS WITHOUT COMPLICATION, WITHOUT LONG-TERM CURRENT USE OF INSULIN: ICD-10-CM

## 2021-03-02 RX ORDER — ATORVASTATIN CALCIUM 10 MG/1
10 TABLET, FILM COATED ORAL DAILY
Qty: 30 TABLET | Refills: 6 | OUTPATIENT
Start: 2021-03-02 | End: 2021-05-25

## 2021-03-09 ENCOUNTER — PATIENT MESSAGE (OUTPATIENT)
Dept: INTERNAL MEDICINE | Facility: CLINIC | Age: 46
End: 2021-03-09

## 2021-03-17 DIAGNOSIS — E11.9 TYPE 2 DIABETES MELLITUS WITHOUT COMPLICATION, WITHOUT LONG-TERM CURRENT USE OF INSULIN: ICD-10-CM

## 2021-03-17 DIAGNOSIS — E11.8 TYPE 2 DIABETES MELLITUS WITH COMPLICATION: ICD-10-CM

## 2021-03-17 RX ORDER — LOSARTAN POTASSIUM 25 MG/1
TABLET ORAL
Qty: 30 TABLET | Refills: 0 | Status: SHIPPED | OUTPATIENT
Start: 2021-03-17 | End: 2021-04-19

## 2021-03-17 RX ORDER — METFORMIN HYDROCHLORIDE 500 MG/1
TABLET ORAL
Qty: 60 TABLET | Refills: 0 | Status: SHIPPED | OUTPATIENT
Start: 2021-03-17 | End: 2021-04-19

## 2021-04-05 ENCOUNTER — PATIENT MESSAGE (OUTPATIENT)
Dept: ADMINISTRATIVE | Facility: HOSPITAL | Age: 46
End: 2021-04-05

## 2021-04-17 ENCOUNTER — PATIENT MESSAGE (OUTPATIENT)
Dept: GASTROENTEROLOGY | Facility: CLINIC | Age: 46
End: 2021-04-17

## 2021-04-17 DIAGNOSIS — R13.19 ESOPHAGEAL DYSPHAGIA: ICD-10-CM

## 2021-04-17 DIAGNOSIS — K21.9 GERD WITHOUT ESOPHAGITIS: ICD-10-CM

## 2021-04-19 ENCOUNTER — PATIENT MESSAGE (OUTPATIENT)
Dept: GASTROENTEROLOGY | Facility: CLINIC | Age: 46
End: 2021-04-19

## 2021-04-19 DIAGNOSIS — R13.19 ESOPHAGEAL DYSPHAGIA: ICD-10-CM

## 2021-04-19 DIAGNOSIS — K21.9 GERD WITHOUT ESOPHAGITIS: ICD-10-CM

## 2021-04-19 RX ORDER — OMEPRAZOLE 40 MG/1
40 CAPSULE, DELAYED RELEASE ORAL EVERY MORNING
Qty: 90 CAPSULE | Refills: 3 | Status: SHIPPED | OUTPATIENT
Start: 2021-04-19 | End: 2022-10-25

## 2021-04-23 ENCOUNTER — TELEPHONE (OUTPATIENT)
Dept: PHARMACY | Facility: CLINIC | Age: 46
End: 2021-04-23

## 2021-05-12 DIAGNOSIS — E11.9 TYPE 2 DIABETES MELLITUS WITHOUT COMPLICATION: ICD-10-CM

## 2021-05-25 ENCOUNTER — HOSPITAL ENCOUNTER (EMERGENCY)
Facility: HOSPITAL | Age: 46
Discharge: HOME | End: 2021-05-25
Attending: EMERGENCY MEDICINE
Payer: MEDICAID

## 2021-05-25 VITALS
DIASTOLIC BLOOD PRESSURE: 69 MMHG | OXYGEN SATURATION: 99 % | RESPIRATION RATE: 18 BRPM | HEIGHT: 66 IN | BODY MASS INDEX: 42.27 KG/M2 | SYSTOLIC BLOOD PRESSURE: 143 MMHG | WEIGHT: 263 LBS | TEMPERATURE: 98 F | HEART RATE: 75 BPM

## 2021-05-25 DIAGNOSIS — M54.31 RIGHT SIDED SCIATICA: Primary | ICD-10-CM

## 2021-05-25 PROCEDURE — 99284 EMERGENCY DEPT VISIT MOD MDM: CPT | Mod: 25,ER

## 2021-07-08 ENCOUNTER — TELEPHONE (OUTPATIENT)
Dept: ORTHOPEDICS | Facility: CLINIC | Age: 46
End: 2021-07-08

## 2021-08-04 ENCOUNTER — PATIENT MESSAGE (OUTPATIENT)
Dept: ADMINISTRATIVE | Facility: HOSPITAL | Age: 46
End: 2021-08-04

## 2021-08-20 ENCOUNTER — PATIENT MESSAGE (OUTPATIENT)
Dept: INTERNAL MEDICINE | Facility: CLINIC | Age: 46
End: 2021-08-20

## 2021-10-05 ENCOUNTER — PATIENT MESSAGE (OUTPATIENT)
Dept: ADMINISTRATIVE | Facility: HOSPITAL | Age: 46
End: 2021-10-05

## 2021-11-10 ENCOUNTER — PATIENT MESSAGE (OUTPATIENT)
Dept: INTERNAL MEDICINE | Facility: CLINIC | Age: 46
End: 2021-11-10
Payer: MEDICAID

## 2021-11-10 DIAGNOSIS — Z12.31 ENCOUNTER FOR SCREENING MAMMOGRAM FOR MALIGNANT NEOPLASM OF BREAST: Primary | ICD-10-CM

## 2021-11-11 ENCOUNTER — IMMUNIZATION (OUTPATIENT)
Dept: INTERNAL MEDICINE | Facility: CLINIC | Age: 46
End: 2021-11-11
Payer: MEDICAID

## 2021-11-11 PROCEDURE — 90686 IIV4 VACC NO PRSV 0.5 ML IM: CPT | Mod: PBBFAC

## 2021-12-21 ENCOUNTER — PATIENT MESSAGE (OUTPATIENT)
Dept: INTERNAL MEDICINE | Facility: CLINIC | Age: 46
End: 2021-12-21
Payer: MEDICAID

## 2021-12-21 DIAGNOSIS — R05.9 COUGH: Primary | ICD-10-CM

## 2021-12-21 RX ORDER — PROMETHAZINE HYDROCHLORIDE 6.25 MG/5ML
25 SYRUP ORAL EVERY 6 HOURS PRN
Qty: 473 ML | Refills: 0 | Status: SHIPPED | OUTPATIENT
Start: 2021-12-21 | End: 2022-01-19 | Stop reason: SDUPTHER

## 2022-01-19 ENCOUNTER — OFFICE VISIT (OUTPATIENT)
Dept: INTERNAL MEDICINE | Facility: CLINIC | Age: 47
End: 2022-01-19
Attending: FAMILY MEDICINE
Payer: MEDICAID

## 2022-01-19 VITALS
DIASTOLIC BLOOD PRESSURE: 70 MMHG | HEIGHT: 66 IN | HEART RATE: 72 BPM | WEIGHT: 189.13 LBS | BODY MASS INDEX: 30.4 KG/M2 | OXYGEN SATURATION: 99 % | SYSTOLIC BLOOD PRESSURE: 122 MMHG

## 2022-01-19 DIAGNOSIS — Z12.11 SCREENING FOR COLON CANCER: ICD-10-CM

## 2022-01-19 DIAGNOSIS — R05.9 COUGH: ICD-10-CM

## 2022-01-19 DIAGNOSIS — Z00.00 PREVENTATIVE HEALTH CARE: ICD-10-CM

## 2022-01-19 DIAGNOSIS — G47.33 OSA (OBSTRUCTIVE SLEEP APNEA): ICD-10-CM

## 2022-01-19 DIAGNOSIS — E11.9 TYPE 2 DIABETES MELLITUS WITHOUT COMPLICATION, WITHOUT LONG-TERM CURRENT USE OF INSULIN: Primary | ICD-10-CM

## 2022-01-19 DIAGNOSIS — E66.01 SEVERE OBESITY (BMI >= 40): ICD-10-CM

## 2022-01-19 PROCEDURE — 3008F BODY MASS INDEX DOCD: CPT | Mod: CPTII,,, | Performed by: FAMILY MEDICINE

## 2022-01-19 PROCEDURE — 3078F DIAST BP <80 MM HG: CPT | Mod: CPTII,,, | Performed by: FAMILY MEDICINE

## 2022-01-19 PROCEDURE — 1159F MED LIST DOCD IN RCRD: CPT | Mod: CPTII,,, | Performed by: FAMILY MEDICINE

## 2022-01-19 PROCEDURE — 99214 OFFICE O/P EST MOD 30 MIN: CPT | Mod: S$PBB,,, | Performed by: FAMILY MEDICINE

## 2022-01-19 PROCEDURE — 1160F PR REVIEW ALL MEDS BY PRESCRIBER/CLIN PHARMACIST DOCUMENTED: ICD-10-PCS | Mod: CPTII,,, | Performed by: FAMILY MEDICINE

## 2022-01-19 PROCEDURE — 3008F PR BODY MASS INDEX (BMI) DOCUMENTED: ICD-10-PCS | Mod: CPTII,,, | Performed by: FAMILY MEDICINE

## 2022-01-19 PROCEDURE — 99999 PR PBB SHADOW E&M-EST. PATIENT-LVL V: ICD-10-PCS | Mod: PBBFAC,,, | Performed by: FAMILY MEDICINE

## 2022-01-19 PROCEDURE — 3074F SYST BP LT 130 MM HG: CPT | Mod: CPTII,,, | Performed by: FAMILY MEDICINE

## 2022-01-19 PROCEDURE — 3074F PR MOST RECENT SYSTOLIC BLOOD PRESSURE < 130 MM HG: ICD-10-PCS | Mod: CPTII,,, | Performed by: FAMILY MEDICINE

## 2022-01-19 PROCEDURE — 3078F PR MOST RECENT DIASTOLIC BLOOD PRESSURE < 80 MM HG: ICD-10-PCS | Mod: CPTII,,, | Performed by: FAMILY MEDICINE

## 2022-01-19 PROCEDURE — 1160F RVW MEDS BY RX/DR IN RCRD: CPT | Mod: CPTII,,, | Performed by: FAMILY MEDICINE

## 2022-01-19 PROCEDURE — 1159F PR MEDICATION LIST DOCUMENTED IN MEDICAL RECORD: ICD-10-PCS | Mod: CPTII,,, | Performed by: FAMILY MEDICINE

## 2022-01-19 PROCEDURE — 99215 OFFICE O/P EST HI 40 MIN: CPT | Mod: PBBFAC | Performed by: FAMILY MEDICINE

## 2022-01-19 PROCEDURE — 99999 PR PBB SHADOW E&M-EST. PATIENT-LVL V: CPT | Mod: PBBFAC,,, | Performed by: FAMILY MEDICINE

## 2022-01-19 PROCEDURE — 99214 PR OFFICE/OUTPT VISIT, EST, LEVL IV, 30-39 MIN: ICD-10-PCS | Mod: S$PBB,,, | Performed by: FAMILY MEDICINE

## 2022-01-19 RX ORDER — MULTIVIT-MIN/IRON/FOLIC ACID/K 45-800-120
1 CAPSULE ORAL DAILY
Status: ON HOLD | COMMUNITY
End: 2023-07-14

## 2022-01-19 RX ORDER — POTASSIUM CHLORIDE 1.5 G/1.58G
POWDER, FOR SOLUTION ORAL
Status: ON HOLD | COMMUNITY
Start: 2021-11-18 | End: 2022-10-27

## 2022-01-19 RX ORDER — PROMETHAZINE HYDROCHLORIDE 6.25 MG/5ML
25 SYRUP ORAL EVERY 6 HOURS PRN
Qty: 473 ML | Refills: 0 | Status: ON HOLD | OUTPATIENT
Start: 2022-01-19 | End: 2022-08-25 | Stop reason: HOSPADM

## 2022-01-19 NOTE — PROGRESS NOTES
"CHIEF COMPLAINT:  T2 DM    HISTORY OF PRESENT ILLNESS: The patient is a morbidly obese 45 year-old black female.  She recently had a good A1c.    She is seeing bariatric surgery.  She underwent successful gastric bypass.      REVIEW OF SYSTEMS:  GENERAL: No fever, chills, fatigability.  SKIN: No rashes, itching or changes in color or texture of skin.  HEAD: No headaches or recent head trauma.  EYES: Visual acuity fine. No photophobia, ocular pain or diplopia.  EARS: Denies ear pain, discharge or vertigo.  NOSE: No loss of smell, no epistaxis or postnasal drip.  MOUTH & THROAT: No hoarseness or change in voice. No excessive gum bleeding.  NODES: Denies swollen glands.  CHEST: Denies JONES, cyanosis, wheezing, cough and sputum production.  CARDIOVASCULAR: Denies chest pain, PND, orthopnea or reduced exercise tolerance.  ABDOMEN: Appetite fine. No weight loss. Denies diarrhea, abdominal pain, hematemesis or blood in stool.  URINARY: No flank pain, dysuria or hematuria.  PERIPHERAL VASCULAR: No claudication or cyanosis.  MUSCULOSKELETAL: No joint stiffness or swelling. She does have chronic intermittent back pain.  NEUROLOGIC: No history of seizures, paralysis, alteration of gait or coordination.    SOCIAL HISTORY: The patient does not smoke.  The patient consumes alcohol socially.     PHYSICAL EXAMINATION:     Blood pressure 122/70, pulse 72, height 5' 6" (1.676 m), weight 85.8 kg (189 lb 2.5 oz), SpO2 99 %.    APPEARANCE: Well nourished, well developed, in no acute distress.    HEAD: Normocephalic, atraumatic.  EYES: PERRL. EOMI.  Conjunctivae without injection and  anicteric   NOSE: Mucosa pink. Airway clear.  MOUTH & THROAT: No tonsillar enlargement. No pharyngeal erythema or exudate. No stridor.  NECK: Supple.   NODES: No cervical, axillary or inguinal lymph node enlargement.  CHEST: Lungs clear to auscultation.  No retractions are noted.  No rales or rhonchi are present.  CARDIOVASCULAR: Normal S1, S2. No rubs, " murmurs or gallops.  ABDOMEN: Bowel sounds normal. Not distended. Soft. No tenderness or masses.  No ascites is noted.  MUSCULOSKELETAL:  There is no clubbing, cyanosis, or edema of the extremities x4.  There is full range of motion of the lumbar spine.  There is full range of motion of the extremities x4.  There is no deformity noted.    NEUROLOGIC:       Normal speech development.      Hearing normal.      Normal gait.      Motor and sensory exams grossly normal.  PSYCHIATRIC: Patient is alert and oriented x3.  Thought processes are all normal.  There is no homicidality.  There is no suicidality.  There is no evidence of psychosis.    LABORATORY/RADIOLOGY:   Chart reviewed.      ASSESSMENT:   T2 DM, well controlled  MILEY  GERD  Morbid obesity with BMI of 30 status post bariatric surgery  Chronic low back pain    PLAN:  We reviewed her recent blood work.    Metformin and diabetic edu  lipitor  losartan  Return to clinic in 6 months with A1c prior

## 2022-01-20 ENCOUNTER — PATIENT MESSAGE (OUTPATIENT)
Dept: ORTHOPEDICS | Facility: CLINIC | Age: 47
End: 2022-01-20
Payer: MEDICAID

## 2022-01-20 ENCOUNTER — TELEPHONE (OUTPATIENT)
Dept: ORTHOPEDICS | Facility: CLINIC | Age: 47
End: 2022-01-20
Payer: MEDICAID

## 2022-01-20 NOTE — TELEPHONE ENCOUNTER
1/20/22 [facility] for pt to call the referrals department to schedule appt with Gynecology-sm

## 2022-01-24 DIAGNOSIS — R13.19 ESOPHAGEAL DYSPHAGIA: ICD-10-CM

## 2022-01-24 RX ORDER — AMITRIPTYLINE HYDROCHLORIDE 10 MG/1
10 TABLET, FILM COATED ORAL NIGHTLY
Qty: 90 TABLET | Refills: 0 | Status: SHIPPED | OUTPATIENT
Start: 2022-01-24 | End: 2022-04-25

## 2022-02-02 ENCOUNTER — HOSPITAL ENCOUNTER (OUTPATIENT)
Dept: RADIOLOGY | Facility: HOSPITAL | Age: 47
Discharge: HOME | End: 2022-02-02
Attending: FAMILY MEDICINE
Payer: MEDICAID

## 2022-02-02 DIAGNOSIS — Z12.31 ENCOUNTER FOR SCREENING MAMMOGRAM FOR MALIGNANT NEOPLASM OF BREAST: ICD-10-CM

## 2022-02-02 PROCEDURE — 77067 MAMMO DIGITAL SCREENING BILAT WITH TOMO: ICD-10-PCS | Mod: 26,,, | Performed by: RADIOLOGY

## 2022-02-02 PROCEDURE — 77063 BREAST TOMOSYNTHESIS BI: CPT | Mod: TC

## 2022-02-02 PROCEDURE — 77063 BREAST TOMOSYNTHESIS BI: CPT | Mod: 26,,, | Performed by: RADIOLOGY

## 2022-02-02 PROCEDURE — 77067 SCR MAMMO BI INCL CAD: CPT | Mod: 26,,, | Performed by: RADIOLOGY

## 2022-02-02 PROCEDURE — 77063 MAMMO DIGITAL SCREENING BILAT WITH TOMO: ICD-10-PCS | Mod: 26,,, | Performed by: RADIOLOGY

## 2022-02-10 ENCOUNTER — PATIENT MESSAGE (OUTPATIENT)
Dept: INTERNAL MEDICINE | Facility: CLINIC | Age: 47
End: 2022-02-10
Payer: MEDICAID

## 2022-02-10 ENCOUNTER — PATIENT MESSAGE (OUTPATIENT)
Dept: ORTHOPEDICS | Facility: CLINIC | Age: 47
End: 2022-02-10
Payer: MEDICAID

## 2022-02-10 ENCOUNTER — TELEPHONE (OUTPATIENT)
Dept: ORTHOPEDICS | Facility: CLINIC | Age: 47
End: 2022-02-10
Payer: MEDICAID

## 2022-02-10 DIAGNOSIS — Z12.11 SCREENING FOR COLON CANCER: Primary | ICD-10-CM

## 2022-02-22 ENCOUNTER — PATIENT OUTREACH (OUTPATIENT)
Dept: ADMINISTRATIVE | Facility: OTHER | Age: 47
End: 2022-02-22
Payer: MEDICAID

## 2022-02-22 DIAGNOSIS — E11.9 DIABETES MELLITUS WITHOUT COMPLICATION: Primary | ICD-10-CM

## 2022-02-22 NOTE — PROGRESS NOTES
Health Maintenance Due   Topic Date Due    Hepatitis C Screening  Never done    HIV Screening  Never done    Colorectal Cancer Screening  Never done    Cervical Cancer Screening  10/09/2018    Hemoglobin A1c  02/11/2022     Updates were requested from care everywhere.  Chart was reviewed for overdue Proactive [facility] Encounters ([facility]) topics (CRS, Breast Cancer Screening, Eye exam)  Health Maintenance has been updated.  LINKS immunization registry triggered.  Immunizations were reconciled.  Order placed for A1c.

## 2022-02-23 ENCOUNTER — LAB VISIT (OUTPATIENT)
Dept: LAB | Facility: HOSPITAL | Age: 47
End: 2022-02-23
Attending: OBSTETRICS & GYNECOLOGY
Payer: MEDICAID

## 2022-02-23 ENCOUNTER — OFFICE VISIT (OUTPATIENT)
Dept: OBSTETRICS AND GYNECOLOGY | Facility: CLINIC | Age: 47
End: 2022-02-23
Payer: MEDICAID

## 2022-02-23 VITALS
DIASTOLIC BLOOD PRESSURE: 78 MMHG | BODY MASS INDEX: 29.07 KG/M2 | SYSTOLIC BLOOD PRESSURE: 122 MMHG | WEIGHT: 180.13 LBS

## 2022-02-23 DIAGNOSIS — Z12.11 SPECIAL SCREENING FOR MALIGNANT NEOPLASMS, COLON: Primary | ICD-10-CM

## 2022-02-23 DIAGNOSIS — Z00.00 PREVENTATIVE HEALTH CARE: ICD-10-CM

## 2022-02-23 DIAGNOSIS — Z11.3 SCREENING FOR STDS (SEXUALLY TRANSMITTED DISEASES): ICD-10-CM

## 2022-02-23 DIAGNOSIS — Z01.419 ROUTINE GYNECOLOGICAL EXAMINATION: ICD-10-CM

## 2022-02-23 DIAGNOSIS — Z01.812 PRE-PROCEDURE LAB EXAM: ICD-10-CM

## 2022-02-23 DIAGNOSIS — Z12.4 CERVICAL CANCER SCREENING: Primary | ICD-10-CM

## 2022-02-23 PROCEDURE — 3008F PR BODY MASS INDEX (BMI) DOCUMENTED: ICD-10-PCS | Mod: CPTII,,, | Performed by: OBSTETRICS & GYNECOLOGY

## 2022-02-23 PROCEDURE — 1160F PR REVIEW ALL MEDS BY PRESCRIBER/CLIN PHARMACIST DOCUMENTED: ICD-10-PCS | Mod: CPTII,,, | Performed by: OBSTETRICS & GYNECOLOGY

## 2022-02-23 PROCEDURE — 87389 HIV-1 AG W/HIV-1&-2 AB AG IA: CPT | Performed by: OBSTETRICS & GYNECOLOGY

## 2022-02-23 PROCEDURE — 1160F RVW MEDS BY RX/DR IN RCRD: CPT | Mod: CPTII,,, | Performed by: OBSTETRICS & GYNECOLOGY

## 2022-02-23 PROCEDURE — 87491 CHLMYD TRACH DNA AMP PROBE: CPT | Mod: 59 | Performed by: OBSTETRICS & GYNECOLOGY

## 2022-02-23 PROCEDURE — 1159F PR MEDICATION LIST DOCUMENTED IN MEDICAL RECORD: ICD-10-PCS | Mod: CPTII,,, | Performed by: OBSTETRICS & GYNECOLOGY

## 2022-02-23 PROCEDURE — 99386 PR PREVENTIVE VISIT,NEW,40-64: ICD-10-PCS | Mod: S$PBB,,, | Performed by: OBSTETRICS & GYNECOLOGY

## 2022-02-23 PROCEDURE — 80074 ACUTE HEPATITIS PANEL: CPT | Performed by: OBSTETRICS & GYNECOLOGY

## 2022-02-23 PROCEDURE — 36415 COLL VENOUS BLD VENIPUNCTURE: CPT | Performed by: OBSTETRICS & GYNECOLOGY

## 2022-02-23 PROCEDURE — 86592 SYPHILIS TEST NON-TREP QUAL: CPT | Performed by: OBSTETRICS & GYNECOLOGY

## 2022-02-23 PROCEDURE — 3008F BODY MASS INDEX DOCD: CPT | Mod: CPTII,,, | Performed by: OBSTETRICS & GYNECOLOGY

## 2022-02-23 PROCEDURE — 1159F MED LIST DOCD IN RCRD: CPT | Mod: CPTII,,, | Performed by: OBSTETRICS & GYNECOLOGY

## 2022-02-23 PROCEDURE — 3078F DIAST BP <80 MM HG: CPT | Mod: CPTII,,, | Performed by: OBSTETRICS & GYNECOLOGY

## 2022-02-23 PROCEDURE — 99214 OFFICE O/P EST MOD 30 MIN: CPT | Mod: PBBFAC,PO | Performed by: OBSTETRICS & GYNECOLOGY

## 2022-02-23 PROCEDURE — 3074F PR MOST RECENT SYSTOLIC BLOOD PRESSURE < 130 MM HG: ICD-10-PCS | Mod: CPTII,,, | Performed by: OBSTETRICS & GYNECOLOGY

## 2022-02-23 PROCEDURE — 99999 PR PBB SHADOW E&M-EST. PATIENT-LVL IV: ICD-10-PCS | Mod: PBBFAC,,, | Performed by: OBSTETRICS & GYNECOLOGY

## 2022-02-23 PROCEDURE — 99386 PREV VISIT NEW AGE 40-64: CPT | Mod: S$PBB,,, | Performed by: OBSTETRICS & GYNECOLOGY

## 2022-02-23 PROCEDURE — 88175 CYTOPATH C/V AUTO FLUID REDO: CPT | Performed by: OBSTETRICS & GYNECOLOGY

## 2022-02-23 PROCEDURE — 99999 PR PBB SHADOW E&M-EST. PATIENT-LVL IV: CPT | Mod: PBBFAC,,, | Performed by: OBSTETRICS & GYNECOLOGY

## 2022-02-23 PROCEDURE — 87481 CANDIDA DNA AMP PROBE: CPT | Mod: 59 | Performed by: OBSTETRICS & GYNECOLOGY

## 2022-02-23 PROCEDURE — 87591 N.GONORRHOEAE DNA AMP PROB: CPT | Mod: 59 | Performed by: OBSTETRICS & GYNECOLOGY

## 2022-02-23 PROCEDURE — 3078F PR MOST RECENT DIASTOLIC BLOOD PRESSURE < 80 MM HG: ICD-10-PCS | Mod: CPTII,,, | Performed by: OBSTETRICS & GYNECOLOGY

## 2022-02-23 PROCEDURE — 87624 HPV HI-RISK TYP POOLED RSLT: CPT | Performed by: OBSTETRICS & GYNECOLOGY

## 2022-02-23 PROCEDURE — 3074F SYST BP LT 130 MM HG: CPT | Mod: CPTII,,, | Performed by: OBSTETRICS & GYNECOLOGY

## 2022-02-23 RX ORDER — TERCONAZOLE 4 MG/G
1 CREAM VAGINAL NIGHTLY
Qty: 1 EACH | Refills: 0 | Status: SHIPPED | OUTPATIENT
Start: 2022-02-23 | End: 2022-03-02

## 2022-02-23 RX ORDER — POLYETHYLENE GLYCOL 3350, SODIUM SULFATE ANHYDROUS, SODIUM BICARBONATE, SODIUM CHLORIDE, POTASSIUM CHLORIDE 236; 22.74; 6.74; 5.86; 2.97 G/4L; G/4L; G/4L; G/4L; G/4L
4 POWDER, FOR SOLUTION ORAL ONCE
Qty: 4000 ML | Refills: 0 | Status: SHIPPED | OUTPATIENT
Start: 2022-02-23 | End: 2022-02-23

## 2022-02-23 NOTE — PROGRESS NOTES
GYNECOLOGY  :  Velma Lopez is a 47 y.o.    Here today for  gyn annual visit     No gyn  complaints  Normal menstrual cycles   No Hx Abnormal PAP smears  No Hx Abnormal Mammogram   Hx gastric bypass, lost + 100lb   Not on metformin anymore for  DM       Past Medical History:   Diagnosis Date    Chronic back pain     Diabetes type 2, uncontrolled     Mild nonproliferative diabetic retinopathy of left eye without macular edema associated with type 2 diabetes mellitus 10/21/2019    Morbid obesity with BMI of 40.0-44.9, adult     MILEY on CPAP     Plantar fasciitis of left foot     Urticaria      Past Surgical History:   Procedure Laterality Date    ARTHROSCOPIC ACROMIOPLASTY OF SHOULDER Left 2020    Procedure: ACROMIOPLASTY, ARTHROSCOPIC;  Surgeon: Marcela Garza MD;  Location: [facility] OR;  Service: Orthopedics;  Laterality: Left;    ARTHROSCOPIC REPAIR OF ROTATOR CUFF OF SHOULDER Right 2020    Procedure: REPAIR, ROTATOR CUFF, ARTHROSCOPIC;  Surgeon: Marcela Garza MD;  Location: [facility] OR;  Service: Orthopedics;  Laterality: Right;  GENERAL/REGIONAL    CYST REMOVAL      ovarian cyst removal at age 12    DECOMPRESSION OF SUBACROMIAL SPACE Left 2020    Procedure: DECOMPRESSION, SUBACROMIAL SPACE;  Surgeon: Marcela Garza MD;  Location: [facility] OR;  Service: Orthopedics;  Laterality: Left;    DILATION AND CURETTAGE OF UTERUS      ESOPHAGEAL MOTILITY STUDY USING HIGH RESOLUTION MANOMETRY N/A 2019    Procedure: MOTILITY STUDY, ESOPHAGUS, USING HIGH RESOLUTION MANOMETRY;  Surgeon: Leopoldo Swanson MD;  Location: [facility] ([address] [place] [address]);  Service: Endoscopy;  Laterality: N/A;  Pt will be taking xanax before procedure to hopefull help with anxiety.    ESOPHAGOGASTRODUODENOSCOPY N/A 2019    Procedure: EGD (ESOPHAGOGASTRODUODENOSCOPY);  Surgeon: Ced Guevara MD;  [facility]  Service: Endoscopy;  Laterality: N/A;  Please make sure it is scheduled  after her cardiology appt.- see cardiology note dated 19 for clearance - ERW    GASTRIC BYPASS N/A 2021    INJECTION OF STEROID Left 2020    Procedure: INJECTION, STEROID LEFT THUMB TRIGGER FINGER;  Surgeon: Marcela Garza MD;  Location: [facility] OR;  Service: Orthopedics;  Laterality: Left;  LEFT THUMB, TRIGGER FINGER    TRIGGER FINGER RELEASE Right 10/4/2019    Procedure: RELEASE, TRIGGER FINGER - right thumb;  Surgeon: Craig Tom MD;  Location: [facility] OR;  Service: Orthopedics;  Laterality: Right;    UPPER GASTROINTESTINAL ENDOSCOPY      WISDOM TOOTH EXTRACTION       Family History   Problem Relation Age of Onset    Cancer Maternal Grandmother     Diabetes Mother     No Known Problems Sister     No Known Problems Sister     No Known Problems Sister     Breast cancer Maternal Aunt 70    Colon cancer Neg Hx     Stomach cancer Neg Hx     Inflammatory bowel disease Neg Hx     Esophageal cancer Neg Hx      Social History     Tobacco Use    Smoking status: Former Smoker     Packs/day: 0.50     Types: Cigarettes    Smokeless tobacco: Never Used   Substance Use Topics    Alcohol use: Yes     Comment: social    Drug use: No     OB History    Para Term  AB Living   5 1 1   4 1   SAB IAB Ectopic Multiple Live Births   4       1      # Outcome Date GA Lbr Chucho/2nd Weight Sex Delivery Anes PTL Lv   5 Term  40w0d   M Vag-Spont   LINDSAY   4 SAB            3 SAB            2 SAB            1 SAB                /78   Wt 81.7 kg (180 lb 1.9 oz)   LMP 2022 (Exact Date)   BMI 29.07 kg/m²     Last PAP= +2y  LMP =   Last Mammogram = /  Last Colonoscopy  = -      COMPREHENSIVE GYN HISTORY:  G 4 P 1     PAP History: Denies abnormal Paps.  Infection History: Denies STDs. Denies PID.  Benign History: Denies uterine fibroids. Denies ovarian cysts. Denies endometriosis.   Cancer History: Denies cervical cancer. Denies uterine cancer or hyperplasia. Denies  ovarian cancer. Denies vulvar cancer or pre-cancer. Denies vaginal cancer or pre-cancer. Denies breast cancer. Denies colon cancer.  Sexual Activity History: ( x ) Yes   ( - )   no   Menstrual History: Age of menarche: (  12 )  years. Every (  30-90 )       days, flows for (  2 )   days. moderate  flow.  Dysmenorrhea History:  absent  Contraception:  Condoms     ROS  GENERAL: Denies significant weight gain or weight loss. Feeling well overall.  SKIN: Denies rash or lesions.  Normal skin turgor  HEAD: Denies head injury or headache.   NODES: Denies enlarged lymph nodes.   CHEST: Denies chest pain or shortness of breath.   CARDIOVASCULAR: Denies palpitations or left sided chest pain.   ABDOMEN: No abdominal pain,no  diarrhea,constipation  nausea, vomiting or rectal bleeding.   URINARY: normal  Frequency,no  Dysuria or burning on urination.   REPRODUCTIVE: Per HPI   BREASTS: The patient sometimes performs breast self-examination and denies pain, lumps, or nipple discharge.   HEMATOLOGIC: No easy bruisability or excessive bleeding.   MUSCULOSKELETAL: Denies joint pain or swelling.   NEUROLOGIC: Denies syncope or weakness.   PSYCHIATRIC: Denies depression, anxiety or mood swings.    Physical Exam     Constitutional: She is oriented to person, place, and time. She appears well-developed and well-nourished. No distress.   HENT:   Head: Normocephalic.   NECK: Neck symmetric without masses or thyromegaly.  Cardiovascular: Normal rate and regular rhythm.   Pulmonary/Chest: Effort normal and breath sounds normal. No respiratory distress. She has no wheezes.   Breasts: Symmetrical, no skin changes or visible lesions. No palpable masses, nipple discharge or adenopathy bilaterally.  Abdominal: Soft not distended. Bowel sounds are normal. She exhibits   no masses . No tenderness to palpation.   Genitourinary: Pelvic exam was performed with patient supine.   External genitalia normal no lesions.Normal hair distribution   Adequate  perineal body,normal urethral meatus   Vagina moist and well rugated without lesions, no vaginal  Discharge.   Cervix pink and without lesions. No bleeding. No significant cystocele or rectocele.  Bimanual exam showed uterus normal size, shape and position , mobile and nontender. Adnexa without masses or tenderness. Urethra and bladder normal  Extremities normal no cyanosis ,edema.         A/P Velma Lopez 47 y.o.     Dx=  1- routine gyn visit   2--Cervical cancer screen  -Breast cancer screen     3-vulvovaginitis   vaginal discharge   4-STD screening       Procedures/Orders:  Pap smear/Mammogram  UA/ UCx/Udip  Affirm  STD testing       Assessment / Plan:  -s/p normal breast exam     Terazol Rx to pharmacy     Patient was counseled today on A.C.S. Pap guidelines and recommendations for yearly pelvic exams, mammograms and monthly self breast exams; to see her PCP for other health maintenance, nutrition and weight gain counseling, discussed lab values.  Discussed colonoscopy recommendations every 10 years starting at age 50   Calcium and vit D daily intake     F/u in 1 yr or PRN      I spent a total of 30 minutes on the day of the visit.This includes face to face time and non-face to face time preparing to see the patient (eg, review of tests), Obtaining and/or reviewing separately obtained history, Documenting clinical information in the electronic or other health record, Independently interpreting resultsand communicating results to the patient/family/caregiver, or Care coordination.      Boone Sewell M.D.   OB/GYN

## 2022-02-24 LAB — RPR SER QL: NORMAL

## 2022-02-25 LAB
HAV IGM SERPL QL IA: NEGATIVE
HBV CORE IGM SERPL QL IA: NEGATIVE
HBV SURFACE AG SERPL QL IA: NEGATIVE
HCV AB SERPL QL IA: NEGATIVE
HIV 1+2 AB+HIV1 P24 AG SERPL QL IA: NEGATIVE

## 2022-02-26 LAB
C TRACH DNA SPEC QL NAA+PROBE: NOT DETECTED
N GONORRHOEA DNA SPEC QL NAA+PROBE: NOT DETECTED

## 2022-02-28 LAB
HPV HR 12 DNA SPEC QL NAA+PROBE: POSITIVE
HPV16 AG SPEC QL: NEGATIVE
HPV18 DNA SPEC QL NAA+PROBE: NEGATIVE

## 2022-03-02 LAB
BACTERIAL VAGINOSIS DNA: POSITIVE
CANDIDA GLABRATA DNA: NEGATIVE
CANDIDA KRUSEI DNA: NEGATIVE
CANDIDA RRNA VAG QL PROBE: NEGATIVE
T VAGINALIS RRNA GENITAL QL PROBE: NEGATIVE

## 2022-03-03 ENCOUNTER — PATIENT MESSAGE (OUTPATIENT)
Dept: OBSTETRICS AND GYNECOLOGY | Facility: CLINIC | Age: 47
End: 2022-03-03
Payer: MEDICAID

## 2022-03-03 ENCOUNTER — TELEPHONE (OUTPATIENT)
Dept: ORTHOPEDICS | Facility: CLINIC | Age: 47
End: 2022-03-03
Payer: MEDICAID

## 2022-03-03 DIAGNOSIS — M25.532 LEFT WRIST PAIN: Primary | ICD-10-CM

## 2022-03-03 LAB
FINAL PATHOLOGIC DIAGNOSIS: NORMAL
Lab: NORMAL

## 2022-03-03 NOTE — TELEPHONE ENCOUNTER
Spoke with the patient. Informed of X-rays scheduled prior to appointment. Patient verbalized understanding and was thankful.       Arleen Freitas MA  Medical Assistant to Dr. Ross Dunbar [facility]

## 2022-03-04 ENCOUNTER — HOSPITAL ENCOUNTER (OUTPATIENT)
Dept: RADIOLOGY | Facility: OTHER | Age: 47
Discharge: HOME | End: 2022-03-04
Attending: ORTHOPAEDIC SURGERY
Payer: MEDICAID

## 2022-03-04 ENCOUNTER — PATIENT MESSAGE (OUTPATIENT)
Dept: ORTHOPEDICS | Facility: CLINIC | Age: 47
End: 2022-03-04
Payer: MEDICAID

## 2022-03-04 ENCOUNTER — HOSPITAL ENCOUNTER (OUTPATIENT)
Dept: RADIOLOGY | Facility: HOSPITAL | Age: 47
Discharge: HOME | End: 2022-03-04
Attending: ORTHOPAEDIC SURGERY
Payer: MEDICAID

## 2022-03-04 DIAGNOSIS — M25.532 LEFT WRIST PAIN: ICD-10-CM

## 2022-03-04 PROCEDURE — 73110 XR WRIST COMPLETE 3 VIEWS LEFT: ICD-10-PCS | Mod: 26,LT,, | Performed by: RADIOLOGY

## 2022-03-04 PROCEDURE — 73110 X-RAY EXAM OF WRIST: CPT | Mod: TC,FY,LT

## 2022-03-04 PROCEDURE — 73110 X-RAY EXAM OF WRIST: CPT | Mod: 26,LT,, | Performed by: RADIOLOGY

## 2022-03-07 ENCOUNTER — OFFICE VISIT (OUTPATIENT)
Dept: ORTHOPEDICS | Facility: CLINIC | Age: 47
End: 2022-03-07
Payer: MEDICAID

## 2022-03-07 ENCOUNTER — TELEPHONE (OUTPATIENT)
Dept: OBSTETRICS AND GYNECOLOGY | Facility: HOSPITAL | Age: 47
End: 2022-03-07
Payer: MEDICAID

## 2022-03-07 DIAGNOSIS — M18.12 ARTHRITIS OF CARPOMETACARPAL (CMC) JOINT OF LEFT THUMB: Primary | ICD-10-CM

## 2022-03-07 DIAGNOSIS — M25.532 LEFT WRIST PAIN: Primary | ICD-10-CM

## 2022-03-07 PROCEDURE — 20600 DRAIN/INJ JOINT/BURSA W/O US: CPT | Mod: PBBFAC,LT | Performed by: ORTHOPAEDIC SURGERY

## 2022-03-07 PROCEDURE — 20600 SMALL JOINT ASPIRATION/INJECTION: L THUMB CMC: ICD-10-PCS | Mod: S$PBB,LT,, | Performed by: ORTHOPAEDIC SURGERY

## 2022-03-07 PROCEDURE — 99213 OFFICE O/P EST LOW 20 MIN: CPT | Mod: 25,S$PBB,, | Performed by: ORTHOPAEDIC SURGERY

## 2022-03-07 PROCEDURE — 99213 PR OFFICE/OUTPT VISIT, EST, LEVL III, 20-29 MIN: ICD-10-PCS | Mod: 25,S$PBB,, | Performed by: ORTHOPAEDIC SURGERY

## 2022-03-07 RX ORDER — METRONIDAZOLE 500 MG/1
500 TABLET ORAL EVERY 12 HOURS
Qty: 14 TABLET | Refills: 0 | Status: SHIPPED | OUTPATIENT
Start: 2022-03-07 | End: 2022-03-14

## 2022-03-07 RX ORDER — TRIAMCINOLONE ACETONIDE 40 MG/ML
40 INJECTION, SUSPENSION INTRA-ARTICULAR; INTRAMUSCULAR
Status: DISCONTINUED | OUTPATIENT
Start: 2022-03-07 | End: 2022-03-07 | Stop reason: HOSPADM

## 2022-03-07 RX ADMIN — TRIAMCINOLONE ACETONIDE 40 MG: 40 INJECTION, SUSPENSION INTRA-ARTICULAR; INTRAMUSCULAR at 10:03

## 2022-03-07 NOTE — PROCEDURES
Small Joint Aspiration/Injection: L thumb CMC    Date/Time: 3/7/2022 10:00 AM  Performed by: Craig Tom MD  Authorized by: Craig Tom MD     Consent Done?:  Yes (Verbal)  Indications:  Pain  Site marked: the procedure site was marked    Timeout: prior to procedure the correct patient, procedure, and site was verified    Prep: patient was prepped and draped in usual sterile fashion      Local anesthesia used?: Yes    Location:  Thumb  Site:  L thumb CMC  Ultrasonic guidance for needle placement?: No    Needle size:  25 G  Approach:  Dorsal  Medications:  40 mg triamcinolone acetonide 40 mg/mL  Patient tolerance:  Patient tolerated the procedure well with no immediate complications

## 2022-03-07 NOTE — PROGRESS NOTES
[facility]  History & Physical  Orthopedics    SUBJECTIVE:      COVID-19 attestation:  This patient was treated during the COVID-19 pandemic.  This was discussed with the patient, they are aware of our current policies and procedures, were given the option of delaying their visit and or switching to a virtual visit, delaying their surgery when applicable, and they elect to proceed.    Chief Complaint: L thumb pain    Referring Provider: No ref. provider found     History of Present Illness:  Patient is a 47 y.o. right hand dominant female who presents today with complaints of L thumb pain for the past few months. 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 Injections have helped. She has history of R thumb triggering treated with surgery in 2019. She has also had a R rotator cuff repair treated surgically in 2020. She has not been bracing. She cannot take NSAIDs due to hx of gastric bypass.         Onset of symptoms/DOI was years ago, acute flare 3mo.    Symptoms are aggravated by activity, movement and during the day.    Symptoms are alleviated by rest.    Symptoms consist of pain, swelling and decreased ROM.    The patient rates their pain as a 5/10.    Attempted treatment(s) and/or interventions include activity modifications, rest, rest, activity modification and steroid injection.     The patient denies any fevers, chills, N/V, D/C and presents for evaluation.       Past Medical History:   Diagnosis Date    Chronic back pain     Diabetes type 2, uncontrolled     Mild nonproliferative diabetic retinopathy of left eye without macular edema associated with type 2 diabetes mellitus 10/21/2019    Morbid obesity with BMI of 40.0-44.9, adult     MILEY on CPAP     Plantar fasciitis of left foot     Urticaria      Past Surgical History:   Procedure Laterality Date    ARTHROSCOPIC ACROMIOPLASTY OF SHOULDER Left 7/27/2020    Procedure: ACROMIOPLASTY, ARTHROSCOPIC;  Surgeon: Marcela KAHN  MD Greg;  Location: [facility] OR;  Service: Orthopedics;  Laterality: Left;    ARTHROSCOPIC REPAIR OF ROTATOR CUFF OF SHOULDER Right 7/27/2020    Procedure: REPAIR, ROTATOR CUFF, ARTHROSCOPIC;  Surgeon: Marcela Garza MD;  Location: [facility] OR;  Service: Orthopedics;  Laterality: Right;  GENERAL/REGIONAL    CYST REMOVAL      ovarian cyst removal at age 12    DECOMPRESSION OF SUBACROMIAL SPACE Left 7/27/2020    Procedure: DECOMPRESSION, SUBACROMIAL SPACE;  Surgeon: Marcela Garza MD;  Location: [facility] OR;  Service: Orthopedics;  Laterality: Left;    DILATION AND CURETTAGE OF UTERUS      ESOPHAGEAL MOTILITY STUDY USING HIGH RESOLUTION MANOMETRY N/A 9/17/2019    Procedure: MOTILITY STUDY, ESOPHAGUS, USING HIGH RESOLUTION MANOMETRY;  Surgeon: Leopoldo Swanson MD;  [facility]  Service: Endoscopy;  Laterality: N/A;  Pt will be taking xanax before procedure to hopefull help with anxiety.    ESOPHAGOGASTRODUODENOSCOPY N/A 6/14/2019    Procedure: EGD (ESOPHAGOGASTRODUODENOSCOPY);  Surgeon: Ced Guevara MD;  [facility]  Service: Endoscopy;  Laterality: N/A;  Please make sure it is scheduled after her cardiology appt.- see cardiology note dated 6/13/19 for clearance - ERW    GASTRIC BYPASS N/A 05/20/2021    INJECTION OF STEROID Left 7/27/2020    Procedure: INJECTION, STEROID LEFT THUMB TRIGGER FINGER;  Surgeon: Marcela Garza MD;  Location: [facility] OR;  Service: Orthopedics;  Laterality: Left;  LEFT THUMB, TRIGGER FINGER    TRIGGER FINGER RELEASE Right 10/4/2019    Procedure: RELEASE, TRIGGER FINGER - right thumb;  Surgeon: Craig Tom MD;  Location: [facility] OR;  Service: Orthopedics;  Laterality: Right;    UPPER GASTROINTESTINAL ENDOSCOPY      WISDOM TOOTH EXTRACTION       Review of patient's allergies indicates:  No Known Allergies  Social History     Social History Narrative    Single.  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      Family History  "  Problem Relation Age of Onset    Cancer Maternal Grandmother     Diabetes Mother     No Known Problems Sister     No Known Problems Sister     No Known Problems Sister     Breast cancer Maternal Aunt 70    Colon cancer Neg Hx     Stomach cancer Neg Hx     Inflammatory bowel disease Neg Hx     Esophageal cancer Neg Hx          Current Outpatient Medications:     amitriptyline (ELAVIL) 10 MG tablet, Take 1 tablet (10 mg total) by mouth every evening., Disp: 90 tablet, Rfl: 0    BD ULTRA-FINE DEVAUGHN PEN NEEDLE 32 gauge x 5/32" Ndle, USE IN THE EVENING DAILY AS DIRECTED, Disp: 100 each, Rfl: 11    blood sugar diagnostic Strp, 1 strip by Misc.(Non-Drug; Combo Route) route 2 (two) times daily., Disp: 200 strip, Rfl: 3    blood-glucose meter kit, Please provide with insurance covered meter, Disp: 1 each, Rfl: 0    cetirizine (ZYRTEC) 10 MG tablet, Take 10 mg by mouth once daily., Disp: , Rfl:     diclofenac sodium (VOLTAREN) 1 % Gel, Apply 2 g topically 4 (four) times daily., Disp: 100 g, Rfl: 0    HYDROcodone-acetaminophen (NORCO) 5-325 mg per tablet, Take 1 tablet by mouth every 6 (six) hours as needed for Pain (moderate to severe)., Disp: 28 tablet, Rfl: 0    lancets Misc, 1 Device by Misc.(Non-Drug; Combo Route) route 2 (two) times daily., Disp: 200 each, Rfl: 3    liraglutide 0.6 mg/0.1 mL, 18 mg/3 mL, subq PNIJ (VICTOZA 2-ANGELES) 0.6 mg/0.1 mL (18 mg/3 mL) PnIj pen, Inject 1.8 mg into the skin once daily. (Patient not taking: Reported on 1/19/2022), Disp: 9 mL, Rfl: 11    medroxyPROGESTERone (DEPO-PROVERA) 150 mg/mL Syrg, Inject 1 mL (150 mg total) into the muscle every 3 (three) months., Disp: 1 mL, Rfl: 2    multivitamin-min-iron-FA-vit K (BARIATRIC MULTIVITAMINS) 45 mg iron- 800 mcg-120 mcg Cap, Take by mouth., Disp: , Rfl:     nicotine (NICODERM CQ) 21 mg/24 hr, Place 1 patch onto the skin once daily., Disp: 28 patch, Rfl: 1    omeprazole (PRILOSEC) 40 MG capsule, Take 1 capsule (40 mg total) " by mouth every morning., Disp: 90 capsule, Rfl: 3    oxyCODONE-acetaminophen (PERCOCET) 5-325 mg per tablet, Take 1 tablet by mouth every 4 (four) hours as needed for Pain., Disp: 12 tablet, Rfl: 0    potassium chloride (KLOR-CON) 20 mEq Pack, Take by mouth., Disp: , Rfl:     promethazine (PHENERGAN) 6.25 mg/5 mL syrup, Take 20 mLs (25 mg total) by mouth every 6 (six) hours as needed for Nausea., Disp: 473 mL, Rfl: 0    vitamin D (VITAMIN D3) 1000 units Tab, Take 1,000 Units by mouth once daily., Disp: , Rfl:     zolpidem (AMBIEN) 5 MG Tab, TAKE 1 TABLET(5 MG) BY MOUTH EVERY NIGHT AS NEEDED, Disp: 30 tablet, Rfl: 4  No current facility-administered medications for this visit.    Facility-Administered Medications Ordered in Other Visits:     fentaNYL injection 100 mcg, 100 mcg, Intravenous, Q5 Min PRN, Ced Patino MD, 100 mcg at 07/27/20 1056    lidocaine (PF) 10 mg/ml (1%) injection 10 mg, 1 mL, Intradermal, Once, Brenna Livingston MD    midazolam (VERSED) 1 mg/mL injection 0.5 mg, 0.5 mg, Intravenous, PRN, Ced Patino MD, 2 mg at 07/27/20 1322    ropivacaine (Naropin) 1000 mg/500 mL (2 mg/mL) [facility] PainPRO Pump infusion, 4 mL/hr, Perineural, Continuous, Ced Patino MD, Last Rate: 4 mL/hr at 07/27/20 1519, 4 mL/hr at 07/27/20 1519      Review of Systems:  As per HPI otherwise noncontributory    OBJECTIVE:      Vital Signs (Most Recent):  There were no vitals filed for this visit.  There is no height or weight on file to calculate BMI.      Physical Exam:  Constitutional: The patient appears well-developed and well-nourished. No distress.   Skin: No lesions appreciated  Head: Normocephalic and atraumatic.   Nose: Nose normal.   Ears: No deformities seen  Eyes: Conjunctivae and EOM are normal.   Neck: No tracheal deviation present.   Cardiovascular: Normal rate and intact distal pulses.    Pulmonary/Chest: Effort normal. No respiratory distress.   Abdominal: There is no  guarding.   Neurological: The patient is alert.   Psychiatric: The patient has a normal mood and affect.     Left Hand/Wrist Examination:    Observation/Inspection:  Swelling  none    Deformity  none  Discoloration  none     Scars   none    Atrophy  none    HAND/WRIST EXAMINATION:  Finkelstein's Test   Neg  WHAT Test    Neg  Snuff box tenderness   Neg  Pope's Test    Neg  Hook of Hamate Tenderness  Neg  CMC grind    Pos  Circumduction test   Neg  TTP at base of thumb    Neurovascular Exam:  Digits WWP, brisk CR < 3s throughout  NVI motor/LTS to M/R/U nerves, radial pulse 2+  Tinel's Test - Carpal Tunnel  Neg  Tinel's Test - Cubital Tunnel  Neg  Phalen's Test    Neg  Median Nerve Compression Test Neg    ROM hand full, painless except for pain with thumb ROM    ROM wrist full, painless    ROM elbow full, painless    Abdomen not guarded  Respirations nonlabored  Perfusion intact    Diagnostic Results:     Imaging - I independently viewed the patient's imaging as well as the radiology report.  Xrays of the patient's L wrist  demonstrate no evidence of any acute fractures or dislocations. There is significant degenerative changes at the thumb CMC joint    EMG - none    ASSESSMENT/PLAN:      47 y.o. yo female with L thumb CMC arthritis  Plan: The patient and I had a thorough discussion today.  We discussed the working diagnosis as well as several other potential alternative diagnoses.  Treatment options were discussed, both conservative and surgical.  Conservative treatment options would include things such as activity modifications, workplace modifications, a period of rest, oral vs topical OTC and prescription anti-inflammatory medications, occupational therapy, splinting/bracing, immobilization, corticosteroid injections, and others.  Surgical options were discussed as well.     At this time, the patient would like to proceed with a trial of CSI and thumb spica bracing.    RTC PRN    Should the patient's symptoms  worsen, persist, or fail to improve they should return for reevaluation and I would be happy to see them back anytime.        Craig Tom M.D.     Please be aware that this note has been generated with the assistance of [email or web address] voice-to-text.  Please excuse any spelling or grammatical errors.    Thank you for choosing Dr. Craig Tom for your orthopedic hand and upper extremity care. It is our goal to provide you with exceptional care that will help keep you healthy, active, and get you back in the game.     If you felt that you received exemplary care today, please consider leaving feedback for Dr. Tom on [email or web address] at [email or web address].    Please do not hesitate to reach out to us via email, phone, or MyChart with any questions, concerns, or feedback.

## 2022-03-08 ENCOUNTER — TELEPHONE (OUTPATIENT)
Dept: OBSTETRICS AND GYNECOLOGY | Facility: CLINIC | Age: 47
End: 2022-03-08
Payer: MEDICAID

## 2022-03-08 NOTE — TELEPHONE ENCOUNTER
Normal; PAP smear     Affirm resulted  Positive for BV  Rx for flagyl sent to pharmacy on file  Please inform patient    Boone Sewell M.D.   OB/GYN

## 2022-03-08 NOTE — TELEPHONE ENCOUNTER
----- Message from Boone Sewell MD sent at 3/7/2022 10:20 PM CST -----  HIV , hepatitis panel and RPR all  negative       Boone Sewell M.D.   OB/GYN

## 2022-03-15 ENCOUNTER — PATIENT MESSAGE (OUTPATIENT)
Dept: OBSTETRICS AND GYNECOLOGY | Facility: CLINIC | Age: 47
End: 2022-03-15
Payer: MEDICAID

## 2022-03-20 ENCOUNTER — PATIENT MESSAGE (OUTPATIENT)
Dept: OBSTETRICS AND GYNECOLOGY | Facility: CLINIC | Age: 47
End: 2022-03-20
Payer: MEDICAID

## 2022-03-21 ENCOUNTER — HOSPITAL ENCOUNTER (EMERGENCY)
Facility: HOSPITAL | Age: 47
Discharge: HOME | End: 2022-03-21
Attending: EMERGENCY MEDICINE
Payer: MEDICAID

## 2022-03-21 ENCOUNTER — TELEPHONE (OUTPATIENT)
Dept: OBSTETRICS AND GYNECOLOGY | Facility: CLINIC | Age: 47
End: 2022-03-21
Payer: MEDICAID

## 2022-03-21 VITALS
HEART RATE: 70 BPM | RESPIRATION RATE: 15 BRPM | TEMPERATURE: 98 F | DIASTOLIC BLOOD PRESSURE: 75 MMHG | BODY MASS INDEX: 27.32 KG/M2 | WEIGHT: 170 LBS | SYSTOLIC BLOOD PRESSURE: 123 MMHG | OXYGEN SATURATION: 99 % | HEIGHT: 66 IN

## 2022-03-21 DIAGNOSIS — N93.9 VAGINAL BLEEDING: ICD-10-CM

## 2022-03-21 DIAGNOSIS — R10.9 ABDOMINAL CRAMPING: Primary | ICD-10-CM

## 2022-03-21 DIAGNOSIS — N93.9 ABNORMAL UTERINE BLEEDING (AUB): Primary | ICD-10-CM

## 2022-03-21 LAB
ALBUMIN SERPL BCP-MCNC: 3.2 G/DL (ref 3.5–5.2)
ALP SERPL-CCNC: 66 U/L (ref 55–135)
ALT SERPL W/O P-5'-P-CCNC: 17 U/L (ref 10–44)
ANION GAP SERPL CALC-SCNC: 8 MMOL/L (ref 8–16)
AST SERPL-CCNC: 16 U/L (ref 10–40)
B-HCG UR QL: NEGATIVE
BILIRUB SERPL-MCNC: 0.6 MG/DL (ref 0.1–1)
BILIRUB UR QL STRIP: NEGATIVE
BUN SERPL-MCNC: 8 MG/DL (ref 6–20)
CALCIUM SERPL-MCNC: 8.7 MG/DL (ref 8.7–10.5)
CHLORIDE SERPL-SCNC: 108 MMOL/L (ref 95–110)
CLARITY UR: ABNORMAL
CO2 SERPL-SCNC: 24 MMOL/L (ref 23–29)
COLOR UR: YELLOW
CREAT SERPL-MCNC: 0.7 MG/DL (ref 0.5–1.4)
CTP QC/QA: YES
ERYTHROCYTE [DISTWIDTH] IN BLOOD BY AUTOMATED COUNT: 13.9 % (ref 11.5–14.5)
EST. GFR  (AFRICAN AMERICAN): >60 ML/MIN/1.73 M^2
EST. GFR  (NON AFRICAN AMERICAN): >60 ML/MIN/1.73 M^2
GLUCOSE SERPL-MCNC: 77 MG/DL (ref 70–110)
GLUCOSE UR QL STRIP: NEGATIVE
HCT VFR BLD AUTO: 40.8 % (ref 37–48.5)
HGB BLD-MCNC: 13.2 G/DL (ref 12–16)
HGB UR QL STRIP: ABNORMAL
KETONES UR QL STRIP: ABNORMAL
LEUKOCYTE ESTERASE UR QL STRIP: ABNORMAL
MCH RBC QN AUTO: 32.9 PG (ref 27–31)
MCHC RBC AUTO-ENTMCNC: 32.4 G/DL (ref 32–36)
MCV RBC AUTO: 102 FL (ref 82–98)
MICROSCOPIC COMMENT: ABNORMAL
NITRITE UR QL STRIP: NEGATIVE
PH UR STRIP: 6 [PH] (ref 5–8)
PLATELET # BLD AUTO: 283 K/UL (ref 150–450)
PMV BLD AUTO: 11.6 FL (ref 9.2–12.9)
POTASSIUM SERPL-SCNC: 3.9 MMOL/L (ref 3.5–5.1)
PROT SERPL-MCNC: 5.6 G/DL (ref 6–8.4)
PROT UR QL STRIP: ABNORMAL
RBC # BLD AUTO: 4.01 M/UL (ref 4–5.4)
RBC #/AREA URNS HPF: >100 /HPF (ref 0–4)
SODIUM SERPL-SCNC: 140 MMOL/L (ref 136–145)
SP GR UR STRIP: 1.03 (ref 1–1.03)
SQUAMOUS #/AREA URNS HPF: 1 /HPF
URN SPEC COLLECT METH UR: ABNORMAL
UROBILINOGEN UR STRIP-ACNC: ABNORMAL EU/DL
WBC # BLD AUTO: 8.61 K/UL (ref 3.9–12.7)
WBC #/AREA URNS HPF: 88 /HPF (ref 0–5)

## 2022-03-21 PROCEDURE — 85027 COMPLETE CBC AUTOMATED: CPT | Performed by: EMERGENCY MEDICINE

## 2022-03-21 PROCEDURE — 25000003 PHARM REV CODE 250: Performed by: EMERGENCY MEDICINE

## 2022-03-21 PROCEDURE — 80053 COMPREHEN METABOLIC PANEL: CPT | Performed by: EMERGENCY MEDICINE

## 2022-03-21 PROCEDURE — 99283 EMERGENCY DEPT VISIT LOW MDM: CPT

## 2022-03-21 PROCEDURE — 87086 URINE CULTURE/COLONY COUNT: CPT | Performed by: EMERGENCY MEDICINE

## 2022-03-21 PROCEDURE — 81000 URINALYSIS NONAUTO W/SCOPE: CPT | Performed by: EMERGENCY MEDICINE

## 2022-03-21 PROCEDURE — 81025 URINE PREGNANCY TEST: CPT | Performed by: EMERGENCY MEDICINE

## 2022-03-21 RX ORDER — ACETAMINOPHEN 325 MG/1
650 TABLET ORAL
Status: COMPLETED | OUTPATIENT
Start: 2022-03-21 | End: 2022-03-21

## 2022-03-21 RX ORDER — ACETAMINOPHEN 500 MG
1000 TABLET ORAL EVERY 6 HOURS PRN
Qty: 50 TABLET | Refills: 0 | Status: SHIPPED | OUTPATIENT
Start: 2022-03-21

## 2022-03-21 RX ADMIN — ACETAMINOPHEN 650 MG: 325 TABLET ORAL at 05:03

## 2022-03-21 NOTE — TELEPHONE ENCOUNTER
Spoke to pt and informed her that u/s is needed and then a possible embx. Scheduled and verbalized understanding.

## 2022-03-21 NOTE — DISCHARGE INSTRUCTIONS
Mrs. Lopez,    Thank you for letting me care for you today! It was nice meeting you, and I hope you feel better soon.   If you would like access to your chart and what was done today please utilize the [facility].   Please come back to [facility] for all of your future medical needs.    Our goal in the emergency department is to always give you outstanding care and exceptional service. You may receive a survey by mail or e-mail in the next week regarding your experience in our ED. We would greatly appreciate you completing and returning the survey. Your feedback provides us with a way to recognize our staff who give very good care and it helps us learn how to improve when your experience was below our aspiration of excellence.     Sincerely,    Hair Guerrero MD  Board Certified Emergency Physician

## 2022-03-21 NOTE — ED NOTES
Enter room for discharge education and instructions, and pt requested test results to be discussed further with MD. MD Cesar notified and on way to bedside; will continue to monitor.

## 2022-03-21 NOTE — PROVIDER PROGRESS NOTES - EMERGENCY DEPT.
Encounter Date: 3/21/2022    ED Physician Progress Notes        Physician Note:   I assumed care of this patient from the previous emergency provider Dr. Bean.  This is a 47-year-old perimenopausal woman who has had intermittent bleeding with some heavier than usual bleeding over last several days.  She is currently awaiting results of of CBC for disposition determination.  Will reassess thereafter.    CBC stable, this woman is very well-appearing on my reassessment, has benign abdominal examination, her cramping and pain have sees.  We discussed the importance of OB follow-up.

## 2022-03-21 NOTE — TELEPHONE ENCOUNTER
I did not see her for irregular bleeding   Will have to come for US and  Endometrial biopsy    Boone Sewell M.D.   OB/GYN    3/21/2022

## 2022-03-21 NOTE — ED PROVIDER NOTES
Encounter Date: 3/21/2022       History     Chief Complaint   Patient presents with    Vaginal Bleeding     Pt c/o heavy vaginal bleeding w/ clots beginning 3/11, lower abdominal pain x 1 day. Pt denies nausea/vomiting. Denies previous hx of heavy menstrual bleeding. Pt taking antibiotics for BV.    Abdominal Cramping     Velma Lopez is a 47 y.o. female who  has a past medical history of Chronic back pain, Diabetes type 2, uncontrolled, Mild nonproliferative diabetic retinopathy of left eye without macular edema associated with type 2 diabetes mellitus (10/21/2019), Morbid obesity with BMI of 40.0-44.9, adult, MILEY on CPAP, Plantar fasciitis of left foot, and Urticaria.    The patient presents to the ED due to heavy vaginal bleeding.   Patient reports she has had heavy vaginal bleeding since 03/11.  She states she had to menstrual cycles last month, which is abnormal for her.  She did not have a menstrual cycle the 3 previous months.  However, prior to the last few months, she normally has mild vaginal bleeding with her cycles lasting about 3 days.  She mentioned this to her OB during her last visit, but states she is not undergoing menopause because menopause typically lasts 6 months or more without menstrual cycles.  She denies any fever, chest pain, shortness of breath, vomiting/diarrhea, urinary complaints, or any other concerns.  She has a history of gastric bypass but no other abdominal surgeries.  She is not on blood thinners.  She did not take any medications prior to arrival.        Review of patient's allergies indicates:  No Known Allergies  Past Medical History:   Diagnosis Date    Chronic back pain     Diabetes type 2, uncontrolled     Mild nonproliferative diabetic retinopathy of left eye without macular edema associated with type 2 diabetes mellitus 10/21/2019    Morbid obesity with BMI of 40.0-44.9, adult     MILEY on CPAP     Plantar fasciitis of left foot     Urticaria      Past  Surgical History:   Procedure Laterality Date    ARTHROSCOPIC ACROMIOPLASTY OF SHOULDER Left 7/27/2020    Procedure: ACROMIOPLASTY, ARTHROSCOPIC;  Surgeon: Marcela Garza MD;  Location: [facility] OR;  Service: Orthopedics;  Laterality: Left;    ARTHROSCOPIC REPAIR OF ROTATOR CUFF OF SHOULDER Right 7/27/2020    Procedure: REPAIR, ROTATOR CUFF, ARTHROSCOPIC;  Surgeon: Marcela Garza MD;  Location: [facility] OR;  Service: Orthopedics;  Laterality: Right;  GENERAL/REGIONAL    CYST REMOVAL      ovarian cyst removal at age 12    DECOMPRESSION OF SUBACROMIAL SPACE Left 7/27/2020    Procedure: DECOMPRESSION, SUBACROMIAL SPACE;  Surgeon: Marcela Garza MD;  Location: [facility] OR;  Service: Orthopedics;  Laterality: Left;    DILATION AND CURETTAGE OF UTERUS      ESOPHAGEAL MOTILITY STUDY USING HIGH RESOLUTION MANOMETRY N/A 9/17/2019    Procedure: MOTILITY STUDY, ESOPHAGUS, USING HIGH RESOLUTION MANOMETRY;  Surgeon: Leopoldo Swanson MD;  [facility]: [facility] ([address] [place] [address]);  Service: Endoscopy;  Laterality: N/A;  Pt will be taking xanax before procedure to hopefull help with anxiety.    ESOPHAGOGASTRODUODENOSCOPY N/A 6/14/2019    Procedure: EGD (ESOPHAGOGASTRODUODENOSCOPY);  Surgeon: Ced Guevara MD;  [facility]  Service: Endoscopy;  Laterality: N/A;  Please make sure it is scheduled after her cardiology appt.- see cardiology note dated 6/13/19 for clearance - ERW    GASTRIC BYPASS N/A 05/20/2021    INJECTION OF STEROID Left 7/27/2020    Procedure: INJECTION, STEROID LEFT THUMB TRIGGER FINGER;  Surgeon: Marcela Garza MD;  Location: [facility] OR;  Service: Orthopedics;  Laterality: Left;  LEFT THUMB, TRIGGER FINGER    TRIGGER FINGER RELEASE Right 10/4/2019    Procedure: RELEASE, TRIGGER FINGER - right thumb;  Surgeon: Craig Tom MD;  Location: [facility] OR;  Service: Orthopedics;  Laterality: Right;    UPPER GASTROINTESTINAL ENDOSCOPY      WISDOM TOOTH EXTRACTION       Family History   Problem  Relation Age of Onset    Cancer Maternal Grandmother     Diabetes Mother     No Known Problems Sister     No Known Problems Sister     No Known Problems Sister     Breast cancer Maternal Aunt 70    Colon cancer Neg Hx     Stomach cancer Neg Hx     Inflammatory bowel disease Neg Hx     Esophageal cancer Neg Hx      Social History     Tobacco Use    Smoking status: Former Smoker     Packs/day: 0.50     Types: Cigarettes    Smokeless tobacco: Never Used   Substance Use Topics    Alcohol use: Yes     Comment: social    Drug use: No     Review of Systems   Constitutional: Negative for chills and fever.   HENT: Negative for sore throat.    Respiratory: Negative for cough and shortness of breath.    Cardiovascular: Negative for chest pain.   Gastrointestinal: Positive for abdominal pain. Negative for nausea and vomiting.   Genitourinary: Positive for vaginal bleeding. Negative for dysuria, frequency and urgency.   Musculoskeletal: Negative for back pain.   Skin: Negative for rash and wound.   Neurological: Negative for syncope and weakness.   Hematological: Does not bruise/bleed easily.   Psychiatric/Behavioral: Negative for agitation, behavioral problems and confusion.       Physical Exam     Initial Vitals [03/21/22 0354]   BP Pulse Resp Temp SpO2   (!) 150/81 75 20 98.4 °F (36.9 °C) 100 %      MAP       --         Physical Exam    Nursing note and vitals reviewed.  Constitutional: She appears well-developed and well-nourished. She is not diaphoretic. No distress.   Well-appearing, pleasant, no acute distress.   HENT:   Head: Normocephalic and atraumatic.   Mouth/Throat: Oropharynx is clear and moist.   Eyes: EOM are normal. Pupils are equal, round, and reactive to light.   Neck: No tracheal deviation present.   Cardiovascular: Normal rate, regular rhythm, normal heart sounds and intact distal pulses.   Pulmonary/Chest: Breath sounds normal. No stridor. No respiratory distress. She has no wheezes.    Abdominal: Abdomen is soft. Bowel sounds are normal. She exhibits no distension and no mass. There is generalized abdominal tenderness (mild).   Musculoskeletal:         General: No edema. Normal range of motion.     Neurological: She is alert and oriented to person, place, and time. She has normal strength. No cranial nerve deficit or sensory deficit.   Skin: Skin is warm and dry. Capillary refill takes less than 2 seconds. No pallor.   Psychiatric: She has a normal mood and affect. Her behavior is normal. Thought content normal.         ED Course   Procedures  Labs Reviewed   URINALYSIS, REFLEX TO URINE CULTURE - Abnormal; Notable for the following components:       Result Value    Appearance, UA Hazy (*)     Protein, UA Trace (*)     Ketones, UA 2+ (*)     Occult Blood UA 3+ (*)     Urobilinogen, UA 2.0-3.0 (*)     Leukocytes, UA Trace (*)     All other components within normal limits    Narrative:     Specimen Source->Urine   URINALYSIS MICROSCOPIC - Abnormal; Notable for the following components:    RBC, UA >100 (*)     WBC, UA 88 (*)     All other components within normal limits    Narrative:     Specimen Source->Urine   CULTURE, URINE   CBC WITHOUT DIFFERENTIAL   COMPREHENSIVE METABOLIC PANEL   POCT URINE PREGNANCY          Imaging Results    None          Medications   acetaminophen tablet 650 mg (650 mg Oral Given 3/21/22 0514)     Medical Decision Making:   Initial Assessment:   47-year-old female presents to ER with heavy vaginal bleeding and abdominal cramping ongoing since 03/11.  Vitals and exam benign.  Will obtain labs, treat cramping with Tylenol, and reassess.  Differential Diagnosis:   Differential Diagnosis includes, but is not limited to:  Pregnancy complication (threatened AB, inevitable AB, incomplete Ab, missed AB, uterine rupture, ectopic pregnancy, placental abruption, placenta previa), ovarian cyst/torsion, STD, foreign body, trauma, normal menstruation.    Clinical Tests:   Lab Tests:  Ordered and Reviewed  ED Management:  Difficulty obtaining labs despite multiple attempts by nursing.   will attempt lab draw.  Labs pending at time of shift change. Hemodynamically stable.   Oncoming physician to assume care, follow up lab results, disposition accordingly.  Anticipate bleeding is likely perimenopausal in nature.  She will benefit from close OB follow-up.                        Clinical Impression:   Final diagnoses:  [R10.9] Abdominal cramping (Primary)  [N93.9] Vaginal bleeding                 Kei Bean MD  03/21/22 0776

## 2022-03-22 LAB
BACTERIA UR CULT: NORMAL
BACTERIA UR CULT: NORMAL

## 2022-03-23 ENCOUNTER — PATIENT MESSAGE (OUTPATIENT)
Dept: OBSTETRICS AND GYNECOLOGY | Facility: CLINIC | Age: 47
End: 2022-03-23
Payer: MEDICAID

## 2022-03-23 ENCOUNTER — HOSPITAL ENCOUNTER (OUTPATIENT)
Dept: RADIOLOGY | Facility: HOSPITAL | Age: 47
Discharge: HOME | End: 2022-03-23
Attending: OBSTETRICS & GYNECOLOGY
Payer: MEDICAID

## 2022-03-23 DIAGNOSIS — N93.9 ABNORMAL UTERINE BLEEDING (AUB): ICD-10-CM

## 2022-03-23 PROCEDURE — 76856 US PELVIS COMP WITH TRANSVAG NON-OB (XPD): ICD-10-PCS | Mod: 26,,, | Performed by: RADIOLOGY

## 2022-03-23 PROCEDURE — 76830 TRANSVAGINAL US NON-OB: CPT | Mod: TC

## 2022-03-23 PROCEDURE — 76856 US EXAM PELVIC COMPLETE: CPT | Mod: 26,,, | Performed by: RADIOLOGY

## 2022-03-23 PROCEDURE — 76830 TRANSVAGINAL US NON-OB: CPT | Mod: 26,,, | Performed by: RADIOLOGY

## 2022-03-23 PROCEDURE — 76830 US PELVIS COMP WITH TRANSVAG NON-OB (XPD): ICD-10-PCS | Mod: 26,,, | Performed by: RADIOLOGY

## 2022-03-24 ENCOUNTER — TELEPHONE (OUTPATIENT)
Dept: OBSTETRICS AND GYNECOLOGY | Facility: CLINIC | Age: 47
End: 2022-03-24
Payer: MEDICAID

## 2022-03-24 RX ORDER — NAPROXEN 500 MG/1
500 TABLET ORAL EVERY 8 HOURS PRN
Qty: 30 TABLET | Refills: 0 | Status: ON HOLD | OUTPATIENT
Start: 2022-03-24 | End: 2022-08-25 | Stop reason: HOSPADM

## 2022-03-24 RX ORDER — MEDROXYPROGESTERONE ACETATE 10 MG/1
10 TABLET ORAL DAILY
Qty: 30 TABLET | Refills: 3 | Status: SHIPPED | OUTPATIENT
Start: 2022-03-24 | End: 2022-04-03

## 2022-03-24 RX ORDER — NAPROXEN 500 MG/1
500 TABLET ORAL EVERY 8 HOURS PRN
Qty: 30 TABLET | Refills: 0 | OUTPATIENT
Start: 2022-03-24

## 2022-03-24 NOTE — TELEPHONE ENCOUNTER
----- Message from Nya Garcia sent at 3/24/2022  2:13 PM CDT -----  [facility]  Contact: Lisa with [facility]  Name of Who is Calling: Lisa with [facility]           What is the request in detail: she states the pt shouldn't take this medicationnaproxen (NAPROSYN) 500 MG table  due to her conditions they want to consult . They are requesting a call back            Can the clinic reply by [facility]: No          What Number to Call Back if not in [facility]: [phone]

## 2022-03-24 NOTE — TELEPHONE ENCOUNTER
Spoke to pharmacy and informed them that per Dr umaña that he authorizes her to take naproxen for 2 days only.

## 2022-03-24 NOTE — TELEPHONE ENCOUNTER
Please Tell Ms John that the ultrasound shows two small  Uterine fibroids with normal endometrial thickness  Sent prescription or progesterone and Naproxen to her pharmacy   Take the medicine without interruption   She could also come back next week and talk to me about other options if she wishes to     Boone Sewell M.D.   OB/GYN    3/24/2022

## 2022-03-24 NOTE — TELEPHONE ENCOUNTER
----- Message from Zulema Sun, Patient Care Assistant sent at 3/24/2022  9:10 AM CDT -----  Type:  Needs Medical Advice    Who Called:  pt  Symptoms (please be specific):  patient is having a lot of abdominal pain pt stated she is tried of the messaging  please call her   Would the patient rather a call back or a response via [facility]?    Best Call Back Number: [phone]  Additional Information:

## 2022-03-24 NOTE — TELEPHONE ENCOUNTER
Spoke to pt and informed her that switch was made to this location and to call at 3p.m for rx status and again naproxen can be taken for 2 days-per Dr umaña advisgianni and then can stop it since she stated her dr that did her gastro surgery advised her not to take anything but tylenol. Pt is seeing us next week for f/u. Pt verbalized understanding.

## 2022-03-24 NOTE — TELEPHONE ENCOUNTER
Advised and verbalized understanding. [facility] will also [facility] [address] [facility] [place] as she requested. Pt verbalized understanding.

## 2022-03-24 NOTE — TELEPHONE ENCOUNTER
Returned pt call and she is following up on previous messages on next steps-pt informs us that she had ultrasound done today-I apologized on the delay of Dr's response-informed her that I would follow up with Dr now with alert and call her back asap. Pt verbalized understanding

## 2022-03-30 ENCOUNTER — PATIENT OUTREACH (OUTPATIENT)
Dept: ADMINISTRATIVE | Facility: OTHER | Age: 47
End: 2022-03-30
Payer: MEDICAID

## 2022-03-30 NOTE — PROGRESS NOTES
There are no preventive care reminders to display for this patient.  Updates were requested from care everywhere.  Chart was reviewed for overdue [facility] Encounters ([facility]) topics (CRS, Breast Cancer Screening, Eye exam)  Health Maintenance has been updated.  LINKS immunization registry triggered.  Immunizations were reconciled.

## 2022-03-31 ENCOUNTER — OFFICE VISIT (OUTPATIENT)
Dept: OBSTETRICS AND GYNECOLOGY | Facility: CLINIC | Age: 47
End: 2022-03-31
Payer: MEDICAID

## 2022-03-31 VITALS — WEIGHT: 169.31 LBS | DIASTOLIC BLOOD PRESSURE: 60 MMHG | BODY MASS INDEX: 27.33 KG/M2 | SYSTOLIC BLOOD PRESSURE: 94 MMHG

## 2022-03-31 DIAGNOSIS — B97.7 HPV IN FEMALE: ICD-10-CM

## 2022-03-31 DIAGNOSIS — D25.1 FIBROIDS, INTRAMURAL: Primary | ICD-10-CM

## 2022-03-31 PROCEDURE — 99213 OFFICE O/P EST LOW 20 MIN: CPT | Mod: PBBFAC,PO | Performed by: OBSTETRICS & GYNECOLOGY

## 2022-03-31 PROCEDURE — 1159F PR MEDICATION LIST DOCUMENTED IN MEDICAL RECORD: ICD-10-PCS | Mod: CPTII,,, | Performed by: OBSTETRICS & GYNECOLOGY

## 2022-03-31 PROCEDURE — 3074F PR MOST RECENT SYSTOLIC BLOOD PRESSURE < 130 MM HG: ICD-10-PCS | Mod: CPTII,,, | Performed by: OBSTETRICS & GYNECOLOGY

## 2022-03-31 PROCEDURE — 3008F PR BODY MASS INDEX (BMI) DOCUMENTED: ICD-10-PCS | Mod: CPTII,,, | Performed by: OBSTETRICS & GYNECOLOGY

## 2022-03-31 PROCEDURE — 99213 OFFICE O/P EST LOW 20 MIN: CPT | Mod: S$PBB,,, | Performed by: OBSTETRICS & GYNECOLOGY

## 2022-03-31 PROCEDURE — 1160F RVW MEDS BY RX/DR IN RCRD: CPT | Mod: CPTII,,, | Performed by: OBSTETRICS & GYNECOLOGY

## 2022-03-31 PROCEDURE — 99999 PR PBB SHADOW E&M-EST. PATIENT-LVL III: CPT | Mod: PBBFAC,,, | Performed by: OBSTETRICS & GYNECOLOGY

## 2022-03-31 PROCEDURE — 1159F MED LIST DOCD IN RCRD: CPT | Mod: CPTII,,, | Performed by: OBSTETRICS & GYNECOLOGY

## 2022-03-31 PROCEDURE — 99213 PR OFFICE/OUTPT VISIT, EST, LEVL III, 20-29 MIN: ICD-10-PCS | Mod: S$PBB,,, | Performed by: OBSTETRICS & GYNECOLOGY

## 2022-03-31 PROCEDURE — 3074F SYST BP LT 130 MM HG: CPT | Mod: CPTII,,, | Performed by: OBSTETRICS & GYNECOLOGY

## 2022-03-31 PROCEDURE — 3008F BODY MASS INDEX DOCD: CPT | Mod: CPTII,,, | Performed by: OBSTETRICS & GYNECOLOGY

## 2022-03-31 PROCEDURE — 3078F PR MOST RECENT DIASTOLIC BLOOD PRESSURE < 80 MM HG: ICD-10-PCS | Mod: CPTII,,, | Performed by: OBSTETRICS & GYNECOLOGY

## 2022-03-31 PROCEDURE — 1160F PR REVIEW ALL MEDS BY PRESCRIBER/CLIN PHARMACIST DOCUMENTED: ICD-10-PCS | Mod: CPTII,,, | Performed by: OBSTETRICS & GYNECOLOGY

## 2022-03-31 PROCEDURE — 3078F DIAST BP <80 MM HG: CPT | Mod: CPTII,,, | Performed by: OBSTETRICS & GYNECOLOGY

## 2022-03-31 PROCEDURE — 99999 PR PBB SHADOW E&M-EST. PATIENT-LVL III: ICD-10-PCS | Mod: PBBFAC,,, | Performed by: OBSTETRICS & GYNECOLOGY

## 2022-03-31 NOTE — PROGRESS NOTES
GYNECOLOGY  :  Velma Lopez is a 47 y.o.    Here today for follow up      TVUS 3/23/22   Normal sized uterus, 2 small fibroids  1.5 cms and 1.2 cms   Normal endometrial thickness   Normal adnexa     Normal PAP with positive HPV  (non 16 or 18)     Seen before for annual visit :  No gyn  complaints  Normal menstrual cycles   No Hx Abnormal PAP smears  No Hx Abnormal Mammogram   Hx gastric bypass, lost + 100lb   Not on metformin anymore for  DM       Past Medical History:   Diagnosis Date    Chronic back pain     Diabetes type 2, uncontrolled     Mild nonproliferative diabetic retinopathy of left eye without macular edema associated with type 2 diabetes mellitus 10/21/2019    Morbid obesity with BMI of 40.0-44.9, adult     MILEY on CPAP     Plantar fasciitis of left foot     Urticaria      Past Surgical History:   Procedure Laterality Date    ARTHROSCOPIC ACROMIOPLASTY OF SHOULDER Left 2020    Procedure: ACROMIOPLASTY, ARTHROSCOPIC;  Surgeon: Marcela Garza MD;  Location: [facility] OR;  Service: Orthopedics;  Laterality: Left;    ARTHROSCOPIC REPAIR OF ROTATOR CUFF OF SHOULDER Right 2020    Procedure: REPAIR, ROTATOR CUFF, ARTHROSCOPIC;  Surgeon: Marcela Garza MD;  Location: [facility] OR;  Service: Orthopedics;  Laterality: Right;  GENERAL/REGIONAL    CYST REMOVAL      ovarian cyst removal at age 12    DECOMPRESSION OF SUBACROMIAL SPACE Left 2020    Procedure: DECOMPRESSION, SUBACROMIAL SPACE;  Surgeon: Marcela Garza MD;  Location: [facility] OR;  Service: Orthopedics;  Laterality: Left;    DILATION AND CURETTAGE OF UTERUS      ESOPHAGEAL MOTILITY STUDY USING HIGH RESOLUTION MANOMETRY N/A 2019    Procedure: MOTILITY STUDY, ESOPHAGUS, USING HIGH RESOLUTION MANOMETRY;  Surgeon: Leopoldo Swanson MD;  [facility]  Service: Endoscopy;  Laterality: N/A;  Pt will be taking xanax before procedure to hopefull help with anxiety.    ESOPHAGOGASTRODUODENOSCOPY  N/A 2019    Procedure: EGD (ESOPHAGOGASTRODUODENOSCOPY);  Surgeon: Ced Guevara MD;  Location: [facility] ([address] [place] [address]);  Service: Endoscopy;  Laterality: N/A;  Please make sure it is scheduled after her cardiology appt.- see cardiology note dated 19 for clearance - ERW    GASTRIC BYPASS N/A 2021    INJECTION OF STEROID Left 2020    Procedure: INJECTION, STEROID LEFT THUMB TRIGGER FINGER;  Surgeon: Marcela Garza MD;  Location: [facility];  Service: Orthopedics;  Laterality: Left;  LEFT THUMB, TRIGGER FINGER    TRIGGER FINGER RELEASE Right 10/4/2019    Procedure: RELEASE, TRIGGER FINGER - right thumb;  Surgeon: Craig Tom MD;  Location: [facility];  Service: Orthopedics;  Laterality: Right;    UPPER GASTROINTESTINAL ENDOSCOPY      WISDOM TOOTH EXTRACTION       Family History   Problem Relation Age of Onset    Cancer Maternal Grandmother     Diabetes Mother     No Known Problems Sister     No Known Problems Sister     No Known Problems Sister     Breast cancer Maternal Aunt 70    Colon cancer Neg Hx     Stomach cancer Neg Hx     Inflammatory bowel disease Neg Hx     Esophageal cancer Neg Hx      Social History     Tobacco Use    Smoking status: Former Smoker     Packs/day: 0.50     Types: Cigarettes    Smokeless tobacco: Never Used   Substance Use Topics    Alcohol use: Yes     Comment: social    Drug use: No     OB History    Para Term  AB Living   5 1 1   4 1   SAB IAB Ectopic Multiple Live Births   4       1      # Outcome Date GA Lbr Chucho/2nd Weight Sex Delivery Anes PTL Lv   5 Term  40w0d   M Vag-Spont   LINDSAY   4 SAB            3 SAB            2 SAB            1 SAB                BP 94/60   Wt 76.8 kg (169 lb 5 oz)   LMP 2022 (Approximate)   BMI 27.33 kg/m²     Last PAP= +2y  LMP =   Last Mammogram = /  Last Colonoscopy  = -      COMPREHENSIVE GYN HISTORY:  G 4 P 1     PAP History: Denies abnormal Paps.  Infection History: Denies  STDs. Denies PID.  Benign History: Denies uterine fibroids. Denies ovarian cysts. Denies endometriosis.   Cancer History: Denies cervical cancer. Denies uterine cancer or hyperplasia. Denies ovarian cancer. Denies vulvar cancer or pre-cancer. Denies vaginal cancer or pre-cancer. Denies breast cancer. Denies colon cancer.  Sexual Activity History: ( x ) Yes   ( - )   no   Menstrual History: Age of menarche: (  12 )  years. Every (  30-90 )       days, flows for (  2 )   days. moderate  flow.  Dysmenorrhea History:  absent  Contraception:  Condoms     ROS  GENERAL: Denies significant weight gain or weight loss. Feeling well overall.  SKIN: Denies rash or lesions.  Normal skin turgor  HEAD: Denies head injury or headache.   NODES: Denies enlarged lymph nodes.   CHEST: Denies chest pain or shortness of breath.   CARDIOVASCULAR: Denies palpitations or left sided chest pain.   ABDOMEN: No abdominal pain,no  diarrhea,constipation  nausea, vomiting or rectal bleeding.   URINARY: normal  Frequency,no  Dysuria or burning on urination.   REPRODUCTIVE: Per HPI   BREASTS: The patient sometimes performs breast self-examination and denies pain, lumps, or nipple discharge.   HEMATOLOGIC: No easy bruisability or excessive bleeding.   MUSCULOSKELETAL: Denies joint pain or swelling.   NEUROLOGIC: Denies syncope or weakness.   PSYCHIATRIC: Denies depression, anxiety or mood swings.    Physical Exam     Constitutional: She is oriented to person, place, and time. She appears well-developed and well-nourished. No distress.   HENT:   Head: Normocephalic.   NECK: Neck symmetric without masses or thyromegaly.  Cardiovascular: Normal rate and regular rhythm.   Pulmonary/Chest: Effort normal and breath sounds normal. No respiratory distress. She has no wheezes.   Breasts: Symmetrical, no skin changes or visible lesions. No palpable masses, nipple discharge or adenopathy bilaterally.  Abdominal: Soft not distended. Bowel sounds are normal. She  exhibits   no masses . No tenderness to palpation.   Genitourinary: Pelvic exam was performed with patient supine.   External genitalia normal no lesions.Normal hair distribution   Adequate perineal body,normal urethral meatus   Vagina moist and well rugated without lesions, no vaginal  Discharge.   Cervix pink and without lesions. No bleeding. No significant cystocele or rectocele.  Bimanual exam showed uterus normal size, shape and position , mobile and nontender. Adnexa without masses or tenderness. Urethra and bladder normal  Extremities normal no cyanosis ,edema.         A/P Velma Lopez 47 y.o.     Dx=  1- Small asymptomatic fibroids   2-    Procedures/Orders:         Patient was counseled today on A.C.S. Pap guidelines and recommendations for yearly pelvic exams, mammograms and monthly self breast exams; to see her PCP for other health maintenance, nutrition and weight gain counseling, discussed lab values.  Discussed colonoscopy recommendations every 10 years starting at age 50   Calcium and vit D daily intake     F/u in 1 yr for cotesting     I spent a total of 30 minutes on the day of the visit.This includes face to face time and non-face to face time preparing to see the patient (eg, review of tests), Obtaining and/or reviewing separately obtained history, Documenting clinical information in the electronic or other health record, Independently interpreting resultsand communicating results to the patient/family/caregiver, or Care coordination.      Boone Sewell M.D.   OB/GYN

## 2022-04-04 ENCOUNTER — LAB VISIT (OUTPATIENT)
Dept: PRIMARY CARE CLINIC | Facility: CLINIC | Age: 47
End: 2022-04-04
Payer: MEDICAID

## 2022-04-04 DIAGNOSIS — Z01.812 PRE-PROCEDURE LAB EXAM: ICD-10-CM

## 2022-04-04 LAB
SARS-COV-2 RNA RESP QL NAA+PROBE: NOT DETECTED
SARS-COV-2- CYCLE NUMBER: NORMAL

## 2022-04-04 PROCEDURE — U0003 INFECTIOUS AGENT DETECTION BY NUCLEIC ACID (DNA OR RNA); SEVERE ACUTE RESPIRATORY SYNDROME CORONAVIRUS 2 (SARS-COV-2) (CORONAVIRUS DISEASE [COVID-19]), AMPLIFIED PROBE TECHNIQUE, MAKING USE OF HIGH THROUGHPUT TECHNOLOGIES AS DESCRIBED BY CMS-2020-01-R: HCPCS | Performed by: FAMILY MEDICINE

## 2022-04-04 PROCEDURE — U0005 INFEC AGEN DETEC AMPLI PROBE: HCPCS | Performed by: FAMILY MEDICINE

## 2022-04-07 ENCOUNTER — ANESTHESIA (OUTPATIENT)
Dept: ENDOSCOPY | Facility: HOSPITAL | Age: 47
End: 2022-04-07
Payer: MEDICAID

## 2022-04-07 ENCOUNTER — HOSPITAL ENCOUNTER (OUTPATIENT)
Facility: HOSPITAL | Age: 47
Discharge: HOME | End: 2022-04-07
Attending: COLON & RECTAL SURGERY | Admitting: COLON & RECTAL SURGERY
Payer: MEDICAID

## 2022-04-07 ENCOUNTER — ANESTHESIA EVENT (OUTPATIENT)
Dept: ENDOSCOPY | Facility: HOSPITAL | Age: 47
End: 2022-04-07
Payer: MEDICAID

## 2022-04-07 VITALS
RESPIRATION RATE: 16 BRPM | HEART RATE: 72 BPM | SYSTOLIC BLOOD PRESSURE: 130 MMHG | WEIGHT: 165 LBS | BODY MASS INDEX: 26.52 KG/M2 | DIASTOLIC BLOOD PRESSURE: 62 MMHG | TEMPERATURE: 98 F | HEIGHT: 66 IN | OXYGEN SATURATION: 97 %

## 2022-04-07 DIAGNOSIS — Z12.11 SCREENING FOR MALIGNANT NEOPLASM OF COLON: Primary | ICD-10-CM

## 2022-04-07 LAB
B-HCG UR QL: NEGATIVE
CTP QC/QA: YES

## 2022-04-07 PROCEDURE — G0121 COLON CA SCRN NOT HI RSK IND: HCPCS | Performed by: COLON & RECTAL SURGERY

## 2022-04-07 PROCEDURE — 00812 ANES LWR INTST SCR COLSC: CPT | Performed by: COLON & RECTAL SURGERY

## 2022-04-07 PROCEDURE — E9220 PRA ENDO ANESTHESIA: HCPCS | Mod: ,,, | Performed by: NURSE ANESTHETIST, CERTIFIED REGISTERED

## 2022-04-07 PROCEDURE — 25000003 PHARM REV CODE 250: Performed by: NURSE ANESTHETIST, CERTIFIED REGISTERED

## 2022-04-07 PROCEDURE — 81025 URINE PREGNANCY TEST: CPT | Performed by: COLON & RECTAL SURGERY

## 2022-04-07 PROCEDURE — 63600175 PHARM REV CODE 636 W HCPCS: Performed by: NURSE ANESTHETIST, CERTIFIED REGISTERED

## 2022-04-07 PROCEDURE — 37000008 HC ANESTHESIA 1ST 15 MINUTES: Performed by: COLON & RECTAL SURGERY

## 2022-04-07 PROCEDURE — 45378 DIAGNOSTIC COLONOSCOPY: CPT | Mod: ,,, | Performed by: COLON & RECTAL SURGERY

## 2022-04-07 PROCEDURE — 37000009 HC ANESTHESIA EA ADD 15 MINS: Performed by: COLON & RECTAL SURGERY

## 2022-04-07 PROCEDURE — 45378 PR COLONOSCOPY,DIAGNOSTIC: ICD-10-PCS | Mod: ,,, | Performed by: COLON & RECTAL SURGERY

## 2022-04-07 PROCEDURE — E9220 PRA ENDO ANESTHESIA: ICD-10-PCS | Mod: ,,, | Performed by: NURSE ANESTHETIST, CERTIFIED REGISTERED

## 2022-04-07 PROCEDURE — 25000003 PHARM REV CODE 250: Performed by: COLON & RECTAL SURGERY

## 2022-04-07 RX ORDER — PROPOFOL 10 MG/ML
VIAL (ML) INTRAVENOUS
Status: DISCONTINUED | OUTPATIENT
Start: 2022-04-07 | End: 2022-04-07

## 2022-04-07 RX ORDER — PROPOFOL 10 MG/ML
VIAL (ML) INTRAVENOUS CONTINUOUS PRN
Status: DISCONTINUED | OUTPATIENT
Start: 2022-04-07 | End: 2022-04-07

## 2022-04-07 RX ORDER — SODIUM CHLORIDE 9 MG/ML
INJECTION, SOLUTION INTRAVENOUS CONTINUOUS
Status: DISCONTINUED | OUTPATIENT
Start: 2022-04-07 | End: 2022-04-07 | Stop reason: HOSPADM

## 2022-04-07 RX ORDER — LIDOCAINE HYDROCHLORIDE 20 MG/ML
INJECTION INTRAVENOUS
Status: DISCONTINUED | OUTPATIENT
Start: 2022-04-07 | End: 2022-04-07

## 2022-04-07 RX ADMIN — PROPOFOL 100 MG: 10 INJECTION, EMULSION INTRAVENOUS at 11:04

## 2022-04-07 RX ADMIN — SODIUM CHLORIDE: 0.9 INJECTION, SOLUTION INTRAVENOUS at 09:04

## 2022-04-07 RX ADMIN — Medication 150 MCG/KG/MIN: at 11:04

## 2022-04-07 RX ADMIN — LIDOCAINE HYDROCHLORIDE 50 MG: 20 INJECTION, SOLUTION INTRAVENOUS at 11:04

## 2022-04-07 RX ADMIN — PROPOFOL 30 MG: 10 INJECTION, EMULSION INTRAVENOUS at 11:04

## 2022-04-07 NOTE — TRANSFER OF CARE
"Anesthesia Transfer of Care Note    Patient: Velma Lopez    Procedure(s) Performed: Procedure(s) (LRB):  COLONOSCOPY (N/A)    Patient location: OPS    Anesthesia Type: general    Transport from OR: Transported from OR on room air with adequate spontaneous ventilation    Post pain: adequate analgesia    Post assessment: no apparent anesthetic complications and tolerated procedure well    Post vital signs: stable    Level of consciousness: sedated    Nausea/Vomiting: no nausea/vomiting    Complications: none    Transfer of care protocol was followed      Last vitals:   Visit Vitals  /67 (BP Location: Left arm, Patient Position: Lying)   Pulse 81   Temp 36.5 °C (97.7 °F) (Temporal)   Resp 17   Ht 5' 6" (1.676 m)   Wt 74.8 kg (165 lb)   LMP 03/11/2022 (Approximate)   SpO2 98%   Breastfeeding No   BMI 26.63 kg/m²     "

## 2022-04-07 NOTE — ANESTHESIA POSTPROCEDURE EVALUATION
Anesthesia Post Evaluation    Patient: Velma Lopez    Procedure(s) Performed: Procedure(s) (LRB):  COLONOSCOPY (N/A)    Final Anesthesia Type: general      Patient location during evaluation: PACU  Patient participation: Yes- Able to Participate  Level of consciousness: awake and alert  Post-procedure vital signs: reviewed and stable  Pain management: adequate  Airway patency: patent    PONV status at discharge: No PONV  Anesthetic complications: no      Cardiovascular status: blood pressure returned to baseline  Respiratory status: unassisted and spontaneous ventilation  Hydration status: euvolemic  Follow-up not needed.          Vitals Value Taken Time   /62 04/07/22 1227   Temp 36.5 °C (97.7 °F) 04/07/22 1201   Pulse 72 04/07/22 1227   Resp 16 04/07/22 1227   SpO2 97 % 04/07/22 1227         Event Time   Out of Recovery 12:29:51         Pain/Anabell Score: Anabell Score: 10 (4/7/2022 12:18 PM)

## 2022-04-07 NOTE — ANESTHESIA PREPROCEDURE EVALUATION
04/07/2022  Velma Lopez is a 47 y.o., female.    Past Medical History:   Diagnosis Date    Chronic back pain     Diabetes type 2, uncontrolled     Mild nonproliferative diabetic retinopathy of left eye without macular edema associated with type 2 diabetes mellitus 10/21/2019    Morbid obesity with BMI of 40.0-44.9, adult     MILEY on CPAP     Plantar fasciitis of left foot     Urticaria        Past Surgical History:   Procedure Laterality Date    ARTHROSCOPIC ACROMIOPLASTY OF SHOULDER Left 7/27/2020    Procedure: ACROMIOPLASTY, ARTHROSCOPIC;  Surgeon: Marcela Garza MD;  Location: [facility] OR;  Service: Orthopedics;  Laterality: Left;    ARTHROSCOPIC REPAIR OF ROTATOR CUFF OF SHOULDER Right 7/27/2020    Procedure: REPAIR, ROTATOR CUFF, ARTHROSCOPIC;  Surgeon: Marcela Garza MD;  Location: [facility] OR;  Service: Orthopedics;  Laterality: Right;  GENERAL/REGIONAL    CYST REMOVAL      ovarian cyst removal at age 12    DECOMPRESSION OF SUBACROMIAL SPACE Left 7/27/2020    Procedure: DECOMPRESSION, SUBACROMIAL SPACE;  Surgeon: Marcela Garza MD;  Location: [facility] OR;  Service: Orthopedics;  Laterality: Left;    DILATION AND CURETTAGE OF UTERUS      ESOPHAGEAL MOTILITY STUDY USING HIGH RESOLUTION MANOMETRY N/A 9/17/2019    Procedure: MOTILITY STUDY, ESOPHAGUS, USING HIGH RESOLUTION MANOMETRY;  Surgeon: Leopoldo Swanson MD;  Location: [facility] ([address] [place] [address]);  Service: Endoscopy;  Laterality: N/A;  Pt will be taking xanax before procedure to hopefull help with anxiety.    ESOPHAGOGASTRODUODENOSCOPY N/A 6/14/2019    Procedure: EGD (ESOPHAGOGASTRODUODENOSCOPY);  Surgeon: Ced Guevara MD;  Location: [facility] ([address] [place] [address]);  Service: Endoscopy;  Laterality: N/A;  Please make sure it is scheduled after her cardiology appt.- see cardiology note dated 6/13/19 for clearance - ERW    GASTRIC BYPASS  N/A 05/20/2021    INJECTION OF STEROID Left 7/27/2020    Procedure: INJECTION, STEROID LEFT THUMB TRIGGER FINGER;  Surgeon: Marcela Garza MD;  Location: [facility] OR;  Service: Orthopedics;  Laterality: Left;  LEFT THUMB, TRIGGER FINGER    TRIGGER FINGER RELEASE Right 10/4/2019    Procedure: RELEASE, TRIGGER FINGER - right thumb;  Surgeon: Craig Tom MD;  Location: [facility] OR;  Service: Orthopedics;  Laterality: Right;    UPPER GASTROINTESTINAL ENDOSCOPY      WISDOM TOOTH EXTRACTION             Pre-op Assessment    I have reviewed the Patient Summary Reports.     I have reviewed the Nursing Notes.    I have reviewed the Medications.     Review of Systems  Anesthesia Hx:  No problems with previous Anesthesia  Neg history of prior surgery. Denies Family Hx of Anesthesia complications.   Denies Personal Hx of Anesthesia complications.   Social:  Former Smoker, Social Alcohol Use    Hematology/Oncology:  Hematology Normal   Oncology Normal     EENT/Dental:EENT/Dental Normal   Cardiovascular:   Exercise tolerance: good JONES    Pulmonary:   Shortness of breath Sleep Apnea Hx of MILEY - resolved with weight loss   Renal/:  Renal/ Normal     Hepatic/GI:  Hepatic/GI Normal    Musculoskeletal:  Musculoskeletal Normal    Neurological:  Neurology Normal    Endocrine:   Diabetes, well controlled Hx of DM - no longer treated with medication, weight and diet controlled   Dermatological:  Skin Normal    Psych:  Psychiatric Normal           Physical Exam  General: Well nourished, Cooperative, Alert and Oriented    Airway:  Mallampati: II / II  Mouth Opening: Normal  TM Distance: 4 - 6 cm  Tongue: Normal  Neck ROM: Normal ROM    Dental:  Intact    Chest/Lungs:  Clear to auscultation, Normal Respiratory Rate    Heart:  Rate: Normal  Rhythm: Regular Rhythm        Anesthesia Plan  Type of Anesthesia, risks & benefits discussed:    Anesthesia Type: Gen Natural Airway  Intra-op Monitoring Plan: Standard ASA Monitors  Post Op  Pain Control Plan: multimodal analgesia  Induction:  IV  Informed Consent: Informed consent signed with the Patient and all parties understand the risks and agree with anesthesia plan.  All questions answered. Patient consented to blood products? No  ASA Score: 2  Day of Surgery Review of History & Physical: H&P Update referred to the surgeon/provider.    Ready For Surgery From Anesthesia Perspective.     .

## 2022-04-07 NOTE — H&P
"COLONOSCOPY HISTORY AND PHYSICAL EXAM    Procedure : Colonoscopy      INDICATIONS: asymptomatic screening exam    Family Hx of CRC: None    Last Colonoscopy:  None  Findings: n/a       Past Medical History:   Diagnosis Date    Chronic back pain     Diabetes type 2, uncontrolled     Mild nonproliferative diabetic retinopathy of left eye without macular edema associated with type 2 diabetes mellitus 10/21/2019    Morbid obesity with BMI of 40.0-44.9, adult     MILEY on CPAP     Plantar fasciitis of left foot     Urticaria      Sedation Problems: NO  Family History   Problem Relation Age of Onset    Cancer Maternal Grandmother     Diabetes Mother     No Known Problems Sister     No Known Problems Sister     No Known Problems Sister     Breast cancer Maternal Aunt 70    Colon cancer Neg Hx     Stomach cancer Neg Hx     Inflammatory bowel disease Neg Hx     Esophageal cancer Neg Hx      Fam Hx of Sedation Problems: NO  Social History     Socioeconomic History    Marital status: Single   Tobacco Use    Smoking status: Former Smoker     Packs/day: 0.50     Types: Cigarettes    Smokeless tobacco: Never Used   Substance and Sexual Activity    Alcohol use: Yes     Comment: social    Drug use: No    Sexual activity: Yes     Partners: Male     Birth control/protection: None   Social History Narrative    Single.  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        Review of Systems - Negative except   Respiratory ROS: no dyspnea  Cardiovascular ROS: no exertional chest pain  Gastrointestinal ROS: NO abdominal discomfort,  NO rectal bleeding  Musculoskeletal ROS: no muscular pain  Neurological ROS: no recent stroke    Physical Exam:  /68 (BP Location: Right arm, Patient Position: Lying)   Pulse (!) 36   Temp 97.5 °F (36.4 °C) (Temporal)   Resp 16   Ht 5' 6" (1.676 m)   Wt 74.8 kg (165 lb)   LMP 03/11/2022 (Approximate)   SpO2 97%   Breastfeeding No   BMI 26.63 kg/m²   General: no " distress  Head: normocephalic  Mallampati Score   Neck: supple, symmetrical, trachea midline  Lungs:  clear to auscultation bilaterally and normal respiratory effort  Heart: regular rate and rhythm and no murmur  Abdomen: soft, non-tender non-distented; bowel sounds normal; no masses,  no organomegaly  Extremities: no cyanosis or edema, or clubbing    ASA:  II    PLAN  COLONOSCOPY.    SedationPlan :MAC    The details of the procedure, the possible need for biopsy or polypectomy and the potential risks including bleeding, perforation, missed polyps were discussed in detail.

## 2022-04-07 NOTE — PROVATION PATIENT INSTRUCTIONS
Discharge Summary/Instructions after an Endoscopic Procedure  Patient Name: Velma Lopez  Patient MRN: [id number]  Patient YOB: 1975  Thursday, April 7, 2022  Yadi Wong MD  Dear patient,  As a result of recent federal legislation (The Federal Cures Act), you may   receive lab or pathology results from your procedure in your [facility]   account before your physician is able to contact you. Your physician or   their representative will relay the results to you with their   recommendations at their soonest availability.  Thank you,  RESTRICTIONS:  During your procedure today, you received medications for sedation.  These   medications may affect your judgment, balance and coordination.  Therefore,   for 24 hours, you have the following restrictions:   - DO NOT drive a car, operate machinery, make legal/financial decisions,   sign important papers or drink alcohol.    ACTIVITY:  Today: no heavy lifting, straining or running due to procedural   sedation/anesthesia.  The following day: return to full activity including work.  DIET:  Eat and drink normally unless instructed otherwise.     TREATMENT FOR COMMON SIDE EFFECTS:  - Mild abdominal pain, nausea, belching, bloating or excessive gas:  rest,   eat lightly and use a heating pad.  - Sore Throat: treat with throat lozenges and/or gargle with warm salt   water.  - Because air was used during the procedure, expelling large amounts of air   from your rectum or belching is normal.  - If a bowel prep was taken, you may not have a bowel movement for 1-3 days.    This is normal.  SYMPTOMS TO WATCH FOR AND REPORT TO YOUR PHYSICIAN:  1. Abdominal pain or bloating, other than gas cramps.  2. Chest pain.  3. Back pain.  4. Signs of infection such as: chills or fever occurring within 24 hours   after the procedure.  5. Rectal bleeding, which would show as bright red, maroon, or black stools.   (A tablespoon of blood from the rectum is not serious, especially  if   hemorrhoids are present.)  6. Vomiting.  7. Weakness or dizziness.  GO DIRECTLY TO THE NEAREST EMERGENCY ROOM IF YOU HAVE ANY OF THE FOLLOWING:      Difficulty breathing              Chills and/or fever over 101 F   Persistent vomiting and/or vomiting blood   Severe abdominal pain   Severe chest pain   Black, tarry stools   Bleeding- more than one tablespoon   Any other symptom or condition that you feel may need urgent attention  Your doctor recommends these additional instructions:  If any biopsies were taken, your doctors clinic will contact you in 1 to 2   weeks with any results.  - Discharge patient to home.   - Resume previous diet.   - Continue present medications.   - Repeat colonoscopy in 10 years for screening purposes.   - Return to primary care physician.   - Written discharge instructions were provided to the patient.   - The signs and symptoms of potential delayed complications were discussed   with the patient.   - Patient has a contact number available for emergencies.   - Return to normal activities tomorrow.  For questions, problems or results please call your physician - Yadi Wong MD at Work:  [phone].  [facility], EMERGENCY ROOM PHONE NUMBER: [phone]  IF A COMPLICATION OR EMERGENCY SITUATION ARISES AND YOU ARE UNABLE TO REACH   YOUR PHYSICIAN - GO DIRECTLY TO THE EMERGENCY ROOM.  Yadi Wong MD  4/7/2022 11:57:16 AM  This report has been verified and signed electronically.  Dear patient,  As a result of recent federal legislation (The Federal Cures Act), you may   receive lab or pathology results from your procedure in your [facility]   account before your physician is able to contact you. Your physician or   their representative will relay the results to you with their   recommendations at their soonest availability.  Thank you,  PROVATION

## 2022-05-10 ENCOUNTER — PATIENT MESSAGE (OUTPATIENT)
Dept: OBSTETRICS AND GYNECOLOGY | Facility: CLINIC | Age: 47
End: 2022-05-10
Payer: MEDICAID

## 2022-05-11 ENCOUNTER — PATIENT MESSAGE (OUTPATIENT)
Dept: OBSTETRICS AND GYNECOLOGY | Facility: CLINIC | Age: 47
End: 2022-05-11
Payer: MEDICAID

## 2022-05-12 ENCOUNTER — PATIENT MESSAGE (OUTPATIENT)
Dept: OBSTETRICS AND GYNECOLOGY | Facility: CLINIC | Age: 47
End: 2022-05-12
Payer: MEDICAID

## 2022-05-18 ENCOUNTER — OFFICE VISIT (OUTPATIENT)
Dept: OBSTETRICS AND GYNECOLOGY | Facility: CLINIC | Age: 47
End: 2022-05-18
Payer: MEDICAID

## 2022-05-18 VITALS
DIASTOLIC BLOOD PRESSURE: 70 MMHG | WEIGHT: 168.19 LBS | SYSTOLIC BLOOD PRESSURE: 132 MMHG | BODY MASS INDEX: 27.15 KG/M2

## 2022-05-18 DIAGNOSIS — N93.9 ABNORMAL UTERINE BLEEDING (AUB): Primary | ICD-10-CM

## 2022-05-18 DIAGNOSIS — D25.1 FIBROIDS, INTRAMURAL: ICD-10-CM

## 2022-05-18 PROCEDURE — 1159F MED LIST DOCD IN RCRD: CPT | Mod: CPTII,,, | Performed by: OBSTETRICS & GYNECOLOGY

## 2022-05-18 PROCEDURE — 99999 PR PBB SHADOW E&M-EST. PATIENT-LVL III: ICD-10-PCS | Mod: PBBFAC,,, | Performed by: OBSTETRICS & GYNECOLOGY

## 2022-05-18 PROCEDURE — 3008F PR BODY MASS INDEX (BMI) DOCUMENTED: ICD-10-PCS | Mod: CPTII,,, | Performed by: OBSTETRICS & GYNECOLOGY

## 2022-05-18 PROCEDURE — 3078F PR MOST RECENT DIASTOLIC BLOOD PRESSURE < 80 MM HG: ICD-10-PCS | Mod: CPTII,,, | Performed by: OBSTETRICS & GYNECOLOGY

## 2022-05-18 PROCEDURE — 1160F PR REVIEW ALL MEDS BY PRESCRIBER/CLIN PHARMACIST DOCUMENTED: ICD-10-PCS | Mod: CPTII,,, | Performed by: OBSTETRICS & GYNECOLOGY

## 2022-05-18 PROCEDURE — 3008F BODY MASS INDEX DOCD: CPT | Mod: CPTII,,, | Performed by: OBSTETRICS & GYNECOLOGY

## 2022-05-18 PROCEDURE — 99214 PR OFFICE/OUTPT VISIT, EST, LEVL IV, 30-39 MIN: ICD-10-PCS | Mod: S$PBB,,, | Performed by: OBSTETRICS & GYNECOLOGY

## 2022-05-18 PROCEDURE — 99213 OFFICE O/P EST LOW 20 MIN: CPT | Mod: PBBFAC,PO | Performed by: OBSTETRICS & GYNECOLOGY

## 2022-05-18 PROCEDURE — 3078F DIAST BP <80 MM HG: CPT | Mod: CPTII,,, | Performed by: OBSTETRICS & GYNECOLOGY

## 2022-05-18 PROCEDURE — 99214 OFFICE O/P EST MOD 30 MIN: CPT | Mod: S$PBB,,, | Performed by: OBSTETRICS & GYNECOLOGY

## 2022-05-18 PROCEDURE — 1160F RVW MEDS BY RX/DR IN RCRD: CPT | Mod: CPTII,,, | Performed by: OBSTETRICS & GYNECOLOGY

## 2022-05-18 PROCEDURE — 99999 PR PBB SHADOW E&M-EST. PATIENT-LVL III: CPT | Mod: PBBFAC,,, | Performed by: OBSTETRICS & GYNECOLOGY

## 2022-05-18 PROCEDURE — 3075F PR MOST RECENT SYSTOLIC BLOOD PRESS GE 130-139MM HG: ICD-10-PCS | Mod: CPTII,,, | Performed by: OBSTETRICS & GYNECOLOGY

## 2022-05-18 PROCEDURE — 1159F PR MEDICATION LIST DOCUMENTED IN MEDICAL RECORD: ICD-10-PCS | Mod: CPTII,,, | Performed by: OBSTETRICS & GYNECOLOGY

## 2022-05-18 PROCEDURE — 3075F SYST BP GE 130 - 139MM HG: CPT | Mod: CPTII,,, | Performed by: OBSTETRICS & GYNECOLOGY

## 2022-05-18 RX ORDER — MEDROXYPROGESTERONE ACETATE 10 MG/1
10 TABLET ORAL DAILY
Qty: 30 TABLET | Refills: 3 | Status: SHIPPED | OUTPATIENT
Start: 2022-05-18 | End: 2022-06-16

## 2022-05-18 RX ORDER — POLYETHYLENE GLYCOL 3350, SODIUM SULFATE ANHYDROUS, SODIUM BICARBONATE, SODIUM CHLORIDE, POTASSIUM CHLORIDE 236; 22.74; 6.74; 5.86; 2.97 G/4L; G/4L; G/4L; G/4L; G/4L
POWDER, FOR SOLUTION ORAL
COMMUNITY
Start: 2022-03-02 | End: 2022-06-21 | Stop reason: ALTCHOICE

## 2022-05-18 NOTE — PROGRESS NOTES
GYNECOLOGY  :  Velma Lopez is a 47 y.o.    Here today for irregular menstrual bleeding with cramping since 2022  Spots 2-3 times a month , no clots   Not bleeding today seen before for asymptomatic small uterine fibroids         TVUS 3/23/22   Normal sized uterus, 2 small fibroids  1.5 cms and 1.2 cms   Normal endometrial thickness   Normal adnexa     Normal PAP with positive HPV  (non 16 or 18)     Seen before for annual visit :  No gyn  complaints  Normal menstrual cycles   No Hx Abnormal PAP smears  No Hx Abnormal Mammogram   Hx gastric bypass, lost + 100lb   Not on metformin anymore for  DM       Past Medical History:   Diagnosis Date    Chronic back pain     Diabetes type 2, uncontrolled     Mild nonproliferative diabetic retinopathy of left eye without macular edema associated with type 2 diabetes mellitus 10/21/2019    Morbid obesity with BMI of 40.0-44.9, adult     MILEY on CPAP     Plantar fasciitis of left foot     Urticaria      Past Surgical History:   Procedure Laterality Date    ARTHROSCOPIC ACROMIOPLASTY OF SHOULDER Left 2020    Procedure: ACROMIOPLASTY, ARTHROSCOPIC;  Surgeon: Marcela Garza MD;  Location: [facility];  Service: Orthopedics;  Laterality: Left;    ARTHROSCOPIC REPAIR OF ROTATOR CUFF OF SHOULDER Right 2020    Procedure: REPAIR, ROTATOR CUFF, ARTHROSCOPIC;  Surgeon: Marcela Garza MD;  Location: [facility] OR;  Service: Orthopedics;  Laterality: Right;  GENERAL/REGIONAL    COLONOSCOPY N/A 2022    Procedure: COLONOSCOPY;  Surgeon: Yadi Wong MD;  Location: [address];  Service: Endoscopy;  Laterality: N/A;  Covid test at [place] on .EC    CYST REMOVAL      ovarian cyst removal at age 12    DECOMPRESSION OF SUBACROMIAL SPACE Left 2020    Procedure: DECOMPRESSION, SUBACROMIAL SPACE;  Surgeon: Marcela Garza MD;  Location: [facility];  Service: Orthopedics;  Laterality: Left;    DILATION AND CURETTAGE OF  UTERUS      ESOPHAGEAL MOTILITY STUDY USING HIGH RESOLUTION MANOMETRY N/A 2019    Procedure: MOTILITY STUDY, ESOPHAGUS, USING HIGH RESOLUTION MANOMETRY;  Surgeon: Leopoldo Swanson MD;  Location: [facility] (4TH FLR);  Service: Endoscopy;  Laterality: N/A;  Pt will be taking xanax before procedure to hopefull help with anxiety.    ESOPHAGOGASTRODUODENOSCOPY N/A 2019    Procedure: EGD (ESOPHAGOGASTRODUODENOSCOPY);  Surgeon: Ced Guevara MD;  Location: [facility] (4TH FLR);  Service: Endoscopy;  Laterality: N/A;  Please make sure it is scheduled after her cardiology appt.- see cardiology note dated 19 for clearance - ERW    GASTRIC BYPASS N/A 2021    INJECTION OF STEROID Left 2020    Procedure: INJECTION, STEROID LEFT THUMB TRIGGER FINGER;  Surgeon: Marcela Garza MD;  Location: [facility] OR;  Service: Orthopedics;  Laterality: Left;  LEFT THUMB, TRIGGER FINGER    TRIGGER FINGER RELEASE Right 10/4/2019    Procedure: RELEASE, TRIGGER FINGER - right thumb;  Surgeon: Craig Tom MD;  Location: [facility] OR;  Service: Orthopedics;  Laterality: Right;    UPPER GASTROINTESTINAL ENDOSCOPY      WISDOM TOOTH EXTRACTION       Family History   Problem Relation Age of Onset    Cancer Maternal Grandmother     Diabetes Mother     No Known Problems Sister     No Known Problems Sister     No Known Problems Sister     Breast cancer Maternal Aunt 70    Colon cancer Neg Hx     Stomach cancer Neg Hx     Inflammatory bowel disease Neg Hx     Esophageal cancer Neg Hx      Social History     Tobacco Use    Smoking status: Former Smoker     Packs/day: 0.50     Types: Cigarettes    Smokeless tobacco: Never Used   Substance Use Topics    Alcohol use: Yes     Comment: social    Drug use: No     OB History    Para Term  AB Living   5 1 1   4 1   SAB IAB Ectopic Multiple Live Births   4       1      # Outcome Date GA Lbr Chucho/2nd Weight Sex Delivery Anes PTL Lv   5 Term  40w0d   M Vag-Spont    LINDSAY   4 SAB            3 SAB            2 SAB            1 SAB                /70   Wt 76.3 kg (168 lb 3.4 oz)   LMP 04/11/2022 (Approximate)   BMI 27.15 kg/m²     Last PAP= +2y  LMP = January 22  Last Mammogram = 1/22/  Last Colonoscopy  = -      COMPREHENSIVE GYN HISTORY:  G 4 P 1     PAP History: Denies abnormal Paps.  Infection History: Denies STDs. Denies PID.  Benign History: Denies uterine fibroids. Denies ovarian cysts. Denies endometriosis.   Cancer History: Denies cervical cancer. Denies uterine cancer or hyperplasia. Denies ovarian cancer. Denies vulvar cancer or pre-cancer. Denies vaginal cancer or pre-cancer. Denies breast cancer. Denies colon cancer.  Sexual Activity History: ( x ) Yes   ( - )   no   Menstrual History: Age of menarche: (  12 )  years. Every (  30-90 )       days, flows for (  2 )   days. moderate  flow.  Dysmenorrhea History:  absent  Contraception:  Condoms     ROS  GENERAL: Denies significant weight gain or weight loss. Feeling well overall.  SKIN: Denies rash or lesions.  Normal skin turgor  HEAD: Denies head injury or headache.   NODES: Denies enlarged lymph nodes.   CHEST: Denies chest pain or shortness of breath.   CARDIOVASCULAR: Denies palpitations or left sided chest pain.   ABDOMEN: No abdominal pain,no  diarrhea,constipation  nausea, vomiting or rectal bleeding.   URINARY: normal  Frequency,no  Dysuria or burning on urination.   REPRODUCTIVE: Per HPI   BREASTS: The patient sometimes performs breast self-examination and denies pain, lumps, or nipple discharge.   HEMATOLOGIC: No easy bruisability or excessive bleeding.   MUSCULOSKELETAL: Denies joint pain or swelling.   NEUROLOGIC: Denies syncope or weakness.   PSYCHIATRIC: Denies depression, anxiety or mood swings.    Physical Exam     Constitutional: She is oriented to person, place, and time. She appears well-developed and well-nourished. No distress.   HENT:   Head: Normocephalic.   NECK: Neck symmetric without  masses or thyromegaly.  Cardiovascular: Normal rate and regular rhythm.   Pulmonary/Chest: Effort normal and breath sounds normal. No respiratory distress. She has no wheezes.   Breasts: Symmetrical, no skin changes or visible lesions. No palpable masses, nipple discharge or adenopathy bilaterally.  Abdominal: Soft not distended. Bowel sounds are normal. She exhibits   no masses . No tenderness to palpation.   Genitourinary: Pelvic exam was performed with patient supine.   External genitalia normal no lesions.Normal hair distribution   Adequate perineal body,normal urethral meatus   Vagina moist and well rugated without lesions, no vaginal  Discharge.   Cervix pink and without lesions. No bleeding. No significant cystocele or rectocele.  Bimanual exam showed uterus normal size, shape and position , mobile and nontender. Adnexa without masses or tenderness. Urethra and bladder normal  Extremities normal no cyanosis ,edema.         A/P Velma Lopez 47 y.o.     Dx=  1-Abnormal uterine bleeding   2-Small asymptomatic fibroids   2-    Procedures/Orders:     Patient was counseled on management options   including observation   (expectant management) , medical treatment  and surgical alternatives (hysteroecopy /D/C , with/without  endometrial ablation   )  were discussed as well .   Patient opts to start medical treatment for now , since has worked in the past   Rx for 10 mgs medroxyprogesterone daily  Sent to Pharmacy  X 3 months     Patient was counseled today on A.C.S. Pap guidelines and recommendations for yearly pelvic exams, mammograms and monthly self breast exams; to see her PCP for other health maintenance, nutrition and weight gain counseling, discussed lab values.  Discussed colonoscopy recommendations every 10 years starting at age 50   Calcium and vit D daily intake     F/u in 3-6 months to reassess  treatment plan        I spent a total of 30 minutes on the day of the visit.This includes face  to face time and non-face to face time preparing to see the patient (eg, review of tests), Obtaining and/or reviewing separately obtained history, Documenting clinical information in the electronic or other health record, Independently interpreting resultsand communicating results to the patient/family/caregiver, or Care coordination.      Boone Sewell M.D.   OB/GYN

## 2022-06-09 ENCOUNTER — PATIENT MESSAGE (OUTPATIENT)
Dept: INTERNAL MEDICINE | Facility: CLINIC | Age: 47
End: 2022-06-09
Payer: MEDICAID

## 2022-06-09 DIAGNOSIS — R35.0 URINARY FREQUENCY: Primary | ICD-10-CM

## 2022-06-09 DIAGNOSIS — R20.2 PARESTHESIA: ICD-10-CM

## 2022-06-14 ENCOUNTER — PATIENT MESSAGE (OUTPATIENT)
Dept: OBSTETRICS AND GYNECOLOGY | Facility: CLINIC | Age: 47
End: 2022-06-14
Payer: MEDICAID

## 2022-06-16 ENCOUNTER — PATIENT MESSAGE (OUTPATIENT)
Dept: OBSTETRICS AND GYNECOLOGY | Facility: CLINIC | Age: 47
End: 2022-06-16
Payer: MEDICAID

## 2022-06-16 NOTE — TELEPHONE ENCOUNTER
Pt's is complaining of taking the medroxyprogesterone daily for about 3 months to stop bleeding. It worked for a couple of weeks, but now its been heavy for 8 days. Is it possible for her to get back on the depo provero birth control shot?  She used that shot for over 20 years off and on. Do you have any other suggestions? Can you send in a rx for the depo? SHe can give herself the shot. [facility] in [place] is my pharmacy.

## 2022-06-21 ENCOUNTER — OFFICE VISIT (OUTPATIENT)
Dept: UROLOGY | Facility: CLINIC | Age: 47
End: 2022-06-21
Payer: MEDICAID

## 2022-06-21 VITALS
HEIGHT: 66 IN | WEIGHT: 160.94 LBS | SYSTOLIC BLOOD PRESSURE: 124 MMHG | HEART RATE: 71 BPM | DIASTOLIC BLOOD PRESSURE: 82 MMHG | BODY MASS INDEX: 25.86 KG/M2

## 2022-06-21 DIAGNOSIS — R35.0 URINARY FREQUENCY: ICD-10-CM

## 2022-06-21 LAB
BILIRUB SERPL-MCNC: NORMAL MG/DL
BLOOD URINE, POC: 250
CLARITY, POC UA: NORMAL
COLOR, POC UA: NORMAL
GLUCOSE UR QL STRIP: NORMAL
KETONES UR QL STRIP: NORMAL
LEUKOCYTE ESTERASE URINE, POC: NORMAL
NITRITE, POC UA: NORMAL
PH, POC UA: 5
PROTEIN, POC: NORMAL
SPECIFIC GRAVITY, POC UA: NORMAL
UROBILINOGEN, POC UA: NORMAL

## 2022-06-21 PROCEDURE — 87077 CULTURE AEROBIC IDENTIFY: CPT | Performed by: NURSE PRACTITIONER

## 2022-06-21 PROCEDURE — 81002 URINALYSIS NONAUTO W/O SCOPE: CPT | Mod: PBBFAC,PN | Performed by: NURSE PRACTITIONER

## 2022-06-21 PROCEDURE — 1159F PR MEDICATION LIST DOCUMENTED IN MEDICAL RECORD: ICD-10-PCS | Mod: CPTII,,, | Performed by: NURSE PRACTITIONER

## 2022-06-21 PROCEDURE — 99203 OFFICE O/P NEW LOW 30 MIN: CPT | Mod: S$PBB,,, | Performed by: NURSE PRACTITIONER

## 2022-06-21 PROCEDURE — 3074F PR MOST RECENT SYSTOLIC BLOOD PRESSURE < 130 MM HG: ICD-10-PCS | Mod: CPTII,,, | Performed by: NURSE PRACTITIONER

## 2022-06-21 PROCEDURE — 3079F DIAST BP 80-89 MM HG: CPT | Mod: CPTII,,, | Performed by: NURSE PRACTITIONER

## 2022-06-21 PROCEDURE — 1160F PR REVIEW ALL MEDS BY PRESCRIBER/CLIN PHARMACIST DOCUMENTED: ICD-10-PCS | Mod: CPTII,,, | Performed by: NURSE PRACTITIONER

## 2022-06-21 PROCEDURE — 87186 SC STD MICRODIL/AGAR DIL: CPT | Performed by: NURSE PRACTITIONER

## 2022-06-21 PROCEDURE — 87088 URINE BACTERIA CULTURE: CPT | Performed by: NURSE PRACTITIONER

## 2022-06-21 PROCEDURE — 99999 PR PBB SHADOW E&M-EST. PATIENT-LVL V: CPT | Mod: PBBFAC,,, | Performed by: NURSE PRACTITIONER

## 2022-06-21 PROCEDURE — 3008F BODY MASS INDEX DOCD: CPT | Mod: CPTII,,, | Performed by: NURSE PRACTITIONER

## 2022-06-21 PROCEDURE — 99215 OFFICE O/P EST HI 40 MIN: CPT | Mod: PBBFAC,PN | Performed by: NURSE PRACTITIONER

## 2022-06-21 PROCEDURE — 1159F MED LIST DOCD IN RCRD: CPT | Mod: CPTII,,, | Performed by: NURSE PRACTITIONER

## 2022-06-21 PROCEDURE — 87086 URINE CULTURE/COLONY COUNT: CPT | Performed by: NURSE PRACTITIONER

## 2022-06-21 PROCEDURE — 3079F PR MOST RECENT DIASTOLIC BLOOD PRESSURE 80-89 MM HG: ICD-10-PCS | Mod: CPTII,,, | Performed by: NURSE PRACTITIONER

## 2022-06-21 PROCEDURE — 1160F RVW MEDS BY RX/DR IN RCRD: CPT | Mod: CPTII,,, | Performed by: NURSE PRACTITIONER

## 2022-06-21 PROCEDURE — 99999 PR PBB SHADOW E&M-EST. PATIENT-LVL V: ICD-10-PCS | Mod: PBBFAC,,, | Performed by: NURSE PRACTITIONER

## 2022-06-21 PROCEDURE — 3008F PR BODY MASS INDEX (BMI) DOCUMENTED: ICD-10-PCS | Mod: CPTII,,, | Performed by: NURSE PRACTITIONER

## 2022-06-21 PROCEDURE — 99203 PR OFFICE/OUTPT VISIT, NEW, LEVL III, 30-44 MIN: ICD-10-PCS | Mod: S$PBB,,, | Performed by: NURSE PRACTITIONER

## 2022-06-21 PROCEDURE — 3074F SYST BP LT 130 MM HG: CPT | Mod: CPTII,,, | Performed by: NURSE PRACTITIONER

## 2022-06-21 RX ORDER — HYOSCYAMINE SULFATE 0.125 MG
125 TABLET ORAL EVERY 4 HOURS PRN
Qty: 30 TABLET | Refills: 2 | Status: SHIPPED | OUTPATIENT
Start: 2022-06-21 | End: 2022-08-25

## 2022-06-21 NOTE — PROGRESS NOTES
"Subjective:      Velma Lopez is a 47 y.o. female who presents for evaluation of urinary frequency.      She reports bothersome urinary frequency and double voiding that has been present for about 2 months.  She denies dysuria, hematuria; although she is currently been on her cycle for over 1 month.  She denies previous  surgeries.  She denies previous renal stone history. Drinks mostly water. Denies flank pain. Denies f/c/n/v.     The following portions of the patient's history were reviewed and updated as appropriate: allergies, current medications, past family history, past medical history, past social history, past surgical history and problem list.    Review of Systems  Constitutional: no fever or chills  ENT: no nasal congestion or sore throat  Respiratory: no cough or shortness of breath  Cardiovascular: no chest pain or palpitations  Gastrointestinal: no nausea or vomiting, tolerating diet  Genitourinary: as per HPI  Hematologic/Lymphatic: no easy bruising or lymphadenopathy  Musculoskeletal: no arthralgias or myalgias  Neurological: no seizures or tremors  Behavioral/Psych: no auditory or visual hallucinations     Objective:   Vital Signs:/82   Pulse 71   Ht 5' 6" (1.676 m)   Wt 73 kg (160 lb 15 oz)   LMP 06/08/2022 (Exact Date)   BMI 25.98 kg/m²     Physical Exam   General: alert and oriented, no acute distress  Head: normocephalic, atraumatic  Neck: supple, no lymphadenopathy, normal ROM, no masses  Respiratory: Symmetric expansion, non-labored breathing  Cardiovascular: regular rate and rhythm, nomal pulses, no peripheral edema  Abdomen: soft, non tender, non distended  Skin: normal coloration and turgor, no rashes, no suspicious skin lesions noted  Neuro: alert and oriented x3, no gross deficits  Psych: normal judgment and insight, normal mood/affect and non-anxious    Lab Review   Urinalysis demonstrates positive for leukocytes, red blood cells, protein (on menstrual cycle)  Lab " Results   Component Value Date    WBC 8.61 03/21/2022    HGB 13.2 03/21/2022    HCT 40.8 03/21/2022     (H) 03/21/2022     03/21/2022     Lab Results   Component Value Date    CREATININE 0.7 03/21/2022    BUN 8 03/21/2022       Imaging   US PELVIS COMP WITH TRANSVAG NON-OB (XPD)    FINDINGS:  Uterus:  Size: 7.0 x 4.6 x 6.1 cm and retroverted.  Masses: Two measured fibroids, at the fundal posterior aspect measuring 1.5 x 1.0 x 1.2 cm and at the left fundal aspect measuring 1.4 x 1.2 x 1.5 cm  Endometrium: Normal thickness in this pre menopausal patient, measuring 3 mm.  Some associated vascularity noted; however, nonspecific as patient is currently on menstrual cycle.     Right ovary:  Size: 2.3 x 1.8 x 1.9 cm  Appearance: Normal.  Vascular flow: Present.  Left ovary:  Size: 3.3 x 1.8 x 1.7 cm  Appearance: Anechoic area suggesting cyst measuring up to 1.9 cm.  Vascular Flow: Present  Free Fluid:  None.  Impression:     Fibroid uterus.     Some nonspecific vascularity at the endometrial stripe with otherwise normal thickness in this patient who is currently on menstrual cycle. Vaginal tampon, partially visualized.        Electronically signed by: Armando Cazares  Date:                                            03/23/2022  Time:                                           17:06      Assessment and Plan:   1. Urinary frequency  --Discussed treatment options for urinary frequency including anticholinergics, myrbetriq, botox, and interstim  --Discussed common bladder irritants such as caffeine, alcohol, citrus, and acidic and spicy foods. Encouraged to minimize in diet to reduce symptoms.  --Instructed to complete voiding diary before next visit.  - Urine culture; pt would like to wait for results before treatment   --Trial of levsin prn   - hyoscyamine (ANASPAZ,LEVSIN) 0.125 mg Tab; Take 1 tablet (125 mcg total) by mouth every 4 (four) hours as needed (bladder spasms).  Dispense: 30 tablet; Refill: 2      --RTC in 2 weeks for med assessment     This note is dictated on M*Modal word recognition program.  There are word recognition mistakes that are occasionally missed on review.

## 2022-06-23 DIAGNOSIS — R35.0 URINARY FREQUENCY: Primary | ICD-10-CM

## 2022-06-23 LAB — BACTERIA UR CULT: ABNORMAL

## 2022-06-23 RX ORDER — NITROFURANTOIN 25; 75 MG/1; MG/1
100 CAPSULE ORAL 2 TIMES DAILY
Qty: 14 CAPSULE | Refills: 0 | Status: SHIPPED | OUTPATIENT
Start: 2022-06-23 | End: 2022-06-30

## 2022-07-05 ENCOUNTER — OFFICE VISIT (OUTPATIENT)
Dept: UROLOGY | Facility: CLINIC | Age: 47
End: 2022-07-05
Payer: MEDICAID

## 2022-07-05 DIAGNOSIS — R35.0 URINARY FREQUENCY: Primary | ICD-10-CM

## 2022-07-05 PROCEDURE — 99212 PR OFFICE/OUTPT VISIT, EST, LEVL II, 10-19 MIN: ICD-10-PCS | Mod: 95,,, | Performed by: NURSE PRACTITIONER

## 2022-07-05 PROCEDURE — 99212 OFFICE O/P EST SF 10 MIN: CPT | Mod: 95,,, | Performed by: NURSE PRACTITIONER

## 2022-07-05 PROCEDURE — 1160F PR REVIEW ALL MEDS BY PRESCRIBER/CLIN PHARMACIST DOCUMENTED: ICD-10-PCS | Mod: CPTII,95,, | Performed by: NURSE PRACTITIONER

## 2022-07-05 PROCEDURE — 1159F MED LIST DOCD IN RCRD: CPT | Mod: CPTII,95,, | Performed by: NURSE PRACTITIONER

## 2022-07-05 PROCEDURE — 1160F RVW MEDS BY RX/DR IN RCRD: CPT | Mod: CPTII,95,, | Performed by: NURSE PRACTITIONER

## 2022-07-05 PROCEDURE — 1159F PR MEDICATION LIST DOCUMENTED IN MEDICAL RECORD: ICD-10-PCS | Mod: CPTII,95,, | Performed by: NURSE PRACTITIONER

## 2022-07-05 NOTE — PROGRESS NOTES
Established Patient - Audio Only Telehealth Visit     The patient location is: [place]  The chief complaint leading to consultation is: medication assessment   Visit type: Virtual visit with audio only (telephone)  Total time spent with patient: 15 minutes        The reason for the audio only service rather than synchronous audio and video virtual visit was related to technical difficulties or patient preference/necessity.     Each patient to whom I provide medical services by telemedicine is:  (1) informed of the relationship between the physician and patient and the respective role of any other health care provider with respect to management of the patient; and (2) notified that they may decline to receive medical services by telemedicine and may withdraw from such care at any time. Patient verbally consented to receive this service via voice-only telephone call.       HPI: She has completed Macrobid for culture proven UTI.   States that she is still having significant frequency; prescribed levsin for bladder spams but has only taken a few times  Denies f/c/n/v.      Assessment and plan:    --Increase water intake  --avoid bladder irritants  --will check medication effectiveness in 2 weeks     This note is dictated on M*Modal word recognition program.  There are word recognition mistakes that are occasionally missed on review.       This service was not originating from a related E/M service provided within the previous 7 days nor will  to an E/M service or procedure within the next 24 hours or my soonest available appointment.  Prevailing standard of care was able to be met in this audio-only visit.

## 2022-07-18 NOTE — PROGRESS NOTES
Established Patient - Audio Only Telehealth Visit     The patient location is: [place]  The chief complaint leading to consultation is: medication effectiveness  Visit type: Virtual visit with audio only (telephone)  Total time spent with patient: 10 minutes       The reason for the audio only service rather than synchronous audio and video virtual visit was related to technical difficulties or patient preference/necessity.     Each patient to whom I provide medical services by telemedicine is:  (1) informed of the relationship between the physician and patient and the respective role of any other health care provider with respect to management of the patient; and (2) notified that they may decline to receive medical services by telemedicine and may withdraw from such care at any time. Patient verbally consented to receive this service via voice-only telephone call.       HPI: Trial of PRN Levsin provided some relief of urgency/frequency. She is interested in trying daily medication for OAB     Assessment and plan:    --Trial of vesicare po daily   --FU in 1 month     This note is dictated on M*Modal word recognition program.  There are word recognition mistakes that are occasionally missed on review.       This service was not originating from a related E/M service provided within the previous 7 days nor will  to an E/M service or procedure within the next 24 hours or my soonest available appointment.  Prevailing standard of care was able to be met in this audio-only visit.

## 2022-07-19 ENCOUNTER — OFFICE VISIT (OUTPATIENT)
Dept: UROLOGY | Facility: CLINIC | Age: 47
End: 2022-07-19
Payer: MEDICAID

## 2022-07-19 DIAGNOSIS — R35.0 URINARY FREQUENCY: Primary | ICD-10-CM

## 2022-07-19 PROCEDURE — 1160F PR REVIEW ALL MEDS BY PRESCRIBER/CLIN PHARMACIST DOCUMENTED: ICD-10-PCS | Mod: CPTII,95,, | Performed by: NURSE PRACTITIONER

## 2022-07-19 PROCEDURE — 99213 PR OFFICE/OUTPT VISIT, EST, LEVL III, 20-29 MIN: ICD-10-PCS | Mod: 95,,, | Performed by: NURSE PRACTITIONER

## 2022-07-19 PROCEDURE — 1160F RVW MEDS BY RX/DR IN RCRD: CPT | Mod: CPTII,95,, | Performed by: NURSE PRACTITIONER

## 2022-07-19 PROCEDURE — 1159F PR MEDICATION LIST DOCUMENTED IN MEDICAL RECORD: ICD-10-PCS | Mod: CPTII,95,, | Performed by: NURSE PRACTITIONER

## 2022-07-19 PROCEDURE — 99213 OFFICE O/P EST LOW 20 MIN: CPT | Mod: 95,,, | Performed by: NURSE PRACTITIONER

## 2022-07-19 PROCEDURE — 1159F MED LIST DOCD IN RCRD: CPT | Mod: CPTII,95,, | Performed by: NURSE PRACTITIONER

## 2022-07-19 RX ORDER — SOLIFENACIN SUCCINATE 10 MG/1
10 TABLET, FILM COATED ORAL DAILY
Qty: 30 TABLET | Refills: 11 | Status: SHIPPED | OUTPATIENT
Start: 2022-07-19 | End: 2022-11-01

## 2022-08-23 ENCOUNTER — HOSPITAL ENCOUNTER (OUTPATIENT)
Facility: HOSPITAL | Age: 47
Discharge: HOME | End: 2022-08-25
Admitting: SURGERY
Payer: MEDICAID

## 2022-08-23 DIAGNOSIS — J93.9 PNEUMOTHORAX ON LEFT: ICD-10-CM

## 2022-08-23 DIAGNOSIS — S27.0XXA TRAUMATIC PNEUMOTHORAX, INITIAL ENCOUNTER: ICD-10-CM
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
## 2022-08-23 DIAGNOSIS — E11.8 TYPE 2 DIABETES MELLITUS WITH COMPLICATION: ICD-10-CM

## 2022-08-23 DIAGNOSIS — S22.42XA CLOSED FRACTURE OF MULTIPLE RIBS OF LEFT SIDE, INITIAL ENCOUNTER: ICD-10-CM

## 2022-08-23 DIAGNOSIS — S09.90XA CLOSED HEAD INJURY, INITIAL ENCOUNTER: ICD-10-CM

## 2022-08-23 LAB
ABO + RH BLD: NORMAL
ALBUMIN SERPL BCP-MCNC: 4.2 G/DL (ref 3.5–5.2)
ALP SERPL-CCNC: 87 U/L (ref 55–135)
ALT SERPL W/O P-5'-P-CCNC: 23 U/L (ref 10–44)
ANION GAP SERPL CALC-SCNC: 15 MMOL/L (ref 8–16)
AST SERPL-CCNC: 15 U/L (ref 10–40)
B-HCG UR QL: NEGATIVE
BASOPHILS # BLD AUTO: 0.08 K/UL (ref 0–0.2)
BASOPHILS NFR BLD: 0.5 % (ref 0–1.9)
BILIRUB SERPL-MCNC: 0.9 MG/DL (ref 0.1–1)
BILIRUB UR QL STRIP: NEGATIVE
BLD GP AB SCN CELLS X3 SERPL QL: NORMAL
BUN SERPL-MCNC: 12 MG/DL (ref 6–20)
CALCIUM SERPL-MCNC: 9.8 MG/DL (ref 8.7–10.5)
CHLORIDE SERPL-SCNC: 108 MMOL/L (ref 95–110)
CLARITY UR: CLEAR
CO2 SERPL-SCNC: 21 MMOL/L (ref 23–29)
COLOR UR: COLORLESS
CREAT SERPL-MCNC: 0.8 MG/DL (ref 0.5–1.4)
CTP QC/QA: YES
DIFFERENTIAL METHOD: ABNORMAL
EOSINOPHIL # BLD AUTO: 0 K/UL (ref 0–0.5)
EOSINOPHIL NFR BLD: 0.2 % (ref 0–8)
ERYTHROCYTE [DISTWIDTH] IN BLOOD BY AUTOMATED COUNT: 12.4 % (ref 11.5–14.5)
EST. GFR  (NO RACE VARIABLE): >60 ML/MIN/1.73 M^2
ETHANOL SERPL-MCNC: <10 MG/DL
GLUCOSE SERPL-MCNC: 138 MG/DL (ref 70–110)
GLUCOSE UR QL STRIP: NEGATIVE
HCT VFR BLD AUTO: 38.8 % (ref 37–48.5)
HGB BLD-MCNC: 12.8 G/DL (ref 12–16)
HGB UR QL STRIP: NEGATIVE
IMM GRANULOCYTES # BLD AUTO: 0.06 K/UL (ref 0–0.04)
IMM GRANULOCYTES NFR BLD AUTO: 0.4 % (ref 0–0.5)
KETONES UR QL STRIP: ABNORMAL
LEUKOCYTE ESTERASE UR QL STRIP: NEGATIVE
LYMPHOCYTES # BLD AUTO: 1.2 K/UL (ref 1–4.8)
LYMPHOCYTES NFR BLD: 8.2 % (ref 18–48)
MAGNESIUM SERPL-MCNC: 1.8 MG/DL (ref 1.6–2.6)
MCH RBC QN AUTO: 31.7 PG (ref 27–31)
MCHC RBC AUTO-ENTMCNC: 33 G/DL (ref 32–36)
MCV RBC AUTO: 96 FL (ref 82–98)
MONOCYTES # BLD AUTO: 0.8 K/UL (ref 0.3–1)
MONOCYTES NFR BLD: 5.4 % (ref 4–15)
NEUTROPHILS # BLD AUTO: 12.5 K/UL (ref 1.8–7.7)
NEUTROPHILS NFR BLD: 85.3 % (ref 38–73)
NITRITE UR QL STRIP: NEGATIVE
NRBC BLD-RTO: 0 /100 WBC
PH UR STRIP: 7 [PH] (ref 5–8)
PLATELET # BLD AUTO: 393 K/UL (ref 150–450)
PMV BLD AUTO: 10.8 FL (ref 9.2–12.9)
POTASSIUM SERPL-SCNC: 3.2 MMOL/L (ref 3.5–5.1)
PROT SERPL-MCNC: 6.9 G/DL (ref 6–8.4)
PROT UR QL STRIP: NEGATIVE
RBC # BLD AUTO: 4.04 M/UL (ref 4–5.4)
SODIUM SERPL-SCNC: 144 MMOL/L (ref 136–145)
SP GR UR STRIP: 1.01 (ref 1–1.03)
URN SPEC COLLECT METH UR: ABNORMAL
UROBILINOGEN UR STRIP-ACNC: NEGATIVE EU/DL
WBC # BLD AUTO: 14.7 K/UL (ref 3.9–12.7)

## 2022-08-23 PROCEDURE — 96374 THER/PROPH/DIAG INJ IV PUSH: CPT

## 2022-08-23 PROCEDURE — 83735 ASSAY OF MAGNESIUM: CPT

## 2022-08-23 PROCEDURE — 99285 EMERGENCY DEPT VISIT HI MDM: CPT | Mod: 25

## 2022-08-23 PROCEDURE — 81003 URINALYSIS AUTO W/O SCOPE: CPT

## 2022-08-23 PROCEDURE — 86901 BLOOD TYPING SEROLOGIC RH(D): CPT

## 2022-08-23 PROCEDURE — 80053 COMPREHEN METABOLIC PANEL: CPT

## 2022-08-23 PROCEDURE — 82077 ASSAY SPEC XCP UR&BREATH IA: CPT

## 2022-08-23 PROCEDURE — 81025 URINE PREGNANCY TEST: CPT

## 2022-08-23 PROCEDURE — 63600175 PHARM REV CODE 636 W HCPCS

## 2022-08-23 PROCEDURE — 96361 HYDRATE IV INFUSION ADD-ON: CPT

## 2022-08-23 PROCEDURE — 25000003 PHARM REV CODE 250

## 2022-08-23 PROCEDURE — 85025 COMPLETE CBC W/AUTO DIFF WBC: CPT

## 2022-08-23 RX ORDER — HYDROMORPHONE HYDROCHLORIDE 1 MG/ML
1 INJECTION, SOLUTION INTRAMUSCULAR; INTRAVENOUS; SUBCUTANEOUS
Status: COMPLETED | OUTPATIENT
Start: 2022-08-23 | End: 2022-08-23

## 2022-08-23 RX ORDER — POTASSIUM CHLORIDE 20 MEQ/1
40 TABLET, EXTENDED RELEASE ORAL
Status: COMPLETED | OUTPATIENT
Start: 2022-08-23 | End: 2022-08-24

## 2022-08-23 RX ORDER — HYDROMORPHONE HYDROCHLORIDE 1 MG/ML
1 INJECTION, SOLUTION INTRAMUSCULAR; INTRAVENOUS; SUBCUTANEOUS
Status: COMPLETED | OUTPATIENT
Start: 2022-08-24 | End: 2022-08-24

## 2022-08-23 RX ADMIN — HYDROMORPHONE HYDROCHLORIDE 1 MG: 1 INJECTION, SOLUTION INTRAMUSCULAR; INTRAVENOUS; SUBCUTANEOUS at 10:08

## 2022-08-23 RX ADMIN — IOHEXOL 100 ML: 350 INJECTION, SOLUTION INTRAVENOUS at 11:08

## 2022-08-23 RX ADMIN — SODIUM CHLORIDE 1000 ML: 0.9 INJECTION, SOLUTION INTRAVENOUS at 10:08

## 2022-08-24 ENCOUNTER — CLINICAL SUPPORT (OUTPATIENT)
Dept: SMOKING CESSATION | Facility: CLINIC | Age: 47
End: 2022-08-24
Payer: COMMERCIAL

## 2022-08-24 DIAGNOSIS — F17.210 CIGARETTE SMOKER: Primary | ICD-10-CM

## 2022-08-24 PROBLEM — S22.42XA CLOSED FRACTURE OF MULTIPLE RIBS OF LEFT SIDE: Status: ACTIVE | Noted: 2022-08-24

## 2022-08-24 LAB
CTP QC/QA: YES
POCT GLUCOSE: 125 MG/DL (ref 70–110)
POCT GLUCOSE: 140 MG/DL (ref 70–110)
SARS-COV-2 RDRP RESP QL NAA+PROBE: NEGATIVE

## 2022-08-24 PROCEDURE — 25500020 PHARM REV CODE 255

## 2022-08-24 PROCEDURE — 96376 TX/PRO/DX INJ SAME DRUG ADON: CPT

## 2022-08-24 PROCEDURE — 25000003 PHARM REV CODE 250

## 2022-08-24 PROCEDURE — 99407 PR TOBACCO USE CESSATION INTENSIVE >10 MINUTES: ICD-10-PCS | Mod: S$GLB,,,

## 2022-08-24 PROCEDURE — 96375 TX/PRO/DX INJ NEW DRUG ADDON: CPT

## 2022-08-24 PROCEDURE — G0378 HOSPITAL OBSERVATION PER HR: HCPCS

## 2022-08-24 PROCEDURE — U0002 COVID-19 LAB TEST NON-CDC: HCPCS

## 2022-08-24 PROCEDURE — 63600175 PHARM REV CODE 636 W HCPCS

## 2022-08-24 PROCEDURE — 99220 PR INITIAL OBSERVATION CARE,LEVL III: CPT | Mod: ,,, | Performed by: SURGERY

## 2022-08-24 PROCEDURE — 25000003 PHARM REV CODE 250: Performed by: SURGERY

## 2022-08-24 PROCEDURE — 99220 PR INITIAL OBSERVATION CARE,LEVL III: ICD-10-PCS | Mod: ,,, | Performed by: SURGERY

## 2022-08-24 PROCEDURE — S4991 NICOTINE PATCH NONLEGEND: HCPCS | Performed by: SURGERY

## 2022-08-24 PROCEDURE — 99999 PR PBB SHADOW E&M-EST. PATIENT-LVL I: CPT | Mod: PBBFAC,,,

## 2022-08-24 PROCEDURE — 94761 N-INVAS EAR/PLS OXIMETRY MLT: CPT

## 2022-08-24 PROCEDURE — 99900035 HC TECH TIME PER 15 MIN (STAT)

## 2022-08-24 PROCEDURE — 99999 PR PBB SHADOW E&M-EST. PATIENT-LVL I: ICD-10-PCS | Mod: PBBFAC,,,

## 2022-08-24 PROCEDURE — 99407 BEHAV CHNG SMOKING > 10 MIN: CPT | Mod: S$GLB,,,

## 2022-08-24 PROCEDURE — 63600175 PHARM REV CODE 636 W HCPCS: Performed by: SURGERY

## 2022-08-24 PROCEDURE — 94799 UNLISTED PULMONARY SVC/PX: CPT

## 2022-08-24 RX ORDER — IBUPROFEN 200 MG
16 TABLET ORAL
Status: DISCONTINUED | OUTPATIENT
Start: 2022-08-24 | End: 2022-08-25 | Stop reason: HOSPADM

## 2022-08-24 RX ORDER — HYDROCODONE BITARTRATE AND ACETAMINOPHEN 5; 325 MG/1; MG/1
1 TABLET ORAL EVERY 4 HOURS PRN
Status: DISCONTINUED | OUTPATIENT
Start: 2022-08-24 | End: 2022-08-24

## 2022-08-24 RX ORDER — FAMOTIDINE 20 MG/1
20 TABLET, FILM COATED ORAL 2 TIMES DAILY
Status: DISCONTINUED | OUTPATIENT
Start: 2022-08-24 | End: 2022-08-25 | Stop reason: HOSPADM

## 2022-08-24 RX ORDER — OXYCODONE HYDROCHLORIDE 5 MG/1
10 TABLET ORAL EVERY 4 HOURS PRN
Status: DISCONTINUED | OUTPATIENT
Start: 2022-08-24 | End: 2022-08-25 | Stop reason: HOSPADM

## 2022-08-24 RX ORDER — ONDANSETRON 2 MG/ML
4 INJECTION INTRAMUSCULAR; INTRAVENOUS EVERY 6 HOURS PRN
Status: DISCONTINUED | OUTPATIENT
Start: 2022-08-24 | End: 2022-08-25 | Stop reason: HOSPADM

## 2022-08-24 RX ORDER — HYDROMORPHONE HYDROCHLORIDE 1 MG/ML
1 INJECTION, SOLUTION INTRAMUSCULAR; INTRAVENOUS; SUBCUTANEOUS EVERY 4 HOURS PRN
Status: DISCONTINUED | OUTPATIENT
Start: 2022-08-24 | End: 2022-08-24

## 2022-08-24 RX ORDER — IBUPROFEN 600 MG/1
600 TABLET ORAL EVERY 6 HOURS PRN
Status: DISCONTINUED | OUTPATIENT
Start: 2022-08-24 | End: 2022-08-25 | Stop reason: HOSPADM

## 2022-08-24 RX ORDER — INSULIN ASPART 100 [IU]/ML
0-5 INJECTION, SOLUTION INTRAVENOUS; SUBCUTANEOUS
Status: DISCONTINUED | OUTPATIENT
Start: 2022-08-24 | End: 2022-08-25 | Stop reason: HOSPADM

## 2022-08-24 RX ORDER — GLUCAGON 1 MG
1 KIT INJECTION
Status: DISCONTINUED | OUTPATIENT
Start: 2022-08-24 | End: 2022-08-25 | Stop reason: HOSPADM

## 2022-08-24 RX ORDER — ACETAMINOPHEN 325 MG/1
650 TABLET ORAL EVERY 6 HOURS
Status: DISCONTINUED | OUTPATIENT
Start: 2022-08-24 | End: 2022-08-25 | Stop reason: HOSPADM

## 2022-08-24 RX ORDER — IBUPROFEN 200 MG
1 TABLET ORAL DAILY
Status: DISCONTINUED | OUTPATIENT
Start: 2022-08-24 | End: 2022-08-25 | Stop reason: HOSPADM

## 2022-08-24 RX ORDER — TALC
6 POWDER (GRAM) TOPICAL NIGHTLY PRN
Status: DISCONTINUED | OUTPATIENT
Start: 2022-08-24 | End: 2022-08-25 | Stop reason: HOSPADM

## 2022-08-24 RX ORDER — LIDOCAINE 50 MG/G
1 PATCH TOPICAL DAILY
Status: DISCONTINUED | OUTPATIENT
Start: 2022-08-24 | End: 2022-08-25 | Stop reason: HOSPADM

## 2022-08-24 RX ORDER — KETOROLAC TROMETHAMINE 30 MG/ML
15 INJECTION, SOLUTION INTRAMUSCULAR; INTRAVENOUS EVERY 6 HOURS
Status: DISCONTINUED | OUTPATIENT
Start: 2022-08-24 | End: 2022-08-25 | Stop reason: HOSPADM

## 2022-08-24 RX ORDER — OXYCODONE HYDROCHLORIDE 5 MG/1
5 TABLET ORAL EVERY 4 HOURS PRN
Status: DISCONTINUED | OUTPATIENT
Start: 2022-08-24 | End: 2022-08-25 | Stop reason: HOSPADM

## 2022-08-24 RX ORDER — IBUPROFEN 200 MG
24 TABLET ORAL
Status: DISCONTINUED | OUTPATIENT
Start: 2022-08-24 | End: 2022-08-25 | Stop reason: HOSPADM

## 2022-08-24 RX ORDER — LIDOCAINE 50 MG/G
1 PATCH TOPICAL
Status: COMPLETED | OUTPATIENT
Start: 2022-08-24 | End: 2022-08-24

## 2022-08-24 RX ORDER — POTASSIUM CHLORIDE 20 MEQ/1
40 TABLET, EXTENDED RELEASE ORAL ONCE
Status: COMPLETED | OUTPATIENT
Start: 2022-08-24 | End: 2022-08-24

## 2022-08-24 RX ORDER — SODIUM CHLORIDE 0.9 % (FLUSH) 0.9 %
10 SYRINGE (ML) INJECTION
Status: DISCONTINUED | OUTPATIENT
Start: 2022-08-24 | End: 2022-08-25 | Stop reason: HOSPADM

## 2022-08-24 RX ADMIN — KETOROLAC TROMETHAMINE 15 MG: 30 INJECTION, SOLUTION INTRAMUSCULAR; INTRAVENOUS at 05:08

## 2022-08-24 RX ADMIN — KETOROLAC TROMETHAMINE 15 MG: 30 INJECTION, SOLUTION INTRAMUSCULAR; INTRAVENOUS at 11:08

## 2022-08-24 RX ADMIN — FAMOTIDINE 20 MG: 20 TABLET ORAL at 08:08

## 2022-08-24 RX ADMIN — ONDANSETRON 4 MG: 2 INJECTION INTRAMUSCULAR; INTRAVENOUS at 05:08

## 2022-08-24 RX ADMIN — POTASSIUM CHLORIDE 40 MEQ: 1500 TABLET, EXTENDED RELEASE ORAL at 12:08

## 2022-08-24 RX ADMIN — OXYCODONE HYDROCHLORIDE 10 MG: 5 TABLET ORAL at 04:08

## 2022-08-24 RX ADMIN — LIDOCAINE 1 PATCH: 50 PATCH CUTANEOUS at 12:08

## 2022-08-24 RX ADMIN — HYDROCODONE BITARTRATE AND ACETAMINOPHEN 1 TABLET: 5; 325 TABLET ORAL at 03:08

## 2022-08-24 RX ADMIN — OXYCODONE HYDROCHLORIDE 5 MG: 5 TABLET ORAL at 12:08

## 2022-08-24 RX ADMIN — Medication 6 MG: at 08:08

## 2022-08-24 RX ADMIN — ACETAMINOPHEN 650 MG: 325 TABLET ORAL at 12:08

## 2022-08-24 RX ADMIN — NICOTINE 1 PATCH: 21 PATCH, EXTENDED RELEASE TRANSDERMAL at 09:08

## 2022-08-24 RX ADMIN — HYDROCODONE BITARTRATE AND ACETAMINOPHEN 1 TABLET: 5; 325 TABLET ORAL at 08:08

## 2022-08-24 RX ADMIN — HYDROMORPHONE HYDROCHLORIDE 1 MG: 1 INJECTION, SOLUTION INTRAMUSCULAR; INTRAVENOUS; SUBCUTANEOUS at 12:08

## 2022-08-24 NOTE — H&P
Pt seen and examined, agree with resident's HP note.  Plan for non-op management of left sided rib fractures  Supplemental oxygen  Will need f/u CT to reassess pneumothorax next 24-48 hours    Dany Harper M.D., F.A.C.S.  [facility] General [facility]  [facility]

## 2022-08-24 NOTE — PLAN OF CARE
Recommendation:   1. Add ADA restrictions to diet.   2. Encourage intake at meals as tolerated.   3. Monitor weight/labs.   4. RD to follow to monitor po intake    Goals:  Pt will tolerate diet with at least 50% intake at meals by RD follow up  Nutrition Goal Status: new

## 2022-08-24 NOTE — SUBJECTIVE & OBJECTIVE
Current Facility-Administered Medications on File Prior to Encounter   Medication    fentaNYL injection 100 mcg    lidocaine (PF) 10 mg/ml (1%) injection 10 mg    midazolam (VERSED) 1 mg/mL injection 0.5 mg    ropivacaine (Naropin) 1000 mg/500 mL (2 mg/mL) [facility] PainPRO Pump infusion     Current Outpatient Medications on File Prior to Encounter   Medication Sig    acetaminophen (TYLENOL) 500 MG tablet Take 2 tablets (1,000 mg total) by mouth every 6 (six) hours as needed for Pain.    amitriptyline (ELAVIL) 10 MG tablet TAKE 1 TABLET BY MOUTH EVERY DAY IN THE EVENING    hyoscyamine (ANASPAZ,LEVSIN) 0.125 mg Tab Take 1 tablet (125 mcg total) by mouth every 4 (four) hours as needed (bladder spasms).    multivitamin-min-iron-FA-vit K (BARIATRIC MULTIVITAMINS) 45 mg iron- 800 mcg-120 mcg Cap Take by mouth.    potassium chloride (KLOR-CON) 20 mEq Pack Take by mouth.    vitamin D (VITAMIN D3) 1000 units Tab Take 1,000 Units by mouth once daily.    blood sugar diagnostic Strp 1 strip by Misc.(Non-Drug; Combo Route) route 2 (two) times daily. (Patient not taking: Reported on 6/21/2022)    blood-glucose meter kit Please provide with insurance covered meter (Patient not taking: Reported on 6/21/2022)    cetirizine (ZYRTEC) 10 MG tablet Take 10 mg by mouth once daily.    diclofenac sodium (VOLTAREN) 1 % Gel Apply 2 g topically 4 (four) times daily. (Patient not taking: Reported on 6/21/2022)    HYDROcodone-acetaminophen (NORCO) 5-325 mg per tablet Take 1 tablet by mouth every 6 (six) hours as needed for Pain (moderate to severe). (Patient not taking: Reported on 6/21/2022)    lancets Misc 1 Device by Misc.(Non-Drug; Combo Route) route 2 (two) times daily. (Patient not taking: Reported on 6/21/2022)    medroxyPROGESTERone (DEPO-PROVERA) 150 mg/mL Syrg Inject 1 mL (150 mg total) into the muscle every 3 (three) months.    naproxen (NAPROSYN) 500 MG tablet Take 1 tablet (500 mg total) by mouth every 8 (eight)  hours as needed (cramping). (Patient not taking: Reported on 6/21/2022)    nicotine (NICODERM CQ) 21 mg/24 hr Place 1 patch onto the skin once daily. (Patient not taking: Reported on 6/21/2022)    omeprazole (PRILOSEC) 40 MG capsule Take 1 capsule (40 mg total) by mouth every morning.    oxyCODONE-acetaminophen (PERCOCET) 5-325 mg per tablet Take 1 tablet by mouth every 4 (four) hours as needed for Pain. (Patient not taking: Reported on 6/21/2022)    promethazine (PHENERGAN) 6.25 mg/5 mL syrup Take 20 mLs (25 mg total) by mouth every 6 (six) hours as needed for Nausea. (Patient not taking: Reported on 6/21/2022)    solifenacin (VESICARE) 10 MG tablet Take 1 tablet (10 mg total) by mouth once daily.    zolpidem (AMBIEN) 5 MG Tab TAKE 1 TABLET(5 MG) BY MOUTH EVERY NIGHT AS NEEDED (Patient not taking: Reported on 6/21/2022)    [DISCONTINUED] atorvastatin (LIPITOR) 10 MG tablet Take 1 tablet (10 mg total) by mouth once daily.    [DISCONTINUED] losartan (COZAAR) 25 MG tablet TAKE 1 TABLET BY MOUTH EVERY DAY    [DISCONTINUED] metFORMIN (GLUCOPHAGE) 500 MG tablet TAKE 1 TABLET BY MOUTH TWICE A DAY WITH MEALS       Review of patient's allergies indicates:  No Known Allergies    Past Medical History:   Diagnosis Date    Chronic back pain     Diabetes type 2, uncontrolled     Mild nonproliferative diabetic retinopathy of left eye without macular edema associated with type 2 diabetes mellitus 10/21/2019    Morbid obesity with BMI of 40.0-44.9, adult     MILEY on CPAP     Plantar fasciitis of left foot     Urticaria      Past Surgical History:   Procedure Laterality Date    ARTHROSCOPIC ACROMIOPLASTY OF SHOULDER Left 7/27/2020    Procedure: ACROMIOPLASTY, ARTHROSCOPIC;  Surgeon: Marcela Garza MD;  Location: [facility];  Service: Orthopedics;  Laterality: Left;    ARTHROSCOPIC REPAIR OF ROTATOR CUFF OF SHOULDER Right 7/27/2020    Procedure: REPAIR, ROTATOR CUFF, ARTHROSCOPIC;  Surgeon: Marcela Garza,  MD;  Location: [facility] OR;  Service: Orthopedics;  Laterality: Right;  GENERAL/REGIONAL    COLONOSCOPY N/A 4/7/2022    Procedure: COLONOSCOPY;  Surgeon: Yadi Wong MD;  Location: [facility] (4TH FLR);  Service: Endoscopy;  Laterality: N/A;  Covid test at [place] on 4/4.EC    CYST REMOVAL      ovarian cyst removal at age 12    DECOMPRESSION OF SUBACROMIAL SPACE Left 7/27/2020    Procedure: DECOMPRESSION, SUBACROMIAL SPACE;  Surgeon: Marcela Garza MD;  Location: [facility] OR;  Service: Orthopedics;  Laterality: Left;    DILATION AND CURETTAGE OF UTERUS      ESOPHAGEAL MOTILITY STUDY USING HIGH RESOLUTION MANOMETRY N/A 9/17/2019    Procedure: MOTILITY STUDY, ESOPHAGUS, USING HIGH RESOLUTION MANOMETRY;  Surgeon: Leopoldo Swanson MD;  Location: [facility] (4TH FLR);  Service: Endoscopy;  Laterality: N/A;  Pt will be taking xanax before procedure to hopefull help with anxiety.    ESOPHAGOGASTRODUODENOSCOPY N/A 6/14/2019    Procedure: EGD (ESOPHAGOGASTRODUODENOSCOPY);  Surgeon: Ced Guevara MD;  Location: [facility] (4TH FLR);  Service: Endoscopy;  Laterality: N/A;  Please make sure it is scheduled after her cardiology appt.- see cardiology note dated 6/13/19 for clearance - [facility]    GASTRIC BYPASS N/A 05/20/2021    INJECTION OF STEROID Left 7/27/2020    Procedure: INJECTION, STEROID LEFT THUMB TRIGGER FINGER;  Surgeon: Marcela Garza MD;  Location: [facility] OR;  Service: Orthopedics;  Laterality: Left;  LEFT THUMB, TRIGGER FINGER    TRIGGER FINGER RELEASE Right 10/4/2019    Procedure: RELEASE, TRIGGER FINGER - right thumb;  Surgeon: Craig Tom MD;  Location: [facility] OR;  Service: Orthopedics;  Laterality: Right;    UPPER GASTROINTESTINAL ENDOSCOPY      WISDOM TOOTH EXTRACTION       Family History       Problem Relation (Age of Onset)    Breast cancer Maternal Aunt (70)    Cancer Maternal Grandmother    Diabetes Mother    No Known Problems Sister, Sister, Sister          Tobacco Use    Smoking status:  Light Tobacco Smoker     Packs/day: 0.50     Types: Cigarettes    Smokeless tobacco: Never Used   Substance and Sexual Activity    Alcohol use: Yes     Comment: social    Drug use: No    Sexual activity: Yes     Partners: Male     Birth control/protection: None     Review of Systems   Constitutional:  Negative for chills and fever.   HENT:  Negative for facial swelling, hearing loss and tinnitus.    Respiratory:  Negative for shortness of breath and wheezing.         Left-sided rib pain   Gastrointestinal:  Negative for abdominal pain, nausea and vomiting.   Genitourinary:  Negative for dysuria.   Neurological:  Negative for dizziness, weakness, light-headedness and numbness.   Objective:     Vital Signs (Most Recent):  Temp: 97.8 °F (36.6 °C) (08/24/22 0731)  Pulse: 73 (08/24/22 0731)  Resp: 18 (08/24/22 0815)  BP: (!) 144/78 (08/24/22 0731)  SpO2: 97 % (08/24/22 0740)   Vital Signs (24h Range):  Temp:  [97.7 °F (36.5 °C)-97.8 °F (36.6 °C)] 97.8 °F (36.6 °C)  Pulse:  [73-92] 73  Resp:  [16-20] 18  SpO2:  [94 %-98 %] 97 %  BP: (135-183)/(66-84) 144/78     Weight: 68.2 kg (150 lb 5.7 oz)  Body mass index is 23.55 kg/m².    Physical Exam  Constitutional:       Appearance: Normal appearance.   HENT:      Head:      Comments: Swelling of the left posterior scalp  Cardiovascular:      Rate and Rhythm: Normal rate and regular rhythm.      Pulses: Normal pulses.   Pulmonary:      Effort: Pulmonary effort is normal.   Chest:      Chest wall: Tenderness present.   Abdominal:      General: Abdomen is flat.      Palpations: Abdomen is soft.      Tenderness: There is no abdominal tenderness.   Skin:     General: Skin is warm and dry.   Neurological:      General: No focal deficit present.      Mental Status: She is alert and oriented to person, place, and time.       Significant Labs:  I have reviewed all pertinent lab results within the past 24 hours.  CBC:   Recent Labs   Lab 08/23/22  2225   WBC 14.70*   RBC 4.04   HGB  12.8   HCT 38.8      MCV 96   MCH 31.7*   MCHC 33.0     BMP:   Recent Labs   Lab 08/23/22  2225   *      K 3.2*      CO2 21*   BUN 12   CREATININE 0.8   CALCIUM 9.8   MG 1.8       Significant Diagnostics:  I have reviewed all pertinent imaging results/findings within the past 24 hours.

## 2022-08-24 NOTE — PLAN OF CARE
SW met with pt at bedside to complete assessment. Pt is AxO 3  and able to verbally answer assessment questions. Pt is able to confirm demographic information. Pt reports at time of discharge changing residence and living with sister who is also emergency contact. Pt reports not returning to prior address. Pt reports feeling safe at home with sister Aminata. Pt reports having everything she needed from her prior residence, no need to return. SW noted Lewis AGUIAR at bedside during admission. Pt able to give report to Lewis AGUIAR. SW confirmed-Pt report not needing to add to report. SW to add resources to AVS. Pt reports ability to drive, and attend follow up appointments. Pt reports while at home being independent of ADL's and only DME is CPAP. Pt reports losing weight and not needing CPAP however interested in scheduling an appointment with the sleep clinic to confirm. Pt reports ability to afford all medications. SW updated whiteboard with CM name and contact information. SW confirmed pt understanding of Observation unit and expected discharge plan. SW will continue to follow pt throughout care and assist with any discharge needs.         08/24/22 1401   Discharge Planning   Assessment Type Discharge Planning Brief Assessment   Resource/Environmental Concerns none   Support Systems Family members   Equipment Currently Used at Home CPAP   Current Living Arrangements home/apartment/condo   Patient/Family Anticipates Transition to home with family   Patient/Family Anticipated Services at Transition none   DME Needed Upon Discharge  none   Discharge Plan A Home with family     Future Appointments   Date Time Provider Department Center   8/31/2022 11:30 AM JERRI Islas [facility]   11/17/2022 10:20 AM Mike Jones MD [facility] NEURO [facility] Case Management  [phone]

## 2022-08-24 NOTE — PROGRESS NOTES
Individual Follow-Up Form    8/24/2022    Quit Date: To be determined    Clinical Status of Patient: Inpatient    Length of Service: 30 minutes    Comments: Smoking cessation education note: Pt is a 0.5 to 0.75 pk/day cigarette smoker x 30 yrs. Order obtained for 21 mg nicotine patch Q day. Pt states that she is ready to quit smoking. She is enrolled in the [employer]. Ambualtory referral to Smoking Cessation clinic following hospital discharge.    Diagnosis: F17.210    Next Visit:  8/31/2022 11:30 am with Lewis Escobar Smoking Cessation
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
## 2022-08-24 NOTE — PLAN OF CARE
Plan of care reviewed with patient, pt voiced understanding. NSR on monitor. No acute distress noted. Side rails x2, bed in lowest position, call bell within reach, pt advised to call for assistance. Maintained bed alarm for pt safety.
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
## 2022-08-24 NOTE — PROGRESS NOTES
08/24/22 0359   Admission   Initial VN Admission Questions Complete   Communication Issues? None   Shift   Virtual Nurse - Rounding Complete   Virtual Nurse - Patient Verbalized Approval Of Camera Use   Safety/Activity   Safety Promotion/Fall Prevention assistive device/personal item within reach;bed alarm set;Fall Risk reviewed with patient/family;medications reviewed;side rails raised x 2;instructed to call staff for mobility

## 2022-08-24 NOTE — PLAN OF CARE
Problem: Adult Inpatient Plan of Care  Goal: Plan of Care Review  Outcome: Ongoing, Progressing  Goal: Absence of Hospital-Acquired Illness or Injury  Outcome: Ongoing, Progressing  Goal: Optimal Comfort and Wellbeing  Outcome: Ongoing, Progressing  Goal: Readiness for Transition of Care  Outcome: Ongoing, Progressing     Problem: Diabetes Comorbidity  Goal: Blood Glucose Level Within Targeted Range  Outcome: Ongoing, Progressing     Problem: Pain Acute  Goal: Acceptable Pain Control and Functional Ability  Outcome: Ongoing, Progressing     Problem: Fall Injury Risk  Goal: Absence of Fall and Fall-Related Injury  Outcome: Ongoing, Progressing     Problem: Infection  Goal: Absence of Infection Signs and Symptoms  Outcome: Ongoing, Progressing
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
## 2022-08-25 ENCOUNTER — CLINICAL SUPPORT (OUTPATIENT)
Dept: SMOKING CESSATION | Facility: CLINIC | Age: 47
End: 2022-08-25

## 2022-08-25 VITALS
TEMPERATURE: 98 F | WEIGHT: 150.38 LBS | HEIGHT: 67 IN | DIASTOLIC BLOOD PRESSURE: 80 MMHG | OXYGEN SATURATION: 98 % | SYSTOLIC BLOOD PRESSURE: 146 MMHG | HEART RATE: 78 BPM | BODY MASS INDEX: 23.6 KG/M2 | RESPIRATION RATE: 18 BRPM

## 2022-08-25 DIAGNOSIS — F17.210 CIGARETTE SMOKER: Primary | ICD-10-CM

## 2022-08-25 LAB
ALBUMIN SERPL BCP-MCNC: 3.2 G/DL (ref 3.5–5.2)
ANION GAP SERPL CALC-SCNC: 11 MMOL/L (ref 8–16)
BASOPHILS # BLD AUTO: 0.06 K/UL (ref 0–0.2)
BASOPHILS NFR BLD: 0.7 % (ref 0–1.9)
BUN SERPL-MCNC: 9 MG/DL (ref 6–20)
CALCIUM SERPL-MCNC: 9.3 MG/DL (ref 8.7–10.5)
CHLORIDE SERPL-SCNC: 109 MMOL/L (ref 95–110)
CO2 SERPL-SCNC: 21 MMOL/L (ref 23–29)
CREAT SERPL-MCNC: 0.7 MG/DL (ref 0.5–1.4)
DIFFERENTIAL METHOD: ABNORMAL
EOSINOPHIL # BLD AUTO: 0.2 K/UL (ref 0–0.5)
EOSINOPHIL NFR BLD: 2.6 % (ref 0–8)
ERYTHROCYTE [DISTWIDTH] IN BLOOD BY AUTOMATED COUNT: 12.3 % (ref 11.5–14.5)
EST. GFR  (NO RACE VARIABLE): >60 ML/MIN/1.73 M^2
GLUCOSE SERPL-MCNC: 83 MG/DL (ref 70–110)
HCT VFR BLD AUTO: 35.9 % (ref 37–48.5)
HGB BLD-MCNC: 11.8 G/DL (ref 12–16)
IMM GRANULOCYTES # BLD AUTO: 0.02 K/UL (ref 0–0.04)
IMM GRANULOCYTES NFR BLD AUTO: 0.2 % (ref 0–0.5)
LYMPHOCYTES # BLD AUTO: 2.8 K/UL (ref 1–4.8)
LYMPHOCYTES NFR BLD: 31.7 % (ref 18–48)
MCH RBC QN AUTO: 31.1 PG (ref 27–31)
MCHC RBC AUTO-ENTMCNC: 32.9 G/DL (ref 32–36)
MCV RBC AUTO: 95 FL (ref 82–98)
MONOCYTES # BLD AUTO: 0.8 K/UL (ref 0.3–1)
MONOCYTES NFR BLD: 8.7 % (ref 4–15)
NEUTROPHILS # BLD AUTO: 5 K/UL (ref 1.8–7.7)
NEUTROPHILS NFR BLD: 56.1 % (ref 38–73)
NRBC BLD-RTO: 0 /100 WBC
PHOSPHATE SERPL-MCNC: 4.2 MG/DL (ref 2.7–4.5)
PLATELET # BLD AUTO: 389 K/UL (ref 150–450)
PMV BLD AUTO: 10.1 FL (ref 9.2–12.9)
POCT GLUCOSE: 74 MG/DL (ref 70–110)
POCT GLUCOSE: 88 MG/DL (ref 70–110)
POTASSIUM SERPL-SCNC: 3.5 MMOL/L (ref 3.5–5.1)
RBC # BLD AUTO: 3.8 M/UL (ref 4–5.4)
SODIUM SERPL-SCNC: 141 MMOL/L (ref 136–145)
WBC # BLD AUTO: 8.97 K/UL (ref 3.9–12.7)

## 2022-08-25 PROCEDURE — 80069 RENAL FUNCTION PANEL: CPT

## 2022-08-25 PROCEDURE — 94799 UNLISTED PULMONARY SVC/PX: CPT

## 2022-08-25 PROCEDURE — 25000003 PHARM REV CODE 250

## 2022-08-25 PROCEDURE — 99900035 HC TECH TIME PER 15 MIN (STAT)

## 2022-08-25 PROCEDURE — 94664 DEMO&/EVAL PT USE INHALER: CPT

## 2022-08-25 PROCEDURE — 94761 N-INVAS EAR/PLS OXIMETRY MLT: CPT

## 2022-08-25 PROCEDURE — 96376 TX/PRO/DX INJ SAME DRUG ADON: CPT

## 2022-08-25 PROCEDURE — G0378 HOSPITAL OBSERVATION PER HR: HCPCS

## 2022-08-25 PROCEDURE — 63600175 PHARM REV CODE 636 W HCPCS: Performed by: SURGERY

## 2022-08-25 PROCEDURE — 85025 COMPLETE CBC W/AUTO DIFF WBC: CPT

## 2022-08-25 PROCEDURE — 99226 PR SUBSEQUENT OBSERVATION CARE,LEVEL III: CPT | Mod: ,,, | Performed by: SURGERY

## 2022-08-25 PROCEDURE — 36415 COLL VENOUS BLD VENIPUNCTURE: CPT

## 2022-08-25 PROCEDURE — 99226 PR SUBSEQUENT OBSERVATION CARE,LEVEL III: ICD-10-PCS | Mod: ,,, | Performed by: SURGERY

## 2022-08-25 PROCEDURE — 99406 BEHAV CHNG SMOKING 3-10 MIN: CPT | Mod: S$GLB,,,

## 2022-08-25 PROCEDURE — 99406 PT REFUSED TOBACCO CESSATION: ICD-10-PCS | Mod: S$GLB,,,

## 2022-08-25 PROCEDURE — 25000003 PHARM REV CODE 250: Performed by: SURGERY

## 2022-08-25 PROCEDURE — S4991 NICOTINE PATCH NONLEGEND: HCPCS | Performed by: SURGERY

## 2022-08-25 PROCEDURE — 27000646 HC AEROBIKA DEVICE

## 2022-08-25 PROCEDURE — 63600175 PHARM REV CODE 636 W HCPCS

## 2022-08-25 RX ORDER — LIDOCAINE 50 MG/G
1 PATCH TOPICAL DAILY
Qty: 10 PATCH | Refills: 0 | Status: SHIPPED | OUTPATIENT
Start: 2022-08-26 | End: 2022-09-07 | Stop reason: SDUPTHER

## 2022-08-25 RX ORDER — OXYCODONE AND ACETAMINOPHEN 5; 325 MG/1; MG/1
1 TABLET ORAL EVERY 4 HOURS PRN
Qty: 20 TABLET | Refills: 0 | Status: SHIPPED | OUTPATIENT
Start: 2022-08-25 | End: 2022-09-02

## 2022-08-25 RX ADMIN — FAMOTIDINE 20 MG: 20 TABLET ORAL at 08:08

## 2022-08-25 RX ADMIN — NICOTINE 1 PATCH: 21 PATCH, EXTENDED RELEASE TRANSDERMAL at 08:08

## 2022-08-25 RX ADMIN — KETOROLAC TROMETHAMINE 15 MG: 30 INJECTION, SOLUTION INTRAMUSCULAR; INTRAVENOUS at 11:08

## 2022-08-25 RX ADMIN — ONDANSETRON 4 MG: 2 INJECTION INTRAMUSCULAR; INTRAVENOUS at 11:08

## 2022-08-25 RX ADMIN — OXYCODONE HYDROCHLORIDE 10 MG: 5 TABLET ORAL at 06:08

## 2022-08-25 RX ADMIN — LIDOCAINE 1 PATCH: 50 PATCH CUTANEOUS at 08:08
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
## 2022-08-25 NOTE — PROGRESS NOTES
[facility]                    Pharmacy       Discharge Medication Education    Patient ACCEPTED medication education. Pharmacy has provided education on the name, indication, and possible side effects of the medication(s) prescribed, using teach-back method.     The following medications have also been discussed, during this admission.        Medication List        START taking these medications      LIDOcaine 5 %  Commonly known as: LIDODERM  Place 1 patch onto the skin once daily. Remove & Discard patch within 12 hours or as directed by MD for 10 days  Start taking on: August 26, 2022            CONTINUE taking these medications      acetaminophen 500 MG tablet  Commonly known as: TYLENOL  Take 2 tablets (1,000 mg total) by mouth every 6 (six) hours as needed for Pain.     amitriptyline 10 MG tablet  Commonly known as: ELAVIL  TAKE 1 TABLET BY MOUTH EVERY DAY IN THE EVENING     BARIATRIC MULTIVITAMINS 45 mg iron- 800 mcg-120 mcg Cap  Generic drug: multivitamin-min-iron-FA-vit K     cetirizine 10 MG tablet  Commonly known as: ZYRTEC     medroxyPROGESTERone 150 mg/mL Syrg  Commonly known as: DEPO-PROVERA  Inject 1 mL (150 mg total) into the muscle every 3 (three) months.     nicotine 21 mg/24 hr  Commonly known as: NICODERM CQ  Place 1 patch onto the skin once daily.     omeprazole 40 MG capsule  Commonly known as: PRILOSEC  Take 1 capsule (40 mg total) by mouth every morning.     oxyCODONE-acetaminophen 5-325 mg per tablet  Commonly known as: PERCOCET  Take 1 tablet by mouth every 4 (four) hours as needed for Pain.     potassium chloride 20 mEq Pack  Commonly known as: KLOR-CON     solifenacin 10 MG tablet  Commonly known as: VESICARE  Take 1 tablet (10 mg total) by mouth once daily.     vitamin D 1000 units Tab  Commonly known as: VITAMIN D3     zolpidem 5 MG Tab  Commonly known as: AMBIEN  TAKE 1 TABLET(5 MG) BY MOUTH EVERY NIGHT AS NEEDED            STOP taking these medications       atorvastatin 10 MG tablet  Commonly known as: LIPITOR     blood sugar diagnostic Strp     blood-glucose meter kit     diclofenac sodium 1 % Gel  Commonly known as: VOLTAREN     HYDROcodone-acetaminophen 5-325 mg per tablet  Commonly known as: NORCO     hyoscyamine 0.125 mg Tab  Commonly known as: ANASPAZ,LEVSIN     lancets Misc     losartan 25 MG tablet  Commonly known as: COZAAR     metFORMIN 500 MG tablet  Commonly known as: GLUCOPHAGE     naproxen 500 MG tablet  Commonly known as: NAPROSYN     promethazine 6.25 mg/5 mL syrup  Commonly known as: PHENERGAN               Where to Get Your Medications        These medications were sent to [facility] [address], [place] [address]      Hours: Mon-Fri, 8a-5:30p Phone: [phone]   LIDOcaine 5 %       You can get these medications from any pharmacy    Bring a paper prescription for each of these medications  oxyCODONE-acetaminophen 5-325 mg per tablet          Thank you  Carlitos Morris, PharmD  [phone]

## 2022-08-25 NOTE — PROGRESS NOTES
Individual Follow-Up Form    8/25/2022    Quit Date: To be determined    Clinical Status of Patient: Inpatient    Length of Service: 15 minutes    Continuing Medication: yes  Patches    Comments: Smoking cessation education follow-up note: No reported nicotine withdrawal symptoms with patch in use. Order requested upon discharge for 21 mg nicotine patch Q day. Pt's initial Ambulatory Smoking Cessation clinic appointment changed to Virtual appointment at pt's request.     Diagnosis: F17.210
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
## 2022-08-25 NOTE — PROGRESS NOTES
08/25/22 1617   AVS Confirmation   Discharge instructions and AVS given to and reviewed with patient and/or significant other. Yes   AVS printed and handed to patient by bedside nurse. VN reviewed discharge instructions with patient and son using teachback method.  Allowed time for questions, all questions answered.  Patient verbalized complete understanding of discharge instructions and voices no concerns.  Discharge instructions complete.  Bedside delivery complete.  Transport wheelchair requested.  Bedside nurse notified.

## 2022-08-25 NOTE — SUBJECTIVE & OBJECTIVE
Interval History: Ms. Lopez is feeling better this morning. Pain better controlled. AVSS. WBC now within normal range.    Medications:  Continuous Infusions:  Scheduled Meds:   acetaminophen  650 mg Oral Q6H    famotidine  20 mg Oral BID    ketorolac  15 mg Intravenous Q6H    LIDOcaine  1 patch Transdermal Daily    nicotine  1 patch Transdermal Daily     PRN Meds:dextrose 10%, dextrose 10%, glucagon (human recombinant), glucose, glucose, ibuprofen, insulin aspart U-100, melatonin, ondansetron, oxyCODONE, oxyCODONE, sodium chloride 0.9%     Review of patient's allergies indicates:  No Known Allergies  Objective:     Vital Signs (Most Recent):  Temp: 97.1 °F (36.2 °C) (08/25/22 0427)  Pulse: 82 (08/25/22 0500)  Resp: 14 (08/25/22 0620)  BP: 132/68 (08/25/22 0427)  SpO2: 97 % (08/25/22 0427) Vital Signs (24h Range):  Temp:  [96.3 °F (35.7 °C)-99.1 °F (37.3 °C)] 97.1 °F (36.2 °C)  Pulse:  [60-87] 82  Resp:  [14-20] 14  SpO2:  [95 %-99 %] 97 %  BP: (131-187)/(67-86) 132/68     Weight: 68.2 kg (150 lb 5.7 oz)  Body mass index is 23.55 kg/m².    Intake/Output - Last 3 Shifts         08/23 0700 08/24 0659 08/24 0700 08/25 0659 08/25 0700 08/26 0659    P.O. 125 270     IV Piggyback 1000      Total Intake(mL/kg) 1125 (16.5) 270 (4)     Urine (mL/kg/hr) 550      Total Output 550      Net +575 +270            Urine Occurrence  2 x     Stool Occurrence  0 x             Physical Exam  Vitals reviewed.   Constitutional:       Appearance: Normal appearance.   Cardiovascular:      Rate and Rhythm: Normal rate.      Pulses: Normal pulses.   Pulmonary:      Effort: Pulmonary effort is normal.   Abdominal:      General: Abdomen is flat.      Palpations: Abdomen is soft.   Skin:     General: Skin is warm and dry.   Neurological:      General: No focal deficit present.      Mental Status: She is alert and oriented to person, place, and time.       Significant Labs:  I have reviewed all pertinent lab results within the past 24  hours.  CBC:   Recent Labs   Lab 08/25/22  0244   WBC 8.97   RBC 3.80*   HGB 11.8*   HCT 35.9*      MCV 95   MCH 31.1*   MCHC 32.9     BMP:   Recent Labs   Lab 08/23/22  2225 08/25/22  0244   * 83    141   K 3.2* 3.5    109   CO2 21* 21*   BUN 12 9   CREATININE 0.8 0.7   CALCIUM 9.8 9.3   MG 1.8  --        Significant Diagnostics:  I have reviewed all pertinent imaging results/findings within the past 24 hours.

## 2022-08-26 RX ORDER — IBUPROFEN 200 MG
1 TABLET ORAL DAILY
Qty: 56 PATCH | Refills: 0 | Status: SHIPPED | OUTPATIENT
Start: 2022-08-26 | End: 2022-09-25

## 2022-08-31 DIAGNOSIS — E11.9 TYPE 2 DIABETES MELLITUS WITHOUT COMPLICATION: ICD-10-CM

## 2022-09-02 ENCOUNTER — OFFICE VISIT (OUTPATIENT)
Dept: PRIMARY CARE CLINIC | Facility: CLINIC | Age: 47
End: 2022-09-02
Payer: MEDICAID

## 2022-09-02 VITALS
HEART RATE: 83 BPM | TEMPERATURE: 99 F | SYSTOLIC BLOOD PRESSURE: 114 MMHG | WEIGHT: 150.38 LBS | BODY MASS INDEX: 23.55 KG/M2 | OXYGEN SATURATION: 95 % | DIASTOLIC BLOOD PRESSURE: 75 MMHG

## 2022-09-02 DIAGNOSIS — S22.42XD CLOSED FRACTURE OF MULTIPLE RIBS OF LEFT SIDE WITH ROUTINE HEALING: ICD-10-CM

## 2022-09-02 DIAGNOSIS — S27.0XXS TRAUMATIC PNEUMOTHORAX, SEQUELA: Primary | ICD-10-CM

## 2022-09-02 PROBLEM — R06.09 DOE (DYSPNEA ON EXERTION): Status: RESOLVED | Noted: 2019-05-06 | Resolved: 2022-09-02

## 2022-09-02 PROBLEM — S27.0XXA TRAUMATIC PNEUMOTHORAX: Status: ACTIVE | Noted: 2022-09-02

## 2022-09-02 PROBLEM — Z01.810 PREOPERATIVE CARDIOVASCULAR EXAMINATION: Status: RESOLVED | Noted: 2019-05-06 | Resolved: 2022-09-02

## 2022-09-02 PROBLEM — R07.9 CHEST PAIN: Status: RESOLVED | Noted: 2019-05-06 | Resolved: 2022-09-02

## 2022-09-02 PROCEDURE — 99205 PR OFFICE/OUTPT VISIT, NEW, LEVL V, 60-74 MIN: ICD-10-PCS | Mod: S$PBB,,, | Performed by: INTERNAL MEDICINE

## 2022-09-02 PROCEDURE — 3078F PR MOST RECENT DIASTOLIC BLOOD PRESSURE < 80 MM HG: ICD-10-PCS | Mod: CPTII,,, | Performed by: INTERNAL MEDICINE

## 2022-09-02 PROCEDURE — 99999 PR PBB SHADOW E&M-EST. PATIENT-LVL IV: CPT | Mod: PBBFAC,,, | Performed by: INTERNAL MEDICINE

## 2022-09-02 PROCEDURE — 3074F SYST BP LT 130 MM HG: CPT | Mod: CPTII,,, | Performed by: INTERNAL MEDICINE

## 2022-09-02 PROCEDURE — 3074F PR MOST RECENT SYSTOLIC BLOOD PRESSURE < 130 MM HG: ICD-10-PCS | Mod: CPTII,,, | Performed by: INTERNAL MEDICINE

## 2022-09-02 PROCEDURE — 3078F DIAST BP <80 MM HG: CPT | Mod: CPTII,,, | Performed by: INTERNAL MEDICINE

## 2022-09-02 PROCEDURE — 1160F RVW MEDS BY RX/DR IN RCRD: CPT | Mod: CPTII,,, | Performed by: INTERNAL MEDICINE

## 2022-09-02 PROCEDURE — 3008F BODY MASS INDEX DOCD: CPT | Mod: CPTII,,, | Performed by: INTERNAL MEDICINE

## 2022-09-02 PROCEDURE — 99999 PR PBB SHADOW E&M-EST. PATIENT-LVL IV: ICD-10-PCS | Mod: PBBFAC,,, | Performed by: INTERNAL MEDICINE

## 2022-09-02 PROCEDURE — 99214 OFFICE O/P EST MOD 30 MIN: CPT | Mod: PBBFAC,PO | Performed by: INTERNAL MEDICINE

## 2022-09-02 PROCEDURE — 3008F PR BODY MASS INDEX (BMI) DOCUMENTED: ICD-10-PCS | Mod: CPTII,,, | Performed by: INTERNAL MEDICINE

## 2022-09-02 PROCEDURE — 1159F PR MEDICATION LIST DOCUMENTED IN MEDICAL RECORD: ICD-10-PCS | Mod: CPTII,,, | Performed by: INTERNAL MEDICINE

## 2022-09-02 PROCEDURE — 1160F PR REVIEW ALL MEDS BY PRESCRIBER/CLIN PHARMACIST DOCUMENTED: ICD-10-PCS | Mod: CPTII,,, | Performed by: INTERNAL MEDICINE

## 2022-09-02 PROCEDURE — 1159F MED LIST DOCD IN RCRD: CPT | Mod: CPTII,,, | Performed by: INTERNAL MEDICINE

## 2022-09-02 PROCEDURE — 99205 OFFICE O/P NEW HI 60 MIN: CPT | Mod: S$PBB,,, | Performed by: INTERNAL MEDICINE

## 2022-09-02 RX ORDER — TRAMADOL HYDROCHLORIDE 50 MG/1
50 TABLET ORAL EVERY 6 HOURS PRN
Qty: 20 TABLET | Refills: 0 | Status: SHIPPED | OUTPATIENT
Start: 2022-09-02 | End: 2022-10-25
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
## 2022-09-06 ENCOUNTER — TELEPHONE (OUTPATIENT)
Dept: SMOKING CESSATION | Facility: CLINIC | Age: 47
End: 2022-09-06
Payer: MEDICAID

## 2022-09-06 ENCOUNTER — CLINICAL SUPPORT (OUTPATIENT)
Dept: SMOKING CESSATION | Facility: CLINIC | Age: 47
End: 2022-09-06
Payer: COMMERCIAL

## 2022-09-06 DIAGNOSIS — F17.200 NICOTINE DEPENDENCE: Primary | ICD-10-CM

## 2022-09-06 PROCEDURE — 99404 PREV MED CNSL INDIV APPRX 60: CPT | Mod: 95,,,

## 2022-09-06 PROCEDURE — 99404 PR PREVENT COUNSEL,INDIV,60 MIN: ICD-10-PCS | Mod: 95,,,

## 2022-09-06 RX ORDER — VARENICLINE TARTRATE 1 MG/1
1 TABLET, FILM COATED ORAL 2 TIMES DAILY
Qty: 56 TABLET | Refills: 0 | Status: ON HOLD | OUTPATIENT
Start: 2022-09-06 | End: 2022-10-27

## 2022-09-06 NOTE — Clinical Note
FTND score of 6 indicates a high dependence to Nicotine.  Patient states she is smoking 10 menthol cigarettes a day.  MORENO-D score of 22 is perceived as some degree of mental distress and possible depression.  NRT patches and Chantix ordered.  Behavioral changes discussed including move cigarettes, move smoking spot, pay attention while smoking and and do nothing else.

## 2022-09-06 NOTE — TELEPHONE ENCOUNTER
1st attempt, patient called to help with check in for today's virtual visit.  Patient stated she would check in online.

## 2022-09-07 ENCOUNTER — PATIENT MESSAGE (OUTPATIENT)
Dept: PRIMARY CARE CLINIC | Facility: CLINIC | Age: 47
End: 2022-09-07
Payer: MEDICAID

## 2022-09-07 RX ORDER — LIDOCAINE 50 MG/G
1 PATCH TOPICAL DAILY
Qty: 10 PATCH | Refills: 0 | Status: SHIPPED | OUTPATIENT
Start: 2022-09-07 | End: 2022-09-17

## 2022-09-13 ENCOUNTER — CLINICAL SUPPORT (OUTPATIENT)
Dept: SMOKING CESSATION | Facility: CLINIC | Age: 47
End: 2022-09-13
Payer: COMMERCIAL

## 2022-09-13 DIAGNOSIS — F17.200 NICOTINE DEPENDENCE: Primary | ICD-10-CM

## 2022-09-13 PROCEDURE — 99402 PREV MED CNSL INDIV APPRX 30: CPT | Mod: 95,,,

## 2022-09-13 PROCEDURE — 99402 PR PREVENT COUNSEL,INDIV,30 MIN: ICD-10-PCS | Mod: 95,,,

## 2022-09-13 NOTE — Clinical Note
Patient states she used patches and Chantix for 3 days and was down to 2 cpd.  She then had a friend over who smokes, and she smoked with her friend.  Patient then discontinued her NRT and Chantix and bought a pack.  Patient states she will start over today.  We discussed starting NRT today and Chantix tomorrow.  We reviewed instructions for Chantix.  We discussed alcohol and socialization as a trigger to smoke. We discussed avoiding her friends who smoke this week.  We discussed having good and bad days moving forward and not feeling as if she has to quit immediately when starting medications.  Patient remains on prescribed tobacco cessation medication regimen of 21 mg patch without any negative side effects at this time.The patient remains on the prescribed tobacco cessation medication regimen of 1 mg Chantix BID without any negative side effects at this time.  The patient will continue with virtual therapy sessions and medication monitoring by CTTS. Prescribed medication management will be by physician.

## 2022-09-15 ENCOUNTER — TELEPHONE (OUTPATIENT)
Dept: NEUROLOGY | Facility: CLINIC | Age: 47
End: 2022-09-15
Payer: MEDICAID

## 2022-10-20 ENCOUNTER — HOSPITAL ENCOUNTER (INPATIENT)
Facility: HOSPITAL | Age: 47
LOS: 3 days | Discharge: HOME | DRG: 337 | End: 2022-10-23
Attending: STUDENT IN AN ORGANIZED HEALTH CARE EDUCATION/TRAINING PROGRAM | Admitting: STUDENT IN AN ORGANIZED HEALTH CARE EDUCATION/TRAINING PROGRAM
Payer: MEDICAID

## 2022-10-20 DIAGNOSIS — R10.9 ABDOMINAL PAIN: ICD-10-CM

## 2022-10-20 DIAGNOSIS — K56.609 LARGE BOWEL OBSTRUCTION: Primary | ICD-10-CM

## 2022-10-20 DIAGNOSIS — K56.609 BOWEL OBSTRUCTION: ICD-10-CM

## 2022-10-20 LAB
ALBUMIN SERPL BCP-MCNC: 3.9 G/DL (ref 3.5–5.2)
ALP SERPL-CCNC: 84 U/L (ref 55–135)
ALT SERPL W/O P-5'-P-CCNC: 31 U/L (ref 10–44)
ANION GAP SERPL CALC-SCNC: 12 MMOL/L (ref 8–16)
AST SERPL-CCNC: 20 U/L (ref 10–40)
B-HCG UR QL: NEGATIVE
BASOPHILS # BLD AUTO: 0.07 K/UL (ref 0–0.2)
BASOPHILS NFR BLD: 0.9 % (ref 0–1.9)
BILIRUB SERPL-MCNC: 1 MG/DL (ref 0.1–1)
BILIRUB UR QL STRIP: NEGATIVE
BUN SERPL-MCNC: 8 MG/DL (ref 6–20)
CALCIUM SERPL-MCNC: 9.7 MG/DL (ref 8.7–10.5)
CHLORIDE SERPL-SCNC: 106 MMOL/L (ref 95–110)
CLARITY UR: CLEAR
CO2 SERPL-SCNC: 23 MMOL/L (ref 23–29)
COLOR UR: YELLOW
CREAT SERPL-MCNC: 0.8 MG/DL (ref 0.5–1.4)
CTP QC/QA: YES
DIFFERENTIAL METHOD: ABNORMAL
EOSINOPHIL # BLD AUTO: 0.2 K/UL (ref 0–0.5)
EOSINOPHIL NFR BLD: 2.7 % (ref 0–8)
ERYTHROCYTE [DISTWIDTH] IN BLOOD BY AUTOMATED COUNT: 12.2 % (ref 11.5–14.5)
EST. GFR  (NO RACE VARIABLE): >60 ML/MIN/1.73 M^2
GLUCOSE SERPL-MCNC: 124 MG/DL (ref 70–110)
GLUCOSE UR QL STRIP: NEGATIVE
HCT VFR BLD AUTO: 42.7 % (ref 37–48.5)
HGB BLD-MCNC: 13.7 G/DL (ref 12–16)
HGB UR QL STRIP: NEGATIVE
IMM GRANULOCYTES # BLD AUTO: 0.01 K/UL (ref 0–0.04)
IMM GRANULOCYTES NFR BLD AUTO: 0.1 % (ref 0–0.5)
KETONES UR QL STRIP: ABNORMAL
LACTATE SERPL-SCNC: 1.2 MMOL/L (ref 0.5–2.2)
LEUKOCYTE ESTERASE UR QL STRIP: NEGATIVE
LIPASE SERPL-CCNC: 10 U/L (ref 4–60)
LYMPHOCYTES # BLD AUTO: 3.5 K/UL (ref 1–4.8)
LYMPHOCYTES NFR BLD: 43 % (ref 18–48)
MCH RBC QN AUTO: 31.1 PG (ref 27–31)
MCHC RBC AUTO-ENTMCNC: 32.1 G/DL (ref 32–36)
MCV RBC AUTO: 97 FL (ref 82–98)
MONOCYTES # BLD AUTO: 0.5 K/UL (ref 0.3–1)
MONOCYTES NFR BLD: 6.6 % (ref 4–15)
NEUTROPHILS # BLD AUTO: 3.8 K/UL (ref 1.8–7.7)
NEUTROPHILS NFR BLD: 46.7 % (ref 38–73)
NITRITE UR QL STRIP: NEGATIVE
NRBC BLD-RTO: 0 /100 WBC
PH UR STRIP: 6 [PH] (ref 5–8)
PLATELET # BLD AUTO: 411 K/UL (ref 150–450)
PMV BLD AUTO: 10.1 FL (ref 9.2–12.9)
POTASSIUM SERPL-SCNC: 3.7 MMOL/L (ref 3.5–5.1)
PROT SERPL-MCNC: 7.1 G/DL (ref 6–8.4)
PROT UR QL STRIP: NEGATIVE
RBC # BLD AUTO: 4.41 M/UL (ref 4–5.4)
SODIUM SERPL-SCNC: 141 MMOL/L (ref 136–145)
SP GR UR STRIP: 1 (ref 1–1.03)
URN SPEC COLLECT METH UR: ABNORMAL
UROBILINOGEN UR STRIP-ACNC: NEGATIVE EU/DL
WBC # BLD AUTO: 8.18 K/UL (ref 3.9–12.7)

## 2022-10-20 PROCEDURE — 83690 ASSAY OF LIPASE: CPT | Performed by: STUDENT IN AN ORGANIZED HEALTH CARE EDUCATION/TRAINING PROGRAM

## 2022-10-20 PROCEDURE — 63600175 PHARM REV CODE 636 W HCPCS: Performed by: STUDENT IN AN ORGANIZED HEALTH CARE EDUCATION/TRAINING PROGRAM

## 2022-10-20 PROCEDURE — 96375 TX/PRO/DX INJ NEW DRUG ADDON: CPT

## 2022-10-20 PROCEDURE — G0378 HOSPITAL OBSERVATION PER HR: HCPCS

## 2022-10-20 PROCEDURE — 85025 COMPLETE CBC W/AUTO DIFF WBC: CPT | Performed by: STUDENT IN AN ORGANIZED HEALTH CARE EDUCATION/TRAINING PROGRAM

## 2022-10-20 PROCEDURE — 80053 COMPREHEN METABOLIC PANEL: CPT | Performed by: STUDENT IN AN ORGANIZED HEALTH CARE EDUCATION/TRAINING PROGRAM

## 2022-10-20 PROCEDURE — 25000003 PHARM REV CODE 250: Performed by: STUDENT IN AN ORGANIZED HEALTH CARE EDUCATION/TRAINING PROGRAM

## 2022-10-20 PROCEDURE — 93005 ELECTROCARDIOGRAM TRACING: CPT

## 2022-10-20 PROCEDURE — 99285 EMERGENCY DEPT VISIT HI MDM: CPT | Mod: 25

## 2022-10-20 PROCEDURE — 93010 EKG 12-LEAD: ICD-10-PCS | Mod: ,,, | Performed by: INTERNAL MEDICINE

## 2022-10-20 PROCEDURE — 93010 ELECTROCARDIOGRAM REPORT: CPT | Mod: ,,, | Performed by: INTERNAL MEDICINE

## 2022-10-20 PROCEDURE — 96374 THER/PROPH/DIAG INJ IV PUSH: CPT

## 2022-10-20 PROCEDURE — 25500020 PHARM REV CODE 255: Performed by: STUDENT IN AN ORGANIZED HEALTH CARE EDUCATION/TRAINING PROGRAM

## 2022-10-20 PROCEDURE — 12000002 HC ACUTE/MED SURGE SEMI-PRIVATE ROOM

## 2022-10-20 PROCEDURE — 96361 HYDRATE IV INFUSION ADD-ON: CPT

## 2022-10-20 PROCEDURE — 96376 TX/PRO/DX INJ SAME DRUG ADON: CPT

## 2022-10-20 PROCEDURE — 83605 ASSAY OF LACTIC ACID: CPT | Performed by: STUDENT IN AN ORGANIZED HEALTH CARE EDUCATION/TRAINING PROGRAM

## 2022-10-20 PROCEDURE — 81025 URINE PREGNANCY TEST: CPT | Performed by: STUDENT IN AN ORGANIZED HEALTH CARE EDUCATION/TRAINING PROGRAM

## 2022-10-20 PROCEDURE — 87635 SARS-COV-2 COVID-19 AMP PRB: CPT | Performed by: STUDENT IN AN ORGANIZED HEALTH CARE EDUCATION/TRAINING PROGRAM

## 2022-10-20 PROCEDURE — 81003 URINALYSIS AUTO W/O SCOPE: CPT | Performed by: STUDENT IN AN ORGANIZED HEALTH CARE EDUCATION/TRAINING PROGRAM

## 2022-10-20 RX ORDER — SODIUM CHLORIDE, SODIUM LACTATE, POTASSIUM CHLORIDE, CALCIUM CHLORIDE 600; 310; 30; 20 MG/100ML; MG/100ML; MG/100ML; MG/100ML
INJECTION, SOLUTION INTRAVENOUS CONTINUOUS
Status: DISCONTINUED | OUTPATIENT
Start: 2022-10-21 | End: 2022-10-21

## 2022-10-20 RX ORDER — ONDANSETRON 2 MG/ML
4 INJECTION INTRAMUSCULAR; INTRAVENOUS
Status: COMPLETED | OUTPATIENT
Start: 2022-10-20 | End: 2022-10-20

## 2022-10-20 RX ORDER — MORPHINE SULFATE 4 MG/ML
4 INJECTION, SOLUTION INTRAMUSCULAR; INTRAVENOUS
Status: COMPLETED | OUTPATIENT
Start: 2022-10-20 | End: 2022-10-20

## 2022-10-20 RX ORDER — FAMOTIDINE 10 MG/ML
20 INJECTION INTRAVENOUS 2 TIMES DAILY
Status: DISCONTINUED | OUTPATIENT
Start: 2022-10-20 | End: 2022-10-21

## 2022-10-20 RX ORDER — HYDROMORPHONE HYDROCHLORIDE 1 MG/ML
1 INJECTION, SOLUTION INTRAMUSCULAR; INTRAVENOUS; SUBCUTANEOUS
Status: COMPLETED | OUTPATIENT
Start: 2022-10-20 | End: 2022-10-20

## 2022-10-20 RX ADMIN — ONDANSETRON HYDROCHLORIDE 4 MG: 2 SOLUTION INTRAMUSCULAR; INTRAVENOUS at 11:10

## 2022-10-20 RX ADMIN — FAMOTIDINE 20 MG: 10 INJECTION INTRAVENOUS at 09:10

## 2022-10-20 RX ADMIN — ONDANSETRON HYDROCHLORIDE 4 MG: 2 SOLUTION INTRAMUSCULAR; INTRAVENOUS at 09:10

## 2022-10-20 RX ADMIN — SODIUM CHLORIDE 1000 ML: 0.9 INJECTION, SOLUTION INTRAVENOUS at 09:10

## 2022-10-20 RX ADMIN — IOHEXOL 75 ML: 350 INJECTION, SOLUTION INTRAVENOUS at 10:10

## 2022-10-20 RX ADMIN — MORPHINE SULFATE 4 MG: 4 INJECTION INTRAVENOUS at 09:10

## 2022-10-20 RX ADMIN — HYDROMORPHONE HYDROCHLORIDE 1 MG: 1 INJECTION, SOLUTION INTRAMUSCULAR; INTRAVENOUS; SUBCUTANEOUS at 11:10

## 2022-10-20 NOTE — Clinical Note
Diagnosis: Bowel obstruction [198761]   Future Attending Provider: TITI GUTIERREZ [[id number]]   Admitting Provider:: DARWIN BHATIA [[id number]]

## 2022-10-21 ENCOUNTER — ANESTHESIA (OUTPATIENT)
Dept: SURGERY | Facility: HOSPITAL | Age: 47
DRG: 337 | End: 2022-10-21
Payer: MEDICAID

## 2022-10-21 ENCOUNTER — ANESTHESIA EVENT (OUTPATIENT)
Dept: SURGERY | Facility: HOSPITAL | Age: 47
DRG: 337 | End: 2022-10-21
Payer: MEDICAID

## 2022-10-21 PROBLEM — K56.609 LARGE BOWEL OBSTRUCTION: Status: ACTIVE | Noted: 2022-10-21

## 2022-10-21 LAB
ANION GAP SERPL CALC-SCNC: 11 MMOL/L (ref 8–16)
BASOPHILS # BLD AUTO: 0.07 K/UL (ref 0–0.2)
BASOPHILS NFR BLD: 0.8 % (ref 0–1.9)
BUN SERPL-MCNC: 6 MG/DL (ref 6–20)
CALCIUM SERPL-MCNC: 9.3 MG/DL (ref 8.7–10.5)
CHLORIDE SERPL-SCNC: 108 MMOL/L (ref 95–110)
CO2 SERPL-SCNC: 20 MMOL/L (ref 23–29)
CREAT SERPL-MCNC: 0.7 MG/DL (ref 0.5–1.4)
CTP QC/QA: YES
DIFFERENTIAL METHOD: ABNORMAL
EOSINOPHIL # BLD AUTO: 0.2 K/UL (ref 0–0.5)
EOSINOPHIL NFR BLD: 1.9 % (ref 0–8)
ERYTHROCYTE [DISTWIDTH] IN BLOOD BY AUTOMATED COUNT: 12.1 % (ref 11.5–14.5)
EST. GFR  (NO RACE VARIABLE): >60 ML/MIN/1.73 M^2
GLUCOSE SERPL-MCNC: 84 MG/DL (ref 70–110)
HCT VFR BLD AUTO: 42.3 % (ref 37–48.5)
HGB BLD-MCNC: 13.3 G/DL (ref 12–16)
IMM GRANULOCYTES # BLD AUTO: 0.04 K/UL (ref 0–0.04)
IMM GRANULOCYTES NFR BLD AUTO: 0.4 % (ref 0–0.5)
LACTATE SERPL-SCNC: 0.6 MMOL/L (ref 0.5–2.2)
LYMPHOCYTES # BLD AUTO: 3.3 K/UL (ref 1–4.8)
LYMPHOCYTES NFR BLD: 35.2 % (ref 18–48)
MCH RBC QN AUTO: 30.9 PG (ref 27–31)
MCHC RBC AUTO-ENTMCNC: 31.4 G/DL (ref 32–36)
MCV RBC AUTO: 98 FL (ref 82–98)
MONOCYTES # BLD AUTO: 0.8 K/UL (ref 0.3–1)
MONOCYTES NFR BLD: 8.8 % (ref 4–15)
NEUTROPHILS # BLD AUTO: 4.9 K/UL (ref 1.8–7.7)
NEUTROPHILS NFR BLD: 52.9 % (ref 38–73)
NRBC BLD-RTO: 0 /100 WBC
PLATELET # BLD AUTO: 304 K/UL (ref 150–450)
PMV BLD AUTO: 11.2 FL (ref 9.2–12.9)
POTASSIUM SERPL-SCNC: 4.1 MMOL/L (ref 3.5–5.1)
RBC # BLD AUTO: 4.31 M/UL (ref 4–5.4)
SARS-COV-2 RDRP RESP QL NAA+PROBE: NEGATIVE
SODIUM SERPL-SCNC: 139 MMOL/L (ref 136–145)
WBC # BLD AUTO: 9.32 K/UL (ref 3.9–12.7)

## 2022-10-21 PROCEDURE — 11000001 HC ACUTE MED/SURG PRIVATE ROOM

## 2022-10-21 PROCEDURE — 63600175 PHARM REV CODE 636 W HCPCS: Performed by: ANESTHESIOLOGY

## 2022-10-21 PROCEDURE — 25000003 PHARM REV CODE 250: Performed by: NURSE ANESTHETIST, CERTIFIED REGISTERED

## 2022-10-21 PROCEDURE — 99222 1ST HOSP IP/OBS MODERATE 55: CPT | Mod: ,,, | Performed by: STUDENT IN AN ORGANIZED HEALTH CARE EDUCATION/TRAINING PROGRAM

## 2022-10-21 PROCEDURE — 37000008 HC ANESTHESIA 1ST 15 MINUTES: Performed by: STUDENT IN AN ORGANIZED HEALTH CARE EDUCATION/TRAINING PROGRAM

## 2022-10-21 PROCEDURE — C1765 ADHESION BARRIER: HCPCS | Performed by: STUDENT IN AN ORGANIZED HEALTH CARE EDUCATION/TRAINING PROGRAM

## 2022-10-21 PROCEDURE — 80048 BASIC METABOLIC PNL TOTAL CA: CPT

## 2022-10-21 PROCEDURE — 63600175 PHARM REV CODE 636 W HCPCS: Performed by: STUDENT IN AN ORGANIZED HEALTH CARE EDUCATION/TRAINING PROGRAM

## 2022-10-21 PROCEDURE — 94761 N-INVAS EAR/PLS OXIMETRY MLT: CPT

## 2022-10-21 PROCEDURE — 85025 COMPLETE CBC W/AUTO DIFF WBC: CPT

## 2022-10-21 PROCEDURE — C9290 INJ, BUPIVACAINE LIPOSOME: HCPCS | Performed by: STUDENT IN AN ORGANIZED HEALTH CARE EDUCATION/TRAINING PROGRAM

## 2022-10-21 PROCEDURE — P9045 ALBUMIN (HUMAN), 5%, 250 ML: HCPCS | Mod: JG | Performed by: NURSE ANESTHETIST, CERTIFIED REGISTERED

## 2022-10-21 PROCEDURE — 71000033 HC RECOVERY, INTIAL HOUR: Performed by: STUDENT IN AN ORGANIZED HEALTH CARE EDUCATION/TRAINING PROGRAM

## 2022-10-21 PROCEDURE — 99222 PR INITIAL HOSPITAL CARE,LEVL II: ICD-10-PCS | Mod: ,,, | Performed by: STUDENT IN AN ORGANIZED HEALTH CARE EDUCATION/TRAINING PROGRAM

## 2022-10-21 PROCEDURE — 36000706: Performed by: STUDENT IN AN ORGANIZED HEALTH CARE EDUCATION/TRAINING PROGRAM

## 2022-10-21 PROCEDURE — 83605 ASSAY OF LACTIC ACID: CPT | Performed by: STUDENT IN AN ORGANIZED HEALTH CARE EDUCATION/TRAINING PROGRAM

## 2022-10-21 PROCEDURE — 36000707: Performed by: STUDENT IN AN ORGANIZED HEALTH CARE EDUCATION/TRAINING PROGRAM

## 2022-10-21 PROCEDURE — 37000009 HC ANESTHESIA EA ADD 15 MINS: Performed by: STUDENT IN AN ORGANIZED HEALTH CARE EDUCATION/TRAINING PROGRAM

## 2022-10-21 PROCEDURE — 63600175 PHARM REV CODE 636 W HCPCS: Performed by: NURSE ANESTHETIST, CERTIFIED REGISTERED

## 2022-10-21 DEVICE — SEPRAFILM BARRIER 3 X 2: Type: IMPLANTABLE DEVICE | Site: ABDOMEN | Status: FUNCTIONAL

## 2022-10-21 RX ORDER — SODIUM CHLORIDE 0.9 % (FLUSH) 0.9 %
3 SYRINGE (ML) INJECTION
Status: DISCONTINUED | OUTPATIENT
Start: 2022-10-21 | End: 2022-10-21 | Stop reason: HOSPADM

## 2022-10-21 RX ORDER — BUPIVACAINE HYDROCHLORIDE 5 MG/ML
INJECTION, SOLUTION PERINEURAL
Status: DISCONTINUED | OUTPATIENT
Start: 2022-10-21 | End: 2022-10-21 | Stop reason: HOSPADM

## 2022-10-21 RX ORDER — PHENYLEPHRINE HYDROCHLORIDE 10 MG/ML
INJECTION INTRAVENOUS
Status: DISCONTINUED | OUTPATIENT
Start: 2022-10-21 | End: 2022-10-21

## 2022-10-21 RX ORDER — NEOSTIGMINE METHYLSULFATE 1 MG/ML
INJECTION, SOLUTION INTRAVENOUS
Status: DISCONTINUED | OUTPATIENT
Start: 2022-10-21 | End: 2022-10-21

## 2022-10-21 RX ORDER — ALBUMIN HUMAN 50 G/1000ML
SOLUTION INTRAVENOUS CONTINUOUS PRN
Status: DISCONTINUED | OUTPATIENT
Start: 2022-10-21 | End: 2022-10-21

## 2022-10-21 RX ORDER — ROCURONIUM BROMIDE 10 MG/ML
INJECTION, SOLUTION INTRAVENOUS
Status: DISCONTINUED | OUTPATIENT
Start: 2022-10-21 | End: 2022-10-21

## 2022-10-21 RX ORDER — ONDANSETRON 2 MG/ML
INJECTION INTRAMUSCULAR; INTRAVENOUS
Status: DISCONTINUED | OUTPATIENT
Start: 2022-10-21 | End: 2022-10-21

## 2022-10-21 RX ORDER — HYDROMORPHONE HYDROCHLORIDE 2 MG/ML
0.5 INJECTION, SOLUTION INTRAMUSCULAR; INTRAVENOUS; SUBCUTANEOUS EVERY 5 MIN PRN
Status: DISCONTINUED | OUTPATIENT
Start: 2022-10-21 | End: 2022-10-21 | Stop reason: HOSPADM

## 2022-10-21 RX ORDER — PROPOFOL 10 MG/ML
VIAL (ML) INTRAVENOUS
Status: DISCONTINUED | OUTPATIENT
Start: 2022-10-21 | End: 2022-10-21

## 2022-10-21 RX ORDER — MORPHINE SULFATE 2 MG/ML
2 INJECTION, SOLUTION INTRAMUSCULAR; INTRAVENOUS
Status: DISCONTINUED | OUTPATIENT
Start: 2022-10-21 | End: 2022-10-23 | Stop reason: HOSPADM

## 2022-10-21 RX ORDER — ONDANSETRON 2 MG/ML
4 INJECTION INTRAMUSCULAR; INTRAVENOUS DAILY PRN
Status: DISCONTINUED | OUTPATIENT
Start: 2022-10-21 | End: 2022-10-21 | Stop reason: HOSPADM

## 2022-10-21 RX ORDER — MORPHINE SULFATE 4 MG/ML
4 INJECTION, SOLUTION INTRAMUSCULAR; INTRAVENOUS
Status: DISCONTINUED | OUTPATIENT
Start: 2022-10-21 | End: 2022-10-21

## 2022-10-21 RX ORDER — LORAZEPAM 2 MG/ML
2 INJECTION INTRAMUSCULAR
Status: COMPLETED | OUTPATIENT
Start: 2022-10-21 | End: 2022-10-21

## 2022-10-21 RX ORDER — SODIUM CHLORIDE, SODIUM LACTATE, POTASSIUM CHLORIDE, CALCIUM CHLORIDE 600; 310; 30; 20 MG/100ML; MG/100ML; MG/100ML; MG/100ML
INJECTION, SOLUTION INTRAVENOUS CONTINUOUS PRN
Status: DISCONTINUED | OUTPATIENT
Start: 2022-10-21 | End: 2022-10-21

## 2022-10-21 RX ORDER — DIPHENHYDRAMINE HYDROCHLORIDE 50 MG/ML
12.5 INJECTION INTRAMUSCULAR; INTRAVENOUS EVERY 6 HOURS PRN
Status: DISCONTINUED | OUTPATIENT
Start: 2022-10-21 | End: 2022-10-21 | Stop reason: HOSPADM

## 2022-10-21 RX ORDER — LIDOCAINE HCL/PF 100 MG/5ML
SYRINGE (ML) INTRAVENOUS
Status: DISCONTINUED | OUTPATIENT
Start: 2022-10-21 | End: 2022-10-21

## 2022-10-21 RX ORDER — FENTANYL CITRATE 50 UG/ML
INJECTION, SOLUTION INTRAMUSCULAR; INTRAVENOUS
Status: DISCONTINUED | OUTPATIENT
Start: 2022-10-21 | End: 2022-10-21

## 2022-10-21 RX ORDER — KETOROLAC TROMETHAMINE 30 MG/ML
INJECTION, SOLUTION INTRAMUSCULAR; INTRAVENOUS
Status: DISCONTINUED | OUTPATIENT
Start: 2022-10-21 | End: 2022-10-21

## 2022-10-21 RX ORDER — KETOROLAC TROMETHAMINE 30 MG/ML
15 INJECTION, SOLUTION INTRAMUSCULAR; INTRAVENOUS EVERY 6 HOURS
Status: DISCONTINUED | OUTPATIENT
Start: 2022-10-21 | End: 2022-10-23 | Stop reason: HOSPADM

## 2022-10-21 RX ORDER — ACETAMINOPHEN 10 MG/ML
INJECTION, SOLUTION INTRAVENOUS
Status: DISCONTINUED | OUTPATIENT
Start: 2022-10-21 | End: 2022-10-21

## 2022-10-21 RX ADMIN — KETOROLAC TROMETHAMINE 30 MG: 30 INJECTION, SOLUTION INTRAMUSCULAR; INTRAVENOUS at 10:10

## 2022-10-21 RX ADMIN — NEOSTIGMINE METHYLSULFATE 4 MG: 1 INJECTION INTRAVENOUS at 10:10

## 2022-10-21 RX ADMIN — GLYCOPYRROLATE 0.4 MG: 0.2 INJECTION, SOLUTION INTRAMUSCULAR; INTRAVITREAL at 10:10

## 2022-10-21 RX ADMIN — FENTANYL CITRATE 50 MCG: 50 INJECTION, SOLUTION INTRAMUSCULAR; INTRAVENOUS at 09:10

## 2022-10-21 RX ADMIN — MORPHINE SULFATE 4 MG: 4 INJECTION INTRAVENOUS at 03:10

## 2022-10-21 RX ADMIN — MORPHINE SULFATE 4 MG: 4 INJECTION INTRAVENOUS at 01:10

## 2022-10-21 RX ADMIN — KETOROLAC TROMETHAMINE 15 MG: 30 INJECTION, SOLUTION INTRAMUSCULAR at 05:10

## 2022-10-21 RX ADMIN — ACETAMINOPHEN 1000 MG: 10 INJECTION, SOLUTION INTRAVENOUS at 09:10

## 2022-10-21 RX ADMIN — CEFOXITIN 2 G: 1 INJECTION, POWDER, FOR SOLUTION INTRAVENOUS at 09:10

## 2022-10-21 RX ADMIN — ALBUMIN (HUMAN): 12.5 SOLUTION INTRAVENOUS at 10:10

## 2022-10-21 RX ADMIN — FENTANYL CITRATE 25 MCG: 50 INJECTION, SOLUTION INTRAMUSCULAR; INTRAVENOUS at 10:10

## 2022-10-21 RX ADMIN — PHENYLEPHRINE HYDROCHLORIDE 100 MCG: 10 INJECTION INTRAVENOUS at 09:10

## 2022-10-21 RX ADMIN — ROCURONIUM BROMIDE 40 MG: 10 INJECTION, SOLUTION INTRAVENOUS at 09:10

## 2022-10-21 RX ADMIN — LIDOCAINE HYDROCHLORIDE 75 MG: 20 INJECTION, SOLUTION INTRAVENOUS at 09:10

## 2022-10-21 RX ADMIN — MORPHINE SULFATE 2 MG: 2 INJECTION, SOLUTION INTRAMUSCULAR; INTRAVENOUS at 08:10

## 2022-10-21 RX ADMIN — LORAZEPAM 1 MG: 2 INJECTION INTRAMUSCULAR; INTRAVENOUS at 01:10

## 2022-10-21 RX ADMIN — SODIUM CHLORIDE, SODIUM LACTATE, POTASSIUM CHLORIDE, AND CALCIUM CHLORIDE: .6; .31; .03; .02 INJECTION, SOLUTION INTRAVENOUS at 09:10

## 2022-10-21 RX ADMIN — PROPOFOL 200 MG: 10 INJECTION, EMULSION INTRAVENOUS at 09:10

## 2022-10-21 RX ADMIN — KETOROLAC TROMETHAMINE 15 MG: 30 INJECTION, SOLUTION INTRAMUSCULAR at 12:10

## 2022-10-21 RX ADMIN — HYDROMORPHONE HYDROCHLORIDE 0.5 MG: 2 INJECTION, SOLUTION INTRAMUSCULAR; INTRAVENOUS; SUBCUTANEOUS at 11:10

## 2022-10-21 RX ADMIN — ONDANSETRON 4 MG: 2 INJECTION, SOLUTION INTRAMUSCULAR; INTRAVENOUS at 10:10

## 2022-10-21 NOTE — ANESTHESIA PROCEDURE NOTES
Intubation    Date/Time: 10/21/2022 9:31 AM  Performed by: Dandy Tavarez Jr., CRNA  Authorized by: Ced Trejo MD     Intubation:     Induction:  Intravenous    Intubated:  Postinduction    Mask Ventilation:  Easy mask    Attempts:  1    Attempted By:  CRNA    Method of Intubation:  Video laryngoscopy    Blade:  M[place]jo 3    Laryngeal View Grade: Grade I - full view of cords      Difficult Airway Encountered?: No      Complications:  None    Airway Device:  Oral endotracheal tube    Airway Device Size:  7.5    Style/Cuff Inflation:  Cuffed (inflated to minimal occlusive pressure)    Tube secured:  21    Secured at:  The lips    Placement Verified By:  Capnometry    Complicating Factors:  None    Findings Post-Intubation:  BS equal bilateral and atraumatic/condition of teeth unchanged

## 2022-10-21 NOTE — OP NOTE
[place] - Surgery ([facility])  General Surgery  Operative Note    SUMMARY     Date of Procedure: 10/21/2022     Procedure: Procedure(s) (LRB):  LAPAROTOMY, EXPLORATORY (N/A)  LYSIS, ADHESIONS (N/A)       Surgeon(s) and Role:     * Bunny Obrien MD - Primary    Assisting Surgeon: Barbara Chapman PGY2    Pre-Operative Diagnosis: Internal hernia, closed loop obstruction of colon    Post-Operative Diagnosis: Internal hernia, intra-abdominal adhesions    Anesthesia: General    Operative Findings (including complications, if any):   Moderate colon dilatation.  Viable through out  Mild omental adhesions to the RUQ above the liver. Could have acted as point for internal hernia vs intermittent volvulus? Adhesions taken down  Antecolic irish limb noted. No mesenteric defect noted.   No Jejunojejunostomy mesenteric defect.  Remainder of colon and small bowel was normal  Experel bilaterally  Seprafilm    Description of Technical Procedures:   Brought into OR and placed supine. A vertical midline laparotomy incision was made. The abdomen was entered without issue. The transverse colon was dilated. One of the transition points noted on CT seemed to correspond to the antecolic jejunal mesenteric defect. It was closed around the transverse colon and did not appear to be involved in any internal hernia. The entire colon was inspected and found to be healthy. The appendix was normal. The gallbladder was palpated and no stones were noted. There was evidence of previous gastric bypass with irish in the antecolic position with JJ anastomosis about 25cm from the ligament of treitz. The entire colon was palpated and inspected down to the rectum. No abnormalities were found. Experel was injected bilaterally. Seprafilm was placed under the incision. THe fascia was closed using 1 PDS. Skin was closed using 4-0 monocryl.     Significant Surgical Tasks Conducted by the Assistant(s), if Applicable:     Estimated Blood Loss (EBL):             Implants:   Implant Name Type Inv. Item Serial No.  Lot No. LRB No. Used Action   SEPRAFILM BARRIER 3 X 2 - [id number]  SEPRAFILM BARRIER 3 X 2  [employer] [id number] N/A 1 Implanted       Specimens:   Specimen (24h ago, onward)      None                    Condition: Stable    Disposition: PACU - hemodynamically stable.    Attestation: I was present and scrubbed for the entire procedure.

## 2022-10-21 NOTE — H&P
Patient ID: Velma Lopez is a 47 y.o. female.    Chief Complaint: Abdominal Pain (Pt arrives with complaints of sharp abdominal pain that started last night but was worse when she woke up this morning. Pt denies any n/v. Pt states she took a laxative around 2 hours ago with no relief. )      HPI:  HPI  47F presented to ED here with about 2 days of abdominal pain. Says ab feels tight on left side then has excruciating waves of pain. Never had this before. Some nausea, no vomiting. No fevers. Had loose BM yesterday and here in ED.   Hx of lap gastric bypass last year.   Minimal pain now after pain meds.     Review of Systems   Constitutional:  Negative for fever.   HENT:  Negative for trouble swallowing.    Respiratory:  Negative for shortness of breath.    Cardiovascular:  Negative for chest pain.   Gastrointestinal:  Positive for abdominal pain and nausea. Negative for blood in stool and vomiting.   Genitourinary:  Negative for dysuria.   Musculoskeletal:  Negative for gait problem.   Skin:  Negative for rash and wound.   Allergic/Immunologic: Negative for immunocompromised state.   Neurological:  Negative for weakness.   Hematological:  Does not bruise/bleed easily.   Psychiatric/Behavioral:  Negative for agitation.      Current Facility-Administered Medications   Medication Dose Route Frequency Provider Last Rate Last Admin    famotidine (PF) injection 20 mg  20 mg Intravenous BID Abhishek Villanueva MD   20 mg at 10/20/22 2104    lactated ringers infusion   Intravenous Continuous Abhishek Villanueva  mL/hr at 10/21/22 0125 Restarted at 10/21/22 0125    morphine injection 4 mg  4 mg Intravenous Q2H PRN Bunny Obrien MD   4 mg at 10/21/22 0326     Current Outpatient Medications   Medication Sig Dispense Refill    acetaminophen (TYLENOL) 500 MG tablet Take 2 tablets (1,000 mg total) by mouth every 6 (six) hours as needed for Pain. 50 tablet 0    amitriptyline (ELAVIL) 10 MG tablet TAKE 1 TABLET BY  MOUTH EVERY DAY IN THE EVENING 90 tablet 0    cetirizine (ZYRTEC) 10 MG tablet Take 10 mg by mouth once daily.      medroxyPROGESTERone (DEPO-PROVERA) 150 mg/mL Syrg Inject 1 mL (150 mg total) into the muscle every 3 (three) months. 1 mL 0    multivitamin-min-iron-FA-vit K (BARIATRIC MULTIVITAMINS) 45 mg iron- 800 mcg-120 mcg Cap Take by mouth.      omeprazole (PRILOSEC) 40 MG capsule Take 1 capsule (40 mg total) by mouth every morning. 90 capsule 3    potassium chloride (KLOR-CON) 20 mEq Pack Take by mouth.      solifenacin (VESICARE) 10 MG tablet Take 1 tablet (10 mg total) by mouth once daily. 30 tablet 11    traMADoL (ULTRAM) 50 mg tablet Take 1 tablet (50 mg total) by mouth every 6 (six) hours as needed for Pain. 20 tablet 0    varenicline (CHANTIX) 1 mg Tab Take 1 tablet (1 mg total) by mouth 2 (two) times daily. 56 tablet 0    vitamin D (VITAMIN D3) 1000 units Tab Take 50,000 Units by mouth once a week.      zolpidem (AMBIEN) 5 MG Tab TAKE 1 TABLET(5 MG) BY MOUTH EVERY NIGHT AS NEEDED 30 tablet 4     Facility-Administered Medications Ordered in Other Encounters   Medication Dose Route Frequency Provider Last Rate Last Admin    lidocaine (PF) 10 mg/ml (1%) injection 10 mg  1 mL Intradermal Once Brenna Bhavya Livingston MD        midazolam (VERSED) 1 mg/mL injection 0.5 mg  0.5 mg Intravenous PRN Ced Patino MD   2 mg at 07/27/20 1322    ropivacaine (Naropin) 1000 mg/500 mL (2 mg/mL) [facility] Pump infusion  4 mL/hr Perineural Continuous Ced Patino MD 4 mL/hr at 07/27/20 1519 4 mL/hr at 07/27/20 1519       Review of patient's allergies indicates:  No Known Allergies    Past Medical History:   Diagnosis Date    Chronic back pain     Diabetes type 2, uncontrolled     Mild nonproliferative diabetic retinopathy of left eye without macular edema associated with type 2 diabetes mellitus 10/21/2019    Morbid obesity with BMI of 40.0-44.9, adult     MILEY on CPAP     Plantar fasciitis of left  foot     Urticaria        Past Surgical History:   Procedure Laterality Date    ARTHROSCOPIC ACROMIOPLASTY OF SHOULDER Left 7/27/2020    Procedure: ACROMIOPLASTY, ARTHROSCOPIC;  Surgeon: Marcela Garza MD;  Location: [facility] OR;  Service: Orthopedics;  Laterality: Left;    ARTHROSCOPIC REPAIR OF ROTATOR CUFF OF SHOULDER Right 7/27/2020    Procedure: REPAIR, ROTATOR CUFF, ARTHROSCOPIC;  Surgeon: Marcela Garza MD;  Location: [facility] OR;  Service: Orthopedics;  Laterality: Right;  GENERAL/REGIONAL    COLONOSCOPY N/A 4/7/2022    Procedure: COLONOSCOPY;  Surgeon: Yadi Wong MD;  Location: [facility] (4TH FLR);  Service: Endoscopy;  Laterality: N/A;  Covid test at [place] on 4/4.EC    CYST REMOVAL      ovarian cyst removal at age 12    DECOMPRESSION OF SUBACROMIAL SPACE Left 7/27/2020    Procedure: DECOMPRESSION, SUBACROMIAL SPACE;  Surgeon: Marcela Garza MD;  Location: [facility] OR;  Service: Orthopedics;  Laterality: Left;    DILATION AND CURETTAGE OF UTERUS      ESOPHAGEAL MOTILITY STUDY USING HIGH RESOLUTION MANOMETRY N/A 9/17/2019    Procedure: MOTILITY STUDY, ESOPHAGUS, USING HIGH RESOLUTION MANOMETRY;  Surgeon: Leopoldo Swanson MD;  Location: [facility] (4TH FLR);  Service: Endoscopy;  Laterality: N/A;  Pt will be taking xanax before procedure to hopefull help with anxiety.    ESOPHAGOGASTRODUODENOSCOPY N/A 6/14/2019    Procedure: EGD (ESOPHAGOGASTRODUODENOSCOPY);  Surgeon: Ced Guevara MD;  Location: [facility] (4TH FLR);  Service: Endoscopy;  Laterality: N/A;  Please make sure it is scheduled after her cardiology appt.- see cardiology note dated 6/13/19 for clearance - ERW    GASTRIC BYPASS N/A 05/20/2021    INJECTION OF STEROID Left 7/27/2020    Procedure: INJECTION, STEROID LEFT THUMB TRIGGER FINGER;  Surgeon: Marcela Garza MD;  Location: [facility] OR;  Service: Orthopedics;  Laterality: Left;  LEFT THUMB, TRIGGER FINGER    TRIGGER FINGER RELEASE Right 10/4/2019    Procedure: RELEASE, TRIGGER  FINGER - right thumb;  Surgeon: Craig Tom MD;  Location: [facility];  Service: Orthopedics;  Laterality: Right;    UPPER GASTROINTESTINAL ENDOSCOPY      WISDOM TOOTH EXTRACTION         Family History   Problem Relation Age of Onset    Cancer Maternal Grandmother     Diabetes Mother     No Known Problems Sister     No Known Problems Sister     No Known Problems Sister     Breast cancer Maternal Aunt 70    Colon cancer Neg Hx     Stomach cancer Neg Hx     Inflammatory bowel disease Neg Hx     Esophageal cancer Neg Hx        Social History     Socioeconomic History    Marital status: Single   Tobacco Use    Smoking status: Every Day     Packs/day: 0.75     Years: 30.00     Pack years: 22.50     Types: Cigarettes     Start date: 1992    Smokeless tobacco: Never    Tobacco comments:     (SCT Member ID # [id number]) Ambulatory referral to Smoking Cessation clinic following hospital discharge.   Substance and Sexual Activity    Alcohol use: Yes     Comment: social    Drug use: No    Sexual activity: Yes     Partners: Male     Birth control/protection: None   Social History Narrative    Single.  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        Vitals:    10/21/22 0326   BP:    Pulse:    Resp: 15   Temp:        Physical Exam  Constitutional:       General: She is not in acute distress.     Appearance: She is well-developed.   HENT:      Head: Normocephalic and atraumatic.   Eyes:      General: No scleral icterus.  Cardiovascular:      Rate and Rhythm: Normal rate.   Pulmonary:      Effort: Pulmonary effort is normal.      Breath sounds: No stridor.   Abdominal:      General: There is no distension.      Palpations: Abdomen is soft.      Tenderness: There is no abdominal tenderness.   Lymphadenopathy:      Cervical: No cervical adenopathy.   Skin:     General: Skin is warm.      Findings: No erythema.   Neurological:      Mental Status: She is alert and oriented to person, place, and time.   Psychiatric:          Behavior: Behavior normal.   Body mass index is 23.4 kg/m².  WBC 8  Hg 13.7  Lytes ok  CMP normal  CT show dilated transverse colon, concerning for internal hernia. No free air, no free fluid.    Assessment & Plan:   47F with hx of gatric bypass, likely internal hernia  To OR for exploration. Discussed possible bowel resection, possible ostomy  NPO  Refused NG tube

## 2022-10-21 NOTE — ED PROVIDER NOTES
Encounter Date: 10/20/2022       History     Chief Complaint   Patient presents with    Abdominal Pain     Pt arrives with complaints of sharp abdominal pain that started last night but was worse when she woke up this morning. Pt denies any n/v. Pt states she took a laxative around 2 hours ago with no relief.      47-year-old female past medical history bypass surgery, diabetes, hypertension presents with abdominal pain beginning last evening.  She says the last evening she had a full meal in did drink up a couple drinks of [place] and then afterward developed sharp abdominal pain that worsened throughout the evening.  She was able to go to sleep however the pain persisted this morning.  She associates nausea without vomiting.  No fevers.  No dysuria hematuria.  Passing gas and having bowel movements.  No diarrhea.  No recent instrumentation.  She is aware of a pancreatic pseudocyst pseudocyst has found on imaging previously however has not had any bouts of pancreatitis since this time.  No history of gallbladder disease.  No sickle cell.  No vaginal bleeding or discharge, dysuria hematuria.    Review of patient's allergies indicates:  No Known Allergies  Past Medical History:   Diagnosis Date    Chronic back pain     Diabetes type 2, uncontrolled     Mild nonproliferative diabetic retinopathy of left eye without macular edema associated with type 2 diabetes mellitus 10/21/2019    Morbid obesity with BMI of 40.0-44.9, adult     MILEY on CPAP     Plantar fasciitis of left foot     Urticaria      Past Surgical History:   Procedure Laterality Date    ARTHROSCOPIC ACROMIOPLASTY OF SHOULDER Left 7/27/2020    Procedure: ACROMIOPLASTY, ARTHROSCOPIC;  Surgeon: Marcela Garza MD;  Location: [facility] OR;  Service: Orthopedics;  Laterality: Left;    ARTHROSCOPIC REPAIR OF ROTATOR CUFF OF SHOULDER Right 7/27/2020    Procedure: REPAIR, ROTATOR CUFF, ARTHROSCOPIC;  Surgeon: Marcela Garza MD;  Location: [facility] OR;   Service: Orthopedics;  Laterality: Right;  GENERAL/REGIONAL    COLONOSCOPY N/A 4/7/2022    Procedure: COLONOSCOPY;  Surgeon: Yadi Wong MD;  [facility]  Service: Endoscopy;  Laterality: N/A;  Covid test at [place] on 4/4.EC    CYST REMOVAL      ovarian cyst removal at age 12    DECOMPRESSION OF SUBACROMIAL SPACE Left 7/27/2020    Procedure: DECOMPRESSION, SUBACROMIAL SPACE;  Surgeon: Marcela Garza MD;  Location: [facility] OR;  Service: Orthopedics;  Laterality: Left;    DILATION AND CURETTAGE OF UTERUS      ESOPHAGEAL MOTILITY STUDY USING HIGH RESOLUTION MANOMETRY N/A 9/17/2019    Procedure: MOTILITY STUDY, ESOPHAGUS, USING HIGH RESOLUTION MANOMETRY;  Surgeon: Leopoldo Swanson MD;  [facility]  Service: Endoscopy;  Laterality: N/A;  Pt will be taking xanax before procedure to hopefull help with anxiety.    ESOPHAGOGASTRODUODENOSCOPY N/A 6/14/2019    Procedure: EGD (ESOPHAGOGASTRODUODENOSCOPY);  Surgeon: Ced Guevara MD;  [facility]  Service: Endoscopy;  Laterality: N/A;  Please make sure it is scheduled after her cardiology appt.- see cardiology note dated 6/13/19 for clearance - ERW    GASTRIC BYPASS N/A 05/20/2021    INJECTION OF STEROID Left 7/27/2020    Procedure: INJECTION, STEROID LEFT THUMB TRIGGER FINGER;  Surgeon: Marcela Garza MD;  Location: [facility] OR;  Service: Orthopedics;  Laterality: Left;  LEFT THUMB, TRIGGER FINGER    TRIGGER FINGER RELEASE Right 10/4/2019    Procedure: RELEASE, TRIGGER FINGER - right thumb;  Surgeon: Craig Tom MD;  Location: [facility] OR;  Service: Orthopedics;  Laterality: Right;    UPPER GASTROINTESTINAL ENDOSCOPY      WISDOM TOOTH EXTRACTION       Family History   Problem Relation Age of Onset    Cancer Maternal Grandmother     Diabetes Mother     No Known Problems Sister     No Known Problems Sister     No Known Problems Sister     Breast cancer Maternal Aunt 70    Colon cancer Neg Hx     Stomach cancer  Neg Hx     Inflammatory bowel disease Neg Hx     Esophageal cancer Neg Hx      Social History     Tobacco Use    Smoking status: Every Day     Packs/day: 0.75     Years: 30.00     Pack years: 22.50     Types: Cigarettes     Start date: 1992    Smokeless tobacco: Never    Tobacco comments:     (SCT Member ID # [id number]) Ambulatory referral to Smoking Cessation clinic following hospital discharge.   Substance Use Topics    Alcohol use: Yes     Comment: social    Drug use: No     Review of Systems   All other systems reviewed and are negative.    Physical Exam     Initial Vitals [10/20/22 2001]   BP Pulse Resp Temp SpO2   (!) 160/90 80 20 98.1 °F (36.7 °C) 97 %      MAP       --         Physical Exam    Nursing note and vitals reviewed.  Constitutional: She appears well-developed and well-nourished.   HENT:   Head: Normocephalic and atraumatic.   Eyes: EOM are normal. Pupils are equal, round, and reactive to light.   Neck: Neck supple. No JVD present.   Normal range of motion.  Cardiovascular:  Normal rate and regular rhythm.           Pulmonary/Chest: Breath sounds normal. No stridor. No respiratory distress.   Abdominal: Abdomen is soft. There is abdominal tenderness. There is rebound and guarding.   Musculoskeletal:         General: No edema. Normal range of motion.      Cervical back: Normal range of motion and neck supple.     Neurological: She is alert and oriented to person, place, and time. GCS score is 15. GCS eye subscore is 4. GCS verbal subscore is 5. GCS motor subscore is 6.   Skin: Skin is warm. Capillary refill takes less than 2 seconds.   Psychiatric: She has a normal mood and affect. Thought content normal.       ED Course   Procedures  Labs Reviewed   CBC W/ AUTO DIFFERENTIAL - Abnormal; Notable for the following components:       Result Value    MCH 31.1 (*)     All other components within normal limits   COMPREHENSIVE METABOLIC PANEL - Abnormal; Notable for the following components:    Glucose 124  (*)     All other components within normal limits   URINALYSIS, REFLEX TO URINE CULTURE - Abnormal; Notable for the following components:    Ketones, UA 1+ (*)     All other components within normal limits    Narrative:     Specimen Source->Urine   LIPASE   LACTIC ACID, PLASMA   LACTIC ACID, PLASMA   POCT URINE PREGNANCY   SARS-COV-2 RDRP GENE   SARS-COV-2 RDRP GENE          Imaging Results              X-Ray Abdomen Flat And Erect (Final result)  Result time 10/20/22 22:52:26      Final result by Ramiro Pearce DO (10/20/22 22:52:26)                   Impression:      Severely distended loops of large bowel.      Electronically signed by: Ramiro Pearce  Date:    10/20/2022  Time:    22:52               Narrative:    EXAMINATION:  XR ABDOMEN FLAT AND ERECT    CLINICAL HISTORY:  Abdominal Pain;    TECHNIQUE:  Flat and erect AP views of the abdomen were performed.    COMPARISON:  CT of the abdomen and pelvis from earlier the same date.    FINDINGS:  There are severely distended loops of large bowel.  There is a paucity of bowel gas in the rectum.  The lung bases are clear.  There is no evidence of free intraperitoneal air.  Visualized osseous structures demonstrate degenerative changes.  There is contrast in the urinary bladder.                                        CT Abdomen Pelvis With Contrast (Edited Result - FINAL)  Result time 10/20/22 22:53:23      Addendum (preliminary) 1 of 1 by Ramiro Pearce DO (10/20/22 22:53:23)      Impression: Severe colonic distension with two transition points of the transverse colon in the mid abdomen with an intervening distended loop of large bowel.  Findings are compatible with large bowel obstruction which may be due to an internal hernia, with a possible closed loop.  No evidence of wall thickening, pneumatosis, or evidence of gross free air or perforation.  Surgical consultation is recommended.    Postoperative changes of gastric bypass.    This report was flagged in  Epic as abnormal.      Electronically signed by: Ramiro Pearce  Date:    10/20/2022  Time:    22:53                 Final result by Ramiro Pearce DO (10/20/22 22:37:33)                   Impression:      Severe colonic distension with 2 transition points of the transverse colon in the mid abdomen with an intervening descending loop of large bowel.  Findings are compatible with large bowel obstruction which may be due to an internal hernia, with a possible closed loop.  No evidence of wall thickening, pneumatosis, or evidence of gross free air or perforation.  Surgical consultation is recommended.    Postoperative changes of gastric bypass.    This report was flagged in Epic as abnormal.      Electronically signed by: Ramiro Pearce  Date:    10/20/2022  Time:    22:37               Narrative:    EXAMINATION:  CT ABDOMEN PELVIS WITH CONTRAST    CLINICAL HISTORY:  Abdominal pain, acute, nonlocalized;    TECHNIQUE:  Axial CT images with sagittal and coronal reformats were obtained of the abdomen and pelvis from the hemidiaphragms through the symphysis pubis after the administration of 75mL Omnipaque 350.    COMPARISON:  CT of the abdomen and pelvis from 08/23/2022.    FINDINGS:  Lung Bases: Clear.    Heart: Heart size is normal.  No pericardial effusion.    Liver: The liver is enlarged.  No focal lesions are seen.  The portal vasculature is patent.    Biliary tract: No intrahepatic or extrahepatic biliary ductal dilatation.    Gallbladder: No radiodense gallstone. No wall thickening or pericholecystic fluid.    Pancreas: Normal. No pancreatic ductal dilatation.    Spleen: Normal size without focal lesion.    Adrenals: Unremarkable.    Kidneys and urinary collecting systems: Normal.  No hydronephrosis or urolithiasis.    Lymph nodes: None enlarged.    Stomach and bowel: There are postoperative changes of Pola-en-Y gastric bypass.  There is severe colonic distension, measuring up to approximately 8.5 cm in maximal  diameter.  There are two transition points of the transverse colon in the mid abdomen (series 601, image 49), with an intervening a distended loop of transverse colon.  Findings are compatible with large bowel obstruction which may be due to an internal hernia with possible closed loop obstruction.  There is no evidence of bowel wall thickening, pneumatosis, portal venous gas, or evidence of gross free intraperitoneal air.    Peritoneum and mesentery: No ascites or free intraperitoneal air.  No abdominal fluid collection.    Vasculature: No aneurysm or significant atherosclerosis.    Urinary bladder: No wall thickening.    Reproductive organs: The uterus and adnexae are unremarkable.    Body wall: No abnormality.    Musculoskeletal: No aggressive osseous lesion.                                       Medications   famotidine (PF) injection 20 mg (20 mg Intravenous Given 10/20/22 2104)   lactated ringers infusion (has no administration in time range)   sodium chloride 0.9% bolus 1,000 mL (0 mLs Intravenous Stopped 10/20/22 2206)   morphine injection 4 mg (4 mg Intravenous Given 10/20/22 2104)   ondansetron injection 4 mg (4 mg Intravenous Given 10/20/22 2101)   iohexoL (OMNIPAQUE 350) injection 75 mL (75 mLs Intravenous Given 10/20/22 2200)   HYDROmorphone injection 1 mg (1 mg Intravenous Given 10/20/22 2322)   ondansetron injection 4 mg (4 mg Intravenous Given 10/20/22 2322)     Medical Decision Making:   Initial Assessment:   For 7 year female who presents with abdominal pain as above.  Hemodynamically stable.  Afebrile.  Examination as above.  She does show involuntary and voluntary guarding of the abdomen and there is tenderness with palpation of any location.  Unable to assess for rebound.  She says she does have a history of ulcer disease.  Will move forward with abdominal flat plate as well as dedicated contrast the abdomen and pelvis.  Will provide hydration, antiemetics, analgesia.  Will obtain dedicated  laboratory studies.  ED course for remainder of workup.           ED Course as of 10/20/22 2343   Thu Oct 20, 2022   2305 Labs reviewed. CT showing LBO. Discussed w/ Dr. Obrien. Will admit to his service. Will place NGT, IVF maintenance, and anaglesics.     The patient was reassessed frequently and managed aggressively while in the emergency department. Due to the clinical workup and presentation, the patient's condition is concerning and will require additional evaluation. I discussed the objective findings with her including laboratory studies, diagnostic imaging, and any consultant involvement. She was educated on their clinical presentation and all questions were answered. She acknowledged and verbally agreed to the treatment plan. The patient will be admitted to the hospital for further management.     DISCLAIMER: This note was prepared with [employer] voice recognition transcription software. Garbled syntax, mangled pronouns, and other bizarre constructions may be attributed to that software system.     [BG]      ED Course User Index  [BG] Abhishek Villanueva MD          Patient reassessed. Had requested more pain medication after discussion regarding her results. On subsequent reassessment, after analgesia provided here, Resting comfortably and in no acute distress. Discussed the findings that have been obtained as of this time. All questions answered. Will continue to monitor.          Clinical Impression:   Final diagnoses:  [R10.9] Abdominal pain  [K56.609] Bowel obstruction  [K56.609] Large bowel obstruction        ED Disposition Condition    Admit                 Abhishek Villanueva MD  10/20/22 1672

## 2022-10-21 NOTE — ED NOTES
Two RN's attempted multiple times to perform NG tube insertion.   Pt pulled out NG tube, unable to tolerate NG tube.   Pt denies N/V at this time, pt reports pain 0/10 post morphine. Pt appears sleepy at this time. VSS   Pillow provided for comfort, pt encouraged to utilize call light immediately if symptoms of nausea develop. Pt provided emesis bag.   Pt reminded we are here for support and to call nurses station for any questions or concerns.   Pt abdomen not distended at this time.

## 2022-10-21 NOTE — TRANSFER OF CARE
"Anesthesia Transfer of Care Note    Patient: Velma Lopez    Procedure(s) Performed: Procedure(s) (LRB):  LAPAROTOMY, EXPLORATORY (N/A)  LYSIS, ADHESIONS (N/A)    Patient location: PACU    Anesthesia Type: general    Transport from OR: Transported from OR on 6-10 L/min O2 by face mask with adequate spontaneous ventilation    Post pain: adequate analgesia    Post assessment: no apparent anesthetic complications and tolerated procedure well    Post vital signs: stable    Level of consciousness: awake and alert    Nausea/Vomiting: no nausea/vomiting    Complications: none    Transfer of care protocol was followed      Last vitals:   Visit Vitals  /81   Pulse 70   Temp 37.3 °C (99.1 °F) (Oral)   Resp 16   Ht 5' 6" (1.676 m)   Wt 65.8 kg (145 lb)   SpO2 99%   BMI 23.40 kg/m²     "

## 2022-10-21 NOTE — NURSING
Patient transferred to unit from PACU. Patient alert and awake. Midline abdominal incision closed with dermabond and is dry and intact. Vitals taken. Oriented to room set up. Call light within reach. Bed alarm on.

## 2022-10-21 NOTE — ED NOTES
Report received from ZACH Coughlin assumed care of pt. Pt resting in stretcher, reports decrease in pain. Pt aware of plan of care and all questions were answered. Side rails up x 2 and call light within reach

## 2022-10-21 NOTE — ED NOTES
"Patient received in ED at this time with c/o unrelieved abdominal pain that began last night but worssenned since this morning. Pt denies taking medication for pain at home. Pt states she took a laxative near 5pm. Pt denies pain with voiding. Last bowel movement early this morning, pt reports normal bowel movement. Pt abdomen tender to touch, states llq the most painful. Pt denies recent surgery other than bariatric surgery one year ago, denies complications since then. Pt denies pain radiating to the back. Pt states she has felt weak from not being able to eat much "but some saltine crackers". States being able to keep water down okay.   MD Villanueva at bedside.      Family present      Pain:  Rated 10/10 diffuse abdomen pain     Psychosocial:  Patient is calm and cooperative.  Patients insight and judgement are appropriate to situation.  Appears clean, well maintained, with clothing appropriate to environment.  No evidence of delusions, hallucinations, or psychosis.     Neuro:  Eyes open spontaneously.  Awake, alert, oriented x 4.  Speech clear and appropriate.  Tolerating saliva secretions well.  Able to follow commands, demonstrating ability to actively and appropriately communicate within context of current conversation.  Symmetrical facial muscles.  Moving all extremities well with no noted weakness.  Adequate muscle tone present.    Movement is purposeful.       Airway:  Bilateral chest rise and fall.  RR regular and non-labored.  Air entry patent and clear x 5 lobes of the lungs.  No crepitus or subcutaneous emphysema noted on palpation.       Circulatory:  Skin warm, dry, and pink.  Apical and radial pulses strong and regular.  Capillary refill/skin blanching less than 3 seconds to distal of 4 extremities.     Abdomen:  Abdomen soft and non-distended.  Positive normo-active bowel sounds x 4 quadrants. Diffuse abdominal pain, llq pain "the worst" per pt.      Urinary:  Patient denies pain, frequency, or " urgency.  Voids independently.  Reports urine appears per/yellow in color.     Extremities:  No redness, heat, swelling, deformity, or pain.     Skin:  Intact with no bruising/discolorations noted.

## 2022-10-21 NOTE — ED NOTES
Pt stated pain returning 6/10  Described as a sharp pain.   Denies NV at this time or feeling need to throw up.

## 2022-10-21 NOTE — PLAN OF CARE
Problem: Adult Inpatient Plan of Care  Goal: Plan of Care Review  Outcome: Ongoing, Progressing      10/21/22 1410   Admission   Initial VN Admission Questions Complete  (Patient arrived to unit from OR, AAOx4. Admission questions completed. Allowed time for questions. Instructed pt to call for assistance.)   Communication Issues? None   Shift   Virtual Nurse - Rounding Complete   Virtual Nurse - Patient Verbalized Approval Of Camera Use;VN Rounding   Type of Frequent Check   Type Patient Rounds   Safety/Activity   Patient Rounds call light in patient/parent reach;visualized patient   Safety Promotion/Fall Prevention instructed to call staff for mobility   Positioning   Body Position position changed independently   Pain/Comfort/Sleep   Sleep/Rest/Relaxation awake

## 2022-10-21 NOTE — ANESTHESIA PREPROCEDURE EVALUATION
10/21/2022  Velma Lopez is a 47 y.o., female, hx of T2DM, MILEY, and hx of gastric bypass (05/2021), presenting with abdominal pain with concern for internal hernia.    S/f for exploratory laparotomy    Patient Active Problem List   Diagnosis    Chronic low back pain    Severe obesity (BMI >= 40)    MILEY (obstructive sleep apnea)    Plantar fasciitis of left foot    Insomnia    Smoking    Noncompliance with CPAP treatment    Dysphagia    Trigger thumb, right thumb    Mild nonproliferative diabetic retinopathy of left eye without macular edema associated with type 2 diabetes mellitus    Decreased strength, endurance, and mobility    Right rotator cuff tear    Closed fracture of multiple ribs of left side with routine healing    Traumatic pneumothorax     Past Surgical History:   Procedure Laterality Date    ARTHROSCOPIC ACROMIOPLASTY OF SHOULDER Left 7/27/2020    Procedure: ACROMIOPLASTY, ARTHROSCOPIC;  Surgeon: Marcela Garza MD;  Location: [facility] OR;  Service: Orthopedics;  Laterality: Left;    ARTHROSCOPIC REPAIR OF ROTATOR CUFF OF SHOULDER Right 7/27/2020    Procedure: REPAIR, ROTATOR CUFF, ARTHROSCOPIC;  Surgeon: Marcela Garza MD;  Location: [facility] OR;  Service: Orthopedics;  Laterality: Right;  GENERAL/REGIONAL    COLONOSCOPY N/A 4/7/2022    Procedure: COLONOSCOPY;  Surgeon: Yadi Wong MD;  Location: [address]);  Service: Endoscopy;  Laterality: N/A;  Covid test at [place] on 4/4.EC    CYST REMOVAL      ovarian cyst removal at age 12    DECOMPRESSION OF SUBACROMIAL SPACE Left 7/27/2020    Procedure: DECOMPRESSION, SUBACROMIAL SPACE;  Surgeon: Marcela Garza MD;  Location: [facility];  Service: Orthopedics;  Laterality: Left;    DILATION AND CURETTAGE OF UTERUS      ESOPHAGEAL MOTILITY STUDY USING HIGH RESOLUTION MANOMETRY N/A 9/17/2019     Procedure: MOTILITY STUDY, ESOPHAGUS, USING HIGH RESOLUTION MANOMETRY;  Surgeon: Leopoldo Swanson MD;  Location: [facility] ENDO (4TH FLR);  Service: Endoscopy;  Laterality: N/A;  Pt will be taking xanax before procedure to hopefull help with anxiety.    ESOPHAGOGASTRODUODENOSCOPY N/A 6/14/2019    Procedure: EGD (ESOPHAGOGASTRODUODENOSCOPY);  Surgeon: Ced Guevara MD;  Location: [facility] (4TH FLR);  Service: Endoscopy;  Laterality: N/A;  Please make sure it is scheduled after her cardiology appt.- see cardiology note dated 6/13/19 for clearance - ERW    GASTRIC BYPASS N/A 05/20/2021    INJECTION OF STEROID Left 7/27/2020    Procedure: INJECTION, STEROID LEFT THUMB TRIGGER FINGER;  Surgeon: Marcela Garza MD;  Location: [facility] OR;  Service: Orthopedics;  Laterality: Left;  LEFT THUMB, TRIGGER FINGER    TRIGGER FINGER RELEASE Right 10/4/2019    Procedure: RELEASE, TRIGGER FINGER - right thumb;  Surgeon: Craig Tom MD;  Location: [facility] OR;  Service: Orthopedics;  Laterality: Right;    UPPER GASTROINTESTINAL ENDOSCOPY      WISDOM TOOTH EXTRACTION       Past Medical History:   Diagnosis Date    Chronic back pain     Diabetes type 2, uncontrolled     Mild nonproliferative diabetic retinopathy of left eye without macular edema associated with type 2 diabetes mellitus 10/21/2019    Morbid obesity with BMI of 40.0-44.9, adult     MILEY on CPAP     Plantar fasciitis of left foot     Urticaria         TTE 05/2019   Normal left ventricular systolic function. The estimated ejection fraction is 55%   Eccentric left ventricular hypertrophy.   Normal LV diastolic function.   Normal right ventricular systolic function.   The estimated PA systolic pressure is 21 mm Hg   Intermediate central venous pressure (8 mm Hg).        Pre-op Assessment    I have reviewed the Patient Summary Reports.     I have reviewed the Nursing Notes. I have reviewed the NPO Status.      Review of Systems  Anesthesia Hx:  No problems with  previous Anesthesia    Social:  Smoker    Hematology/Oncology:     Oncology Normal     Cardiovascular:  Cardiovascular Normal  ECG has been reviewed.    Pulmonary:   Sleep Apnea (reports improvement following gastric bypass)    Hepatic/GI:   S/p gastric bypass   Musculoskeletal:   Chronic back pain   Neurological:  Neurology Normal    Endocrine:   Diabetes, type 2      Recent Labs   Lab 10/20/22  2053   WBC 8.18   HGB 13.7   HCT 42.7         K 3.7      CREATININE 0.8   BUN 8   CO2 23   ALT 31   AST 20         Physical Exam  General: Well nourished, Cooperative and Alert    Airway:  Mallampati: II   Mouth Opening: Normal  TM Distance: Normal  Tongue: Normal  Neck ROM: Normal ROM    Dental:  Intact    Heart:  Rate: Normal  Rhythm: Regular Rhythm        Anesthesia Plan  Type of Anesthesia, risks & benefits discussed:    Anesthesia Type: Gen ETT  Intra-op Monitoring Plan: Standard ASA Monitors  Post Op Pain Control Plan: multimodal analgesia  Induction:  IV  Airway Plan: Direct  Informed Consent: Informed consent signed with the Patient and all parties understand the risks and agree with anesthesia plan.  All questions answered.   ASA Score: 3  Day of Surgery Review of History & Physical: H&P Update referred to the surgeon/provider.    Ready For Surgery From Anesthesia Perspective.     .

## 2022-10-21 NOTE — ED NOTES
MD Obrien notified via secure chat tool of pts refusal of NG tube despite further education.   Also made aware of pt not having episode of emesis.   No new orders at this time.

## 2022-10-21 NOTE — ED NOTES
Pt reported feeling need to have a bowel movement.   Pt assisted to bedpan.   No bowel movement at this time. 200 cc straw colored urine in bedpan. 100CC noted to be in suction canister via purewick device.   Pt encouraged to use call light should any new NV occur or pain come back. Call light in reach, cellphone provided. Lights dimmed per pt request.

## 2022-10-21 NOTE — ANESTHESIA POSTPROCEDURE EVALUATION
Anesthesia Post Evaluation    Patient: Velma Lopez    Procedure(s) Performed: Procedure(s) (LRB):  LAPAROTOMY, EXPLORATORY (N/A)  LYSIS, ADHESIONS (N/A)    Final Anesthesia Type: general      Patient location during evaluation: PACU  Patient participation: Yes- Able to Participate  Level of consciousness: awake and alert, oriented and awake  Post-procedure vital signs: reviewed and stable  Pain management: adequate  Airway patency: patent    PONV status at discharge: No PONV  Anesthetic complications: no      Cardiovascular status: blood pressure returned to baseline  Respiratory status: unassisted and room air  Hydration status: euvolemic  Follow-up not needed.          Vitals Value Taken Time   /95 10/21/22 1230   Temp 36.6 °C (97.8 °F) 10/21/22 1230   Pulse 66 10/21/22 1230   Resp 18 10/21/22 1230   SpO2 100 % 10/21/22 1230         Event Time   Out of Recovery 10/21/2022 12:01:57         Pain/Anabell Score: Pain Rating Prior to Med Admin: 3 (10/21/2022 12:21 PM)  Pain Rating Post Med Admin: 4 (10/21/2022 12:03 PM)  Anabell Score: 6 (10/21/2022 11:10 AM)

## 2022-10-22 PROCEDURE — 11000001 HC ACUTE MED/SURG PRIVATE ROOM

## 2022-10-22 PROCEDURE — 25000003 PHARM REV CODE 250: Performed by: STUDENT IN AN ORGANIZED HEALTH CARE EDUCATION/TRAINING PROGRAM

## 2022-10-22 PROCEDURE — 63600175 PHARM REV CODE 636 W HCPCS: Performed by: STUDENT IN AN ORGANIZED HEALTH CARE EDUCATION/TRAINING PROGRAM

## 2022-10-22 RX ORDER — DOCUSATE SODIUM 100 MG/1
100 CAPSULE, LIQUID FILLED ORAL 2 TIMES DAILY
Status: DISCONTINUED | OUTPATIENT
Start: 2022-10-22 | End: 2022-10-23 | Stop reason: HOSPADM

## 2022-10-22 RX ADMIN — KETOROLAC TROMETHAMINE 15 MG: 30 INJECTION, SOLUTION INTRAMUSCULAR at 12:10

## 2022-10-22 RX ADMIN — KETOROLAC TROMETHAMINE 15 MG: 30 INJECTION, SOLUTION INTRAMUSCULAR at 05:10

## 2022-10-22 RX ADMIN — MORPHINE SULFATE 2 MG: 2 INJECTION, SOLUTION INTRAMUSCULAR; INTRAVENOUS at 01:10

## 2022-10-22 RX ADMIN — MORPHINE SULFATE 2 MG: 2 INJECTION, SOLUTION INTRAMUSCULAR; INTRAVENOUS at 08:10

## 2022-10-22 RX ADMIN — DOCUSATE SODIUM 100 MG: 100 CAPSULE, LIQUID FILLED ORAL at 02:10

## 2022-10-22 RX ADMIN — DOCUSATE SODIUM 100 MG: 100 CAPSULE, LIQUID FILLED ORAL at 08:10

## 2022-10-22 RX ADMIN — KETOROLAC TROMETHAMINE 15 MG: 30 INJECTION, SOLUTION INTRAMUSCULAR at 06:10

## 2022-10-22 RX ADMIN — MORPHINE SULFATE 2 MG: 2 INJECTION, SOLUTION INTRAMUSCULAR; INTRAVENOUS at 04:10

## 2022-10-22 RX ADMIN — KETOROLAC TROMETHAMINE 15 MG: 30 INJECTION, SOLUTION INTRAMUSCULAR at 11:10

## 2022-10-22 NOTE — PROGRESS NOTES
[facility]  General Surgery  Progress Note    Subjective:     Interval History:   Feeling better but still having intermittent intense waves of crampy pain.   No pain between episdoes and overall better than before surgery  Incisional pain mild  OOB no problems  Voiding  Passed some flatus  Isabel clears, no NV    Post-Op Info:  Procedure(s) (LRB):  LAPAROTOMY, EXPLORATORY (N/A)  LYSIS, ADHESIONS (N/A)   1 Day Post-Op      Medications:  Continuous Infusions:  Scheduled Meds:   ketorolac  15 mg Intravenous Q6H     PRN Meds:influenza, morphine     Objective:     Vital Signs (Most Recent):  Temp: 97.5 °F (36.4 °C) (10/22/22 1142)  Pulse: 77 (10/22/22 1142)  Resp: 18 (10/22/22 1142)  BP: 127/66 (10/22/22 1142)  SpO2: 97 % (10/22/22 0747) Vital Signs (24h Range):  Temp:  [97.5 °F (36.4 °C)-98.9 °F (37.2 °C)] 97.5 °F (36.4 °C)  Pulse:  [63-77] 77  Resp:  [14-18] 18  SpO2:  [95 %-98 %] 97 %  BP: (127-184)/(66-81) 127/66       Intake/Output Summary (Last 24 hours) at 10/22/2022 1318  Last data filed at 10/22/2022 0542  Gross per 24 hour   Intake 0 ml   Output 400 ml   Net -400 ml       Physical Exam  Incision good  Ab soft appriproate    Significant Labs:  CBC:   Recent Labs   Lab 10/21/22  0856   WBC 9.32   RBC 4.31   HGB 13.3   HCT 42.3      MCV 98   MCH 30.9   MCHC 31.4*     CMP:   Recent Labs   Lab 10/20/22  2053 10/21/22  0856   * 84   CALCIUM 9.7 9.3   ALBUMIN 3.9  --    PROT 7.1  --     139   K 3.7 4.1   CO2 23 20*    108   BUN 8 6   CREATININE 0.8 0.7   ALKPHOS 84  --    ALT 31  --    AST 20  --    BILITOT 1.0  --        Significant Diagnostics:  I have reviewed all pertinent imaging results/findings within the past 24 hours.    Assessment/Plan:     Active Diagnoses:    Diagnosis Date Noted POA    PRINCIPAL PROBLEM:  Large bowel obstruction [K56.609] 10/21/2022 Yes      Problems Resolved During this Admission:     S/p ex lap Gissel  Still with some symptoms similar to before but not as  bad  Will avance diet for now  Colace  OOB  Discussed possibility of resecting transverse colon if this continues  Will monitor        Bunny Obrien MD  General Surgery  [facility]

## 2022-10-22 NOTE — PLAN OF CARE
Patient A&Ox4. Midline incision clean dry and intact. Prn pain medicine given. Up to bathroom with standby assist. Call light within reach. Bed alarm on.

## 2022-10-23 VITALS
RESPIRATION RATE: 18 BRPM | DIASTOLIC BLOOD PRESSURE: 77 MMHG | WEIGHT: 141.56 LBS | HEIGHT: 66 IN | TEMPERATURE: 99 F | SYSTOLIC BLOOD PRESSURE: 124 MMHG | HEART RATE: 72 BPM | OXYGEN SATURATION: 97 % | BODY MASS INDEX: 22.75 KG/M2

## 2022-10-23 PROCEDURE — 63600175 PHARM REV CODE 636 W HCPCS: Performed by: STUDENT IN AN ORGANIZED HEALTH CARE EDUCATION/TRAINING PROGRAM

## 2022-10-23 PROCEDURE — 90686 IIV4 VACC NO PRSV 0.5 ML IM: CPT | Performed by: STUDENT IN AN ORGANIZED HEALTH CARE EDUCATION/TRAINING PROGRAM

## 2022-10-23 PROCEDURE — 25000003 PHARM REV CODE 250: Performed by: STUDENT IN AN ORGANIZED HEALTH CARE EDUCATION/TRAINING PROGRAM

## 2022-10-23 PROCEDURE — 97161 PT EVAL LOW COMPLEX 20 MIN: CPT

## 2022-10-23 PROCEDURE — 90471 IMMUNIZATION ADMIN: CPT | Performed by: STUDENT IN AN ORGANIZED HEALTH CARE EDUCATION/TRAINING PROGRAM

## 2022-10-23 RX ORDER — HYDROCODONE BITARTRATE AND ACETAMINOPHEN 5; 325 MG/1; MG/1
1 TABLET ORAL EVERY 6 HOURS PRN
Qty: 10 TABLET | Refills: 0 | Status: SHIPPED | OUTPATIENT
Start: 2022-10-23 | End: 2022-11-01 | Stop reason: SDUPTHER

## 2022-10-23 RX ORDER — DOCUSATE SODIUM 100 MG/1
100 CAPSULE, LIQUID FILLED ORAL 2 TIMES DAILY PRN
Qty: 30 CAPSULE | Refills: 0 | Status: SHIPPED | OUTPATIENT
Start: 2022-10-23 | End: 2022-11-01 | Stop reason: SDUPTHER

## 2022-10-23 RX ADMIN — DOCUSATE SODIUM 100 MG: 100 CAPSULE, LIQUID FILLED ORAL at 08:10

## 2022-10-23 RX ADMIN — KETOROLAC TROMETHAMINE 15 MG: 30 INJECTION, SOLUTION INTRAMUSCULAR at 05:10

## 2022-10-23 RX ADMIN — INFLUENZA VIRUS VACCINE 0.5 ML: 15; 15; 15; 15 SUSPENSION INTRAMUSCULAR at 03:10

## 2022-10-23 RX ADMIN — KETOROLAC TROMETHAMINE 15 MG: 30 INJECTION, SOLUTION INTRAMUSCULAR at 12:10

## 2022-10-23 NOTE — PLAN OF CARE
AAO,meds admin as ordered,poc reviewed,ml abd inc closed w dermabond,pain relief voiced after morphine,bed alarm set.

## 2022-10-23 NOTE — PROGRESS NOTES
Discharge orders noted. Additional clinical references attached. Patient's discharge instructions given by bedside RN and reviewed via this VN.  Education provided on new and previous medications, diagnosis, and follow-up appointments.  New medications sent to her pharmacy. Patient verbalized understanding and teach back method was used. Patient wants flu vaccine before leaving.. All questions answered. Floor nurse notified.              10/23/22 1500    Notification    Notified Of Discharge Status   Admission   Communication Issues? None   Shift   Virtual Nurse - Rounding Complete  (Discharge)   Virtual Nurse - Patient Verbalized Approval Of Camera Use   Safety/Activity   Patient Rounds bed in low position;call light in patient/parent reach;clutter free environment maintained;visualized patient   Safety Promotion/Fall Prevention side rails raised x 2   Positioning   Body Position supine   Head of Bed (HOB) Positioning HOB elevated

## 2022-10-23 NOTE — PLAN OF CARE
Problem: Physical Therapy  Goal: Physical Therapy Goal  Description: Goals to be met by: 22    Patient will increase functional independence with mobility by performin) Supine<>Sit with independence   2) Sit <>Stand with independence   3) Bed <>Chair with independence   4) Pt to ambulate 500 feet with independence   5) Pt complete PT session without c/o dizziness.      10/23/2022 1148 by Magdy Khan, PT  Outcome: Ongoing, Progressing  10/23/2022 1147 by Magdy Khan, PT  Outcome: Ongoing, Progressing

## 2022-10-23 NOTE — PROGRESS NOTES
[facility]  General Surgery  Progress Note    Subjective:     Interval History:   Passing more flatus  Minimal crampy pain today  Isabel reg diet, no BM. No NV  Ambulating well    Post-Op Info:  Procedure(s) (LRB):  LAPAROTOMY, EXPLORATORY (N/A)  LYSIS, ADHESIONS (N/A)   2 Days Post-Op      Medications:  Continuous Infusions:  Scheduled Meds:   docusate sodium  100 mg Oral BID    ketorolac  15 mg Intravenous Q6H     PRN Meds:influenza, morphine     Objective:     Vital Signs (Most Recent):  Temp: 99 °F (37.2 °C) (10/23/22 0838)  Pulse: 70 (10/23/22 0838)  Resp: 18 (10/23/22 0838)  BP: (!) 142/75 (10/23/22 0838)  SpO2: 98 % (10/23/22 0838) Vital Signs (24h Range):  Temp:  [97.5 °F (36.4 °C)-99.2 °F (37.3 °C)] 99 °F (37.2 °C)  Pulse:  [65-77] 70  Resp:  [16-18] 18  SpO2:  [94 %-98 %] 98 %  BP: (119-143)/(66-76) 142/75       Intake/Output Summary (Last 24 hours) at 10/23/2022 1012  Last data filed at 10/22/2022 2335  Gross per 24 hour   Intake 120 ml   Output 200 ml   Net -80 ml         Physical Exam  Incision good  Ab soft appriproate    Significant Labs:  CBC:   No results for input(s): WBC, RBC, HGB, HCT, PLT, MCV, MCH, MCHC in the last 48 hours.    CMP:   No results for input(s): GLU, CALCIUM, ALBUMIN, PROT, NA, K, CO2, CL, BUN, CREATININE, ALKPHOS, ALT, AST, BILITOT in the last 48 hours.      Significant Diagnostics:  I have reviewed all pertinent imaging results/findings within the past 24 hours.    Assessment/Plan:     Active Diagnoses:    Diagnosis Date Noted POA    PRINCIPAL PROBLEM:  Large bowel obstruction [K56.609] 10/21/2022 Yes      Problems Resolved During this Admission:     S/p ex lap Gissel  Continue diet  OOB  Will check axr  Feeling better overall          Bunny Obrien MD  General Surgery  [place] [facility]

## 2022-10-23 NOTE — PLAN OF CARE
Problem: Adult Inpatient Plan of Care  Goal: Plan of Care Review  10/23/2022 1502 by Tresa Harrell RN  Outcome: Met  10/23/2022 0908 by Tresa Harrell RN  Outcome: Ongoing, Progressing  Goal: Patient-Specific Goal (Individualized)  Outcome: Met  Goal: Absence of Hospital-Acquired Illness or Injury  Outcome: Met  Goal: Optimal Comfort and Wellbeing  Outcome: Met  Goal: Readiness for Transition of Care  Outcome: Met

## 2022-10-23 NOTE — PLAN OF CARE
10/23/22 1328   Final Note   Assessment Type Final Discharge Note   Anticipated Discharge Disposition Home   Hospital Resources/Appts/Education Provided Post-Acute resouces added to AVS  (Pt to hear from Dr. Barker office for appt on monday or tuesday.)   Post-Acute Status   Post-Acute Authorization Home Health   Pt states Dr. Shannon office will contact her on mon for appt that day or for Tuesday.    CM encouraged Pt to keep up with her appts.  Pt states she does not need any HME at this time.  CM asked if she had any questions to please contact us asap.

## 2022-10-23 NOTE — PT/OT/SLP EVAL
Physical Therapy Evaluation    Patient Name:  Velma Lopez   MRN:  [id number]    Recommendations:     Discharge Recommendations:  home   Discharge Equipment Recommendations: none   Barriers to discharge: None    Assessment:     Velma Lopez is a 47 y.o. female admitted with a medical diagnosis of Large bowel obstruction.  She presents with the following impairments/functional limitations:   (dizziness). Pt complaints of being light headed with one episode of LOB Which required Min A to prevent fall. Pt states this is the first time this has happened. Pt and RN state pt has been ambulating around the nurses station independently without issue.     Rehab Prognosis: Good; patient would benefit from acute skilled PT services to address these deficits and reach maximum level of function.    Recent Surgery: Procedure(s) (LRB):  LAPAROTOMY, EXPLORATORY (N/A)  LYSIS, ADHESIONS (N/A) 2 Days Post-Op    Plan:     During this hospitalization, patient to be seen  (follow up) to address the identified rehab impairments via gait training and progress toward the following goals:    Plan of Care Expires:   11/23/2022    Subjective     Chief Complaint: Lightheadedness   Patient/Family Comments/goals: to go home.   Pain/Comfort:  Pain Rating 1: 0/10  Pain Rating Post-Intervention 1: 0/10    Patients cultural, spiritual, Caodaism conflicts given the current situation:      Living Environment:  Pt lives in a [address] [place] [address] home with her boyfriend.   Prior to admission, patients level of function was independent.  Equipment used at home: none.  DME owned (not currently used): none.  Upon discharge, patient will have assistance from boyfriend.    Objective:     Communicated with RN prior to session.  Patient found sitting edge of bed with    upon PT entry to room.    General Precautions: Standard, fall   Orthopedic Precautions:N/A   Braces: N/A  Respiratory Status: Room air    Exams:  Cognitive Exam:  Patient is oriented  to Person, Place, Time, and Situation  RLE ROM: WFL  RLE Strength: WFL  LLE ROM: WFL  LLE Strength: WFL    Functional Mobility:  Transfers:     Sit to Stand:  independence with no AD  Gait: ~60 feet with SBA, pt with one episode of LOB which required Min A to prevent fall. Pt c/o of being lightheaded and returned back to bed.  Balance: Good      AM-PAC 6 CLICK MOBILITY  Total Score:22       Treatment & Education:  Bed mobility and transfers as listed above.   Pt was educated on role of PT and plan to follow up 10/24. Discussed with pt and RN to have RN present next ambulation incase pt becomes dizzy again.     Patient left sitting EOB with all lines intact and call button in reach.      GOALS:   Multidisciplinary Problems       Physical Therapy Goals          Problem: Physical Therapy    Goal Priority Disciplines Outcome Goal Variances Interventions   Physical Therapy Goal     PT, PT/OT Ongoing, Progressing     Description: Goals to be met by: 22    Patient will increase functional independence with mobility by performin) Supine<>Sit with independence   2) Sit <>Stand with independence   3) Bed <>Chair with independence   4) Pt to ambulate 500 feet with independence   5) Pt complete PT session without c/o dizziness.                           History:     Past Medical History:   Diagnosis Date    Chronic back pain     Diabetes type 2, uncontrolled     Mild nonproliferative diabetic retinopathy of left eye without macular edema associated with type 2 diabetes mellitus 10/21/2019    Morbid obesity with BMI of 40.0-44.9, adult     MILEY on CPAP     Plantar fasciitis of left foot     Urticaria        Past Surgical History:   Procedure Laterality Date    ARTHROSCOPIC ACROMIOPLASTY OF SHOULDER Left 2020    Procedure: ACROMIOPLASTY, ARTHROSCOPIC;  Surgeon: Marcela Garza MD;  Location: [facility];  Service: Orthopedics;  Laterality: Left;    ARTHROSCOPIC REPAIR OF ROTATOR CUFF OF SHOULDER Right 2020     Procedure: REPAIR, ROTATOR CUFF, ARTHROSCOPIC;  Surgeon: Marcela Garza MD;  Location: [facility] OR;  Service: Orthopedics;  Laterality: Right;  GENERAL/REGIONAL    COLONOSCOPY N/A 4/7/2022    Procedure: COLONOSCOPY;  Surgeon: Yadi Wong MD;  Location: [facility] (4TH FLR);  Service: Endoscopy;  Laterality: N/A;  Covid test at [place] on 4/4.EC    CYST REMOVAL      ovarian cyst removal at age 12    DECOMPRESSION OF SUBACROMIAL SPACE Left 7/27/2020    Procedure: DECOMPRESSION, SUBACROMIAL SPACE;  Surgeon: Marcela Garza MD;  Location: [facility] OR;  Service: Orthopedics;  Laterality: Left;    DILATION AND CURETTAGE OF UTERUS      ESOPHAGEAL MOTILITY STUDY USING HIGH RESOLUTION MANOMETRY N/A 9/17/2019    Procedure: MOTILITY STUDY, ESOPHAGUS, USING HIGH RESOLUTION MANOMETRY;  Surgeon: Leopoldo Swanson MD;  Location: [facility] (4TH FLR);  Service: Endoscopy;  Laterality: N/A;  Pt will be taking xanax before procedure to hopefull help with anxiety.    ESOPHAGOGASTRODUODENOSCOPY N/A 6/14/2019    Procedure: EGD (ESOPHAGOGASTRODUODENOSCOPY);  Surgeon: Ced Guevara MD;  [facility]  Service: Endoscopy;  Laterality: N/A;  Please make sure it is scheduled after her cardiology appt.- see cardiology note dated 6/13/19 for clearance - ERW    GASTRIC BYPASS N/A 05/20/2021    INJECTION OF STEROID Left 7/27/2020    Procedure: INJECTION, STEROID LEFT THUMB TRIGGER FINGER;  Surgeon: Marcela Garza MD;  Location: [facility] OR;  Service: Orthopedics;  Laterality: Left;  LEFT THUMB, TRIGGER FINGER    TRIGGER FINGER RELEASE Right 10/4/2019    Procedure: RELEASE, TRIGGER FINGER - right thumb;  Surgeon: Craig Tom MD;  Location: [facility];  Service: Orthopedics;  Laterality: Right;    UPPER GASTROINTESTINAL ENDOSCOPY      WISDOM TOOTH EXTRACTION         Time Tracking:     PT Received On: 10/23/22  PT Start Time: 1020     PT Stop Time: 1036  PT Total Time (min): 16 min     Billable Minutes: Evaluation  16      10/23/2022

## 2022-10-24 ENCOUNTER — PATIENT MESSAGE (OUTPATIENT)
Dept: SURGERY | Facility: CLINIC | Age: 47
End: 2022-10-24
Payer: MEDICAID

## 2022-10-25 ENCOUNTER — HOSPITAL ENCOUNTER (INPATIENT)
Facility: HOSPITAL | Age: 47
LOS: 5 days | Discharge: HOME | DRG: 331 | End: 2022-10-30
Attending: STUDENT IN AN ORGANIZED HEALTH CARE EDUCATION/TRAINING PROGRAM | Admitting: STUDENT IN AN ORGANIZED HEALTH CARE EDUCATION/TRAINING PROGRAM
Payer: MEDICAID

## 2022-10-25 ENCOUNTER — OFFICE VISIT (OUTPATIENT)
Dept: SURGERY | Facility: CLINIC | Age: 47
End: 2022-10-25
Payer: MEDICAID

## 2022-10-25 ENCOUNTER — PATIENT OUTREACH (OUTPATIENT)
Dept: ADMINISTRATIVE | Facility: CLINIC | Age: 47
End: 2022-10-25
Payer: MEDICAID

## 2022-10-25 VITALS — DIASTOLIC BLOOD PRESSURE: 84 MMHG | SYSTOLIC BLOOD PRESSURE: 150 MMHG | HEART RATE: 65 BPM

## 2022-10-25 DIAGNOSIS — K56.609 LARGE BOWEL OBSTRUCTION: Primary | ICD-10-CM

## 2022-10-25 LAB
ALBUMIN SERPL BCP-MCNC: 3.7 G/DL (ref 3.5–5.2)
ALP SERPL-CCNC: 73 U/L (ref 55–135)
ALT SERPL W/O P-5'-P-CCNC: 17 U/L (ref 10–44)
ANION GAP SERPL CALC-SCNC: 14 MMOL/L (ref 8–16)
AST SERPL-CCNC: 17 U/L (ref 10–40)
BASOPHILS # BLD AUTO: 0.1 K/UL (ref 0–0.2)
BASOPHILS NFR BLD: 1.1 % (ref 0–1.9)
BILIRUB SERPL-MCNC: 0.7 MG/DL (ref 0.1–1)
BUN SERPL-MCNC: 11 MG/DL (ref 6–20)
CALCIUM SERPL-MCNC: 9.5 MG/DL (ref 8.7–10.5)
CHLORIDE SERPL-SCNC: 105 MMOL/L (ref 95–110)
CO2 SERPL-SCNC: 23 MMOL/L (ref 23–29)
CREAT SERPL-MCNC: 0.7 MG/DL (ref 0.5–1.4)
CTP QC/QA: YES
DIFFERENTIAL METHOD: ABNORMAL
EOSINOPHIL # BLD AUTO: 0.3 K/UL (ref 0–0.5)
EOSINOPHIL NFR BLD: 3.4 % (ref 0–8)
ERYTHROCYTE [DISTWIDTH] IN BLOOD BY AUTOMATED COUNT: 12 % (ref 11.5–14.5)
EST. GFR  (NO RACE VARIABLE): >60 ML/MIN/1.73 M^2
GLUCOSE SERPL-MCNC: 82 MG/DL (ref 70–110)
HCT VFR BLD AUTO: 41.6 % (ref 37–48.5)
HGB BLD-MCNC: 13.4 G/DL (ref 12–16)
IMM GRANULOCYTES # BLD AUTO: 0.01 K/UL (ref 0–0.04)
IMM GRANULOCYTES NFR BLD AUTO: 0.1 % (ref 0–0.5)
LYMPHOCYTES # BLD AUTO: 3.5 K/UL (ref 1–4.8)
LYMPHOCYTES NFR BLD: 38.8 % (ref 18–48)
MAGNESIUM SERPL-MCNC: 2.1 MG/DL (ref 1.6–2.6)
MCH RBC QN AUTO: 31.1 PG (ref 27–31)
MCHC RBC AUTO-ENTMCNC: 32.2 G/DL (ref 32–36)
MCV RBC AUTO: 97 FL (ref 82–98)
MONOCYTES # BLD AUTO: 0.8 K/UL (ref 0.3–1)
MONOCYTES NFR BLD: 8.6 % (ref 4–15)
NEUTROPHILS # BLD AUTO: 4.4 K/UL (ref 1.8–7.7)
NEUTROPHILS NFR BLD: 48 % (ref 38–73)
NRBC BLD-RTO: 0 /100 WBC
PHOSPHATE SERPL-MCNC: 4.1 MG/DL (ref 2.7–4.5)
PLATELET # BLD AUTO: 464 K/UL (ref 150–450)
PMV BLD AUTO: 10.1 FL (ref 9.2–12.9)
POCT GLUCOSE: 64 MG/DL (ref 70–110)
POCT GLUCOSE: 72 MG/DL (ref 70–110)
POTASSIUM SERPL-SCNC: 4.9 MMOL/L (ref 3.5–5.1)
PROT SERPL-MCNC: 7.3 G/DL (ref 6–8.4)
RBC # BLD AUTO: 4.31 M/UL (ref 4–5.4)
SARS-COV-2 RDRP RESP QL NAA+PROBE: NEGATIVE
SODIUM SERPL-SCNC: 142 MMOL/L (ref 136–145)
WBC # BLD AUTO: 9.08 K/UL (ref 3.9–12.7)

## 2022-10-25 PROCEDURE — 1159F MED LIST DOCD IN RCRD: CPT | Mod: CPTII,,, | Performed by: STUDENT IN AN ORGANIZED HEALTH CARE EDUCATION/TRAINING PROGRAM

## 2022-10-25 PROCEDURE — 3077F SYST BP >= 140 MM HG: CPT | Mod: CPTII,,, | Performed by: STUDENT IN AN ORGANIZED HEALTH CARE EDUCATION/TRAINING PROGRAM

## 2022-10-25 PROCEDURE — 25000003 PHARM REV CODE 250: Performed by: STUDENT IN AN ORGANIZED HEALTH CARE EDUCATION/TRAINING PROGRAM

## 2022-10-25 PROCEDURE — 96374 THER/PROPH/DIAG INJ IV PUSH: CPT

## 2022-10-25 PROCEDURE — 83735 ASSAY OF MAGNESIUM: CPT | Performed by: STUDENT IN AN ORGANIZED HEALTH CARE EDUCATION/TRAINING PROGRAM

## 2022-10-25 PROCEDURE — 85025 COMPLETE CBC W/AUTO DIFF WBC: CPT | Performed by: STUDENT IN AN ORGANIZED HEALTH CARE EDUCATION/TRAINING PROGRAM

## 2022-10-25 PROCEDURE — 99222 PR INITIAL HOSPITAL CARE,LEVL II: ICD-10-PCS | Mod: ,,, | Performed by: STUDENT IN AN ORGANIZED HEALTH CARE EDUCATION/TRAINING PROGRAM

## 2022-10-25 PROCEDURE — 3077F PR MOST RECENT SYSTOLIC BLOOD PRESSURE >= 140 MM HG: ICD-10-PCS | Mod: CPTII,,, | Performed by: STUDENT IN AN ORGANIZED HEALTH CARE EDUCATION/TRAINING PROGRAM

## 2022-10-25 PROCEDURE — 82962 GLUCOSE BLOOD TEST: CPT | Mod: 59

## 2022-10-25 PROCEDURE — 80053 COMPREHEN METABOLIC PANEL: CPT | Performed by: STUDENT IN AN ORGANIZED HEALTH CARE EDUCATION/TRAINING PROGRAM

## 2022-10-25 PROCEDURE — 99024 POSTOP FOLLOW-UP VISIT: CPT | Mod: ,,, | Performed by: STUDENT IN AN ORGANIZED HEALTH CARE EDUCATION/TRAINING PROGRAM

## 2022-10-25 PROCEDURE — 99999 PR PBB SHADOW E&M-EST. PATIENT-LVL III: CPT | Mod: PBBFAC,,, | Performed by: STUDENT IN AN ORGANIZED HEALTH CARE EDUCATION/TRAINING PROGRAM

## 2022-10-25 PROCEDURE — 99222 1ST HOSP IP/OBS MODERATE 55: CPT | Mod: ,,, | Performed by: STUDENT IN AN ORGANIZED HEALTH CARE EDUCATION/TRAINING PROGRAM

## 2022-10-25 PROCEDURE — 96375 TX/PRO/DX INJ NEW DRUG ADDON: CPT

## 2022-10-25 PROCEDURE — 11000001 HC ACUTE MED/SURG PRIVATE ROOM

## 2022-10-25 PROCEDURE — 99213 OFFICE O/P EST LOW 20 MIN: CPT | Mod: PBBFAC,PO,25 | Performed by: STUDENT IN AN ORGANIZED HEALTH CARE EDUCATION/TRAINING PROGRAM

## 2022-10-25 PROCEDURE — 3079F PR MOST RECENT DIASTOLIC BLOOD PRESSURE 80-89 MM HG: ICD-10-PCS | Mod: CPTII,,, | Performed by: STUDENT IN AN ORGANIZED HEALTH CARE EDUCATION/TRAINING PROGRAM

## 2022-10-25 PROCEDURE — 84100 ASSAY OF PHOSPHORUS: CPT | Performed by: STUDENT IN AN ORGANIZED HEALTH CARE EDUCATION/TRAINING PROGRAM

## 2022-10-25 PROCEDURE — 63600175 PHARM REV CODE 636 W HCPCS: Performed by: STUDENT IN AN ORGANIZED HEALTH CARE EDUCATION/TRAINING PROGRAM

## 2022-10-25 PROCEDURE — 3079F DIAST BP 80-89 MM HG: CPT | Mod: CPTII,,, | Performed by: STUDENT IN AN ORGANIZED HEALTH CARE EDUCATION/TRAINING PROGRAM

## 2022-10-25 PROCEDURE — 87635 SARS-COV-2 COVID-19 AMP PRB: CPT | Performed by: STUDENT IN AN ORGANIZED HEALTH CARE EDUCATION/TRAINING PROGRAM

## 2022-10-25 PROCEDURE — 99024 PR POST-OP FOLLOW-UP VISIT: ICD-10-PCS | Mod: ,,, | Performed by: STUDENT IN AN ORGANIZED HEALTH CARE EDUCATION/TRAINING PROGRAM

## 2022-10-25 PROCEDURE — 99999 PR PBB SHADOW E&M-EST. PATIENT-LVL III: ICD-10-PCS | Mod: PBBFAC,,, | Performed by: STUDENT IN AN ORGANIZED HEALTH CARE EDUCATION/TRAINING PROGRAM

## 2022-10-25 PROCEDURE — 1159F PR MEDICATION LIST DOCUMENTED IN MEDICAL RECORD: ICD-10-PCS | Mod: CPTII,,, | Performed by: STUDENT IN AN ORGANIZED HEALTH CARE EDUCATION/TRAINING PROGRAM

## 2022-10-25 PROCEDURE — 99285 EMERGENCY DEPT VISIT HI MDM: CPT | Mod: 25,27

## 2022-10-25 RX ORDER — SODIUM CHLORIDE 0.9 % (FLUSH) 0.9 %
10 SYRINGE (ML) INJECTION
Status: DISCONTINUED | OUTPATIENT
Start: 2022-10-25 | End: 2022-10-30 | Stop reason: HOSPADM

## 2022-10-25 RX ORDER — ONDANSETRON 8 MG/1
8 TABLET, ORALLY DISINTEGRATING ORAL EVERY 8 HOURS PRN
Status: DISCONTINUED | OUTPATIENT
Start: 2022-10-25 | End: 2022-10-30 | Stop reason: HOSPADM

## 2022-10-25 RX ORDER — MEDROXYPROGESTERONE ACETATE 10 MG/1
10 TABLET ORAL DAILY
COMMUNITY
Start: 2022-06-21 | End: 2022-10-25

## 2022-10-25 RX ORDER — ONDANSETRON 2 MG/ML
4 INJECTION INTRAMUSCULAR; INTRAVENOUS
Status: COMPLETED | OUTPATIENT
Start: 2022-10-25 | End: 2022-10-25

## 2022-10-25 RX ORDER — SODIUM CHLORIDE, SODIUM LACTATE, POTASSIUM CHLORIDE, CALCIUM CHLORIDE 600; 310; 30; 20 MG/100ML; MG/100ML; MG/100ML; MG/100ML
INJECTION, SOLUTION INTRAVENOUS CONTINUOUS
Status: DISCONTINUED | OUTPATIENT
Start: 2022-10-25 | End: 2022-10-26

## 2022-10-25 RX ORDER — TALC
6 POWDER (GRAM) TOPICAL NIGHTLY PRN
Status: DISCONTINUED | OUTPATIENT
Start: 2022-10-25 | End: 2022-10-30 | Stop reason: HOSPADM

## 2022-10-25 RX ORDER — ACETAMINOPHEN 325 MG/1
650 TABLET ORAL EVERY 8 HOURS PRN
Status: DISCONTINUED | OUTPATIENT
Start: 2022-10-25 | End: 2022-10-30 | Stop reason: HOSPADM

## 2022-10-25 RX ORDER — AMITRIPTYLINE HYDROCHLORIDE 10 MG/1
10 TABLET, FILM COATED ORAL NIGHTLY
Status: DISCONTINUED | OUTPATIENT
Start: 2022-10-25 | End: 2022-10-30 | Stop reason: HOSPADM

## 2022-10-25 RX ORDER — HYDROMORPHONE HYDROCHLORIDE 1 MG/ML
0.5 INJECTION, SOLUTION INTRAMUSCULAR; INTRAVENOUS; SUBCUTANEOUS
Status: COMPLETED | OUTPATIENT
Start: 2022-10-25 | End: 2022-10-25

## 2022-10-25 RX ORDER — MORPHINE SULFATE 4 MG/ML
4 INJECTION, SOLUTION INTRAMUSCULAR; INTRAVENOUS EVERY 4 HOURS PRN
Status: DISCONTINUED | OUTPATIENT
Start: 2022-10-25 | End: 2022-10-30 | Stop reason: HOSPADM

## 2022-10-25 RX ORDER — HYDROCODONE BITARTRATE AND ACETAMINOPHEN 5; 325 MG/1; MG/1
1 TABLET ORAL EVERY 4 HOURS PRN
Status: DISCONTINUED | OUTPATIENT
Start: 2022-10-25 | End: 2022-10-30 | Stop reason: HOSPADM

## 2022-10-25 RX ORDER — GLUCAGON 1 MG
1 KIT INJECTION
Status: DISCONTINUED | OUTPATIENT
Start: 2022-10-25 | End: 2022-10-30 | Stop reason: HOSPADM

## 2022-10-25 RX ORDER — LIDOCAINE HYDROCHLORIDE 10 MG/ML
1 INJECTION, SOLUTION EPIDURAL; INFILTRATION; INTRACAUDAL; PERINEURAL ONCE AS NEEDED
Status: DISCONTINUED | OUTPATIENT
Start: 2022-10-25 | End: 2022-10-30 | Stop reason: HOSPADM

## 2022-10-25 RX ORDER — NEOMYCIN SULFATE 500 MG/1
1000 TABLET ORAL
Status: COMPLETED | OUTPATIENT
Start: 2022-10-25 | End: 2022-10-26

## 2022-10-25 RX ORDER — METRONIDAZOLE 500 MG/1
1000 TABLET ORAL
Status: COMPLETED | OUTPATIENT
Start: 2022-10-25 | End: 2022-10-26

## 2022-10-25 RX ORDER — INSULIN ASPART 100 [IU]/ML
1-10 INJECTION, SOLUTION INTRAVENOUS; SUBCUTANEOUS EVERY 6 HOURS PRN
Status: DISCONTINUED | OUTPATIENT
Start: 2022-10-25 | End: 2022-10-30 | Stop reason: HOSPADM

## 2022-10-25 RX ADMIN — NEOMYCIN SULFATE 1000 MG: 500 TABLET ORAL at 09:10

## 2022-10-25 RX ADMIN — HYDROMORPHONE HYDROCHLORIDE 0.5 MG: 1 INJECTION, SOLUTION INTRAMUSCULAR; INTRAVENOUS; SUBCUTANEOUS at 05:10

## 2022-10-25 RX ADMIN — MORPHINE SULFATE 4 MG: 4 INJECTION INTRAVENOUS at 08:10

## 2022-10-25 RX ADMIN — NEOMYCIN SULFATE 1000 MG: 500 TABLET ORAL at 07:10

## 2022-10-25 RX ADMIN — AMITRIPTYLINE HYDROCHLORIDE 10 MG: 10 TABLET, FILM COATED ORAL at 09:10

## 2022-10-25 RX ADMIN — METRONIDAZOLE 1000 MG: 500 TABLET ORAL at 09:10

## 2022-10-25 RX ADMIN — METRONIDAZOLE 1000 MG: 500 TABLET ORAL at 07:10

## 2022-10-25 RX ADMIN — HYDROCODONE BITARTRATE AND ACETAMINOPHEN 1 TABLET: 5; 325 TABLET ORAL at 09:10

## 2022-10-25 RX ADMIN — ONDANSETRON HYDROCHLORIDE 4 MG: 2 INJECTION, SOLUTION INTRAMUSCULAR; INTRAVENOUS at 05:10

## 2022-10-25 RX ADMIN — SODIUM CHLORIDE, SODIUM LACTATE, POTASSIUM CHLORIDE, AND CALCIUM CHLORIDE: .6; .31; .03; .02 INJECTION, SOLUTION INTRAVENOUS at 05:10

## 2022-10-25 NOTE — PROGRESS NOTES
C3 nurse spoke with Velma Lopez for a TCC post hospital discharge follow up call. The patient has a scheduled HOSFU appointment with Tai Obrien on 10/25/22 @ 1500.

## 2022-10-25 NOTE — Clinical Note
Diagnosis: Large bowel obstruction [694959]   Admitting Provider:: TITI GUTIERREZ [[id number]]   Future Attending Provider: TITI GUTIERREZ [[id number]]   Reason for IP Medical Treatment  (Clinical interventions that can only be accomplished in the IP setting? ) :: surgery   Estimated Length of Stay:: 3-4 midnights   I certify that Inpatient services for greater than or equal to 2 midnights are medically necessary:: No   Plans for Post-Acute care--if anticipated (pick the single best option):: A. No post acute care anticipated at this time

## 2022-10-25 NOTE — ED PROVIDER NOTES
Encounter Date: 10/25/2022       History     Chief Complaint   Patient presents with    Abdominal Pain     Pt has hx of bowel obstruction, had follow up with nargis QUARLES and referred pt to ed for admission       47-year-old female with a history of large bowel obstruction status post ex lap presents with concern for recurrent large bowel obstruction.  Patient was seen in general surgery clinic today and has not had a bowel movement since her operation last week.  The patient there without vomiting but is burping.  No flatus.; no fevers.  No chest pain, shortness a breath, headache or diarrhea.    Review of patient's allergies indicates:  No Known Allergies  Past Medical History:   Diagnosis Date    Chronic back pain     Diabetes type 2, uncontrolled     Mild nonproliferative diabetic retinopathy of left eye without macular edema associated with type 2 diabetes mellitus 10/21/2019    Morbid obesity with BMI of 40.0-44.9, adult     MILEY on CPAP     Plantar fasciitis of left foot     Urticaria      Past Surgical History:   Procedure Laterality Date    ARTHROSCOPIC ACROMIOPLASTY OF SHOULDER Left 7/27/2020    Procedure: ACROMIOPLASTY, ARTHROSCOPIC;  Surgeon: Marcela Garza MD;  Location: [facility] OR;  Service: Orthopedics;  Laterality: Left;    ARTHROSCOPIC REPAIR OF ROTATOR CUFF OF SHOULDER Right 7/27/2020    Procedure: REPAIR, ROTATOR CUFF, ARTHROSCOPIC;  Surgeon: Marcela Garza MD;  Location: [facility] OR;  Service: Orthopedics;  Laterality: Right;  GENERAL/REGIONAL    COLONOSCOPY N/A 4/7/2022    Procedure: COLONOSCOPY;  Surgeon: Yadi Wong MD;  Location: [address]);  Service: Endoscopy;  Laterality: N/A;  Covid test at [place] on 4/4.EC    CYST REMOVAL      ovarian cyst removal at age 12    DECOMPRESSION OF SUBACROMIAL SPACE Left 7/27/2020    Procedure: DECOMPRESSION, SUBACROMIAL SPACE;  Surgeon: Marcela Garza MD;  Location: [facility] OR;  Service: Orthopedics;  Laterality: Left;    DILATION AND  CURETTAGE OF UTERUS      ESOPHAGEAL MOTILITY STUDY USING HIGH RESOLUTION MANOMETRY N/A 9/17/2019    Procedure: MOTILITY STUDY, ESOPHAGUS, USING HIGH RESOLUTION MANOMETRY;  Surgeon: Leopoldo Swanson MD;  [facility]  Service: Endoscopy;  Laterality: N/A;  Pt will be taking xanax before procedure to hopefull help with anxiety.    ESOPHAGOGASTRODUODENOSCOPY N/A 6/14/2019    Procedure: EGD (ESOPHAGOGASTRODUODENOSCOPY);  Surgeon: Ced Guevara MD;  [facility]  Service: Endoscopy;  Laterality: N/A;  Please make sure it is scheduled after her cardiology appt.- see cardiology note dated 6/13/19 for clearance - ERW    GASTRIC BYPASS N/A 05/20/2021    INJECTION OF STEROID Left 7/27/2020    Procedure: INJECTION, STEROID LEFT THUMB TRIGGER FINGER;  Surgeon: Marcela Garza MD;  Location: [facility] OR;  Service: Orthopedics;  Laterality: Left;  LEFT THUMB, TRIGGER FINGER    LYSIS OF ADHESIONS N/A 10/21/2022    Procedure: LYSIS, ADHESIONS;  Surgeon: Bunny Obrien MD;  Location: [facility] OR;  Service: General;  Laterality: N/A;    TRIGGER FINGER RELEASE Right 10/4/2019    Procedure: RELEASE, TRIGGER FINGER - right thumb;  Surgeon: Craig Tom MD;  Location: [facility] OR;  Service: Orthopedics;  Laterality: Right;    UPPER GASTROINTESTINAL ENDOSCOPY      WISDOM TOOTH EXTRACTION       Family History   Problem Relation Age of Onset    Cancer Maternal Grandmother     Diabetes Mother     No Known Problems Sister     No Known Problems Sister     No Known Problems Sister     Breast cancer Maternal Aunt 70    Colon cancer Neg Hx     Stomach cancer Neg Hx     Inflammatory bowel disease Neg Hx     Esophageal cancer Neg Hx      Social History     Tobacco Use    Smoking status: Every Day     Packs/day: 0.75     Years: 30.00     Pack years: 22.50     Types: Cigarettes     Start date: 1992    Smokeless tobacco: Never    Tobacco comments:     (SCT Member ID # [id number]) Ambulatory referral to Smoking  Cessation clinic following hospital discharge.   Substance Use Topics    Alcohol use: Yes     Comment: social    Drug use: No     Review of Systems   All other systems reviewed and are negative.    Physical Exam     Initial Vitals [10/25/22 1526]   BP Pulse Resp Temp SpO2   (!) 187/87 70 18 99.6 °F (37.6 °C) 97 %      MAP       --         Physical Exam    Nursing note and vitals reviewed.  Constitutional:   Uncomfortable appearing, interviewed in wheelchair   HENT:   Head: Normocephalic and atraumatic.   Eyes: EOM are normal. Pupils are equal, round, and reactive to light.   Neck: Neck supple. No JVD present.   Normal range of motion.  Cardiovascular:  Normal rate and regular rhythm.           Pulmonary/Chest: Breath sounds normal. No stridor. No respiratory distress.   Abdominal: Abdomen is soft. There is abdominal tenderness.   Musculoskeletal:         General: No edema. Normal range of motion.      Cervical back: Normal range of motion and neck supple.     Neurological: She is alert and oriented to person, place, and time. GCS score is 15. GCS eye subscore is 4. GCS verbal subscore is 5. GCS motor subscore is 6.   Skin: Skin is warm and dry. Capillary refill takes less than 2 seconds.   Psychiatric: She has a normal mood and affect. Thought content normal.       ED Course   Procedures  Labs Reviewed   COMPREHENSIVE METABOLIC PANEL   CBC W/ AUTO DIFFERENTIAL   MAGNESIUM   PHOSPHORUS   SARS-COV-2 RDRP GENE   POCT GLUCOSE MONITORING CONTINUOUS          Imaging Results              X-Ray Abdomen AP 1 View (In process)                      Medications   LIDOcaine (PF) 10 mg/ml (1%) injection 10 mg (has no administration in time range)   sodium chloride 0.9% flush 10 mL (has no administration in time range)   ondansetron disintegrating tablet 8 mg (has no administration in time range)   melatonin tablet 6 mg (has no administration in time range)   acetaminophen tablet 650 mg (has no administration in time range)    HYDROcodone-acetaminophen 5-325 mg per tablet 1 tablet (has no administration in time range)   morphine injection 4 mg (has no administration in time range)   amitriptyline tablet 10 mg (has no administration in time range)   glucagon (human recombinant) injection 1 mg (has no administration in time range)   dextrose 10% bolus 125 mL (has no administration in time range)   dextrose 10% bolus 250 mL (has no administration in time range)   insulin aspart U-100 pen 1-10 Units (has no administration in time range)   neomycin tablet 1,000 mg (has no administration in time range)   metroNIDAZOLE tablet 1,000 mg (has no administration in time range)   lactated ringers infusion (has no administration in time range)   HYDROmorphone injection 0.5 mg (has no administration in time range)   ondansetron injection 4 mg (has no administration in time range)     Medical Decision Making:   Initial Assessment:   As above.  Patient sent from General surgery Clinic, Dr. Paula spears, for direct admission.  I admitted the patient last week and brought her from the waiting room.  Provide analgesics, antiemetics and intravenous crystalloids.  Abdominal flat plate ordered.  Will plan for admission for exploratory laparotomy even today or tomorrow.                        Clinical Impression:   Final diagnoses:  [K56.609] Large bowel obstruction (Primary)      ED Disposition Condition    Admit                 Abhishek Villanueva MD  10/25/22 8961

## 2022-10-25 NOTE — PROGRESS NOTES
Surgery Clinic Note - H and P    Subjective:     Velma Lopez is a 47 y.o. female with h/o RNYGB, T2DM, MILEY who presents to clinic for follow up after hospital admission and ex-lap. She initially presented to the hospital last week with abdominal pain. CT scan at that time showed a closed loop large bowel obstruction. She was taken to the OR for an ex-lap where dilated large bowel was found but there was no evidence of internal hernia or volvulus. She was discharge home on POD#3. She returns today without improvement in her abdominal pain. She has been tolerating a diet, however she has not had a bowel movement since surgery.         PMH:   Past Medical History:   Diagnosis Date    Chronic back pain     Diabetes type 2, uncontrolled     Mild nonproliferative diabetic retinopathy of left eye without macular edema associated with type 2 diabetes mellitus 10/21/2019    Morbid obesity with BMI of 40.0-44.9, adult     MILEY on CPAP     Plantar fasciitis of left foot     Urticaria        Past Surgical History:   Past Surgical History:   Procedure Laterality Date    ARTHROSCOPIC ACROMIOPLASTY OF SHOULDER Left 7/27/2020    Procedure: ACROMIOPLASTY, ARTHROSCOPIC;  Surgeon: Marcela Garza MD;  [facility];  Service: Orthopedics;  Laterality: Left;    ARTHROSCOPIC REPAIR OF ROTATOR CUFF OF SHOULDER Right 7/27/2020    Procedure: REPAIR, ROTATOR CUFF, ARTHROSCOPIC;  Surgeon: Marcela Garza MD;  Location: [facility] OR;  Service: Orthopedics;  Laterality: Right;  GENERAL/REGIONAL    COLONOSCOPY N/A 4/7/2022    Procedure: COLONOSCOPY;  Surgeon: Yadi Wong MD;  Location: [address]);  Service: Endoscopy;  Laterality: N/A;  Covid test at [place] on 4/4.EC    CYST REMOVAL      ovarian cyst removal at age 12    DECOMPRESSION OF SUBACROMIAL SPACE Left 7/27/2020    Procedure: DECOMPRESSION, SUBACROMIAL SPACE;  Surgeon: Marcela Garza MD;  Location: [facility];  Service: Orthopedics;  Laterality:  Left;    DILATION AND CURETTAGE OF UTERUS      ESOPHAGEAL MOTILITY STUDY USING HIGH RESOLUTION MANOMETRY N/A 9/17/2019    Procedure: MOTILITY STUDY, ESOPHAGUS, USING HIGH RESOLUTION MANOMETRY;  Surgeon: Leopoldo Swanson MD;  [facility]  Service: Endoscopy;  Laterality: N/A;  Pt will be taking xanax before procedure to hopefull help with anxiety.    ESOPHAGOGASTRODUODENOSCOPY N/A 6/14/2019    Procedure: EGD (ESOPHAGOGASTRODUODENOSCOPY);  Surgeon: Ced Guevara MD;  [facility]  Service: Endoscopy;  Laterality: N/A;  Please make sure it is scheduled after her cardiology appt.- see cardiology note dated 6/13/19 for clearance - ERW    GASTRIC BYPASS N/A 05/20/2021    INJECTION OF STEROID Left 7/27/2020    Procedure: INJECTION, STEROID LEFT THUMB TRIGGER FINGER;  Surgeon: Marcela Garza MD;  Location: [facility] OR;  Service: Orthopedics;  Laterality: Left;  LEFT THUMB, TRIGGER FINGER    LYSIS OF ADHESIONS N/A 10/21/2022    Procedure: LYSIS, ADHESIONS;  Surgeon: Bunny Obrien MD;  Location: [facility] OR;  Service: General;  Laterality: N/A;    TRIGGER FINGER RELEASE Right 10/4/2019    Procedure: RELEASE, TRIGGER FINGER - right thumb;  Surgeon: Craig Tom MD;  Location: [facility] OR;  Service: Orthopedics;  Laterality: Right;    UPPER GASTROINTESTINAL ENDOSCOPY      WISDOM TOOTH EXTRACTION         Social History:  Social History     Socioeconomic History    Marital status: Single   Tobacco Use    Smoking status: Every Day     Packs/day: 0.75     Years: 30.00     Pack years: 22.50     Types: Cigarettes     Start date: 1992    Smokeless tobacco: Never    Tobacco comments:     (SCT Member ID # [id number]) Ambulatory referral to Smoking Cessation clinic following hospital discharge.   Substance and Sexual Activity    Alcohol use: Yes     Comment: social    Drug use: No    Sexual activity: Yes     Partners: Male     Birth control/protection: None   Social History Narrative    Single.  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        Allergies: Review of patient's allergies indicates:  No Known Allergies    Medications:    Current Outpatient Medications on File Prior to Visit   Medication Sig Dispense Refill    acetaminophen (TYLENOL) 500 MG tablet Take 2 tablets (1,000 mg total) by mouth every 6 (six) hours as needed for Pain. 50 tablet 0    amitriptyline (ELAVIL) 10 MG tablet TAKE 1 TABLET BY MOUTH EVERY DAY IN THE EVENING 90 tablet 0    cetirizine (ZYRTEC) 10 MG tablet Take 10 mg by mouth once daily.      docusate sodium (COLACE) 100 MG capsule Take 1 capsule (100 mg total) by mouth 2 (two) times daily as needed for Constipation. 30 capsule 0    HYDROcodone-acetaminophen (NORCO) 5-325 mg per tablet Take 1 tablet by mouth every 6 (six) hours as needed for Pain. 10 tablet 0    medroxyPROGESTERone (DEPO-PROVERA) 150 mg/mL Syrg Inject 1 mL (150 mg total) into the muscle every 3 (three) months. 1 mL 0    multivitamin-min-iron-FA-vit K (BARIATRIC MULTIVITAMINS) 45 mg iron- 800 mcg-120 mcg Cap Take by mouth.      potassium chloride (KLOR-CON) 20 mEq Pack Take by mouth.      solifenacin (VESICARE) 10 MG tablet Take 1 tablet (10 mg total) by mouth once daily. 30 tablet 11    vitamin D (VITAMIN D3) 1000 units Tab Take 50,000 Units by mouth once a week.      zolpidem (AMBIEN) 5 MG Tab TAKE 1 TABLET(5 MG) BY MOUTH EVERY NIGHT AS NEEDED 30 tablet 4    omeprazole (PRILOSEC) 40 MG capsule Take 1 capsule (40 mg total) by mouth every morning. (Patient not taking: Reported on 10/25/2022) 90 capsule 3    traMADoL (ULTRAM) 50 mg tablet Take 1 tablet (50 mg total) by mouth every 6 (six) hours as needed for Pain. (Patient not taking: No sig reported) 20 tablet 0    varenicline (CHANTIX) 1 mg Tab Take 1 tablet (1 mg total) by mouth 2 (two) times daily. (Patient not taking: No sig reported) 56 tablet 0    [DISCONTINUED] atorvastatin (LIPITOR) 10 MG tablet Take 1 tablet (10 mg total) by mouth once daily. 30 tablet  6    [DISCONTINUED] blood-glucose meter kit Please provide with insurance covered meter (Patient not taking: Reported on 6/21/2022) 1 each 0    [DISCONTINUED] diclofenac sodium (VOLTAREN) 1 % Gel Apply 2 g topically 4 (four) times daily. (Patient not taking: No sig reported) 100 g 0    [DISCONTINUED] hyoscyamine (ANASPAZ,LEVSIN) 0.125 mg Tab Take 1 tablet (125 mcg total) by mouth every 4 (four) hours as needed (bladder spasms). 30 tablet 2    [DISCONTINUED] losartan (COZAAR) 25 MG tablet TAKE 1 TABLET BY MOUTH EVERY DAY 30 tablet 0    [DISCONTINUED] metFORMIN (GLUCOPHAGE) 500 MG tablet TAKE 1 TABLET BY MOUTH TWICE A DAY WITH MEALS 60 tablet 0     Current Facility-Administered Medications on File Prior to Visit   Medication Dose Route Frequency Provider Last Rate Last Admin    lidocaine (PF) 10 mg/ml (1%) injection 10 mg  1 mL Intradermal Once Brenna Bhavya Livingston MD        midazolam (VERSED) 1 mg/mL injection 0.5 mg  0.5 mg Intravenous PRN Ced Patino MD   2 mg at 07/27/20 1322    ropivacaine (Naropin) 1000 mg/500 mL (2 mg/mL) Nimbus PainPRO Pump infusion  4 mL/hr Perineural Continuous Ced Patino MD 4 mL/hr at 07/27/20 1519 4 mL/hr at 07/27/20 1519         Objective:     PHYSICAL EXAM:  Vital Signs (Most Recent)  Pulse: 65 (10/25/22 1452)  BP: (!) 150/84 (10/25/22 1452)    ROS A 10+ review of systems was performed with pertinent positives and negatives noted above in the history of present illness.  Other systems were negative unless otherwise specified.    Physical Exam:  Physical Exam  Constitutional:       General: She is not in acute distress.     Comments: In a wheelchair   HENT:      Head: Normocephalic and atraumatic.   Eyes:      Pupils: Pupils are equal, round, and reactive to light.   Cardiovascular:      Rate and Rhythm: Normal rate.   Pulmonary:      Effort: Pulmonary effort is normal. No respiratory distress.   Abdominal:      General: There is distension.      Palpations:  Abdomen is soft.      Tenderness: There is abdominal tenderness (diffuse tenderness, worse in the epigastrium).   Skin:     General: Skin is warm and dry.   Neurological:      General: No focal deficit present.      Mental Status: She is alert and oriented to person, place, and time.           Assessment:     47 y.o. female with ongoing abdominal pain with concern for reoccurring obstruction.    Plan:     - Admit patient with plan to take to the OR to re-explore tomorrow      Barbara Chapman MD   [facility]

## 2022-10-25 NOTE — ED NOTES
Pt. Complains of continued post-op abdomen pain, S/P lysis of adhesions/ large bowel obstruction 10/20/2022. She was sent from Dr Obrien off today for readmission. She currently reports abdominal pain as intermittent periods of 10/10 sharp pain. Associated symptoms are nausea and decreased of sensation to void w/o inconstancy. Pt. Last ate solid food at approximately 11am today. Pt has not had a bowel movement since prior to surgery. Pt. Appears moderately distressed

## 2022-10-26 ENCOUNTER — CLINICAL SUPPORT (OUTPATIENT)
Dept: SMOKING CESSATION | Facility: CLINIC | Age: 47
End: 2022-10-26
Payer: COMMERCIAL

## 2022-10-26 ENCOUNTER — ANESTHESIA EVENT (OUTPATIENT)
Dept: SURGERY | Facility: HOSPITAL | Age: 47
DRG: 331 | End: 2022-10-26
Payer: MEDICAID

## 2022-10-26 ENCOUNTER — ANESTHESIA (OUTPATIENT)
Dept: SURGERY | Facility: HOSPITAL | Age: 47
DRG: 331 | End: 2022-10-26
Payer: MEDICAID

## 2022-10-26 DIAGNOSIS — F17.210 CIGARETTE SMOKER: Primary | ICD-10-CM

## 2022-10-26 LAB
POCT GLUCOSE: 136 MG/DL (ref 70–110)
POCT GLUCOSE: 74 MG/DL (ref 70–110)

## 2022-10-26 PROCEDURE — 88307 PR  SURG PATH,LEVEL V: ICD-10-PCS | Mod: 26,,, | Performed by: PATHOLOGY

## 2022-10-26 PROCEDURE — 94761 N-INVAS EAR/PLS OXIMETRY MLT: CPT

## 2022-10-26 PROCEDURE — 25000003 PHARM REV CODE 250: Performed by: STUDENT IN AN ORGANIZED HEALTH CARE EDUCATION/TRAINING PROGRAM

## 2022-10-26 PROCEDURE — 99999 PR PBB SHADOW E&M-EST. PATIENT-LVL I: ICD-10-PCS | Mod: PBBFAC,,,

## 2022-10-26 PROCEDURE — 99407 BEHAV CHNG SMOKING > 10 MIN: CPT | Mod: S$GLB,,,

## 2022-10-26 PROCEDURE — 37000009 HC ANESTHESIA EA ADD 15 MINS: Performed by: STUDENT IN AN ORGANIZED HEALTH CARE EDUCATION/TRAINING PROGRAM

## 2022-10-26 PROCEDURE — 88307 TISSUE EXAM BY PATHOLOGIST: CPT | Performed by: PATHOLOGY

## 2022-10-26 PROCEDURE — 71000033 HC RECOVERY, INTIAL HOUR: Performed by: STUDENT IN AN ORGANIZED HEALTH CARE EDUCATION/TRAINING PROGRAM

## 2022-10-26 PROCEDURE — 71000039 HC RECOVERY, EACH ADD'L HOUR: Performed by: STUDENT IN AN ORGANIZED HEALTH CARE EDUCATION/TRAINING PROGRAM

## 2022-10-26 PROCEDURE — 63600175 PHARM REV CODE 636 W HCPCS: Performed by: STUDENT IN AN ORGANIZED HEALTH CARE EDUCATION/TRAINING PROGRAM

## 2022-10-26 PROCEDURE — 99999 PR PBB SHADOW E&M-EST. PATIENT-LVL I: CPT | Mod: PBBFAC,,,

## 2022-10-26 PROCEDURE — 99407 PR TOBACCO USE CESSATION INTENSIVE >10 MINUTES: ICD-10-PCS | Mod: S$GLB,,,

## 2022-10-26 PROCEDURE — 25000003 PHARM REV CODE 250: Performed by: NURSE ANESTHETIST, CERTIFIED REGISTERED

## 2022-10-26 PROCEDURE — 99232 SBSQ HOSP IP/OBS MODERATE 35: CPT | Mod: 57,,, | Performed by: STUDENT IN AN ORGANIZED HEALTH CARE EDUCATION/TRAINING PROGRAM

## 2022-10-26 PROCEDURE — 99232 PR SUBSEQUENT HOSPITAL CARE,LEVL II: ICD-10-PCS | Mod: 57,,, | Performed by: STUDENT IN AN ORGANIZED HEALTH CARE EDUCATION/TRAINING PROGRAM

## 2022-10-26 PROCEDURE — 44140 PR PART REMOVAL COLON W ANASTOMOSIS: ICD-10-PCS | Mod: ,,, | Performed by: STUDENT IN AN ORGANIZED HEALTH CARE EDUCATION/TRAINING PROGRAM

## 2022-10-26 PROCEDURE — 11000001 HC ACUTE MED/SURG PRIVATE ROOM

## 2022-10-26 PROCEDURE — 99900035 HC TECH TIME PER 15 MIN (STAT)

## 2022-10-26 PROCEDURE — 37000008 HC ANESTHESIA 1ST 15 MINUTES: Performed by: STUDENT IN AN ORGANIZED HEALTH CARE EDUCATION/TRAINING PROGRAM

## 2022-10-26 PROCEDURE — 63600175 PHARM REV CODE 636 W HCPCS: Performed by: NURSE ANESTHETIST, CERTIFIED REGISTERED

## 2022-10-26 PROCEDURE — 63600175 PHARM REV CODE 636 W HCPCS: Performed by: ANESTHESIOLOGY

## 2022-10-26 PROCEDURE — 88307 TISSUE EXAM BY PATHOLOGIST: CPT | Mod: 26,,, | Performed by: PATHOLOGY

## 2022-10-26 PROCEDURE — 44140 PARTIAL REMOVAL OF COLON: CPT | Mod: ,,, | Performed by: STUDENT IN AN ORGANIZED HEALTH CARE EDUCATION/TRAINING PROGRAM

## 2022-10-26 PROCEDURE — 36000709 HC OR TIME LEV III EA ADD 15 MIN: Performed by: STUDENT IN AN ORGANIZED HEALTH CARE EDUCATION/TRAINING PROGRAM

## 2022-10-26 PROCEDURE — C9290 INJ, BUPIVACAINE LIPOSOME: HCPCS | Performed by: STUDENT IN AN ORGANIZED HEALTH CARE EDUCATION/TRAINING PROGRAM

## 2022-10-26 PROCEDURE — 36000708 HC OR TIME LEV III 1ST 15 MIN: Performed by: STUDENT IN AN ORGANIZED HEALTH CARE EDUCATION/TRAINING PROGRAM

## 2022-10-26 PROCEDURE — 27201423 OPTIME MED/SURG SUP & DEVICES STERILE SUPPLY: Performed by: STUDENT IN AN ORGANIZED HEALTH CARE EDUCATION/TRAINING PROGRAM

## 2022-10-26 RX ORDER — ROCURONIUM BROMIDE 10 MG/ML
INJECTION, SOLUTION INTRAVENOUS
Status: DISCONTINUED | OUTPATIENT
Start: 2022-10-26 | End: 2022-10-26

## 2022-10-26 RX ORDER — BUPIVACAINE HYDROCHLORIDE 5 MG/ML
INJECTION, SOLUTION EPIDURAL; INTRACAUDAL
Status: DISCONTINUED | OUTPATIENT
Start: 2022-10-26 | End: 2022-10-26 | Stop reason: HOSPADM

## 2022-10-26 RX ORDER — DEXAMETHASONE SODIUM PHOSPHATE 4 MG/ML
INJECTION, SOLUTION INTRA-ARTICULAR; INTRALESIONAL; INTRAMUSCULAR; INTRAVENOUS; SOFT TISSUE
Status: DISCONTINUED | OUTPATIENT
Start: 2022-10-26 | End: 2022-10-26

## 2022-10-26 RX ORDER — HYDROMORPHONE HYDROCHLORIDE 2 MG/ML
0.5 INJECTION, SOLUTION INTRAMUSCULAR; INTRAVENOUS; SUBCUTANEOUS EVERY 5 MIN PRN
Status: DISCONTINUED | OUTPATIENT
Start: 2022-10-26 | End: 2022-10-27

## 2022-10-26 RX ORDER — ONDANSETRON 2 MG/ML
INJECTION INTRAMUSCULAR; INTRAVENOUS
Status: DISCONTINUED | OUTPATIENT
Start: 2022-10-26 | End: 2022-10-26

## 2022-10-26 RX ORDER — DIPHENHYDRAMINE HYDROCHLORIDE 50 MG/ML
INJECTION INTRAMUSCULAR; INTRAVENOUS
Status: DISCONTINUED | OUTPATIENT
Start: 2022-10-26 | End: 2022-10-26

## 2022-10-26 RX ORDER — PHENYLEPHRINE HYDROCHLORIDE 10 MG/ML
INJECTION INTRAVENOUS
Status: DISCONTINUED | OUTPATIENT
Start: 2022-10-26 | End: 2022-10-26

## 2022-10-26 RX ORDER — PROCHLORPERAZINE EDISYLATE 5 MG/ML
5 INJECTION INTRAMUSCULAR; INTRAVENOUS
Status: DISPENSED | OUTPATIENT
Start: 2022-10-26 | End: 2022-10-26

## 2022-10-26 RX ORDER — ACETAMINOPHEN 10 MG/ML
INJECTION, SOLUTION INTRAVENOUS
Status: DISCONTINUED | OUTPATIENT
Start: 2022-10-26 | End: 2022-10-26

## 2022-10-26 RX ORDER — SUCCINYLCHOLINE CHLORIDE 20 MG/ML
INJECTION INTRAMUSCULAR; INTRAVENOUS
Status: DISCONTINUED | OUTPATIENT
Start: 2022-10-26 | End: 2022-10-26

## 2022-10-26 RX ORDER — FENTANYL CITRATE 50 UG/ML
INJECTION, SOLUTION INTRAMUSCULAR; INTRAVENOUS
Status: DISCONTINUED | OUTPATIENT
Start: 2022-10-26 | End: 2022-10-26

## 2022-10-26 RX ORDER — SODIUM CHLORIDE 0.9 % (FLUSH) 0.9 %
3 SYRINGE (ML) INJECTION
Status: DISCONTINUED | OUTPATIENT
Start: 2022-10-26 | End: 2022-10-28

## 2022-10-26 RX ORDER — MIDAZOLAM HYDROCHLORIDE 1 MG/ML
INJECTION, SOLUTION INTRAMUSCULAR; INTRAVENOUS
Status: DISCONTINUED | OUTPATIENT
Start: 2022-10-26 | End: 2022-10-26

## 2022-10-26 RX ORDER — DIPHENHYDRAMINE HYDROCHLORIDE 50 MG/ML
12.5 INJECTION INTRAMUSCULAR; INTRAVENOUS EVERY 6 HOURS PRN
Status: DISCONTINUED | OUTPATIENT
Start: 2022-10-26 | End: 2022-10-28

## 2022-10-26 RX ORDER — VASOPRESSIN 20 [USP'U]/ML
INJECTION, SOLUTION INTRAMUSCULAR; SUBCUTANEOUS
Status: DISCONTINUED | OUTPATIENT
Start: 2022-10-26 | End: 2022-10-26

## 2022-10-26 RX ORDER — LIDOCAINE HYDROCHLORIDE 20 MG/ML
INJECTION, SOLUTION EPIDURAL; INFILTRATION; INTRACAUDAL; PERINEURAL
Status: DISCONTINUED | OUTPATIENT
Start: 2022-10-26 | End: 2022-10-26

## 2022-10-26 RX ORDER — ONDANSETRON 2 MG/ML
8 INJECTION INTRAMUSCULAR; INTRAVENOUS EVERY 8 HOURS
Status: DISCONTINUED | OUTPATIENT
Start: 2022-10-26 | End: 2022-10-30 | Stop reason: HOSPADM

## 2022-10-26 RX ORDER — KETOROLAC TROMETHAMINE 30 MG/ML
15 INJECTION, SOLUTION INTRAMUSCULAR; INTRAVENOUS EVERY 6 HOURS
Status: COMPLETED | OUTPATIENT
Start: 2022-10-26 | End: 2022-10-29

## 2022-10-26 RX ORDER — BUPIVACAINE HYDROCHLORIDE 5 MG/ML
INJECTION, SOLUTION PERINEURAL
Status: DISCONTINUED | OUTPATIENT
Start: 2022-10-26 | End: 2022-10-26 | Stop reason: HOSPADM

## 2022-10-26 RX ORDER — ONDANSETRON 2 MG/ML
4 INJECTION INTRAMUSCULAR; INTRAVENOUS DAILY PRN
Status: DISCONTINUED | OUTPATIENT
Start: 2022-10-26 | End: 2022-10-28

## 2022-10-26 RX ORDER — HYDRALAZINE HYDROCHLORIDE 20 MG/ML
5 INJECTION INTRAMUSCULAR; INTRAVENOUS ONCE
Status: COMPLETED | OUTPATIENT
Start: 2022-10-26 | End: 2022-10-26

## 2022-10-26 RX ORDER — PROPOFOL 10 MG/ML
VIAL (ML) INTRAVENOUS
Status: DISCONTINUED | OUTPATIENT
Start: 2022-10-26 | End: 2022-10-26

## 2022-10-26 RX ADMIN — FENTANYL CITRATE 50 MCG: 50 INJECTION, SOLUTION INTRAMUSCULAR; INTRAVENOUS at 01:10

## 2022-10-26 RX ADMIN — ROCURONIUM BROMIDE 10 MG: 10 INJECTION, SOLUTION INTRAVENOUS at 12:10

## 2022-10-26 RX ADMIN — PHENYLEPHRINE HYDROCHLORIDE 100 MCG: 10 INJECTION INTRAVENOUS at 01:10

## 2022-10-26 RX ADMIN — VASOPRESSIN 2 UNITS: 20 INJECTION, SOLUTION INTRAMUSCULAR; SUBCUTANEOUS at 01:10

## 2022-10-26 RX ADMIN — KETOROLAC TROMETHAMINE 15 MG: 30 INJECTION, SOLUTION INTRAMUSCULAR; INTRAVENOUS at 06:10

## 2022-10-26 RX ADMIN — AMITRIPTYLINE HYDROCHLORIDE 10 MG: 10 TABLET, FILM COATED ORAL at 08:10

## 2022-10-26 RX ADMIN — VASOPRESSIN 1 UNITS: 20 INJECTION, SOLUTION INTRAMUSCULAR; SUBCUTANEOUS at 01:10

## 2022-10-26 RX ADMIN — GLYCOPYRROLATE 0.2 MG: 0.2 INJECTION, SOLUTION INTRAMUSCULAR; INTRAVITREAL at 12:10

## 2022-10-26 RX ADMIN — SUGAMMADEX 200 MG: 100 INJECTION, SOLUTION INTRAVENOUS at 03:10

## 2022-10-26 RX ADMIN — PROPOFOL 100 MG: 10 INJECTION, EMULSION INTRAVENOUS at 12:10

## 2022-10-26 RX ADMIN — ONDANSETRON 8 MG: 2 INJECTION, SOLUTION INTRAMUSCULAR; INTRAVENOUS at 01:10

## 2022-10-26 RX ADMIN — HYDROMORPHONE HYDROCHLORIDE 0.5 MG: 2 INJECTION INTRAMUSCULAR; INTRAVENOUS; SUBCUTANEOUS at 03:10

## 2022-10-26 RX ADMIN — MORPHINE SULFATE 4 MG: 4 INJECTION INTRAVENOUS at 12:10

## 2022-10-26 RX ADMIN — HYDROMORPHONE HYDROCHLORIDE 0.5 MG: 2 INJECTION INTRAMUSCULAR; INTRAVENOUS; SUBCUTANEOUS at 04:10

## 2022-10-26 RX ADMIN — SODIUM CHLORIDE, SODIUM LACTATE, POTASSIUM CHLORIDE, AND CALCIUM CHLORIDE: .6; .31; .03; .02 INJECTION, SOLUTION INTRAVENOUS at 10:10

## 2022-10-26 RX ADMIN — HYDROCODONE BITARTRATE AND ACETAMINOPHEN 1 TABLET: 5; 325 TABLET ORAL at 04:10

## 2022-10-26 RX ADMIN — ROCURONIUM BROMIDE 20 MG: 10 INJECTION, SOLUTION INTRAVENOUS at 01:10

## 2022-10-26 RX ADMIN — ONDANSETRON 8 MG: 8 TABLET, ORALLY DISINTEGRATING ORAL at 04:10

## 2022-10-26 RX ADMIN — PROCHLORPERAZINE EDISYLATE 5 MG: 5 INJECTION INTRAMUSCULAR; INTRAVENOUS at 04:10

## 2022-10-26 RX ADMIN — HYDROCODONE BITARTRATE AND ACETAMINOPHEN 1 TABLET: 5; 325 TABLET ORAL at 09:10

## 2022-10-26 RX ADMIN — NEOMYCIN SULFATE 1000 MG: 500 TABLET ORAL at 12:10

## 2022-10-26 RX ADMIN — SODIUM CHLORIDE, SODIUM LACTATE, POTASSIUM CHLORIDE, AND CALCIUM CHLORIDE: .6; .31; .03; .02 INJECTION, SOLUTION INTRAVENOUS at 02:10

## 2022-10-26 RX ADMIN — DIPHENHYDRAMINE HYDROCHLORIDE 12.5 MG: 50 INJECTION INTRAMUSCULAR; INTRAVENOUS at 01:10

## 2022-10-26 RX ADMIN — SODIUM CHLORIDE, SODIUM LACTATE, POTASSIUM CHLORIDE, AND CALCIUM CHLORIDE: .6; .31; .03; .02 INJECTION, SOLUTION INTRAVENOUS at 12:10

## 2022-10-26 RX ADMIN — DEXAMETHASONE SODIUM PHOSPHATE 4 MG: 4 INJECTION, SOLUTION INTRA-ARTICULAR; INTRALESIONAL; INTRAMUSCULAR; INTRAVENOUS; SOFT TISSUE at 01:10

## 2022-10-26 RX ADMIN — SUCCINYLCHOLINE CHLORIDE 120 MG: 20 INJECTION, SOLUTION INTRAMUSCULAR; INTRAVENOUS at 12:10

## 2022-10-26 RX ADMIN — PHENYLEPHRINE HYDROCHLORIDE 200 MCG: 10 INJECTION INTRAVENOUS at 01:10

## 2022-10-26 RX ADMIN — MORPHINE SULFATE 4 MG: 4 INJECTION INTRAVENOUS at 05:10

## 2022-10-26 RX ADMIN — LIDOCAINE HYDROCHLORIDE 75 MG: 20 INJECTION, SOLUTION EPIDURAL; INFILTRATION; INTRACAUDAL; PERINEURAL at 12:10

## 2022-10-26 RX ADMIN — FENTANYL CITRATE 50 MCG: 50 INJECTION, SOLUTION INTRAMUSCULAR; INTRAVENOUS at 03:10

## 2022-10-26 RX ADMIN — FENTANYL CITRATE 50 MCG: 50 INJECTION, SOLUTION INTRAMUSCULAR; INTRAVENOUS at 02:10

## 2022-10-26 RX ADMIN — ERTAPENEM 1 G: 1 INJECTION INTRAMUSCULAR; INTRAVENOUS at 01:10

## 2022-10-26 RX ADMIN — HYDRALAZINE HYDROCHLORIDE 5 MG: 20 INJECTION INTRAMUSCULAR; INTRAVENOUS at 03:10

## 2022-10-26 RX ADMIN — MIDAZOLAM 2 MG: 1 INJECTION INTRAMUSCULAR; INTRAVENOUS at 12:10

## 2022-10-26 RX ADMIN — ACETAMINOPHEN 1000 MG: 10 INJECTION, SOLUTION INTRAVENOUS at 02:10

## 2022-10-26 RX ADMIN — METRONIDAZOLE 1000 MG: 500 TABLET ORAL at 12:10

## 2022-10-26 RX ADMIN — PROPOFOL 30 MG: 10 INJECTION, EMULSION INTRAVENOUS at 01:10

## 2022-10-26 RX ADMIN — FENTANYL CITRATE 100 MCG: 50 INJECTION, SOLUTION INTRAMUSCULAR; INTRAVENOUS at 12:10

## 2022-10-26 RX ADMIN — ONDANSETRON HYDROCHLORIDE 8 MG: 2 SOLUTION INTRAMUSCULAR; INTRAVENOUS at 06:10

## 2022-10-26 NOTE — DISCHARGE SUMMARY
[facility] Surg  Short Stay  Discharge Summary    Admit Date: 10/20/2022    Discharge Date and Time: 10/23/2022  3:39 PM      Discharge Attending Physician: No att. providers found     Hospital Course (synopsis of major diagnoses, care, treatment, and services provided during the course of the hospital stay): 47F admitted for abdominal pain. She had hx of gastric bypass and was taken for laparotomy. SHe was found to have moderate adhesions but no bowel necrosis. Post op she felt better and her diet was advanced which she tolerated and was discharged home.     Final Diagnoses:    Principal Problem: Large bowel obstruction   Secondary Diagnoses:   Active Hospital Problems    Diagnosis  POA    *Large bowel obstruction [K56.609]  Yes      Resolved Hospital Problems   No resolved problems to display.       Discharged Condition: stable    Disposition: Home or Self Care    Follow up/Patient Instructions:    Medications:  Reconciled Home Medications:      Medication List        START taking these medications      docusate sodium 100 MG capsule  Commonly known as: COLACE  Take 1 capsule (100 mg total) by mouth 2 (two) times daily as needed for Constipation.     HYDROcodone-acetaminophen 5-325 mg per tablet  Commonly known as: NORCO  Take 1 tablet by mouth every 6 (six) hours as needed for Pain.            CONTINUE taking these medications      acetaminophen 500 MG tablet  Commonly known as: TYLENOL  Take 2 tablets (1,000 mg total) by mouth every 6 (six) hours as needed for Pain.     amitriptyline 10 MG tablet  Commonly known as: ELAVIL  TAKE 1 TABLET BY MOUTH EVERY DAY IN THE EVENING     BARIATRIC MULTIVITAMINS 45 mg iron- 800 mcg-120 mcg Cap  Generic drug: multivitamin-min-iron-FA-vit K  Take by mouth.     cetirizine 10 MG tablet  Commonly known as: ZYRTEC  Take 10 mg by mouth once daily.     medroxyPROGESTERone 150 mg/mL Syrg  Commonly known as: DEPO-PROVERA  Inject 1 mL (150 mg total) into the muscle every 3 (three)  months.     potassium chloride 20 mEq Pack  Commonly known as: KLOR-CON  Take by mouth.     solifenacin 10 MG tablet  Commonly known as: VESICARE  Take 1 tablet (10 mg total) by mouth once daily.     varenicline 1 mg Tab  Commonly known as: CHANTIX  Take 1 tablet (1 mg total) by mouth 2 (two) times daily.     vitamin D 1000 units Tab  Commonly known as: VITAMIN D3  Take 50,000 Units by mouth once a week.     zolpidem 5 MG Tab  Commonly known as: AMBIEN  TAKE 1 TABLET(5 MG) BY MOUTH EVERY NIGHT AS NEEDED            No discharge procedures on file.   Follow-up Information       Bunny Obrien MD Follow up on 10/25/2022.    Specialties: Surgery, General Surgery  Contact information:  [address]  [address]  [facility] [address] [phone]

## 2022-10-26 NOTE — PLAN OF CARE
SW attempted to complete assessment but pt was not on floor. SW will follow up at a later time. SW will continue to follow pt throughout her transitions of care and assist with any dc needs.     Future Appointments   Date Time Provider Department Center   11/11/2022  9:00 AM Bunny Obrien MD [facility]        10/26/22 [id number]   Post-Acute Status   Post-Acute Authorization Other

## 2022-10-26 NOTE — PLAN OF CARE
Pt is AAOx4. Pt given medications as ordered per MAR. Pt resuming clear liquid diet. Scheduled Toradol given for pain and Scheduled Zofran given for nausea. Incision to midline abdomen CDI. Blood glucose monitoring maintained. Due to void at 10:30 pm. Safety maintained. Bed alarm set. Instructed to use call light for assistance. Will continue to monitor.         Problem: Adult Inpatient Plan of Care  Goal: Optimal Comfort and Wellbeing  Outcome: Ongoing, Progressing     Problem: Diabetes Comorbidity  Goal: Blood Glucose Level Within Targeted Range  Outcome: Ongoing, Progressing     Problem: Pain (Intestinal Obstruction)  Goal: Acceptable Pain Control  Outcome: Ongoing, Progressing

## 2022-10-26 NOTE — ANESTHESIA PREPROCEDURE EVALUATION
10/26/2022  Velma Lopez is a 47 y.o., female, hx of T2DM, MILEY, and hx of gastric bypass (05/2021),  s/p ex-lap on 10/21, with recurrent large bowel obst for ex-lap    Patient Active Problem List   Diagnosis    Chronic low back pain    Severe obesity (BMI >= 40)    MILEY (obstructive sleep apnea)    Plantar fasciitis of left foot    Insomnia    Smoking    Noncompliance with CPAP treatment    Dysphagia    Trigger thumb, right thumb    Mild nonproliferative diabetic retinopathy of left eye without macular edema associated with type 2 diabetes mellitus    Decreased strength, endurance, and mobility    Right rotator cuff tear    Closed fracture of multiple ribs of left side with routine healing    Traumatic pneumothorax    Large bowel obstruction     Past Surgical History:   Procedure Laterality Date    ARTHROSCOPIC ACROMIOPLASTY OF SHOULDER Left 7/27/2020    Procedure: ACROMIOPLASTY, ARTHROSCOPIC;  Surgeon: Marcela Garza MD;  Location: [facility] OR;  Service: Orthopedics;  Laterality: Left;    ARTHROSCOPIC REPAIR OF ROTATOR CUFF OF SHOULDER Right 7/27/2020    Procedure: REPAIR, ROTATOR CUFF, ARTHROSCOPIC;  Surgeon: Marcela Garza MD;  Location: [facility] OR;  Service: Orthopedics;  Laterality: Right;  GENERAL/REGIONAL    COLONOSCOPY N/A 4/7/2022    Procedure: COLONOSCOPY;  Surgeon: Yadi Wong MD;  Location: [address];  Service: Endoscopy;  Laterality: N/A;  Covid test at [place] on 4/4.EC    CYST REMOVAL      ovarian cyst removal at age 12    DECOMPRESSION OF SUBACROMIAL SPACE Left 7/27/2020    Procedure: DECOMPRESSION, SUBACROMIAL SPACE;  Surgeon: Marcela Garza MD;  Location: [facility];  Service: Orthopedics;  Laterality: Left;    DILATION AND CURETTAGE OF UTERUS      ESOPHAGEAL MOTILITY STUDY USING HIGH RESOLUTION MANOMETRY N/A 9/17/2019    Procedure:  MOTILITY STUDY, ESOPHAGUS, USING HIGH RESOLUTION MANOMETRY;  Surgeon: Leopoldo Swanson MD;  Location: [facility] ENDO (4TH FLR);  Service: Endoscopy;  Laterality: N/A;  Pt will be taking xanax before procedure to hopefull help with anxiety.    ESOPHAGOGASTRODUODENOSCOPY N/A 6/14/2019    Procedure: EGD (ESOPHAGOGASTRODUODENOSCOPY);  Surgeon: Ced Guevara MD;  Location: [facility] ENDO (4TH FLR);  Service: Endoscopy;  Laterality: N/A;  Please make sure it is scheduled after her cardiology appt.- see cardiology note dated 6/13/19 for clearance - ERW    GASTRIC BYPASS N/A 05/20/2021    INJECTION OF STEROID Left 7/27/2020    Procedure: INJECTION, STEROID LEFT THUMB TRIGGER FINGER;  Surgeon: Marcela Garza MD;  Location: [facility] OR;  Service: Orthopedics;  Laterality: Left;  LEFT THUMB, TRIGGER FINGER    LYSIS OF ADHESIONS N/A 10/21/2022    Procedure: LYSIS, ADHESIONS;  Surgeon: Bunny Obrien MD;  Location: [facility] OR;  Service: General;  Laterality: N/A;    TRIGGER FINGER RELEASE Right 10/4/2019    Procedure: RELEASE, TRIGGER FINGER - right thumb;  Surgeon: Craig Tom MD;  Location: [facility] OR;  Service: Orthopedics;  Laterality: Right;    UPPER GASTROINTESTINAL ENDOSCOPY      WISDOM TOOTH EXTRACTION       Past Medical History:   Diagnosis Date    Chronic back pain     Diabetes type 2, uncontrolled     Mild nonproliferative diabetic retinopathy of left eye without macular edema associated with type 2 diabetes mellitus 10/21/2019    Morbid obesity with BMI of 40.0-44.9, adult     MILEY on CPAP     Plantar fasciitis of left foot     Urticaria         TTE 05/2019   Normal left ventricular systolic function. The estimated ejection fraction is 55%   Eccentric left ventricular hypertrophy.   Normal LV diastolic function.   Normal right ventricular systolic function.   The estimated PA systolic pressure is 21 mm Hg   Intermediate central venous pressure (8 mm Hg).        Pre-op Assessment    I have reviewed the  Patient Summary Reports.     I have reviewed the Nursing Notes. I have reviewed the NPO Status.      Review of Systems  Anesthesia Hx:  No problems with previous Anesthesia    Social:  Smoker    Hematology/Oncology:     Oncology Normal     Cardiovascular:  Cardiovascular Normal  ECG has been reviewed.    Pulmonary:   Sleep Apnea (reports improvement following gastric bypass)    Hepatic/GI:   S/p gastric bypass   Musculoskeletal:   Chronic back pain   Neurological:  Neurology Normal    Endocrine:   Diabetes, type 2      Recent Labs   Lab 10/20/22  2053 10/21/22  0856 10/25/22  1647   WBC 8.18 9.32 9.08   HGB 13.7 13.3 13.4   HCT 42.7 42.3 41.6    304 464*    139 142   K 3.7 4.1 4.9    108 105   CREATININE 0.8 0.7 0.7   BUN 8 6 11   CO2 23 20* 23   ALT 31  --  17   AST 20  --  17         Physical Exam  General: Well nourished, Cooperative and Alert    Airway:  Mallampati: II   Mouth Opening: Normal  TM Distance: Normal  Tongue: Normal  Neck ROM: Normal ROM    Dental:  Intact    Heart:  Rate: Normal  Rhythm: Regular Rhythm        Anesthesia Plan  Type of Anesthesia, risks & benefits discussed:    Anesthesia Type: Gen ETT  Intra-op Monitoring Plan: Standard ASA Monitors  Post Op Pain Control Plan: multimodal analgesia  Induction:  IV  Airway Plan: Direct  Informed Consent: Informed consent signed with the Patient and all parties understand the risks and agree with anesthesia plan.  All questions answered.   ASA Score: 3  Day of Surgery Review of History & Physical: H&P Update referred to the surgeon/provider.    Ready For Surgery From Anesthesia Perspective.     .

## 2022-10-26 NOTE — PROGRESS NOTES
Individual Follow-Up Form    10/26/2022    Quit Date: To be determined    Clinical Status of Patient: Inpatient    Length of Service: 30 minutes    Comments: Smoking cessation education note: Pt is a 0.75 pk/day cigarette smoker x 30 yrs. She denies nicotine withdrawal symptoms and declines referral to Amb[facility] Smoking Cessation clinic at this time, stating that she feel she will be able to quit on her own. Handout provided.     Diagnosis: F17.210

## 2022-10-26 NOTE — ASSESSMENT & PLAN NOTE
47 year old female with recurrent large bowel obstruction.    - NPO  - mIVF  - PRN pain and nausea medication  - Plan to go to OR for ex-lap

## 2022-10-26 NOTE — ANESTHESIA POSTPROCEDURE EVALUATION
Anesthesia Post Evaluation    Patient: Velma Lopez    Procedure(s) Performed: Procedure(s) (LRB):  LAPAROTOMY, EXPLORATORY (N/A)  COLECTOMY, TRANSVERSE (N/A)    Final Anesthesia Type: general      Patient location during evaluation: PACU  Patient participation: Yes- Able to Participate  Level of consciousness: awake and alert, oriented and awake  Post-procedure vital signs: reviewed and stable  Pain management: adequate  Airway patency: patent    PONV status at discharge: No PONV  Anesthetic complications: no      Cardiovascular status: blood pressure returned to baseline  Respiratory status: unassisted and room air  Hydration status: euvolemic  Follow-up not needed.          Vitals Value Taken Time   /90 10/26/22 1533   Temp 36.6 °C (97.9 °F) 10/26/22 1525   Pulse 66 10/26/22 1535   Resp 10 10/26/22 1535   SpO2 100 % 10/26/22 1535   Vitals shown include unvalidated device data.      No case tracking events are documented in the log.      Pain/Anabell Score: Pain Rating Prior to Med Admin: 5 (10/26/2022 11:14 AM)  Pain Rating Post Med Admin: 4 (10/26/2022 11:14 AM)

## 2022-10-26 NOTE — PHARMACY MED REC
"[facility]           Pharmacy  Admission Medication History     The home medication history was taken by Nancy Hein.      Medication history obtained from Medications listed below were obtained from: Medical records    Based on information gathered for medication list, you may go to "Admission" then "Reconcile Home Medications" tabs to review and/or act upon those items.     The home medication list has been updated by the Pharmacy department.   Please read ALL comments highlighted in yellow.   Please address this information as you see fit.    Feel free to contact us if you have any questions or require assistance.    The medications listed below were removed from the home medication list.  Please reorder if appropriate:    Patient reports NOT TAKING the following medication(s):  Provera 10mg  Prilosec 40mg  Ultram 50mg      Current Facility-Administered Medications on File Prior to Encounter   Medication Dose Route Frequency Provider Last Rate Last Admin    lidocaine (PF) 10 mg/ml (1%) injection 10 mg  1 mL Intradermal Once Brenna Livingston MD        midazolam (VERSED) 1 mg/mL injection 0.5 mg  0.5 mg Intravenous PRN Ced Patino MD   2 mg at 07/27/20 1322    ropivacaine (Naropin) 1000 mg/500 mL (2 mg/mL) Nimbus PainPRO Pump infusion  4 mL/hr Perineural Continuous Ced Patino MD 4 mL/hr at 07/27/20 1519 4 mL/hr at 07/27/20 1519     Current Outpatient Medications on File Prior to Encounter   Medication Sig Dispense Refill    amitriptyline (ELAVIL) 10 MG tablet TAKE 1 TABLET BY MOUTH EVERY DAY IN THE EVENING (Patient taking differently: Take 10 mg by mouth every evening.) 90 tablet 0    HYDROcodone-acetaminophen (NORCO) 5-325 mg per tablet Take 1 tablet by mouth every 6 (six) hours as needed for Pain. 10 tablet 0    medroxyPROGESTERone (DEPO-PROVERA) 150 mg/mL Syrg Inject 1 mL (150 mg total) into the muscle every 3 (three) months. 1 mL 0    solifenacin (VESICARE) " 10 MG tablet Take 1 tablet (10 mg total) by mouth once daily. 30 tablet 11    acetaminophen (TYLENOL) 500 MG tablet Take 2 tablets (1,000 mg total) by mouth every 6 (six) hours as needed for Pain. 50 tablet 0    cetirizine (ZYRTEC) 10 MG tablet Take 10 mg by mouth once daily.      docusate sodium (COLACE) 100 MG capsule Take 1 capsule (100 mg total) by mouth 2 (two) times daily as needed for Constipation. 30 capsule 0    multivitamin-min-iron-FA-vit K (BARIATRIC MULTIVITAMINS) 45 mg iron- 800 mcg-120 mcg Cap Take by mouth.      potassium chloride (KLOR-CON) 20 mEq Pack Take by mouth.      varenicline (CHANTIX) 1 mg Tab Take 1 tablet (1 mg total) by mouth 2 (two) times daily. (Patient not taking: No sig reported) 56 tablet 0    vitamin D (VITAMIN D3) 1000 units Tab Take 50,000 Units by mouth once a week.      zolpidem (AMBIEN) 5 MG Tab TAKE 1 TABLET(5 MG) BY MOUTH EVERY NIGHT AS NEEDED (Patient not taking: Reported on 10/25/2022) 30 tablet 4       Please address this information as you see fit.  Feel free to contact us if you have any questions or require assistance.    Nancy Hein  [phone]                  .

## 2022-10-26 NOTE — NURSING
Pt was a transfer from ED. AAO*4. RA. VSS. Bed is in lowest position. Call light remains in reach.WCTM.

## 2022-10-26 NOTE — PROGRESS NOTES
[facility] Surg  General Surgery  Progress Note    Subjective:     History of Present Illness:  No notes on file    Post-Op Info:  Procedure(s) (LRB):  LAPAROTOMY, EXPLORATORY (N/A)         Interval History: No acute events overnight. Patient continues to have ongoing abdominal pain. Received abx bowel prep. Passed a small amount of gas but no bowel movement.    Medications:  Continuous Infusions:   lactated ringers 100 mL/hr at 10/25/22 1700     Scheduled Meds:   amitriptyline  10 mg Oral QHS    ertapenem (INVANZ) IVPB  1 g Intravenous Once     PRN Meds:acetaminophen, dextrose 10%, dextrose 10%, glucagon (human recombinant), HYDROcodone-acetaminophen, insulin aspart U-100, LIDOcaine (PF) 10 mg/ml (1%), melatonin, morphine, ondansetron, sodium chloride 0.9%     Review of patient's allergies indicates:  No Known Allergies  Objective:     Vital Signs (Most Recent):  Temp: 98.5 °F (36.9 °C) (10/26/22 0726)  Pulse: 68 (10/26/22 0726)  Resp: 18 (10/26/22 0932)  BP: (!) 153/77 (10/26/22 0726)  SpO2: 95 % (10/26/22 0726)   Vital Signs (24h Range):  Temp:  [97 °F (36.1 °C)-99.6 °F (37.6 °C)] 98.5 °F (36.9 °C)  Pulse:  [65-80] 68  Resp:  [17-20] 18  SpO2:  [95 %-99 %] 95 %  BP: (131-203)/(72-95) 153/77     Weight: 65.8 kg (145 lb 1 oz)  Body mass index is 23.41 kg/m².    Intake/Output - Last 3 Shifts         10/24 0700  10/25 0659 10/25 0700  10/26 0659 10/26 0700  10/27 0659    Urine (mL/kg/hr)  100     Total Output  100     Net  -100            Stool Occurrence  0 x             Physical Exam  Constitutional:       General: She is not in acute distress.  HENT:      Head: Normocephalic and atraumatic.   Eyes:      Pupils: Pupils are equal, round, and reactive to light.   Cardiovascular:      Rate and Rhythm: Normal rate.   Pulmonary:      Effort: Pulmonary effort is normal. No respiratory distress.   Abdominal:      General: There is distension.      Palpations: Abdomen is soft.      Tenderness: There is abdominal  tenderness.      Comments: Incision c/d/i   Skin:     General: Skin is warm and dry.      Capillary Refill: Capillary refill takes less than 2 seconds.   Neurological:      General: No focal deficit present.      Mental Status: She is alert and oriented to person, place, and time.       Significant Labs:  I have reviewed all pertinent lab results within the past 24 hours.  CBC:   Recent Labs   Lab 10/25/22  1647   WBC 9.08   RBC 4.31   HGB 13.4   HCT 41.6   *   MCV 97   MCH 31.1*   MCHC 32.2     BMP:   Recent Labs   Lab 10/25/22  1647   GLU 82      K 4.9      CO2 23   BUN 11   CREATININE 0.7   CALCIUM 9.5   MG 2.1       Significant Diagnostics:  I have reviewed all pertinent imaging results/findings within the past 24 hours.    Assessment/Plan:     Large bowel obstruction  47 year old female with recurrent large bowel obstruction.    - NPO  - mIVF  - PRN pain and nausea medication  - Plan to go to OR for ex-lap        Barbara Chapman MD  General Surgery  [place] - Med Surg

## 2022-10-26 NOTE — PROGRESS NOTES
10/25/22 2157   Admission   Initial VN Admission Questions Complete   Communication Issues? None   Shift   Virtual Nurse - Patient Verbalized Approval Of Camera Use   Safety/Activity   Patient Rounds bed in low position;call light in patient/parent reach   Safety Promotion/Fall Prevention medications reviewed;side rails raised x 2;instructed to call staff for mobility   Positioning   Body Position position changed independently   Head of Bed (HOB) Positioning HOB elevated

## 2022-10-26 NOTE — OP NOTE
[facility] Surg  General Surgery  Operative Note    SUMMARY     Date of Procedure: 10/26/2022     Procedure: Procedure(s) (LRB):  LAPAROTOMY, EXPLORATORY (N/A)     Transverse colectomy    Surgeon(s) and Role:     * Bunny Obrien MD - Primary    Assisting Surgeon: Barbara Chapman PGY2    Pre-Operative Diagnosis: Large bowel obstruction [K56.609]    Post-Operative Diagnosis: Post-Op Diagnosis Codes:     * Large bowel obstruction [K56.609]    Anesthesia: General    Operative Findings (including complications, if any):   Grossly dilated and redundant proximal transverse colon. Approximately 8 to 10cm dilated. Concerning for intermittent volvulus with mild ecchymosis at transverse mesocolon. All bowel was viable and healthy in appearance other than dilation of transverse colon.   Antecolic irish limb mesenteric defect that appeared to be acting as transition point in partial transverse colon obstruction. Left colon contained gas but was normal in caliber. No masses or indication of obstruction distal to the transverse colon.   Small bowel was healthy and normal caliber  Resected about a foot of transverse colon proximal to antecolic bypass limp  Experel bilaterally     Description of Technical Procedures: Brought into OR and placed supine. The previous incision was opened. There were no adhesions from recent exploration. The transverse colon was grossly dilated to 8 to 10cm but no signs of necrosis. The colon distal to the antecolic irish limb of her gastric bypass appeared normal in caliber but did contain gas. The remainder of the colon was palpated and inspected and was normal. Entire small bowel was normal, healthy and normal caliber. The mesenteric defect through which the transverse colon passed appeared to tether the transverse colon down and the proximal and mid transverse colon was greatly dilated. The defect was enlarged slightly using cautery on the superior aspect in order to avoid the vasculature to the  small bowel. This appeared to relax the tension on the underlying colon. We decided to remove part of the transverse colon that was grossly dilated and very redundant. About one foot of colon was clamped and divided using ligasure. The mesocolon was divided very close to the colon and it was sent for specimen. A handsewn anastomosis was created a running Ana María suture. The abdomen was irrigated with normal saline. Experel was injected in bilateral TAP plane. The fascia was closed using running 1 PDS. Skin was closed using skin staples.     Significant Surgical Tasks Conducted by the Assistant(s), if Applicable:       Estimated Blood Loss (EBL): 50ml           Implants: * No implants in log *    Specimens:   Specimen (24h ago, onward)       Start     Ordered    10/26/22 1412  Specimen to Pathology, Surgery General Surgery  Once        Comments: Pre-op Diagnosis: Large bowel obstruction [K56.609]Procedure(s):LAPAROTOMY, EXPLORATORY Number of specimens: 1Name of specimens: 1. Transverse Colon -perm     References:    Click here for ordering Quick Tip   Question Answer Comment   Procedure Type: General Surgery    Which provider would you like to cc? TITI GUTIERREZ    Release to patient Immediate        10/26/22 1511                            Condition: Stable    Disposition: PACU - hemodynamically stable.    Attestation: I was present and scrubbed for the entire procedure.

## 2022-10-26 NOTE — ED NOTES
Pt AAOx4, c/o intermittent stabbing pain to lower abdomen. Pt is AAOx4 and is awaiting bed assignment. Pt is on CM cont  pulse ox and automatic BP cuff. SR up Bed locked and low CB in reach. Pt is NPO.

## 2022-10-26 NOTE — H&P
Patient ID: Velma Lopez is a 47 y.o. female.    Chief Complaint: Abdominal Pain (Pt has hx of bowel obstruction, had follow up with nargis QUARLES and referred pt to ed for admission  )      HPI:  HPI  47F with ab pain, feels unchanged since leaving hospital. No better. No nausea, no vomiting. Isabel reg diet but no BM since in the hops[facility]. SOme flatus.       Review of Systems   Constitutional:  Negative for fever.   HENT:  Negative for trouble swallowing.    Respiratory:  Negative for shortness of breath.    Cardiovascular:  Negative for chest pain.   Gastrointestinal:  Positive for abdominal pain and constipation. Negative for blood in stool, nausea and vomiting.   Genitourinary:  Negative for dysuria.   Musculoskeletal:  Negative for gait problem.   Skin:  Negative for rash and wound.   Allergic/Immunologic: Negative for immunocompromised state.   Neurological:  Negative for weakness.   Hematological:  Does not bruise/bleed easily.   Psychiatric/Behavioral:  Negative for agitation.      Current Facility-Administered Medications   Medication Dose Route Frequency Provider Last Rate Last Admin    acetaminophen tablet 650 mg  650 mg Oral Q8H PRN Bunny Obrien MD        amitriptyline tablet 10 mg  10 mg Oral QHS Bunny Obrien MD   10 mg at 10/25/22 2129    dextrose 10% bolus 125 mL  12.5 g Intravenous PRN Bunny Obrien MD        dextrose 10% bolus 250 mL  25 g Intravenous PRN Bunny Obrien MD        ertapenem (INVANZ) 1 g in sodium chloride 0.9% 100 mL IVPB  1 g Intravenous Once Bunny Obrien MD        glucagon (human recombinant) injection 1 mg  1 mg Intramuscular PRN Bunny Obrien MD        HYDROcodone-acetaminophen 5-325 mg per tablet 1 tablet  1 tablet Oral Q4H PRN Bunny Obrien MD   1 tablet at 10/26/22 0664    insulin aspart U-100 pen 1-10 Units  1-10 Units Subcutaneous Q6H PRN Bunny Obrien MD        lactated ringers infusion   Intravenous Continuous Bunny ANDINO  MD Camille 100 mL/hr at 10/25/22 1700 New Bag at 10/25/22 1700    LIDOcaine (PF) 10 mg/ml (1%) injection 10 mg  1 mL Intradermal Once PRN Bunny Obrien MD        melatonin tablet 6 mg  6 mg Oral Nightly PRN Bunny Obrien MD        morphine injection 4 mg  4 mg Intravenous Q4H PRN Bunny Obrien MD   4 mg at 10/26/22 0552    ondansetron disintegrating tablet 8 mg  8 mg Oral Q8H PRN Bunny Obrien MD   8 mg at 10/26/22 0427    sodium chloride 0.9% flush 10 mL  10 mL Intravenous PRN Bunny Obrien MD         Facility-Administered Medications Ordered in Other Encounters   Medication Dose Route Frequency Provider Last Rate Last Admin    lidocaine (PF) 10 mg/ml (1%) injection 10 mg  1 mL Intradermal Once Brenna Livingston MD        midazolam (VERSED) 1 mg/mL injection 0.5 mg  0.5 mg Intravenous PRN Ced Patino MD   2 mg at 07/27/20 1322    ropivacaine (Naropin) 1000 mg/500 mL (2 mg/mL) Nimbus PainPRO Pump infusion  4 mL/hr Perineural Continuous Ced Patino MD 4 mL/hr at 07/27/20 1519 4 mL/hr at 07/27/20 1519       Review of patient's allergies indicates:  No Known Allergies    Past Medical History:   Diagnosis Date    Chronic back pain     Diabetes type 2, uncontrolled     Mild nonproliferative diabetic retinopathy of left eye without macular edema associated with type 2 diabetes mellitus 10/21/2019    Morbid obesity with BMI of 40.0-44.9, adult     MILEY on CPAP     Plantar fasciitis of left foot     Urticaria        Past Surgical History:   Procedure Laterality Date    ARTHROSCOPIC ACROMIOPLASTY OF SHOULDER Left 7/27/2020    Procedure: ACROMIOPLASTY, ARTHROSCOPIC;  Surgeon: Marcela Garza MD;  Location: [facility] OR;  Service: Orthopedics;  Laterality: Left;    ARTHROSCOPIC REPAIR OF ROTATOR CUFF OF SHOULDER Right 7/27/2020    Procedure: REPAIR, ROTATOR CUFF, ARTHROSCOPIC;  Surgeon: Marcela Garza MD;  Location: [facility] OR;  Service: Orthopedics;  Laterality:  Right;  GENERAL/REGIONAL    COLONOSCOPY N/A 4/7/2022    Procedure: COLONOSCOPY;  Surgeon: Yadi Wong MD;  [facility]  Service: Endoscopy;  Laterality: N/A;  Covid test at [facility] on 4/4.EC    CYST REMOVAL      ovarian cyst removal at age 12    DECOMPRESSION OF SUBACROMIAL SPACE Left 7/27/2020    Procedure: DECOMPRESSION, SUBACROMIAL SPACE;  Surgeon: Marcela Garza MD;  Location: [facility] OR;  Service: Orthopedics;  Laterality: Left;    DILATION AND CURETTAGE OF UTERUS      ESOPHAGEAL MOTILITY STUDY USING HIGH RESOLUTION MANOMETRY N/A 9/17/2019    Procedure: MOTILITY STUDY, ESOPHAGUS, USING HIGH RESOLUTION MANOMETRY;  Surgeon: Leopoldo Swanson MD;  [facility]  Service: Endoscopy;  Laterality: N/A;  Pt will be taking xanax before procedure to hopefull help with anxiety.    ESOPHAGOGASTRODUODENOSCOPY N/A 6/14/2019    Procedure: EGD (ESOPHAGOGASTRODUODENOSCOPY);  Surgeon: Ced Guevara MD;  [facility]  Service: Endoscopy;  Laterality: N/A;  Please make sure it is scheduled after her cardiology appt.- see cardiology note dated 6/13/19 for clearance - ERW    GASTRIC BYPASS N/A 05/20/2021    INJECTION OF STEROID Left 7/27/2020    Procedure: INJECTION, STEROID LEFT THUMB TRIGGER FINGER;  Surgeon: Marcela Garza MD;  Location: [facility] OR;  Service: Orthopedics;  Laterality: Left;  LEFT THUMB, TRIGGER FINGER    LYSIS OF ADHESIONS N/A 10/21/2022    Procedure: LYSIS, ADHESIONS;  Surgeon: Bunny Obrien MD;  Location: [facility] OR;  Service: General;  Laterality: N/A;    TRIGGER FINGER RELEASE Right 10/4/2019    Procedure: RELEASE, TRIGGER FINGER - right thumb;  Surgeon: Craig Tom MD;  Location: [facility] OR;  Service: Orthopedics;  Laterality: Right;    UPPER GASTROINTESTINAL ENDOSCOPY      WISDOM TOOTH EXTRACTION         Family History   Problem Relation Age of Onset    Cancer Maternal Grandmother     Diabetes Mother     No Known Problems Sister     No  Known Problems Sister     No Known Problems Sister     Breast cancer Maternal Aunt 70    Colon cancer Neg Hx     Stomach cancer Neg Hx     Inflammatory bowel disease Neg Hx     Esophageal cancer Neg Hx        Social History     Socioeconomic History    Marital status: Single   Tobacco Use    Smoking status: Every Day     Packs/day: 0.75     Years: 30.00     Pack years: 22.50     Types: Cigarettes     Start date: 1992    Smokeless tobacco: Never    Tobacco comments:     Enrolled in the [employer] on 8/1/18 ([facility] Member ID # [id number]) Ambulatory referral to Smoking Cessation clinic following hospital discharge.   Substance and Sexual Activity    Alcohol use: Yes     Comment: social    Drug use: No    Sexual activity: Yes     Partners: Male     Birth control/protection: None   Social History Narrative    Single.  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        Vitals:    10/26/22 0726   BP: (!) 153/77   Pulse: 68   Resp: 18   Temp: 98.5 °F (36.9 °C)       Physical Exam  Constitutional:       General: She is not in acute distress.     Appearance: She is well-developed.   HENT:      Head: Normocephalic and atraumatic.   Eyes:      General: No scleral icterus.  Cardiovascular:      Rate and Rhythm: Normal rate.   Pulmonary:      Effort: Pulmonary effort is normal.      Breath sounds: No stridor.   Abdominal:      General: There is no distension.      Palpations: Abdomen is soft.      Tenderness: There is no abdominal tenderness.   Lymphadenopathy:      Cervical: No cervical adenopathy.   Skin:     General: Skin is warm.      Findings: No erythema.   Neurological:      Mental Status: She is alert and oriented to person, place, and time.   Psychiatric:         Behavior: Behavior normal.     Ab soft, some TTP upper abdomen    Body mass index is 23.41 kg/m².  AXR shows dilated colon  CBC ok  Lytes ok    Assessment & Plan:   47F with large bowel obstruction  Plan for ex lap, resection of dilated redundant  portion of tranverse colon and enlargement of mesenteric defect at site of transition point  Invanz octor  NPO after midnight

## 2022-10-26 NOTE — TRANSFER OF CARE
"Anesthesia Transfer of Care Note    Patient: Velma Lopez    Procedure(s) Performed: Procedure(s) (LRB):  LAPAROTOMY, EXPLORATORY (N/A)    Patient location: PACU    Anesthesia Type: general    Transport from OR: Transported from OR on 6-10 L/min O2 by face mask with adequate spontaneous ventilation    Post pain: adequate analgesia    Post assessment: no apparent anesthetic complications    Post vital signs: stable    Level of consciousness: awake    Nausea/Vomiting: no nausea/vomiting    Complications: none    Transfer of care protocol was followed      Last vitals:   Visit Vitals  BP (!) 153/77   Pulse 68   Temp 36.9 °C (98.5 °F)   Resp 18   Ht 5' 6" (1.676 m)   Wt 65.8 kg (145 lb 1 oz)   SpO2 95%   Breastfeeding No   BMI 23.41 kg/m²     "

## 2022-10-26 NOTE — ANESTHESIA PROCEDURE NOTES
Intubation    Date/Time: 10/26/2022 1:01 PM  Performed by: Tigist Munoz CRNA  Authorized by: Ced Trejo MD     Intubation:     Induction:  Intravenous    Intubated:  Postinduction    Mask Ventilation:  N/a    Attempts:  1    Attempted By:  CRNA    Method of Intubation:  Video laryngoscopy    Blade:  Marmolejo 3    Laryngeal View Grade: Grade I - full view of cords      Difficult Airway Encountered?: No      Complications:  None    Airway Device:  Oral endotracheal tube    Airway Device Size:  7.0    Style/Cuff Inflation:  Cuffed    Inflation Amount (mL):  5    Tube secured:  21    Secured at:  The lips    Placement Verified By:  Capnometry    Complicating Factors:  None    Findings Post-Intubation:  BS equal bilateral

## 2022-10-26 NOTE — PLAN OF CARE
Signed out from pacu per Dr Trejo. Report called to Arlene Pelayo/5A. Transported to room 508 via stretcher,sr upx2.

## 2022-10-26 NOTE — SUBJECTIVE & OBJECTIVE
Interval History: No acute events overnight. Patient continues to have ongoing abdominal pain. Received abx bowel prep. Passed a small amount of gas but no bowel movement.    Medications:  Continuous Infusions:   lactated ringers 100 mL/hr at 10/25/22 1700     Scheduled Meds:   amitriptyline  10 mg Oral QHS    ertapenem (INVANZ) IVPB  1 g Intravenous Once     PRN Meds:acetaminophen, dextrose 10%, dextrose 10%, glucagon (human recombinant), HYDROcodone-acetaminophen, insulin aspart U-100, LIDOcaine (PF) 10 mg/ml (1%), melatonin, morphine, ondansetron, sodium chloride 0.9%     Review of patient's allergies indicates:  No Known Allergies  Objective:     Vital Signs (Most Recent):  Temp: 98.5 °F (36.9 °C) (10/26/22 0726)  Pulse: 68 (10/26/22 0726)  Resp: 18 (10/26/22 0932)  BP: (!) 153/77 (10/26/22 0726)  SpO2: 95 % (10/26/22 0726)   Vital Signs (24h Range):  Temp:  [97 °F (36.1 °C)-99.6 °F (37.6 °C)] 98.5 °F (36.9 °C)  Pulse:  [65-80] 68  Resp:  [17-20] 18  SpO2:  [95 %-99 %] 95 %  BP: (131-203)/(72-95) 153/77     Weight: 65.8 kg (145 lb 1 oz)  Body mass index is 23.41 kg/m².    Intake/Output - Last 3 Shifts         10/24 0700  10/25 0659 10/25 0700  10/26 0659 10/26 0700  10/27 0659    Urine (mL/kg/hr)  100     Total Output  100     Net  -100            Stool Occurrence  0 x             Physical Exam  Constitutional:       General: She is not in acute distress.  HENT:      Head: Normocephalic and atraumatic.   Eyes:      Pupils: Pupils are equal, round, and reactive to light.   Cardiovascular:      Rate and Rhythm: Normal rate.   Pulmonary:      Effort: Pulmonary effort is normal. No respiratory distress.   Abdominal:      General: There is distension.      Palpations: Abdomen is soft.      Tenderness: There is abdominal tenderness.      Comments: Incision c/d/i   Skin:     General: Skin is warm and dry.      Capillary Refill: Capillary refill takes less than 2 seconds.   Neurological:      General: No focal  deficit present.      Mental Status: She is alert and oriented to person, place, and time.       Significant Labs:  I have reviewed all pertinent lab results within the past 24 hours.  CBC:   Recent Labs   Lab 10/25/22  1647   WBC 9.08   RBC 4.31   HGB 13.4   HCT 41.6   *   MCV 97   MCH 31.1*   MCHC 32.2     BMP:   Recent Labs   Lab 10/25/22  1647   GLU 82      K 4.9      CO2 23   BUN 11   CREATININE 0.7   CALCIUM 9.5   MG 2.1       Significant Diagnostics:  I have reviewed all pertinent imaging results/findings within the past 24 hours.

## 2022-10-27 LAB
BASOPHILS # BLD AUTO: 0.02 K/UL (ref 0–0.2)
BASOPHILS NFR BLD: 0.1 % (ref 0–1.9)
DIFFERENTIAL METHOD: ABNORMAL
EOSINOPHIL # BLD AUTO: 0 K/UL (ref 0–0.5)
EOSINOPHIL NFR BLD: 0.2 % (ref 0–8)
ERYTHROCYTE [DISTWIDTH] IN BLOOD BY AUTOMATED COUNT: 12.3 % (ref 11.5–14.5)
HCT VFR BLD AUTO: 40.8 % (ref 37–48.5)
HGB BLD-MCNC: 13.7 G/DL (ref 12–16)
IMM GRANULOCYTES # BLD AUTO: 0.07 K/UL (ref 0–0.04)
IMM GRANULOCYTES NFR BLD AUTO: 0.5 % (ref 0–0.5)
LYMPHOCYTES # BLD AUTO: 1.5 K/UL (ref 1–4.8)
LYMPHOCYTES NFR BLD: 10.9 % (ref 18–48)
MCH RBC QN AUTO: 31.4 PG (ref 27–31)
MCHC RBC AUTO-ENTMCNC: 33.6 G/DL (ref 32–36)
MCV RBC AUTO: 94 FL (ref 82–98)
MONOCYTES # BLD AUTO: 1.1 K/UL (ref 0.3–1)
MONOCYTES NFR BLD: 7.6 % (ref 4–15)
NEUTROPHILS # BLD AUTO: 11.2 K/UL (ref 1.8–7.7)
NEUTROPHILS NFR BLD: 80.7 % (ref 38–73)
NRBC BLD-RTO: 0 /100 WBC
PLATELET # BLD AUTO: 482 K/UL (ref 150–450)
PMV BLD AUTO: 9.7 FL (ref 9.2–12.9)
POCT GLUCOSE: 100 MG/DL (ref 70–110)
POCT GLUCOSE: 102 MG/DL (ref 70–110)
POCT GLUCOSE: 104 MG/DL (ref 70–110)
POCT GLUCOSE: 106 MG/DL (ref 70–110)
POCT GLUCOSE: 117 MG/DL (ref 70–110)
RBC # BLD AUTO: 4.36 M/UL (ref 4–5.4)
WBC # BLD AUTO: 13.82 K/UL (ref 3.9–12.7)

## 2022-10-27 PROCEDURE — 11000001 HC ACUTE MED/SURG PRIVATE ROOM

## 2022-10-27 PROCEDURE — 63600175 PHARM REV CODE 636 W HCPCS: Performed by: STUDENT IN AN ORGANIZED HEALTH CARE EDUCATION/TRAINING PROGRAM

## 2022-10-27 PROCEDURE — 25000003 PHARM REV CODE 250: Performed by: STUDENT IN AN ORGANIZED HEALTH CARE EDUCATION/TRAINING PROGRAM

## 2022-10-27 PROCEDURE — 99900035 HC TECH TIME PER 15 MIN (STAT)

## 2022-10-27 PROCEDURE — 97116 GAIT TRAINING THERAPY: CPT

## 2022-10-27 PROCEDURE — 97162 PT EVAL MOD COMPLEX 30 MIN: CPT

## 2022-10-27 PROCEDURE — 36415 COLL VENOUS BLD VENIPUNCTURE: CPT

## 2022-10-27 PROCEDURE — 85025 COMPLETE CBC W/AUTO DIFF WBC: CPT

## 2022-10-27 RX ORDER — DOCUSATE SODIUM 100 MG/1
100 CAPSULE, LIQUID FILLED ORAL 2 TIMES DAILY
Status: DISCONTINUED | OUTPATIENT
Start: 2022-10-27 | End: 2022-10-30 | Stop reason: HOSPADM

## 2022-10-27 RX ORDER — ERYTHROMYCIN ETHYLSUCCINATE 400 MG/5ML
400 SUSPENSION ORAL
Status: DISCONTINUED | OUTPATIENT
Start: 2022-10-27 | End: 2022-10-30

## 2022-10-27 RX ADMIN — DOCUSATE SODIUM 100 MG: 100 CAPSULE, LIQUID FILLED ORAL at 05:10

## 2022-10-27 RX ADMIN — KETOROLAC TROMETHAMINE 15 MG: 30 INJECTION, SOLUTION INTRAMUSCULAR; INTRAVENOUS at 05:10

## 2022-10-27 RX ADMIN — MORPHINE SULFATE 4 MG: 4 INJECTION INTRAVENOUS at 02:10

## 2022-10-27 RX ADMIN — ONDANSETRON HYDROCHLORIDE 8 MG: 2 SOLUTION INTRAMUSCULAR; INTRAVENOUS at 09:10

## 2022-10-27 RX ADMIN — ERYTHROMYCIN ETHYLSUCCINATE 400 MG: 400 SUSPENSION ORAL at 05:10

## 2022-10-27 RX ADMIN — ACETAMINOPHEN 650 MG: 325 TABLET ORAL at 09:10

## 2022-10-27 RX ADMIN — AMITRIPTYLINE HYDROCHLORIDE 10 MG: 10 TABLET, FILM COATED ORAL at 09:10

## 2022-10-27 RX ADMIN — MORPHINE SULFATE 4 MG: 4 INJECTION INTRAVENOUS at 09:10

## 2022-10-27 RX ADMIN — ONDANSETRON HYDROCHLORIDE 8 MG: 2 SOLUTION INTRAMUSCULAR; INTRAVENOUS at 01:10

## 2022-10-27 RX ADMIN — ONDANSETRON HYDROCHLORIDE 8 MG: 2 SOLUTION INTRAMUSCULAR; INTRAVENOUS at 05:10

## 2022-10-27 RX ADMIN — KETOROLAC TROMETHAMINE 15 MG: 30 INJECTION, SOLUTION INTRAMUSCULAR; INTRAVENOUS at 11:10

## 2022-10-27 RX ADMIN — KETOROLAC TROMETHAMINE 15 MG: 30 INJECTION, SOLUTION INTRAMUSCULAR; INTRAVENOUS at 01:10

## 2022-10-27 RX ADMIN — ERYTHROMYCIN ETHYLSUCCINATE 400 MG: 400 SUSPENSION ORAL at 09:10

## 2022-10-27 NOTE — PT/OT/SLP EVAL
"Physical Therapy Evaluation    Patient Name:  Velma Lopez   MRN:  [id number]    Recommendations:     Discharge Recommendations:  other (see comments) (TBD pending progress)   Discharge Equipment Recommendations: other (see comments) (TBD)   Barriers to discharge: None    Assessment:     Velma Lopez is a 47 y.o. female admitted with a medical diagnosis of Large bowel obstruction.  She presents with the following impairments/functional limitations:  weakness, gait instability, impaired endurance, impaired balance, decreased lower extremity function, pain, impaired skin, impaired functional mobility Patient seen for physical therapy evaluation on this date.  Patient will benefit from inpatient physical therapy to address functional mobility limitations.  Recommendations are ongoing, pending progress.  Physical therapy will continue to follow..    Rehab Prognosis: Good; patient would benefit from acute skilled PT services to address these deficits and reach maximum level of function.    Recent Surgery: Procedure(s) (LRB):  LAPAROTOMY, EXPLORATORY (N/A)  COLECTOMY, TRANSVERSE (N/A) 1 Day Post-Op    Plan:     During this hospitalization, patient to be seen 5 x/week to address the identified rehab impairments via gait training, therapeutic activities, therapeutic exercises, neuromuscular re-education and progress toward the following goals:    Plan of Care Expires:  11/26/22    Subjective     Chief Complaint: "I just need to poop"  Patient/Family Comments/goals: To [facility]  Pain/Comfort:  Pain Rating 1: 3/10  Location - Side 1: Bilateral  Location - Orientation 1: lower  Location 1: abdomen  Pain Addressed 1: Reposition, Cessation of Activity, Nurse notified  Pain Rating Post-Intervention 1: 3/10    Patients cultural, spiritual, Latter-day conflicts given the current situation: no    Living Environment:  Patient lives with boyfriend in a 2 story [facility], no IVY.  Bedroom and bathroom (T/S " combo) are upstairs.  12-15 steps to second floor with Left railing.   Prior to admission, patients level of function was independent, + Driving, works full time, owns own business.  Equipment used at home: none.  DME owned (not currently used): none.  Upon discharge, patient will have assistance from boyfriend.    Objective:     Communicated with nurse Jacobs prior to session.  Patient found supine with bed alarm, PureWick, peripheral IV  upon PT entry to room.    General Precautions: Standard, fall   Orthopedic Precautions:N/A   Braces: N/A  Respiratory Status: Room air    Exams:  Cognitive Exam:  Patient is oriented to Person, Place, Time, and Situation  Gross Motor Coordination:  WFL  Postural Exam:  Patient presented with the following abnormalities:    -       Rounded shoulders  -       Forward head  Sensation:    -       Intact  Skin Integrity/Edema:      -       Skin integrity: midline abdominal incision with dressing intact  RLE ROM: WFL  RLE Strength: 4/5 grossly  LLE ROM: WFL  LLE Strength: 4/5 grossly    Functional Mobility:  Bed Mobility:     Rolling Left:  stand by assistance  Scooting: stand by assistance  Supine to Sit: minimum assistance  Transfers:     Sit to Stand:  contact guard assistance with no AD  Gait: x 5' to bedside chair with no AD and CGA, slow pace  Balance: Seated:  no LOB sitting EOB   Standing:  no overt LOB, CGA required, unsteady      AM-PAC 6 CLICK MOBILITY  Total Score:16       Treatment & Education:  Patient educated on role of physical therapy and POC.  Patient educated on importance of OOB and daily gait.  Patient educated to perform log rolling to avoid abdominal pain.  Patient educated to call for assist for return to bed.      Patient left up in chair with all lines intact, call button in reach, chair alarm on, and nurse notified.    GOALS:   Multidisciplinary Problems       Physical Therapy Goals          Problem: Physical Therapy    Goal Priority Disciplines Outcome Goal  Variances Interventions   Physical Therapy Goal     PT, PT/OT Ongoing, Progressing     Description: Goals to be met by: 2022     Patient will increase functional independence with mobility by performin. Supine to sit with Modified [place]  2. Sit to supine with Modified [place]  3. Rolling to Left and Right with Modified [place].  4. Sit to stand transfer with Supervision  5. Gait  x 150 feet with Supervision using no AD vs. LRAD  6. Ascend/descend 15 stair with left Handrails Supervision.                           History:     Past Medical History:   Diagnosis Date    Chronic back pain     Diabetes type 2, uncontrolled     Mild nonproliferative diabetic retinopathy of left eye without macular edema associated with type 2 diabetes mellitus 10/21/2019    Morbid obesity with BMI of 40.0-44.9, adult     MILEY on CPAP     Plantar fasciitis of left foot     Urticaria        Past Surgical History:   Procedure Laterality Date    ARTHROSCOPIC ACROMIOPLASTY OF SHOULDER Left 2020    Procedure: ACROMIOPLASTY, ARTHROSCOPIC;  Surgeon: Marcela Garza MD;  Location: [facility];  Service: Orthopedics;  Laterality: Left;    ARTHROSCOPIC REPAIR OF ROTATOR CUFF OF SHOULDER Right 2020    Procedure: REPAIR, ROTATOR CUFF, ARTHROSCOPIC;  Surgeon: Marcela Garza MD;  Location: [facility];  Service: Orthopedics;  Laterality: Right;  GENERAL/REGIONAL    COLONOSCOPY N/A 2022    Procedure: COLONOSCOPY;  Surgeon: Yadi Wong MD;  Location: [address]);  Service: Endoscopy;  Laterality: N/A;  Covid test at [place] on .EC    CYST REMOVAL      ovarian cyst removal at age 12    DECOMPRESSION OF SUBACROMIAL SPACE Left 2020    Procedure: DECOMPRESSION, SUBACROMIAL SPACE;  Surgeon: Marcela Garza MD;  Location: [facility];  Service: Orthopedics;  Laterality: Left;    DILATION AND CURETTAGE OF UTERUS      ESOPHAGEAL MOTILITY STUDY USING HIGH RESOLUTION MANOMETRY N/A 2019     Procedure: MOTILITY STUDY, ESOPHAGUS, USING HIGH RESOLUTION MANOMETRY;  Surgeon: Leopoldo Swanson MD;  Location: [facility] (4TH FLR);  Service: Endoscopy;  Laterality: N/A;  Pt will be taking xanax before procedure to hopefull help with anxiety.    ESOPHAGOGASTRODUODENOSCOPY N/A 6/14/2019    Procedure: EGD (ESOPHAGOGASTRODUODENOSCOPY);  Surgeon: Ced Guevara MD;  [facility]  Service: Endoscopy;  Laterality: N/A;  Please make sure it is scheduled after her cardiology appt.- see cardiology note dated 6/13/19 for clearance - ERW    GASTRIC BYPASS N/A 05/20/2021    INJECTION OF STEROID Left 7/27/2020    Procedure: INJECTION, STEROID LEFT THUMB TRIGGER FINGER;  Surgeon: Marcela Garza MD;  Location: [facility] OR;  Service: Orthopedics;  Laterality: Left;  LEFT THUMB, TRIGGER FINGER    LYSIS OF ADHESIONS N/A 10/21/2022    Procedure: LYSIS, ADHESIONS;  Surgeon: Bunny Obrien MD;  Location: [facility] OR;  Service: General;  Laterality: N/A;    TRANSVERSE COLECTOMY N/A 10/26/2022    Procedure: COLECTOMY, TRANSVERSE;  Surgeon: Bunny Obrien MD;  Location: [facility] OR;  Service: General;  Laterality: N/A;    TRIGGER FINGER RELEASE Right 10/4/2019    Procedure: RELEASE, TRIGGER FINGER - right thumb;  Surgeon: Craig Tom MD;  Location: [facility] OR;  Service: Orthopedics;  Laterality: Right;    UPPER GASTROINTESTINAL ENDOSCOPY      WISDOM TOOTH EXTRACTION         Time Tracking:     PT Received On: 10/27/22  PT Start Time: 1021     PT Stop Time: 1048  PT Total Time (min): 27 min     Billable Minutes: Evaluation 15 and Gait Training 12      10/27/2022

## 2022-10-27 NOTE — PLAN OF CARE
Pt is AAOx4. Pt given medications as ordered per MAR. Pt on clear liquid diet. Scheduled Toradol given for pain and Scheduled Zofran given for nausea. Incision to midline abdomen CDI. Blood glucose monitoring maintained.Safety maintained. Bed alarm set. Instructed to use call light for assistance. Will continue to monitor.         Problem: Adult Inpatient Plan of Care  Goal: Optimal Comfort and Wellbeing  Outcome: Ongoing, Progressing     Problem: Nausea and Vomiting (Intestinal Obstruction)  Goal: Nausea and Vomiting Relief  Outcome: Ongoing, Progressing     Problem: Pain (Intestinal Obstruction)  Goal: Acceptable Pain Control  Outcome: Ongoing, Progressing

## 2022-10-27 NOTE — PROGRESS NOTES
[facility]  General Surgery  Progress Note    Subjective:     History of Present Illness:  No notes on file    Post-Op Info:  Procedure(s) (LRB):  LAPAROTOMY, EXPLORATORY (N/A)  COLECTOMY, TRANSVERSE (N/A)   1 Day Post-Op     Interval History: No acute events overnight. HDS on RA. Complaining of pain, but says that it is improved from prior to surgery. Tolerating clear liquids without nausea or vomiting.     Medications:  Continuous Infusions:  Scheduled Meds:   amitriptyline  10 mg Oral QHS    ketorolac  15 mg Intravenous Q6H    ondansetron  8 mg Intravenous Q8H     PRN Meds:acetaminophen, dextrose 10%, dextrose 10%, diphenhydrAMINE, glucagon (human recombinant), HYDROcodone-acetaminophen, HYDROmorphone, insulin aspart U-100, LIDOcaine (PF) 10 mg/ml (1%), melatonin, morphine, ondansetron, ondansetron, sodium chloride 0.9%, sodium chloride 0.9%     Review of patient's allergies indicates:  No Known Allergies  Objective:     Vital Signs (Most Recent):  Temp: 98.8 °F (37.1 °C) (10/27/22 0534)  Pulse: 82 (10/27/22 0534)  Resp: 14 (10/27/22 0534)  BP: 135/75 (10/27/22 0534)  SpO2: 96 % (10/27/22 0534) Vital Signs (24h Range):  Temp:  [97.9 °F (36.6 °C)-98.8 °F (37.1 °C)] 98.8 °F (37.1 °C)  Pulse:  [65-90] 82  Resp:  [14-20] 14  SpO2:  [94 %-100 %] 96 %  BP: (132-199)/(65-98) 135/75     Weight: 68.2 kg (150 lb 5.7 oz)  Body mass index is 24.27 kg/m².    Intake/Output - Last 3 Shifts         10/25 0700  10/26 0659 10/26 0700  10/27 0659    IV Piggyback  1300    Total Intake(mL/kg)  1300 (19.1)    Urine (mL/kg/hr) 100 895 (0.5)    Total Output 100 895    Net -100 +405          Stool Occurrence 0 x             Physical Exam  Constitutional:       General: She is not in acute distress.  HENT:      Head: Normocephalic and atraumatic.   Eyes:      Pupils: Pupils are equal, round, and reactive to light.   Cardiovascular:      Rate and Rhythm: Normal rate.   Pulmonary:      Effort: Pulmonary effort is normal. No  respiratory distress.   Abdominal:      General: There is no distension.      Palpations: Abdomen is soft.      Tenderness: There is abdominal tenderness (appropriately tender).      Comments: Incision c/d/i   Skin:     General: Skin is warm and dry.      Capillary Refill: Capillary refill takes less than 2 seconds.   Neurological:      General: No focal deficit present.      Mental Status: She is alert and oriented to person, place, and time.       Significant Labs:  I have reviewed all pertinent lab results within the past 24 hours.  CBC:   Recent Labs   Lab 10/25/22  1647   WBC 9.08   RBC 4.31   HGB 13.4   HCT 41.6   *   MCV 97   MCH 31.1*   MCHC 32.2     BMP:   Recent Labs   Lab 10/25/22  1647   GLU 82      K 4.9      CO2 23   BUN 11   CREATININE 0.7   CALCIUM 9.5   MG 2.1       Significant Diagnostics:  I have reviewed all pertinent imaging results/findings within the past 24 hours.    Assessment/Plan:     * Large bowel obstruction  47 year old female with recurrent large bowel obstruction now s/p ex-lap with transverse colon resection    - Clear liquid diet  - multimodal pain control  - SSI  - DVT ppx: SCDs  - PT/OT, needs to be up out of bed today        Barbara Chapman MD  General Surgery  [facility]

## 2022-10-27 NOTE — PLAN OF CARE
Patient seen for physical therapy evaluation on this date.  Full report to follow.  Patient will benefit from inpatient physical therapy to address functional mobility limitations.  Recommendations are ongoing, pending progress.  Physical therapy will continue to follow.      Problem: Physical Therapy  Goal: Physical Therapy Goal  Description: Goals to be met by: 2022     Patient will increase functional independence with mobility by performin. Supine to sit with Modified [place]  2. Sit to supine with Modified [place]  3. Rolling to Left and Right with Modified [place].  4. Sit to stand transfer with Supervision  5. Gait  x 150 feet with Supervision using no AD vs. LRAD  6. Ascend/descend 15 stair with left Handrails Supervision.      Outcome: Ongoing, Progressing

## 2022-10-27 NOTE — SUBJECTIVE & OBJECTIVE
Interval History: No acute events overnight. HDS on RA. Complaining of pain, but says that it is improved from prior to surgery. Tolerating clear liquids without nausea or vomiting.     Medications:  Continuous Infusions:  Scheduled Meds:   amitriptyline  10 mg Oral QHS    ketorolac  15 mg Intravenous Q6H    ondansetron  8 mg Intravenous Q8H     PRN Meds:acetaminophen, dextrose 10%, dextrose 10%, diphenhydrAMINE, glucagon (human recombinant), HYDROcodone-acetaminophen, HYDROmorphone, insulin aspart U-100, LIDOcaine (PF) 10 mg/ml (1%), melatonin, morphine, ondansetron, ondansetron, sodium chloride 0.9%, sodium chloride 0.9%     Review of patient's allergies indicates:  No Known Allergies  Objective:     Vital Signs (Most Recent):  Temp: 98.8 °F (37.1 °C) (10/27/22 0534)  Pulse: 82 (10/27/22 0534)  Resp: 14 (10/27/22 0534)  BP: 135/75 (10/27/22 0534)  SpO2: 96 % (10/27/22 0534) Vital Signs (24h Range):  Temp:  [97.9 °F (36.6 °C)-98.8 °F (37.1 °C)] 98.8 °F (37.1 °C)  Pulse:  [65-90] 82  Resp:  [14-20] 14  SpO2:  [94 %-100 %] 96 %  BP: (132-199)/(65-98) 135/75     Weight: 68.2 kg (150 lb 5.7 oz)  Body mass index is 24.27 kg/m².    Intake/Output - Last 3 Shifts         10/25 0700  10/26 0659 10/26 0700  10/27 0659    IV Piggyback  1300    Total Intake(mL/kg)  1300 (19.1)    Urine (mL/kg/hr) 100 895 (0.5)    Total Output 100 895    Net -100 +405          Stool Occurrence 0 x             Physical Exam  Constitutional:       General: She is not in acute distress.  HENT:      Head: Normocephalic and atraumatic.   Eyes:      Pupils: Pupils are equal, round, and reactive to light.   Cardiovascular:      Rate and Rhythm: Normal rate.   Pulmonary:      Effort: Pulmonary effort is normal. No respiratory distress.   Abdominal:      General: There is no distension.      Palpations: Abdomen is soft.      Tenderness: There is abdominal tenderness (appropriately tender).      Comments: Incision c/d/i   Skin:     General: Skin is warm  and dry.      Capillary Refill: Capillary refill takes less than 2 seconds.   Neurological:      General: No focal deficit present.      Mental Status: She is alert and oriented to person, place, and time.       Significant Labs:  I have reviewed all pertinent lab results within the past 24 hours.  CBC:   Recent Labs   Lab 10/25/22  1647   WBC 9.08   RBC 4.31   HGB 13.4   HCT 41.6   *   MCV 97   MCH 31.1*   MCHC 32.2     BMP:   Recent Labs   Lab 10/25/22  1647   GLU 82      K 4.9      CO2 23   BUN 11   CREATININE 0.7   CALCIUM 9.5   MG 2.1       Significant Diagnostics:  I have reviewed all pertinent imaging results/findings within the past 24 hours.

## 2022-10-27 NOTE — CARE UPDATE
Pt AOx4.Slept most of the shift. Pain managed with scheduled medications. Pt able to void within 6 hours of [place] removal. Pt wearing external catheter per pt request.. Safety maintained throughout  shift.

## 2022-10-27 NOTE — ASSESSMENT & PLAN NOTE
47 year old female with recurrent large bowel obstruction now s/p ex-lap with transverse colon resection    - Clear liquid diet  - multimodal pain control  - SSI  - DVT ppx: SCDs  - PT/OT, needs to be up out of bed today

## 2022-10-27 NOTE — PLAN OF CARE
SW met with pt at bedside to discuss dc planning.     Pt confirmed information on chart. Pt resides in home with her fidaniela. Pt is independent in ADLs. Pt has no DME/HH services. Pt reports upon dc her son or fiance will transport her home. SW wrote contact information on board for any questions or concerns. SW will continue to follow pt throughout her transitions of care and assist with any dc needs.       Future Appointments   Date Time Provider Department Center   11/11/2022  9:00 AM Bunny Obrien MD [facility]        10/27/22 1425   Discharge Assessment   Assessment Type Discharge Planning Assessment   Confirmed/corrected address, phone number and insurance Yes   Confirmed Demographics Correct on Facesheet   Source of Information patient   Communicated TOM with patient/caregiver Yes   Reason For Admission Large bowel obstruction   Lives With significant other   Do you expect to return to your current living situation? Yes   Do you have help at home or someone to help you manage your care at home? Yes   Who are your caregiver(s) and their phone number(s)? Aminata Millard (Sister)   [phone]   Prior to hospitilization cognitive status: Alert/Oriented   Current cognitive status: Alert/Oriented   Walking or Climbing Stairs Difficulty none   Dressing/Bathing Difficulty none   Equipment Currently Used at Home none   Patient currently being followed by outpatient case management? No   Do you currently have service(s) that help you manage your care at home? No   Do you take prescription medications? Yes   Do you have prescription coverage? Yes   Coverage Medicaid   Do you have any problems affording any of your prescribed medications? No   Is the patient taking medications as prescribed? yes   Who is going to help you get home at discharge? Fiance or Son   How do you get to doctors appointments? family or friend will provide;car, drives self   Are you on dialysis? No   Do you take coumadin? No    Discharge Plan A Home with family   DME Needed Upon Discharge  none   Discharge Plan discussed with: Patient   Discharge Barriers Identified None   Physical Activity   On average, how many days per week do you engage in moderate to strenuous exercise (like a brisk walk)? 0 days   On average, how many minutes do you engage in exercise at this level? 0 min   Financial Resource Strain   How hard is it for you to pay for the very basics like food, housing, medical care, and heating? Not hard   Housing Stability   In the last 12 months, was there a time when you were not able to pay the mortgage or rent on time? N   In the last 12 months, was there a time when you did not have a steady place to sleep or slept in a shelter (including now)? N   Transportation Needs   In the past 12 months, has lack of transportation kept you from medical appointments or from getting medications? no   In the past 12 months, has lack of transportation kept you from meetings, work, or from getting things needed for daily living? No   Food Insecurity   Within the past 12 months, you worried that your food would run out before you got the money to buy more. Never true   Within the past 12 months, the food you bought just didn't last and you didn't have money to get more. Never true   Alcohol Use   Q1: How often do you have a drink containing alcohol? 2-4 pr month   Q2: How many drinks containing alcohol do you have on a typical day when you are drinking? 3 or 4   Q3: How often do you have six or more drinks on one occasion? Monthly   Relationship/Environment   Name(s) of Who Lives With Patient Christina

## 2022-10-27 NOTE — PROGRESS NOTES
"[facility]           Pharmacy  Admission Medication Reconciliation     Based on information gathered for medication list, you may go to "Admission" then "Reconcile Home Medications" tabs to review and/or act upon those items.     The home medication list has been updated by the Pharmacy department.   Please read ALL comments highlighted in red.   Please address this information as you see fit.    Feel free to contact us if you have any questions or require assistance.    Home medication list has been compared to current inpatient medications. Please review the following discrepancies noted below:    Patient reports NO LONGER TAKING the following medication(s) which has been ordered upon admit.  Potassium chloride 20 meq packet  Varenicline 1 mg tab  Vitamin D3 1000 units  Patient reports STILL TAKING the following medication(s) which was not ordered upon admit  Solifenacin 10 mg    Feel free to contact us if you have any questions or require assistance.    Kely Price  [phone]       "

## 2022-10-28 LAB
POCT GLUCOSE: 64 MG/DL (ref 70–110)
POCT GLUCOSE: 70 MG/DL (ref 70–110)
POCT GLUCOSE: 77 MG/DL (ref 70–110)
POCT GLUCOSE: 83 MG/DL (ref 70–110)
POCT GLUCOSE: 84 MG/DL (ref 70–110)

## 2022-10-28 PROCEDURE — 63600175 PHARM REV CODE 636 W HCPCS

## 2022-10-28 PROCEDURE — 25000003 PHARM REV CODE 250: Performed by: STUDENT IN AN ORGANIZED HEALTH CARE EDUCATION/TRAINING PROGRAM

## 2022-10-28 PROCEDURE — 94761 N-INVAS EAR/PLS OXIMETRY MLT: CPT

## 2022-10-28 PROCEDURE — 97116 GAIT TRAINING THERAPY: CPT

## 2022-10-28 PROCEDURE — 99900035 HC TECH TIME PER 15 MIN (STAT)

## 2022-10-28 PROCEDURE — 63600175 PHARM REV CODE 636 W HCPCS: Performed by: STUDENT IN AN ORGANIZED HEALTH CARE EDUCATION/TRAINING PROGRAM

## 2022-10-28 PROCEDURE — 11000001 HC ACUTE MED/SURG PRIVATE ROOM

## 2022-10-28 RX ORDER — SODIUM CHLORIDE, SODIUM LACTATE, POTASSIUM CHLORIDE, CALCIUM CHLORIDE 600; 310; 30; 20 MG/100ML; MG/100ML; MG/100ML; MG/100ML
INJECTION, SOLUTION INTRAVENOUS CONTINUOUS
Status: DISCONTINUED | OUTPATIENT
Start: 2022-10-28 | End: 2022-10-30 | Stop reason: HOSPADM

## 2022-10-28 RX ADMIN — AMITRIPTYLINE HYDROCHLORIDE 10 MG: 10 TABLET, FILM COATED ORAL at 09:10

## 2022-10-28 RX ADMIN — ONDANSETRON HYDROCHLORIDE 8 MG: 2 SOLUTION INTRAMUSCULAR; INTRAVENOUS at 09:10

## 2022-10-28 RX ADMIN — ONDANSETRON HYDROCHLORIDE 8 MG: 2 SOLUTION INTRAMUSCULAR; INTRAVENOUS at 01:10

## 2022-10-28 RX ADMIN — DOCUSATE SODIUM 100 MG: 100 CAPSULE, LIQUID FILLED ORAL at 09:10

## 2022-10-28 RX ADMIN — ERYTHROMYCIN ETHYLSUCCINATE 400 MG: 400 SUSPENSION ORAL at 11:10

## 2022-10-28 RX ADMIN — SODIUM CHLORIDE, SODIUM LACTATE, POTASSIUM CHLORIDE, AND CALCIUM CHLORIDE: .6; .31; .03; .02 INJECTION, SOLUTION INTRAVENOUS at 03:10

## 2022-10-28 RX ADMIN — KETOROLAC TROMETHAMINE 15 MG: 30 INJECTION, SOLUTION INTRAMUSCULAR; INTRAVENOUS at 01:10

## 2022-10-28 RX ADMIN — KETOROLAC TROMETHAMINE 15 MG: 30 INJECTION, SOLUTION INTRAMUSCULAR; INTRAVENOUS at 11:10

## 2022-10-28 RX ADMIN — HYDROCODONE BITARTRATE AND ACETAMINOPHEN 1 TABLET: 5; 325 TABLET ORAL at 09:10

## 2022-10-28 RX ADMIN — KETOROLAC TROMETHAMINE 15 MG: 30 INJECTION, SOLUTION INTRAMUSCULAR; INTRAVENOUS at 05:10

## 2022-10-28 RX ADMIN — KETOROLAC TROMETHAMINE 15 MG: 30 INJECTION, SOLUTION INTRAMUSCULAR; INTRAVENOUS at 06:10

## 2022-10-28 RX ADMIN — MORPHINE SULFATE 4 MG: 4 INJECTION INTRAVENOUS at 03:10

## 2022-10-28 RX ADMIN — ERYTHROMYCIN ETHYLSUCCINATE 400 MG: 400 SUSPENSION ORAL at 09:10

## 2022-10-28 RX ADMIN — ONDANSETRON HYDROCHLORIDE 8 MG: 2 SOLUTION INTRAMUSCULAR; INTRAVENOUS at 05:10

## 2022-10-28 RX ADMIN — ERYTHROMYCIN ETHYLSUCCINATE 400 MG: 400 SUSPENSION ORAL at 05:10

## 2022-10-28 RX ADMIN — ONDANSETRON 8 MG: 8 TABLET, ORALLY DISINTEGRATING ORAL at 03:10

## 2022-10-28 RX ADMIN — DOCUSATE SODIUM 100 MG: 100 CAPSULE, LIQUID FILLED ORAL at 11:10

## 2022-10-28 NOTE — PLAN OF CARE
Patient is making progress toward PT goals.  Patient tolerated gait x 150' with SBA, slow pace.  Increased coughing with mucus production during treatment today.  Patient given incentive spirometer to perform 1x/hour.  Full report to follow.  Anticipate patient will be able to return home with no further therapy, no DME needs.    Problem: Physical Therapy  Goal: Physical Therapy Goal  Description: Goals to be met by: 2022     Patient will increase functional independence with mobility by performin. Supine to sit with Modified [place]  2. Sit to supine with Modified [place]  3. Rolling to Left and Right with Modified [place].  4. Sit to stand transfer with Supervision  5. Gait  x 150 feet with Supervision using no AD vs. LRAD  6. Ascend/descend 15 stair with left Handrails Supervision.      Outcome: Ongoing, Progressing

## 2022-10-28 NOTE — PROGRESS NOTES
[facility] S[facility]  General Surgery  Progress Note    Subjective:     History of Present Illness:  No notes on file    Post-Op Info:  Procedure(s) (LRB):  LAPAROTOMY, EXPLORATORY (N/A)  COLECTOMY, TRANSVERSE (N/A)   2 Days Post-Op     Interval History: No acute events overnight. HDS on RA. Pain present, more prominent when moving but controlled with pain medication. Tolerating clears without nausea or vomiting. Began passing gas overnight, no BM.     Medications:  Continuous Infusions:  Scheduled Meds:   amitriptyline  10 mg Oral QHS    docusate sodium  100 mg Oral BID    erythromycin  400 mg Oral QID (WM & HS)    ketorolac  15 mg Intravenous Q6H    ondansetron  8 mg Intravenous Q8H     PRN Meds:acetaminophen, dextrose 10%, dextrose 10%, glucagon (human recombinant), HYDROcodone-acetaminophen, insulin aspart U-100, LIDOcaine (PF) 10 mg/ml (1%), melatonin, morphine, ondansetron, sodium chloride 0.9%     Review of patient's allergies indicates:  No Known Allergies  Objective:     Vital Signs (Most Recent):  Temp: 98.9 °F (37.2 °C) (10/28/22 0435)  Pulse: 77 (10/28/22 0435)  Resp: 18 (10/28/22 0435)  BP: 127/70 (10/28/22 0435)  SpO2: 95 % (10/28/22 0435) Vital Signs (24h Range):  Temp:  [98.2 °F (36.8 °C)-99.5 °F (37.5 °C)] 98.9 °F (37.2 °C)  Pulse:  [70-97] 77  Resp:  [16-20] 18  SpO2:  [94 %-96 %] 95 %  BP: (118-130)/(55-80) 127/70     Weight: 68.8 kg (151 lb 10.8 oz)  Body mass index is 24.48 kg/m².    Intake/Output - Last 3 Shifts         10/26 0700  10/27 0659 10/27 0700  10/28 0659 10/28 0700  10/29 0659    P.O. 240 240     IV Piggyback 1300      Total Intake(mL/kg) 1540 (22.6) 240 (3.5)     Urine (mL/kg/hr) 1045 (0.6) 300 (0.2)     Total Output 1045 300     Net +495 -60            Urine Occurrence  5 x             Physical Exam  Constitutional:       General: She is not in acute distress.  HENT:      Head: Normocephalic and atraumatic.   Eyes:      Pupils: Pupils are equal, round, and reactive to light.    Cardiovascular:      Rate and Rhythm: Normal rate.   Pulmonary:      Effort: Pulmonary effort is normal. No respiratory distress.   Abdominal:      General: There is no distension.      Palpations: Abdomen is soft.      Tenderness: There is abdominal tenderness (appropriately tender).      Comments: Incision c/d/i   Skin:     General: Skin is warm and dry.      Capillary Refill: Capillary refill takes less than 2 seconds.   Neurological:      General: No focal deficit present.      Mental Status: She is alert and oriented to person, place, and time.       Significant Labs:  I have reviewed all pertinent lab results within the past 24 hours.  CBC:   Recent Labs   Lab 10/27/22  0632   WBC 13.82*   RBC 4.36   HGB 13.7   HCT 40.8   *   MCV 94   MCH 31.4*   MCHC 33.6     BMP:   Recent Labs   Lab 10/25/22  1647   GLU 82      K 4.9      CO2 23   BUN 11   CREATININE 0.7   CALCIUM 9.5   MG 2.1       Significant Diagnostics:  I have reviewed all pertinent imaging results/findings within the past 24 hours.    Assessment/Plan:     * Large bowel obstruction  47 year old female with recurrent large bowel obstruction now s/p ex-lap with transverse colon resection.    - Clear liquid diet, consider advancing diet with return of bowel function  - multimodal pain control  - SSI  - DVT ppx: SCDs  - PT/OT        Barbara Chapman MD  General Surgery  [facility] S[facility]

## 2022-10-28 NOTE — PT/OT/SLP PROGRESS
"Physical Therapy Treatment    Patient Name:  Velma Lopez   MRN:  [id number]    Recommendations:     Discharge Recommendations:  home   Discharge Equipment Recommendations: none   Barriers to discharge: None    Assessment:     Velma Lopez is a 47 y.o. female admitted with a medical diagnosis of Large bowel obstruction.  She presents with the following impairments/functional limitations:  weakness, gait instability, impaired endurance, impaired balance, decreased lower extremity function, impaired functional mobility, pain, impaired skin Patient is making progress toward PT goals.  Patient tolerated gait x 150' with SBA, slow pace.  Increased coughing with mucus production during treatment today.  Patient given incentive spirometer to perform 1x/hour.  Full report to follow.  Anticipate patient will be able to return home with no further therapy, no DME needs..    Rehab Prognosis: Good; patient would benefit from acute skilled PT services to address these deficits and reach maximum level of function.    Recent Surgery: Procedure(s) (LRB):  LAPAROTOMY, EXPLORATORY (N/A)  COLECTOMY, TRANSVERSE (N/A) 2 Days Post-Op    Plan:     During this hospitalization, patient to be seen 3 x/week to address the identified rehab impairments via gait training, therapeutic activities, therapeutic exercises, neuromuscular re-education and progress toward the following goals:    Plan of Care Expires:  11/26/22    Subjective     Chief Complaint: "I just am not getting any rest ... I am so tired"  Patient/Family Comments/goals: To return home at [facility]  Pain/Comfort:  Pain Rating 1: 0/10  Pain Rating Post-Intervention 1: 0/10      Objective:     Communicated with nurse Christopher prior to session.  Patient found supine with peripheral IV upon PT entry to room.     General Precautions: Standard, fall   Orthopedic Precautions:N/A   Braces: N/A  Respiratory Status: Room air     Functional Mobility:  Bed Mobility:     Rolling Left:  " stand by assistance  Supine to Sit: stand by assistance  Transfers:     Sit to Stand:  stand by assistance with no AD  Gait: with no AD x 150' with CG/SBA, very slow pace, VC's to increase upright posture  Balance: Seated:  no LOB   Standing: requires no AD, slow pace, slightly unsteady      AM-PAC 6 CLICK MOBILITY  Turning over in bed (including adjusting bedclothes, sheets and blankets)?: 3  Sitting down on and standing up from a chair with arms (e.g., wheelchair, bedside commode, etc.): 4  Moving from lying on back to sitting on the side of the bed?: 4  Moving to and from a bed to a chair (including a wheelchair)?: 4  Need to walk in hospital room?: 3  Climbing 3-5 steps with a railing?: 3  Basic Mobility Total Score: 21       Treatment & Education:  Patient educated on use of Incentive Spirometer.  Patient with increased mucus production during session with coughing.  Notified Nurse.  Patient also with red urine when toileting.  Alerted nurse.  Patient educated on importance of OOB.      Patient left up in chair with all lines intact, call button in reach, chair alarm on, and nurse notified..    GOALS:   Multidisciplinary Problems       Physical Therapy Goals          Problem: Physical Therapy    Goal Priority Disciplines Outcome Goal Variances Interventions   Physical Therapy Goal     PT, PT/OT Ongoing, Progressing     Description: Goals to be met by: 2022     Patient will increase functional independence with mobility by performin. Supine to sit with Modified [place]  2. Sit to supine with Modified [place]  3. Rolling to Left and Right with Modified [place].  4. Sit to stand transfer with Supervision  5. Gait  x 150 feet with Supervision using no AD vs. LRAD  6. Ascend/descend 15 stair with left Handrails Supervision.                           Time Tracking:     PT Received On: 10/28/22  PT Start Time: 1003     PT Stop Time: 1033  PT Total Time (min): 30 min     Billable Minutes:  Gait Training 30    Treatment Type: Treatment  PT/PTA: PT     PTA Visit Number: 0     10/28/2022

## 2022-10-28 NOTE — ASSESSMENT & PLAN NOTE
47 year old female with recurrent large bowel obstruction now s/p ex-lap with transverse colon resection.    - Clear liquid diet, consider advancing diet with return of bowel function  - multimodal pain control  - SSI  - DVT ppx: SCDs  - PT/OT

## 2022-10-28 NOTE — PLAN OF CARE
Pt vitals were maintained, pt c/o moderated pain to abd. Pt ambulates to bathroom with standby asst. Pt is Aaox4. Pt incision looks to be c/d/I, with no visible signs of drainage. Pt stated they didn't eat much today. Pt does have faint bowel sound in the lower abd. New iv started. Bg in range. Wctm   Problem: Adult Inpatient Plan of Care  Goal: Optimal Comfort and Wellbeing  Outcome: Ongoing, Progressing     Problem: Diabetes Comorbidity  Goal: Blood Glucose Level Within Targeted Range  Outcome: Ongoing, Progressing     Problem: Fluid Deficit (Intestinal Obstruction)  Goal: Fluid Balance  Outcome: Ongoing, Progressing     Problem: Nausea and Vomiting (Intestinal Obstruction)  Goal: Nausea and Vomiting Relief  Outcome: Ongoing, Progressing     Problem: Fall Injury Risk  Goal: Absence of Fall and Fall-Related Injury  Outcome: Ongoing, Progressing

## 2022-10-28 NOTE — SUBJECTIVE & OBJECTIVE
Interval History: No acute events overnight. HDS on RA. Pain present, more prominent when moving but controlled with pain medication. Tolerating clears without nausea or vomiting. Began passing gas overnight, no BM.     Medications:  Continuous Infusions:  Scheduled Meds:   amitriptyline  10 mg Oral QHS    docusate sodium  100 mg Oral BID    erythromycin  400 mg Oral QID (WM & HS)    ketorolac  15 mg Intravenous Q6H    ondansetron  8 mg Intravenous Q8H     PRN Meds:acetaminophen, dextrose 10%, dextrose 10%, glucagon (human recombinant), HYDROcodone-acetaminophen, insulin aspart U-100, LIDOcaine (PF) 10 mg/ml (1%), melatonin, morphine, ondansetron, sodium chloride 0.9%     Review of patient's allergies indicates:  No Known Allergies  Objective:     Vital Signs (Most Recent):  Temp: 98.9 °F (37.2 °C) (10/28/22 0435)  Pulse: 77 (10/28/22 0435)  Resp: 18 (10/28/22 0435)  BP: 127/70 (10/28/22 0435)  SpO2: 95 % (10/28/22 0435) Vital Signs (24h Range):  Temp:  [98.2 °F (36.8 °C)-99.5 °F (37.5 °C)] 98.9 °F (37.2 °C)  Pulse:  [70-97] 77  Resp:  [16-20] 18  SpO2:  [94 %-96 %] 95 %  BP: (118-130)/(55-80) 127/70     Weight: 68.8 kg (151 lb 10.8 oz)  Body mass index is 24.48 kg/m².    Intake/Output - Last 3 Shifts         10/26 0700  10/27 0659 10/27 0700  10/28 0659 10/28 0700  10/29 0659    P.O. 240 240     IV Piggyback 1300      Total Intake(mL/kg) 1540 (22.6) 240 (3.5)     Urine (mL/kg/hr) 1045 (0.6) 300 (0.2)     Total Output 1045 300     Net +495 -60            Urine Occurrence  5 x             Physical Exam  Constitutional:       General: She is not in acute distress.  HENT:      Head: Normocephalic and atraumatic.   Eyes:      Pupils: Pupils are equal, round, and reactive to light.   Cardiovascular:      Rate and Rhythm: Normal rate.   Pulmonary:      Effort: Pulmonary effort is normal. No respiratory distress.   Abdominal:      General: There is no distension.      Palpations: Abdomen is soft.      Tenderness: There is  abdominal tenderness (appropriately tender).      Comments: Incision c/d/i   Skin:     General: Skin is warm and dry.      Capillary Refill: Capillary refill takes less than 2 seconds.   Neurological:      General: No focal deficit present.      Mental Status: She is alert and oriented to person, place, and time.       Significant Labs:  I have reviewed all pertinent lab results within the past 24 hours.  CBC:   Recent Labs   Lab 10/27/22  0632   WBC 13.82*   RBC 4.36   HGB 13.7   HCT 40.8   *   MCV 94   MCH 31.4*   MCHC 33.6     BMP:   Recent Labs   Lab 10/25/22  1647   GLU 82      K 4.9      CO2 23   BUN 11   CREATININE 0.7   CALCIUM 9.5   MG 2.1       Significant Diagnostics:  I have reviewed all pertinent imaging results/findings within the past 24 hours.

## 2022-10-29 LAB
POCT GLUCOSE: 102 MG/DL (ref 70–110)
POCT GLUCOSE: 69 MG/DL (ref 70–110)
POCT GLUCOSE: 76 MG/DL (ref 70–110)
POCT GLUCOSE: 83 MG/DL (ref 70–110)
POCT GLUCOSE: 91 MG/DL (ref 70–110)

## 2022-10-29 PROCEDURE — 63600175 PHARM REV CODE 636 W HCPCS: Performed by: STUDENT IN AN ORGANIZED HEALTH CARE EDUCATION/TRAINING PROGRAM

## 2022-10-29 PROCEDURE — 63600175 PHARM REV CODE 636 W HCPCS

## 2022-10-29 PROCEDURE — 11000001 HC ACUTE MED/SURG PRIVATE ROOM

## 2022-10-29 PROCEDURE — 25000003 PHARM REV CODE 250: Performed by: STUDENT IN AN ORGANIZED HEALTH CARE EDUCATION/TRAINING PROGRAM

## 2022-10-29 PROCEDURE — 97116 GAIT TRAINING THERAPY: CPT | Mod: CQ

## 2022-10-29 PROCEDURE — 97530 THERAPEUTIC ACTIVITIES: CPT | Mod: CQ

## 2022-10-29 RX ORDER — MEGESTROL ACETATE 40 MG/1
40 TABLET ORAL DAILY
Status: DISCONTINUED | OUTPATIENT
Start: 2022-10-29 | End: 2022-10-30

## 2022-10-29 RX ADMIN — SODIUM CHLORIDE, SODIUM LACTATE, POTASSIUM CHLORIDE, AND CALCIUM CHLORIDE: .6; .31; .03; .02 INJECTION, SOLUTION INTRAVENOUS at 05:10

## 2022-10-29 RX ADMIN — ONDANSETRON HYDROCHLORIDE 8 MG: 2 SOLUTION INTRAMUSCULAR; INTRAVENOUS at 09:10

## 2022-10-29 RX ADMIN — ERYTHROMYCIN ETHYLSUCCINATE 400 MG: 400 SUSPENSION ORAL at 05:10

## 2022-10-29 RX ADMIN — MORPHINE SULFATE 4 MG: 4 INJECTION INTRAVENOUS at 09:10

## 2022-10-29 RX ADMIN — ONDANSETRON HYDROCHLORIDE 8 MG: 2 SOLUTION INTRAMUSCULAR; INTRAVENOUS at 05:10

## 2022-10-29 RX ADMIN — AMITRIPTYLINE HYDROCHLORIDE 10 MG: 10 TABLET, FILM COATED ORAL at 08:10

## 2022-10-29 RX ADMIN — ERYTHROMYCIN ETHYLSUCCINATE 400 MG: 400 SUSPENSION ORAL at 09:10

## 2022-10-29 RX ADMIN — MEGESTROL ACETATE 40 MG: 40 TABLET ORAL at 01:10

## 2022-10-29 RX ADMIN — ERYTHROMYCIN ETHYLSUCCINATE 400 MG: 400 SUSPENSION ORAL at 01:10

## 2022-10-29 RX ADMIN — DOCUSATE SODIUM 100 MG: 100 CAPSULE, LIQUID FILLED ORAL at 09:10

## 2022-10-29 RX ADMIN — ONDANSETRON HYDROCHLORIDE 8 MG: 2 SOLUTION INTRAMUSCULAR; INTRAVENOUS at 01:10

## 2022-10-29 RX ADMIN — KETOROLAC TROMETHAMINE 15 MG: 30 INJECTION, SOLUTION INTRAMUSCULAR; INTRAVENOUS at 05:10

## 2022-10-29 RX ADMIN — KETOROLAC TROMETHAMINE 15 MG: 30 INJECTION, SOLUTION INTRAMUSCULAR; INTRAVENOUS at 01:10

## 2022-10-29 RX ADMIN — DOCUSATE SODIUM 100 MG: 100 CAPSULE, LIQUID FILLED ORAL at 08:10

## 2022-10-29 NOTE — PLAN OF CARE
Pt vitals were maintained, pt c/o mild pain. Pt states that she is having some gas but no BOWel movement yet. Pt is AaOx4. Iv fluids cont. Incision looks c/d/I. Urine color improved. Wctm   Problem: Adult Inpatient Plan of Care  Goal: Optimal Comfort and Wellbeing  Outcome: Ongoing, Progressing     Problem: Diabetes Comorbidity  Goal: Blood Glucose Level Within Targeted Range  Outcome: Ongoing, Progressing     Problem: Infection (Intestinal Obstruction)  Goal: Absence of Infection Signs and Symptoms  Outcome: Ongoing, Progressing     Problem: Nausea and Vomiting (Intestinal Obstruction)  Goal: Nausea and Vomiting Relief  Outcome: Ongoing, Progressing     Problem: Pain (Intestinal Obstruction)  Goal: Acceptable Pain Control  Outcome: Ongoing, Progressing     Problem: Fall Injury Risk  Goal: Absence of Fall and Fall-Related Injury  Outcome: Ongoing, Progressing     Problem: Bowel Motility Impaired (Bowel Resection)  Goal: Effective Bowel Motility and Elimination  Outcome: Ongoing, Progressing

## 2022-10-29 NOTE — NURSING
Patient ambulated in the hallway with therapy and then was able to have a bowel movement, moderate - loose brown.

## 2022-10-29 NOTE — PROGRESS NOTES
[facility]  General Surgery  Progress Note    Subjective:     Interval History:   Feeling better  Incisional pain  No NV  Passing flatus  No BM  Not much appetite  OOB a good bit    Post-Op Info:  Procedure(s) (LRB):  LAPAROTOMY, EXPLORATORY (N/A)  COLECTOMY, TRANSVERSE (N/A)   3 Days Post-Op      Medications:  Continuous Infusions:   lactated ringers 75 mL/hr at 10/28/22 1800     Scheduled Meds:   amitriptyline  10 mg Oral QHS    docusate sodium  100 mg Oral BID    erythromycin  400 mg Oral QID (WM & HS)    ketorolac  15 mg Intravenous Q6H    megestroL  40 mg Oral Daily    ondansetron  8 mg Intravenous Q8H     PRN Meds:acetaminophen, dextrose 10%, dextrose 10%, glucagon (human recombinant), HYDROcodone-acetaminophen, insulin aspart U-100, LIDOcaine (PF) 10 mg/ml (1%), melatonin, morphine, ondansetron, sodium chloride 0.9%     Objective:     Vital Signs (Most Recent):  Temp: 98.2 °F (36.8 °C) (10/29/22 0751)  Pulse: 74 (10/29/22 0751)  Resp: 20 (10/29/22 0954)  BP: 131/76 (10/29/22 0751)  SpO2: 97 % (10/29/22 0751) Vital Signs (24h Range):  Temp:  [98 °F (36.7 °C)-99.5 °F (37.5 °C)] 98.2 °F (36.8 °C)  Pulse:  [70-82] 74  Resp:  [14-20] 20  SpO2:  [97 %-98 %] 97 %  BP: (131-161)/(67-85) 131/76       Intake/Output Summary (Last 24 hours) at 10/29/2022 1222  Last data filed at 10/28/2022 1800  Gross per 24 hour   Intake 529.27 ml   Output 400 ml   Net 129.27 ml       Physical Exam  Ab appropriate  Incision good    Significant Labs:  CBC: No results for input(s): WBC, RBC, HGB, HCT, PLT, MCV, MCH, MCHC in the last 48 hours.  CMP: No results for input(s): GLU, CALCIUM, ALBUMIN, PROT, NA, K, CO2, CL, BUN, CREATININE, ALKPHOS, ALT, AST, BILITOT in the last 48 hours.    Significant Diagnostics:  I have reviewed all pertinent imaging results/findings within the past 24 hours.    Assessment/Plan:     Active Diagnoses:    Diagnosis Date Noted POA    PRINCIPAL PROBLEM:  Large bowel obstruction [K56.609] 10/21/2022 Yes       Problems Resolved During this Admission:     47F s/p transverse colectomy  Reg diet  OOB  Will add megace  Axr  Bmp tomorrow  Improving        Bunny Obrien MD  General Surgery  [facility]

## 2022-10-29 NOTE — PLAN OF CARE
Problem: Physical Therapy  Goal: Physical Therapy Goal  Description: Goals to be met by: 2022     Patient will increase functional independence with mobility by performin. Supine to sit with Modified [place]  2. Sit to supine with Modified [place]  3. Rolling to Left and Right with Modified [place].  4. Sit to stand transfer with Supervision  5. Gait  x 150 feet with Supervision using no AD vs. LRAD  6. Ascend/descend 15 stair with left Handrails Supervision.      Outcome: Ongoing, Progressing   Continue working toward goals.

## 2022-10-29 NOTE — PT/OT/SLP PROGRESS
Physical Therapy Treatment    Patient Name:  Velma Lopez   MRN:  [id number]    Recommendations:     Discharge Recommendations:  home   Discharge Equipment Recommendations: none   Barriers to discharge:  decreased functional mobility    Assessment:     Velma Lopez is a 47 y.o. female admitted with a medical diagnosis of Large bowel obstruction.  She presents with the following impairments/functional limitations:  weakness, impaired endurance, impaired functional mobility, gait instability, impaired balance, decreased upper extremity function, decreased lower extremity function, impaired skin Pt would continue to benefit from P.T. To address impairments listed above.  .    Rehab Prognosis: Good; patient would benefit from acute skilled PT services to address these deficits and reach maximum level of function.    Recent Surgery: Procedure(s) (LRB):  LAPAROTOMY, EXPLORATORY (N/A)  COLECTOMY, TRANSVERSE (N/A) 3 Days Post-Op    Plan:     During this hospitalization, patient to be seen 3 x/week to address the identified rehab impairments via gait training, therapeutic activities, therapeutic exercises, neuromuscular re-education and progress toward the following goals:    Plan of Care Expires:  11/26/22    Subjective       Patient/Family Comments/goals: Pt agreed to tx.  Pain/Comfort:  Pain Rating 1: 0/10 (discomfort)  Pain Rating Post-Intervention 1: 0/10      Objective:     Communicated with RN prior to session.  Patient found supine with peripheral IV upon PT entry to room.     General Precautions: Standard, fall   Orthopedic Precautions:N/A   Braces:    Respiratory Status: Room air     Functional Mobility:  Bed Mobility:     Rolling Right: contact guard assistance and with b/r for assist and vc's for log rolling technique  Scooting: stand by assistance  Supine to Sit: contact guard assistance via log rolling  Sit to Supine: contact guard assistance via log rolling  Transfers:     Sit to Stand:  contact  guard assistance with no AD  Toilet Transfer: contact guard assistance with  no AD  using  Step Transfer  Gait: 90ft x 2 with a brief standing rest between bouts.  First bout, pt ambulated with CGA holding onto IV pole and 2nd bout CGA without hold IV pole.  Pt ambulates with decreased latrell and guarded secondary to abdominal discomfort.  VC's for upright posture as able.  Balance: sitting good, standing fair+, gait fair      AM-PAC 6 CLICK MOBILITY  Turning over in bed (including adjusting bedclothes, sheets and blankets)?: 3  Sitting down on and standing up from a chair with arms (e.g., wheelchair, bedside commode, etc.): 3  Moving from lying on back to sitting on the side of the bed?: 4  Moving to and from a bed to a chair (including a wheelchair)?: 3  Need to walk in hospital room?: 3  Climbing 3-5 steps with a railing?: 3  Basic Mobility Total Score: 19       Treatment & Education:  Gait as above.  Pt ambulated onto [employer] and performed marching in place x 10 reps and heel raises x 10 reps holding on to [employer] rail with SBA.  Pt ambulated back to her room and requested to use toilet, transfer as above.  Pt performed self hygiene to BM and stood at sink to wash hands with S and not LOB.  Pt returned to bed supine instructed in log rolling technique to decreased abdominal pain with supine <> sit.      Patient left supine with all lines intact, call button in reach, bed alarm on, and RN notified..    GOALS:   Multidisciplinary Problems       Physical Therapy Goals          Problem: Physical Therapy    Goal Priority Disciplines Outcome Goal Variances Interventions   Physical Therapy Goal     PT, PT/OT Ongoing, Progressing     Description: Goals to be met by: 2022     Patient will increase functional independence with mobility by performin. Supine to sit with Modified [place]  2. Sit to supine with Modified [place]  3. Rolling to Left and Right with Modified [place].  4. Sit to stand  transfer with Supervision  5. Gait  x 150 feet with Supervision using no AD vs. LRAD  6. Ascend/descend 15 stair with left Handrails Supervision.                           Time Tracking:     PT Received On: 10/29/22  PT Start Time: 1300     PT Stop Time: 1329  PT Total Time (min): 29 min     Billable Minutes: Gait Training 13 and Therapeutic Activity 16    Treatment Type: Treatment  PT/PTA: PTA     PTA Visit Number: 1     10/29/2022

## 2022-10-30 VITALS
RESPIRATION RATE: 18 BRPM | HEIGHT: 66 IN | SYSTOLIC BLOOD PRESSURE: 156 MMHG | TEMPERATURE: 100 F | DIASTOLIC BLOOD PRESSURE: 80 MMHG | BODY MASS INDEX: 24.38 KG/M2 | WEIGHT: 151.69 LBS | HEART RATE: 74 BPM | OXYGEN SATURATION: 97 %

## 2022-10-30 LAB
ANION GAP SERPL CALC-SCNC: 10 MMOL/L (ref 8–16)
BUN SERPL-MCNC: 5 MG/DL (ref 6–20)
CALCIUM SERPL-MCNC: 8.6 MG/DL (ref 8.7–10.5)
CHLORIDE SERPL-SCNC: 103 MMOL/L (ref 95–110)
CO2 SERPL-SCNC: 24 MMOL/L (ref 23–29)
CREAT SERPL-MCNC: 0.5 MG/DL (ref 0.5–1.4)
EST. GFR  (NO RACE VARIABLE): >60 ML/MIN/1.73 M^2
GLUCOSE SERPL-MCNC: 93 MG/DL (ref 70–110)
MAGNESIUM SERPL-MCNC: 1.6 MG/DL (ref 1.6–2.6)
PHOSPHATE SERPL-MCNC: 3.6 MG/DL (ref 2.7–4.5)
POCT GLUCOSE: 70 MG/DL (ref 70–110)
POCT GLUCOSE: 93 MG/DL (ref 70–110)
POTASSIUM SERPL-SCNC: 3.4 MMOL/L (ref 3.5–5.1)
SODIUM SERPL-SCNC: 137 MMOL/L (ref 136–145)

## 2022-10-30 PROCEDURE — 83735 ASSAY OF MAGNESIUM: CPT | Performed by: STUDENT IN AN ORGANIZED HEALTH CARE EDUCATION/TRAINING PROGRAM

## 2022-10-30 PROCEDURE — 84100 ASSAY OF PHOSPHORUS: CPT | Performed by: STUDENT IN AN ORGANIZED HEALTH CARE EDUCATION/TRAINING PROGRAM

## 2022-10-30 PROCEDURE — 63600175 PHARM REV CODE 636 W HCPCS: Performed by: STUDENT IN AN ORGANIZED HEALTH CARE EDUCATION/TRAINING PROGRAM

## 2022-10-30 PROCEDURE — 80048 BASIC METABOLIC PNL TOTAL CA: CPT | Performed by: STUDENT IN AN ORGANIZED HEALTH CARE EDUCATION/TRAINING PROGRAM

## 2022-10-30 PROCEDURE — 63600175 PHARM REV CODE 636 W HCPCS

## 2022-10-30 PROCEDURE — 36415 COLL VENOUS BLD VENIPUNCTURE: CPT | Performed by: STUDENT IN AN ORGANIZED HEALTH CARE EDUCATION/TRAINING PROGRAM

## 2022-10-30 PROCEDURE — 25000003 PHARM REV CODE 250: Performed by: STUDENT IN AN ORGANIZED HEALTH CARE EDUCATION/TRAINING PROGRAM

## 2022-10-30 RX ORDER — DRONABINOL 2.5 MG/1
2.5 CAPSULE ORAL 2 TIMES DAILY
Qty: 28 CAPSULE | Refills: 0 | Status: SHIPPED | OUTPATIENT
Start: 2022-10-30 | End: 2022-10-31 | Stop reason: SDUPTHER

## 2022-10-30 RX ORDER — POTASSIUM CHLORIDE 20 MEQ/1
40 TABLET, EXTENDED RELEASE ORAL ONCE
Status: COMPLETED | OUTPATIENT
Start: 2022-10-30 | End: 2022-10-30

## 2022-10-30 RX ORDER — LANOLIN ALCOHOL/MO/W.PET/CERES
400 CREAM (GRAM) TOPICAL ONCE
Status: COMPLETED | OUTPATIENT
Start: 2022-10-30 | End: 2022-10-30

## 2022-10-30 RX ORDER — HYDROCODONE BITARTRATE AND ACETAMINOPHEN 5; 325 MG/1; MG/1
1 TABLET ORAL EVERY 4 HOURS PRN
Qty: 10 TABLET | Refills: 0 | Status: SHIPPED | OUTPATIENT
Start: 2022-10-30 | End: 2023-08-18

## 2022-10-30 RX ORDER — DOCUSATE SODIUM 100 MG/1
100 CAPSULE, LIQUID FILLED ORAL 2 TIMES DAILY
Qty: 30 CAPSULE | Refills: 0 | Status: SHIPPED | OUTPATIENT
Start: 2022-10-30 | End: 2022-11-23 | Stop reason: SDUPTHER

## 2022-10-30 RX ORDER — DRONABINOL 2.5 MG/1
2.5 CAPSULE ORAL 2 TIMES DAILY
Status: DISCONTINUED | OUTPATIENT
Start: 2022-10-30 | End: 2022-10-30 | Stop reason: HOSPADM

## 2022-10-30 RX ADMIN — POTASSIUM CHLORIDE 40 MEQ: 1500 TABLET, EXTENDED RELEASE ORAL at 12:10

## 2022-10-30 RX ADMIN — DRONABINOL 2.5 MG: 2.5 CAPSULE ORAL at 01:10

## 2022-10-30 RX ADMIN — Medication 400 MG: at 12:10

## 2022-10-30 RX ADMIN — DOCUSATE SODIUM 100 MG: 100 CAPSULE, LIQUID FILLED ORAL at 09:10

## 2022-10-30 RX ADMIN — MEGESTROL ACETATE 40 MG: 40 TABLET ORAL at 09:10

## 2022-10-30 RX ADMIN — ONDANSETRON HYDROCHLORIDE 8 MG: 2 SOLUTION INTRAMUSCULAR; INTRAVENOUS at 01:10

## 2022-10-30 RX ADMIN — SODIUM CHLORIDE, SODIUM LACTATE, POTASSIUM CHLORIDE, AND CALCIUM CHLORIDE: .6; .31; .03; .02 INJECTION, SOLUTION INTRAVENOUS at 09:10

## 2022-10-30 RX ADMIN — ONDANSETRON HYDROCHLORIDE 8 MG: 2 SOLUTION INTRAMUSCULAR; INTRAVENOUS at 05:10

## 2022-10-30 NOTE — PROGRESS NOTES
[facility]  General Surgery  Progress Note    Subjective:     Interval History:   Feeling better  Incisional pain  No NV  Passing flatus. Had several bowel movements now  Still not much appetite  OOB a good bit    Post-Op Info:  Procedure(s) (LRB):  LAPAROTOMY, EXPLORATORY (N/A)  COLECTOMY, TRANSVERSE (N/A)   4 Days Post-Op      Medications:  Continuous Infusions:   lactated ringers 75 mL/hr at 10/30/22 0915     Scheduled Meds:   amitriptyline  10 mg Oral QHS    docusate sodium  100 mg Oral BID    dronabinoL  2.5 mg Oral BID    ondansetron  8 mg Intravenous Q8H     PRN Meds:acetaminophen, dextrose 10%, dextrose 10%, glucagon (human recombinant), HYDROcodone-acetaminophen, insulin aspart U-100, LIDOcaine (PF) 10 mg/ml (1%), melatonin, morphine, ondansetron, sodium chloride 0.9%     Objective:     Vital Signs (Most Recent):  Temp: 98.4 °F (36.9 °C) (10/30/22 0857)  Pulse: 76 (10/30/22 0857)  Resp: 18 (10/30/22 0857)  BP: (!) 163/83 (10/30/22 0857)  SpO2: 96 % (10/30/22 0857) Vital Signs (24h Range):  Temp:  [97.1 °F (36.2 °C)-98.8 °F (37.1 °C)] 98.4 °F (36.9 °C)  Pulse:  [69-77] 76  Resp:  [17-18] 18  SpO2:  [95 %-99 %] 96 %  BP: (135-180)/(68-83) 163/83     No intake or output data in the 24 hours ending 10/30/22 1105      Physical Exam  Ab appropriate  Incision good    Significant Labs:  CBC: No results for input(s): WBC, RBC, HGB, HCT, PLT, MCV, MCH, MCHC in the last 48 hours.  CMP:   Recent Labs   Lab 10/30/22  0617   GLU 93   CALCIUM 8.6*      K 3.4*   CO2 24      BUN 5*   CREATININE 0.5       Significant Diagnostics:  I have reviewed all pertinent imaging results/findings within the past 24 hours.    Assessment/Plan:     Active Diagnoses:    Diagnosis Date Noted POA    PRINCIPAL PROBLEM:  Large bowel obstruction [K56.609] 10/21/2022 Yes      Problems Resolved During this Admission:     47F s/p transverse colectomy  Reg diet  OOB  Will try marinol, brian home with  Replace K, mg  Main complaints  are decreased appetite and not being able to sleep. Feels ready to go home.  Plan for DC today        Bunny Obrien MD  General Surgery  [facility]

## 2022-10-30 NOTE — PROGRESS NOTES
Discharge orders noted. Additional clinical references attached. Patient's discharge instructions given by bedside RN and reviewed via this VN.  Education provided on new medication, diagnosis, and follow-up appointments.  Teach back method used. Patient verbalized understanding. All questions answered. Transport to [facility] requested. Floor nurse notified.      10/30/22 1433   AVS Confirmation   Discharge instructions and AVS given to and reviewed with patient and/or significant other. Yes

## 2022-10-30 NOTE — PLAN OF CARE
10/30/22 1206   Final Note   Assessment Type Final Discharge Note   Anticipated Discharge Disposition Home   Hospital Resources/Appts/Education Provided Appointments scheduled by Navigator/Coordinator  (schedule appt w/ Dr. Obrien in one week please.)   Spoke w/ Pt, her Fiance is going to pick her up and bring her home.  She is to f/u w/  Dr. Obrien in 1 week.  CM informed her the Navigator will contact her once the appt. Has been  Scheduled.  Pt verbalized understanding.

## 2022-10-30 NOTE — PLAN OF CARE
AAOx4. No C/o pain. Tolerating diabetic diet. Incision to abdomen CDI. IVF infusing.   Blood glucose monitoring maintained. Bed locked in lowest position, bed alarm set and call bell within reach.

## 2022-10-31 ENCOUNTER — PATIENT MESSAGE (OUTPATIENT)
Dept: SURGERY | Facility: CLINIC | Age: 47
End: 2022-10-31
Payer: MEDICAID

## 2022-10-31 DIAGNOSIS — K56.609 LARGE BOWEL OBSTRUCTION: Primary | ICD-10-CM

## 2022-10-31 RX ORDER — DRONABINOL 2.5 MG/1
2.5 CAPSULE ORAL 2 TIMES DAILY
Qty: 28 CAPSULE | Refills: 0 | Status: SHIPPED | OUTPATIENT
Start: 2022-10-31 | End: 2022-11-14

## 2022-11-01 ENCOUNTER — HOSPITAL ENCOUNTER (EMERGENCY)
Facility: HOSPITAL | Age: 47
Discharge: HOME | End: 2022-11-01
Attending: STUDENT IN AN ORGANIZED HEALTH CARE EDUCATION/TRAINING PROGRAM
Payer: MEDICAID

## 2022-11-01 VITALS
WEIGHT: 140 LBS | SYSTOLIC BLOOD PRESSURE: 153 MMHG | OXYGEN SATURATION: 96 % | DIASTOLIC BLOOD PRESSURE: 79 MMHG | TEMPERATURE: 99 F | RESPIRATION RATE: 14 BRPM | HEART RATE: 79 BPM | HEIGHT: 66 IN | BODY MASS INDEX: 22.5 KG/M2

## 2022-11-01 DIAGNOSIS — E86.0 DEHYDRATION: Primary | ICD-10-CM

## 2022-11-01 DIAGNOSIS — R53.1 WEAKNESS: ICD-10-CM

## 2022-11-01 DIAGNOSIS — R05.9 COUGH: ICD-10-CM

## 2022-11-01 LAB
ALBUMIN SERPL BCP-MCNC: 3 G/DL (ref 3.5–5.2)
ALP SERPL-CCNC: 69 U/L (ref 55–135)
ALT SERPL W/O P-5'-P-CCNC: 14 U/L (ref 10–44)
ANION GAP SERPL CALC-SCNC: 18 MMOL/L (ref 8–16)
AST SERPL-CCNC: 15 U/L (ref 10–40)
BASOPHILS # BLD AUTO: 0.1 K/UL (ref 0–0.2)
BASOPHILS NFR BLD: 0.8 % (ref 0–1.9)
BILIRUB SERPL-MCNC: 0.6 MG/DL (ref 0.1–1)
BUN SERPL-MCNC: 6 MG/DL (ref 6–20)
CALCIUM SERPL-MCNC: 9.8 MG/DL (ref 8.7–10.5)
CHLORIDE SERPL-SCNC: 102 MMOL/L (ref 95–110)
CO2 SERPL-SCNC: 19 MMOL/L (ref 23–29)
CREAT SERPL-MCNC: 0.6 MG/DL (ref 0.5–1.4)
DIFFERENTIAL METHOD: ABNORMAL
EOSINOPHIL # BLD AUTO: 0.4 K/UL (ref 0–0.5)
EOSINOPHIL NFR BLD: 3.4 % (ref 0–8)
ERYTHROCYTE [DISTWIDTH] IN BLOOD BY AUTOMATED COUNT: 11.8 % (ref 11.5–14.5)
EST. GFR  (NO RACE VARIABLE): >60 ML/MIN/1.73 M^2
FERRITIN SERPL-MCNC: 440 NG/ML (ref 20–300)
FINAL PATHOLOGIC DIAGNOSIS: NORMAL
GLUCOSE SERPL-MCNC: 73 MG/DL (ref 70–110)
GROSS: NORMAL
HCT VFR BLD AUTO: 37.1 % (ref 37–48.5)
HGB BLD-MCNC: 12.2 G/DL (ref 12–16)
IMM GRANULOCYTES # BLD AUTO: 0.06 K/UL (ref 0–0.04)
IMM GRANULOCYTES NFR BLD AUTO: 0.5 % (ref 0–0.5)
INFLUENZA A, MOLECULAR: NEGATIVE
INFLUENZA B, MOLECULAR: NEGATIVE
LYMPHOCYTES # BLD AUTO: 3.1 K/UL (ref 1–4.8)
LYMPHOCYTES NFR BLD: 23.7 % (ref 18–48)
Lab: NORMAL
MAGNESIUM SERPL-MCNC: 1.9 MG/DL (ref 1.6–2.6)
MCH RBC QN AUTO: 31 PG (ref 27–31)
MCHC RBC AUTO-ENTMCNC: 32.9 G/DL (ref 32–36)
MCV RBC AUTO: 94 FL (ref 82–98)
MONOCYTES # BLD AUTO: 1.2 K/UL (ref 0.3–1)
MONOCYTES NFR BLD: 8.9 % (ref 4–15)
NEUTROPHILS # BLD AUTO: 8.2 K/UL (ref 1.8–7.7)
NEUTROPHILS NFR BLD: 62.7 % (ref 38–73)
NRBC BLD-RTO: 0 /100 WBC
PLATELET # BLD AUTO: 596 K/UL (ref 150–450)
PMV BLD AUTO: 9.5 FL (ref 9.2–12.9)
POTASSIUM SERPL-SCNC: 3.8 MMOL/L (ref 3.5–5.1)
PROT SERPL-MCNC: 7.1 G/DL (ref 6–8.4)
RBC # BLD AUTO: 3.93 M/UL (ref 4–5.4)
SODIUM SERPL-SCNC: 139 MMOL/L (ref 136–145)
SPECIMEN SOURCE: NORMAL
TROPONIN I SERPL DL<=0.01 NG/ML-MCNC: <0.006 NG/ML (ref 0–0.03)
VIT B12 SERPL-MCNC: 700 PG/ML (ref 210–950)
WBC # BLD AUTO: 13.08 K/UL (ref 3.9–12.7)

## 2022-11-01 PROCEDURE — 83735 ASSAY OF MAGNESIUM: CPT | Performed by: STUDENT IN AN ORGANIZED HEALTH CARE EDUCATION/TRAINING PROGRAM

## 2022-11-01 PROCEDURE — 82607 VITAMIN B-12: CPT | Performed by: STUDENT IN AN ORGANIZED HEALTH CARE EDUCATION/TRAINING PROGRAM

## 2022-11-01 PROCEDURE — 80053 COMPREHEN METABOLIC PANEL: CPT | Performed by: STUDENT IN AN ORGANIZED HEALTH CARE EDUCATION/TRAINING PROGRAM

## 2022-11-01 PROCEDURE — 87502 INFLUENZA DNA AMP PROBE: CPT | Performed by: STUDENT IN AN ORGANIZED HEALTH CARE EDUCATION/TRAINING PROGRAM

## 2022-11-01 PROCEDURE — 93010 ELECTROCARDIOGRAM REPORT: CPT | Mod: ,,, | Performed by: INTERNAL MEDICINE

## 2022-11-01 PROCEDURE — 84484 ASSAY OF TROPONIN QUANT: CPT | Performed by: STUDENT IN AN ORGANIZED HEALTH CARE EDUCATION/TRAINING PROGRAM

## 2022-11-01 PROCEDURE — 93005 ELECTROCARDIOGRAM TRACING: CPT

## 2022-11-01 PROCEDURE — 99285 EMERGENCY DEPT VISIT HI MDM: CPT | Mod: 25

## 2022-11-01 PROCEDURE — 96375 TX/PRO/DX INJ NEW DRUG ADDON: CPT

## 2022-11-01 PROCEDURE — 25000003 PHARM REV CODE 250: Performed by: STUDENT IN AN ORGANIZED HEALTH CARE EDUCATION/TRAINING PROGRAM

## 2022-11-01 PROCEDURE — 85025 COMPLETE CBC W/AUTO DIFF WBC: CPT | Performed by: STUDENT IN AN ORGANIZED HEALTH CARE EDUCATION/TRAINING PROGRAM

## 2022-11-01 PROCEDURE — 96374 THER/PROPH/DIAG INJ IV PUSH: CPT

## 2022-11-01 PROCEDURE — 96361 HYDRATE IV INFUSION ADD-ON: CPT

## 2022-11-01 PROCEDURE — 82728 ASSAY OF FERRITIN: CPT | Performed by: STUDENT IN AN ORGANIZED HEALTH CARE EDUCATION/TRAINING PROGRAM

## 2022-11-01 PROCEDURE — 63600175 PHARM REV CODE 636 W HCPCS: Performed by: STUDENT IN AN ORGANIZED HEALTH CARE EDUCATION/TRAINING PROGRAM

## 2022-11-01 PROCEDURE — 93010 EKG 12-LEAD: ICD-10-PCS | Mod: ,,, | Performed by: INTERNAL MEDICINE

## 2022-11-01 RX ORDER — ONDANSETRON 2 MG/ML
4 INJECTION INTRAMUSCULAR; INTRAVENOUS
Status: COMPLETED | OUTPATIENT
Start: 2022-11-01 | End: 2022-11-01

## 2022-11-01 RX ORDER — DROPERIDOL 2.5 MG/ML
0.62 INJECTION, SOLUTION INTRAMUSCULAR; INTRAVENOUS ONCE
Status: COMPLETED | OUTPATIENT
Start: 2022-11-01 | End: 2022-11-01

## 2022-11-01 RX ORDER — MORPHINE SULFATE 2 MG/ML
2 INJECTION, SOLUTION INTRAMUSCULAR; INTRAVENOUS
Status: COMPLETED | OUTPATIENT
Start: 2022-11-01 | End: 2022-11-01

## 2022-11-01 RX ADMIN — DROPERIDOL 0.62 MG: 2.5 INJECTION, SOLUTION INTRAMUSCULAR; INTRAVENOUS at 12:11

## 2022-11-01 RX ADMIN — MORPHINE SULFATE 2 MG: 2 INJECTION, SOLUTION INTRAMUSCULAR; INTRAVENOUS at 11:11

## 2022-11-01 RX ADMIN — FOLIC ACID: 5 INJECTION, SOLUTION INTRAMUSCULAR; INTRAVENOUS; SUBCUTANEOUS at 09:11

## 2022-11-01 RX ADMIN — ONDANSETRON HYDROCHLORIDE 4 MG: 2 INJECTION, SOLUTION INTRAMUSCULAR; INTRAVENOUS at 06:11

## 2022-11-01 NOTE — PHARMACY MED REC
"Admission Medication History     The home medication history was taken by Alicia Benitez CPhT.    Medication history obtained from, Patient Verified    You may go to "Admission" then "Reconcile Home Medications" tabs to review and/or act upon these items.     The home medication list has been updated by the Pharmacy department.   Please read ALL comments highlighted in yellow.   Please address this information as you see fit.    Feel free to contact us if you have any questions or require assistance.      The medications listed below were removed from the home medication list.  Please reorder if appropriate:  Patient reports no longer taking the following medication(s):  Vesicare 10 mg      Alicia Benitez CPhT.  Ext [phone]               .        "

## 2022-11-01 NOTE — ED NOTES
Dr. Villanueva at bedside for reassessment and to discuss test results and plan of care, follow-up.

## 2022-11-01 NOTE — DISCHARGE INSTRUCTIONS
Please follow-up with Dr. Johnson tomorrow.  Follow-up with gastroenterology-referral has been placed for you.  Return to the emergency department for any new or worsening.

## 2022-11-01 NOTE — ED PROVIDER NOTES
"Encounter Date: 11/1/2022       History     Chief Complaint   Patient presents with    Weakness     Patient presents to the ED with reports of having weakness, dizziness, decreased appetite, and fatigue. States having been discharged from the hospital on 10/30/22 for a large bowel obstruction with surgical resection.     Dizziness     47-year-old female with a history of closed loop large bowel obstruction status post reversal in revision last week and recent discharge from this facility 2 days ago presents with generalized weakness, dizziness, reduced appetite.  The patient says that she feels weak mainly to not eating and drinking.  She says that she does does not have the desire to eat and has reduced appetite.  She denies aversion to food.  No postprandial abdominal pain.  No nausea, vomiting, diarrhea.  She is having bowel movements and passing gas.  She says her abdominal pain is much reduced since the surgery NG only has mild pain at this time.  No fevers.  No dysuria hematuria.  No chest pain, shortness a breath, headache, head neck pain, neck stiffness.  She says that she has had difficulty with her appetite over the last few days and thinks that if she got vitamin she would feel better.  She says I just need a "boost" and I would feel better.  She does endorse a cough, nasal congestion but denies knowing if she has the flu or recent testing.    Review of patient's allergies indicates:  No Known Allergies  Past Medical History:   Diagnosis Date    Chronic back pain     Diabetes type 2, uncontrolled     Mild nonproliferative diabetic retinopathy of left eye without macular edema associated with type 2 diabetes mellitus 10/21/2019    Morbid obesity with BMI of 40.0-44.9, adult     MILEY on CPAP     Plantar fasciitis of left foot     Urticaria      Past Surgical History:   Procedure Laterality Date    ARTHROSCOPIC ACROMIOPLASTY OF SHOULDER Left 7/27/2020    Procedure: ACROMIOPLASTY, ARTHROSCOPIC;  Surgeon: " Marcela Garza MD;  Location: [facility] OR;  Service: Orthopedics;  Laterality: Left;    ARTHROSCOPIC REPAIR OF ROTATOR CUFF OF SHOULDER Right 7/27/2020    Procedure: REPAIR, ROTATOR CUFF, ARTHROSCOPIC;  Surgeon: Marcela Garza MD;  Location: [facility] OR;  Service: Orthopedics;  Laterality: Right;  GENERAL/REGIONAL    COLONOSCOPY N/A 4/7/2022    Procedure: COLONOSCOPY;  Surgeon: Yadi Wong MD;  Location: [facility] ([address] [place] [address]);  Service: Endoscopy;  Laterality: N/A;  Covid test at [place] on 4/4.EC    CYST REMOVAL      ovarian cyst removal at age 12    DECOMPRESSION OF SUBACROMIAL SPACE Left 7/27/2020    Procedure: DECOMPRESSION, SUBACROMIAL SPACE;  Surgeon: Marcela Garza MD;  Location: [facility] OR;  Service: Orthopedics;  Laterality: Left;    DILATION AND CURETTAGE OF UTERUS      ESOPHAGEAL MOTILITY STUDY USING HIGH RESOLUTION MANOMETRY N/A 9/17/2019    Procedure: MOTILITY STUDY, ESOPHAGUS, USING HIGH RESOLUTION MANOMETRY;  Surgeon: Leopoldo Swanson MD;  Location: [facility] ([address] [place] [address]);  Service: Endoscopy;  Laterality: N/A;  Pt will be taking xanax before procedure to hopefull help with anxiety.    ESOPHAGOGASTRODUODENOSCOPY N/A 6/14/2019    Procedure: EGD (ESOPHAGOGASTRODUODENOSCOPY);  Surgeon: Ced Guevara MD;  Location: [facility] ([address] [place] [address]);  Service: Endoscopy;  Laterality: N/A;  Please make sure it is scheduled after her cardiology appt.- see cardiology note dated 6/13/19 for clearance - ERW    GASTRIC BYPASS N/A 05/20/2021    INJECTION OF STEROID Left 7/27/2020    Procedure: INJECTION, STEROID LEFT THUMB TRIGGER FINGER;  Surgeon: Marcela Garza MD;  Location: [facility] OR;  Service: Orthopedics;  Laterality: Left;  LEFT THUMB, TRIGGER FINGER    LYSIS OF ADHESIONS N/A 10/21/2022    Procedure: LYSIS, ADHESIONS;  Surgeon: Bunny Obrien MD;  Location: [facility] OR;  Service: General;  Laterality: N/A;    TRANSVERSE COLECTOMY N/A 10/26/2022    Procedure: COLECTOMY, TRANSVERSE;  Surgeon: Bunny ANDINO  MD Camille;  Location: [facility] OR;  Service: General;  Laterality: N/A;    TRIGGER FINGER RELEASE Right 10/4/2019    Procedure: RELEASE, TRIGGER FINGER - right thumb;  Surgeon: Craig Tom MD;  Location: [facility] OR;  Service: Orthopedics;  Laterality: Right;    UPPER GASTROINTESTINAL ENDOSCOPY      WISDOM TOOTH EXTRACTION       Family History   Problem Relation Age of Onset    Cancer Maternal Grandmother     Diabetes Mother     No Known Problems Sister     No Known Problems Sister     No Known Problems Sister     Breast cancer Maternal Aunt 70    Colon cancer Neg Hx     Stomach cancer Neg Hx     Inflammatory bowel disease Neg Hx     Esophageal cancer Neg Hx      Social History     Tobacco Use    Smoking status: Every Day     Packs/day: 0.75     Years: 30.00     Pack years: 22.50     Types: Cigarettes     Start date: 1992    Smokeless tobacco: Never    Tobacco comments:     Enrolled in the [employer] on 8/1/18 ([facility] Member ID # [id number]) Handout provided for Ambulatory Smoking Cessation Program.   Substance Use Topics    Alcohol use: Yes     Comment: social    Drug use: No     Review of Systems   All other systems reviewed and are negative.    Physical Exam     Initial Vitals [11/01/22 0616]   BP Pulse Resp Temp SpO2   (!) 143/88 79 18 99.4 °F (37.4 °C) 98 %      MAP       --         Physical Exam    Nursing note and vitals reviewed.  Constitutional:   Uncomfortable appearing, no acute distress, anxious   HENT:   Head: Normocephalic and atraumatic.   Mucous membranes are dry.   Eyes: Conjunctivae and EOM are normal. Pupils are equal, round, and reactive to light.   Neck: Neck supple.   Normal range of motion.  Cardiovascular:  Normal rate and regular rhythm.           Pulmonary/Chest: Breath sounds normal. No respiratory distress.   Abdominal: Abdomen is soft. There is no abdominal tenderness.   Vertical surgical scar to the abdomen with staples intact.  Wound appears clean.  Mild tenderness with palpation to  this area-expected given recent operative intervention.   Musculoskeletal:         General: No edema. Normal range of motion.      Cervical back: Normal range of motion and neck supple.     Neurological: She is alert and oriented to person, place, and time.   Skin: Skin is warm and dry. Capillary refill takes less than 2 seconds.   Psychiatric: She has a normal mood and affect. Thought content normal.       ED Course   Procedures  Labs Reviewed   CBC W/ AUTO DIFFERENTIAL - Abnormal; Notable for the following components:       Result Value    WBC 13.08 (*)     RBC 3.93 (*)     Platelets 596 (*)     Gran # (ANC) 8.2 (*)     Immature Grans (Abs) 0.06 (*)     Mono # 1.2 (*)     All other components within normal limits   COMPREHENSIVE METABOLIC PANEL - Abnormal; Notable for the following components:    CO2 19 (*)     Albumin 3.0 (*)     Anion Gap 18 (*)     All other components within normal limits   FERRITIN - Abnormal; Notable for the following components:    Ferritin 440 (*)     All other components within normal limits   INFLUENZA A & B BY MOLECULAR   TROPONIN I   MAGNESIUM   VITAMIN B12        ECG Results              EKG 12-lead (Final result)  Result time 11/01/22 12:38:53      Final result by Interface, Lab In [facility] (11/01/22 12:38:53)                   Narrative:    Test Reason : R53.1,    Vent. Rate : 068 BPM     Atrial Rate : 068 BPM     P-R Int : 128 ms          QRS Dur : 080 ms      QT Int : 394 ms       P-R-T Axes : 065 040 051 degrees     QTc Int : 418 ms    Normal sinus rhythm  Normal ECG  When compared with ECG of 20-OCT-2022 20:43,  No significant change was found  Confirmed by Chet Liang MD ([id number]) on 11/1/2022 12:38:45 PM    Referred By: [email or web address]   SELF           Confirmed By:Chet Liang MD                                  Imaging Results              X-Ray Abdomen AP 1 View (KUB) (Final result)  Result time 11/01/22 08:56:23      Final result by Favio Gutierrez MD (11/01/22  08:56:23)                   Impression:      As above      Electronically signed by: Favio Gutierrez  Date:    11/01/2022  Time:    08:56               Narrative:    EXAMINATION:  XR ABDOMEN AP 1 VIEW    CLINICAL HISTORY:  Cough, unspecified    TECHNIQUE:  AP View(s) of the abdomen was performed.    COMPARISON:  Abdominal radiograph 10/28/2022, 14:30 hours.    FINDINGS:  Postoperative sequela again project in the abdomen.  No definite enlarging gas-filled loops of small bowel appreciated.  Decreased caliber of gas-filled colonic loops since 10/29/2022, 14:03 hours.  Mildly prominent loops of gas-filled colon in the left upper quadrant.  Remainder of examination not substantially changed since 10/29/2022, 10:43 hours.                                       X-Ray Chest AP Portable (Final result)  Result time 11/01/22 08:54:03      Final result by Favio Gutierrez MD (11/01/22 08:54:03)                   Impression:      No convincing evidence of acute cardiopulmonary disease.      Electronically signed by: Favio Gutierrez  Date:    11/01/2022  Time:    08:54               Narrative:    EXAMINATION:  XR CHEST AP PORTABLE    CLINICAL HISTORY:  Cough, unspecified    TECHNIQUE:  Single frontal view of the chest was performed.    COMPARISON:  Chest radiograph 08/25/2022.    FINDINGS:  Monitoring leads overlie the chest.  Grossly unchanged cardiomediastinal contours.  Minor background interstitial coarsening, as before.    No definite pneumothorax or large volume pleural effusion.    No acute findings the visualized abdomen.  Osseous and soft tissue structures appear without definite acute change.  Interval healing of left lateral 6th rib fracture.                                       Medications   ondansetron injection 4 mg (4 mg Intravenous Given 11/1/22 0658)   sodium chloride 0.9% 1,000 mL with mvi, (ADULT) no.4 with vit K 3,300 unit- 150 mcg/10 mL 10 mL, thiamine 100 mg, folic acid 1 mg infusion ( Intravenous Stopped  11/1/22 1219)   morphine injection 2 mg (2 mg Intravenous Given 11/1/22 1104)   droperidoL injection 0.625 mg (0.625 mg Intravenous Given 11/1/22 1215)     Medical Decision Making:   Initial Assessment:   Hemodynamically stable.  Afebrile.  Examination as above.  Patient appears clinically hypovolemic and reports that she has not been able to eat much over the last 2 days.  Will provide intravenous banana bag for nutrients and hydration.  Will obtain basic laboratory studies in addition to a plain film of the chest and a KUB due to recent surgery.  Will reassess and discuss with General surgery due for recent postoperative patient.           ED Course as of 11/01/22 1406   Tue Nov 01, 2022   0854 Labs reviewed. No renal insufficiency, metabolic acidosis. On reassessment, pt has not received banana bag. Will reorder fluids. [BG]   0904 On reassessment, banana bag being hung. Gave pt a cup of ice water - tolerating well.  [BG]   0917 Left voicemail on Dr. Obrien's phone to discuss case. Will await call back.  [BG]   1006 Spoke with Dr. Obrien. Case discussed and diagnostics reviewed. Will give fluids here. Pt has appointment tomorrow with him. Will also refer to GI for possible motility concerns.  [BG]   1134 Reassessed.  Patient is slightly improved.  Will give a dose of droperidol due to her nauseousness.  Long and lengthy conversation regarding expectations the emergency department and plan moving forward.  Patient has an appointment with Ernst after pressure tomorrow and I will give her referral to Gastroenterology.  Patient is happy with the plan. [BG]   1235 The patient was reassessed and on subsequent re-evaluation, they were subjectively feeling better. They were resting comfortably and in no acute distress. I discussed the laboratory and diagnostic findings with the patient. Education was provided and all questions were answered. As discussed, they were recommended to follow up with their primary care  physician within the next few days and to return to the emergency department sooner for any new or worsening. They acknowledged and verbalized agreement to the treatment plan. The patient was discharged home in stable condition.     DISCLAIMER: This note was prepared with [employer] voice recognition transcription software. Garbled syntax, mangled pronouns, and other bizarre constructions may be attributed to that software system.     [BG]      ED Course User Index  [BG] Abhishek Villanueva MD                 Clinical Impression:   Final diagnoses:  [R05.9] Cough  [R53.1] Weakness  [E86.0] Dehydration (Primary)        ED Disposition Condition    Discharge Stable          ED Prescriptions    None       Follow-up Information       Follow up With Specialties Details Why Contact Info    Aftab Martinez MD Family Medicine Schedule an appointment as soon as possible for a visit  As needed [id number]  [facility] [address]  [facility] [address]  [phone]               Abhishek Villanueva MD  11/01/22 1418

## 2022-11-02 ENCOUNTER — OFFICE VISIT (OUTPATIENT)
Dept: SURGERY | Facility: CLINIC | Age: 47
End: 2022-11-02
Payer: MEDICAID

## 2022-11-02 VITALS
BODY MASS INDEX: 22.36 KG/M2 | HEIGHT: 66 IN | DIASTOLIC BLOOD PRESSURE: 80 MMHG | SYSTOLIC BLOOD PRESSURE: 128 MMHG | HEART RATE: 88 BPM | WEIGHT: 139.13 LBS

## 2022-11-02 DIAGNOSIS — K56.609 LARGE BOWEL OBSTRUCTION: Primary | ICD-10-CM

## 2022-11-02 PROCEDURE — 1159F PR MEDICATION LIST DOCUMENTED IN MEDICAL RECORD: ICD-10-PCS | Mod: CPTII,,, | Performed by: STUDENT IN AN ORGANIZED HEALTH CARE EDUCATION/TRAINING PROGRAM

## 2022-11-02 PROCEDURE — 99999 PR PBB SHADOW E&M-EST. PATIENT-LVL III: CPT | Mod: PBBFAC,,, | Performed by: STUDENT IN AN ORGANIZED HEALTH CARE EDUCATION/TRAINING PROGRAM

## 2022-11-02 PROCEDURE — 3079F PR MOST RECENT DIASTOLIC BLOOD PRESSURE 80-89 MM HG: ICD-10-PCS | Mod: CPTII,,, | Performed by: STUDENT IN AN ORGANIZED HEALTH CARE EDUCATION/TRAINING PROGRAM

## 2022-11-02 PROCEDURE — 99024 PR POST-OP FOLLOW-UP VISIT: ICD-10-PCS | Mod: ,,, | Performed by: STUDENT IN AN ORGANIZED HEALTH CARE EDUCATION/TRAINING PROGRAM

## 2022-11-02 PROCEDURE — 99213 OFFICE O/P EST LOW 20 MIN: CPT | Mod: PBBFAC,PO | Performed by: STUDENT IN AN ORGANIZED HEALTH CARE EDUCATION/TRAINING PROGRAM

## 2022-11-02 PROCEDURE — 99999 PR PBB SHADOW E&M-EST. PATIENT-LVL III: ICD-10-PCS | Mod: PBBFAC,,, | Performed by: STUDENT IN AN ORGANIZED HEALTH CARE EDUCATION/TRAINING PROGRAM

## 2022-11-02 PROCEDURE — 99024 POSTOP FOLLOW-UP VISIT: CPT | Mod: ,,, | Performed by: STUDENT IN AN ORGANIZED HEALTH CARE EDUCATION/TRAINING PROGRAM

## 2022-11-02 PROCEDURE — 3079F DIAST BP 80-89 MM HG: CPT | Mod: CPTII,,, | Performed by: STUDENT IN AN ORGANIZED HEALTH CARE EDUCATION/TRAINING PROGRAM

## 2022-11-02 PROCEDURE — 3008F PR BODY MASS INDEX (BMI) DOCUMENTED: ICD-10-PCS | Mod: CPTII,,, | Performed by: STUDENT IN AN ORGANIZED HEALTH CARE EDUCATION/TRAINING PROGRAM

## 2022-11-02 PROCEDURE — 3074F SYST BP LT 130 MM HG: CPT | Mod: CPTII,,, | Performed by: STUDENT IN AN ORGANIZED HEALTH CARE EDUCATION/TRAINING PROGRAM

## 2022-11-02 PROCEDURE — 1159F MED LIST DOCD IN RCRD: CPT | Mod: CPTII,,, | Performed by: STUDENT IN AN ORGANIZED HEALTH CARE EDUCATION/TRAINING PROGRAM

## 2022-11-02 PROCEDURE — 3074F PR MOST RECENT SYSTOLIC BLOOD PRESSURE < 130 MM HG: ICD-10-PCS | Mod: CPTII,,, | Performed by: STUDENT IN AN ORGANIZED HEALTH CARE EDUCATION/TRAINING PROGRAM

## 2022-11-02 PROCEDURE — 3008F BODY MASS INDEX DOCD: CPT | Mod: CPTII,,, | Performed by: STUDENT IN AN ORGANIZED HEALTH CARE EDUCATION/TRAINING PROGRAM

## 2022-11-02 RX ORDER — METOCLOPRAMIDE 5 MG/1
5 TABLET ORAL
Qty: 21 TABLET | Refills: 0 | Status: SHIPPED | OUTPATIENT
Start: 2022-11-02 | End: 2022-11-09 | Stop reason: SDUPTHER

## 2022-11-02 NOTE — PROGRESS NOTES
S/p transverse colectomy a week ago  Went to ED yesterday, feeling weak, run down  Main complaint is just not wanting to eat. No nausea. Passing flatus. Last BM was 3 days ago.  Minimal pain. No fevers  Ambulating some but feels tired  Feels much better today. Got banana bag in ED  Lytes were ok, wbc 13  Ab xr somewhat improved gaseous distention of colon  Ab appropriate, nondeistended, incision looks good  Discussed with GI, will try reglan  Has appointment to see GI at [facility], colonic motility issue?  Half staples removed  Encouraged activity, PO, calories and fluids  Continue colace  RTC next week, remove remainder of staples

## 2022-11-09 ENCOUNTER — OFFICE VISIT (OUTPATIENT)
Dept: SURGERY | Facility: CLINIC | Age: 47
End: 2022-11-09
Payer: MEDICAID

## 2022-11-09 VITALS — WEIGHT: 138.88 LBS | BODY MASS INDEX: 22.32 KG/M2 | HEIGHT: 66 IN

## 2022-11-09 DIAGNOSIS — K56.609 LARGE BOWEL OBSTRUCTION: Primary | ICD-10-CM

## 2022-11-09 PROCEDURE — 1159F MED LIST DOCD IN RCRD: CPT | Mod: CPTII,,, | Performed by: STUDENT IN AN ORGANIZED HEALTH CARE EDUCATION/TRAINING PROGRAM

## 2022-11-09 PROCEDURE — 99999 PR PBB SHADOW E&M-EST. PATIENT-LVL III: CPT | Mod: PBBFAC,,, | Performed by: STUDENT IN AN ORGANIZED HEALTH CARE EDUCATION/TRAINING PROGRAM

## 2022-11-09 PROCEDURE — 1159F PR MEDICATION LIST DOCUMENTED IN MEDICAL RECORD: ICD-10-PCS | Mod: CPTII,,, | Performed by: STUDENT IN AN ORGANIZED HEALTH CARE EDUCATION/TRAINING PROGRAM

## 2022-11-09 PROCEDURE — 3008F BODY MASS INDEX DOCD: CPT | Mod: CPTII,,, | Performed by: STUDENT IN AN ORGANIZED HEALTH CARE EDUCATION/TRAINING PROGRAM

## 2022-11-09 PROCEDURE — 99213 OFFICE O/P EST LOW 20 MIN: CPT | Mod: PBBFAC,PO | Performed by: STUDENT IN AN ORGANIZED HEALTH CARE EDUCATION/TRAINING PROGRAM

## 2022-11-09 PROCEDURE — 99024 POSTOP FOLLOW-UP VISIT: CPT | Mod: ,,, | Performed by: STUDENT IN AN ORGANIZED HEALTH CARE EDUCATION/TRAINING PROGRAM

## 2022-11-09 PROCEDURE — 99999 PR PBB SHADOW E&M-EST. PATIENT-LVL III: ICD-10-PCS | Mod: PBBFAC,,, | Performed by: STUDENT IN AN ORGANIZED HEALTH CARE EDUCATION/TRAINING PROGRAM

## 2022-11-09 PROCEDURE — 99024 PR POST-OP FOLLOW-UP VISIT: ICD-10-PCS | Mod: ,,, | Performed by: STUDENT IN AN ORGANIZED HEALTH CARE EDUCATION/TRAINING PROGRAM

## 2022-11-09 PROCEDURE — 3008F PR BODY MASS INDEX (BMI) DOCUMENTED: ICD-10-PCS | Mod: CPTII,,, | Performed by: STUDENT IN AN ORGANIZED HEALTH CARE EDUCATION/TRAINING PROGRAM

## 2022-11-09 RX ORDER — METOCLOPRAMIDE 5 MG/1
5 TABLET ORAL
Qty: 21 TABLET | Refills: 0 | Status: SHIPPED | OUTPATIENT
Start: 2022-11-09 | End: 2022-11-16

## 2022-11-11 NOTE — PROGRESS NOTES
Was doing well for the past week until about 1 hour ago. She ate lunch at [employer] and had charbroiled oysters and suddenly began having crampy lower ab pains. Has been passing flatus, BMs. No NV  No pain before today  Was feeling more or less back to normal  Incision good, staples removed  Will continue reglan for now  RTC next week  [place] diet

## 2022-11-15 ENCOUNTER — PATIENT MESSAGE (OUTPATIENT)
Dept: SURGERY | Facility: CLINIC | Age: 47
End: 2022-11-15
Payer: MEDICAID

## 2022-11-16 ENCOUNTER — OFFICE VISIT (OUTPATIENT)
Dept: GASTROENTEROLOGY | Facility: CLINIC | Age: 47
End: 2022-11-16
Payer: MEDICAID

## 2022-11-16 VITALS
WEIGHT: 140.81 LBS | DIASTOLIC BLOOD PRESSURE: 73 MMHG | HEART RATE: 78 BPM | HEIGHT: 66 IN | BODY MASS INDEX: 22.63 KG/M2 | SYSTOLIC BLOOD PRESSURE: 119 MMHG

## 2022-11-16 DIAGNOSIS — R68.81 EARLY SATIETY: Primary | ICD-10-CM

## 2022-11-16 PROCEDURE — 1159F MED LIST DOCD IN RCRD: CPT | Mod: CPTII,,, | Performed by: NURSE PRACTITIONER

## 2022-11-16 PROCEDURE — 3078F PR MOST RECENT DIASTOLIC BLOOD PRESSURE < 80 MM HG: ICD-10-PCS | Mod: CPTII,,, | Performed by: NURSE PRACTITIONER

## 2022-11-16 PROCEDURE — 1160F RVW MEDS BY RX/DR IN RCRD: CPT | Mod: CPTII,,, | Performed by: NURSE PRACTITIONER

## 2022-11-16 PROCEDURE — 99999 PR PBB SHADOW E&M-EST. PATIENT-LVL V: ICD-10-PCS | Mod: PBBFAC,,, | Performed by: NURSE PRACTITIONER

## 2022-11-16 PROCEDURE — 3074F SYST BP LT 130 MM HG: CPT | Mod: CPTII,,, | Performed by: NURSE PRACTITIONER

## 2022-11-16 PROCEDURE — 3078F DIAST BP <80 MM HG: CPT | Mod: CPTII,,, | Performed by: NURSE PRACTITIONER

## 2022-11-16 PROCEDURE — 99215 OFFICE O/P EST HI 40 MIN: CPT | Mod: PBBFAC,PO | Performed by: NURSE PRACTITIONER

## 2022-11-16 PROCEDURE — 99999 PR PBB SHADOW E&M-EST. PATIENT-LVL V: CPT | Mod: PBBFAC,,, | Performed by: NURSE PRACTITIONER

## 2022-11-16 PROCEDURE — 1111F DSCHRG MED/CURRENT MED MERGE: CPT | Mod: CPTII,,, | Performed by: NURSE PRACTITIONER

## 2022-11-16 PROCEDURE — 1111F PR DISCHARGE MEDS RECONCILED W/ CURRENT OUTPATIENT MED LIST: ICD-10-PCS | Mod: CPTII,,, | Performed by: NURSE PRACTITIONER

## 2022-11-16 PROCEDURE — 1159F PR MEDICATION LIST DOCUMENTED IN MEDICAL RECORD: ICD-10-PCS | Mod: CPTII,,, | Performed by: NURSE PRACTITIONER

## 2022-11-16 PROCEDURE — 3074F PR MOST RECENT SYSTOLIC BLOOD PRESSURE < 130 MM HG: ICD-10-PCS | Mod: CPTII,,, | Performed by: NURSE PRACTITIONER

## 2022-11-16 PROCEDURE — 3008F BODY MASS INDEX DOCD: CPT | Mod: CPTII,,, | Performed by: NURSE PRACTITIONER

## 2022-11-16 PROCEDURE — 3008F PR BODY MASS INDEX (BMI) DOCUMENTED: ICD-10-PCS | Mod: CPTII,,, | Performed by: NURSE PRACTITIONER

## 2022-11-16 PROCEDURE — 99214 OFFICE O/P EST MOD 30 MIN: CPT | Mod: S$PBB,,, | Performed by: NURSE PRACTITIONER

## 2022-11-16 PROCEDURE — 99214 PR OFFICE/OUTPT VISIT, EST, LEVL IV, 30-39 MIN: ICD-10-PCS | Mod: S$PBB,,, | Performed by: NURSE PRACTITIONER

## 2022-11-16 PROCEDURE — 1160F PR REVIEW ALL MEDS BY PRESCRIBER/CLIN PHARMACIST DOCUMENTED: ICD-10-PCS | Mod: CPTII,,, | Performed by: NURSE PRACTITIONER

## 2022-11-17 ENCOUNTER — HOSPITAL ENCOUNTER (OUTPATIENT)
Dept: RADIOLOGY | Facility: HOSPITAL | Age: 47
Discharge: HOME | End: 2022-11-17
Attending: NURSE PRACTITIONER
Payer: MEDICAID

## 2022-11-17 DIAGNOSIS — R68.81 EARLY SATIETY: ICD-10-CM

## 2022-11-17 PROCEDURE — A9698 NON-RAD CONTRAST MATERIALNOC: HCPCS | Performed by: NURSE PRACTITIONER

## 2022-11-17 PROCEDURE — 74240 X-RAY XM UPR GI TRC 1CNTRST: CPT | Mod: TC,FY

## 2022-11-17 PROCEDURE — 74240 FL UPPER GI: ICD-10-PCS | Mod: 26,,, | Performed by: RADIOLOGY

## 2022-11-17 PROCEDURE — 74240 X-RAY XM UPR GI TRC 1CNTRST: CPT | Mod: 26,,, | Performed by: RADIOLOGY

## 2022-11-17 PROCEDURE — 25500020 PHARM REV CODE 255: Performed by: NURSE PRACTITIONER

## 2022-11-17 RX ADMIN — BARIUM SULFATE 355 ML: 0.6 SUSPENSION ORAL at 09:11

## 2022-11-17 RX ADMIN — BARIUM SULFATE 340 G: 980 POWDER, FOR SUSPENSION ORAL at 09:11

## 2022-11-21 NOTE — PROGRESS NOTES
Subjective:       Patient ID: Velma Lopez is a 47 y.o. female.    Chief Complaint: Other (Bowel Obstruction)    46 y/o female referred by general surgery for abdominal pain and early satiety. Admitted to hospital 10/2022 for large bowel obstruction and underwent ex lap with transverse colectomy. States she does not have an appetite and only eats very small portions of food. Constantly feels full. No abdominal pain, nausea, or vomiting in a few days. She is taking Miralax daily and having normal BMs.      Past Medical History:   Diagnosis Date    Chronic back pain     Diabetes type 2, uncontrolled     Mild nonproliferative diabetic retinopathy of left eye without macular edema associated with type 2 diabetes mellitus 10/21/2019    Morbid obesity with BMI of 40.0-44.9, adult     MILEY on CPAP     Plantar fasciitis of left foot     Urticaria        Past Surgical History:   Procedure Laterality Date    ARTHROSCOPIC ACROMIOPLASTY OF SHOULDER Left 7/27/2020    Procedure: ACROMIOPLASTY, ARTHROSCOPIC;  Surgeon: Marcela Garza MD;  Location: [facility];  Service: Orthopedics;  Laterality: Left;    ARTHROSCOPIC REPAIR OF ROTATOR CUFF OF SHOULDER Right 7/27/2020    Procedure: REPAIR, ROTATOR CUFF, ARTHROSCOPIC;  Surgeon: Marcela Garza MD;  Location: [facility] OR;  Service: Orthopedics;  Laterality: Right;  GENERAL/REGIONAL    COLONOSCOPY N/A 4/7/2022    Procedure: COLONOSCOPY;  Surgeon: Yadi Wong MD;  Location: [address]);  Service: Endoscopy;  Laterality: N/A;  Covid test at [place] on 4/4.EC    CYST REMOVAL      ovarian cyst removal at age 12    DECOMPRESSION OF SUBACROMIAL SPACE Left 7/27/2020    Procedure: DECOMPRESSION, SUBACROMIAL SPACE;  Surgeon: Marcela Garza MD;  Location: [facility];  Service: Orthopedics;  Laterality: Left;    DILATION AND CURETTAGE OF UTERUS      ESOPHAGEAL MOTILITY STUDY USING HIGH RESOLUTION MANOMETRY N/A 9/17/2019    Procedure: MOTILITY STUDY, ESOPHAGUS,  USING HIGH RESOLUTION MANOMETRY;  Surgeon: Leopoldo Swanson MD;  Location: [facility] ENDO (4TH FLR);  Service: Endoscopy;  Laterality: N/A;  Pt will be taking xanax before procedure to hopefull help with anxiety.    ESOPHAGOGASTRODUODENOSCOPY N/A 6/14/2019    Procedure: EGD (ESOPHAGOGASTRODUODENOSCOPY);  Surgeon: Ced Guevara MD;  Location: [facility] ENDO (4TH FLR);  Service: Endoscopy;  Laterality: N/A;  Please make sure it is scheduled after her cardiology appt.- see cardiology note dated 6/13/19 for clearance - ERW    GASTRIC BYPASS N/A 05/20/2021    INJECTION OF STEROID Left 7/27/2020    Procedure: INJECTION, STEROID LEFT THUMB TRIGGER FINGER;  Surgeon: Marcela Garza MD;  Location: [facility] OR;  Service: Orthopedics;  Laterality: Left;  LEFT THUMB, TRIGGER FINGER    LYSIS OF ADHESIONS N/A 10/21/2022    Procedure: LYSIS, ADHESIONS;  Surgeon: Bunny Obrien MD;  Location: [facility] OR;  Service: General;  Laterality: N/A;    TRANSVERSE COLECTOMY N/A 10/26/2022    Procedure: COLECTOMY, TRANSVERSE;  Surgeon: Bunny Obrien MD;  Location: [facility] OR;  Service: General;  Laterality: N/A;    TRIGGER FINGER RELEASE Right 10/4/2019    Procedure: RELEASE, TRIGGER FINGER - right thumb;  Surgeon: Craig Tom MD;  Location: [facility] OR;  Service: Orthopedics;  Laterality: Right;    UPPER GASTROINTESTINAL ENDOSCOPY      WISDOM TOOTH EXTRACTION         Family History   Problem Relation Age of Onset    Cancer Maternal Grandmother     Diabetes Mother     No Known Problems Sister     No Known Problems Sister     No Known Problems Sister     Breast cancer Maternal Aunt 70    Colon cancer Neg Hx     Stomach cancer Neg Hx     Inflammatory bowel disease Neg Hx     Esophageal cancer Neg Hx        Social History     Socioeconomic History    Marital status: Single   Tobacco Use    Smoking status: Every Day     Packs/day: 0.75     Years: 30.00     Pack years: 22.50     Types: Cigarettes     Start date: 1992    Smokeless tobacco: Never     "Tobacco comments:     Enrolled in the [place] [employer] on 8/1/18 ([employer] Member ID # [id number]) Handout provided for Ambulatory Smoking Cessation Program.   Substance and Sexual Activity    Alcohol use: Yes     Comment: social    Drug use: No    Sexual activity: Yes     Partners: Male     Birth control/protection: None   Social History Narrative    Single.  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      Social Determinants of Health     Financial Resource Strain: Low Risk     Difficulty of Paying Living Expenses: Not hard at all   Food Insecurity: No Food Insecurity    Worried About Running Out of Food in the Last Year: Never true    Ran Out of Food in the Last Year: Never true   Transportation Needs: No Transportation Needs    Lack of Transportation (Medical): No    Lack of Transportation (Non-Medical): No   Physical Activity: Inactive    Days of Exercise per Week: 0 days    Minutes of Exercise per Session: 0 min   Housing Stability: Unknown    Unable to Pay for Housing in the Last Year: No    Unstable Housing in the Last Year: No       Review of Systems   Constitutional:  Negative for appetite change and unexpected weight change.   HENT:  Negative for trouble swallowing.    Respiratory:  Negative for shortness of breath.    Cardiovascular:  Negative for chest pain.   Gastrointestinal:  Positive for abdominal pain and nausea. Negative for constipation and diarrhea.   Neurological:  Negative for weakness.   Hematological:  Negative for adenopathy. Does not bruise/bleed easily.   Psychiatric/Behavioral:  Negative for dysphoric mood.        Objective:     Vitals:    11/16/22 1358   BP: 119/73   BP Location: Right arm   Patient Position: Sitting   BP Method: Large (Automatic)   Pulse: 78   Weight: 63.9 kg (140 lb 12.8 oz)   Height: 5' 6" (1.676 m)          Physical Exam  Constitutional:       General: She is not in acute distress.     Appearance: Normal appearance. She is not ill-appearing.   HENT:      Head: " Normocephalic.   Eyes:      Conjunctiva/sclera: Conjunctivae normal.      Pupils: Pupils are equal, round, and reactive to light.   Pulmonary:      Effort: Pulmonary effort is normal. No respiratory distress.   Musculoskeletal:         General: Normal range of motion.      Cervical back: Normal range of motion.   Neurological:      Mental Status: She is alert and oriented to person, place, and time.   Psychiatric:         Mood and Affect: Mood normal.         Behavior: Behavior normal.             Assessment:         ICD-10-CM ICD-9-CM   1. Early satiety  R68.81 780.94       Plan:       Early satiety  -     Ambulatory referral/consult to Gastroenterology  -     [place] Upper GI; Future; Expected date: 11/16/2022  -     [place] Gastric Emptying; Future; Expected date: 11/16/2022    - Continue medications as prescribed.     Follow up if symptoms worsen or fail to improve.     Patient's Medications   New Prescriptions    No medications on file   Previous Medications    ACETAMINOPHEN (TYLENOL) 500 MG TABLET    Take 2 tablets (1,000 mg total) by mouth every 6 (six) hours as needed for Pain.    AMITRIPTYLINE (ELAVIL) 10 MG TABLET    TAKE 1 TABLET BY MOUTH EVERY DAY IN THE EVENING    CETIRIZINE (ZYRTEC) 10 MG TABLET    Take 10 mg by mouth once daily.    DOCUSATE SODIUM (COLACE) 100 MG CAPSULE    Take 1 capsule (100 mg total) by mouth 2 (two) times daily.    HYDROCODONE-ACETAMINOPHEN (NORCO) 5-325 MG PER TABLET    Take 1 tablet by mouth every 4 (four) hours as needed for Pain.    MEDROXYPROGESTERONE (DEPO-PROVERA) 150 MG/ML SYRG    Inject 1 mL (150 mg total) into the muscle every 3 (three) months.    MULTIVITAMIN-MIN-IRON-FA-VIT K (BARIATRIC MULTIVITAMINS) 45 MG IRON- 800 MCG-120 MCG CAP    Take 1 capsule by mouth once daily.    ZOLPIDEM (AMBIEN) 5 MG TAB    TAKE 1 TABLET(5 MG) BY MOUTH EVERY NIGHT AS NEEDED   Modified Medications    No medications on file   Discontinued Medications    No medications on file

## 2022-11-23 RX ORDER — DOCUSATE SODIUM 100 MG/1
100 CAPSULE, LIQUID FILLED ORAL 2 TIMES DAILY
Qty: 30 CAPSULE | Refills: 11 | Status: SHIPPED | OUTPATIENT
Start: 2022-11-23 | End: 2023-08-18

## 2022-11-30 ENCOUNTER — HOSPITAL ENCOUNTER (OUTPATIENT)
Dept: RADIOLOGY | Facility: HOSPITAL | Age: 47
Discharge: HOME | End: 2022-11-30
Attending: NURSE PRACTITIONER
Payer: MEDICAID

## 2022-11-30 DIAGNOSIS — R68.81 EARLY SATIETY: ICD-10-CM

## 2022-11-30 PROCEDURE — 78264 GASTRIC EMPTYING IMG STUDY: CPT | Mod: 26,,, | Performed by: STUDENT IN AN ORGANIZED HEALTH CARE EDUCATION/TRAINING PROGRAM

## 2022-11-30 PROCEDURE — 78264 NM GASTRIC EMPTYING: ICD-10-PCS | Mod: 26,,, | Performed by: STUDENT IN AN ORGANIZED HEALTH CARE EDUCATION/TRAINING PROGRAM

## 2022-11-30 PROCEDURE — A9541 TC99M SULFUR COLLOID: HCPCS

## 2022-12-05 ENCOUNTER — PATIENT MESSAGE (OUTPATIENT)
Dept: GASTROENTEROLOGY | Facility: CLINIC | Age: 47
End: 2022-12-05
Payer: MEDICAID

## 2022-12-05 ENCOUNTER — PATIENT MESSAGE (OUTPATIENT)
Dept: OBSTETRICS AND GYNECOLOGY | Facility: CLINIC | Age: 47
End: 2022-12-05
Payer: MEDICAID

## 2022-12-05 ENCOUNTER — OFFICE VISIT (OUTPATIENT)
Dept: SURGERY | Facility: CLINIC | Age: 47
End: 2022-12-05
Payer: MEDICAID

## 2022-12-05 VITALS — WEIGHT: 145.75 LBS | BODY MASS INDEX: 23.52 KG/M2

## 2022-12-05 DIAGNOSIS — K56.609 LARGE BOWEL OBSTRUCTION: Primary | ICD-10-CM

## 2022-12-05 PROCEDURE — 99999 PR PBB SHADOW E&M-EST. PATIENT-LVL III: CPT | Mod: PBBFAC,,, | Performed by: STUDENT IN AN ORGANIZED HEALTH CARE EDUCATION/TRAINING PROGRAM

## 2022-12-05 PROCEDURE — 1159F MED LIST DOCD IN RCRD: CPT | Mod: CPTII,,, | Performed by: STUDENT IN AN ORGANIZED HEALTH CARE EDUCATION/TRAINING PROGRAM

## 2022-12-05 PROCEDURE — 1159F PR MEDICATION LIST DOCUMENTED IN MEDICAL RECORD: ICD-10-PCS | Mod: CPTII,,, | Performed by: STUDENT IN AN ORGANIZED HEALTH CARE EDUCATION/TRAINING PROGRAM

## 2022-12-05 PROCEDURE — 1160F PR REVIEW ALL MEDS BY PRESCRIBER/CLIN PHARMACIST DOCUMENTED: ICD-10-PCS | Mod: CPTII,,, | Performed by: STUDENT IN AN ORGANIZED HEALTH CARE EDUCATION/TRAINING PROGRAM

## 2022-12-05 PROCEDURE — 1160F RVW MEDS BY RX/DR IN RCRD: CPT | Mod: CPTII,,, | Performed by: STUDENT IN AN ORGANIZED HEALTH CARE EDUCATION/TRAINING PROGRAM

## 2022-12-05 PROCEDURE — 3008F PR BODY MASS INDEX (BMI) DOCUMENTED: ICD-10-PCS | Mod: CPTII,,, | Performed by: STUDENT IN AN ORGANIZED HEALTH CARE EDUCATION/TRAINING PROGRAM

## 2022-12-05 PROCEDURE — 99213 OFFICE O/P EST LOW 20 MIN: CPT | Mod: PBBFAC,PO | Performed by: STUDENT IN AN ORGANIZED HEALTH CARE EDUCATION/TRAINING PROGRAM

## 2022-12-05 PROCEDURE — 99024 PR POST-OP FOLLOW-UP VISIT: ICD-10-PCS | Mod: ,,, | Performed by: STUDENT IN AN ORGANIZED HEALTH CARE EDUCATION/TRAINING PROGRAM

## 2022-12-05 PROCEDURE — 99999 PR PBB SHADOW E&M-EST. PATIENT-LVL III: ICD-10-PCS | Mod: PBBFAC,,, | Performed by: STUDENT IN AN ORGANIZED HEALTH CARE EDUCATION/TRAINING PROGRAM

## 2022-12-05 PROCEDURE — 3008F BODY MASS INDEX DOCD: CPT | Mod: CPTII,,, | Performed by: STUDENT IN AN ORGANIZED HEALTH CARE EDUCATION/TRAINING PROGRAM

## 2022-12-05 PROCEDURE — 99024 POSTOP FOLLOW-UP VISIT: CPT | Mod: ,,, | Performed by: STUDENT IN AN ORGANIZED HEALTH CARE EDUCATION/TRAINING PROGRAM

## 2022-12-05 NOTE — PROGRESS NOTES
Feeling well  No pain  Appetite still not normal but gaining weight  Reg BMs  Able to do normal activity  Incision good  Saw GI, gastric empyting study normal  From my standpoint can stop reglan  RTC prn

## 2022-12-06 ENCOUNTER — PATIENT MESSAGE (OUTPATIENT)
Dept: OBSTETRICS AND GYNECOLOGY | Facility: CLINIC | Age: 47
End: 2022-12-06
Payer: MEDICAID

## 2022-12-13 NOTE — DISCHARGE SUMMARY
[facility] Surg  Short Stay  Discharge Summary    Admit Date: 10/25/2022    Discharge Date and Time: 10/30/2022  2:40 PM      Discharge Attending Physician: Rosa att. providers found     Hospital Course (synopsis of major diagnoses, care, treatment, and services provided during the course of the hospital stay): 47F admitted for abdominal pain, concern for colon volvulus, underwent ex lap with transverse colectomy. SHe tolerated the procedure well. Her pain was well controlled. Her diet was slowly advanced which she tolerated. She began having bowel function and was discharged home.     Final Diagnoses:    Principal Problem: Large bowel obstruction   Secondary Diagnoses:   Active Hospital Problems    Diagnosis  POA    *Large bowel obstruction [K56.609]  Yes      Resolved Hospital Problems   No resolved problems to display.       Discharged Condition: stable    Disposition: Home or Self Care    Follow up/Patient Instructions:    Medications:  Reconciled Home Medications:      Medication List        START taking these medications      docusate sodium 100 MG capsule  Commonly known as: COLACE  Take 1 capsule (100 mg total) by mouth 2 (two) times daily.     HYDROcodone-acetaminophen 5-325 mg per tablet  Commonly known as: NORCO  Take 1 tablet by mouth every 4 (four) hours as needed for Pain.            CONTINUE taking these medications      acetaminophen 500 MG tablet  Commonly known as: TYLENOL  Take 2 tablets (1,000 mg total) by mouth every 6 (six) hours as needed for Pain.     amitriptyline 10 MG tablet  Commonly known as: ELAVIL  TAKE 1 TABLET BY MOUTH EVERY DAY IN THE EVENING     BARIATRIC MULTIVITAMINS 45 mg iron- 800 mcg-120 mcg Cap  Generic drug: multivitamin-min-iron-FA-vit K  Take 1 capsule by mouth once daily.     cetirizine 10 MG tablet  Commonly known as: ZYRTEC  Take 10 mg by mouth once daily.     medroxyPROGESTERone 150 mg/mL Syrg  Commonly known as: DEPO-PROVERA  Inject 1 mL (150 mg total) into the muscle  every 3 (three) months.     zolpidem 5 MG Tab  Commonly known as: AMBIEN  TAKE 1 TABLET(5 MG) BY MOUTH EVERY NIGHT AS NEEDED            No discharge procedures on file.   Follow-up Information       Bunny Obrien MD Follow up on 11/2/2022.    Specialties: Surgery, General Surgery  Contact information:  [address]  [address]  [place] [address] [phone]                                  16

## 2022-12-18 ENCOUNTER — HOSPITAL ENCOUNTER (OUTPATIENT)
Facility: HOSPITAL | Age: 47
Discharge: HOME | End: 2022-12-19
Attending: EMERGENCY MEDICINE | Admitting: STUDENT IN AN ORGANIZED HEALTH CARE EDUCATION/TRAINING PROGRAM
Payer: MEDICAID

## 2022-12-18 DIAGNOSIS — R10.9 ABDOMINAL PAIN: Primary | ICD-10-CM

## 2022-12-18 LAB
ALBUMIN SERPL BCP-MCNC: 4.2 G/DL (ref 3.5–5.2)
ALP SERPL-CCNC: 82 U/L (ref 55–135)
ALT SERPL W/O P-5'-P-CCNC: 29 U/L (ref 10–44)
ANION GAP SERPL CALC-SCNC: 10 MMOL/L (ref 8–16)
AST SERPL-CCNC: 25 U/L (ref 10–40)
B-HCG UR QL: NEGATIVE
BASOPHILS # BLD AUTO: 0.09 K/UL (ref 0–0.2)
BASOPHILS NFR BLD: 1.1 % (ref 0–1.9)
BILIRUB SERPL-MCNC: 0.5 MG/DL (ref 0.1–1)
BILIRUB UR QL STRIP: NEGATIVE
BUN SERPL-MCNC: 8 MG/DL (ref 6–20)
CALCIUM SERPL-MCNC: 9.5 MG/DL (ref 8.7–10.5)
CHLORIDE SERPL-SCNC: 108 MMOL/L (ref 95–110)
CLARITY UR: CLEAR
CO2 SERPL-SCNC: 22 MMOL/L (ref 23–29)
COLOR UR: COLORLESS
CREAT SERPL-MCNC: 0.8 MG/DL (ref 0.5–1.4)
CTP QC/QA: YES
DIFFERENTIAL METHOD: ABNORMAL
EOSINOPHIL # BLD AUTO: 0.2 K/UL (ref 0–0.5)
EOSINOPHIL NFR BLD: 2.7 % (ref 0–8)
ERYTHROCYTE [DISTWIDTH] IN BLOOD BY AUTOMATED COUNT: 14 % (ref 11.5–14.5)
EST. GFR  (NO RACE VARIABLE): >60 ML/MIN/1.73 M^2
GLUCOSE SERPL-MCNC: 120 MG/DL (ref 70–110)
GLUCOSE UR QL STRIP: NEGATIVE
HCT VFR BLD AUTO: 43.2 % (ref 37–48.5)
HGB BLD-MCNC: 13.9 G/DL (ref 12–16)
HGB UR QL STRIP: NEGATIVE
IMM GRANULOCYTES # BLD AUTO: 0.03 K/UL (ref 0–0.04)
IMM GRANULOCYTES NFR BLD AUTO: 0.4 % (ref 0–0.5)
KETONES UR QL STRIP: ABNORMAL
LEUKOCYTE ESTERASE UR QL STRIP: NEGATIVE
LIPASE SERPL-CCNC: 17 U/L (ref 4–60)
LYMPHOCYTES # BLD AUTO: 2.2 K/UL (ref 1–4.8)
LYMPHOCYTES NFR BLD: 27.9 % (ref 18–48)
MCH RBC QN AUTO: 31.2 PG (ref 27–31)
MCHC RBC AUTO-ENTMCNC: 32.2 G/DL (ref 32–36)
MCV RBC AUTO: 97 FL (ref 82–98)
MONOCYTES # BLD AUTO: 0.5 K/UL (ref 0.3–1)
MONOCYTES NFR BLD: 6.9 % (ref 4–15)
NEUTROPHILS # BLD AUTO: 4.8 K/UL (ref 1.8–7.7)
NEUTROPHILS NFR BLD: 61 % (ref 38–73)
NITRITE UR QL STRIP: NEGATIVE
NRBC BLD-RTO: 0 /100 WBC
PH UR STRIP: 7 [PH] (ref 5–8)
PLATELET # BLD AUTO: 447 K/UL (ref 150–450)
PMV BLD AUTO: 10.3 FL (ref 9.2–12.9)
POTASSIUM SERPL-SCNC: 4.7 MMOL/L (ref 3.5–5.1)
PROT SERPL-MCNC: 7.8 G/DL (ref 6–8.4)
PROT UR QL STRIP: NEGATIVE
RBC # BLD AUTO: 4.46 M/UL (ref 4–5.4)
SODIUM SERPL-SCNC: 140 MMOL/L (ref 136–145)
SP GR UR STRIP: 1.02 (ref 1–1.03)
URN SPEC COLLECT METH UR: ABNORMAL
UROBILINOGEN UR STRIP-ACNC: NEGATIVE EU/DL
WBC # BLD AUTO: 7.88 K/UL (ref 3.9–12.7)

## 2022-12-18 PROCEDURE — 83690 ASSAY OF LIPASE: CPT | Performed by: NURSE PRACTITIONER

## 2022-12-18 PROCEDURE — 81025 URINE PREGNANCY TEST: CPT | Performed by: NURSE PRACTITIONER

## 2022-12-18 PROCEDURE — 80053 COMPREHEN METABOLIC PANEL: CPT | Performed by: NURSE PRACTITIONER

## 2022-12-18 PROCEDURE — 81003 URINALYSIS AUTO W/O SCOPE: CPT | Performed by: NURSE PRACTITIONER

## 2022-12-18 PROCEDURE — 85025 COMPLETE CBC W/AUTO DIFF WBC: CPT | Performed by: NURSE PRACTITIONER

## 2022-12-18 PROCEDURE — 99285 EMERGENCY DEPT VISIT HI MDM: CPT | Mod: 25

## 2022-12-19 VITALS
HEIGHT: 66 IN | DIASTOLIC BLOOD PRESSURE: 58 MMHG | WEIGHT: 159.63 LBS | RESPIRATION RATE: 20 BRPM | OXYGEN SATURATION: 99 % | SYSTOLIC BLOOD PRESSURE: 106 MMHG | TEMPERATURE: 99 F | BODY MASS INDEX: 25.66 KG/M2 | HEART RATE: 70 BPM

## 2022-12-19 PROBLEM — R10.9 UNSPECIFIED ABDOMINAL PAIN: Status: ACTIVE | Noted: 2022-12-19

## 2022-12-19 LAB — POCT GLUCOSE: 84 MG/DL (ref 70–110)

## 2022-12-19 PROCEDURE — G0378 HOSPITAL OBSERVATION PER HR: HCPCS

## 2022-12-19 PROCEDURE — 25500020 PHARM REV CODE 255: Performed by: EMERGENCY MEDICINE

## 2022-12-19 PROCEDURE — 94760 N-INVAS EAR/PLS OXIMETRY 1: CPT

## 2022-12-19 RX ORDER — LIDOCAINE HYDROCHLORIDE 10 MG/ML
1 INJECTION, SOLUTION EPIDURAL; INFILTRATION; INTRACAUDAL; PERINEURAL ONCE AS NEEDED
Status: DISCONTINUED | OUTPATIENT
Start: 2022-12-19 | End: 2022-12-19 | Stop reason: HOSPADM

## 2022-12-19 RX ORDER — ONDANSETRON 4 MG/1
8 TABLET, ORALLY DISINTEGRATING ORAL EVERY 8 HOURS PRN
Status: DISCONTINUED | OUTPATIENT
Start: 2022-12-19 | End: 2022-12-19 | Stop reason: HOSPADM

## 2022-12-19 RX ORDER — AMITRIPTYLINE HYDROCHLORIDE 10 MG/1
10 TABLET, FILM COATED ORAL NIGHTLY
Status: DISCONTINUED | OUTPATIENT
Start: 2022-12-19 | End: 2022-12-19 | Stop reason: HOSPADM

## 2022-12-19 RX ORDER — SODIUM CHLORIDE 0.9 % (FLUSH) 0.9 %
10 SYRINGE (ML) INJECTION
Status: DISCONTINUED | OUTPATIENT
Start: 2022-12-19 | End: 2022-12-19 | Stop reason: HOSPADM

## 2022-12-19 RX ORDER — ACETAMINOPHEN 325 MG/1
650 TABLET ORAL EVERY 4 HOURS PRN
Status: DISCONTINUED | OUTPATIENT
Start: 2022-12-19 | End: 2022-12-19 | Stop reason: HOSPADM

## 2022-12-19 RX ORDER — LIDOCAINE HYDROCHLORIDE 20 MG/ML
5 SOLUTION OROPHARYNGEAL
Status: DISCONTINUED | OUTPATIENT
Start: 2022-12-19 | End: 2022-12-19 | Stop reason: HOSPADM

## 2022-12-19 RX ORDER — ACETAMINOPHEN 325 MG/1
650 TABLET ORAL EVERY 8 HOURS PRN
Status: DISCONTINUED | OUTPATIENT
Start: 2022-12-19 | End: 2022-12-19 | Stop reason: HOSPADM

## 2022-12-19 RX ADMIN — IOHEXOL 100 ML: 350 INJECTION, SOLUTION INTRAVENOUS at 02:12

## 2022-12-19 NOTE — PLAN OF CARE
Pt is cleared from  pov. Pt will d/c home today. Rounds completed on pt. SW requested f/u appts.  All questions addressed.  Bedside nurse to discuss d/c medications.  Discussed importance to attend all f/u appts and take medications as prescribed.  Verbalized understanding.    BRIAN Montaño  [phone]     12/19/22 1352   Final Note   Assessment Type Final Discharge Note

## 2022-12-19 NOTE — DISCHARGE SUMMARY
"[facility] Surg  General Surgery  Discharge Summary      Patient Name: Velma Lopez  MRN: [id number]  Admission Date: 12/18/2022  Hospital Length of Stay: 0 days  Discharge Date and Time:  12/19/2022 1:36 PM  Attending Physician: Bunny Obrien MD   Discharging Provider: Madonna Armenta MD  Primary Care Provider: Aftab Martinez MD     HPI: The patient is a 47-year-old female who presents emergency lower quadrant abdominal pain after eating cheese pizza yesterday afternoon around 4:00 p.m. She states her last bowel movement was after she ate the pizza. It was formed stool not black not liquid and did not change her pain. It was "really too small so I knew I needed to go to the doctor."  The patient underwent ex lap for concern for LBO and had partial transverse large bowel resection in October. She denies any fever nausea vomiting, chest pain or back pain.  She states her abdomen feels bloated. When she arrived to the ED she had a large episode of flatus that states made her abdominal pain resolve.      In the ED, CT scan showing concern for SBO. Patient admitted to general surgery service for management.    * No surgery found *     Hospital Course:   Briefly, the patient presented for the above reasons. She was treated conservatively and progressed well without issue. The patients pain was controlled with PO medications.  Ambulating without issue.  Voiding without issue with adequate urine output. Passing gas and stool.  Tolerating diet without nausea or vomiting.  Incision site is clean, dry, and intact.  Discharged on 12/19/22.      Consults:   Consults (From admission, onward)          Status Ordering Provider     Inpatient consult to Social Work  Once        Provider:  (Not yet assigned)    Acknowledged MADONNA ARMENTA            Significant Diagnostic Studies: Labs: CMP   Recent Labs   Lab 12/18/22  2113      K 4.7      CO2 22*   *   BUN 8   CREATININE 0.8   CALCIUM 9.5 "   PROT 7.8   ALBUMIN 4.2   BILITOT 0.5   ALKPHOS 82   AST 25   ALT 29   ANIONGAP 10    and CBC   Recent Labs   Lab 12/18/22  2113   WBC 7.88   HGB 13.9   HCT 43.2          Pending Diagnostic Studies:       None          Final Active Diagnoses:    Diagnosis Date Noted POA    PRINCIPAL PROBLEM:  Unspecified abdominal pain [R10.9] 12/19/2022 Yes      Problems Resolved During this Admission:      Discharged Condition: good    Disposition: Home or Self Care    Follow Up:   Follow-up Information       Bunny Obrien MD Follow up.    Specialties: Surgery, General Surgery  Contact information:  [address]  [address]  [place] [address]  [phone]                           Patient Instructions:      Notify your health care provider if you experience any of the following:  temperature >100.4     Notify your health care provider if you experience any of the following:  persistent nausea and vomiting or diarrhea     Notify your health care provider if you experience any of the following:  severe uncontrolled pain     Notify your health care provider if you experience any of the following:  redness, tenderness, or signs of infection (pain, swelling, redness, odor or green/yellow discharge around incision site)     Notify your health care provider if you experience any of the following:  difficulty breathing or increased cough     Notify your health care provider if you experience any of the following:  severe persistent headache     Notify your health care provider if you experience any of the following:  worsening rash     Notify your health care provider if you experience any of the following:  persistent dizziness, light-headedness, or visual disturbances     Notify your health care provider if you experience any of the following:  increased confusion or weakness     Activity as tolerated     Medications:  Reconciled Home Medications:      Medication List        CONTINUE taking these medications       acetaminophen 500 MG tablet  Commonly known as: TYLENOL  Take 2 tablets (1,000 mg total) by mouth every 6 (six) hours as needed for Pain.     amitriptyline 10 MG tablet  Commonly known as: ELAVIL  TAKE 1 TABLET BY MOUTH EVERY DAY IN THE EVENING     BARIATRIC MULTIVITAMINS 45 mg iron- 800 mcg-120 mcg Cap  Generic drug: multivitamin-min-iron-FA-vit K  Take 1 capsule by mouth once daily.     cetirizine 10 MG tablet  Commonly known as: ZYRTEC  Take 10 mg by mouth once daily.     docusate sodium 100 MG capsule  Commonly known as: COLACE  Take 1 capsule (100 mg total) by mouth 2 (two) times daily.     HYDROcodone-acetaminophen 5-325 mg per tablet  Commonly known as: NORCO  Take 1 tablet by mouth every 4 (four) hours as needed for Pain.     * medroxyPROGESTERone 150 mg/mL Syrg  Commonly known as: DEPO-PROVERA  Inject 1 mL (150 mg total) into the muscle every 3 (three) months.     * medroxyPROGESTERone 150 mg/mL Syrg  Commonly known as: DEPO-PROVERA  Inject 1 mL (150 mg total) into the muscle every 3 (three) months.     zolpidem 5 MG Tab  Commonly known as: AMBIEN  TAKE 1 TABLET(5 MG) BY MOUTH EVERY NIGHT AS NEEDED           * This list has 2 medication(s) that are the same as other medications prescribed for you. Read the directions carefully, and ask your doctor or other care provider to review them with you.                  Gamal Armenta MD  General Surgery  [facility] Surg

## 2022-12-19 NOTE — ED NOTES
Report received. Assumed care of patient. Pt lying in bed in no acute distress at this time. Pt denies pain or nausea.

## 2022-12-19 NOTE — H&P
"[facility]  General Surgery  History & Physical    Patient Name: Velma Lopez  MRN: [id number]  Admission Date: 12/18/2022  Attending Physician: Bunny Obrien MD   Primary Care Provider: Aftab Martinez MD    Patient information was obtained from patient and ER records.     Subjective:     Chief Complaint/Reason for Admission: Abdominal pain, unspecified    History of Present Illness:  The patient is a 47-year-old female who presents emergency lower quadrant abdominal pain after eating cheese pizza yesterday afternoon around 4:00 p.m. She states her last bowel movement was after she ate the pizza. It was formed stool not black not liquid and did not change her pain. It was "really too small so I knew I needed to go to the doctor."  The patient underwent ex lap for concern for LBO and had partial transverse large bowel resection in October. She denies any fever nausea vomiting, chest pain or back pain.  She states her abdomen feels bloated. When she arrived to the ED she had a large episode of flatus that states made her abdominal pain resolve.     In the ED, CT scan showing concern for SBO. Patient admitted to general surgery service for management.      Current Facility-Administered Medications on File Prior to Encounter   Medication    lidocaine (PF) 10 mg/ml (1%) injection 10 mg    midazolam (VERSED) 1 mg/mL injection 0.5 mg    ropivacaine (Naropin) 1000 mg/500 mL (2 mg/mL) [facility] Pump infusion     Current Outpatient Medications on File Prior to Encounter   Medication Sig    acetaminophen (TYLENOL) 500 MG tablet Take 2 tablets (1,000 mg total) by mouth every 6 (six) hours as needed for Pain.    amitriptyline (ELAVIL) 10 MG tablet TAKE 1 TABLET BY MOUTH EVERY DAY IN THE EVENING    cetirizine (ZYRTEC) 10 MG tablet Take 10 mg by mouth once daily.    docusate sodium (COLACE) 100 MG capsule Take 1 capsule (100 mg total) by mouth 2 (two) times daily.    " HYDROcodone-acetaminophen (NORCO) 5-325 mg per tablet Take 1 tablet by mouth every 4 (four) hours as needed for Pain.    medroxyPROGESTERone (DEPO-PROVERA) 150 mg/mL Syrg Inject 1 mL (150 mg total) into the muscle every 3 (three) months.    medroxyPROGESTERone (DEPO-PROVERA) 150 mg/mL Syrg Inject 1 mL (150 mg total) into the muscle every 3 (three) months.    multivitamin-min-iron-FA-vit K (BARIATRIC MULTIVITAMINS) 45 mg iron- 800 mcg-120 mcg Cap Take 1 capsule by mouth once daily.    zolpidem (AMBIEN) 5 MG Tab TAKE 1 TABLET(5 MG) BY MOUTH EVERY NIGHT AS NEEDED    [DISCONTINUED] atorvastatin (LIPITOR) 10 MG tablet Take 1 tablet (10 mg total) by mouth once daily.    [DISCONTINUED] blood-glucose meter kit Please provide with insurance covered meter (Patient not taking: Reported on 6/21/2022)    [DISCONTINUED] diclofenac sodium (VOLTAREN) 1 % Gel Apply 2 g topically 4 (four) times daily. (Patient not taking: No sig reported)    [DISCONTINUED] hyoscyamine (ANASPAZ,LEVSIN) 0.125 mg Tab Take 1 tablet (125 mcg total) by mouth every 4 (four) hours as needed (bladder spasms).    [DISCONTINUED] losartan (COZAAR) 25 MG tablet TAKE 1 TABLET BY MOUTH EVERY DAY    [DISCONTINUED] metFORMIN (GLUCOPHAGE) 500 MG tablet TAKE 1 TABLET BY MOUTH TWICE A DAY WITH MEALS       Review of patient's allergies indicates:  No Known Allergies    Past Medical History:   Diagnosis Date    Chronic back pain     Diabetes type 2, uncontrolled     Mild nonproliferative diabetic retinopathy of left eye without macular edema associated with type 2 diabetes mellitus 10/21/2019    Morbid obesity with BMI of 40.0-44.9, adult     MILEY on CPAP     Plantar fasciitis of left foot     Urticaria      Past Surgical History:   Procedure Laterality Date    ARTHROSCOPIC ACROMIOPLASTY OF SHOULDER Left 7/27/2020    Procedure: ACROMIOPLASTY, ARTHROSCOPIC;  Surgeon: Marcela Garza MD;  Location: [facility];  Service: Orthopedics;  Laterality: Left;     ARTHROSCOPIC REPAIR OF ROTATOR CUFF OF SHOULDER Right 7/27/2020    Procedure: REPAIR, ROTATOR CUFF, ARTHROSCOPIC;  Surgeon: Marcela Garza MD;  Location: [facility] OR;  Service: Orthopedics;  Laterality: Right;  GENERAL/REGIONAL    COLONOSCOPY N/A 4/7/2022    Procedure: COLONOSCOPY;  Surgeon: Yadi Wong MD;  Location: [facility] (4TH FLR);  Service: Endoscopy;  Laterality: N/A;  Covid test at [place] on 4/4.EC    CYST REMOVAL      ovarian cyst removal at age 12    DECOMPRESSION OF SUBACROMIAL SPACE Left 7/27/2020    Procedure: DECOMPRESSION, SUBACROMIAL SPACE;  Surgeon: Marcela Garza MD;  Location: [facility] OR;  Service: Orthopedics;  Laterality: Left;    DILATION AND CURETTAGE OF UTERUS      ESOPHAGEAL MOTILITY STUDY USING HIGH RESOLUTION MANOMETRY N/A 9/17/2019    Procedure: MOTILITY STUDY, ESOPHAGUS, USING HIGH RESOLUTION MANOMETRY;  Surgeon: Leopoldo Swanson MD;  Location: [facility] (4TH FLR);  Service: Endoscopy;  Laterality: N/A;  Pt will be taking xanax before procedure to hopefull help with anxiety.    ESOPHAGOGASTRODUODENOSCOPY N/A 6/14/2019    Procedure: EGD (ESOPHAGOGASTRODUODENOSCOPY);  Surgeon: Ced Guevara MD;  [facility]  Service: Endoscopy;  Laterality: N/A;  Please make sure it is scheduled after her cardiology appt.- see cardiology note dated 6/13/19 for clearance - ERW    GASTRIC BYPASS N/A 05/20/2021    INJECTION OF STEROID Left 7/27/2020    Procedure: INJECTION, STEROID LEFT THUMB TRIGGER FINGER;  Surgeon: Marcela Garza MD;  Location: [facility] OR;  Service: Orthopedics;  Laterality: Left;  LEFT THUMB, TRIGGER FINGER    LYSIS OF ADHESIONS N/A 10/21/2022    Procedure: LYSIS, ADHESIONS;  Surgeon: Bunny Obrien MD;  Location: [facility] OR;  Service: General;  Laterality: N/A;    TRANSVERSE COLECTOMY N/A 10/26/2022    Procedure: COLECTOMY, TRANSVERSE;  Surgeon: Bunny Obrien MD;  Location: [facility] OR;  Service: General;  Laterality: N/A;     TRIGGER FINGER RELEASE Right 10/4/2019    Procedure: RELEASE, TRIGGER FINGER - right thumb;  Surgeon: Craig Tom MD;  Location: [facility];  Service: Orthopedics;  Laterality: Right;    UPPER GASTROINTESTINAL ENDOSCOPY      WISDOM TOOTH EXTRACTION       Family History       Problem Relation (Age of Onset)    Breast cancer Maternal Aunt (70)    Cancer Maternal Grandmother    Diabetes Mother    No Known Problems Sister, Sister, Sister          Tobacco Use    Smoking status: Every Day     Packs/day: 0.75     Years: 30.00     Pack years: 22.50     Types: Cigarettes     Start date: 1992    Smokeless tobacco: Never    Tobacco comments:     Enrolled in the [employer] on 8/1/18 ([facility] Member ID # [id number]) Handout provided for Ambulatory Smoking Cessation Program.   Substance and Sexual Activity    Alcohol use: Yes     Comment: social    Drug use: No    Sexual activity: Yes     Partners: Male     Birth control/protection: None     Review of Systems   Constitutional:  Negative for chills and fever.   Respiratory:  Negative for chest tightness and shortness of breath.    Cardiovascular:  Negative for chest pain.   Gastrointestinal:  Negative for abdominal pain, constipation, diarrhea, nausea and vomiting.   Skin:  Negative for color change and rash.   Objective:     Vital Signs (Most Recent):  Temp: 98.1 °F (36.7 °C) (12/19/22 0758)  Pulse: 68 (12/19/22 0758)  Resp: 14 (12/19/22 0758)  BP: 130/68 (12/19/22 0758)  SpO2: 98 % (12/19/22 0758) Vital Signs (24h Range):  Temp:  [98.1 °F (36.7 °C)-98.6 °F (37 °C)] 98.1 °F (36.7 °C)  Pulse:  [66-78] 68  Resp:  [14-18] 14  SpO2:  [96 %-99 %] 98 %  BP: (120-196)/(67-95) 130/68     Weight: 72.4 kg (159 lb 9.8 oz)  Body mass index is 25.76 kg/m².    Physical Exam  Vitals and nursing note reviewed.   Constitutional:       Appearance: Normal appearance.   HENT:      Head: Normocephalic and atraumatic.   Cardiovascular:      Rate and Rhythm: Normal rate and regular rhythm.       Pulses: Normal pulses.   Pulmonary:      Effort: Pulmonary effort is normal.   Abdominal:      General: Abdomen is flat. There is no distension.      Palpations: Abdomen is soft.      Tenderness: There is no abdominal tenderness. There is no guarding.   Musculoskeletal:         General: Normal range of motion.      Cervical back: Normal range of motion.   Skin:     General: Skin is warm and dry.      Capillary Refill: Capillary refill takes less than 2 seconds.   Neurological:      General: No focal deficit present.      Mental Status: She is alert and oriented to person, place, and time.   Psychiatric:         Mood and Affect: Mood normal.         Behavior: Behavior normal.       Significant Labs:  I have reviewed all pertinent lab results within the past 24 hours.  CBC:   Recent Labs   Lab 12/18/22 2113   WBC 7.88   RBC 4.46   HGB 13.9   HCT 43.2      MCV 97   MCH 31.2*   MCHC 32.2     CMP:   Recent Labs   Lab 12/18/22 2113   *   CALCIUM 9.5   ALBUMIN 4.2   PROT 7.8      K 4.7   CO2 22*      BUN 8   CREATININE 0.8   ALKPHOS 82   ALT 29   AST 25   BILITOT 0.5       Significant Diagnostics:  I have reviewed all pertinent imaging results/findings within the past 24 hours.      Assessment/Plan:     Unspecified abdominal pain  48yo female with PSH of LBO s/p colon resection in October presenting to the ED with concern for SBO.    - Per patient, pain resolved after large rush of flatus while in the ED  - Patient still passing gas now, no BM  - KUB this AM with contrast noted in Ascending colon  - Discussion with staff, but likely advance diet today, if tolerates possible discharge later      VTE Risk Mitigation (From admission, onward)    None          Gamal Armenta MD  General Surgery  [facility] Surg

## 2022-12-19 NOTE — PROVIDER PROGRESS NOTES - EMERGENCY DEPT.
Encounter Date: 12/18/2022    ED Physician Progress Notes          2:14 AM  Assumed care of pt from Dr. Pearce.  Pt awaiting CT and surgery recs       3:56 AM  CTA/P with evidence of SBO spoke with surgery who will admit pt to their service. Pt to be NPO and NG tube to be placed. Updated pt regarding plan ans she agrees.       Jomar Hunter MD

## 2022-12-19 NOTE — HPI
" The patient is a 47-year-old female who presents emergency lower quadrant abdominal pain after eating cheese pizza yesterday afternoon around 4:00 p.m. She states her last bowel movement was after she ate the pizza. It was formed stool not black not liquid and did not change her pain. It was "really too small so I knew I needed to go to the doctor."  The patient underwent ex lap for concern for LBO and had partial transverse large bowel resection in October. She denies any fever nausea vomiting, chest pain or back pain.  She states her abdomen feels bloated. When she arrived to the ED she had a large episode of flatus that states made her abdominal pain resolve.     In the ED, CT scan showing concern for SBO. Patient admitted to general surgery service for management.  "

## 2022-12-19 NOTE — SUBJECTIVE & OBJECTIVE
Current Facility-Administered Medications on File Prior to Encounter   Medication    lidocaine (PF) 10 mg/ml (1%) injection 10 mg    midazolam (VERSED) 1 mg/mL injection 0.5 mg    ropivacaine (Naropin) 1000 mg/500 mL (2 mg/mL) [facility] PainPRO Pump infusion     Current Outpatient Medications on File Prior to Encounter   Medication Sig    acetaminophen (TYLENOL) 500 MG tablet Take 2 tablets (1,000 mg total) by mouth every 6 (six) hours as needed for Pain.    amitriptyline (ELAVIL) 10 MG tablet TAKE 1 TABLET BY MOUTH EVERY DAY IN THE EVENING    cetirizine (ZYRTEC) 10 MG tablet Take 10 mg by mouth once daily.    docusate sodium (COLACE) 100 MG capsule Take 1 capsule (100 mg total) by mouth 2 (two) times daily.    HYDROcodone-acetaminophen (NORCO) 5-325 mg per tablet Take 1 tablet by mouth every 4 (four) hours as needed for Pain.    medroxyPROGESTERone (DEPO-PROVERA) 150 mg/mL Syrg Inject 1 mL (150 mg total) into the muscle every 3 (three) months.    medroxyPROGESTERone (DEPO-PROVERA) 150 mg/mL Syrg Inject 1 mL (150 mg total) into the muscle every 3 (three) months.    multivitamin-min-iron-FA-vit K (BARIATRIC MULTIVITAMINS) 45 mg iron- 800 mcg-120 mcg Cap Take 1 capsule by mouth once daily.    zolpidem (AMBIEN) 5 MG Tab TAKE 1 TABLET(5 MG) BY MOUTH EVERY NIGHT AS NEEDED    [DISCONTINUED] atorvastatin (LIPITOR) 10 MG tablet Take 1 tablet (10 mg total) by mouth once daily.    [DISCONTINUED] blood-glucose meter kit Please provide with insurance covered meter (Patient not taking: Reported on 6/21/2022)    [DISCONTINUED] diclofenac sodium (VOLTAREN) 1 % Gel Apply 2 g topically 4 (four) times daily. (Patient not taking: No sig reported)    [DISCONTINUED] hyoscyamine (ANASPAZ,LEVSIN) 0.125 mg Tab Take 1 tablet (125 mcg total) by mouth every 4 (four) hours as needed (bladder spasms).    [DISCONTINUED] losartan (COZAAR) 25 MG tablet TAKE 1 TABLET BY MOUTH EVERY DAY    [DISCONTINUED] metFORMIN (GLUCOPHAGE) 500 MG tablet TAKE 1  TABLET BY MOUTH TWICE A DAY WITH MEALS       Review of patient's allergies indicates:  No Known Allergies    Past Medical History:   Diagnosis Date    Chronic back pain     Diabetes type 2, uncontrolled     Mild nonproliferative diabetic retinopathy of left eye without macular edema associated with type 2 diabetes mellitus 10/21/2019    Morbid obesity with BMI of 40.0-44.9, adult     MILEY on CPAP     Plantar fasciitis of left foot     Urticaria      Past Surgical History:   Procedure Laterality Date    ARTHROSCOPIC ACROMIOPLASTY OF SHOULDER Left 7/27/2020    Procedure: ACROMIOPLASTY, ARTHROSCOPIC;  Surgeon: Marcela Garza MD;  Location: [facility] OR;  Service: Orthopedics;  Laterality: Left;    ARTHROSCOPIC REPAIR OF ROTATOR CUFF OF SHOULDER Right 7/27/2020    Procedure: REPAIR, ROTATOR CUFF, ARTHROSCOPIC;  Surgeon: Marcela Garza MD;  Location: [facility] OR;  Service: Orthopedics;  Laterality: Right;  GENERAL/REGIONAL    COLONOSCOPY N/A 4/7/2022    Procedure: COLONOSCOPY;  Surgeon: Yadi Wong MD;  Location: [address]);  Service: Endoscopy;  Laterality: N/A;  Covid test at [place] on 4/4.EC    CYST REMOVAL      ovarian cyst removal at age 12    DECOMPRESSION OF SUBACROMIAL SPACE Left 7/27/2020    Procedure: DECOMPRESSION, SUBACROMIAL SPACE;  Surgeon: Marcela Garza MD;  Location: [facility] OR;  Service: Orthopedics;  Laterality: Left;    DILATION AND CURETTAGE OF UTERUS      ESOPHAGEAL MOTILITY STUDY USING HIGH RESOLUTION MANOMETRY N/A 9/17/2019    Procedure: MOTILITY STUDY, ESOPHAGUS, USING HIGH RESOLUTION MANOMETRY;  Surgeon: Leopoldo Swanson MD;  [facility]  Service: Endoscopy;  Laterality: N/A;  Pt will be taking xanax before procedure to hopefull help with anxiety.    ESOPHAGOGASTRODUODENOSCOPY N/A 6/14/2019    Procedure: EGD (ESOPHAGOGASTRODUODENOSCOPY);  Surgeon: Ced Guevara MD;  Location: [facility] ([address] [place] [address]);  Service: Endoscopy;  Laterality: N/A;  Please make sure  it is scheduled after her cardiology appt.- see cardiology note dated 6/13/19 for clearance - ERW    GASTRIC BYPASS N/A 05/20/2021    INJECTION OF STEROID Left 7/27/2020    Procedure: INJECTION, STEROID LEFT THUMB TRIGGER FINGER;  Surgeon: Marcela Garza MD;  Location: [facility] OR;  Service: Orthopedics;  Laterality: Left;  LEFT THUMB, TRIGGER FINGER    LYSIS OF ADHESIONS N/A 10/21/2022    Procedure: LYSIS, ADHESIONS;  Surgeon: Bunny Obrien MD;  Location: [facility] OR;  Service: General;  Laterality: N/A;    TRANSVERSE COLECTOMY N/A 10/26/2022    Procedure: COLECTOMY, TRANSVERSE;  Surgeon: Bunny Obrien MD;  Location: [facility] OR;  Service: General;  Laterality: N/A;    TRIGGER FINGER RELEASE Right 10/4/2019    Procedure: RELEASE, TRIGGER FINGER - right thumb;  Surgeon: Craig Tom MD;  Location: [facility] OR;  Service: Orthopedics;  Laterality: Right;    UPPER GASTROINTESTINAL ENDOSCOPY      WISDOM TOOTH EXTRACTION       Family History       Problem Relation (Age of Onset)    Breast cancer Maternal Aunt (70)    Cancer Maternal Grandmother    Diabetes Mother    No Known Problems Sister, Sister, Sister          Tobacco Use    Smoking status: Every Day     Packs/day: 0.75     Years: 30.00     Pack years: 22.50     Types: Cigarettes     Start date: 1992    Smokeless tobacco: Never    Tobacco comments:     Enrolled in the [employer] on 8/1/18 ([facility] Member ID # [id number]) Handout provided for Ambulatory Smoking Cessation Program.   Substance and Sexual Activity    Alcohol use: Yes     Comment: social    Drug use: No    Sexual activity: Yes     Partners: Male     Birth control/protection: None     Review of Systems   Constitutional:  Negative for chills and fever.   Respiratory:  Negative for chest tightness and shortness of breath.    Cardiovascular:  Negative for chest pain.   Gastrointestinal:  Negative for abdominal pain, constipation, diarrhea, nausea and vomiting.   Skin:  Negative for color change and  rash.   Objective:     Vital Signs (Most Recent):  Temp: 98.1 °F (36.7 °C) (12/19/22 0758)  Pulse: 68 (12/19/22 0758)  Resp: 14 (12/19/22 0758)  BP: 130/68 (12/19/22 0758)  SpO2: 98 % (12/19/22 0758) Vital Signs (24h Range):  Temp:  [98.1 °F (36.7 °C)-98.6 °F (37 °C)] 98.1 °F (36.7 °C)  Pulse:  [66-78] 68  Resp:  [14-18] 14  SpO2:  [96 %-99 %] 98 %  BP: (120-196)/(67-95) 130/68     Weight: 72.4 kg (159 lb 9.8 oz)  Body mass index is 25.76 kg/m².    Physical Exam  Vitals and nursing note reviewed.   Constitutional:       Appearance: Normal appearance.   HENT:      Head: Normocephalic and atraumatic.   Cardiovascular:      Rate and Rhythm: Normal rate and regular rhythm.      Pulses: Normal pulses.   Pulmonary:      Effort: Pulmonary effort is normal.   Abdominal:      General: Abdomen is flat. There is no distension.      Palpations: Abdomen is soft.      Tenderness: There is no abdominal tenderness. There is no guarding.   Musculoskeletal:         General: Normal range of motion.      Cervical back: Normal range of motion.   Skin:     General: Skin is warm and dry.      Capillary Refill: Capillary refill takes less than 2 seconds.   Neurological:      General: No focal deficit present.      Mental Status: She is alert and oriented to person, place, and time.   Psychiatric:         Mood and Affect: Mood normal.         Behavior: Behavior normal.       Significant Labs:  I have reviewed all pertinent lab results within the past 24 hours.  CBC:   Recent Labs   Lab 12/18/22 2113   WBC 7.88   RBC 4.46   HGB 13.9   HCT 43.2      MCV 97   MCH 31.2*   MCHC 32.2     CMP:   Recent Labs   Lab 12/18/22 2113   *   CALCIUM 9.5   ALBUMIN 4.2   PROT 7.8      K 4.7   CO2 22*      BUN 8   CREATININE 0.8   ALKPHOS 82   ALT 29   AST 25   BILITOT 0.5       Significant Diagnostics:  I have reviewed all pertinent imaging results/findings within the past 24 hours.

## 2022-12-19 NOTE — PLAN OF CARE
Introduced as VN and will be reviewing discharge instructions.  Educated patient on reason for admission, home medication list, and discharge instructions including when to return to ED and the following doctor appointments.  Education per flowsheet.  Opportunity given for questions and questions answered.  Nurse notified of   completion of discharge education. Patient does not want a wheelchair and wants to walk out. Explained hospital policy, patient verbalized understanding. Nurse notified

## 2022-12-19 NOTE — ASSESSMENT & PLAN NOTE
46yo female with PSH of LBO s/p colon resection in October presenting to the ED with concern for SBO.    - Per patient, pain resolved after large rush of flatus while in the ED  - Patient still passing gas now, no BM  - KUB this AM with contrast noted in Ascending colon  - Discussion with staff, but likely advance diet today, if tolerates possible discharge later

## 2022-12-19 NOTE — ED PROVIDER NOTES
Encounter Date: 12/18/2022       History     Chief Complaint   Patient presents with    Abdominal Pain    Nausea    Vomiting     PT arrives with complaints of abdominal pain, n/v that started this morning and has progressively gotten worse throughout the day.       The patient is a 47-year-old female who presents emergency lower quadrant abdominal pain after eating cheese pizza this afternoon around 4:00 p.m. she states her last bowel movement was approximately 3 hours ago was formed stool not black not liquid and did not change her pain.  The patient had bowel surgery in October and had a partial resection of her colon.  She has had ongoing problems since that time.  She denies any fever nausea vomiting, chest pain or back pain.  She states her abdomen feels bloated    Review of patient's allergies indicates:  No Known Allergies  Past Medical History:   Diagnosis Date    Chronic back pain     Diabetes type 2, uncontrolled     Mild nonproliferative diabetic retinopathy of left eye without macular edema associated with type 2 diabetes mellitus 10/21/2019    Morbid obesity with BMI of 40.0-44.9, adult     MILEY on CPAP     Plantar fasciitis of left foot     Urticaria      Past Surgical History:   Procedure Laterality Date    ARTHROSCOPIC ACROMIOPLASTY OF SHOULDER Left 7/27/2020    Procedure: ACROMIOPLASTY, ARTHROSCOPIC;  Surgeon: Marcela Garza MD;  Location: [facility] OR;  Service: Orthopedics;  Laterality: Left;    ARTHROSCOPIC REPAIR OF ROTATOR CUFF OF SHOULDER Right 7/27/2020    Procedure: REPAIR, ROTATOR CUFF, ARTHROSCOPIC;  Surgeon: Marcela Garza MD;  Location: [facility] OR;  Service: Orthopedics;  Laterality: Right;  GENERAL/REGIONAL    COLONOSCOPY N/A 4/7/2022    Procedure: COLONOSCOPY;  Surgeon: Yadi Wong MD;  Location: [address]);  Service: Endoscopy;  Laterality: N/A;  Covid test at [place] on 4/4.EC    CYST REMOVAL      ovarian cyst removal at age 12    DECOMPRESSION OF SUBACROMIAL  SPACE Left 7/27/2020    Procedure: DECOMPRESSION, SUBACROMIAL SPACE;  Surgeon: Marcela Garza MD;  Location: [facility] OR;  Service: Orthopedics;  Laterality: Left;    DILATION AND CURETTAGE OF UTERUS      ESOPHAGEAL MOTILITY STUDY USING HIGH RESOLUTION MANOMETRY N/A 9/17/2019    Procedure: MOTILITY STUDY, ESOPHAGUS, USING HIGH RESOLUTION MANOMETRY;  Surgeon: Leopoldo Swanson MD;  Location: [facility] (4TH FLR);  Service: Endoscopy;  Laterality: N/A;  Pt will be taking xanax before procedure to hopefull help with anxiety.    ESOPHAGOGASTRODUODENOSCOPY N/A 6/14/2019    Procedure: EGD (ESOPHAGOGASTRODUODENOSCOPY);  Surgeon: Ced Guevara MD;  [facility]  Service: Endoscopy;  Laterality: N/A;  Please make sure it is scheduled after her cardiology appt.- see cardiology note dated 6/13/19 for clearance - ERW    GASTRIC BYPASS N/A 05/20/2021    INJECTION OF STEROID Left 7/27/2020    Procedure: INJECTION, STEROID LEFT THUMB TRIGGER FINGER;  Surgeon: Marcela Garza MD;  Location: [facility] OR;  Service: Orthopedics;  Laterality: Left;  LEFT THUMB, TRIGGER FINGER    LYSIS OF ADHESIONS N/A 10/21/2022    Procedure: LYSIS, ADHESIONS;  Surgeon: Bunny Obrien MD;  Location: [facility] OR;  Service: General;  Laterality: N/A;    TRANSVERSE COLECTOMY N/A 10/26/2022    Procedure: COLECTOMY, TRANSVERSE;  Surgeon: Bunny Obrien MD;  Location: [facility] OR;  Service: General;  Laterality: N/A;    TRIGGER FINGER RELEASE Right 10/4/2019    Procedure: RELEASE, TRIGGER FINGER - right thumb;  Surgeon: Craig Tom MD;  Location: [facility] OR;  Service: Orthopedics;  Laterality: Right;    UPPER GASTROINTESTINAL ENDOSCOPY      WISDOM TOOTH EXTRACTION       Family History   Problem Relation Age of Onset    Cancer Maternal Grandmother     Diabetes Mother     No Known Problems Sister     No Known Problems Sister     No Known Problems Sister     Breast cancer Maternal Aunt 70    Colon cancer Neg Hx     Stomach cancer Neg Hx      Inflammatory bowel disease Neg Hx     Esophageal cancer Neg Hx      Social History     Tobacco Use    Smoking status: Every Day     Packs/day: 0.75     Years: 30.00     Pack years: 22.50     Types: Cigarettes     Start date: 1992    Smokeless tobacco: Never    Tobacco comments:     Enrolled in the [employer] on 8/1/18 ([facility] Member ID # [id number]) Handout provided for Ambulatory Smoking Cessation Program.   Substance Use Topics    Alcohol use: Yes     Comment: social    Drug use: No     Review of Systems   Constitutional:  Negative for fever.   HENT:  Negative for sore throat.    Respiratory:  Negative for shortness of breath.    Cardiovascular:  Negative for chest pain.   Gastrointestinal:  Positive for abdominal distention and abdominal pain. Negative for nausea.   Genitourinary:  Negative for dysuria.   Musculoskeletal:  Negative for back pain.   Skin:  Negative for rash.   Neurological:  Negative for weakness.   Hematological:  Does not bruise/bleed easily.     Physical Exam     Initial Vitals [12/18/22 2044]   BP Pulse Resp Temp SpO2   (!) 196/95 66 18 98.5 °F (36.9 °C) 99 %      MAP       --         Physical Exam    Nursing note and vitals reviewed.  Constitutional: She appears well-developed and well-nourished.   HENT:   Head: Normocephalic and atraumatic.   Mouth/Throat: Oropharynx is clear and moist.   Eyes: Conjunctivae and EOM are normal. Pupils are equal, round, and reactive to light.   Neck: Neck supple.   Normal range of motion.  Cardiovascular:  Normal rate, regular rhythm, normal heart sounds and intact distal pulses.     Exam reveals no gallop and no friction rub.       No murmur heard.  Pulmonary/Chest: Breath sounds normal.   Abdominal: Abdomen is soft. Bowel sounds are normal. She exhibits distension. There is abdominal tenderness (diffuse). There is no rebound and no guarding.   Musculoskeletal:         General: No tenderness or edema. Normal range of motion.      Cervical back: Normal range  of motion and neck supple.     Lymphadenopathy:     She has no cervical adenopathy.   Neurological: She is alert and oriented to person, place, and time. She has normal strength and normal reflexes.   Skin: Skin is warm and dry.   Psychiatric: She has a normal mood and affect. Her behavior is normal. Judgment and thought content normal.       ED Course   Procedures  Labs Reviewed   URINALYSIS, REFLEX TO URINE CULTURE - Abnormal; Notable for the following components:       Result Value    Color, UA Colorless (*)     Ketones, UA Trace (*)     All other components within normal limits    Narrative:     Specimen Source->Urine   CBC W/ AUTO DIFFERENTIAL - Abnormal; Notable for the following components:    MCH 31.2 (*)     All other components within normal limits   COMPREHENSIVE METABOLIC PANEL - Abnormal; Notable for the following components:    CO2 22 (*)     Glucose 120 (*)     All other components within normal limits   LIPASE   POCT URINE PREGNANCY          Imaging Results              X-Ray Abdomen AP 1 View (Final result)  Result time 12/19/22 08:54:46      Final result by John Agee MD (12/19/22 08:54:46)                   Impression:      As above.      Electronically signed by: John Agee  Date:    12/19/2022  Time:    08:54               Narrative:    EXAMINATION:  XR ABDOMEN AP 1 VIEW    CLINICAL HISTORY:  contrast evaluation;    TECHNIQUE:  AP View(s) of the abdomen was performed.    COMPARISON:  KUB 12/18/2022; CT abdomen pelvis 12/19/2022    FINDINGS:  There are gas-filled loops of small and large bowel in a nonobstructive pattern.  Contrast has progressed into the ascending colon.  There is excreted contrast in the bladder.                                        CT Abdomen Pelvis With Contrast (Final result)  Result time 12/19/22 03:01:54      Final result by Otto Harvey MD (12/19/22 03:01:54)                   Impression:      Abnormal small bowel pattern most consistent with small bowel  obstructive process as discussed above.    Nonspecific mild-to-moderate prominence of the colon with air and stool.    Prominence of the region of the lower uterine segment/cervix for which clinical and historical correlation and evaluation is recommended.    Gastric bypass is noted, there is fluid and air within the gastric remnant there is also wall and fold thickening of the gastric remnant, as discussed above clinical and historical correlation is needed to determine need for additional follow-up.    Accentuated attenuation of the body wall fat planes may relate to a pattern of body wall edema.    Additional findings as above.    This report was flagged in Epic as abnormal.      Electronically signed by: Otto Harvey  Date:    12/19/2022  Time:    03:01               Narrative:    EXAMINATION:  CT ABDOMEN PELVIS WITH CONTRAST    CLINICAL HISTORY:  LLQ abdominal pain;    TECHNIQUE:  Low dose axial images, sagittal and coronal reformations were obtained from the lung bases to the pubic symphysis following the IV administration of 75 mL of Omnipaque 350 oral contrast was utilized.  Single phase postcontrast CT examination of the abdomen and pelvis is submitted.    COMPARISON:  CT examination of the abdomen pelvis October 20, 2022    FINDINGS:  The visualized lung bases demonstrate atelectatic change.  There is postoperative change of the stomach, gastric bypass noted, there is fluid seen within the gastric remnant.  There is also appearance of wall and fold thickening of the gastric remnant, this is nonspecific however can be seen with gastritis or gastric ulcer disease or infiltrating process.  Clinical and historical correlation needed to determine need for additional follow-up.    There are dilated small bowel loops throughout the abdomen and pelvis, several prominently dilated small bowel loops are noted, with opacification with oral contrast, these measure up to approximately 3.9 cm in caliber.  It is  difficult to definitively identify the point of transition however the pattern is thought representative of a small bowel obstruction.  Several of the distal small bowel loops in the lower abdomen and pelvis appear decompressed.    There is a structure thought to represent the appendix, it is not seen in its entirety, it does not appear inflamed.  There is mild-to-moderate prominence of the colon throughout its course with air and stool, the colon appears redundant and tortuous.  There is no evidence for free intraperitoneal air, there is no evidence for pneumatosis or portal venous air and there is no evidence for extravasation of oral contrast.    There is no evidence for acute process of the liver, gallbladder, pancreas, spleen, or adrenal glands.  There is no evidence for hydronephrosis or obstructive uropathy or perinephric inflammatory change bilaterally.  The abdominal aorta appears normal in caliber, demonstrates appropriate opacification.  The urinary bladder is incompletely distended, wall thickening may relate to incomplete distention.    The uterus appears retroverted and retroflexed, there is heterogeneity which may relate to timing of imaging after contrast administration.  There is prominence of the region of the lower uterine segment/cervix for which clinical and historical correlation and evaluation is recommended.  There is a hypodense finding at the left adnexa measuring approximately 2 cm likely left ovarian follicle.  There is mild free fluid in the lower pelvis.  A large amount of free fluid of the abdomen and pelvis is not seen.    Accentuated attenuation of the body wall fat planes may relate to a pattern of general body wall edema.  The osseous structures demonstrate chronic change.                                        X-Ray Abdomen Flat And Erect (Final result)  Result time 12/18/22 22:16:43      Final result by Otto Harvey MD (12/18/22 22:16:43)                   Impression:       There are dilated small bowel loops, there is also air and stool noted along the colon without abnormal colonic distention.  The findings may relate to ileus however the possibly of early or partial small bowel obstruction is to be considered.    This report was flagged in Epic as abnormal.      Electronically signed by: Otto Harvey  Date:    12/18/2022  Time:    22:16               Narrative:    EXAMINATION:  XR ABDOMEN FLAT AND ERECT    CLINICAL HISTORY:  Unspecified abdominal pain    TECHNIQUE:  Flat and erect AP views of the abdomen were performed.    COMPARISON:  Abdominal radiograph November 1, 2022    FINDINGS:  Abdominal radiographic examination is submitted.  Limited imaging of the lung bases appears clear.  There is no evidence for free intraperitoneal air.    There are dilated small bowel loops throughout the abdomen and pelvis, on the erect view a couple of air-fluid levels are noted.  This may relate to ileus however the possibly of obstruction is to be considered.  There is mild prominence of the right colon with air and stool extending to the level of the splenic flexure with mild air and stool along the descending colon as well.  Mild air and stool suspected along the rectosigmoid colon without distention.  There is no abnormal colonic distention.    The osseous structures demonstrate chronic change.                                       Medications   iohexoL (OMNIPAQUE 350) injection 75 mL (100 mLs Intravenous Given 12/19/22 0231)                 ED Course as of 12/25/22 1538   Sun Dec 18, 2022   2124 CBC without significant leukocytosis, anemia, or platelet abnormalities.    [AS]   2346   The case was discussed with Dr. Fernandez, general surgery, on call for Dr. Obrien.  She reviewed the x-rays and recommends a CT abdomen pelvis with p.o. and IV contrast.  This was ordered [ST]      ED Course User Index  [AS] Jomar Hunter MD  [ST] Aspen Pearce MD                 Clinical Impression:    Final diagnoses:  [R10.9] Abdominal pain (Primary)        ED Disposition Condition    Observation                 Aspen Pearce MD  12/25/22 4580

## 2023-01-03 ENCOUNTER — CLINICAL SUPPORT (OUTPATIENT)
Dept: SMOKING CESSATION | Facility: CLINIC | Age: 48
End: 2023-01-03
Payer: COMMERCIAL

## 2023-01-03 DIAGNOSIS — F17.200 NICOTINE DEPENDENCE: Primary | ICD-10-CM

## 2023-01-03 PROCEDURE — 99407 BEHAV CHNG SMOKING > 10 MIN: CPT | Mod: S$GLB,,,

## 2023-01-03 PROCEDURE — 99407 PR TOBACCO USE CESSATION INTENSIVE >10 MINUTES: ICD-10-PCS | Mod: S$GLB,,,

## 2023-01-03 PROCEDURE — 99999 PR PBB SHADOW E&M-EST. PATIENT-LVL I: CPT | Mod: PBBFAC,,,

## 2023-01-03 PROCEDURE — 99999 PR PBB SHADOW E&M-EST. PATIENT-LVL I: ICD-10-PCS | Mod: PBBFAC,,,

## 2023-01-03 NOTE — PROGRESS NOTES
Spoke with patient today in regard to smoking cessation progress for 3 month telephone follow up, she states not tobacco free. Patient states not ready to return to the program at this time and would like to be called back at a later date. Informed patient of benefit period, future follow ups, and contact information if any further help or support is needed. Will complete smart form for 3 month follow up on Quit attempt #1.

## 2023-01-18 ENCOUNTER — PATIENT MESSAGE (OUTPATIENT)
Dept: ADMINISTRATIVE | Facility: HOSPITAL | Age: 48
End: 2023-01-18
Payer: MEDICAID

## 2023-01-25 ENCOUNTER — PATIENT OUTREACH (OUTPATIENT)
Dept: ADMINISTRATIVE | Facility: HOSPITAL | Age: 48
End: 2023-01-25
Payer: MEDICAID

## 2023-01-25 DIAGNOSIS — Z12.31 ENCOUNTER FOR SCREENING MAMMOGRAM FOR BREAST CANCER: Primary | ICD-10-CM

## 2023-01-25 DIAGNOSIS — Z12.12 ENCOUNTER FOR COLORECTAL CANCER SCREENING: ICD-10-CM

## 2023-01-25 DIAGNOSIS — E11.9 TYPE 2 DIABETES MELLITUS WITHOUT COMPLICATION, WITH LONG-TERM CURRENT USE OF INSULIN: ICD-10-CM

## 2023-01-25 DIAGNOSIS — Z12.11 ENCOUNTER FOR COLORECTAL CANCER SCREENING: ICD-10-CM

## 2023-01-25 DIAGNOSIS — Z79.4 TYPE 2 DIABETES MELLITUS WITHOUT COMPLICATION, WITH LONG-TERM CURRENT USE OF INSULIN: ICD-10-CM

## 2023-02-22 ENCOUNTER — PATIENT MESSAGE (OUTPATIENT)
Dept: OBSTETRICS AND GYNECOLOGY | Facility: CLINIC | Age: 48
End: 2023-02-22
Payer: MEDICAID

## 2023-02-22 ENCOUNTER — TELEPHONE (OUTPATIENT)
Dept: OBSTETRICS AND GYNECOLOGY | Facility: CLINIC | Age: 48
End: 2023-02-22
Payer: MEDICAID

## 2023-02-22 RX ORDER — TERCONAZOLE 4 MG/G
1 CREAM VAGINAL NIGHTLY
Qty: 7 G | Refills: 0 | Status: ON HOLD | OUTPATIENT
Start: 2023-02-22 | End: 2023-07-14

## 2023-02-23 ENCOUNTER — HOSPITAL ENCOUNTER (OUTPATIENT)
Dept: RADIOLOGY | Facility: HOSPITAL | Age: 48
Discharge: HOME | End: 2023-02-23
Attending: FAMILY MEDICINE
Payer: MEDICAID

## 2023-02-23 DIAGNOSIS — Z12.31 ENCOUNTER FOR SCREENING MAMMOGRAM FOR BREAST CANCER: ICD-10-CM

## 2023-02-23 PROCEDURE — 77063 MAMMO DIGITAL SCREENING BILAT WITH TOMO: ICD-10-PCS | Mod: 26,,, | Performed by: RADIOLOGY

## 2023-02-23 PROCEDURE — 77067 MAMMO DIGITAL SCREENING BILAT WITH TOMO: ICD-10-PCS | Mod: 26,,, | Performed by: RADIOLOGY

## 2023-02-23 PROCEDURE — 77063 BREAST TOMOSYNTHESIS BI: CPT | Mod: 26,,, | Performed by: RADIOLOGY

## 2023-02-23 PROCEDURE — 77067 SCR MAMMO BI INCL CAD: CPT | Mod: TC

## 2023-02-23 PROCEDURE — 77067 SCR MAMMO BI INCL CAD: CPT | Mod: 26,,, | Performed by: RADIOLOGY

## 2023-02-26 ENCOUNTER — PATIENT MESSAGE (OUTPATIENT)
Dept: ORTHOPEDICS | Facility: CLINIC | Age: 48
End: 2023-02-26
Payer: MEDICAID

## 2023-02-27 ENCOUNTER — PATIENT MESSAGE (OUTPATIENT)
Dept: ORTHOPEDICS | Facility: CLINIC | Age: 48
End: 2023-02-27
Payer: MEDICAID

## 2023-02-28 DIAGNOSIS — M79.644 BILATERAL THUMB PAIN: Primary | ICD-10-CM

## 2023-02-28 DIAGNOSIS — M79.645 BILATERAL THUMB PAIN: Primary | ICD-10-CM

## 2023-03-01 ENCOUNTER — PATIENT MESSAGE (OUTPATIENT)
Dept: ORTHOPEDICS | Facility: CLINIC | Age: 48
End: 2023-03-01
Payer: MEDICAID

## 2023-03-02 ENCOUNTER — TELEPHONE (OUTPATIENT)
Dept: SMOKING CESSATION | Facility: CLINIC | Age: 48
End: 2023-03-02
Payer: MEDICAID

## 2023-03-17 ENCOUNTER — PATIENT MESSAGE (OUTPATIENT)
Dept: UROLOGY | Facility: CLINIC | Age: 48
End: 2023-03-17
Payer: MEDICAID

## 2023-03-17 ENCOUNTER — PATIENT MESSAGE (OUTPATIENT)
Dept: OBSTETRICS AND GYNECOLOGY | Facility: CLINIC | Age: 48
End: 2023-03-17
Payer: MEDICAID

## 2023-03-20 DIAGNOSIS — R35.0 URINARY FREQUENCY: Primary | ICD-10-CM

## 2023-03-20 RX ORDER — SOLIFENACIN SUCCINATE 10 MG/1
10 TABLET, FILM COATED ORAL DAILY
Qty: 30 TABLET | Refills: 11 | Status: ON HOLD | OUTPATIENT
Start: 2023-03-20 | End: 2023-07-14

## 2023-03-20 NOTE — TELEPHONE ENCOUNTER
Sent refill for VESIcare to her pharmacy.  Please schedule 1 month follow-up (audio okay)  Thanks

## 2023-03-23 ENCOUNTER — TELEPHONE (OUTPATIENT)
Dept: SMOKING CESSATION | Facility: CLINIC | Age: 48
End: 2023-03-23
Payer: MEDICAID

## 2023-03-23 ENCOUNTER — TELEPHONE (OUTPATIENT)
Dept: OBSTETRICS AND GYNECOLOGY | Facility: CLINIC | Age: 48
End: 2023-03-23
Payer: MEDICAID

## 2023-03-23 DIAGNOSIS — N93.9 ABNORMAL UTERINE BLEEDING (AUB): Primary | ICD-10-CM

## 2023-03-23 RX ORDER — MEDROXYPROGESTERONE ACETATE 150 MG/ML
150 INJECTION, SUSPENSION INTRAMUSCULAR
Qty: 1 ML | Refills: 0 | Status: SHIPPED | OUTPATIENT
Start: 2023-03-23

## 2023-04-17 ENCOUNTER — ANESTHESIA (OUTPATIENT)
Dept: SURGERY | Facility: HOSPITAL | Age: 48
End: 2023-04-17
Payer: MEDICAID

## 2023-04-17 ENCOUNTER — HOSPITAL ENCOUNTER (EMERGENCY)
Facility: HOSPITAL | Age: 48
Discharge: HOME | End: 2023-04-17
Attending: EMERGENCY MEDICINE
Payer: MEDICAID

## 2023-04-17 ENCOUNTER — ANESTHESIA EVENT (OUTPATIENT)
Dept: SURGERY | Facility: HOSPITAL | Age: 48
End: 2023-04-17
Payer: MEDICAID

## 2023-04-17 VITALS
TEMPERATURE: 98 F | HEART RATE: 68 BPM | WEIGHT: 140 LBS | OXYGEN SATURATION: 99 % | SYSTOLIC BLOOD PRESSURE: 143 MMHG | DIASTOLIC BLOOD PRESSURE: 76 MMHG | BODY MASS INDEX: 22.6 KG/M2 | RESPIRATION RATE: 12 BRPM

## 2023-04-17 DIAGNOSIS — G47.00 INSOMNIA, UNSPECIFIED TYPE: ICD-10-CM

## 2023-04-17 DIAGNOSIS — T18.5XXA RECTAL FOREIGN BODY: Primary | ICD-10-CM

## 2023-04-17 DIAGNOSIS — G47.33 OSA (OBSTRUCTIVE SLEEP APNEA): ICD-10-CM

## 2023-04-17 DIAGNOSIS — Z01.810 PREOP CARDIOVASCULAR EXAM: ICD-10-CM

## 2023-04-17 DIAGNOSIS — K66.8 PNEUMOPERITONEUM: ICD-10-CM

## 2023-04-17 LAB
ABO + RH BLD: NORMAL
ALBUMIN SERPL BCP-MCNC: 3.6 G/DL (ref 3.5–5.2)
ALP SERPL-CCNC: 74 U/L (ref 55–135)
ALT SERPL W/O P-5'-P-CCNC: 17 U/L (ref 10–44)
ANION GAP SERPL CALC-SCNC: 11 MMOL/L (ref 8–16)
APTT PPP: 22.6 SEC (ref 21–32)
AST SERPL-CCNC: 16 U/L (ref 10–40)
BASOPHILS # BLD AUTO: 0.1 K/UL (ref 0–0.2)
BASOPHILS NFR BLD: 1.3 % (ref 0–1.9)
BILIRUB SERPL-MCNC: 0.5 MG/DL (ref 0.1–1)
BLD GP AB SCN CELLS X3 SERPL QL: NORMAL
BUN SERPL-MCNC: 14 MG/DL (ref 6–20)
CALCIUM SERPL-MCNC: 8.8 MG/DL (ref 8.7–10.5)
CHLORIDE SERPL-SCNC: 111 MMOL/L (ref 95–110)
CO2 SERPL-SCNC: 18 MMOL/L (ref 23–29)
CREAT SERPL-MCNC: 0.7 MG/DL (ref 0.5–1.4)
DIFFERENTIAL METHOD: ABNORMAL
EOSINOPHIL # BLD AUTO: 0.2 K/UL (ref 0–0.5)
EOSINOPHIL NFR BLD: 2.9 % (ref 0–8)
ERYTHROCYTE [DISTWIDTH] IN BLOOD BY AUTOMATED COUNT: 12.7 % (ref 11.5–14.5)
EST. GFR  (NO RACE VARIABLE): >60 ML/MIN/1.73 M^2
GLUCOSE SERPL-MCNC: 86 MG/DL (ref 70–110)
HCT VFR BLD AUTO: 36.9 % (ref 37–48.5)
HGB BLD-MCNC: 12.1 G/DL (ref 12–16)
IMM GRANULOCYTES # BLD AUTO: 0.02 K/UL (ref 0–0.04)
IMM GRANULOCYTES NFR BLD AUTO: 0.3 % (ref 0–0.5)
INR PPP: 1 (ref 0.8–1.2)
LACTATE SERPL-SCNC: 0.7 MMOL/L (ref 0.5–2.2)
LYMPHOCYTES # BLD AUTO: 2.1 K/UL (ref 1–4.8)
LYMPHOCYTES NFR BLD: 26.4 % (ref 18–48)
MCH RBC QN AUTO: 32 PG (ref 27–31)
MCHC RBC AUTO-ENTMCNC: 32.8 G/DL (ref 32–36)
MCV RBC AUTO: 98 FL (ref 82–98)
MONOCYTES # BLD AUTO: 0.7 K/UL (ref 0.3–1)
MONOCYTES NFR BLD: 8.4 % (ref 4–15)
NEUTROPHILS # BLD AUTO: 4.8 K/UL (ref 1.8–7.7)
NEUTROPHILS NFR BLD: 60.7 % (ref 38–73)
NRBC BLD-RTO: 0 /100 WBC
PLATELET # BLD AUTO: 303 K/UL (ref 150–450)
PMV BLD AUTO: 11.4 FL (ref 9.2–12.9)
POTASSIUM SERPL-SCNC: 4.4 MMOL/L (ref 3.5–5.1)
PROT SERPL-MCNC: 6.5 G/DL (ref 6–8.4)
PROTHROMBIN TIME: 10.3 SEC (ref 9–12.5)
RBC # BLD AUTO: 3.78 M/UL (ref 4–5.4)
SODIUM SERPL-SCNC: 140 MMOL/L (ref 136–145)
SPECIMEN OUTDATE: NORMAL
WBC # BLD AUTO: 7.95 K/UL (ref 3.9–12.7)

## 2023-04-17 PROCEDURE — D9220A PRA ANESTHESIA: ICD-10-PCS | Mod: CRNA,,, | Performed by: NURSE ANESTHETIST, CERTIFIED REGISTERED

## 2023-04-17 PROCEDURE — D9220A PRA ANESTHESIA: Mod: ANES,,, | Performed by: ANESTHESIOLOGY

## 2023-04-17 PROCEDURE — 63600175 PHARM REV CODE 636 W HCPCS: Performed by: NURSE ANESTHETIST, CERTIFIED REGISTERED

## 2023-04-17 PROCEDURE — 71000033 HC RECOVERY, INTIAL HOUR: Performed by: SURGERY

## 2023-04-17 PROCEDURE — 80053 COMPREHEN METABOLIC PANEL: CPT | Performed by: EMERGENCY MEDICINE

## 2023-04-17 PROCEDURE — 37000009 HC ANESTHESIA EA ADD 15 MINS: Performed by: SURGERY

## 2023-04-17 PROCEDURE — 25000003 PHARM REV CODE 250: Performed by: ANESTHESIOLOGY

## 2023-04-17 PROCEDURE — 36000705 HC OR TIME LEV I EA ADD 15 MIN: Performed by: SURGERY

## 2023-04-17 PROCEDURE — 88300 SURGICAL PATH GROSS: CPT | Mod: 26,,, | Performed by: PATHOLOGY

## 2023-04-17 PROCEDURE — 93010 EKG 12-LEAD: ICD-10-PCS | Mod: ,,, | Performed by: INTERNAL MEDICINE

## 2023-04-17 PROCEDURE — 71000015 HC POSTOP RECOV 1ST HR: Performed by: SURGERY

## 2023-04-17 PROCEDURE — 83605 ASSAY OF LACTIC ACID: CPT | Performed by: EMERGENCY MEDICINE

## 2023-04-17 PROCEDURE — 25500020 PHARM REV CODE 255: Performed by: EMERGENCY MEDICINE

## 2023-04-17 PROCEDURE — 63600175 PHARM REV CODE 636 W HCPCS: Performed by: EMERGENCY MEDICINE

## 2023-04-17 PROCEDURE — 93010 ELECTROCARDIOGRAM REPORT: CPT | Mod: ,,, | Performed by: INTERNAL MEDICINE

## 2023-04-17 PROCEDURE — 86900 BLOOD TYPING SEROLOGIC ABO: CPT | Performed by: EMERGENCY MEDICINE

## 2023-04-17 PROCEDURE — 93005 ELECTROCARDIOGRAM TRACING: CPT | Mod: 59

## 2023-04-17 PROCEDURE — 99291 CRITICAL CARE FIRST HOUR: CPT

## 2023-04-17 PROCEDURE — 00902 ANES ANORECTAL PX: CPT | Performed by: SURGERY

## 2023-04-17 PROCEDURE — 88300 SURGICAL PATH GROSS: CPT | Performed by: PATHOLOGY

## 2023-04-17 PROCEDURE — 25000003 PHARM REV CODE 250: Performed by: EMERGENCY MEDICINE

## 2023-04-17 PROCEDURE — D9220A PRA ANESTHESIA: ICD-10-PCS | Mod: ANES,,, | Performed by: ANESTHESIOLOGY

## 2023-04-17 PROCEDURE — 36000704 HC OR TIME LEV I 1ST 15 MIN: Performed by: SURGERY

## 2023-04-17 PROCEDURE — D9220A PRA ANESTHESIA: Mod: CRNA,,, | Performed by: NURSE ANESTHETIST, CERTIFIED REGISTERED

## 2023-04-17 PROCEDURE — 85025 COMPLETE CBC W/AUTO DIFF WBC: CPT | Performed by: EMERGENCY MEDICINE

## 2023-04-17 PROCEDURE — 88300 PR  SURG PATH,GROSS,LEVEL I: ICD-10-PCS | Mod: 26,,, | Performed by: PATHOLOGY

## 2023-04-17 PROCEDURE — 96365 THER/PROPH/DIAG IV INF INIT: CPT | Mod: 59

## 2023-04-17 PROCEDURE — 37000008 HC ANESTHESIA 1ST 15 MINUTES: Performed by: SURGERY

## 2023-04-17 PROCEDURE — 96375 TX/PRO/DX INJ NEW DRUG ADDON: CPT

## 2023-04-17 PROCEDURE — 25000003 PHARM REV CODE 250: Performed by: NURSE ANESTHETIST, CERTIFIED REGISTERED

## 2023-04-17 PROCEDURE — 85730 THROMBOPLASTIN TIME PARTIAL: CPT | Performed by: EMERGENCY MEDICINE

## 2023-04-17 PROCEDURE — 85610 PROTHROMBIN TIME: CPT | Performed by: EMERGENCY MEDICINE

## 2023-04-17 PROCEDURE — 63600175 PHARM REV CODE 636 W HCPCS: Performed by: ANESTHESIOLOGY

## 2023-04-17 RX ORDER — ONDANSETRON 2 MG/ML
8 INJECTION INTRAMUSCULAR; INTRAVENOUS
Status: COMPLETED | OUTPATIENT
Start: 2023-04-17 | End: 2023-04-17

## 2023-04-17 RX ORDER — LIDOCAINE HYDROCHLORIDE 20 MG/ML
INJECTION INTRAVENOUS
Status: DISCONTINUED | OUTPATIENT
Start: 2023-04-17 | End: 2023-04-17

## 2023-04-17 RX ORDER — MIDAZOLAM HYDROCHLORIDE 1 MG/ML
INJECTION INTRAMUSCULAR; INTRAVENOUS
Status: DISCONTINUED | OUTPATIENT
Start: 2023-04-17 | End: 2023-04-17

## 2023-04-17 RX ORDER — ACETAMINOPHEN 325 MG/1
650 TABLET ORAL EVERY 4 HOURS PRN
Status: DISCONTINUED | OUTPATIENT
Start: 2023-04-17 | End: 2023-04-17 | Stop reason: HOSPADM

## 2023-04-17 RX ORDER — DEXAMETHASONE SODIUM PHOSPHATE 4 MG/ML
INJECTION, SOLUTION INTRA-ARTICULAR; INTRALESIONAL; INTRAMUSCULAR; INTRAVENOUS; SOFT TISSUE
Status: DISCONTINUED | OUTPATIENT
Start: 2023-04-17 | End: 2023-04-17

## 2023-04-17 RX ORDER — ONDANSETRON 2 MG/ML
4 INJECTION INTRAMUSCULAR; INTRAVENOUS DAILY PRN
Status: DISCONTINUED | OUTPATIENT
Start: 2023-04-17 | End: 2023-04-17 | Stop reason: HOSPADM

## 2023-04-17 RX ORDER — MORPHINE SULFATE 4 MG/ML
4 INJECTION, SOLUTION INTRAMUSCULAR; INTRAVENOUS
Status: COMPLETED | OUTPATIENT
Start: 2023-04-17 | End: 2023-04-17

## 2023-04-17 RX ORDER — SODIUM CHLORIDE 9 MG/ML
INJECTION, SOLUTION INTRAVENOUS CONTINUOUS
Status: DISCONTINUED | OUTPATIENT
Start: 2023-04-17 | End: 2023-04-17 | Stop reason: HOSPADM

## 2023-04-17 RX ORDER — ONDANSETRON 2 MG/ML
INJECTION INTRAMUSCULAR; INTRAVENOUS
Status: DISCONTINUED | OUTPATIENT
Start: 2023-04-17 | End: 2023-04-17

## 2023-04-17 RX ORDER — HYDROMORPHONE HYDROCHLORIDE 2 MG/ML
0.5 INJECTION, SOLUTION INTRAMUSCULAR; INTRAVENOUS; SUBCUTANEOUS EVERY 5 MIN PRN
Status: DISCONTINUED | OUTPATIENT
Start: 2023-04-17 | End: 2023-04-17 | Stop reason: HOSPADM

## 2023-04-17 RX ORDER — HYDROCODONE BITARTRATE AND ACETAMINOPHEN 5; 325 MG/1; MG/1
1 TABLET ORAL EVERY 4 HOURS PRN
Status: DISCONTINUED | OUTPATIENT
Start: 2023-04-17 | End: 2023-04-17 | Stop reason: HOSPADM

## 2023-04-17 RX ORDER — CEFAZOLIN SODIUM 1 G/3ML
INJECTION, POWDER, FOR SOLUTION INTRAMUSCULAR; INTRAVENOUS
Status: DISCONTINUED | OUTPATIENT
Start: 2023-04-17 | End: 2023-04-17

## 2023-04-17 RX ORDER — OXYCODONE HYDROCHLORIDE 5 MG/1
5 TABLET ORAL
Status: DISCONTINUED | OUTPATIENT
Start: 2023-04-17 | End: 2023-04-17 | Stop reason: HOSPADM

## 2023-04-17 RX ORDER — PROPOFOL 10 MG/ML
VIAL (ML) INTRAVENOUS
Status: DISCONTINUED | OUTPATIENT
Start: 2023-04-17 | End: 2023-04-17

## 2023-04-17 RX ORDER — FENTANYL CITRATE 50 UG/ML
INJECTION, SOLUTION INTRAMUSCULAR; INTRAVENOUS
Status: DISCONTINUED | OUTPATIENT
Start: 2023-04-17 | End: 2023-04-17

## 2023-04-17 RX ORDER — SUCCINYLCHOLINE CHLORIDE 20 MG/ML
INJECTION INTRAMUSCULAR; INTRAVENOUS
Status: DISCONTINUED | OUTPATIENT
Start: 2023-04-17 | End: 2023-04-17

## 2023-04-17 RX ADMIN — DEXAMETHASONE SODIUM PHOSPHATE 8 MG: 4 INJECTION, SOLUTION INTRA-ARTICULAR; INTRALESIONAL; INTRAMUSCULAR; INTRAVENOUS; SOFT TISSUE at 07:04

## 2023-04-17 RX ADMIN — FENTANYL CITRATE 100 MCG: 50 INJECTION, SOLUTION INTRAMUSCULAR; INTRAVENOUS at 07:04

## 2023-04-17 RX ADMIN — PIPERACILLIN AND TAZOBACTAM 4.5 G: 4; .5 INJECTION, POWDER, LYOPHILIZED, FOR SOLUTION INTRAVENOUS; PARENTERAL at 05:04

## 2023-04-17 RX ADMIN — ONDANSETRON HYDROCHLORIDE 8 MG: 2 SOLUTION INTRAMUSCULAR; INTRAVENOUS at 04:04

## 2023-04-17 RX ADMIN — HYDROMORPHONE HYDROCHLORIDE 0.5 MG: 2 INJECTION INTRAMUSCULAR; INTRAVENOUS; SUBCUTANEOUS at 07:04

## 2023-04-17 RX ADMIN — MORPHINE SULFATE 4 MG: 4 INJECTION INTRAVENOUS at 03:04

## 2023-04-17 RX ADMIN — CEFAZOLIN 2 G: 330 INJECTION, POWDER, FOR SOLUTION INTRAMUSCULAR; INTRAVENOUS at 07:04

## 2023-04-17 RX ADMIN — OXYCODONE HYDROCHLORIDE 5 MG: 5 TABLET ORAL at 08:04

## 2023-04-17 RX ADMIN — IOHEXOL 100 ML: 350 INJECTION, SOLUTION INTRAVENOUS at 06:04

## 2023-04-17 RX ADMIN — ONDANSETRON 8 MG: 2 INJECTION, SOLUTION INTRAMUSCULAR; INTRAVENOUS at 07:04

## 2023-04-17 RX ADMIN — PROPOFOL 200 MG: 10 INJECTION, EMULSION INTRAVENOUS at 07:04

## 2023-04-17 RX ADMIN — MIDAZOLAM HYDROCHLORIDE 2 MG: 1 INJECTION, SOLUTION INTRAMUSCULAR; INTRAVENOUS at 07:04

## 2023-04-17 RX ADMIN — LIDOCAINE HYDROCHLORIDE 100 MG: 20 INJECTION, SOLUTION INTRAVENOUS at 07:04

## 2023-04-17 RX ADMIN — HYDROMORPHONE HYDROCHLORIDE 0.5 MG: 2 INJECTION INTRAMUSCULAR; INTRAVENOUS; SUBCUTANEOUS at 08:04

## 2023-04-17 RX ADMIN — SODIUM CHLORIDE, SODIUM LACTATE, POTASSIUM CHLORIDE, AND CALCIUM CHLORIDE: .6; .31; .03; .02 INJECTION, SOLUTION INTRAVENOUS at 07:04

## 2023-04-17 RX ADMIN — SUCCINYLCHOLINE CHLORIDE 120 MG: 20 INJECTION, SOLUTION INTRAMUSCULAR; INTRAVENOUS at 07:04

## 2023-04-17 NOTE — ED PROVIDER NOTES
Encounter Date: 4/17/2023       History     Chief Complaint   Patient presents with    anal toy     Pt has been unable to dislodge vibrator from rectum. Pt states it has been 30 minutes.       HPI    Pleasant 48-year-old female presents the ER for evaluation of rectal foreign body.  She put a vibrator in her rectum and has since lost it.  No other complaints, no fever chills.  Came the ER for further evaluation.      Review of patient's allergies indicates:  No Known Allergies  Past Medical History:   Diagnosis Date    Chronic back pain     Diabetes type 2, uncontrolled     Mild nonproliferative diabetic retinopathy of left eye without macular edema associated with type 2 diabetes mellitus 10/21/2019    Morbid obesity with BMI of 40.0-44.9, adult     MILEY on CPAP     Plantar fasciitis of left foot     Urticaria      Past Surgical History:   Procedure Laterality Date    ARTHROSCOPIC ACROMIOPLASTY OF SHOULDER Left 7/27/2020    Procedure: ACROMIOPLASTY, ARTHROSCOPIC;  Surgeon: Marcela Garza MD;  Location: [facility] OR;  Service: Orthopedics;  Laterality: Left;    ARTHROSCOPIC REPAIR OF ROTATOR CUFF OF SHOULDER Right 7/27/2020    Procedure: REPAIR, ROTATOR CUFF, ARTHROSCOPIC;  Surgeon: Marcela Garza MD;  Location: [facility] OR;  Service: Orthopedics;  Laterality: Right;  GENERAL/REGIONAL    COLONOSCOPY N/A 4/7/2022    Procedure: COLONOSCOPY;  Surgeon: Yadi Wong MD;  Location: [address]);  Service: Endoscopy;  Laterality: N/A;  Covid test at [place] on 4/4.EC    CYST REMOVAL      ovarian cyst removal at age 12    DECOMPRESSION OF SUBACROMIAL SPACE Left 7/27/2020    Procedure: DECOMPRESSION, SUBACROMIAL SPACE;  Surgeon: Marcela Garza MD;  Location: [facility];  Service: Orthopedics;  Laterality: Left;    DILATION AND CURETTAGE OF UTERUS      ESOPHAGEAL MOTILITY STUDY USING HIGH RESOLUTION MANOMETRY N/A 9/17/2019    Procedure: MOTILITY STUDY, ESOPHAGUS, USING HIGH RESOLUTION MANOMETRY;   Surgeon: Leopoldo Swanson MD;  Location: [facility] (4TH FLR);  Service: Endoscopy;  Laterality: N/A;  Pt will be taking xanax before procedure to hopefull help with anxiety.    ESOPHAGOGASTRODUODENOSCOPY N/A 6/14/2019    Procedure: EGD (ESOPHAGOGASTRODUODENOSCOPY);  Surgeon: Ced Guevara MD;  Location: [facility] (4TH FLR);  Service: Endoscopy;  Laterality: N/A;  Please make sure it is scheduled after her cardiology appt.- see cardiology note dated 6/13/19 for clearance - ERW    FOREIGN BODY REMOVAL N/A 4/17/2023    Procedure: REMOVAL, FOREIGN BODY;  Surgeon: Rom Rodriguez MD;  Location: [facility];  Service: General;  Laterality: N/A;    GASTRIC BYPASS N/A 05/20/2021    INJECTION OF STEROID Left 7/27/2020    Procedure: INJECTION, STEROID LEFT THUMB TRIGGER FINGER;  Surgeon: Marcela Garza MD;  Location: [facility] OR;  Service: Orthopedics;  Laterality: Left;  LEFT THUMB, TRIGGER FINGER    LYSIS OF ADHESIONS N/A 10/21/2022    Procedure: LYSIS, ADHESIONS;  Surgeon: Bunny Obrien MD;  Location: [facility] OR;  Service: General;  Laterality: N/A;    TRANSVERSE COLECTOMY N/A 10/26/2022    Procedure: COLECTOMY, TRANSVERSE;  Surgeon: Bunny Obrien MD;  Location: [facility] OR;  Service: General;  Laterality: N/A;    TRIGGER FINGER RELEASE Right 10/4/2019    Procedure: RELEASE, TRIGGER FINGER - right thumb;  Surgeon: Craig Tom MD;  Location: [facility] OR;  Service: Orthopedics;  Laterality: Right;    UPPER GASTROINTESTINAL ENDOSCOPY      WISDOM TOOTH EXTRACTION       Family History   Problem Relation Age of Onset    Cancer Maternal Grandmother     Diabetes Mother     No Known Problems Sister     No Known Problems Sister     No Known Problems Sister     Breast cancer Maternal Aunt 70    Colon cancer Neg Hx     Stomach cancer Neg Hx     Inflammatory bowel disease Neg Hx     Esophageal cancer Neg Hx      Social History     Tobacco Use    Smoking status: Every Day     Packs/day: 0.75     Years: 30.00     Pack years: 22.50      Types: Cigarettes     Start date: 1992    Smokeless tobacco: Never    Tobacco comments:     Enrolled in the [employer] on 8/1/18 ([facility] Member ID # [id number]) Handout provided for Ambulatory Smoking Cessation Program.   Substance Use Topics    Alcohol use: Yes     Comment: social    Drug use: No     Review of Systems   Gastrointestinal:  Positive for rectal pain.   All other systems reviewed and are negative.    Physical Exam     Initial Vitals [04/17/23 0230]   BP Pulse Resp Temp SpO2   122/60 71 18 98.1 °F (36.7 °C) 100 %      MAP       --         Physical Exam    Nursing note and vitals reviewed.  Constitutional: She appears well-developed and well-nourished.   HENT:   Head: Normocephalic and atraumatic.   Eyes: Pupils are equal, round, and reactive to light.   Neck:   Normal range of motion.  Pulmonary/Chest: No respiratory distress.   Abdominal: Abdomen is soft. She exhibits no distension. There is no abdominal tenderness.   Genitourinary:    Genitourinary Comments: Done with nurse chaperone, rectal foreign body noted     Musculoskeletal:         General: Normal range of motion.      Cervical back: Normal range of motion.     Neurological: She is alert and oriented to person, place, and time. She has normal strength. GCS score is 15. GCS eye subscore is 4. GCS verbal subscore is 5. GCS motor subscore is 6.   Skin: Skin is warm and dry. Capillary refill takes less than 2 seconds.   Psychiatric: She has a normal mood and affect. Thought content normal.       ED Course   Procedures  Labs Reviewed   CBC W/ AUTO DIFFERENTIAL - Abnormal; Notable for the following components:       Result Value    RBC 3.78 (*)     Hematocrit 36.9 (*)     MCH 32.0 (*)     All other components within normal limits   COMPREHENSIVE METABOLIC PANEL - Abnormal; Notable for the following components:    Chloride 111 (*)     CO2 18 (*)     All other components within normal limits   LACTIC ACID, PLASMA   PROTIME-INR   APTT   TYPE &  SCREEN     EKG Readings: (Independently Interpreted)   Normal sinus rhythm 69 beats per minute normal intervals and axis no STEMI   ECG Results              EKG 12-lead (Final result)  Result time 04/17/23 08:36:15      Final result by Interface, Lab In [facility] (04/17/23 08:36:15)                   Narrative:    Test Reason : Z01.810,    Vent. Rate : 069 BPM     Atrial Rate : 069 BPM     P-R Int : 142 ms          QRS Dur : 080 ms      QT Int : 412 ms       P-R-T Axes : 068 054 064 degrees     QTc Int : 441 ms    Normal sinus rhythm  Normal ECG  When compared with ECG of 01-NOV-2022 06:55,  No significant change was found  Confirmed by Camilo Rae MD ([id number]) on 4/17/2023 8:36:05 AM    Referred By: [email or web address]   SELF           Confirmed By:Camilo Rae MD                                  Imaging Results              CT Abdomen Pelvis With Contrast (Final result)  Result time 04/17/23 07:17:23      Final result by Chet Mckeon MD (04/17/23 07:17:23)                   Impression:      Large rectal foreign body.  No pneumoperitoneum.    Postoperative changes of gastric bypass.    Mild anasarca.    Mild-to-moderate stool burden in the colon.      Electronically signed by: Chet Mckeon MD  Date:    04/17/2023  Time:    07:17               Narrative:    EXAMINATION:  CT ABDOMEN PELVIS WITH CONTRAST    CLINICAL HISTORY:  Peritonitis or perforation suspected;    TECHNIQUE:  Low dose axial images, sagittal and coronal reformations were obtained from the lung bases to the pubic symphysis following the IV administration of 75 mL of Omnipaque 350 .  Oral contrast was not given.    COMPARISON:  CT abdomen pelvis, 12/19/2022.  Chest and abdomen radiograph, 04/17/2023.    FINDINGS:  Lower Chest:    Prominent dependent bibasilar subsegmental atelectasis.  Heart is not enlarged.  No pleural effusion.    Abdomen:    Liver is enlarged, measuring approximately 19.5 cm in craniocaudal length.  No focal hepatic mass  identified.  Gallbladder is distended and otherwise unremarkable.  No calcified gallstones.  No intrahepatic biliary ductal dilatation.    Spleen is not enlarged.  Adrenal glands and pancreas are unremarkable.    The kidneys are symmetric.  No hydronephrosis. No asymmetric perinephric inflammatory fat stranding.    Stable postoperative changes of gastric bypass.  No small bowel obstruction.  There is a large metallic rectal foreign body measuring up to 16 cm in length.  No pneumoperitoneum.  No inflammatory changes identified in bowel.  The appendix is normal.  Mild-to-moderate stool burden throughout the colon.  No drainable fluid collection.    No bulky retroperitoneal lymphadenopathy.    Abdominal aorta is normal in caliber without significant atherosclerosis.    Portal, splenic, and superior mesenteric veins are patent.    Pelvis:    Urinary bladder is decompressed and not well evaluated.  Uterus is unremarkable.  Large rectal foreign body is present extending into the sigmoid colon.  Inferior tip of the rectal foreign body is at the level of the coccyx.  Possible trace pelvic free fluid.    Bones and soft tissues:    No aggressive osseous lesions.  Mild degenerative changes in the spine.  Moderate facet arthropathy most pronounced to the right of midline in the lower lumbar spine at L4-L5.  Mild diffuse body wall edema.                                       X-Ray Chest 1 View (Final result)  Result time 04/17/23 15:07:09      Final result by RADIOLOGIST, ROSE MARY (04/17/23 15:07:09)                   Impression:      No acute findings. Thank you for allowing us to participate in the care of your patient. Dictated and Authenticated by: Claudy Cyr MD 04/17/2023 4:51 AM Central Time ([place]      Electronically signed by: Virtual Radiologist  Date:    04/17/2023  Time:    15:07               Narrative:    EXAMINATION:  XR Chest    CLINICAL HISTORY:  Age: 48 years old Clinical indication: Screening exam;  Pre-operative exam; Cardiovascular screening    TECHNIQUE:  Imaging protocol: Radiologic exam of the chest. Views: 1 view    COMPARISON:  CR XR CHEST AP PORTABLE 11/1/2022 8:13 AM    FINDINGS:  Lungs: Unremarkable. No consolidation. Pleural spaces: Unremarkable. No pleural effusion. No pneumothorax. Heart/Mediastinum: Stable cardiomediastinal silhouette. Bones/joints: Unremarkable.                                       X-Ray Abdomen AP 1 View (KUB) (Final result)  Result time 04/17/23 15:07:27      Final result by RADIOLOGIST, ROSE MARY (04/17/23 15:07:27)                   Impression:      1. Imaging findings concerning for pneumoperitoneum. Further evaluation with abdomen CT is recommended. 2. Air distended loops of small bowel, which can be seen the setting of enteritis, early obstruction or adynamic ileus. Thank you for allowing us to participate in the care of your patient. Dictated and Authenticated by: Claudy Cyr MD 04/17/2023 3:31 AM Central Time ([place]      Electronically signed by: Virtual Radiologist  Date:    04/17/2023  Time:    15:07               Narrative:    EXAMINATION:  XR Abdomen    CLINICAL HISTORY:  Age: 48 years old Clinical indication: Abdominal pain    TECHNIQUE:  Imaging protocol: Radiologic exam of the abdomen. Views: 3 or more views.    COMPARISON:  CR XR ABDOMEN AP 1 VIEW 12/19/2022 8:37 AM    FINDINGS:  Gastrointestinal tract: Air distended loops of small bowel seen throughout the abdomen. Intraperitoneal space: There is lucency over the liver shadow, concerning for pneumoperitoneum. Bones/joints: Degenerative changes of the spine seen.                                       Medications   morphine injection 4 mg (4 mg Intravenous Given 4/17/23 0352)   ondansetron injection 8 mg (8 mg Intravenous Given 4/17/23 0402)   piperacillin-tazobactam (ZOSYN) 4.5 g in dextrose 5 % in water (D5W) 5 % 100 mL IVPB (MB+) (0 g Intravenous Stopped 4/17/23 0630)   iohexoL (OMNIPAQUE 350) injection 75  mL (100 mLs Intravenous Given 4/17/23 0605)     Medical Decision Making:   Initial Assessment:   Pleasant 48-year-old female presents the ER for evaluation of rectal foreign body.  Will plan x-ray rectal exam attempt removal, if unsuccessful will plan obs and GI evaluation.           ED Course as of 04/18/23 2101 Mon Apr 17, 2023   0416 X-ray resulted from [facility], there is lucency over the liver shadow concerning for pneumoperitoneum.  Will not attempt to remove rectal foreign body at this time given concern of perforation.  Will plan blood work preop labs will start Zosyn and obtain formal CT scan.  General surgery Dr. Rodriguez aware. [SE]   0715 Pt taken to OR by Dr Rodriguez [CD]      ED Course User Index  [CD] Aftab Ansari DO  [SE] Michael Moore MD               Critical Care Time: 45 minutes for pneumoperitoneum likely abdominal perforation from foreign body  Critical care was time spent personally by me on the following activities: evaluating this patient's organ dysfunction, development of treatment plan, discussing treatment plan with patient or surrogate and bedside caregivers, discussions with consultants, evaluation of patient's response to treatment, examination of patient, ordering and performing treatments and interventions, ordering and review of laboratory studies, ordering and review of radiographic studies, re-evaluation of patient's condition. This critical care time did not overlap with that of any other provider or involve time for any procedures.    Clinical Impression:   Final diagnoses:  [T18.5XXA] Rectal foreign body (Primary)  [Z01.810] Preop cardiovascular exam  [K66.8] Pneumoperitoneum        ED Disposition Condition    Observation Stable                Michael Moore MD  04/17/23 0601       Michael Moore MD  04/17/23 0617       Michael Moore MD  04/18/23 2101

## 2023-04-17 NOTE — TRANSFER OF CARE
Anesthesia Transfer of Care Note    Patient: Velma Lopez    Procedure(s) Performed: Procedure(s) (LRB):  REMOVAL, FOREIGN BODY (N/A)    Patient location: PACU    Anesthesia Type: general    Transport from OR: Transported from OR on 6-10 L/min O2 by face mask with adequate spontaneous ventilation    Post pain: adequate analgesia    Post assessment: no apparent anesthetic complications and tolerated procedure well    Post vital signs: stable    Level of consciousness: awake, alert and oriented    Nausea/Vomiting: no nausea/vomiting    Complications: none    Transfer of care protocol was followed      Last vitals:   Visit Vitals  BP (!) 118/52   Pulse 72   Temp 36.7 °C (98.1 °F) (Oral)   Resp 18   Wt 63.5 kg (140 lb)   LMP 01/01/2023 (Approximate)   SpO2 100%   BMI 22.60 kg/m²

## 2023-04-17 NOTE — PROGRESS NOTES
Established Patient - Audio Only Telehealth Visit     The patient location is: [place]  The chief complaint leading to consultation is: medication FU  Visit type: Virtual visit with audio only (telephone)  Total time spent with patient: 20 minutes        The reason for the audio only service rather than synchronous audio and video virtual visit was related to technical difficulties or patient preference/necessity.     Each patient to whom I provide medical services by telemedicine is:  (1) informed of the relationship between the physician and patient and the respective role of any other health care provider with respect to management of the patient; and (2) notified that they may decline to receive medical services by telemedicine and may withdraw from such care at any time. Patient verbally consented to receive this service via voice-only telephone call.       HPI: HPI: Trial of PRN Levsin provided some relief of urgency/frequency. Vesicare was started. She reports significant improvement in urgency and frequency. Still with symptoms after alcohol intake. She denies SE; wishes to continue vesicare.     Assessment and plan:    --continue vesicare  --rtc in 1 year; sooner for new or worsening symptoms                This service was not originating from a related E/M service provided within the previous 7 days nor will  to an E/M service or procedure within the next 24 hours or my soonest available appointment.  Prevailing standard of care was able to be met in this audio-only visit.

## 2023-04-17 NOTE — ANESTHESIA PROCEDURE NOTES
Intubation    Date/Time: 4/17/2023 7:25 AM  Performed by: Elver Sherman CRNA  Authorized by: Jefry Herron MD     Intubation:     Induction:  Rapid sequence induction    Intubated:  Postinduction    Mask Ventilation:  Not attempted    Attempts:  1    Attempted By:  CRNA    Method of Intubation:  Direct    Blade:  Mehta 2    Laryngeal View Grade: Grade I - full view of cords      Difficult Airway Encountered?: No      Complications:  None    Airway Device:  Oral endotracheal tube    Airway Device Size:  7.0    Style/Cuff Inflation:  Cuffed (inflated to minimal occlusive pressure)    Tube secured:  20    Secured at:  The lips    Placement Verified By:  Capnometry    Complicating Factors:  None    Findings Post-Intubation:  BS equal bilateral and atraumatic/condition of teeth unchanged

## 2023-04-17 NOTE — OP NOTE
[facility] ([facility])  Operative Note      Date of Procedure: 4/17/2023     Procedure: Procedure(s) (LRB):  REMOVAL, FOREIGN BODY (N/A)     Surgeon(s) and Role:     * Rom Rodriguez MD - Primary    Assisting Surgeon: None    Pre-Operative Diagnosis: Foreign body anus/rectum [T18.5XXA]    Post-Operative Diagnosis: Post-Op Diagnosis Codes:     * Foreign body anus/rectum [T18.5XXA]    Anesthesia: General    Operative Findings (including complications, if any):  Removal of rectal foreign body vibrator from rectum removed under general anesthesia no intraop complication no blood loss sent to recovery room in stable condition patient to be discharged today.    Description of Technical Procedures:   After satisfactory general endotracheal anesthesia patient in supine position in lithotomy time-out was called patient identity confirmed site was confirmed perirectal area was prepped and draped in a sterile manner using Betadine scrub solution rectum was dilated using 4 fingers the tip of the vibrator could be reached from the finger but had to be pushed in and then with help of left hand the vibrator which was felt in the lower abdomen was pushed and it came out of the rectum and were grasped with towel clip and gently removed no bleeding noted no free air specimen was sent to the pathology patient did not need any dressing sent tolerated well sent to recovery room in stable condition.  Significant Surgical Tasks Conducted by the Assistant(s), if Applicable: na    Estimated Blood Loss (EBL): 0 mL           Implants: * No implants in log *    Specimens:   Specimen (24h ago, onward)       Start     Ordered    04/17/23 0754  Specimen to Pathology, Surgery General Surgery  Once        Comments: Pre-op Diagnosis: Foreign body anus/rectum [T18.5XXA]Procedure(s):REMOVAL, FOREIGN BODY Number of specimens: 1Name of specimens: foreign object - ID only     References:    Click here for ordering Quick Tip   Question Answer  Comment   Procedure Type: General Surgery    Which provider would you like to cc? JOON FOSTER    Release to patient Immediate        04/17/23 0754                            Condition: Good      Disposition: PACU - hemodynamically stable.    Attestation: I performed the procedure.    Discharge Note    OUTCOME: Patient tolerated treatment/procedure well without complication and is now ready for discharge.    DISPOSITION: Home or Self Care    FINAL DIAGNOSIS:  Rectal foreign body    FOLLOWUP: In clinic    DISCHARGE INSTRUCTIONS:    Discharge Procedure Orders   Diet general     Keep surgical extremity elevated     Lifting restrictions     Leave dressing on - Keep it clean, dry, and intact until clinic visit     Call MD for:  temperature >100.4     Call MD for:  persistent nausea and vomiting     Call MD for:  severe uncontrolled pain     Call MD for:  redness, tenderness, or signs of infection (pain, swelling, redness, odor or green/yellow discharge around incision site)

## 2023-04-17 NOTE — ANESTHESIA POSTPROCEDURE EVALUATION
Anesthesia Post Evaluation    Patient: Velma Lopez    Procedure(s) Performed: Procedure(s) (LRB):  REMOVAL, FOREIGN BODY (N/A)    Final Anesthesia Type: general      Patient location during evaluation: PACU  Patient participation: Yes- Able to Participate  Level of consciousness: awake and alert  Post-procedure vital signs: reviewed and stable  Pain management: adequate  Airway patency: patent    PONV status at discharge: No PONV  Anesthetic complications: no      Cardiovascular status: blood pressure returned to baseline  Respiratory status: unassisted  Hydration status: euvolemic            Vitals Value Taken Time   /76 04/17/23 0855   Temp 36.8 °C (98.2 °F) 04/17/23 0855   Pulse 68 04/17/23 0855   Resp 12 04/17/23 0855   SpO2 99 % 04/17/23 0855         Event Time   Out of Recovery 08:27:00         Pain/Anabell Score: Pain Rating Prior to Med Admin: 3 (4/17/2023  8:43 AM)  Anabell Score: 10 (4/17/2023  9:00 AM)

## 2023-04-17 NOTE — ANESTHESIA PREPROCEDURE EVALUATION
04/17/2023     Velma Lopez is a 48 y.o., female here for foreign body removal    Past Medical History:   Diagnosis Date    Chronic back pain     Diabetes type 2, uncontrolled     Mild nonproliferative diabetic retinopathy of left eye without macular edema associated with type 2 diabetes mellitus 10/21/2019    Morbid obesity with BMI of 40.0-44.9, adult     MILEY on CPAP     Plantar fasciitis of left foot     Urticaria      Past Surgical History:   Procedure Laterality Date    ARTHROSCOPIC ACROMIOPLASTY OF SHOULDER Left 7/27/2020    Procedure: ACROMIOPLASTY, ARTHROSCOPIC;  Surgeon: Marcela Garza MD;  Location: [facility] OR;  Service: Orthopedics;  Laterality: Left;    ARTHROSCOPIC REPAIR OF ROTATOR CUFF OF SHOULDER Right 7/27/2020    Procedure: REPAIR, ROTATOR CUFF, ARTHROSCOPIC;  Surgeon: Marcela Garza MD;  Location: [facility] OR;  Service: Orthopedics;  Laterality: Right;  GENERAL/REGIONAL    COLONOSCOPY N/A 4/7/2022    Procedure: COLONOSCOPY;  Surgeon: Yadi Wong MD;  Location: [facility] ([address] [place] [address]);  Service: Endoscopy;  Laterality: N/A;  Covid test at [place] on 4/4.EC    CYST REMOVAL      ovarian cyst removal at age 12    DECOMPRESSION OF SUBACROMIAL SPACE Left 7/27/2020    Procedure: DECOMPRESSION, SUBACROMIAL SPACE;  Surgeon: Marcela Garza MD;  Location: [facility] OR;  Service: Orthopedics;  Laterality: Left;    DILATION AND CURETTAGE OF UTERUS      ESOPHAGEAL MOTILITY STUDY USING HIGH RESOLUTION MANOMETRY N/A 9/17/2019    Procedure: MOTILITY STUDY, ESOPHAGUS, USING HIGH RESOLUTION MANOMETRY;  Surgeon: Leopoldo Swanson MD;  Location: [facility] ([address] [place] [address]);  Service: Endoscopy;  Laterality: N/A;  Pt will be taking xanax before procedure to hopefull help with anxiety.    ESOPHAGOGASTRODUODENOSCOPY N/A 6/14/2019    Procedure: EGD (ESOPHAGOGASTRODUODENOSCOPY);  Surgeon:  Ced Guevara MD;  Location: [facility] ENDO (4TH FLR);  Service: Endoscopy;  Laterality: N/A;  Please make sure it is scheduled after her cardiology appt.- see cardiology note dated 6/13/19 for clearance - ERW    GASTRIC BYPASS N/A 05/20/2021    INJECTION OF STEROID Left 7/27/2020    Procedure: INJECTION, STEROID LEFT THUMB TRIGGER FINGER;  Surgeon: Marcela Garza MD;  Location: [facility] OR;  Service: Orthopedics;  Laterality: Left;  LEFT THUMB, TRIGGER FINGER    LYSIS OF ADHESIONS N/A 10/21/2022    Procedure: LYSIS, ADHESIONS;  Surgeon: Bunny Obrien MD;  Location: [facility] OR;  Service: General;  Laterality: N/A;    TRANSVERSE COLECTOMY N/A 10/26/2022    Procedure: COLECTOMY, TRANSVERSE;  Surgeon: Bunny Obrien MD;  Location: [facility] OR;  Service: General;  Laterality: N/A;    TRIGGER FINGER RELEASE Right 10/4/2019    Procedure: RELEASE, TRIGGER FINGER - right thumb;  Surgeon: Craig Tom MD;  Location: [facility] OR;  Service: Orthopedics;  Laterality: Right;    UPPER GASTROINTESTINAL ENDOSCOPY      WISDOM TOOTH EXTRACTION         Pre-op Assessment    I have reviewed the Patient Summary Reports.     I have reviewed the Nursing Notes. I have reviewed the NPO Status.      Review of Systems  Anesthesia Hx:  History of prior surgery of interest to airway management or planning:  Denies Personal Hx of Anesthesia complications.   Cardiovascular:   Exercise tolerance: good    Pulmonary:   Sleep Apnea    Hepatic/GI:  Hepatic/GI Normal    Endocrine:   Diabetes        Physical Exam  General: Well nourished    Airway:  Mallampati: II   Mouth Opening: Normal  Tongue: Normal        Anesthesia Plan  Type of Anesthesia, risks & benefits discussed:    Anesthesia Type: Gen ETT, Gen Natural Airway  Informed Consent: Informed consent signed with the Patient and all parties understand the risks and agree with anesthesia plan.  All questions answered.   ASA Score: 2    Ready For Surgery From Anesthesia Perspective.      .

## 2023-04-17 NOTE — ED NOTES
Pt c/o pain to rectum s/t FBO. Pt states she is unable to reach FBO. No other complaints.    Review of patient's allergies indicates:  No Known Allergies     Patient has verified the spelling of their name and  on armband.   APPEARANCE: Patient is alert, calm, oriented x 4, and does not appear distressed.  SKIN: Skin is normal for race, warm, and dry. Normal skin turgor and mucous membranes moist.  CARDIAC: Normal rate and rhythm, no murmur heard.   RESPIRATORY:Normal rate and effort. Breath sounds clear bilaterally throughout chest. Respirations are equal and unlabored.    GASTRO: Bowel sounds normal, abdomen is soft, no tenderness, and no abdominal distention.  MUSCLE: Full ROM. No bony tenderness or soft tissue tenderness. No obvious deformity.  PERIPHERAL VASCULAR: peripheral pulses present. Normal cap refill. No edema. Warm to touch.  NEURO: 5/5 strength major flexors/extensors bilaterally. Sensory intact to light touch bilaterally. Roxana coma scale: eyes open spontaneously-4, oriented & converses-5, obeys commands-6. No neurological abnormalities.   MENTAL STATUS: awake, alert and aware of environment.  : Voids without complication    ED orders in progress. Pt SR up x 2. Bed in lowest position with wheels locked. Call bell within reach of pt.

## 2023-04-17 NOTE — PLAN OF CARE
Pt voided moderate amt of clear yellow urine without difficulty in bedpan.  States readiness to be discharged.

## 2023-04-17 NOTE — H&P
Chief Complaint   Patient presents with    anal toy       Pt has been unable to dislodge vibrator from rectum. Pt states it has been 30 minutes.        HPI     Pleasant 48-year-old female presents the ER for evaluation of rectal foreign body.  She put a vibrator in her rectum and has since lost it.  No other complaints, no fever chills.  Came the ER for further evaluation.        Review of patient's allergies indicates:  No Known Allergies       Past Medical History:   Diagnosis Date    Chronic back pain      Diabetes type 2, uncontrolled      Mild nonproliferative diabetic retinopathy of left eye without macular edema associated with type 2 diabetes mellitus 10/21/2019    Morbid obesity with BMI of 40.0-44.9, adult      MILEY on CPAP      Plantar fasciitis of left foot      Urticaria              Past Surgical History:   Procedure Laterality Date    ARTHROSCOPIC ACROMIOPLASTY OF SHOULDER Left 7/27/2020     Procedure: ACROMIOPLASTY, ARTHROSCOPIC;  Surgeon: Marcela Garza MD;  Location: [facility] OR;  Service: Orthopedics;  Laterality: Left;    ARTHROSCOPIC REPAIR OF ROTATOR CUFF OF SHOULDER Right 7/27/2020     Procedure: REPAIR, ROTATOR CUFF, ARTHROSCOPIC;  Surgeon: Marcela Garza MD;  Location: [facility] OR;  Service: Orthopedics;  Laterality: Right;  GENERAL/REGIONAL    COLONOSCOPY N/A 4/7/2022     Procedure: COLONOSCOPY;  Surgeon: Yadi Wong MD;  Location: [address]);  Service: Endoscopy;  Laterality: N/A;  Covid test at [place] on 4/4.EC    CYST REMOVAL         ovarian cyst removal at age 12    DECOMPRESSION OF SUBACROMIAL SPACE Left 7/27/2020     Procedure: DECOMPRESSION, SUBACROMIAL SPACE;  Surgeon: Marcela Garza MD;  Location: [facility] OR;  Service: Orthopedics;  Laterality: Left;    DILATION AND CURETTAGE OF UTERUS        ESOPHAGEAL MOTILITY STUDY USING HIGH RESOLUTION MANOMETRY N/A 9/17/2019     Procedure: MOTILITY STUDY, ESOPHAGUS, USING HIGH RESOLUTION MANOMETRY;  Surgeon: Leopoldo  MD Sky;  Location: [facility] (4TH FLR);  Service: Endoscopy;  Laterality: N/A;  Pt will be taking xanax before procedure to hopefull help with anxiety.    ESOPHAGOGASTRODUODENOSCOPY N/A 6/14/2019     Procedure: EGD (ESOPHAGOGASTRODUODENOSCOPY);  Surgeon: Ced Guevara MD;  [address]  Service: Endoscopy;  Laterality: N/A;  Please make sure it is scheduled after her cardiology appt.- see cardiology note dated 6/13/19 for clearance - ERW    GASTRIC BYPASS N/A 05/20/2021    INJECTION OF STEROID Left 7/27/2020     Procedure: INJECTION, STEROID LEFT THUMB TRIGGER FINGER;  Surgeon: Marcela Garza MD;  Location: [facility] OR;  Service: Orthopedics;  Laterality: Left;  LEFT THUMB, TRIGGER FINGER    LYSIS OF ADHESIONS N/A 10/21/2022     Procedure: LYSIS, ADHESIONS;  Surgeon: Bunny Obrien MD;  Location: [facility] OR;  Service: General;  Laterality: N/A;    TRANSVERSE COLECTOMY N/A 10/26/2022     Procedure: COLECTOMY, TRANSVERSE;  Surgeon: Bunny Obrien MD;  Location: [facility] OR;  Service: General;  Laterality: N/A;    TRIGGER FINGER RELEASE Right 10/4/2019     Procedure: RELEASE, TRIGGER FINGER - right thumb;  Surgeon: Craig Tom MD;  Location: [facility] OR;  Service: Orthopedics;  Laterality: Right;    UPPER GASTROINTESTINAL ENDOSCOPY        WISDOM TOOTH EXTRACTION                Family History   Problem Relation Age of Onset    Cancer Maternal Grandmother      Diabetes Mother      No Known Problems Sister      No Known Problems Sister      No Known Problems Sister      Breast cancer Maternal Aunt 70    Colon cancer Neg Hx      Stomach cancer Neg Hx      Inflammatory bowel disease Neg Hx      Esophageal cancer Neg Hx        Social History            Tobacco Use    Smoking status: Every Day       Packs/day: 0.75       Years: 30.00       Pack years: 22.50       Types: Cigarettes       Start date: 1992    Smokeless tobacco: Never    Tobacco comments:       Enrolled in the [employer] on  8/1/18 ([employer] Member ID # [id number]) Handout provided for Ambulatory Smoking Cessation Program.   Substance Use Topics    Alcohol use: Yes       Comment: social    Drug use: No      Review of Systems   Gastrointestinal:  Positive for rectal pain.   All other systems reviewed and are negative.     Physical Exam             Initial Vitals [04/17/23 0230]   BP Pulse Resp Temp SpO2   122/60 71 18 98.1 °F (36.7 °C) 100 %       MAP           --              Physical Exam     Nursing note and vitals reviewed.  Constitutional: She appears well-developed and well-nourished.   HENT:   Head: Normocephalic and atraumatic.   Eyes: Pupils are equal, round, and reactive to light.   Neck:   Normal range of motion.  Pulmonary/Chest: No respiratory distress.   Abdominal: Abdomen is soft. She exhibits no distension. There is no abdominal tenderness.   Genitourinary:    Genitourinary Comments: Done with nurse chaperone, rectal foreign body noted      Musculoskeletal:         General: Normal range of motion.      Cervical back: Normal range of motion.      Neurological: She is alert and oriented to person, place, and time. She has normal strength. GCS score is 15. GCS eye subscore is 4. GCS verbal subscore is 5. GCS motor subscore is 6.   Skin: Skin is warm and dry. Capillary refill takes less than 2 seconds.   Psychiatric: She has a normal mood and affect. Thought content normal.         rocedures  Labs Reviewed   CBC W/ AUTO DIFFERENTIAL   COMPREHENSIVE METABOLIC PANEL   LACTIC ACID, PLASMA   PROTIME-INR   APTT   TYPE & SCREEN      EKG Readings: (Independently Interpreted)   Normal sinus rhythm 69 beats per minute normal intervals and axis no STEMI      Imaging Results                              X-Ray Abdomen AP 1 View (KUB) (In process)     rectal foreign body.   Imp: rectal foreign body    Plan: removal now in operating room under general anaesthesia.

## 2023-04-18 ENCOUNTER — PATIENT MESSAGE (OUTPATIENT)
Dept: INTERNAL MEDICINE | Facility: CLINIC | Age: 48
End: 2023-04-18
Payer: MEDICAID

## 2023-04-18 LAB
FINAL PATHOLOGIC DIAGNOSIS: NORMAL
GROSS: NORMAL
Lab: NORMAL

## 2023-04-19 ENCOUNTER — PATIENT MESSAGE (OUTPATIENT)
Dept: INTERNAL MEDICINE | Facility: CLINIC | Age: 48
End: 2023-04-19
Payer: MEDICAID

## 2023-04-20 ENCOUNTER — OFFICE VISIT (OUTPATIENT)
Dept: UROLOGY | Facility: CLINIC | Age: 48
End: 2023-04-20
Payer: MEDICAID

## 2023-04-20 DIAGNOSIS — R35.0 URINARY FREQUENCY: Primary | ICD-10-CM

## 2023-04-20 PROCEDURE — 99214 OFFICE O/P EST MOD 30 MIN: CPT | Mod: 95,,, | Performed by: NURSE PRACTITIONER

## 2023-04-20 PROCEDURE — 1160F RVW MEDS BY RX/DR IN RCRD: CPT | Mod: CPTII,95,, | Performed by: NURSE PRACTITIONER

## 2023-04-20 PROCEDURE — 1159F MED LIST DOCD IN RCRD: CPT | Mod: CPTII,95,, | Performed by: NURSE PRACTITIONER

## 2023-04-20 PROCEDURE — 1159F PR MEDICATION LIST DOCUMENTED IN MEDICAL RECORD: ICD-10-PCS | Mod: CPTII,95,, | Performed by: NURSE PRACTITIONER

## 2023-04-20 PROCEDURE — 99214 PR OFFICE/OUTPT VISIT, EST, LEVL IV, 30-39 MIN: ICD-10-PCS | Mod: 95,,, | Performed by: NURSE PRACTITIONER

## 2023-04-20 PROCEDURE — 1160F PR REVIEW ALL MEDS BY PRESCRIBER/CLIN PHARMACIST DOCUMENTED: ICD-10-PCS | Mod: CPTII,95,, | Performed by: NURSE PRACTITIONER

## 2023-05-09 ENCOUNTER — OFFICE VISIT (OUTPATIENT)
Dept: PODIATRY | Facility: CLINIC | Age: 48
End: 2023-05-09
Payer: MEDICAID

## 2023-05-09 VITALS
HEIGHT: 66 IN | SYSTOLIC BLOOD PRESSURE: 112 MMHG | DIASTOLIC BLOOD PRESSURE: 80 MMHG | BODY MASS INDEX: 21.25 KG/M2 | HEART RATE: 84 BPM | WEIGHT: 132.25 LBS | RESPIRATION RATE: 18 BRPM

## 2023-05-09 DIAGNOSIS — M79.671 PAIN IN BOTH FEET: ICD-10-CM

## 2023-05-09 DIAGNOSIS — B07.0 PLANTAR WARTS: ICD-10-CM

## 2023-05-09 DIAGNOSIS — E11.9 TYPE 2 DIABETES MELLITUS WITHOUT COMPLICATION, WITH LONG-TERM CURRENT USE OF INSULIN: Primary | ICD-10-CM

## 2023-05-09 DIAGNOSIS — L98.9 SKIN LESION OF FOOT: ICD-10-CM

## 2023-05-09 DIAGNOSIS — F17.200 SMOKING: ICD-10-CM

## 2023-05-09 DIAGNOSIS — M79.672 PAIN IN BOTH FEET: ICD-10-CM

## 2023-05-09 DIAGNOSIS — Z79.4 TYPE 2 DIABETES MELLITUS WITHOUT COMPLICATION, WITH LONG-TERM CURRENT USE OF INSULIN: Primary | ICD-10-CM

## 2023-05-09 PROCEDURE — 3074F PR MOST RECENT SYSTOLIC BLOOD PRESSURE < 130 MM HG: ICD-10-PCS | Mod: CPTII,,, | Performed by: PODIATRIST

## 2023-05-09 PROCEDURE — 99999 PR PBB SHADOW E&M-EST. PATIENT-LVL V: CPT | Mod: PBBFAC,,, | Performed by: PODIATRIST

## 2023-05-09 PROCEDURE — 1159F PR MEDICATION LIST DOCUMENTED IN MEDICAL RECORD: ICD-10-PCS | Mod: CPTII,,, | Performed by: PODIATRIST

## 2023-05-09 PROCEDURE — 99215 OFFICE O/P EST HI 40 MIN: CPT | Mod: PBBFAC,PO | Performed by: PODIATRIST

## 2023-05-09 PROCEDURE — 3079F DIAST BP 80-89 MM HG: CPT | Mod: CPTII,,, | Performed by: PODIATRIST

## 2023-05-09 PROCEDURE — 3008F PR BODY MASS INDEX (BMI) DOCUMENTED: ICD-10-PCS | Mod: CPTII,,, | Performed by: PODIATRIST

## 2023-05-09 PROCEDURE — 1160F PR REVIEW ALL MEDS BY PRESCRIBER/CLIN PHARMACIST DOCUMENTED: ICD-10-PCS | Mod: CPTII,,, | Performed by: PODIATRIST

## 2023-05-09 PROCEDURE — 3008F BODY MASS INDEX DOCD: CPT | Mod: CPTII,,, | Performed by: PODIATRIST

## 2023-05-09 PROCEDURE — 1159F MED LIST DOCD IN RCRD: CPT | Mod: CPTII,,, | Performed by: PODIATRIST

## 2023-05-09 PROCEDURE — 99203 PR OFFICE/OUTPT VISIT, NEW, LEVL III, 30-44 MIN: ICD-10-PCS | Mod: S$PBB,,, | Performed by: PODIATRIST

## 2023-05-09 PROCEDURE — 3079F PR MOST RECENT DIASTOLIC BLOOD PRESSURE 80-89 MM HG: ICD-10-PCS | Mod: CPTII,,, | Performed by: PODIATRIST

## 2023-05-09 PROCEDURE — 1160F RVW MEDS BY RX/DR IN RCRD: CPT | Mod: CPTII,,, | Performed by: PODIATRIST

## 2023-05-09 PROCEDURE — 99203 OFFICE O/P NEW LOW 30 MIN: CPT | Mod: S$PBB,,, | Performed by: PODIATRIST

## 2023-05-09 PROCEDURE — 99999 PR PBB SHADOW E&M-EST. PATIENT-LVL V: ICD-10-PCS | Mod: PBBFAC,,, | Performed by: PODIATRIST

## 2023-05-09 PROCEDURE — 3074F SYST BP LT 130 MM HG: CPT | Mod: CPTII,,, | Performed by: PODIATRIST

## 2023-05-09 NOTE — PROGRESS NOTES
[facility] - PODIATRY  [address] [place] [address], [address]  [facility] [address]  Dept: [phone]  Dept Fax: [phone]    Abdirahman Melara Jr., DPM     Assessment:   MDM    Coding  1. Type 2 diabetes mellitus without complication, with long-term current use of insulin  Ambulatory referral/consult to Podiatry    Foot Exam Performed    Ambulatory referral/consult to Diabetes Education      2. Pain in both feet        3. Skin lesion of foot        4. Plantar warts        5. Smoking  Ambulatory referral/consult to Smoking Cessation Program          Plan:     Procedures  1. Type 2 diabetes mellitus without complication, with long-term current use of insulin  -     Ambulatory referral/consult to Podiatry  -     Foot Exam Performed  -     Ambulatory referral/consult to Diabetes Education; Future; Expected date: 05/16/2023    2. Pain in both feet    3. Skin lesion of foot    4. Plantar warts    5. Smoking  -     Ambulatory referral/consult to Smoking Cessation Program; Future; Expected date: 05/16/2023      Velma was seen today for diabetes mellitus, toe pain and callouses.    Diagnoses and all orders for this visit:    Type 2 diabetes mellitus without complication, with long-term current use of insulin  -     Ambulatory referral/consult to Podiatry  -     Foot Exam Performed  -     Ambulatory referral/consult to Diabetes Education; Future    Pain in both feet    Skin lesion of foot    Plantar warts    Smoking  -     Ambulatory referral/consult to Smoking Cessation Program; Future      ADA Risk Classification: No LOPS,No PAD, No deformity - 0: rtc 12 months    -pt seen, evaluated, and managed  -dx discussed in detail. All questions/concerns addressed  -all tx options discussed. All alternatives, risks, benefits of all txs discussed  -comprehensive diabetic foot exam with risk assessment performed today  -the patient was educated about the diagnosis  -labs reviewed by me: a1c of 4.9. recs as  below  -implemented icing/stretching regimen  -offloading pads dispensed  -Shoe inspection. Diabetic Foot Education. Patient reminded of the importance of good nutrition and blood sugar control to help prevent podiatric complications of diabetes. Patient instructed on proper foot hygeine. We discussed wearing proper shoe gear, daily foot inspections, never walking without protective shoe gear, never putting sharp instruments to feet.  -rxs dispensed: none  -referrals: dm education and smoking cessation  -WB: wbat    For painful skin lesions:  -after obtaining consent and marking and then performing timeout, I cleansed w/ alcohol prep pad the above mentioned hyperkeratosis which was then trimmed utilizing No 15 scapel, to a smooth base with pinpoint bleeding with out incident. Silver nitrate was used as needed. The skin lesion was then destroyed with application of cantherone and covered with occlusive dressing. Patient tolerated this well and reported comfort to the area.  -Warnings regarding pain and blister formation given  -OTC medications for pain  -pt instructed to keep lesion covered with bandaid+triple abx ointment - change QD as needed x 14-21 days  -Patient education regarding warts was given / Handout on warts was given      Follow up if symptoms worsen or fail to improve.    Subjective:      Patient ID: Velma Lopez is a 48 y.o. female.    Chief Complaint:   Chief Complaint   Patient presents with    Diabetes Mellitus     DM    Toe Pain     3rd digit and 5th digit    Callouses     Left foot        Velma Lopez presents to the clinic upon referral from Dr. Martinez  for evaluation and treatment of diabetic feet. Patient relates no major problem with feet. Only complaints today consist of needing dm foot exam.      HPI    Last Podiatry Enc: Visit date not found  Last Enc w/ Me: Visit date not found    Outside reports reviewed: historical medical records.  Family hx: as below  Past  Medical History:   Diagnosis Date    Chronic back pain     Diabetes type 2, uncontrolled     Mild nonproliferative diabetic retinopathy of left eye without macular edema associated with type 2 diabetes mellitus 10/21/2019    Morbid obesity with BMI of 40.0-44.9, adult     MILEY on CPAP     Plantar fasciitis of left foot     Urticaria      Past Surgical History:   Procedure Laterality Date    ARTHROSCOPIC ACROMIOPLASTY OF SHOULDER Left 7/27/2020    Procedure: ACROMIOPLASTY, ARTHROSCOPIC;  Surgeon: Marcela Garza MD;  Location: [facility] OR;  Service: Orthopedics;  Laterality: Left;    ARTHROSCOPIC REPAIR OF ROTATOR CUFF OF SHOULDER Right 7/27/2020    Procedure: REPAIR, ROTATOR CUFF, ARTHROSCOPIC;  Surgeon: Marcela Garza MD;  Location: [facility] OR;  Service: Orthopedics;  Laterality: Right;  GENERAL/REGIONAL    COLONOSCOPY N/A 4/7/2022    Procedure: COLONOSCOPY;  Surgeon: Yadi Wong MD;  [facility]  Service: Endoscopy;  Laterality: N/A;  Covid test at [place] on 4/4.EC    CYST REMOVAL      ovarian cyst removal at age 12    DECOMPRESSION OF SUBACROMIAL SPACE Left 7/27/2020    Procedure: DECOMPRESSION, SUBACROMIAL SPACE;  Surgeon: Marcela Garza MD;  Location: [facility] OR;  Service: Orthopedics;  Laterality: Left;    DILATION AND CURETTAGE OF UTERUS      ESOPHAGEAL MOTILITY STUDY USING HIGH RESOLUTION MANOMETRY N/A 9/17/2019    Procedure: MOTILITY STUDY, ESOPHAGUS, USING HIGH RESOLUTION MANOMETRY;  Surgeon: Leopoldo Swanson MD;  Location: [facility] (4TH FLR);  Service: Endoscopy;  Laterality: N/A;  Pt will be taking xanax before procedure to hopefull help with anxiety.    ESOPHAGOGASTRODUODENOSCOPY N/A 6/14/2019    Procedure: EGD (ESOPHAGOGASTRODUODENOSCOPY);  Surgeon: Ced Guevara MD;  Location: [facility] (4TH FLR);  Service: Endoscopy;  Laterality: N/A;  Please make sure it is scheduled after her cardiology appt.- see cardiology note dated 6/13/19 for clearance - ERW     FOREIGN BODY REMOVAL N/A 4/17/2023    Procedure: REMOVAL, FOREIGN BODY;  Surgeon: Rom Rodriguez MD;  Location: [facility] OR;  Service: General;  Laterality: N/A;    GASTRIC BYPASS N/A 05/20/2021    INJECTION OF STEROID Left 7/27/2020    Procedure: INJECTION, STEROID LEFT THUMB TRIGGER FINGER;  Surgeon: Marcela Garza MD;  Location: [facility] OR;  Service: Orthopedics;  Laterality: Left;  LEFT THUMB, TRIGGER FINGER    LYSIS OF ADHESIONS N/A 10/21/2022    Procedure: LYSIS, ADHESIONS;  Surgeon: Bunny Obrien MD;  Location: [facility] OR;  Service: General;  Laterality: N/A;    TRANSVERSE COLECTOMY N/A 10/26/2022    Procedure: COLECTOMY, TRANSVERSE;  Surgeon: Bunny Obrien MD;  Location: [facility] OR;  Service: General;  Laterality: N/A;    TRIGGER FINGER RELEASE Right 10/4/2019    Procedure: RELEASE, TRIGGER FINGER - right thumb;  Surgeon: Craig Tom MD;  Location: [facility] OR;  Service: Orthopedics;  Laterality: Right;    UPPER GASTROINTESTINAL ENDOSCOPY      WISDOM TOOTH EXTRACTION       Family History   Problem Relation Age of Onset    Cancer Maternal Grandmother     Diabetes Mother     No Known Problems Sister     No Known Problems Sister     No Known Problems Sister     Breast cancer Maternal Aunt 70    Colon cancer Neg Hx     Stomach cancer Neg Hx     Inflammatory bowel disease Neg Hx     Esophageal cancer Neg Hx      Current Outpatient Medications   Medication Sig Dispense Refill    acetaminophen (TYLENOL) 500 MG tablet Take 2 tablets (1,000 mg total) by mouth every 6 (six) hours as needed for Pain. 50 tablet 0    amitriptyline (ELAVIL) 10 MG tablet TAKE 1 TABLET BY MOUTH EVERY DAY IN THE EVENING 90 tablet 0    cetirizine (ZYRTEC) 10 MG tablet Take 10 mg by mouth once daily.      docusate sodium (COLACE) 100 MG capsule Take 1 capsule (100 mg total) by mouth 2 (two) times daily. 30 capsule 11    HYDROcodone-acetaminophen (NORCO) 5-325 mg per tablet Take 1 tablet by mouth every 4  (four) hours as needed for Pain. 10 tablet 0    medroxyPROGESTERone (DEPO-PROVERA) 150 mg/mL Syrg Inject 1 mL (150 mg total) into the muscle every 3 (three) months. 1 mL 0    medroxyPROGESTERone (DEPO-PROVERA) 150 mg/mL Syrg Inject 1 mL (150 mg total) into the muscle every 3 (three) months. 1 mL 0    medroxyPROGESTERone (DEPO-PROVERA) 150 mg/mL Syrg Inject 1 mL (150 mg total) into the muscle every 3 (three) months. 1 mL 0    multivitamin-min-iron-FA-vit K (BARIATRIC MULTIVITAMINS) 45 mg iron- 800 mcg-120 mcg Cap Take 1 capsule by mouth once daily.      solifenacin (VESICARE) 10 MG tablet Take 1 tablet (10 mg total) by mouth once daily. 30 tablet 11    terconazole (TERAZOL 7) 0.4 % Crea Place 1 applicator vaginally every evening. 7 g 0    zolpidem (AMBIEN) 5 MG Tab TAKE 1 TABLET(5 MG) BY MOUTH EVERY NIGHT AS NEEDED 30 tablet 4     No current facility-administered medications for this visit.     Facility-Administered Medications Ordered in Other Visits   Medication Dose Route Frequency Provider Last Rate Last Admin    lidocaine (PF) 10 mg/ml (1%) injection 10 mg  1 mL Intradermal Once Brenna Livingston MD        midazolam (VERSED) 1 mg/mL injection 0.5 mg  0.5 mg Intravenous PRN Ced Patino MD   2 mg at 07/27/20 1322    ropivacaine (Naropin) 1000 mg/500 mL (2 mg/mL) [facility] PainPRO Pump infusion  4 mL/hr Perineural Continuous Ced Patino MD 4 mL/hr at 07/27/20 1519 4 mL/hr at 07/27/20 1519     Review of patient's allergies indicates:  No Known Allergies  Social History     Socioeconomic History    Marital status: Single   Tobacco Use    Smoking status: Every Day     Packs/day: 0.75     Years: 30.00     Pack years: 22.50     Types: Cigarettes     Start date: 1992    Smokeless tobacco: Never    Tobacco comments:     Enrolled in the [employer] on 8/1/18 ([employer] Member ID # [id number]) Handout provided for Ambulatory Smoking Cessation Program.   Substance and Sexual Activity     Alcohol use: Yes     Comment: social    Drug use: No    Sexual activity: Yes     Partners: Male     Birth control/protection: None   Social History Narrative    Single.  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      Social Determinants of Health     Financial Resource Strain: Low Risk     Difficulty of Paying Living Expenses: Not hard at all   Food Insecurity: No Food Insecurity    Worried About Running Out of Food in the Last Year: Never true    Ran Out of Food in the Last Year: Never true   Transportation Needs: No Transportation Needs    Lack of Transportation (Medical): No    Lack of Transportation (Non-Medical): No   Physical Activity: Inactive    Days of Exercise per Week: 0 days    Minutes of Exercise per Session: 0 min   Housing Stability: Unknown    Unable to Pay for Housing in the Last Year: No    Unstable Housing in the Last Year: No       ROS    REVIEW OF SYSTEMS: Negative as documented below as well as positive findings in bold.       Constitutional  Respiratory  Gastrointestinal  Skin   - Fever - Cough - Heartburn - Rash   - Chills - Spit blood - Nausea - Itching   - Weight Loss - Shortness of breath - Vomiting - Nail pain   - Malaise/Fatigue - Wheezing - Abdominal Pain  Wound/Ulcer   - Weight Gain   - Blood in Stool  Poor wound healing       - Diarrhea          Cardiovascular  Genitourinary  Neurological  HEENT   - Chest Pain - Dysuria - Burning Sensation of feet - Headache   - Palpitations - Hematuria - Tingling / Paresthesia - Congestion   - Pain at night in legs - Flank Pain - Dizziness - Sore Throat   - Cramping   - Tremor - Blurred Vision   - Leg Swelling   - Sensory Change - Double Vision   - Dizzy when standing   - Speech Change - Eye Redness       - Focal Weakness - Dry Eyes       - Loss of Consciousness          Endocrine  Musculoskeletal  Psychiatric   - Cold intolerance - Muscle Pain - Depression   - Heat intolerance - Neck Pain - Insomnia   - Anemia - Joint Pain -  "Memory Loss   -  Easy bruising, bleeding - Heel pain - Anxiety      Toe Pain        Leg/Ankle/Foot Pain         Objective:     /80 (BP Location: Left arm, Patient Position: Sitting, BP Method: Medium (Manual))   Pulse 84   Resp 18   Ht 5' 6" (1.676 m)   Wt 60 kg (132 lb 4.4 oz)   LMP 01/01/2023 (Approximate)   BMI 21.35 kg/m²   Vitals:    05/09/23 0833   BP: 112/80   Pulse: 84   Resp: 18   Weight: 60 kg (132 lb 4.4 oz)   Height: 5' 6" (1.676 m)   PainSc:   2   PainLoc: Toe       Physical Exam    General Appearance:   Patient appears well developed, well nourished  Patient appears stated age    Psychiatric:   Patient is oriented to time, place, and person.  Patient has appropriate mood and affect    Neck:  Trachea Midline  No visible masses    Respiratory/Ears:  No distress or labored breathing.  Able to differentiate between normal talking voice and whisper.  Able to follow commands    Eyes:  Visual Acuity intact  Lids and conjunctivae normal. No discoloration noted.    Physical Exam  Vitals and nursing note reviewed.   Feet:      Right foot:      Protective Sensation: 10 sites tested.  10 sites sensed.      Left foot:      Protective Sensation: 10 sites tested.  10 sites sensed.   Neurological:      Mental Status: She is alert.   Psychiatric:         Behavior: Behavior is cooperative.         Thought Content: Thought content normal.         Judgment: Judgment normal.     Ortho Exam  General    Nursing note and vitals reviewed.  Constitutional: She is cooperative.   Neurological: She is alert.   Psychiatric: Judgment and thought content normal.           Foot Exam  Foot/Ankle Musculoskeletal Exam    General    Neurological: alert    B/l LE exam con't:  V:  DP 2/4, PT 2/4   CRT< 3s to all digits tested   Tibial and popliteal lymph nodes are w/o abnormality    edema absent bilaterally, varicosities absent bilaterally    N:  Patient displays normal ankle reflexes   SILT in SP/DP/T/Karissa/Saph " distributions    Ortho: +Motor EHL/FHL/TA/GA   +TTP skin lesions b/l foot   Compartments soft/compressible. No pain on passive stretch of big toe. No calf  Pain.   no deformity noted on exam    Derm:  skin intact, skin warm and dry, skin without ulcers, skin without induration, nails normal, +hyperkeratotic v verrucous lesion that breaks normal skin lines and has pinpoint bleeding upon debridement b/l foot    Imaging / Labs:    Hemoglobin A1C   Date Value Ref Range Status   05/19/2022 4.9 4.7 - 5.6 % Final   11/11/2021 5.1 4.7 - 5.6 % Final   03/16/2021 7.0 (H) 4.7 - 5.6 % Final   12/10/2019 7.3 (H) 4.0 - 5.6 % Final     Comment:     ADA Screening Guidelines:  5.7-6.4%  Consistent with prediabetes  >or=6.5%  Consistent with diabetes  High levels of fetal hemoglobin interfere with the HbA1C  assay. Heterozygous hemoglobin variants (HbS, HgC, etc)do  not significantly interfere with this assay.   However, presence of multiple variants may affect accuracy.     09/10/2019 9.7 (H) 4.0 - 5.6 % Final     Comment:     ADA Screening Guidelines:  5.7-6.4%  Consistent with prediabetes  >or=6.5%  Consistent with diabetes  High levels of fetal hemoglobin interfere with the HbA1C  assay. Heterozygous hemoglobin variants (HbS, HgC, etc)do  not significantly interfere with this assay.   However, presence of multiple variants may affect accuracy.     01/12/2018 6.2 (H) 4.0 - 5.6 % Final     Comment:     According to ADA guidelines, hemoglobin A1c <7.0% represents  optimal control in non-pregnant diabetic patients. Different  metrics may apply to specific patient populations.   Standards of Medical Care in Diabetes-2016.  For the purpose of screening for the presence of diabetes:  <5.7%     Consistent with the absence of diabetes  5.7-6.4%  Consistent with increasing risk for diabetes   (prediabetes)  >or=6.5%  Consistent with diabetes  Currently, no consensus exists for use of hemoglobin A1c  for diagnosis of diabetes for  children.  This Hemoglobin A1c assay has significant interference with fetal   hemoglobin   (HbF). The results are invalid for patients with abnormal amounts of   HbF,   including those with known Hereditary Persistence   of Fetal Hemoglobin. Heterozygous hemoglobin variants (HbAS, HbAC,   HbAD, HbAE, HbA2) do not significantly interfere with this assay;   however, presence of multiple variants in a sample may impact the %   interference.              CT Abdomen Pelvis With Contrast    Result Date: 4/17/2023  EXAMINATION: CT ABDOMEN PELVIS WITH CONTRAST CLINICAL HISTORY: Peritonitis or perforation suspected; TECHNIQUE: Low dose axial images, sagittal and coronal reformations were obtained from the lung bases to the pubic symphysis following the IV administration of 75 mL of Omnipaque 350 .  Oral contrast was not given. COMPARISON: CT abdomen pelvis, 12/19/2022.  Chest and abdomen radiograph, 04/17/2023. FINDINGS: Lower Chest: Prominent dependent bibasilar subsegmental atelectasis.  Heart is not enlarged.  No pleural effusion. Abdomen: Liver is enlarged, measuring approximately 19.5 cm in craniocaudal length.  No focal hepatic mass identified.  Gallbladder is distended and otherwise unremarkable.  No calcified gallstones.  No intrahepatic biliary ductal dilatation. Spleen is not enlarged.  Adrenal glands and pancreas are unremarkable. The kidneys are symmetric.  No hydronephrosis. No asymmetric perinephric inflammatory fat stranding. Stable postoperative changes of gastric bypass.  No small bowel obstruction.  There is a large metallic rectal foreign body measuring up to 16 cm in length.  No pneumoperitoneum.  No inflammatory changes identified in bowel.  The appendix is normal.  Mild-to-moderate stool burden throughout the colon.  No drainable fluid collection. No bulky retroperitoneal lymphadenopathy. Abdominal aorta is normal in caliber without significant atherosclerosis. Portal, splenic, and superior mesenteric  veins are patent. Pelvis: Urinary bladder is decompressed and not well evaluated.  Uterus is unremarkable.  Large rectal foreign body is present extending into the sigmoid colon.  Inferior tip of the rectal foreign body is at the level of the coccyx.  Possible trace pelvic free fluid. Bones and soft tissues: No aggressive osseous lesions.  Mild degenerative changes in the spine.  Moderate facet arthropathy most pronounced to the right of midline in the lower lumbar spine at L4-L5.  Mild diffuse body wall edema.     Large rectal foreign body.  No pneumoperitoneum. Postoperative changes of gastric bypass. Mild anasarca. Mild-to-moderate stool burden in the colon. Electronically signed by: Chet Mckeon MD Date:    04/17/2023 Time:    07:17        Note: This was dictated using a computer transcription program. Although proofread, it may contain computer transcription errors and phonetic errors. Other human proofreading errors may also exist. Corrections may be performed at a later time. Please contact us for any clarification if needed.    Abdirahman Melara DPM  [facility]

## 2023-05-09 NOTE — PATIENT INSTRUCTIONS
Diabetes: Inspecting Your Feet      Diabetes increases your chances of developing foot problems. So inspect your feet every day. This helps you find small skin irritations before they become serious ulcers or infections. If you have trouble seeing the bottoms of your feet, use a mirror or ask a family member or friend to help.  How to check your feet  Below are tips to help you look for foot problems. Try to check your feet at the same time each day, such as when you get out of bed in the morning:  Check the top of each foot. The tops of toes, back of the heel, and outer edge of the foot can get a lot of rubbing from poor-fitting shoes.  Check the bottom of each foot. Daily wear and tear often leads to problems at pressure spots.  Check the toes and nails. Fungal infections often occur between toes. Toenail problems can also be a sign of fungal infections or lead to breaks in the skin.  Check your shoes, too. Loose objects inside a shoe can injure the foot. Use your hand to feel inside your shoes for things like whitney, loose stitching, or rough areas that could irritate your skin.  Warning signs  Look for any color changes in the foot. Redness with streaks can signal a severe infection, which needs immediate medical attention. Tell your healthcare provider right away if you have any of these problems:  Swelling, sometimes with color changes, may be a sign of poor blood flow or infection. Symptoms include tenderness and an increase in the size of your foot.  Warm or hot areas on your feet may be signs of infection. A foot that is cold may not be getting enough blood.  Sensations such as burning, tingling, or pins and needles can be signs of a problem. Also check for areas that may be numb.  Hot spots are caused by friction or pressure. Look for hot spots in areas that get a lot of rubbing. Hot spots can turn into blisters, calluses, or sores.  Cracks and sores are caused by dry or irritated skin. They are a sign  that the skin is breaking down, which can lead to infection.  Toenail problems to watch for include nails growing into the skin (ingrown toenail) and causing redness or pain. Thick, yellow, or discolored nails can signal a fungal infection.  Drainage and odor can develop from untreated sores and ulcers. Call your healthcare provider right away if you notice white or yellow drainage, bleeding, or unpleasant odor.   Date Last Reviewed: 6/1/2016 © 2000-2017 [employer]. [address] [place] [id number]. All rights reserved. This information is not intended as a substitute for professional medical care. Always follow your healthcare professional's instructions.    Long-Term Complications of Diabetes    Diabetes can cause health problems over time. These are called complications. They are more likely to happen if your blood sugar is often too high. Over time, high blood sugar can damage blood vessels in your body. It is important to keep your blood sugar in your target range. This can help prevent or delay complications from diabetes.  Possible complications  Complications of diabetes include:    Eye problems, including damage to the blood vessels in the eyes (retinopathy), pressure in the eye (glaucoma), and clouding of the eye's lens (a cataract). Eye problems can eventually lead to irreversible blindness.   Tooth and gum problems (periodontal disease), causing loss of teeth and bone  Blood vessel (vascular) disease leading to circulation problems, heart attack or stroke, or a need for amputation of a limb   Problems with sexual function leading to erectile dysfunction in men and sexual discomfort in women   Kidney disease (nephropathy) can eventually lead to kidney failure, which may require dialysis or kidney transplant   Nerve problems (neuropathy), causing pain or loss of feeling in your feet and other parts of your body, potentially leading to an amputation of a limb   High blood pressure  (hypertension), putting strain on your heart and blood vessels  Serious infections, possibly leading to loss of toes, feet, or limbs    How to avoid complications  The serious consequences of these complications may be avoidable for most people with diabetes by managing your blood glucose, blood pressure, and cholesterol levels. This can help you feel better and stay healthy. You can manage diabetes by tracking your blood sugar. You can also eat healthy and exercise to avoid gaining weight. And you should take medicine if directed by your healthcare provider.     Obesity and Your Feet    Obesity is an ever-increasing problem in [place] society. Currently, up to one third of the [place] population is considered obese, defined as a body mass index greater than 30. Although it seems obvious, many studies have found a direct link between increased BMI and foot problems. Not only is there an increased risk of wear-and-tear problems (such as arthritis, tendonitis and heel pain), but also an increased risk of developing type II diabetes. As little as one pound above your ideal weight can increase pressure in your hips, knees and ankles by as much as eight pounds. Simply walking up a flight of stairs or up an incline can increase pressure on the ankle by four to six times. Weight control can be an essential component to alleviate foot pain.    Your foot and ankle surgeon can be your biggest advocate in losing weight. A surgical procedure, such as gastric banding or gastric bypass, may be desired to help obesity. Often, the surgeon will require a large amount of weight loss (40 to 100+ lbs) before surgery is performed. In addition to diet modification, exercise, such as walking, is encouraged. This can be difficult with foot pain. A foot and ankle surgeon can advise on shoe selection, stretching and even orthotics to keep you walking and help you reach your goals.    Eating the Right Number of Calories (9750-5329  Guidelines)  Calories are a measure of the energy you get from food. If you eat more calories than you use, you will gain weight. If you eat fewer calories than you use, you will lose weight. Below are tables that give the number of calories needed each day. Look for your gender, age, and activity level. If you stick to this number, you should neither gain nor lose weight. Note that this is an estimated number of calories.* Your exact number may differ.  Women  Age in years Low activity level (calories/day) Moderate activity level (calories/day) High activity level (calories/day)   19 to 30 1,800-2,000 2,000-2,200 2,400   31 to 50 1,800 2,000 2,200   51 and older 1,600 1,800 2,000-2,200      Men  Age in years Low activity level  (calories/day) Moderate activity level (calories/day) High activity level (calories/day)   19 to 30 2,400-2,600 2,600-2,800 3,000   31 to 50 2,200-2,400 2,400-2,600 2,800-3,000   51 and older 2,000-2,200 2,200-2,400 2,400-2,800   Activity levels defined  Low. Only light physical activity such as that done during typical daily life.  Moderate. Light physical activity done during typical daily life AND physical activity equal to walking about 1.5 to 3 miles a day at 3 to 4 miles per hour.  High. Light physical activity done during typical daily life AND physical activity equal to walking more than 3 miles a day at 3 to 4 miles per hour.   Diabetes Foot Care Guidelines  Diabetic foot care is essential as diabetes can be dangerous to your feet--even a small cut can produce serious consequences. Diabetes may cause nerve damage that takes away the feeling in your feet. Diabetes may also reduce blood flow to the feet, making it harder to heal an injury or resist infection. Because of these problems, you may not notice a foreign object in your shoe. As a result, you could develop a blister or a sore. This could lead to an infection or a nonhealing wound that could put you at risk for an  amputation.    To avoid serious foot problems that could result in losing a toe, foot or leg, follow these guidelines.    Inspect your feet daily. Check for cuts, blisters, redness, swelling or nail problems. Use a magnifying hand mirror to look at the bottom of your feet. Call your doctor if you notice anything.    Bathe feet in lukewarm, never hot, water. Keep your feet clean by washing them daily. Use only lukewarm water--the temperature you would use on a  baby.    Be gentle when bathing your feet. Wash them using a soft washcloth or sponge. Dry by blotting or patting and carefully dry between the toes.    Moisturize your feet but not between your toes. Use a moisturizer daily to keep dry skin from itching or cracking. But don't moisturize between the toes--that could encourage a fungal infection.    Cut nails carefully. Cut them straight across and file the edges. Dont cut nails too short, as this could lead to ingrown toenails. If you have concerns about your nails, consult your doctor.    Never treat corns or calluses yourself. No bathroom surgery or medicated pads. Visit your doctor for appropriate treatment.    Wear clean, dry socks. Change them daily.    Consider socks made specifically for patients living with diabetes. These socks have extra cushioning, do not have elastic tops, are higher than the ankle and are made from fibers that wick moisture away from the skin.    Wear socks to bed. If your feet get cold at night, wear socks. Never use a heating pad or a hot water bottle.    Shake out your shoes and feel the inside before wearing. Remember, your feet may not be able to feel a pebble or other foreign object, so always inspect your shoes before putting them on.    Keep your feet warm and dry. Dont let your feet get wet in snow or rain. Wear warm socks and shoes in winter.    Consider using an antiperspirant on the soles of your feet. This is helpful if you have excessive sweating of the  feet.    Never walk barefoot. Not even at home! Always wear shoes or slippers. You could step on something and get a scratch or cut.    Take care of your diabetes. Keep your blood sugar levels under control.    Do not smoke. Smoking restricts blood flow in your feet.    Get periodic foot exams. Seeing your foot and ankle surgeon on a regular basis can help prevent the foot complications of diabetes.

## 2023-06-09 DIAGNOSIS — R13.19 ESOPHAGEAL DYSPHAGIA: ICD-10-CM

## 2023-06-12 ENCOUNTER — PATIENT MESSAGE (OUTPATIENT)
Dept: SURGERY | Facility: CLINIC | Age: 48
End: 2023-06-12
Payer: MEDICAID

## 2023-06-12 ENCOUNTER — CLINICAL SUPPORT (OUTPATIENT)
Dept: ENDOSCOPY | Facility: HOSPITAL | Age: 48
End: 2023-06-12
Attending: FAMILY MEDICINE
Payer: MEDICAID

## 2023-06-12 DIAGNOSIS — Z12.12 ENCOUNTER FOR COLORECTAL CANCER SCREENING: ICD-10-CM

## 2023-06-12 DIAGNOSIS — Z12.11 ENCOUNTER FOR COLORECTAL CANCER SCREENING: ICD-10-CM

## 2023-06-12 RX ORDER — AMITRIPTYLINE HYDROCHLORIDE 10 MG/1
TABLET, FILM COATED ORAL
Qty: 90 TABLET | Refills: 0 | Status: SHIPPED | OUTPATIENT
Start: 2023-06-12

## 2023-06-12 NOTE — PLAN OF CARE
Referral for screening colonoscopy received. Patient's last screening colonoscopy was 4/7/22. Recommended follow up 10 yrs. Spoke with patient regarding information above. Patient stated she will speak with her surgeon regarding colonoscopy and call back if needed. Direct number provided.

## 2023-06-14 ENCOUNTER — HOSPITAL ENCOUNTER (EMERGENCY)
Facility: HOSPITAL | Age: 48
Discharge: HOME | End: 2023-06-14
Attending: EMERGENCY MEDICINE
Payer: MEDICAID

## 2023-06-14 VITALS
HEART RATE: 84 BPM | OXYGEN SATURATION: 100 % | TEMPERATURE: 99 F | SYSTOLIC BLOOD PRESSURE: 151 MMHG | RESPIRATION RATE: 18 BRPM | DIASTOLIC BLOOD PRESSURE: 69 MMHG | WEIGHT: 133 LBS | BODY MASS INDEX: 21.47 KG/M2

## 2023-06-14 DIAGNOSIS — M79.632 LEFT FOREARM PAIN: ICD-10-CM

## 2023-06-14 DIAGNOSIS — R51.9 NONINTRACTABLE HEADACHE, UNSPECIFIED CHRONICITY PATTERN, UNSPECIFIED HEADACHE TYPE: ICD-10-CM

## 2023-06-14 DIAGNOSIS — M54.9 ACUTE BILATERAL BACK PAIN, UNSPECIFIED BACK LOCATION: ICD-10-CM

## 2023-06-14 DIAGNOSIS — T14.90XA INJURY: ICD-10-CM

## 2023-06-14 DIAGNOSIS — M25.532 LEFT WRIST PAIN: Primary | ICD-10-CM

## 2023-06-14 PROCEDURE — 99283 EMERGENCY DEPT VISIT LOW MDM: CPT

## 2023-06-14 PROCEDURE — 29125 APPL SHORT ARM SPLINT STATIC: CPT | Mod: LT

## 2023-06-14 PROCEDURE — 99284 PR EMERGENCY DEPT VISIT,LEVEL IV: ICD-10-PCS | Mod: ,,, | Performed by: PHYSICIAN ASSISTANT

## 2023-06-14 PROCEDURE — 99284 EMERGENCY DEPT VISIT MOD MDM: CPT | Mod: ,,, | Performed by: PHYSICIAN ASSISTANT

## 2023-06-14 PROCEDURE — 25000003 PHARM REV CODE 250: Performed by: PHYSICIAN ASSISTANT

## 2023-06-14 RX ORDER — LIDOCAINE 50 MG/G
1 PATCH TOPICAL
Status: DISCONTINUED | OUTPATIENT
Start: 2023-06-14 | End: 2023-06-14 | Stop reason: HOSPADM

## 2023-06-14 RX ORDER — KETOROLAC TROMETHAMINE 10 MG/1
10 TABLET, FILM COATED ORAL
Status: COMPLETED | OUTPATIENT
Start: 2023-06-14 | End: 2023-06-14

## 2023-06-14 RX ORDER — NAPROXEN 500 MG/1
500 TABLET ORAL 2 TIMES DAILY WITH MEALS
Qty: 60 TABLET | Refills: 0 | Status: ON HOLD | OUTPATIENT
Start: 2023-06-14 | End: 2023-07-14

## 2023-06-14 RX ADMIN — KETOROLAC TROMETHAMINE 10 MG: 10 TABLET, FILM COATED ORAL at 07:06

## 2023-06-14 RX ADMIN — LIDOCAINE 1 PATCH: 50 PATCH TOPICAL at 07:06

## 2023-06-15 NOTE — DISCHARGE INSTRUCTIONS
Naproxen prescribed for upper extremity pain, back pain.  Take as needed and directed.  Make sure to take with meals.  Do not take with other ibuprofen containing products    You may purchase lidocaine patches over-the-counter as needed.     You were given a thumb spica splint today.  Use as needed for 1-2 weeks for support of thumb and wrist
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
## 2023-06-20 ENCOUNTER — PATIENT OUTREACH (OUTPATIENT)
Dept: EMERGENCY MEDICINE | Facility: HOSPITAL | Age: 48
End: 2023-06-20
Payer: MEDICAID
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
## 2023-07-13 ENCOUNTER — HOSPITAL ENCOUNTER (INPATIENT)
Facility: HOSPITAL | Age: 48
LOS: 1 days | Discharge: HOME | DRG: 392 | End: 2023-07-14
Attending: STUDENT IN AN ORGANIZED HEALTH CARE EDUCATION/TRAINING PROGRAM | Admitting: STUDENT IN AN ORGANIZED HEALTH CARE EDUCATION/TRAINING PROGRAM
Payer: MEDICAID

## 2023-07-13 DIAGNOSIS — K56.609 SMALL BOWEL OBSTRUCTION: Primary | ICD-10-CM

## 2023-07-13 LAB
ALBUMIN SERPL BCP-MCNC: 4.2 G/DL (ref 3.5–5.2)
ALP SERPL-CCNC: 91 U/L (ref 55–135)
ALT SERPL W/O P-5'-P-CCNC: 19 U/L (ref 10–44)
ANION GAP SERPL CALC-SCNC: 12 MMOL/L (ref 8–16)
AST SERPL-CCNC: 11 U/L (ref 10–40)
BASOPHILS # BLD AUTO: 0.09 K/UL (ref 0–0.2)
BASOPHILS NFR BLD: 0.8 % (ref 0–1.9)
BILIRUB SERPL-MCNC: 0.6 MG/DL (ref 0.1–1)
BILIRUB UR QL STRIP: NEGATIVE
BUN SERPL-MCNC: 12 MG/DL (ref 6–20)
CALCIUM SERPL-MCNC: 9.9 MG/DL (ref 8.7–10.5)
CHLORIDE SERPL-SCNC: 108 MMOL/L (ref 95–110)
CLARITY UR: CLEAR
CO2 SERPL-SCNC: 21 MMOL/L (ref 23–29)
COLOR UR: YELLOW
CREAT SERPL-MCNC: 0.8 MG/DL (ref 0.5–1.4)
CTP QC/QA: YES
DIFFERENTIAL METHOD: ABNORMAL
EOSINOPHIL # BLD AUTO: 0.1 K/UL (ref 0–0.5)
EOSINOPHIL NFR BLD: 0.7 % (ref 0–8)
ERYTHROCYTE [DISTWIDTH] IN BLOOD BY AUTOMATED COUNT: 12.7 % (ref 11.5–14.5)
EST. GFR  (NO RACE VARIABLE): >60 ML/MIN/1.73 M^2
GLUCOSE SERPL-MCNC: 105 MG/DL (ref 70–110)
GLUCOSE UR QL STRIP: NEGATIVE
HCT VFR BLD AUTO: 41.6 % (ref 37–48.5)
HGB BLD-MCNC: 13.4 G/DL (ref 12–16)
HGB UR QL STRIP: NEGATIVE
IMM GRANULOCYTES # BLD AUTO: 0.03 K/UL (ref 0–0.04)
IMM GRANULOCYTES NFR BLD AUTO: 0.3 % (ref 0–0.5)
KETONES UR QL STRIP: NEGATIVE
LEUKOCYTE ESTERASE UR QL STRIP: NEGATIVE
LIPASE SERPL-CCNC: 10 U/L (ref 4–60)
LYMPHOCYTES # BLD AUTO: 2.1 K/UL (ref 1–4.8)
LYMPHOCYTES NFR BLD: 19.1 % (ref 18–48)
MCH RBC QN AUTO: 31.2 PG (ref 27–31)
MCHC RBC AUTO-ENTMCNC: 32.2 G/DL (ref 32–36)
MCV RBC AUTO: 97 FL (ref 82–98)
MONOCYTES # BLD AUTO: 0.4 K/UL (ref 0.3–1)
MONOCYTES NFR BLD: 3.3 % (ref 4–15)
NEUTROPHILS # BLD AUTO: 8.1 K/UL (ref 1.8–7.7)
NEUTROPHILS NFR BLD: 75.8 % (ref 38–73)
NITRITE UR QL STRIP: NEGATIVE
NRBC BLD-RTO: 0 /100 WBC
PH UR STRIP: 6 [PH] (ref 5–8)
PLATELET # BLD AUTO: 371 K/UL (ref 150–450)
PMV BLD AUTO: 10.4 FL (ref 9.2–12.9)
POTASSIUM SERPL-SCNC: 4.5 MMOL/L (ref 3.5–5.1)
PROT SERPL-MCNC: 7.6 G/DL (ref 6–8.4)
PROT UR QL STRIP: ABNORMAL
RBC # BLD AUTO: 4.3 M/UL (ref 4–5.4)
SARS-COV-2 RDRP RESP QL NAA+PROBE: NEGATIVE
SODIUM SERPL-SCNC: 141 MMOL/L (ref 136–145)
SP GR UR STRIP: >1.03 (ref 1–1.03)
URN SPEC COLLECT METH UR: ABNORMAL
UROBILINOGEN UR STRIP-ACNC: NEGATIVE EU/DL
WBC # BLD AUTO: 10.74 K/UL (ref 3.9–12.7)

## 2023-07-13 PROCEDURE — 85025 COMPLETE CBC W/AUTO DIFF WBC: CPT | Performed by: PHYSICIAN ASSISTANT

## 2023-07-13 PROCEDURE — 81003 URINALYSIS AUTO W/O SCOPE: CPT | Performed by: PHYSICIAN ASSISTANT

## 2023-07-13 PROCEDURE — 99285 EMERGENCY DEPT VISIT HI MDM: CPT | Mod: 25

## 2023-07-13 PROCEDURE — 12000002 HC ACUTE/MED SURGE SEMI-PRIVATE ROOM

## 2023-07-13 PROCEDURE — 83690 ASSAY OF LIPASE: CPT | Performed by: PHYSICIAN ASSISTANT

## 2023-07-13 PROCEDURE — 63600175 PHARM REV CODE 636 W HCPCS: Performed by: STUDENT IN AN ORGANIZED HEALTH CARE EDUCATION/TRAINING PROGRAM

## 2023-07-13 PROCEDURE — 80053 COMPREHEN METABOLIC PANEL: CPT | Performed by: PHYSICIAN ASSISTANT

## 2023-07-13 PROCEDURE — 87635 SARS-COV-2 COVID-19 AMP PRB: CPT | Performed by: STUDENT IN AN ORGANIZED HEALTH CARE EDUCATION/TRAINING PROGRAM

## 2023-07-13 RX ORDER — ONDANSETRON 2 MG/ML
4 INJECTION INTRAMUSCULAR; INTRAVENOUS EVERY 6 HOURS PRN
Status: DISCONTINUED | OUTPATIENT
Start: 2023-07-13 | End: 2023-07-14 | Stop reason: HOSPADM

## 2023-07-13 RX ORDER — MORPHINE SULFATE 4 MG/ML
4 INJECTION, SOLUTION INTRAMUSCULAR; INTRAVENOUS EVERY 4 HOURS PRN
Status: DISCONTINUED | OUTPATIENT
Start: 2023-07-13 | End: 2023-07-14 | Stop reason: HOSPADM

## 2023-07-13 RX ORDER — SODIUM CHLORIDE 9 MG/ML
1000 INJECTION, SOLUTION INTRAVENOUS
Status: COMPLETED | OUTPATIENT
Start: 2023-07-13 | End: 2023-07-14

## 2023-07-13 RX ORDER — GLUCAGON 1 MG
1 KIT INJECTION
Status: DISCONTINUED | OUTPATIENT
Start: 2023-07-13 | End: 2023-07-14

## 2023-07-13 RX ORDER — INSULIN ASPART 100 [IU]/ML
0-12 INJECTION, SOLUTION INTRAVENOUS; SUBCUTANEOUS
Status: DISCONTINUED | OUTPATIENT
Start: 2023-07-13 | End: 2023-07-13

## 2023-07-13 RX ORDER — GLUCAGON 1 MG
1 KIT INJECTION
Status: DISCONTINUED | OUTPATIENT
Start: 2023-07-13 | End: 2023-07-13

## 2023-07-13 RX ORDER — LORAZEPAM 2 MG/ML
1 INJECTION INTRAMUSCULAR
Status: COMPLETED | OUTPATIENT
Start: 2023-07-13 | End: 2023-07-13

## 2023-07-13 RX ORDER — INSULIN ASPART 100 [IU]/ML
1-10 INJECTION, SOLUTION INTRAVENOUS; SUBCUTANEOUS EVERY 6 HOURS PRN
Status: DISCONTINUED | OUTPATIENT
Start: 2023-07-13 | End: 2023-07-14

## 2023-07-13 RX ADMIN — MORPHINE SULFATE 4 MG: 4 INJECTION INTRAVENOUS at 09:07

## 2023-07-13 RX ADMIN — LORAZEPAM 1 MG: 2 INJECTION INTRAMUSCULAR; INTRAVENOUS at 11:07

## 2023-07-13 RX ADMIN — ONDANSETRON 4 MG: 2 INJECTION INTRAMUSCULAR; INTRAVENOUS at 09:07

## 2023-07-13 NOTE — Clinical Note
Diagnosis: Small bowel obstruction [351996]   Future Attending Provider: TITI GUTIERREZ [[id number]]   Admitting Provider:: MARGARITO COPE [[id number]]

## 2023-07-13 NOTE — FIRST PROVIDER EVALUATION
Emergency Department TeleTriage Encounter Note      CHIEF COMPLAINT    Chief Complaint   Patient presents with    Abdominal Pain     Pt c/o abdominal pain that started a few hours ago. Pt has diarrhea this morning prior to the pain starting. Pt has nausea but no vomiting. Pt last ate around 1. Pt denies any dysuria. Pain is in the lower abdomen. Pt appears in moderate distress.        VITAL SIGNS   Initial Vitals [07/13/23 1816]   BP Pulse Resp Temp SpO2   (!) 151/89 65 18 97.4 °F (36.3 °C) 99 %      MAP       --            ALLERGIES    Review of patient's allergies indicates:  No Known Allergies    PROVIDER TRIAGE NOTE  This is a teletriage evaluation of a 48 y.o. female presenting to the ED complaining of abdominal pain. Patient reports severe abdominal pain with nausea. She had diarrhea earlier today. States it feels similar to previous bowel obstruction.    Patient is alert and oriented. She speaks in complete sentences. She is sitting upright in the chair. She appears uncomfortable.    Initial orders will be placed and care will be transferred to an alternate provider when patient is roomed for a full evaluation. Any additional orders and the final disposition will be determined by that provider.         ORDERS  Labs Reviewed   CBC W/ AUTO DIFFERENTIAL   COMPREHENSIVE METABOLIC PANEL   LIPASE   URINALYSIS, REFLEX TO URINE CULTURE       ED Orders (720h ago, onward)      Start Ordered     Status Ordering Provider    07/13/23 1823 07/13/23 1823  CT Abdomen Pelvis  Without Contrast  1 time imaging         Ordered MATHIEU COLLADO.    07/13/23 1822 07/13/23 1821  Vital signs  Every 2 hours         Ordered MATHIEU COLLADO G.    07/13/23 1822 07/13/23 1821  Diet NPO  Diet effective now         Ordered HEAVENMATHIEU G.    07/13/23 1822 07/13/23 1821  Insert peripheral IV  Once         Ordered HEAVENJOSE MANUELY G.    07/13/23 1822 07/13/23 1821  CBC W/ AUTO DIFFERENTIAL  STAT         Ordered MATHIEU COLLADO GHoward    07/13/23 1822  07/13/23 1821  Comp. Metabolic Panel  STAT         Ordered MATHIEU COLLADO.    07/13/23 1822 07/13/23 1821  Lipase  STAT         Ordered MATHIEU COLLADO.    07/13/23 1822 07/13/23 1821  Urinalysis, Reflex to Urine Culture Urine, Clean Catch  STAT         Ordered MATHIEU COLLADO.              Virtual Visit Note: The provider triage portion of this emergency department evaluation and documentation was performed via [employer], a HIPAA-compliant telemedicine application, in concert with a tele-presenter in the room. A face to face patient evaluation with one of my colleagues will occur once the patient is placed in an emergency department room.      DISCLAIMER: This note was prepared with [employer] voice recognition transcription software. Garbled syntax, mangled pronouns, and other bizarre constructions may be attributed to that software system.

## 2023-07-14 ENCOUNTER — CLINICAL SUPPORT (OUTPATIENT)
Dept: SMOKING CESSATION | Facility: CLINIC | Age: 48
End: 2023-07-14
Payer: COMMERCIAL

## 2023-07-14 VITALS
TEMPERATURE: 98 F | HEART RATE: 67 BPM | HEIGHT: 66 IN | DIASTOLIC BLOOD PRESSURE: 64 MMHG | BODY MASS INDEX: 19.91 KG/M2 | OXYGEN SATURATION: 92 % | RESPIRATION RATE: 18 BRPM | WEIGHT: 123.88 LBS | SYSTOLIC BLOOD PRESSURE: 137 MMHG

## 2023-07-14 DIAGNOSIS — F17.210 CIGARETTE SMOKER: Primary | ICD-10-CM

## 2023-07-14 PROBLEM — R10.84 GENERALIZED ABDOMINAL PAIN: Status: ACTIVE | Noted: 2022-12-19

## 2023-07-14 LAB
POCT GLUCOSE: 70 MG/DL (ref 70–110)
POCT GLUCOSE: 88 MG/DL (ref 70–110)
POCT GLUCOSE: 97 MG/DL (ref 70–110)

## 2023-07-14 PROCEDURE — 63600175 PHARM REV CODE 636 W HCPCS: Performed by: STUDENT IN AN ORGANIZED HEALTH CARE EDUCATION/TRAINING PROGRAM

## 2023-07-14 PROCEDURE — 25000003 PHARM REV CODE 250: Performed by: STUDENT IN AN ORGANIZED HEALTH CARE EDUCATION/TRAINING PROGRAM

## 2023-07-14 PROCEDURE — 99407 BEHAV CHNG SMOKING > 10 MIN: CPT | Mod: S$GLB,,,

## 2023-07-14 PROCEDURE — 99222 1ST HOSP IP/OBS MODERATE 55: CPT | Mod: ,,, | Performed by: STUDENT IN AN ORGANIZED HEALTH CARE EDUCATION/TRAINING PROGRAM

## 2023-07-14 PROCEDURE — 99407 PR TOBACCO USE CESSATION INTENSIVE >10 MINUTES: ICD-10-PCS | Mod: S$GLB,,,

## 2023-07-14 PROCEDURE — S4991 NICOTINE PATCH NONLEGEND: HCPCS | Performed by: STUDENT IN AN ORGANIZED HEALTH CARE EDUCATION/TRAINING PROGRAM

## 2023-07-14 PROCEDURE — 99222 PR INITIAL HOSPITAL CARE,LEVL II: ICD-10-PCS | Mod: ,,, | Performed by: STUDENT IN AN ORGANIZED HEALTH CARE EDUCATION/TRAINING PROGRAM

## 2023-07-14 RX ORDER — AMITRIPTYLINE HYDROCHLORIDE 10 MG/1
10 TABLET, FILM COATED ORAL NIGHTLY
Status: DISCONTINUED | OUTPATIENT
Start: 2023-07-14 | End: 2023-07-14 | Stop reason: HOSPADM

## 2023-07-14 RX ORDER — GLUCAGON 1 MG
1 KIT INJECTION
Status: DISCONTINUED | OUTPATIENT
Start: 2023-07-14 | End: 2023-07-14 | Stop reason: HOSPADM

## 2023-07-14 RX ORDER — DOCUSATE SODIUM 100 MG/1
100 CAPSULE, LIQUID FILLED ORAL 2 TIMES DAILY
Status: DISCONTINUED | OUTPATIENT
Start: 2023-07-14 | End: 2023-07-14 | Stop reason: HOSPADM

## 2023-07-14 RX ORDER — IBUPROFEN 200 MG
1 TABLET ORAL DAILY
Status: DISCONTINUED | OUTPATIENT
Start: 2023-07-14 | End: 2023-07-14 | Stop reason: HOSPADM

## 2023-07-14 RX ORDER — IBUPROFEN 200 MG
24 TABLET ORAL
Status: DISCONTINUED | OUTPATIENT
Start: 2023-07-14 | End: 2023-07-14 | Stop reason: HOSPADM

## 2023-07-14 RX ORDER — CETIRIZINE HYDROCHLORIDE 10 MG/1
10 TABLET ORAL DAILY
Status: DISCONTINUED | OUTPATIENT
Start: 2023-07-14 | End: 2023-07-14 | Stop reason: HOSPADM

## 2023-07-14 RX ORDER — IBUPROFEN 200 MG
16 TABLET ORAL
Status: DISCONTINUED | OUTPATIENT
Start: 2023-07-14 | End: 2023-07-14 | Stop reason: HOSPADM

## 2023-07-14 RX ORDER — INSULIN ASPART 100 [IU]/ML
1-10 INJECTION, SOLUTION INTRAVENOUS; SUBCUTANEOUS
Status: DISCONTINUED | OUTPATIENT
Start: 2023-07-14 | End: 2023-07-14 | Stop reason: HOSPADM

## 2023-07-14 RX ADMIN — DOCUSATE SODIUM 100 MG: 100 CAPSULE, LIQUID FILLED ORAL at 10:07

## 2023-07-14 RX ADMIN — SODIUM CHLORIDE 1000 ML: 9 INJECTION, SOLUTION INTRAVENOUS at 01:07

## 2023-07-14 RX ADMIN — MORPHINE SULFATE 4 MG: 4 INJECTION INTRAVENOUS at 01:07

## 2023-07-14 RX ADMIN — NICOTINE 1 PATCH: 21 PATCH, EXTENDED RELEASE TRANSDERMAL at 11:07

## 2023-07-14 RX ADMIN — DEXTROSE MONOHYDRATE 125 ML: 100 INJECTION, SOLUTION INTRAVENOUS at 06:07

## 2023-07-14 RX ADMIN — CETIRIZINE HYDROCHLORIDE 10 MG: 10 TABLET, FILM COATED ORAL at 10:07

## 2023-07-14 RX ADMIN — MORPHINE SULFATE 4 MG: 4 INJECTION INTRAVENOUS at 12:07

## 2023-07-14 RX ADMIN — MORPHINE SULFATE 4 MG: 4 INJECTION INTRAVENOUS at 05:07

## 2023-07-14 NOTE — ED PROVIDER NOTES
ED Provider Note - 7/13/2023    History     Chief Complaint   Patient presents with    Abdominal Pain     Pt c/o abdominal pain that started a few hours ago. Pt has diarrhea this morning prior to the pain starting. Pt has nausea but no vomiting. Pt last ate around 1. Pt denies any dysuria. Pain is in the lower abdomen. Pt appears in moderate distress.        Velma Loepz is a 48 y.o. year old female with past medical and surgical history as seen below, presenting with chief complaint of nausea, vomiting, generalized abdominal pain over the past few hours.  Complicated recent history involving partial colectomy and prior small bowel obstruction that improve with medical management.  She was concerned due to feeling today how she felt during prior small-bowel obstruction.    Past Medical History:   Diagnosis Date    Chronic back pain     Diabetes type 2, uncontrolled     Mild nonproliferative diabetic retinopathy of left eye without macular edema associated with type 2 diabetes mellitus 10/21/2019    Morbid obesity with BMI of 40.0-44.9, adult     MILEY on CPAP     Plantar fasciitis of left foot     Urticaria      Past Surgical History:   Procedure Laterality Date    ARTHROSCOPIC ACROMIOPLASTY OF SHOULDER Left 7/27/2020    Procedure: ACROMIOPLASTY, ARTHROSCOPIC;  Surgeon: Marcela Garza MD;  Location: [facility] OR;  Service: Orthopedics;  Laterality: Left;    ARTHROSCOPIC REPAIR OF ROTATOR CUFF OF SHOULDER Right 7/27/2020    Procedure: REPAIR, ROTATOR CUFF, ARTHROSCOPIC;  Surgeon: Marcela Garza MD;  Location: [facility] OR;  Service: Orthopedics;  Laterality: Right;  GENERAL/REGIONAL    COLONOSCOPY N/A 4/7/2022    Procedure: COLONOSCOPY;  Surgeon: Yadi Wong MD;  Location: [address];  Service: Endoscopy;  Laterality: N/A;  Covid test at [place] on 4/4.EC    CYST REMOVAL      ovarian cyst removal at age 12    DECOMPRESSION OF SUBACROMIAL SPACE Left 7/27/2020    Procedure: DECOMPRESSION,  SUBACROMIAL SPACE;  Surgeon: Marcela Garza MD;  Location: [facility] OR;  Service: Orthopedics;  Laterality: Left;    DILATION AND CURETTAGE OF UTERUS      ESOPHAGEAL MOTILITY STUDY USING HIGH RESOLUTION MANOMETRY N/A 9/17/2019    Procedure: MOTILITY STUDY, ESOPHAGUS, USING HIGH RESOLUTION MANOMETRY;  Surgeon: Leopoldo Swanson MD;  Location: [facility] ENDO (4TH FLR);  Service: Endoscopy;  Laterality: N/A;  Pt will be taking xanax before procedure to hopefull help with anxiety.    ESOPHAGOGASTRODUODENOSCOPY N/A 6/14/2019    Procedure: EGD (ESOPHAGOGASTRODUODENOSCOPY);  Surgeon: Ced Guevara MD;  Location: [facility] (4TH FLR);  Service: Endoscopy;  Laterality: N/A;  Please make sure it is scheduled after her cardiology appt.- see cardiology note dated 6/13/19 for clearance - ERW    FOREIGN BODY REMOVAL N/A 4/17/2023    Procedure: REMOVAL, FOREIGN BODY;  Surgeon: Rom Rodriguez MD;  Location: [facility] OR;  Service: General;  Laterality: N/A;    GASTRIC BYPASS N/A 05/20/2021    INJECTION OF STEROID Left 7/27/2020    Procedure: INJECTION, STEROID LEFT THUMB TRIGGER FINGER;  Surgeon: Marcela Garza MD;  Location: [facility] OR;  Service: Orthopedics;  Laterality: Left;  LEFT THUMB, TRIGGER FINGER    LYSIS OF ADHESIONS N/A 10/21/2022    Procedure: LYSIS, ADHESIONS;  Surgeon: Bunny Obrien MD;  Location: [facility] OR;  Service: General;  Laterality: N/A;    TRANSVERSE COLECTOMY N/A 10/26/2022    Procedure: COLECTOMY, TRANSVERSE;  Surgeon: Bunny Obrien MD;  Location: [facility] OR;  Service: General;  Laterality: N/A;    TRIGGER FINGER RELEASE Right 10/4/2019    Procedure: RELEASE, TRIGGER FINGER - right thumb;  Surgeon: Craig Tom MD;  Location: [facility] OR;  Service: Orthopedics;  Laterality: Right;    UPPER GASTROINTESTINAL ENDOSCOPY      WISDOM TOOTH EXTRACTION           Family History   Problem Relation Age of Onset    Cancer Maternal Grandmother     Diabetes Mother     No Known Problems Sister     No Known Problems  Sister     No Known Problems Sister     Breast cancer Maternal Aunt 70    Colon cancer Neg Hx     Stomach cancer Neg Hx     Inflammatory bowel disease Neg Hx     Esophageal cancer Neg Hx      Social History     Tobacco Use    Smoking status: Every Day     Packs/day: 0.75     Years: 30.00     Pack years: 22.50     Types: Cigarettes     Start date: 1992    Smokeless tobacco: Never    Tobacco comments:     Enrolled in the [employer] on 8/1/18 ([facility] Member ID # [id number]) Handout provided for Ambulatory Smoking Cessation Program.   Substance Use Topics    Alcohol use: Yes     Comment: social    Drug use: No     Social Determinants of Health with Concerns     Tobacco Use: High Risk    Smoking Tobacco Use: Every Day    Smokeless Tobacco Use: Never    Passive Exposure: Not on file   Physical Activity: Insufficiently Active    Days of Exercise per Week: 2 days    Minutes of Exercise per Session: 60 min   Social Connections: Socially Isolated    Frequency of Communication with Friends and Family: More than three times a week    Frequency of Social Gatherings with Friends and Family: More than three times a week    Attends Faith Services: Never    Active Member of Clubs or Organizations: No    Attends Club or Organization Meetings: Never    Marital Status: Never    Housing Stability: Unknown    Unable to Pay for Housing in the Last Year: No    Number of Places Lived in the Last Year: Not on file    Unstable Housing in the Last Year: No   Depression: Not on file      Review of patient's allergies indicates:  No Known Allergies    Review of Systems     A full Review of Systems (ROS) was performed and was negative unless otherwise stated in the HPI.      Physical Exam     Vitals:    07/13/23 1816   BP: (!) 151/89   BP Location: Left arm   Patient Position: Sitting   Pulse: 65   Resp: 18   Temp: 97.4 °F (36.3 °C)   TempSrc: Oral   SpO2: 99%   Weight: 61.2 kg (135 lb)        Physical Exam    Nursing note and vitals  reviewed.  Constitutional: She appears well-developed and well-nourished. No distress.   HENT:   Head: Normocephalic and atraumatic.   Right Ear: External ear normal.   Left Ear: External ear normal.   Nose: Nose normal.   Mouth/Throat: Oropharynx is clear and moist.   Eyes: Conjunctivae and EOM are normal. Pupils are equal, round, and reactive to light.   Neck: Neck supple.   Normal range of motion.  Cardiovascular:  Normal rate, regular rhythm, normal heart sounds and intact distal pulses.           Pulmonary/Chest: Breath sounds normal. No stridor. No respiratory distress. She has no wheezes. She has no rhonchi. She has no rales.   Abdominal: Abdomen is soft. She exhibits no distension. Bowel sounds are decreased. A surgical scar is present. There is generalized abdominal tenderness. There is no rebound and no guarding.   Musculoskeletal:         General: No tenderness or edema. Normal range of motion.      Cervical back: Normal range of motion and neck supple.     Neurological: She is alert and oriented to person, place, and time. She has normal strength. No cranial nerve deficit or sensory deficit.   Skin: Skin is warm and dry. No rash noted.   Psychiatric: She has a normal mood and affect. Thought content normal.       Lab Results- Independently reviewed by myself      Labs Reviewed   CBC W/ AUTO DIFFERENTIAL - Abnormal; Notable for the following components:       Result Value    MCH 31.2 (*)     Gran # (ANC) 8.1 (*)     Gran % 75.8 (*)     Mono % 3.3 (*)     All other components within normal limits   COMPREHENSIVE METABOLIC PANEL - Abnormal; Notable for the following components:    CO2 21 (*)     All other components within normal limits   URINALYSIS, REFLEX TO URINE CULTURE - Abnormal; Notable for the following components:    Specific Gravity, UA >1.030 (*)     Protein, UA Trace (*)     All other components within normal limits    Narrative:     Specimen Source->Urine   LIPASE   SARS-COV-2 RDRP GENE   POCT  GLUCOSE           Imaging     Imaging Results              CT Abdomen Pelvis  Without Contrast (Final result)  Result time 07/13/23 19:50:03      Final result by Ramiro Pearce DO (07/13/23 19:50:03)                   Impression:      Small bowel obstruction with transition point in the left hemiabdomen, likely due to adhesions.  No bowel wall thickening or pneumatosis.      Electronically signed by: Ramiro Pearce  Date:    07/13/2023  Time:    19:50               Narrative:    EXAMINATION:  CT ABDOMEN PELVIS WITHOUT CONTRAST    CLINICAL HISTORY:  Bowel obstruction suspected;    TECHNIQUE:  Multiplanar images were obtained of the abdomen and pelvis from the hemidiaphragms through the symphysis pubis without intravenous contrast.    COMPARISON:  CT of the abdomen and pelvis from 04/17/2023.    FINDINGS:  Lung Bases: Clear.    Heart: Heart size is normal.  No pericardial effusion.    Liver: Normal in size and attenuation without focal hepatic lesion.    Biliary tract: No intrahepatic or extrahepatic biliary ductal dilatation.    Gallbladder: No radiodense gallstone. No wall thickening or pericholecystic fluid.    Pancreas: Normal. No pancreatic ductal dilatation.    Spleen: Normal size without focal lesion.    Adrenals: Normal.    Kidneys and urinary collecting systems: Mild suspected hyperattenuation in the kidneys, may represent medullary nephrocalcinosis or small nonobstructing calculi.  No hydronephrosis or obstructing calculus.    Lymph nodes: None enlarged.    Stomach and bowel: There are postop change of s gastric bypass.  The gastrojejunal and jejunal anastomoses are unremarkable.  There are multiple dilated loops of small bowel in the pelvis, measuring up to approximately 3.8 cm in maximal diameter.  There is no evidence of bowel wall thickening.  There is a transition point in the left hemiabdomen (series 2, image 91), just left of midline.  There is a moderate volume of liquid stool in the right  colon.    Peritoneum and mesentery: No ascites or free intraperitoneal air. No abdominal fluid collection.    Vasculature: Normal.    Urinary bladder: Normal.    Reproductive organs: The uterus is unremarkable.    Body wall: No abnormality.    Musculoskeletal: No aggressive osseous lesion.  There are multilevel degenerative changes of the lumbar spine.                                              ED Course         Critical Care    Date/Time: 7/13/2023 8:54 PM  Performed by: Toi Barnes MD  Authorized by: Toi Barnes MD   Direct patient critical care time: 11 minutes  Additional history critical care time: 7 minutes  Ordering / reviewing critical care time: 7 minutes  Documentation critical care time: 8 minutes  Consulting other physicians critical care time: 5 minutes  Total critical care time (exclusive of procedural time) : 38 minutes  Critical care time was exclusive of separately billable procedures and treating other patients and teaching time.  Critical care was necessary to treat or prevent imminent or life-threatening deterioration of the following conditions: SBO.  Critical care was time spent personally by me on the following activities: blood draw for specimens, discussions with consultants, interpretation of cardiac output measurements, evaluation of patient's response to treatment, examination of patient, obtaining history from patient or surrogate, ordering and review of laboratory studies, ordering and performing treatments and interventions, ordering and review of radiographic studies, pulse oximetry, re-evaluation of patient's condition and review of old charts.             Orders Placed This Encounter    CT Abdomen Pelvis  Without Contrast    CBC W/ AUTO DIFFERENTIAL    Comp. Metabolic Panel    Lipase    Urinalysis, Reflex to Urine Culture Urine, Clean Catch    Diet NPO    Vital signs    Insert peripheral IV          ED Course as of 07/16/23 0141   u Jul 13, 2023 2021  CT A/P:  There are  postop change of s gastric bypass.  The gastrojejunal and jejunal anastomoses are unremarkable.  There are multiple dilated loops of small bowel in the pelvis, measuring up to approximately 3.8 cm in maximal diameter.  There is no evidence of bowel wall thickening.  There is a transition point in the left hemiabdomen (series 2, image 91), just left of midline.  There is a moderate volume of liquid stool in the right colon. [KB]   2025 Discussed with Dr. Obrien. Would like patient NPO with NG tube and placed in observation on his service. [KB]      ED Course User Index  [KB] Toi Barnes MD              Medical Decision Making       The patient's list of active medical problems, social history, medications, and allergies as documented per RN staff has been reviewed.     Comorbidities taken into consideration for development of diagnosis and treatment plan include DM.    Medical Decision Making:   History:   Old Medical Records: I decided to obtain old medical records.  Old Records Summarized: records from previous admission(s) and records from clinic visits.  Clinical Tests:   Lab Tests: Ordered and Reviewed  Radiological Study: Ordered and Reviewed  Other:   I have discussed this case with another health care provider.                Clinical Impression       Disposition     ED Disposition Condition    Admit             Diagnosis    ICD-10-CM ICD-9-CM   1. Small bowel obstruction  K56.609 560.9           Toi Barnes MD        07/13/2023          DISCLAIMER: This note was prepared with [employer] voice recognition transcription software. Garbled syntax, mangled pronouns, and other bizarre constructions may be attributed to that software system.       Toi Barnes MD  07/16/23 0145

## 2023-07-14 NOTE — PROGRESS NOTES
[facility]           Pharmacy        Current Drug Shortage     Due to national backorder and [facility] is critically low on inventory of Dextrose 50% (D50) Syringes and Vials, pharmacy has automatically switched from D50% to D10% IVPB at the equivalent dose until resolution of the shortage per P&T approved protocol.               Mandy Hooks, PharmD  [phone]

## 2023-07-14 NOTE — PROGRESS NOTES
Future Appointments   Date Time Provider Department Center   7/31/2023 11:00 AM JERRI Clark [facility] [place] [facility]   8/2/2023 10:00 AM Aftab Martinez MD [facility]

## 2023-07-14 NOTE — NURSING
Received report from Kellie, received the patient lying supine, new orders received for NG tube removal done prior to assuming care this am, patient voiced no concerns at present. Bed locked in low with call light in reach.

## 2023-07-14 NOTE — H&P
Patient ID: Velma Lopez is a 48 y.o. female.    Chief Complaint: Abdominal Pain (Pt c/o abdominal pain that started a few hours ago. Pt has diarrhea this morning prior to the pain starting. Pt has nausea but no vomiting. Pt last ate around 1. Pt denies any dysuria. Pain is in the lower abdomen. Pt appears in moderate distress. )      HPI:  HPI  48F admitted from ED last night due to central ab cramping pain, nausea. Similar to previous episodes. Had BM and passed flatus yesterday. NG placed in ED has had no output despite repositioning.   KNown to me for hx of partial colectomy  Also hx of gastric bypass.     Review of Systems   Constitutional:  Negative for fever.   HENT:  Negative for trouble swallowing.    Respiratory:  Negative for shortness of breath.    Cardiovascular:  Negative for chest pain.   Gastrointestinal:  Negative for abdominal pain, blood in stool, nausea and vomiting.   Genitourinary:  Negative for dysuria.   Musculoskeletal:  Negative for gait problem.   Skin:  Negative for rash and wound.   Allergic/Immunologic: Negative for immunocompromised state.   Neurological:  Negative for weakness.   Hematological:  Does not bruise/bleed easily.   Psychiatric/Behavioral:  Negative for agitation.      Current Facility-Administered Medications   Medication Dose Route Frequency Provider Last Rate Last Admin    amitriptyline tablet 10 mg  10 mg Oral QHS Bunny Obrien MD        cetirizine tablet 10 mg  10 mg Oral Daily Bunny Obrien MD   10 mg at 07/14/23 1001    dextrose 10% bolus 125 mL 125 mL  12.5 g Intravenous PRN Toi Barnes MD   Stopped at 07/14/23 0634    docusate sodium capsule 100 mg  100 mg Oral BID Bunny Obrien MD   100 mg at 07/14/23 1001    glucagon (human recombinant) injection 1 mg  1 mg Intramuscular PRN Bunny Obrien MD        glucose chewable tablet 16 g  16 g Oral PRN Bunny Obrien MD        glucose chewable tablet 24 g  24 g Oral PRN Bunny ANDINO  MD Camille        insulin aspart U-100 pen 1-10 Units  1-10 Units Subcutaneous QID (AC + HS) PRN Bunny Obrien MD        morphine injection 4 mg  4 mg Intravenous Q4H PRN Toi Barnes MD   4 mg at 07/14/23 1219    nicotine 21 mg/24 hr 1 patch  1 patch Transdermal Daily Bunny Obrien MD   1 patch at 07/14/23 1106    ondansetron injection 4 mg  4 mg Intravenous Q6H PRN Toi Barnes MD   4 mg at 07/13/23 2119     Facility-Administered Medications Ordered in Other Encounters   Medication Dose Route Frequency Provider Last Rate Last Admin    lidocaine (PF) 10 mg/ml (1%) injection 10 mg  1 mL Intradermal Once Brenna Livingston MD        midazolam (VERSED) 1 mg/mL injection 0.5 mg  0.5 mg Intravenous PRN Ced Patino MD   2 mg at 07/27/20 1322    ropivacaine (Naropin) 1000 mg/500 mL (2 mg/mL) Nimbus PainPRO Pump infusion  4 mL/hr Perineural Continuous Ced Patino MD 4 mL/hr at 07/27/20 1519 4 mL/hr at 07/27/20 1519       Review of patient's allergies indicates:  No Known Allergies    Past Medical History:   Diagnosis Date    Chronic back pain     Diabetes type 2, uncontrolled     Mild nonproliferative diabetic retinopathy of left eye without macular edema associated with type 2 diabetes mellitus 10/21/2019    Morbid obesity with BMI of 40.0-44.9, adult     MILEY on CPAP     Plantar fasciitis of left foot     Urticaria        Past Surgical History:   Procedure Laterality Date    ARTHROSCOPIC ACROMIOPLASTY OF SHOULDER Left 7/27/2020    Procedure: ACROMIOPLASTY, ARTHROSCOPIC;  Surgeon: Marcela Garza MD;  Location: [facility] OR;  Service: Orthopedics;  Laterality: Left;    ARTHROSCOPIC REPAIR OF ROTATOR CUFF OF SHOULDER Right 7/27/2020    Procedure: REPAIR, ROTATOR CUFF, ARTHROSCOPIC;  Surgeon: Marcela Garza MD;  Location: [facility] OR;  Service: Orthopedics;  Laterality: Right;  GENERAL/REGIONAL    COLONOSCOPY N/A 4/7/2022    Procedure: COLONOSCOPY;  Surgeon: Yadi Wong,  MD;  Location: [facility] ([address] [place] [address]);  Service: Endoscopy;  Laterality: N/A;  Covid test at [place] on 4/4.EC    CYST REMOVAL      ovarian cyst removal at age 12    DECOMPRESSION OF SUBACROMIAL SPACE Left 7/27/2020    Procedure: DECOMPRESSION, SUBACROMIAL SPACE;  Surgeon: Marcela Garza MD;  Location: [facility] OR;  Service: Orthopedics;  Laterality: Left;    DILATION AND CURETTAGE OF UTERUS      ESOPHAGEAL MOTILITY STUDY USING HIGH RESOLUTION MANOMETRY N/A 9/17/2019    Procedure: MOTILITY STUDY, ESOPHAGUS, USING HIGH RESOLUTION MANOMETRY;  Surgeon: Leopoldo Swanson MD;  Location: [facility] (4TH FLR);  Service: Endoscopy;  Laterality: N/A;  Pt will be taking xanax before procedure to hopefull help with anxiety.    ESOPHAGOGASTRODUODENOSCOPY N/A 6/14/2019    Procedure: EGD (ESOPHAGOGASTRODUODENOSCOPY);  Surgeon: Ced Guevara MD;  [facility]  Service: Endoscopy;  Laterality: N/A;  Please make sure it is scheduled after her cardiology appt.- see cardiology note dated 6/13/19 for clearance - ERW    FOREIGN BODY REMOVAL N/A 4/17/2023    Procedure: REMOVAL, FOREIGN BODY;  Surgeon: Rom Rodriguez MD;  Location: [facility] OR;  Service: General;  Laterality: N/A;    GASTRIC BYPASS N/A 05/20/2021    INJECTION OF STEROID Left 7/27/2020    Procedure: INJECTION, STEROID LEFT THUMB TRIGGER FINGER;  Surgeon: Marcela Garza MD;  Location: [facility] OR;  Service: Orthopedics;  Laterality: Left;  LEFT THUMB, TRIGGER FINGER    LYSIS OF ADHESIONS N/A 10/21/2022    Procedure: LYSIS, ADHESIONS;  Surgeon: Bunny Obrien MD;  Location: [facility] OR;  Service: General;  Laterality: N/A;    TRANSVERSE COLECTOMY N/A 10/26/2022    Procedure: COLECTOMY, TRANSVERSE;  Surgeon: Bunny Obrien MD;  Location: [facility] OR;  Service: General;  Laterality: N/A;    TRIGGER FINGER RELEASE Right 10/4/2019    Procedure: RELEASE, TRIGGER FINGER - right thumb;  Surgeon: Craig Tom MD;  Location: [facility] OR;  Service: Orthopedics;   Laterality: Right;    UPPER GASTROINTESTINAL ENDOSCOPY      WISDOM TOOTH EXTRACTION         Family History   Problem Relation Age of Onset    Cancer Maternal Grandmother     Diabetes Mother     No Known Problems Sister     No Known Problems Sister     No Known Problems Sister     Breast cancer Maternal Aunt 70    Colon cancer Neg Hx     Stomach cancer Neg Hx     Inflammatory bowel disease Neg Hx     Esophageal cancer Neg Hx        Social History     Socioeconomic History    Marital status: Single   Tobacco Use    Smoking status: Every Day     Packs/day: 0.75     Years: 31.00     Pack years: 23.25     Types: Cigarettes     Start date: 1992    Smokeless tobacco: Never    Tobacco comments:     Enrolled in the [employer] on 8/1/18 ([facility] Member ID # [id number]) Handout provided for Ambulatory Smoking Cessation Program.   Substance and Sexual Activity    Alcohol use: Yes     Comment: social    Drug use: No    Sexual activity: Yes     Partners: Male     Birth control/protection: None   Social History Narrative    Single.  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      Social Determinants of Health     Financial Resource Strain: Low Risk     Difficulty of Paying Living Expenses: Not very hard   Food Insecurity: No Food Insecurity    Worried About Running Out of Food in the Last Year: Never true    Ran Out of Food in the Last Year: Never true   Transportation Needs: No Transportation Needs    Lack of Transportation (Medical): No    Lack of Transportation (Non-Medical): No   Physical Activity: Insufficiently Active    Days of Exercise per Week: 2 days    Minutes of Exercise per Session: 60 min   Stress: No Stress Concern Present    Feeling of Stress : Not at all   Social Connections: Socially Isolated    Frequency of Communication with Friends and Family: More than three times a week    Frequency of Social Gatherings with Friends and Family: More than three times a week    Attends Restoration Services: Never     Active Member of Clubs or Organizations: No    Attends Club or Organization Meetings: Never    Marital Status: Never    Housing Stability: Unknown    Unable to Pay for Housing in the Last Year: No    Unstable Housing in the Last Year: No       Vitals:    07/14/23 1219   BP:    Pulse:    Resp: 18   Temp:        Physical Exam  Constitutional:       General: She is not in acute distress.     Appearance: She is well-developed.   HENT:      Head: Normocephalic and atraumatic.   Eyes:      General: No scleral icterus.  Cardiovascular:      Rate and Rhythm: Normal rate.   Pulmonary:      Effort: Pulmonary effort is normal.      Breath sounds: No stridor.   Abdominal:      General: There is no distension.      Palpations: Abdomen is soft.      Tenderness: There is no abdominal tenderness.      Comments: Ab soft nonconcerning   Lymphadenopathy:      Cervical: No cervical adenopathy.   Skin:     General: Skin is warm.      Findings: No erythema.   Neurological:      Mental Status: She is alert and oriented to person, place, and time.   Psychiatric:         Behavior: Behavior normal.   Body mass index is 20 kg/m².  CT shows some dilated loops, air and fluid throughout colon      Assessment & Plan:  48F with apparent motility issue  Hx of ex lap for bowel obstruction, has not seemed to resolve her issues  Colon dilated even in splenic flexure  NG out  Advance diet  Likely home soon

## 2023-07-14 NOTE — PLAN OF CARE
CM met with pt - lives with family -   dx:  pSBO - resolved - tolerated lunch     No dme, no hh prior to admit - pt drives and is independent   Aminata Millard Sister [phone]  Future Appointments   Date Time Provider Department Center   7/31/2023 11:00 AM JERRI Clark [facility]   8/2/2023 10:00 AM [facility]     CM called pcp office -- message sent to pcp requesting a sooner apt than 8/2 - pt will be contacted by office      07/14/23 4715   Discharge Planning   Assessment Type Discharge Planning Brief Assessment   Resource/Environmental Concerns none   Support Systems Family members   Equipment Currently Used at Home none   Current Living Arrangements home   Patient/Family Anticipates Transition to home;home with family   Patient/Family Anticipated Services at Transition none   DME Needed Upon Discharge  none   Discharge Plan A Home;Home with family

## 2023-07-14 NOTE — ED NOTES
Two attempts for placement of NG tube.  RN had asked MD previously for anxiolytics.  PT unable to tolerate procedure at this time.  Will continue NPO orders at this time.

## 2023-07-14 NOTE — PROGRESS NOTES
Individual Follow-Up Form    7/14/2023    Quit Date: To be determined    Clinical Status of Patient: Inpatient    Length of Service: 30 minutes    Comments: Smoking cessation education provided. Pt is a 0.75 pk/day cigarette smoker x 31 yrs. Order obtained for 21 mg nicotine patch Q day. Pt states ready to quit. Ambulatory referral to Smoking Cessation clinic following hospital discharge.     Diagnosis: F17.210

## 2023-07-18 ENCOUNTER — TELEPHONE (OUTPATIENT)
Dept: INTERNAL MEDICINE | Facility: CLINIC | Age: 48
End: 2023-07-18
Payer: MEDICAID

## 2023-07-18 ENCOUNTER — PATIENT OUTREACH (OUTPATIENT)
Dept: EMERGENCY MEDICINE | Facility: HOSPITAL | Age: 48
End: 2023-07-18
Payer: MEDICAID

## 2023-07-18 NOTE — PROGRESS NOTES
F/u call placed from initial enrollment and recent visit. Pt states she is doing a whole lot better and denies needing any assistance at this time. Pt advised that she may reach out any time with questions or concerns. Pt verbalized understanding.      · Non chest pain associated elevation in troponin  · Likely secondary to underlying urinary tract infection and acute kidney injury in setting of cirrhosis  · No further inpatient intervention indicated at this time  · Troponins elevated but flat suspect non MI elevated troponin  · Telemetry has since been discontinued

## 2023-07-18 NOTE — TELEPHONE ENCOUNTER
Spoke with the pt and tried to schedule her at the [facility] for a hospital follow up. Pt declined and stated she would talk about her hospital encounter in August at her annual.

## 2023-07-18 NOTE — TELEPHONE ENCOUNTER
----- Message from Nya Altamirano sent at 7/14/2023  3:54 PM CDT -----  Regarding: appt access  Name of Caller: Shelia with [facility]      When is the first available appointment? unable to schedule       Symptoms:       Best Call Back Number: [phone]        Additional Information: pt needs an appt for hospital, follow up, please advise.

## 2023-07-19 ENCOUNTER — PATIENT OUTREACH (OUTPATIENT)
Dept: ADMINISTRATIVE | Facility: HOSPITAL | Age: 48
End: 2023-07-19
Payer: MEDICAID

## 2023-07-25 NOTE — PROGRESS NOTES
Individual Follow-Up Form    9/13/2022    Quit Date: N/A    Clinical Status of Patient: Outpatient    Length of Service: 30 minutes    Continuing Medication: yes  Chantix or Patches    Other Medications:      Target Symptoms: Withdrawal and medication side effects. The following were  rated moderate (3) to severe (4) on TCRS:  Moderate (3): none  Severe (4): none    Comments: completion of TCRS (Tobacco Cessation Rating Scale) reviewed strategies, cues, and triggers. Introduced the negative impact of tobacco on health, the health advantages of discontinuing the use of tobacco, time line improved health changes after a quit, withdrawal issues to expect from nicotine and habit, and ways to achieve the goal of a quit.  Patient states she used patches and Chantix for 3 days and was down to 2 cpd.  She then had a friend over who smokes, and she smoked with her friend.  Patient then discontinued her NRT and Chantix and bought a pack.  Patient states she will start over today.  We discussed starting NRT today and Chantix tomorrow.  We reviewed instructions for Chantix.  We discussed alcohol and socialization as a trigger to smoke. We discussed avoiding her friends who smoke this week.  We discussed having good and bad days moving forward and not feeling as if she has to quit immediately when starting medications.  Patient remains on prescribed tobacco cessation medication regimen of 21 mg patch without any negative side effects at this time.The patient remains on the prescribed tobacco cessation medication regimen of 1 mg Chantix BID without any negative side effects at this time.  The patient will continue with virtual therapy sessions and medication monitoring by CTTS. Prescribed medication management will be by physician.     Diagnosis: F17.200    Next Visit: 1 week     endoscopy

## 2023-07-27 NOTE — DISCHARGE SUMMARY
[facility]  Short Stay  Discharge Summary    Admit Date: 7/13/2023    Discharge Date and Time: 7/14/2023  5:55 PM      Discharge Attending Physician: No att. providers found     Hospital Course (synopsis of major diagnoses, care, treatment, and services provided during the course of the hospital stay): 48F admitted for nausea, ab pain. Her symptoms resolved quickly and she was discharged home.     Final Diagnoses:    Principal Problem: Generalized abdominal pain   Secondary Diagnoses:   Active Hospital Problems    Diagnosis  POA    *Generalized abdominal pain [R10.84]  Yes      Resolved Hospital Problems   No resolved problems to display.       Discharged Condition: stable    Disposition: Home or Self Care    Follow up/Patient Instructions:    Medications:  Reconciled Home Medications:      Medication List        CONTINUE taking these medications      acetaminophen 500 MG tablet  Commonly known as: TYLENOL  Take 2 tablets (1,000 mg total) by mouth every 6 (six) hours as needed for Pain.     amitriptyline 10 MG tablet  Commonly known as: ELAVIL  TAKE 1 TABLET BY MOUTH EVERY DAY IN THE EVENING     cetirizine 10 MG tablet  Commonly known as: ZYRTEC  Take 10 mg by mouth once daily.     docusate sodium 100 MG capsule  Commonly known as: COLACE  Take 1 capsule (100 mg total) by mouth 2 (two) times daily.     HYDROcodone-acetaminophen 5-325 mg per tablet  Commonly known as: NORCO  Take 1 tablet by mouth every 4 (four) hours as needed for Pain.     * medroxyPROGESTERone 150 mg/mL Syrg  Commonly known as: DEPO-PROVERA  Inject 1 mL (150 mg total) into the muscle every 3 (three) months.     * medroxyPROGESTERone 150 mg/mL Syrg  Commonly known as: DEPO-PROVERA  Inject 1 mL (150 mg total) into the muscle every 3 (three) months.     * medroxyPROGESTERone 150 mg/mL Syrg  Commonly known as: DEPO-PROVERA  Inject 1 mL (150 mg total) into the muscle every 3 (three) months.           * This list has 3 medication(s) that are the  same as other medications prescribed for you. Read the directions carefully, and ask your doctor or other care provider to review them with you.                No discharge procedures on file.   Follow-up Information       Afatb Martinez MD Follow up on 8/2/2023.    Specialty: Family Medicine  Why: 10:00   a message was sent to 's office requesting a sooner appointment - you will be contacted by the office.  Contact information:  [address]  [address]  [facility] [address]  [phone]               [facility] - Smoking Cessation Follow up on 7/31/2023.    Specialty: Smoking Cessation  Why: 11:00 am  Contact information:  [address], [address]  [place] [address] [phone]  Additional information:  [address]   At this time [facility] will only use these entries [facility], [facility], and Emergency Department due to COVID-19 precautions.

## 2023-07-31 ENCOUNTER — PATIENT OUTREACH (OUTPATIENT)
Dept: EMERGENCY MEDICINE | Facility: HOSPITAL | Age: 48
End: 2023-07-31
Payer: MEDICAID

## 2023-07-31 NOTE — PROGRESS NOTES
F/u call from last encounter placed. Pt states she is doing fine and denies any needs at this time. Pt continues to participate in the smoking cessation plan. Next appt is 8-14-23. Pt is aware.

## 2023-08-02 ENCOUNTER — OFFICE VISIT (OUTPATIENT)
Dept: INTERNAL MEDICINE | Facility: CLINIC | Age: 48
End: 2023-08-02
Attending: FAMILY MEDICINE
Payer: MEDICAID

## 2023-08-02 VITALS
DIASTOLIC BLOOD PRESSURE: 64 MMHG | WEIGHT: 139.75 LBS | HEART RATE: 68 BPM | HEIGHT: 66 IN | SYSTOLIC BLOOD PRESSURE: 110 MMHG | BODY MASS INDEX: 22.46 KG/M2 | OXYGEN SATURATION: 100 %

## 2023-08-02 DIAGNOSIS — E11.9 TYPE 2 DIABETES MELLITUS WITHOUT COMPLICATION, WITHOUT LONG-TERM CURRENT USE OF INSULIN: Primary | ICD-10-CM

## 2023-08-02 DIAGNOSIS — G47.33 OSA (OBSTRUCTIVE SLEEP APNEA): ICD-10-CM

## 2023-08-02 PROCEDURE — 1111F PR DISCHARGE MEDS RECONCILED W/ CURRENT OUTPATIENT MED LIST: ICD-10-PCS | Mod: CPTII,,, | Performed by: FAMILY MEDICINE

## 2023-08-02 PROCEDURE — 99999 PR PBB SHADOW E&M-EST. PATIENT-LVL IV: ICD-10-PCS | Mod: PBBFAC,,, | Performed by: FAMILY MEDICINE

## 2023-08-02 PROCEDURE — 1159F MED LIST DOCD IN RCRD: CPT | Mod: CPTII,,, | Performed by: FAMILY MEDICINE

## 2023-08-02 PROCEDURE — 3008F PR BODY MASS INDEX (BMI) DOCUMENTED: ICD-10-PCS | Mod: CPTII,,, | Performed by: FAMILY MEDICINE

## 2023-08-02 PROCEDURE — 3078F PR MOST RECENT DIASTOLIC BLOOD PRESSURE < 80 MM HG: ICD-10-PCS | Mod: CPTII,,, | Performed by: FAMILY MEDICINE

## 2023-08-02 PROCEDURE — 1160F PR REVIEW ALL MEDS BY PRESCRIBER/CLIN PHARMACIST DOCUMENTED: ICD-10-PCS | Mod: CPTII,,, | Performed by: FAMILY MEDICINE

## 2023-08-02 PROCEDURE — 3008F BODY MASS INDEX DOCD: CPT | Mod: CPTII,,, | Performed by: FAMILY MEDICINE

## 2023-08-02 PROCEDURE — 1160F RVW MEDS BY RX/DR IN RCRD: CPT | Mod: CPTII,,, | Performed by: FAMILY MEDICINE

## 2023-08-02 PROCEDURE — 3078F DIAST BP <80 MM HG: CPT | Mod: CPTII,,, | Performed by: FAMILY MEDICINE

## 2023-08-02 PROCEDURE — 99214 OFFICE O/P EST MOD 30 MIN: CPT | Mod: S$PBB,,, | Performed by: FAMILY MEDICINE

## 2023-08-02 PROCEDURE — 99214 OFFICE O/P EST MOD 30 MIN: CPT | Mod: PBBFAC | Performed by: FAMILY MEDICINE

## 2023-08-02 PROCEDURE — 3074F SYST BP LT 130 MM HG: CPT | Mod: CPTII,,, | Performed by: FAMILY MEDICINE

## 2023-08-02 PROCEDURE — 1159F PR MEDICATION LIST DOCUMENTED IN MEDICAL RECORD: ICD-10-PCS | Mod: CPTII,,, | Performed by: FAMILY MEDICINE

## 2023-08-02 PROCEDURE — 99214 PR OFFICE/OUTPT VISIT, EST, LEVL IV, 30-39 MIN: ICD-10-PCS | Mod: S$PBB,,, | Performed by: FAMILY MEDICINE

## 2023-08-02 PROCEDURE — 99999 PR PBB SHADOW E&M-EST. PATIENT-LVL IV: CPT | Mod: PBBFAC,,, | Performed by: FAMILY MEDICINE

## 2023-08-02 PROCEDURE — 1111F DSCHRG MED/CURRENT MED MERGE: CPT | Mod: CPTII,,, | Performed by: FAMILY MEDICINE

## 2023-08-02 PROCEDURE — 3074F PR MOST RECENT SYSTOLIC BLOOD PRESSURE < 130 MM HG: ICD-10-PCS | Mod: CPTII,,, | Performed by: FAMILY MEDICINE

## 2023-08-02 RX ORDER — TERCONAZOLE 4 MG/G
1 CREAM VAGINAL EVERY EVENING
COMMUNITY
Start: 2023-02-22 | End: 2023-08-18

## 2023-08-02 RX ORDER — MELOXICAM 7.5 MG/1
7.5 TABLET ORAL DAILY PRN
COMMUNITY
Start: 2023-07-22

## 2023-08-02 RX ORDER — IBUPROFEN 200 MG
1 TABLET ORAL DAILY
Qty: 28 PATCH | Refills: 1 | Status: SHIPPED | OUTPATIENT
Start: 2023-08-02 | End: 2023-08-18

## 2023-08-02 RX ORDER — SOLIFENACIN SUCCINATE 10 MG/1
10 TABLET, FILM COATED ORAL DAILY
COMMUNITY
Start: 2023-08-02 | End: 2024-03-27

## 2023-08-02 RX ORDER — TIZANIDINE 4 MG/1
TABLET ORAL
COMMUNITY
Start: 2023-07-22

## 2023-08-02 NOTE — PROGRESS NOTES
"CHIEF COMPLAINT:  T2 DM    HISTORY OF PRESENT ILLNESS: The patient is a very nice 48 year-old black female.  She recently had a good A1c.    She is seeing bariatric surgery.  She underwent successful gastric bypass.      REVIEW OF SYSTEMS:  GENERAL: No fever, chills, fatigability.  SKIN: No rashes, itching or changes in color or texture of skin.  HEAD: No headaches or recent head trauma.  EYES: Visual acuity fine. No photophobia, ocular pain or diplopia.  EARS: Denies ear pain, discharge or vertigo.  NOSE: No loss of smell, no epistaxis or postnasal drip.  MOUTH & THROAT: No hoarseness or change in voice. No excessive gum bleeding.  NODES: Denies swollen glands.  CHEST: Denies JONES, cyanosis, wheezing, cough and sputum production.  CARDIOVASCULAR: Denies chest pain, PND, orthopnea or reduced exercise tolerance.  ABDOMEN: Appetite fine. No weight loss. Denies diarrhea, abdominal pain, hematemesis or blood in stool.  URINARY: No flank pain, dysuria or hematuria.  PERIPHERAL VASCULAR: No claudication or cyanosis.  MUSCULOSKELETAL: No joint stiffness or swelling. She does have chronic intermittent back pain.  NEUROLOGIC: No history of seizures, paralysis, alteration of gait or coordination.    SOCIAL HISTORY: The patient does not smoke.  The patient consumes alcohol socially.     PHYSICAL EXAMINATION:     Blood pressure 110/64, pulse 68, height 5' 6" (1.676 m), weight 63.4 kg (139 lb 12.4 oz), SpO2 100 %.    APPEARANCE: Well nourished, well developed, in no acute distress.    HEAD: Normocephalic, atraumatic.  EYES: PERRL. EOMI.  Conjunctivae without injection and  anicteric   NOSE: Mucosa pink. Airway clear.  MOUTH & THROAT: No tonsillar enlargement. No pharyngeal erythema or exudate. No stridor.  NECK: Supple.   NODES: No cervical, axillary or inguinal lymph node enlargement.  CHEST: Lungs clear to auscultation.  No retractions are noted.  No rales or rhonchi are present.  CARDIOVASCULAR: Normal S1, S2. No rubs, murmurs " or gallops.  ABDOMEN: Bowel sounds normal. Not distended. Soft. No tenderness or masses.  No ascites is noted.  MUSCULOSKELETAL:  There is no clubbing, cyanosis, or edema of the extremities x4.  There is full range of motion of the lumbar spine.  There is full range of motion of the extremities x4.  There is no deformity noted.    NEUROLOGIC:       Normal speech development.      Hearing normal.      Normal gait.      Motor and sensory exams grossly normal.  PSYCHIATRIC: Patient is alert and oriented x3.  Thought processes are all normal.  There is no homicidality.  There is no suicidality.  There is no evidence of psychosis.    LABORATORY/RADIOLOGY:   Chart reviewed.      ASSESSMENT:   T2 DM, well controlled  MILEY  GERD  Morbid obesity with BMI of 30 status post bariatric surgery  Chronic low back pain    PLAN:  She had a good A1c at [facility]  We reviewed her recent blood work.    Metformin and diabetic edu  lipitor  losartan  Return to clinic in 6 months with A1c prior

## 2023-08-17 ENCOUNTER — HOSPITAL ENCOUNTER (INPATIENT)
Facility: HOSPITAL | Age: 48
LOS: 4 days | Discharge: HOME | DRG: 337 | End: 2023-08-21
Attending: EMERGENCY MEDICINE | Admitting: STUDENT IN AN ORGANIZED HEALTH CARE EDUCATION/TRAINING PROGRAM
Payer: MEDICAID

## 2023-08-17 DIAGNOSIS — K56.609 SMALL BOWEL OBSTRUCTION: Primary | ICD-10-CM

## 2023-08-17 PROCEDURE — 63600175 PHARM REV CODE 636 W HCPCS: Performed by: EMERGENCY MEDICINE

## 2023-08-17 PROCEDURE — 99285 EMERGENCY DEPT VISIT HI MDM: CPT | Mod: 25

## 2023-08-17 PROCEDURE — 96374 THER/PROPH/DIAG INJ IV PUSH: CPT

## 2023-08-17 PROCEDURE — 83690 ASSAY OF LIPASE: CPT | Performed by: EMERGENCY MEDICINE

## 2023-08-17 PROCEDURE — 12000002 HC ACUTE/MED SURGE SEMI-PRIVATE ROOM

## 2023-08-17 PROCEDURE — 96375 TX/PRO/DX INJ NEW DRUG ADDON: CPT

## 2023-08-17 PROCEDURE — 80053 COMPREHEN METABOLIC PANEL: CPT | Performed by: EMERGENCY MEDICINE

## 2023-08-17 PROCEDURE — 85027 COMPLETE CBC AUTOMATED: CPT | Performed by: EMERGENCY MEDICINE

## 2023-08-17 RX ORDER — ONDANSETRON 2 MG/ML
4 INJECTION INTRAMUSCULAR; INTRAVENOUS
Status: COMPLETED | OUTPATIENT
Start: 2023-08-17 | End: 2023-08-17

## 2023-08-17 RX ORDER — MORPHINE SULFATE 2 MG/ML
6 INJECTION, SOLUTION INTRAMUSCULAR; INTRAVENOUS
Status: COMPLETED | OUTPATIENT
Start: 2023-08-17 | End: 2023-08-17

## 2023-08-17 RX ADMIN — ONDANSETRON 4 MG: 2 INJECTION INTRAMUSCULAR; INTRAVENOUS at 11:08

## 2023-08-17 RX ADMIN — MORPHINE SULFATE 6 MG: 2 INJECTION, SOLUTION INTRAMUSCULAR; INTRAVENOUS at 11:08

## 2023-08-17 NOTE — Clinical Note
Diagnosis: Small bowel obstruction [995586]   Future Attending Provider: TITI GUTIERREZ [[id number]]   Admitting Provider:: TITI GUTIERREZ [[id number]]

## 2023-08-18 ENCOUNTER — ANESTHESIA EVENT (OUTPATIENT)
Dept: SURGERY | Facility: HOSPITAL | Age: 48
DRG: 337 | End: 2023-08-18
Payer: MEDICAID

## 2023-08-18 ENCOUNTER — ANESTHESIA (OUTPATIENT)
Dept: SURGERY | Facility: HOSPITAL | Age: 48
DRG: 337 | End: 2023-08-18
Payer: MEDICAID

## 2023-08-18 PROBLEM — K56.609 SMALL BOWEL OBSTRUCTION: Status: ACTIVE | Noted: 2023-08-18

## 2023-08-18 LAB
ABO + RH BLD: NORMAL
ALBUMIN SERPL BCP-MCNC: 4.3 G/DL (ref 3.5–5.2)
ALP SERPL-CCNC: 98 U/L (ref 55–135)
ALT SERPL W/O P-5'-P-CCNC: 29 U/L (ref 10–44)
ANION GAP SERPL CALC-SCNC: 13 MMOL/L (ref 8–16)
AST SERPL-CCNC: 16 U/L (ref 10–40)
BILIRUB SERPL-MCNC: 0.5 MG/DL (ref 0.1–1)
BILIRUB UR QL STRIP: NEGATIVE
BLD GP AB SCN CELLS X3 SERPL QL: NORMAL
BUN SERPL-MCNC: 14 MG/DL (ref 6–20)
CALCIUM SERPL-MCNC: 9.8 MG/DL (ref 8.7–10.5)
CHLORIDE SERPL-SCNC: 112 MMOL/L (ref 95–110)
CLARITY UR: CLEAR
CO2 SERPL-SCNC: 17 MMOL/L (ref 23–29)
COLOR UR: COLORLESS
CREAT SERPL-MCNC: 0.9 MG/DL (ref 0.5–1.4)
ERYTHROCYTE [DISTWIDTH] IN BLOOD BY AUTOMATED COUNT: 13.2 % (ref 11.5–14.5)
EST. GFR  (NO RACE VARIABLE): >60 ML/MIN/1.73 M^2
GLUCOSE SERPL-MCNC: 163 MG/DL (ref 70–110)
GLUCOSE UR QL STRIP: NEGATIVE
HCT VFR BLD AUTO: 46.1 % (ref 37–48.5)
HGB BLD-MCNC: 15.2 G/DL (ref 12–16)
HGB UR QL STRIP: NEGATIVE
KETONES UR QL STRIP: NEGATIVE
LACTATE SERPL-SCNC: 2.3 MMOL/L (ref 0.5–2.2)
LEUKOCYTE ESTERASE UR QL STRIP: NEGATIVE
LIPASE SERPL-CCNC: 10 U/L (ref 4–60)
MCH RBC QN AUTO: 32.1 PG (ref 27–31)
MCHC RBC AUTO-ENTMCNC: 33 G/DL (ref 32–36)
MCV RBC AUTO: 97 FL (ref 82–98)
NITRITE UR QL STRIP: NEGATIVE
PH UR STRIP: 7 [PH] (ref 5–8)
PLATELET # BLD AUTO: 340 K/UL (ref 150–450)
PMV BLD AUTO: 11.6 FL (ref 9.2–12.9)
POTASSIUM SERPL-SCNC: 4 MMOL/L (ref 3.5–5.1)
PROT SERPL-MCNC: 7.6 G/DL (ref 6–8.4)
PROT UR QL STRIP: NEGATIVE
RBC # BLD AUTO: 4.74 M/UL (ref 4–5.4)
SODIUM SERPL-SCNC: 142 MMOL/L (ref 136–145)
SP GR UR STRIP: >1.03 (ref 1–1.03)
SPECIMEN OUTDATE: NORMAL
URN SPEC COLLECT METH UR: ABNORMAL
UROBILINOGEN UR STRIP-ACNC: NEGATIVE EU/DL
WBC # BLD AUTO: 13.03 K/UL (ref 3.9–12.7)

## 2023-08-18 PROCEDURE — C1765 ADHESION BARRIER: HCPCS | Performed by: STUDENT IN AN ORGANIZED HEALTH CARE EDUCATION/TRAINING PROGRAM

## 2023-08-18 PROCEDURE — 63600175 PHARM REV CODE 636 W HCPCS: Performed by: STUDENT IN AN ORGANIZED HEALTH CARE EDUCATION/TRAINING PROGRAM

## 2023-08-18 PROCEDURE — 99222 PR INITIAL HOSPITAL CARE,LEVL II: ICD-10-PCS | Mod: 57,,, | Performed by: STUDENT IN AN ORGANIZED HEALTH CARE EDUCATION/TRAINING PROGRAM

## 2023-08-18 PROCEDURE — D9220A PRA ANESTHESIA: Mod: ANES,,, | Performed by: STUDENT IN AN ORGANIZED HEALTH CARE EDUCATION/TRAINING PROGRAM

## 2023-08-18 PROCEDURE — 81003 URINALYSIS AUTO W/O SCOPE: CPT | Performed by: EMERGENCY MEDICINE

## 2023-08-18 PROCEDURE — 25000003 PHARM REV CODE 250: Performed by: STUDENT IN AN ORGANIZED HEALTH CARE EDUCATION/TRAINING PROGRAM

## 2023-08-18 PROCEDURE — 36000707: Performed by: STUDENT IN AN ORGANIZED HEALTH CARE EDUCATION/TRAINING PROGRAM

## 2023-08-18 PROCEDURE — 25000003 PHARM REV CODE 250: Performed by: NURSE ANESTHETIST, CERTIFIED REGISTERED

## 2023-08-18 PROCEDURE — 25000003 PHARM REV CODE 250: Performed by: EMERGENCY MEDICINE

## 2023-08-18 PROCEDURE — 37000009 HC ANESTHESIA EA ADD 15 MINS: Performed by: STUDENT IN AN ORGANIZED HEALTH CARE EDUCATION/TRAINING PROGRAM

## 2023-08-18 PROCEDURE — 63600175 PHARM REV CODE 636 W HCPCS: Performed by: EMERGENCY MEDICINE

## 2023-08-18 PROCEDURE — D9220A PRA ANESTHESIA: ICD-10-PCS | Mod: CRNA,,, | Performed by: NURSE ANESTHETIST, CERTIFIED REGISTERED

## 2023-08-18 PROCEDURE — D9220A PRA ANESTHESIA: ICD-10-PCS | Mod: ANES,,, | Performed by: STUDENT IN AN ORGANIZED HEALTH CARE EDUCATION/TRAINING PROGRAM

## 2023-08-18 PROCEDURE — 83605 ASSAY OF LACTIC ACID: CPT | Performed by: EMERGENCY MEDICINE

## 2023-08-18 PROCEDURE — 99222 1ST HOSP IP/OBS MODERATE 55: CPT | Mod: 57,,, | Performed by: STUDENT IN AN ORGANIZED HEALTH CARE EDUCATION/TRAINING PROGRAM

## 2023-08-18 PROCEDURE — 63600175 PHARM REV CODE 636 W HCPCS: Performed by: NURSE ANESTHETIST, CERTIFIED REGISTERED

## 2023-08-18 PROCEDURE — 37000008 HC ANESTHESIA 1ST 15 MINUTES: Performed by: STUDENT IN AN ORGANIZED HEALTH CARE EDUCATION/TRAINING PROGRAM

## 2023-08-18 PROCEDURE — 25500020 PHARM REV CODE 255: Performed by: EMERGENCY MEDICINE

## 2023-08-18 PROCEDURE — 71000033 HC RECOVERY, INTIAL HOUR: Performed by: STUDENT IN AN ORGANIZED HEALTH CARE EDUCATION/TRAINING PROGRAM

## 2023-08-18 PROCEDURE — 44005 PR FREEING BOWEL ADHESION,ENTEROLYSIS: ICD-10-PCS | Mod: ,,, | Performed by: STUDENT IN AN ORGANIZED HEALTH CARE EDUCATION/TRAINING PROGRAM

## 2023-08-18 PROCEDURE — 44005 FREEING OF BOWEL ADHESION: CPT | Mod: ,,, | Performed by: STUDENT IN AN ORGANIZED HEALTH CARE EDUCATION/TRAINING PROGRAM

## 2023-08-18 PROCEDURE — 36000706: Performed by: STUDENT IN AN ORGANIZED HEALTH CARE EDUCATION/TRAINING PROGRAM

## 2023-08-18 PROCEDURE — 71000039 HC RECOVERY, EACH ADD'L HOUR: Performed by: STUDENT IN AN ORGANIZED HEALTH CARE EDUCATION/TRAINING PROGRAM

## 2023-08-18 PROCEDURE — D9220A PRA ANESTHESIA: Mod: CRNA,,, | Performed by: NURSE ANESTHETIST, CERTIFIED REGISTERED

## 2023-08-18 PROCEDURE — 86900 BLOOD TYPING SEROLOGIC ABO: CPT | Performed by: STUDENT IN AN ORGANIZED HEALTH CARE EDUCATION/TRAINING PROGRAM

## 2023-08-18 PROCEDURE — C9290 INJ, BUPIVACAINE LIPOSOME: HCPCS | Performed by: STUDENT IN AN ORGANIZED HEALTH CARE EDUCATION/TRAINING PROGRAM

## 2023-08-18 PROCEDURE — 11000001 HC ACUTE MED/SURG PRIVATE ROOM

## 2023-08-18 DEVICE — SEPRAFILM ADHESION BARRIER (MEMBRANE) IS A STERILE, BIORESORBABLE, TRANSLUCENT ADHESION BARRIER COMPOSED OF TWO ANIONIC POLYSACCHARIDES, SODIUM HYALURONATE (HA) AND CARBOXYMETHYLCELLULOSE (CMC).
Type: IMPLANTABLE DEVICE | Site: ABDOMEN | Status: FUNCTIONAL
Brand: SEPRAFILM

## 2023-08-18 RX ORDER — ONDANSETRON 2 MG/ML
4 INJECTION INTRAMUSCULAR; INTRAVENOUS EVERY 8 HOURS PRN
Status: DISCONTINUED | OUTPATIENT
Start: 2023-08-18 | End: 2023-08-18

## 2023-08-18 RX ORDER — METRONIDAZOLE 500 MG/100ML
INJECTION, SOLUTION INTRAVENOUS
Status: DISCONTINUED | OUTPATIENT
Start: 2023-08-18 | End: 2023-08-18

## 2023-08-18 RX ORDER — TALC
6 POWDER (GRAM) TOPICAL NIGHTLY PRN
Status: DISCONTINUED | OUTPATIENT
Start: 2023-08-18 | End: 2023-08-21 | Stop reason: HOSPADM

## 2023-08-18 RX ORDER — LIDOCAINE HYDROCHLORIDE 20 MG/ML
INJECTION, SOLUTION EPIDURAL; INFILTRATION; INTRACAUDAL; PERINEURAL
Status: DISCONTINUED | OUTPATIENT
Start: 2023-08-18 | End: 2023-08-18

## 2023-08-18 RX ORDER — HYDROMORPHONE HYDROCHLORIDE 1 MG/ML
1 INJECTION, SOLUTION INTRAMUSCULAR; INTRAVENOUS; SUBCUTANEOUS
Status: DISCONTINUED | OUTPATIENT
Start: 2023-08-18 | End: 2023-08-21

## 2023-08-18 RX ORDER — DEXAMETHASONE SODIUM PHOSPHATE 4 MG/ML
INJECTION, SOLUTION INTRA-ARTICULAR; INTRALESIONAL; INTRAMUSCULAR; INTRAVENOUS; SOFT TISSUE
Status: DISCONTINUED | OUTPATIENT
Start: 2023-08-18 | End: 2023-08-18

## 2023-08-18 RX ORDER — ACETAMINOPHEN 10 MG/ML
INJECTION, SOLUTION INTRAVENOUS
Status: DISCONTINUED | OUTPATIENT
Start: 2023-08-18 | End: 2023-08-18

## 2023-08-18 RX ORDER — ONDANSETRON 2 MG/ML
8 INJECTION INTRAMUSCULAR; INTRAVENOUS EVERY 8 HOURS PRN
Status: DISCONTINUED | OUTPATIENT
Start: 2023-08-18 | End: 2023-08-21 | Stop reason: HOSPADM

## 2023-08-18 RX ORDER — ERGOCALCIFEROL 1.25 MG/1
50000 CAPSULE ORAL
COMMUNITY

## 2023-08-18 RX ORDER — ONDANSETRON 2 MG/ML
INJECTION INTRAMUSCULAR; INTRAVENOUS
Status: DISCONTINUED | OUTPATIENT
Start: 2023-08-18 | End: 2023-08-18

## 2023-08-18 RX ORDER — CEFAZOLIN SODIUM 1 G/3ML
INJECTION, POWDER, FOR SOLUTION INTRAMUSCULAR; INTRAVENOUS
Status: DISCONTINUED | OUTPATIENT
Start: 2023-08-18 | End: 2023-08-18

## 2023-08-18 RX ORDER — DIPHENHYDRAMINE HYDROCHLORIDE 50 MG/ML
INJECTION INTRAMUSCULAR; INTRAVENOUS
Status: DISCONTINUED | OUTPATIENT
Start: 2023-08-18 | End: 2023-08-18

## 2023-08-18 RX ORDER — AMOXICILLIN AND CLAVULANATE POTASSIUM 875; 125 MG/1; MG/1
1 TABLET, FILM COATED ORAL EVERY 12 HOURS
COMMUNITY
Start: 2023-08-11 | End: 2023-08-18

## 2023-08-18 RX ORDER — SODIUM CHLORIDE 0.9 % (FLUSH) 0.9 %
10 SYRINGE (ML) INJECTION
Status: DISCONTINUED | OUTPATIENT
Start: 2023-08-18 | End: 2023-08-21 | Stop reason: HOSPADM

## 2023-08-18 RX ORDER — MORPHINE SULFATE 2 MG/ML
6 INJECTION, SOLUTION INTRAMUSCULAR; INTRAVENOUS EVERY 4 HOURS PRN
Status: DISCONTINUED | OUTPATIENT
Start: 2023-08-18 | End: 2023-08-18

## 2023-08-18 RX ORDER — PROPOFOL 10 MG/ML
VIAL (ML) INTRAVENOUS
Status: DISCONTINUED | OUTPATIENT
Start: 2023-08-18 | End: 2023-08-18

## 2023-08-18 RX ORDER — HYDROMORPHONE HYDROCHLORIDE 1 MG/ML
1 INJECTION, SOLUTION INTRAMUSCULAR; INTRAVENOUS; SUBCUTANEOUS EVERY 4 HOURS PRN
Status: DISCONTINUED | OUTPATIENT
Start: 2023-08-18 | End: 2023-08-18

## 2023-08-18 RX ORDER — MIDAZOLAM HYDROCHLORIDE 1 MG/ML
INJECTION INTRAMUSCULAR; INTRAVENOUS
Status: DISCONTINUED | OUTPATIENT
Start: 2023-08-18 | End: 2023-08-18

## 2023-08-18 RX ORDER — BUPIVACAINE HYDROCHLORIDE 5 MG/ML
INJECTION, SOLUTION PERINEURAL
Status: DISCONTINUED | OUTPATIENT
Start: 2023-08-18 | End: 2023-08-18 | Stop reason: HOSPADM

## 2023-08-18 RX ORDER — SODIUM CHLORIDE 9 MG/ML
INJECTION, SOLUTION INTRAVENOUS CONTINUOUS
Status: DISCONTINUED | OUTPATIENT
Start: 2023-08-18 | End: 2023-08-19

## 2023-08-18 RX ORDER — ROCURONIUM BROMIDE 10 MG/ML
INJECTION, SOLUTION INTRAVENOUS
Status: DISCONTINUED | OUTPATIENT
Start: 2023-08-18 | End: 2023-08-18

## 2023-08-18 RX ORDER — HYDROMORPHONE HYDROCHLORIDE 1 MG/ML
1 INJECTION, SOLUTION INTRAMUSCULAR; INTRAVENOUS; SUBCUTANEOUS
Status: COMPLETED | OUTPATIENT
Start: 2023-08-18 | End: 2023-08-18

## 2023-08-18 RX ORDER — HYDROMORPHONE HYDROCHLORIDE 2 MG/ML
0.5 INJECTION, SOLUTION INTRAMUSCULAR; INTRAVENOUS; SUBCUTANEOUS EVERY 5 MIN PRN
Status: DISCONTINUED | OUTPATIENT
Start: 2023-08-18 | End: 2023-08-18 | Stop reason: HOSPADM

## 2023-08-18 RX ORDER — LANOLIN ALCOHOL/MO/W.PET/CERES
1000 CREAM (GRAM) TOPICAL WEEKLY
COMMUNITY

## 2023-08-18 RX ORDER — SUCCINYLCHOLINE CHLORIDE 20 MG/ML
INJECTION INTRAMUSCULAR; INTRAVENOUS
Status: DISCONTINUED | OUTPATIENT
Start: 2023-08-18 | End: 2023-08-18

## 2023-08-18 RX ORDER — KETOROLAC TROMETHAMINE 30 MG/ML
INJECTION, SOLUTION INTRAMUSCULAR; INTRAVENOUS
Status: DISCONTINUED | OUTPATIENT
Start: 2023-08-18 | End: 2023-08-18

## 2023-08-18 RX ORDER — FENTANYL CITRATE 50 UG/ML
INJECTION, SOLUTION INTRAMUSCULAR; INTRAVENOUS
Status: DISCONTINUED | OUTPATIENT
Start: 2023-08-18 | End: 2023-08-18

## 2023-08-18 RX ORDER — MULTIVIT,IRON,MINERALS/LUTEIN
1 TABLET ORAL DAILY
COMMUNITY

## 2023-08-18 RX ORDER — PROCHLORPERAZINE EDISYLATE 5 MG/ML
5 INJECTION INTRAMUSCULAR; INTRAVENOUS EVERY 6 HOURS PRN
Status: DISCONTINUED | OUTPATIENT
Start: 2023-08-18 | End: 2023-08-21 | Stop reason: HOSPADM

## 2023-08-18 RX ADMIN — FENTANYL CITRATE 50 MCG: 50 INJECTION, SOLUTION INTRAMUSCULAR; INTRAVENOUS at 01:08

## 2023-08-18 RX ADMIN — LIDOCAINE HYDROCHLORIDE 60 MG: 20 INJECTION, SOLUTION EPIDURAL; INFILTRATION; INTRACAUDAL; PERINEURAL at 12:08

## 2023-08-18 RX ADMIN — MORPHINE SULFATE 6 MG: 2 INJECTION, SOLUTION INTRAMUSCULAR; INTRAVENOUS at 04:08

## 2023-08-18 RX ADMIN — SUCCINYLCHOLINE CHLORIDE 140 MG: 20 INJECTION, SOLUTION INTRAMUSCULAR; INTRAVENOUS at 12:08

## 2023-08-18 RX ADMIN — ROCURONIUM BROMIDE 45 MG: 10 INJECTION, SOLUTION INTRAVENOUS at 12:08

## 2023-08-18 RX ADMIN — HYDROMORPHONE HYDROCHLORIDE 1 MG: 1 INJECTION, SOLUTION INTRAMUSCULAR; INTRAVENOUS; SUBCUTANEOUS at 06:08

## 2023-08-18 RX ADMIN — CEFAZOLIN 2 G: 330 INJECTION, POWDER, FOR SOLUTION INTRAMUSCULAR; INTRAVENOUS at 12:08

## 2023-08-18 RX ADMIN — SODIUM CHLORIDE, SODIUM LACTATE, POTASSIUM CHLORIDE, AND CALCIUM CHLORIDE: .6; .31; .03; .02 INJECTION, SOLUTION INTRAVENOUS at 12:08

## 2023-08-18 RX ADMIN — SUGAMMADEX 200 MG: 100 INJECTION, SOLUTION INTRAVENOUS at 01:08

## 2023-08-18 RX ADMIN — PROPOFOL 120 MG: 10 INJECTION, EMULSION INTRAVENOUS at 12:08

## 2023-08-18 RX ADMIN — HYDROMORPHONE HYDROCHLORIDE 1 MG: 1 INJECTION, SOLUTION INTRAMUSCULAR; INTRAVENOUS; SUBCUTANEOUS at 11:08

## 2023-08-18 RX ADMIN — ACETAMINOPHEN 1000 MG: 10 INJECTION, SOLUTION INTRAVENOUS at 12:08

## 2023-08-18 RX ADMIN — MIDAZOLAM HYDROCHLORIDE 2 MG: 1 INJECTION INTRAMUSCULAR; INTRAVENOUS at 12:08

## 2023-08-18 RX ADMIN — DEXAMETHASONE SODIUM PHOSPHATE 4 MG: 4 INJECTION, SOLUTION INTRA-ARTICULAR; INTRALESIONAL; INTRAMUSCULAR; INTRAVENOUS; SOFT TISSUE at 12:08

## 2023-08-18 RX ADMIN — HYDROMORPHONE HYDROCHLORIDE 1 MG: 1 INJECTION, SOLUTION INTRAMUSCULAR; INTRAVENOUS; SUBCUTANEOUS at 04:08

## 2023-08-18 RX ADMIN — ONDANSETRON 8 MG: 2 INJECTION, SOLUTION INTRAMUSCULAR; INTRAVENOUS at 01:08

## 2023-08-18 RX ADMIN — ROCURONIUM BROMIDE 5 MG: 10 INJECTION, SOLUTION INTRAVENOUS at 12:08

## 2023-08-18 RX ADMIN — ONDANSETRON 4 MG: 2 INJECTION INTRAMUSCULAR; INTRAVENOUS at 11:08

## 2023-08-18 RX ADMIN — HYDROMORPHONE HYDROCHLORIDE 0.5 MG: 2 INJECTION, SOLUTION INTRAMUSCULAR; INTRAVENOUS; SUBCUTANEOUS at 02:08

## 2023-08-18 RX ADMIN — SODIUM CHLORIDE: 9 INJECTION, SOLUTION INTRAVENOUS at 04:08

## 2023-08-18 RX ADMIN — MORPHINE SULFATE 6 MG: 2 INJECTION, SOLUTION INTRAMUSCULAR; INTRAVENOUS at 11:08

## 2023-08-18 RX ADMIN — KETOROLAC TROMETHAMINE 30 MG: 30 INJECTION, SOLUTION INTRAMUSCULAR; INTRAVENOUS at 01:08

## 2023-08-18 RX ADMIN — SODIUM CHLORIDE: 9 INJECTION, SOLUTION INTRAVENOUS at 03:08

## 2023-08-18 RX ADMIN — IOHEXOL 75 ML: 350 INJECTION, SOLUTION INTRAVENOUS at 12:08

## 2023-08-18 RX ADMIN — HYDROMORPHONE HYDROCHLORIDE 0.5 MG: 2 INJECTION, SOLUTION INTRAMUSCULAR; INTRAVENOUS; SUBCUTANEOUS at 03:08

## 2023-08-18 RX ADMIN — FENTANYL CITRATE 100 MCG: 50 INJECTION, SOLUTION INTRAMUSCULAR; INTRAVENOUS at 12:08

## 2023-08-18 RX ADMIN — METRONIDAZOLE 500 MG: 5 INJECTION, SOLUTION INTRAVENOUS at 12:08

## 2023-08-18 RX ADMIN — DIPHENHYDRAMINE HYDROCHLORIDE 12.5 MG: 50 INJECTION INTRAMUSCULAR; INTRAVENOUS at 12:08

## 2023-08-18 RX ADMIN — HYDROMORPHONE HYDROCHLORIDE 1 MG: 1 INJECTION, SOLUTION INTRAMUSCULAR; INTRAVENOUS; SUBCUTANEOUS at 08:08

## 2023-08-18 RX ADMIN — SODIUM CHLORIDE 1000 ML: 9 INJECTION, SOLUTION INTRAVENOUS at 01:08

## 2023-08-18 RX ADMIN — HYDROMORPHONE HYDROCHLORIDE 1 MG: 1 INJECTION, SOLUTION INTRAMUSCULAR; INTRAVENOUS; SUBCUTANEOUS at 01:08

## 2023-08-18 NOTE — ED NOTES
Pt states pain is increasing. Unable to have pain medication at this time based on order parameters. Pt verbalized understanding.

## 2023-08-18 NOTE — TRANSFER OF CARE
"Anesthesia Transfer of Care Note    Patient: Velma Lopez    Procedure(s) Performed: Procedure(s) (LRB):  LAPAROTOMY, EXPLORATORY (N/A)  LYSIS, ADHESIONS (N/A)    Patient location: PACU    Anesthesia Type: general    Transport from OR: Transported from OR on 6-10 L/min O2 by face mask with adequate spontaneous ventilation    Post pain: adequate analgesia    Post assessment: no apparent anesthetic complications    Post vital signs: stable    Level of consciousness: awake    Nausea/Vomiting: no nausea/vomiting    Complications: none    Transfer of care protocol was followed      Last vitals:   Visit Vitals  /71   Pulse 64   Temp 37 °C (98.6 °F) (Oral)   Resp 20   Ht 5' 6" (1.676 m)   Wt 61.2 kg (135 lb)   SpO2 100%   BMI 21.79 kg/m²     "

## 2023-08-18 NOTE — ED NOTES
Dr. Obrien notified of pts refusal for NG tube. Per verbal order from physician, may change PRN dilaudid order to q 3 hours.

## 2023-08-18 NOTE — PLAN OF CARE
VIRTUAL NURSE:  Cued into patient's room.  Permission received per patient to turn camera to view patient.  Introduced as VN that will be working with floor nurse and nursing assistant.  Educated patient on VN's role in patient care and  VIP model.  Plan of care reviewed with patient.  Education per flowsheet.   Informed patient that staff will round on them every 2 hours but to use call light for any other needs they may have; informed of fall risk and fall precautions.  Patient verbalized understanding.  Call light within reach; bed siderails up x3.  Opportunity given for questions and questions answered.  Admission assessment questions answered.  Patient denies complaints or any needs at this time. Instructed to call for assistance.  Will cont to monitor and intervene as needed.    Labs, notes, orders, and careplan reviewed.       Problem: Adult Inpatient Plan of Care  Goal: Plan of Care Review  Outcome: Ongoing, Progressing  Goal: Patient-Specific Goal (Individualized)  Outcome: Ongoing, Progressing  Goal: Absence of Hospital-Acquired Illness or Injury  Outcome: Ongoing, Progressing  Goal: Optimal Comfort and Wellbeing  Outcome: Ongoing, Progressing  Goal: Readiness for Transition of Care  Outcome: Ongoing, Progressing

## 2023-08-18 NOTE — ED NOTES
Pt ambulated to bathroom with steady gait, c/o 7/10 pain returning to exam room. Pt continues to decline NG tube, states was unable to tolerate during previous visits. Dilaudid admin. Pt updated on poc. Pulse ox applied, SR x 2, bed low and locked, call light in reach.

## 2023-08-18 NOTE — ANESTHESIA POSTPROCEDURE EVALUATION
Anesthesia Post Evaluation    Patient: Velma Lopez    Procedure(s) Performed: Procedure(s) (LRB):  LAPAROTOMY, EXPLORATORY (N/A)  LYSIS, ADHESIONS (N/A)    Final Anesthesia Type: general      Patient location during evaluation: PACU  Patient participation: Yes- Able to Participate  Level of consciousness: awake and alert  Post-procedure vital signs: reviewed and stable  Pain management: adequate  Airway patency: patent  MILEY mitigation strategies: Multimodal analgesia  PONV status at discharge: No PONV  Anesthetic complications: no      Cardiovascular status: hemodynamically stable  Respiratory status: spontaneous ventilation and room air  Hydration status: euvolemic  Follow-up not needed.          Vitals Value Taken Time   /81 08/18/23 1452   Temp  08/18/23 1455   Pulse 80 08/18/23 1455   Resp 8 08/18/23 1455   SpO2 100 % 08/18/23 1455   Vitals shown include unvalidated device data.      No case tracking events are documented in the log.      Pain/Anabell Score: Pain Rating Prior to Med Admin: 8 (8/18/2023  2:45 PM)

## 2023-08-18 NOTE — H&P
[facility] - Emergency Dept  General Surgery  History & Physical    Patient Name: Velma Lopez  MRN: [id number]  Admission Date: 8/17/2023  Attending Physician: Bunny Obrien MD   Primary Care Provider: Aftab Martinez MD    Patient information was obtained from patient, past medical records and ER records.     Subjective:     Chief Complaint/Reason for Admission: Small bowel obstruction     History of Present Illness: Velma Lopez is a 48 y.o. 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 She presents today with one day history of nausea, dry heaving, abdominal pain and obstipation. She states the pain has been continuous since onset and has waves of increasing severity. It is worst in the lower abdomen, but involves the entire abdomen. She was admitted in July with similar symptoms and managed conservatively for a small bowel obstruction. Upon presentation she was afebrile and hemodynamically stable. Labs notable for mild leukocytosis of 13 and lactate elevated just above normal at 2.3. Imaging concerning for small bowel obstruction with transition point.         Current Facility-Administered Medications on File Prior to Encounter   Medication    lidocaine (PF) 10 mg/ml (1%) injection 10 mg    midazolam (VERSED) 1 mg/mL injection 0.5 mg    ropivacaine (Naropin) 1000 mg/500 mL (2 mg/mL) Nimbus PainPRO Pump infusion     Current Outpatient Medications on File Prior to Encounter   Medication Sig    acetaminophen (TYLENOL) 500 MG tablet Take 2 tablets (1,000 mg total) by mouth every 6 (six) hours as needed for Pain.    amitriptyline (ELAVIL) 10 MG tablet TAKE 1 TABLET BY MOUTH EVERY DAY IN THE EVENING    cetirizine (ZYRTEC) 10 MG tablet Take 10 mg by mouth once daily.    docusate sodium (COLACE) 100 MG capsule Take 1 capsule (100 mg total) by mouth 2 (two) times daily.     HYDROcodone-acetaminophen (NORCO) 5-325 mg per tablet Take 1 tablet by mouth every 4 (four) hours as needed for Pain.    medroxyPROGESTERone (DEPO-PROVERA) 150 mg/mL Syrg Inject 1 mL (150 mg total) into the muscle every 3 (three) months.    medroxyPROGESTERone (DEPO-PROVERA) 150 mg/mL Syrg Inject 1 mL (150 mg total) into the muscle every 3 (three) months.    medroxyPROGESTERone (DEPO-PROVERA) 150 mg/mL Syrg Inject 1 mL (150 mg total) into the muscle every 3 (three) months.    meloxicam (MOBIC) 7.5 MG tablet Take 7.5 mg by mouth daily as needed.    nicotine (NICODERM CQ) 21 mg/24 hr Place 1 patch onto the skin once daily.    solifenacin (VESICARE) 10 MG tablet Take 10 mg by mouth.    terconazole (TERAZOL 7) 0.4 % Crea Place 1 applicator vaginally once daily.    tiZANidine (ZANAFLEX) 4 MG tablet TAKE 1/2-1 TABLET BY MOUTH AT BEDTIME AS NEEDED FOR SPASM    [DISCONTINUED] atorvastatin (LIPITOR) 10 MG tablet Take 1 tablet (10 mg total) by mouth once daily.    [DISCONTINUED] blood-glucose meter kit Please provide with insurance covered meter (Patient not taking: Reported on 6/21/2022)    [DISCONTINUED] diclofenac sodium (VOLTAREN) 1 % Gel Apply 2 g topically 4 (four) times daily. (Patient not taking: No sig reported)    [DISCONTINUED] hyoscyamine (ANASPAZ,LEVSIN) 0.125 mg Tab Take 1 tablet (125 mcg total) by mouth every 4 (four) hours as needed (bladder spasms).    [DISCONTINUED] losartan (COZAAR) 25 MG tablet TAKE 1 TABLET BY MOUTH EVERY DAY    [DISCONTINUED] metFORMIN (GLUCOPHAGE) 500 MG tablet TAKE 1 TABLET BY MOUTH TWICE A DAY WITH MEALS       Review of patient's allergies indicates:  No Known Allergies    Past Medical History:   Diagnosis Date    Chronic back pain     Diabetes type 2, uncontrolled     Mild nonproliferative diabetic retinopathy of left eye without macular edema associated with type 2 diabetes mellitus 10/21/2019    Morbid obesity with BMI of 40.0-44.9, adult     MILEY on CPAP      Plantar fasciitis of left foot     Urticaria      Past Surgical History:   Procedure Laterality Date    ARTHROSCOPIC ACROMIOPLASTY OF SHOULDER Left 7/27/2020    Procedure: ACROMIOPLASTY, ARTHROSCOPIC;  Surgeon: Marcela Garza MD;  Location: [facility] OR;  Service: Orthopedics;  Laterality: Left;    ARTHROSCOPIC REPAIR OF ROTATOR CUFF OF SHOULDER Right 7/27/2020    Procedure: REPAIR, ROTATOR CUFF, ARTHROSCOPIC;  Surgeon: Marcela Garza MD;  Location: [facility] OR;  Service: Orthopedics;  Laterality: Right;  GENERAL/REGIONAL    COLONOSCOPY N/A 4/7/2022    Procedure: COLONOSCOPY;  Surgeon: Yadi Wong MD;  Location: [facility] (4TH FLR);  Service: Endoscopy;  Laterality: N/A;  Covid test at [place] on 4/4.EC    CYST REMOVAL      ovarian cyst removal at age 12    DECOMPRESSION OF SUBACROMIAL SPACE Left 7/27/2020    Procedure: DECOMPRESSION, SUBACROMIAL SPACE;  Surgeon: Marcela Garza MD;  Location: [facility] OR;  Service: Orthopedics;  Laterality: Left;    DILATION AND CURETTAGE OF UTERUS      ESOPHAGEAL MOTILITY STUDY USING HIGH RESOLUTION MANOMETRY N/A 9/17/2019    Procedure: MOTILITY STUDY, ESOPHAGUS, USING HIGH RESOLUTION MANOMETRY;  Surgeon: Leopoldo Swanson MD;  [facility]  Service: Endoscopy;  Laterality: N/A;  Pt will be taking xanax before procedure to hopefull help with anxiety.    ESOPHAGOGASTRODUODENOSCOPY N/A 6/14/2019    Procedure: EGD (ESOPHAGOGASTRODUODENOSCOPY);  Surgeon: Ced Guevara MD;  [facility]  Service: Endoscopy;  Laterality: N/A;  Please make sure it is scheduled after her cardiology appt.- see cardiology note dated 6/13/19 for clearance - [facility]    FOREIGN BODY REMOVAL N/A 4/17/2023    Procedure: REMOVAL, FOREIGN BODY;  Surgeon: Rom Rodriguez MD;  Location: [facility];  Service: General;  Laterality: N/A;    GASTRIC BYPASS N/A 05/20/2021    INJECTION OF STEROID Left 7/27/2020    Procedure: INJECTION, STEROID LEFT THUMB TRIGGER  FINGER;  Surgeon: Marcela Garza MD;  Location: [facility] OR;  Service: Orthopedics;  Laterality: Left;  LEFT THUMB, TRIGGER FINGER    LYSIS OF ADHESIONS N/A 10/21/2022    Procedure: LYSIS, ADHESIONS;  Surgeon: Bunny Obrien MD;  Location: [facility] OR;  Service: General;  Laterality: N/A;    TRANSVERSE COLECTOMY N/A 10/26/2022    Procedure: COLECTOMY, TRANSVERSE;  Surgeon: Bunny Obrien MD;  Location: [facility] OR;  Service: General;  Laterality: N/A;    TRIGGER FINGER RELEASE Right 10/4/2019    Procedure: RELEASE, TRIGGER FINGER - right thumb;  Surgeon: Craig Tom MD;  Location: [facility] OR;  Service: Orthopedics;  Laterality: Right;    UPPER GASTROINTESTINAL ENDOSCOPY      WISDOM TOOTH EXTRACTION       Family History       Problem Relation (Age of Onset)    Breast cancer Maternal Aunt (70)    Cancer Maternal Grandmother    Diabetes Mother    No Known Problems Sister, Sister, Sister          Tobacco Use    Smoking status: Every Day     Current packs/day: 0.75     Average packs/day: 0.8 packs/day for 31.6 years (23.7 ttl pk-yrs)     Types: Cigarettes     Start date: 1992    Smokeless tobacco: Never    Tobacco comments:     Enrolled in the [email or web address] on 8/1/18 ([facility] Member ID # [id number]) Ambulatory referral to Smoking Cessation Program.   Substance and Sexual Activity    Alcohol use: Yes     Comment: social    Drug use: No    Sexual activity: Yes     Partners: Male     Birth control/protection: None     Review of Systems   Constitutional:  Negative for chills and fever.   HENT:  Negative for congestion.    Respiratory:  Negative for cough and shortness of breath.    Cardiovascular:  Negative for chest pain.   Gastrointestinal:  Positive for abdominal pain, nausea and vomiting. Negative for abdominal distention, constipation and diarrhea.   Genitourinary:  Negative for dysuria.   Neurological:  Negative for seizures and syncope.   All other systems reviewed and are negative.    Objective:      Vital Signs (Most Recent):  Temp: 98.6 °F (37 °C) (08/17/23 2233)  Pulse: 62 (08/18/23 0900)  Resp: 20 (08/18/23 0817)  BP: 133/71 (08/18/23 0702)  SpO2: 100 % (08/18/23 0900) Vital Signs (24h Range):  Temp:  [98.6 °F (37 °C)] 98.6 °F (37 °C)  Pulse:  [62-77] 62  Resp:  [18-20] 20  SpO2:  [94 %-100 %] 100 %  BP: (121-180)/() 133/71     Weight: 61.2 kg (135 lb)  Body mass index is 21.79 kg/m².     Physical Exam  Vitals and nursing note reviewed.   Constitutional:       General: She is in acute distress.      Appearance: She is not ill-appearing or toxic-appearing.   HENT:      Head: Normocephalic and atraumatic.   Eyes:      General: No scleral icterus.     Extraocular Movements: Extraocular movements intact.   Cardiovascular:      Rate and Rhythm: Normal rate and regular rhythm.   Pulmonary:      Effort: Pulmonary effort is normal. No respiratory distress.   Abdominal:      General: There is distension.      Tenderness: There is abdominal tenderness. There is guarding (RLQ). There is no rebound.   Skin:     General: Skin is warm and dry.   Neurological:      General: No focal deficit present.      Mental Status: She is alert and oriented to person, place, and time.   Psychiatric:         Mood and Affect: Mood normal.         Behavior: Behavior normal.            I have reviewed all pertinent lab results within the past 24 hours.    Significant Diagnostics:  I have reviewed all pertinent imaging results/findings within the past 24 hours.      Assessment/Plan:     * Small bowel obstruction  Velma Lopez is a 48 y.o. female with surgical history notable for gastric bypass and more recently transverse colotomy in Oct 2022 who presents with her second small bowel obstruction in the past month. Suspect her obstructions are secondary to adhesive disease. Will proceed with exploratory laparotomy today.    -Admit to General Surgery  -NPO, mIVF  -Place NGT should patient develop emesis before OR, otherwise  we will decompress there  -PRN pain and anti-emetics  -Consent for surgery signed at bedside. Patient in agreement with plan.    Dispo: Pending OR findings, expect inpatient stay through the weekend at least      VTE Risk Mitigation (From admission, onward)         Ordered     IP VTE LOW RISK PATIENT  Once         08/18/23 0318     Place FE hose  Until discontinued         08/18/23 0318                Yoshi Morelos MD  General Surgery  [place] - Emergency Dept

## 2023-08-18 NOTE — ED NOTES
Pt refusing NG tube, stating she has attempted insertion multiple times in the past w/o success. Reports previous small bowel obstructions have resolved on their own w/o need for NG tube or surgery. Will notify Dr. Obrien.

## 2023-08-18 NOTE — ASSESSMENT & PLAN NOTE
Velma Lopez is a 48 y.o. female with surgical history notable for gastric bypass and more recently transverse colotomy in Oct 2022 who presents with her second small bowel obstruction in the past month. Suspect her obstructions are secondary to adhesive disease. Will proceed with exploratory laparotomy today.    -Admit to General Surgery  -NPO, mIVF  -Place NGT should patient develop emesis before OR, otherwise we will decompress there  -PRN pain and anti-emetics  -Consent for surgery signed at bedside. Patient in agreement with plan.    Dispo: Pending OR findings, expect inpatient stay through the weekend at least

## 2023-08-18 NOTE — OP NOTE
[place] - Surgery ([facility])  General Surgery  Operative Note    SUMMARY     Date of Procedure: 8/18/2023     Procedure: Procedure(s) (LRB):  LAPAROTOMY, EXPLORATORY (N/A)  LYSIS, ADHESIONS (N/A)       Surgeon(s) and Role:     * Bunny Obrien MD - Primary    Assisting Surgeon: Yoshi Morelos PGY3    Pre-Operative Diagnosis: Small bowel obstruction [K56.609]    Post-Operative Diagnosis: Post-Op Diagnosis Codes:     * Small bowel obstruction [K56.609]    Anesthesia: General    Operative Findings (including complications, if any):   Adhesions of small bowel to anterior abdominal wall  Serosal tear x2 oversewn using 2-0 vicryl  Adhesive band of proximal jejunum over the proximal pola limb lysed  Jejunojejunostomy widely patent  Colon normal  No internal hernia  All bowel healthy and viable    Description of Technical Procedures:   Brought into the OR and placed supine. A vertical laparotomy incision was made using a 10 blade and cautery was used to dissect down to the fascia. This abdomen was carefully entered and the fascia divided started superiorly and heading inferiorly. A densely adhered loop of small bowel was encountered. Two small serosal tears were made in the process of taking down the adhesions there. These areas were oversewn using 2-0 vicryl. There was no full thickness injury or spillage of bowel contents. The ligament of treitz was identified and appeared normal. This was run to the JJ anastomosi which was normal and patent. Distally the bowel became slightly dilated to the area where the previous adhesion was. Distal to the the ileum was normal. The colon was normal. The Pola limb was traced back to the gastric pouch. There was a loop of pola limb adhesed over itself although this did not appear to be an obvious source of obstruction. This adhesion was taken down using scissors. At the end of the case there was no evidence of further adhesive obstruction. No internal hernia was encountered. All  bowel appeared healthy and viable. The abdomen was irrigated. Experel was injected in the bilatearl TAP plane. Seprafilm was placed in the abdomen. The fascia was closed using running 1 PDS. Skin was closed using running 4-0 monocryl.     Significant Surgical Tasks Conducted by the Assistant(s), if Applicable:     Estimated Blood Loss (EBL): 25ml           Implants:   Implant Name Type Inv. Item Serial No.  Lot No. LRB No. Used Action   SEPRAFILM BARRIER 3 X 2 - [id number]  SEPRAFILM BARRIER 3 X 2  [employer] [id number] N/A 1 Implanted       Specimens:   Specimen (24h ago, onward)      None                    Condition: Stable    Disposition: PACU - hemodynamically stable.    Attestation: I was present and scrubbed for the entire procedure.

## 2023-08-18 NOTE — ANESTHESIA PROCEDURE NOTES
Intubation    Date/Time: 8/18/2023 12:15 PM    Performed by: Tigist Munoz CRNA  Authorized by: Robert Patton MD    Intubation:     Induction:  Intravenous    Intubated:  Postinduction    Mask Ventilation:  Not attempted    Attempts:  1    Attempted By:  CRNA    Method of Intubation:  Video laryngoscopy    Blade:  [place] 3    Laryngeal View Grade: Grade I - full view of cords      Difficult Airway Encountered?: No      Complications:  None    Airway Device:  Oral endotracheal tube    Airway Device Size:  7.0    Style/Cuff Inflation:  Cuffed (inflated to minimal occlusive pressure)    Inflation Amount (mL):  8    Tube secured:  21    Secured at:  The teeth    Placement Verified By:  Capnometry    Complicating Factors:  None    Findings Post-Intubation:  BS equal bilateral and atraumatic/condition of teeth unchanged

## 2023-08-18 NOTE — PHARMACY MED REC
"  Admission Medication History     The home medication history was taken by Alicia Benitez CPhT.    Medication history obtained from, Patient Verified    You may go to "Admission" then "Reconcile Home Medications" tabs to review and/or act upon these items.     The home medication list has been updated by the Pharmacy department.   Please read ALL comments highlighted in yellow.   Please address this information as you see fit.    Feel free to contact us if you have any questions or require assistance.      The medications listed below were removed from the home medication list.  Please reorder if appropriate:  Patient reports no longer taking the following medication(s):  Augmentin 875-125 mg  Colace 100 mg  Norco 5-325 mg  Nicoderm 21 mg/24H patch  Terconazole 0.4% vaginal cream      Alicia Benitez CPhT.  Ext [phone]             .          "

## 2023-08-18 NOTE — ED PROVIDER NOTES
Encounter Date: 8/17/2023       History     Chief Complaint   Patient presents with    Abdominal Pain     Complaining of abdominal pain x 6 hours.  Also complaining of nausea. Pt hollering and crying.     This history was obtained from the patient without limitations.  She is a 48-year-old with the below past medical history who presents by personal transportation.  She complains of severe generalized abdominal pain that began approximately 7 hours ago.  The pain began immediately after eating.  She has constant pain with waves of more severe sharp discomfort.  She has associated nausea but has not vomited.  She has increased belching.  Her last bowel movement was earlier today prior to the onset of this pain.  She has had no bowel movements or flatus since the onset of the pain.  There are no exacerbating factors.  She has not taking medications to attempt treatment.  This feels like prior bowel obstructions.        Review of patient's allergies indicates:  No Known Allergies  Past Medical History:   Diagnosis Date    Chronic back pain     Diabetes type 2, uncontrolled     Mild nonproliferative diabetic retinopathy of left eye without macular edema associated with type 2 diabetes mellitus 10/21/2019    Morbid obesity with BMI of 40.0-44.9, adult     MILEY on CPAP     Plantar fasciitis of left foot     Urticaria      Past Surgical History:   Procedure Laterality Date    ARTHROSCOPIC ACROMIOPLASTY OF SHOULDER Left 7/27/2020    Procedure: ACROMIOPLASTY, ARTHROSCOPIC;  Surgeon: Marcela Garza MD;  Location: [facility] OR;  Service: Orthopedics;  Laterality: Left;    ARTHROSCOPIC REPAIR OF ROTATOR CUFF OF SHOULDER Right 7/27/2020    Procedure: REPAIR, ROTATOR CUFF, ARTHROSCOPIC;  Surgeon: Marcela Garza MD;  Location: [facility] OR;  Service: Orthopedics;  Laterality: Right;  GENERAL/REGIONAL    COLONOSCOPY N/A 4/7/2022    Procedure: COLONOSCOPY;  Surgeon: Yadi Wong MD;  Location: [facility] (4TH FLR);   Service: Endoscopy;  Laterality: N/A;  Covid test at [place] on 4/4.EC    CYST REMOVAL      ovarian cyst removal at age 12    DECOMPRESSION OF SUBACROMIAL SPACE Left 7/27/2020    Procedure: DECOMPRESSION, SUBACROMIAL SPACE;  Surgeon: Marcela Garza MD;  Location: [facility] OR;  Service: Orthopedics;  Laterality: Left;    DILATION AND CURETTAGE OF UTERUS      ESOPHAGEAL MOTILITY STUDY USING HIGH RESOLUTION MANOMETRY N/A 9/17/2019    Procedure: MOTILITY STUDY, ESOPHAGUS, USING HIGH RESOLUTION MANOMETRY;  Surgeon: Leopoldo Swanson MD;  [facility]  Service: Endoscopy;  Laterality: N/A;  Pt will be taking xanax before procedure to hopefull help with anxiety.    ESOPHAGOGASTRODUODENOSCOPY N/A 6/14/2019    Procedure: EGD (ESOPHAGOGASTRODUODENOSCOPY);  Surgeon: Ced Guevara MD;  [facility]  Service: Endoscopy;  Laterality: N/A;  Please make sure it is scheduled after her cardiology appt.- see cardiology note dated 6/13/19 for clearance - ERW    FOREIGN BODY REMOVAL N/A 4/17/2023    Procedure: REMOVAL, FOREIGN BODY;  Surgeon: Rom Rodriguez MD;  Location: [facility] OR;  Service: General;  Laterality: N/A;    GASTRIC BYPASS N/A 05/20/2021    INJECTION OF STEROID Left 7/27/2020    Procedure: INJECTION, STEROID LEFT THUMB TRIGGER FINGER;  Surgeon: Marcela Garza MD;  Location: [facility] OR;  Service: Orthopedics;  Laterality: Left;  LEFT THUMB, TRIGGER FINGER    LYSIS OF ADHESIONS N/A 10/21/2022    Procedure: LYSIS, ADHESIONS;  Surgeon: Bunny Obrien MD;  Location: [facility] OR;  Service: General;  Laterality: N/A;    TRANSVERSE COLECTOMY N/A 10/26/2022    Procedure: COLECTOMY, TRANSVERSE;  Surgeon: Bunny Obrien MD;  Location: [facility] OR;  Service: General;  Laterality: N/A;    TRIGGER FINGER RELEASE Right 10/4/2019    Procedure: RELEASE, TRIGGER FINGER - right thumb;  Surgeon: Craig Tom MD;  Location: [facility] OR;  Service: Orthopedics;  Laterality: Right;    UPPER  GASTROINTESTINAL ENDOSCOPY      WISDOM TOOTH EXTRACTION       Family History   Problem Relation Age of Onset    Cancer Maternal Grandmother     Diabetes Mother     No Known Problems Sister     No Known Problems Sister     No Known Problems Sister     Breast cancer Maternal Aunt 70    Colon cancer Neg Hx     Stomach cancer Neg Hx     Inflammatory bowel disease Neg Hx     Esophageal cancer Neg Hx      Social History     Tobacco Use    Smoking status: Every Day     Current packs/day: 0.75     Average packs/day: 0.8 packs/day for 31.6 years (23.7 ttl pk-yrs)     Types: Cigarettes     Start date: 1992    Smokeless tobacco: Never    Tobacco comments:     Enrolled in the [employer] on 8/1/18 ([facility] Member ID # [id number]) Ambulatory referral to Smoking Cessation Program.   Substance Use Topics    Alcohol use: Yes     Comment: social    Drug use: No     Review of Systems  See HPI.  Remainder of 10 point systems review negative.    Physical Exam     Initial Vitals [08/17/23 2233]   BP Pulse Resp Temp SpO2   (!) 180/85 68 18 98.6 °F (37 °C) 97 %      MAP       --         Physical Exam    Nursing note and vitals reviewed.  Constitutional: She is not diaphoretic. She appears distressed.   Eyes: Conjunctivae are normal. No scleral icterus.   Cardiovascular:  Normal rate, regular rhythm and normal heart sounds.     Exam reveals no gallop and no friction rub.       No murmur heard.  Pulmonary/Chest: No respiratory distress. She has no wheezes. She has no rhonchi.   Abdominal: Abdomen is soft. Bowel sounds are normal. She exhibits no distension. There is generalized abdominal tenderness. There is guarding.     Neurological: She is alert and oriented to person, place, and time. GCS score is 15. GCS eye subscore is 4. GCS verbal subscore is 5. GCS motor subscore is 6.   Skin: Skin is warm and dry. No pallor.         ED Course   Procedures  Labs Reviewed   LACTIC ACID, PLASMA - Abnormal; Notable for the following components:        Result Value    Lactate (Lactic Acid) 2.3 (*)     All other components within normal limits   COMPREHENSIVE METABOLIC PANEL - Abnormal; Notable for the following components:    Chloride 112 (*)     CO2 17 (*)     Glucose 163 (*)     All other components within normal limits   CBC WITHOUT DIFFERENTIAL - Abnormal; Notable for the following components:    WBC 13.03 (*)     MCH 32.1 (*)     All other components within normal limits   CBC W/ AUTO DIFFERENTIAL   COMPREHENSIVE METABOLIC PANEL   LIPASE   LIPASE   URINALYSIS, REFLEX TO URINE CULTURE          Imaging Results               CT Abdomen Pelvis With Contrast (Final result)  Result time 08/18/23 00:55:11      Final result by Siena Cortez MD (08/18/23 00:55:11)                   Impression:      Small-bowel obstruction again noted with transition point around the root of the mesentery suspected similar to prior exam with some tethering suggesting adhesions.  No evidence of extraluminal free air, pneumatosis or other acute detrimental change involving the bowel in this patient with gastric bypass postoperative changes.    Interval development of mild ascites and edematous changes throughout the subcutaneous fat/anasarca also noted.    No extraluminal free air.    Hepatic steatosis.    No other acute findings.    This report was flagged in Epic as abnormal.      Electronically signed by: Siena Cortez  Date:    08/18/2023  Time:    00:55               Narrative:    EXAMINATION:  CT ABDOMEN PELVIS WITH CONTRAST    CLINICAL HISTORY:  Bowel obstruction suspected;    TECHNIQUE:  Low dose axial images, sagittal and coronal reformations were obtained from the lung bases to the pubic symphysis following the IV administration of 75 mL of Omnipaque 350 .  Oral contrast was not administered..    COMPARISON:  CT abdomen and pelvis 7/13/23, 12/19/2022    FINDINGS:  Abdomen:    - Lung bases: Mild dependent atelectasis is noted bilaterally.  Visualized heart is not  enlarged there is no pleural or pericardial effusion.    Bowel/Mesentery:    Distended loops of small-bowel are again noted throughout the abdomen.  Apparent transition point in the mid abdomen around the root of the mesentery seen with 2 areas of bowel transition coronal images 37 through 47 axial images 85 through 98 with tethering and mild swirling of the vessels in the region.  Patient is post gastric bypass.  Anastomotic sutures are noted in the left upper quadrant.  Colon is collapsed.    There is no mesenteric adenopathy or extraluminal free air.  There is mild ascites more prominent in the pelvis.    - Liver: The liver is decreased in density compatible hepatic steatosis.  No focal appreciable lesions are seen.    - Gallbladder: There is no CT evidence of cholecystitis or cholelithiasis.    - Bile Ducts: No evidence of intra or extra hepatic biliary ductal dilation.    - Spleen: Negative.    - Kidneys: Kidneys enhance symmetrically and homogeneously.  No nephrolithiasis or hydronephrosis seen.    - Adrenals: Unremarkable.    - Pancreas: No mass or peripancreatic fat stranding.    - Retroperitoneum:  No significant adenopathy.    - Vascular: No abdominal aortic aneurysm.    - Abdominal wall:  Unremarkable.    Pelvis:    The uterus is retro flexed.  No abnormal adnexal masses are appreciated noting ascites.  There is no pelvic mass or adenopathy.    Bones:  No acute osseous abnormality and no suspicious lytic or blastic lesion. Spine appears aligned there is mild spondylosis.  No compression fractures.  Degenerative changes of the SI joints noted.  Mild subcutaneous strandy changes are noted throughout the fat suggesting edema/mild anasarca.                                       Medications   sodium chloride 0.9% bolus 1,000 mL 1,000 mL (1,000 mLs Intravenous New Bag 8/18/23 2017)   ondansetron injection 4 mg (4 mg Intravenous Given 8/17/23 8164)   morphine injection 6 mg (6 mg Intravenous Given 8/17/23 6968)    iohexoL (OMNIPAQUE 350) injection 75 mL (75 mLs Intravenous Given 8/18/23 0027)   HYDROmorphone injection 1 mg (1 mg Intravenous Given 8/18/23 0133)     Medical Decision Making  Amount and/or Complexity of Data Reviewed  Labs: ordered. Decision-making details documented in ED Course.  Radiology: ordered. Decision-making details documented in ED Course.    Risk  Prescription drug management.               ED Course as of 08/18/23 0147   Thu Aug 17, 2023   2340 Initial workup and treatment plan:   Evaluation for acute abdominal pain and nausea.  Elevated blood pressure on arrival.  All other vital signs normal.  Blood pressure elevation likely due to pain.  Abdomen diffusely tender with guarding.  Obtaining labs (CBC, CMP, UA, lipase, lactic acid) and CT abdomen and pelvis with IV contrast.  Differential diagnoses: Bowel obstruction, mesenteric ischemia, ischemic colitis, diverticulitis, appendicitis, pyelonephritis, pancreatitis, and other conditions.  Administering IV morphine and Zofran.  Performing serial exams. [LP]   Fri Aug 18, 2023   0031 WBC(!): 13.03  Mild WBC elevation concerning for infectious or inflammatory process. [LP]   0105 CT Abdomen Pelvis With Contrast(!)  Per radiologist, SBO with transition point. [LP]   0115 Discussed with Dr. Rodriguez. He asked when the patient was last admitted. I reviewed the chart and determined that she was admitted by Dr. Obrien last month for SBO. Dr. Rodriguez told me to call him for admission since she is an established patient of his. [LP]   0118 Discussed with Dr. Obrien. I will admit the patient to him. [LP]      ED Course User Index  [LP] Amadou Hooker III, MD                    Clinical Impression:   Final diagnoses:  [K56.609] Small bowel obstruction (Primary)        ED Disposition Condition    Observation Stable                Amadou Hooker III, MD  08/18/23 0147

## 2023-08-18 NOTE — ED NOTES
Pt c/o abdominal pain and nausea w/ pain increasing intermittently causing pt to cry out and double over in pain onset 7 hours PTA. Hx of bowel obstructions. States pain feels the same as past SBOs. Pt appears very uncomfortable. Spouse at bedside. CB within reach.

## 2023-08-18 NOTE — H&P
Patient ID: Velma Lopez is a 48 y.o. female.    Chief Complaint: Abdominal Pain (Complaining of abdominal pain x 6 hours.  Also complaining of nausea. Pt hollering and crying.)      HPI:  HPI  48F known to be for previous ex lap for ab pain, underwent partial transverse colectomy, Gissel about 10 months ago. FOr the past few months has had episodes of crampy pain pain, nausea. Was admitted last month and symptoms quickly resolved. This episode seems worse, started last night with severe lower crampy ab pain, no flatus but also with constant epigastric pain.   Hx of gastric bypass 2 years ago.     Review of Systems   Constitutional:  Negative for fever.   HENT:  Negative for trouble swallowing.    Respiratory:  Negative for shortness of breath.    Cardiovascular:  Negative for chest pain.   Gastrointestinal:  Positive for abdominal pain and nausea. Negative for blood in stool and vomiting.   Genitourinary:  Negative for dysuria.   Musculoskeletal:  Negative for gait problem.   Skin:  Negative for rash and wound.   Allergic/Immunologic: Negative for immunocompromised state.   Neurological:  Negative for weakness.   Hematological:  Does not bruise/bleed easily.   Psychiatric/Behavioral:  Negative for agitation.        Current Facility-Administered Medications   Medication Dose Route Frequency Provider Last Rate Last Admin    0.9%  NaCl infusion   Intravenous Continuous Amadou Hooker III,  mL/hr at 08/18/23 0344 New Bag at 08/18/23 0344    diatrizoate meglumineand-diatrizoate sodium (GASTROVIEW) solution 100 mL  100 mL Oral Once Bunny Obrien MD        HYDROmorphone injection 1 mg  1 mg Intravenous Q3H PRN Bunny Obrien MD   1 mg at 08/18/23 0817    melatonin tablet 6 mg  6 mg Oral Nightly PRN Amadou Hooker III, MD        morphine injection 6 mg  6 mg Intravenous Q4H PRN Amadou Hooker III, MD   6 mg at 08/18/23 0400    ondansetron injection 4 mg  4 mg Intravenous Q8H PRN Amadou Hooker  III, MD        prochlorperazine injection Soln 5 mg  5 mg Intravenous Q6H PRN Amadou Hooker III, MD        sodium chloride 0.9% flush 10 mL  10 mL Intravenous PRN Amadou Hooker III, MD         Current Outpatient Medications   Medication Sig Dispense Refill    acetaminophen (TYLENOL) 500 MG tablet Take 2 tablets (1,000 mg total) by mouth every 6 (six) hours as needed for Pain. 50 tablet 0    amitriptyline (ELAVIL) 10 MG tablet TAKE 1 TABLET BY MOUTH EVERY DAY IN THE EVENING 90 tablet 0    cetirizine (ZYRTEC) 10 MG tablet Take 10 mg by mouth once daily.      docusate sodium (COLACE) 100 MG capsule Take 1 capsule (100 mg total) by mouth 2 (two) times daily. 30 capsule 11    HYDROcodone-acetaminophen (NORCO) 5-325 mg per tablet Take 1 tablet by mouth every 4 (four) hours as needed for Pain. 10 tablet 0    medroxyPROGESTERone (DEPO-PROVERA) 150 mg/mL Syrg Inject 1 mL (150 mg total) into the muscle every 3 (three) months. 1 mL 0    medroxyPROGESTERone (DEPO-PROVERA) 150 mg/mL Syrg Inject 1 mL (150 mg total) into the muscle every 3 (three) months. 1 mL 0    medroxyPROGESTERone (DEPO-PROVERA) 150 mg/mL Syrg Inject 1 mL (150 mg total) into the muscle every 3 (three) months. 1 mL 0    meloxicam (MOBIC) 7.5 MG tablet Take 7.5 mg by mouth daily as needed.      nicotine (NICODERM CQ) 21 mg/24 hr Place 1 patch onto the skin once daily. 28 patch 1    solifenacin (VESICARE) 10 MG tablet Take 10 mg by mouth.      terconazole (TERAZOL 7) 0.4 % Crea Place 1 applicator vaginally once daily.      tiZANidine (ZANAFLEX) 4 MG tablet TAKE 1/2-1 TABLET BY MOUTH AT BEDTIME AS NEEDED FOR SPASM       Facility-Administered Medications Ordered in Other Encounters   Medication Dose Route Frequency Provider Last Rate Last Admin    lidocaine (PF) 10 mg/ml (1%) injection 10 mg  1 mL Intradermal Once [place], Brenna Latif MD        midazolam (VERSED) 1 mg/mL injection 0.5 mg  0.5 mg Intravenous PRN Ced Patino MD   2 mg at 07/27/20  1322    ropivacaine (Naropin) 1000 mg/500 mL (2 mg/mL) Nimbus PainPRO Pump infusion  4 mL/hr Perineural Continuous Ced Patino MD 4 mL/hr at 07/27/20 1519 4 mL/hr at 07/27/20 1519       Review of patient's allergies indicates:  No Known Allergies    Past Medical History:   Diagnosis Date    Chronic back pain     Diabetes type 2, uncontrolled     Mild nonproliferative diabetic retinopathy of left eye without macular edema associated with type 2 diabetes mellitus 10/21/2019    Morbid obesity with BMI of 40.0-44.9, adult     MILEY on CPAP     Plantar fasciitis of left foot     Urticaria        Past Surgical History:   Procedure Laterality Date    ARTHROSCOPIC ACROMIOPLASTY OF SHOULDER Left 7/27/2020    Procedure: ACROMIOPLASTY, ARTHROSCOPIC;  Surgeon: Marcela Garza MD;  Location: [facility] OR;  Service: Orthopedics;  Laterality: Left;    ARTHROSCOPIC REPAIR OF ROTATOR CUFF OF SHOULDER Right 7/27/2020    Procedure: REPAIR, ROTATOR CUFF, ARTHROSCOPIC;  Surgeon: Marcela Garza MD;  Location: [facility] OR;  Service: Orthopedics;  Laterality: Right;  GENERAL/REGIONAL    COLONOSCOPY N/A 4/7/2022    Procedure: COLONOSCOPY;  Surgeon: Yadi Wong MD;  Location: [facility] ([address] [place] [address]);  Service: Endoscopy;  Laterality: N/A;  Covid test at [place] on 4/4.EC    CYST REMOVAL      ovarian cyst removal at age 12    DECOMPRESSION OF SUBACROMIAL SPACE Left 7/27/2020    Procedure: DECOMPRESSION, SUBACROMIAL SPACE;  Surgeon: Marcela Garza MD;  Location: [facility] OR;  Service: Orthopedics;  Laterality: Left;    DILATION AND CURETTAGE OF UTERUS      ESOPHAGEAL MOTILITY STUDY USING HIGH RESOLUTION MANOMETRY N/A 9/17/2019    Procedure: MOTILITY STUDY, ESOPHAGUS, USING HIGH RESOLUTION MANOMETRY;  Surgeon: Leopoldo Swanson MD;  [facility]  Service: Endoscopy;  Laterality: N/A;  Pt will be taking xanax before procedure to hopefull help with anxiety.    ESOPHAGOGASTRODUODENOSCOPY N/A 6/14/2019    Procedure:  EGD (ESOPHAGOGASTRODUODENOSCOPY);  Surgeon: Ced Guevara MD;  [facility]  Service: Endoscopy;  Laterality: N/A;  Please make sure it is scheduled after her cardiology appt.- see cardiology note dated 6/13/19 for clearance - ERW    FOREIGN BODY REMOVAL N/A 4/17/2023    Procedure: REMOVAL, FOREIGN BODY;  Surgeon: Rom Rodriguez MD;  Location: [facility];  Service: General;  Laterality: N/A;    GASTRIC BYPASS N/A 05/20/2021    INJECTION OF STEROID Left 7/27/2020    Procedure: INJECTION, STEROID LEFT THUMB TRIGGER FINGER;  Surgeon: Marcela Garza MD;  Location: [facility] OR;  Service: Orthopedics;  Laterality: Left;  LEFT THUMB, TRIGGER FINGER    LYSIS OF ADHESIONS N/A 10/21/2022    Procedure: LYSIS, ADHESIONS;  Surgeon: Bunny Obrien MD;  Location: [facility];  Service: General;  Laterality: N/A;    TRANSVERSE COLECTOMY N/A 10/26/2022    Procedure: COLECTOMY, TRANSVERSE;  Surgeon: Bunny Obrien MD;  Location: [facility] OR;  Service: General;  Laterality: N/A;    TRIGGER FINGER RELEASE Right 10/4/2019    Procedure: RELEASE, TRIGGER FINGER - right thumb;  Surgeon: Craig Tom MD;  Location: [facility];  Service: Orthopedics;  Laterality: Right;    UPPER GASTROINTESTINAL ENDOSCOPY      WISDOM TOOTH EXTRACTION         Family History   Problem Relation Age of Onset    Cancer Maternal Grandmother     Diabetes Mother     No Known Problems Sister     No Known Problems Sister     No Known Problems Sister     Breast cancer Maternal Aunt 70    Colon cancer Neg Hx     Stomach cancer Neg Hx     Inflammatory bowel disease Neg Hx     Esophageal cancer Neg Hx        Social History     Socioeconomic History    Marital status: Single   Tobacco Use    Smoking status: Every Day     Current packs/day: 0.75     Average packs/day: 0.8 packs/day for 31.6 years (23.7 ttl pk-yrs)     Types: Cigarettes     Start date: 1992    Smokeless tobacco: Never    Tobacco comments:     Enrolled in the [employer] on  8/1/18 ([facility] Member ID # [id number]) Ambulatory referral to Smoking Cessation Program.   Substance and Sexual Activity    Alcohol use: Yes     Comment: social    Drug use: No    Sexual activity: Yes     Partners: Male     Birth control/protection: None   Social History Narrative    Single.  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      Social Determinants of Health     Financial Resource Strain: Low Risk  (6/20/2023)    Overall Financial Resource Strain (CARDIA)     Difficulty of Paying Living Expenses: Not very hard   Food Insecurity: No Food Insecurity (6/20/2023)    Hunger Vital Sign     Worried About Running Out of Food in the Last Year: Never true     Ran Out of Food in the Last Year: Never true   Transportation Needs: No Transportation Needs (6/20/2023)    PRAPARE - Transportation     Lack of Transportation (Medical): No     Lack of Transportation (Non-Medical): No   Physical Activity: Insufficiently Active (6/20/2023)    Exercise Vital Sign     Days of Exercise per Week: 2 days     Minutes of Exercise per Session: 60 min   Stress: No Stress Concern Present (6/20/2023)    [employer] [place] [employer] - Occupational Stress Questionnaire     Feeling of Stress : Not at all   Social Connections: Socially Isolated (6/20/2023)    Social Connection and Isolation Panel [NHANES]     Frequency of Communication with Friends and Family: More than three times a week     Frequency of Social Gatherings with Friends and Family: More than three times a week     Attends Christianity Services: Never     Active Member of Clubs or Organizations: No     Attends Club or Organization Meetings: Never     Marital Status: Never    Housing Stability: Unknown (6/20/2023)    Housing Stability Vital Sign     Unable to Pay for Housing in the Last Year: No     Unstable Housing in the Last Year: No       Vitals:    08/18/23 0900   BP:    Pulse: 62   Resp:    Temp:        Physical Exam  Constitutional:       General:  She is not in acute distress.     Appearance: She is well-developed.   HENT:      Head: Normocephalic and atraumatic.   Eyes:      General: No scleral icterus.  Cardiovascular:      Rate and Rhythm: Normal rate.   Pulmonary:      Effort: Pulmonary effort is normal.      Breath sounds: No stridor.   Abdominal:      General: There is no distension.      Palpations: Abdomen is soft.      Tenderness: There is abdominal tenderness.      Comments: TTP epigastrium   Lymphadenopathy:      Cervical: No cervical adenopathy.   Skin:     General: Skin is warm.      Findings: No erythema.   Neurological:      Mental Status: She is alert and oriented to person, place, and time.   Psychiatric:         Behavior: Behavior normal.     Body mass index is 21.79 kg/m².  WBC 13  LA 2.3  CT concerning for SBO    Assessment & Plan:  48F with possible SBO, internal hernia?  Plan for ex lap when OR available  NPO  Refusing NG tube

## 2023-08-18 NOTE — PLAN OF CARE
Pt received post op this PM. Pt is A+Ox4. IVF to PIV. Voiding via purewick, attempted OOB to bathroom but with c/o severe abd pain. Medicated per MAR. Denies nausea, tolerating CLD at this time. Safety precautions maintained.

## 2023-08-18 NOTE — HPI
Velma Lopez is a 48 y.o. 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 She presents today with one day history of nausea, dry heaving, abdominal pain and obstipation. She states the pain has been continuous since onset and has waves of increasing severity. It is worst in the lower abdomen, but involves the entire abdomen. She was admitted in July with similar symptoms and managed conservatively for a small bowel obstruction. Upon presentation she was afebrile and hemodynamically stable. Labs notable for mild leukocytosis of 13 and lactate elevated just above normal at 2.3. Imaging concerning for small bowel obstruction with transition point.

## 2023-08-18 NOTE — SUBJECTIVE & OBJECTIVE
Current Facility-Administered Medications on File Prior to Encounter   Medication    lidocaine (PF) 10 mg/ml (1%) injection 10 mg    midazolam (VERSED) 1 mg/mL injection 0.5 mg    ropivacaine (Naropin) 1000 mg/500 mL (2 mg/mL) [facility] PainPRO Pump infusion     Current Outpatient Medications on File Prior to Encounter   Medication Sig    acetaminophen (TYLENOL) 500 MG tablet Take 2 tablets (1,000 mg total) by mouth every 6 (six) hours as needed for Pain.    amitriptyline (ELAVIL) 10 MG tablet TAKE 1 TABLET BY MOUTH EVERY DAY IN THE EVENING    cetirizine (ZYRTEC) 10 MG tablet Take 10 mg by mouth once daily.    docusate sodium (COLACE) 100 MG capsule Take 1 capsule (100 mg total) by mouth 2 (two) times daily.    HYDROcodone-acetaminophen (NORCO) 5-325 mg per tablet Take 1 tablet by mouth every 4 (four) hours as needed for Pain.    medroxyPROGESTERone (DEPO-PROVERA) 150 mg/mL Syrg Inject 1 mL (150 mg total) into the muscle every 3 (three) months.    medroxyPROGESTERone (DEPO-PROVERA) 150 mg/mL Syrg Inject 1 mL (150 mg total) into the muscle every 3 (three) months.    medroxyPROGESTERone (DEPO-PROVERA) 150 mg/mL Syrg Inject 1 mL (150 mg total) into the muscle every 3 (three) months.    meloxicam (MOBIC) 7.5 MG tablet Take 7.5 mg by mouth daily as needed.    nicotine (NICODERM CQ) 21 mg/24 hr Place 1 patch onto the skin once daily.    solifenacin (VESICARE) 10 MG tablet Take 10 mg by mouth.    terconazole (TERAZOL 7) 0.4 % Crea Place 1 applicator vaginally once daily.    tiZANidine (ZANAFLEX) 4 MG tablet TAKE 1/2-1 TABLET BY MOUTH AT BEDTIME AS NEEDED FOR SPASM    [DISCONTINUED] atorvastatin (LIPITOR) 10 MG tablet Take 1 tablet (10 mg total) by mouth once daily.    [DISCONTINUED] blood-glucose meter kit Please provide with insurance covered meter (Patient not taking: Reported on 6/21/2022)    [DISCONTINUED] diclofenac sodium (VOLTAREN) 1 % Gel Apply 2 g topically 4 (four) times daily. (Patient not taking: No sig reported)     [DISCONTINUED] hyoscyamine (ANASPAZ,LEVSIN) 0.125 mg Tab Take 1 tablet (125 mcg total) by mouth every 4 (four) hours as needed (bladder spasms).    [DISCONTINUED] losartan (COZAAR) 25 MG tablet TAKE 1 TABLET BY MOUTH EVERY DAY    [DISCONTINUED] metFORMIN (GLUCOPHAGE) 500 MG tablet TAKE 1 TABLET BY MOUTH TWICE A DAY WITH MEALS       Review of patient's allergies indicates:  No Known Allergies    Past Medical History:   Diagnosis Date    Chronic back pain     Diabetes type 2, uncontrolled     Mild nonproliferative diabetic retinopathy of left eye without macular edema associated with type 2 diabetes mellitus 10/21/2019    Morbid obesity with BMI of 40.0-44.9, adult     MILEY on CPAP     Plantar fasciitis of left foot     Urticaria      Past Surgical History:   Procedure Laterality Date    ARTHROSCOPIC ACROMIOPLASTY OF SHOULDER Left 7/27/2020    Procedure: ACROMIOPLASTY, ARTHROSCOPIC;  Surgeon: Marcela Garza MD;  Location: [facility] OR;  Service: Orthopedics;  Laterality: Left;    ARTHROSCOPIC REPAIR OF ROTATOR CUFF OF SHOULDER Right 7/27/2020    Procedure: REPAIR, ROTATOR CUFF, ARTHROSCOPIC;  Surgeon: Marcela Garza MD;  Location: [facility] OR;  Service: Orthopedics;  Laterality: Right;  GENERAL/REGIONAL    COLONOSCOPY N/A 4/7/2022    Procedure: COLONOSCOPY;  Surgeon: Yadi Wong MD;  [facility]  Service: Endoscopy;  Laterality: N/A;  Covid test at [facility] on 4/4.EC    CYST REMOVAL      ovarian cyst removal at age 12    DECOMPRESSION OF SUBACROMIAL SPACE Left 7/27/2020    Procedure: DECOMPRESSION, SUBACROMIAL SPACE;  Surgeon: Marcela Garza MD;  Location: [facility] OR;  Service: Orthopedics;  Laterality: Left;    DILATION AND CURETTAGE OF UTERUS      ESOPHAGEAL MOTILITY STUDY USING HIGH RESOLUTION MANOMETRY N/A 9/17/2019    Procedure: MOTILITY STUDY, ESOPHAGUS, USING HIGH RESOLUTION MANOMETRY;  Surgeon: Leopoldo Swanson MD;  Location: [facility] (4TH FLR);  Service: Endoscopy;  Laterality:  N/A;  Pt will be taking xanax before procedure to hopefull help with anxiety.    ESOPHAGOGASTRODUODENOSCOPY N/A 6/14/2019    Procedure: EGD (ESOPHAGOGASTRODUODENOSCOPY);  Surgeon: Ced Guevara MD;  Location: [address]);  Service: Endoscopy;  Laterality: N/A;  Please make sure it is scheduled after her cardiology appt.- see cardiology note dated 6/13/19 for clearance - ERW    FOREIGN BODY REMOVAL N/A 4/17/2023    Procedure: REMOVAL, FOREIGN BODY;  Surgeon: Rom Rodriguez MD;  Location: [facility];  Service: General;  Laterality: N/A;    GASTRIC BYPASS N/A 05/20/2021    INJECTION OF STEROID Left 7/27/2020    Procedure: INJECTION, STEROID LEFT THUMB TRIGGER FINGER;  Surgeon: Marcela Garza MD;  Location: [facility];  Service: Orthopedics;  Laterality: Left;  LEFT THUMB, TRIGGER FINGER    LYSIS OF ADHESIONS N/A 10/21/2022    Procedure: LYSIS, ADHESIONS;  Surgeon: Bunny Obrien MD;  Location[facility] OR;  Service: General;  Laterality: N/A;    TRANSVERSE COLECTOMY N/A 10/26/2022    Procedure: COLECTOMY, TRANSVERSE;  Surgeon: Bunny Obrien MD;  Location: [facility];  Service: General;  Laterality: N/A;    TRIGGER FINGER RELEASE Right 10/4/2019    Procedure: RELEASE, TRIGGER FINGER - right thumb;  Surgeon: Craig Tom MD;  Location: [facility];  Service: Orthopedics;  Laterality: Right;    UPPER GASTROINTESTINAL ENDOSCOPY      WISDOM TOOTH EXTRACTION       Family History       Problem Relation (Age of Onset)    Breast cancer Maternal Aunt (70)    Cancer Maternal Grandmother    Diabetes Mother    No Known Problems Sister, Sister, Sister          Tobacco Use    Smoking status: Every Day     Current packs/day: 0.75     Average packs/day: 0.8 packs/day for 31.6 years (23.7 ttl pk-yrs)     Types: Cigarettes     Start date: 1992    Smokeless tobacco: Never    Tobacco comments:     Enrolled in the [employer] on 8/1/18 ([facility] Member ID # [id number]) Ambulatory referral to Smoking Cessation Program.    Substance and Sexual Activity    Alcohol use: Yes     Comment: social    Drug use: No    Sexual activity: Yes     Partners: Male     Birth control/protection: None     Review of Systems   Constitutional:  Negative for chills and fever.   HENT:  Negative for congestion.    Respiratory:  Negative for cough and shortness of breath.    Cardiovascular:  Negative for chest pain.   Gastrointestinal:  Positive for abdominal pain, nausea and vomiting. Negative for abdominal distention, constipation and diarrhea.   Genitourinary:  Negative for dysuria.   Neurological:  Negative for seizures and syncope.   All other systems reviewed and are negative.    Objective:     Vital Signs (Most Recent):  Temp: 98.6 °F (37 °C) (08/17/23 2233)  Pulse: 62 (08/18/23 0900)  Resp: 20 (08/18/23 0817)  BP: 133/71 (08/18/23 0702)  SpO2: 100 % (08/18/23 0900) Vital Signs (24h Range):  Temp:  [98.6 °F (37 °C)] 98.6 °F (37 °C)  Pulse:  [62-77] 62  Resp:  [18-20] 20  SpO2:  [94 %-100 %] 100 %  BP: (121-180)/() 133/71     Weight: 61.2 kg (135 lb)  Body mass index is 21.79 kg/m².     Physical Exam  Vitals and nursing note reviewed.   Constitutional:       General: She is in acute distress.      Appearance: She is not ill-appearing or toxic-appearing.   HENT:      Head: Normocephalic and atraumatic.   Eyes:      General: No scleral icterus.     Extraocular Movements: Extraocular movements intact.   Cardiovascular:      Rate and Rhythm: Normal rate and regular rhythm.   Pulmonary:      Effort: Pulmonary effort is normal. No respiratory distress.   Abdominal:      General: There is distension.      Tenderness: There is abdominal tenderness. There is guarding (RLQ). There is no rebound.   Skin:     General: Skin is warm and dry.   Neurological:      General: No focal deficit present.      Mental Status: She is alert and oriented to person, place, and time.   Psychiatric:         Mood and Affect: Mood normal.         Behavior: Behavior normal.             I have reviewed all pertinent lab results within the past 24 hours.    Significant Diagnostics:  I have reviewed all pertinent imaging results/findings within the past 24 hours.

## 2023-08-18 NOTE — ED NOTES
Pt states pain beginning to slowly increase. Reports pain much improved following pain medication.

## 2023-08-18 NOTE — ANESTHESIA PREPROCEDURE EVALUATION
08/18/2023  Velma Lopez is a 48 y.o., female.      Pre-op Assessment    I have reviewed the Patient Summary Reports.    I have reviewed the NPO Status.   I have reviewed the Medications.     Review of Systems  Anesthesia Hx:  No problems with previous Anesthesia    Social:  Smoker    Hematology/Oncology:  Hematology Normal        EENT/Dental:EENT/Dental Normal   Cardiovascular:  Cardiovascular Normal Exercise tolerance: good     Pulmonary:  Pulmonary Normal    Hepatic/GI:   Currently with SBO, WBC at 14k and lactic acid 2.4, hemodynamically stable.  Had gastric bypass in past and has since had multiple bowel obstructions requiring surgery    Musculoskeletal:  Musculoskeletal Normal    Neurological:  Neurology Normal    Endocrine:   Diabetes Diabetes in past       Physical Exam  General: Well nourished and Cooperative    Airway:  Mallampati: I   Mouth Opening: Normal  TM Distance: Normal  Tongue: Normal  Neck ROM: Normal ROM    Dental:  Intact    Chest/Lungs:  Normal Respiratory Rate    Heart:  Rate: Normal        Anesthesia Plan  Type of Anesthesia, risks & benefits discussed:    Anesthesia Type: Gen ETT  Intra-op Monitoring Plan: Standard ASA Monitors  Post Op Pain Control Plan: multimodal analgesia and IV/PO Opioids PRN  Induction:  IV  Airway Plan: Video  Informed Consent: Informed consent signed with the Patient and all parties understand the risks and agree with anesthesia plan.  All questions answered.   ASA Score: 3  Day of Surgery Review of History & Physical: H&P Update referred to the surgeon/provider.    Ready For Surgery From Anesthesia Perspective.     .

## 2023-08-18 NOTE — ED NOTES
Received report from Brenna SERRANO; pt AAO x4, states has intermittent abdominal spasms, does not want pain medications at this time. In no acute distress, General Surgery resident at , darlyn x2, call light in reach and instructed how to use.

## 2023-08-19 LAB
ANION GAP SERPL CALC-SCNC: 6 MMOL/L (ref 8–16)
BUN SERPL-MCNC: 8 MG/DL (ref 6–20)
CALCIUM SERPL-MCNC: 8.5 MG/DL (ref 8.7–10.5)
CHLORIDE SERPL-SCNC: 108 MMOL/L (ref 95–110)
CO2 SERPL-SCNC: 23 MMOL/L (ref 23–29)
CREAT SERPL-MCNC: 0.7 MG/DL (ref 0.5–1.4)
EST. GFR  (NO RACE VARIABLE): >60 ML/MIN/1.73 M^2
GLUCOSE SERPL-MCNC: 95 MG/DL (ref 70–110)
POTASSIUM SERPL-SCNC: 3.6 MMOL/L (ref 3.5–5.1)
SODIUM SERPL-SCNC: 137 MMOL/L (ref 136–145)

## 2023-08-19 PROCEDURE — 11000001 HC ACUTE MED/SURG PRIVATE ROOM

## 2023-08-19 PROCEDURE — 97530 THERAPEUTIC ACTIVITIES: CPT

## 2023-08-19 PROCEDURE — 25000003 PHARM REV CODE 250: Performed by: STUDENT IN AN ORGANIZED HEALTH CARE EDUCATION/TRAINING PROGRAM

## 2023-08-19 PROCEDURE — 25000003 PHARM REV CODE 250: Performed by: SURGERY

## 2023-08-19 PROCEDURE — 97161 PT EVAL LOW COMPLEX 20 MIN: CPT

## 2023-08-19 PROCEDURE — 63600175 PHARM REV CODE 636 W HCPCS: Performed by: STUDENT IN AN ORGANIZED HEALTH CARE EDUCATION/TRAINING PROGRAM

## 2023-08-19 PROCEDURE — 80048 BASIC METABOLIC PNL TOTAL CA: CPT | Performed by: STUDENT IN AN ORGANIZED HEALTH CARE EDUCATION/TRAINING PROGRAM

## 2023-08-19 PROCEDURE — 36415 COLL VENOUS BLD VENIPUNCTURE: CPT | Performed by: STUDENT IN AN ORGANIZED HEALTH CARE EDUCATION/TRAINING PROGRAM

## 2023-08-19 RX ORDER — OXYCODONE AND ACETAMINOPHEN 5; 325 MG/1; MG/1
1 TABLET ORAL EVERY 4 HOURS PRN
Status: DISCONTINUED | OUTPATIENT
Start: 2023-08-19 | End: 2023-08-21

## 2023-08-19 RX ORDER — DOCUSATE SODIUM 100 MG/1
100 CAPSULE, LIQUID FILLED ORAL 2 TIMES DAILY
Status: DISCONTINUED | OUTPATIENT
Start: 2023-08-19 | End: 2023-08-21 | Stop reason: HOSPADM

## 2023-08-19 RX ORDER — DICYCLOMINE HYDROCHLORIDE 10 MG/1
10 CAPSULE ORAL DAILY PRN
Status: DISCONTINUED | OUTPATIENT
Start: 2023-08-19 | End: 2023-08-20

## 2023-08-19 RX ADMIN — OXYCODONE HYDROCHLORIDE AND ACETAMINOPHEN 1 TABLET: 5; 325 TABLET ORAL at 05:08

## 2023-08-19 RX ADMIN — HYDROMORPHONE HYDROCHLORIDE 1 MG: 1 INJECTION, SOLUTION INTRAMUSCULAR; INTRAVENOUS; SUBCUTANEOUS at 11:08

## 2023-08-19 RX ADMIN — DOCUSATE SODIUM 100 MG: 100 CAPSULE, LIQUID FILLED ORAL at 09:08

## 2023-08-19 RX ADMIN — DICYCLOMINE HYDROCHLORIDE 10 MG: 10 CAPSULE ORAL at 11:08

## 2023-08-19 RX ADMIN — HYDROMORPHONE HYDROCHLORIDE 1 MG: 1 INJECTION, SOLUTION INTRAMUSCULAR; INTRAVENOUS; SUBCUTANEOUS at 03:08

## 2023-08-19 RX ADMIN — HYDROMORPHONE HYDROCHLORIDE 1 MG: 1 INJECTION, SOLUTION INTRAMUSCULAR; INTRAVENOUS; SUBCUTANEOUS at 12:08

## 2023-08-19 RX ADMIN — HYDROMORPHONE HYDROCHLORIDE 1 MG: 1 INJECTION, SOLUTION INTRAMUSCULAR; INTRAVENOUS; SUBCUTANEOUS at 04:08

## 2023-08-19 RX ADMIN — HYDROMORPHONE HYDROCHLORIDE 1 MG: 1 INJECTION, SOLUTION INTRAMUSCULAR; INTRAVENOUS; SUBCUTANEOUS at 07:08

## 2023-08-19 RX ADMIN — DOCUSATE SODIUM 100 MG: 100 CAPSULE, LIQUID FILLED ORAL at 12:08

## 2023-08-19 NOTE — PROGRESS NOTES
Progress Note  General Surgery    Admit Date: 8/17/2023  S/P: Procedure(s) (LRB):  LAPAROTOMY, EXPLORATORY (N/A)  LYSIS, ADHESIONS (N/A)    Post-operative Day: 1 Day Post-Op    Hospital Day: 3    SUBJECTIVE:     No acute events overnight. Patient states pain is controlled, on regular diet, no flatus  OBJECTIVE:     Vital Signs (Most Recent)  Temp: 98.1 °F (36.7 °C) (08/19/23 0443)  Pulse: 60 (08/19/23 0443)  Resp: 20 (08/19/23 0751)  BP: 118/62 (08/19/23 0443)  SpO2: 98 % (08/19/23 0443)    Vital Signs Range (Last 24H):  Temp:  [97.7 °F (36.5 °C)-98.6 °F (37 °C)]   Pulse:  [60-88]   Resp:  [7-23]   BP: (118-201)/(61-89)   SpO2:  [95 %-100 %]     I & O (Last 24H):  Intake/Output Summary (Last 24 hours) at 8/19/2023 1203  Last data filed at 8/19/2023 0443  Gross per 24 hour   Intake 2378.3 ml   Output 510 ml   Net 1868.3 ml       Physical Exam:  Gen: NAD   HEENT: NCAT  Pulm: unlabored, symmetrical   Abd: Soft, appropriate ttp. No rebound, guarding.     Wound/Incision:  Dressing in place    Laboratory:  Labs within the past 24 hours have been reviewed.    ASSESSMENT/PLAN:     Assessment/Plan:  DC IVFs  PT to eval/tx  Isaac Harper M.D., F.A.C.S.  [facility] and General Surgery  [facility] [place]

## 2023-08-19 NOTE — NURSING
Pt OTB w/ assistance of bsn and pct. Pt reported some dizziness when standing but subsided. VSS. Pt ambulated to bathroom w/ walker and sba of bsn.

## 2023-08-19 NOTE — NURSING
AAOx4. Meds admin per MAR. Pain fairly controlled w/ prn meds. Pt up w/ walker and 1 person assist to BR. Voided per gia. PW removed to promote ambulation. No BM. Pt educated on opiates and the side effect of constipation and pt encouraged to ambulate w/ assistance more. Diet advanced to regular w/ a fair appetite. No c/o of n/v. C/o cramping, meds admin. Safety m/t. Pt encouraged to call staff for assistance.

## 2023-08-19 NOTE — PLAN OF CARE
AAOX4. Respirations even and unlabored. Breath sounds clear. Denies N/V, SOB, distress. Continued c/o pain at incision site, PRN medication administered. Clear liquid diet continued, tolerating well. Abd soft, tender. Bowel sounds hypoactive. Abd incision CDI with dermabond. Stayed in bed through the night. Voiding via purewick. Vital signs stable. Medications given per MAR. Bed alarm set. Call bell within reach. Instructed to call for assistance.

## 2023-08-19 NOTE — PLAN OF CARE
Problem: Physical Therapy  Goal: Physical Therapy Goal  Description: Goals to be met by: 2023    Patient will increase functional independence with mobility by performin. Sit<>stand with supervision with RW.  2. Gait x 100 feet with RW with SBA.  3. Ascend/descend 10 step(s) with least restrictive assistive device and CGA.      Outcome: Ongoing, Progressing   Pt required Min assist to transfer sit<>stand and to stand.  She required CGA and RW to transfer sit<>stand and SBA with a RW to ambulate 10 ft x 2 to and from the bathroom.  She was able to preform sit<>stand at the toilet with SBA and a RW.

## 2023-08-19 NOTE — PT/OT/SLP EVAL
Physical Therapy Evaluation    Patient Name:  Velma Lopez   MRN:  [id number]    Recommendations:     Discharge Recommendations: other (see comments) (low intensity therapy)   Discharge Equipment Recommendations: walker, rolling   Barriers to discharge: Inaccessible home    Assessment:     Velma Lopez is a 48 y.o. female admitted with a medical diagnosis of Small bowel obstruction.  She presents with the following impairments/functional limitations: weakness, impaired endurance, impaired self care skills, impaired functional mobility, gait instability, impaired balance, decreased lower extremity function, decreased safety awareness, pain, impaired skin, impaired muscle length. Pt required Min assist to transfer sit<>stand and to stand.  She required CGA and RW to transfer sit<>stand and SBA with a RW to ambulate 10 ft x 2 to and from the bathroom.  She was able to preform sit<>stand at the toilet with SBA and a RW..    Rehab Prognosis: Good; patient would benefit from acute skilled PT services to address these deficits and reach maximum level of function.    Recent Surgery: Procedure(s) (LRB):  LAPAROTOMY, EXPLORATORY (N/A)  LYSIS, ADHESIONS (N/A) 1 Day Post-Op    Plan:     During this hospitalization, patient to be seen 5 x/week to address the identified rehab impairments via gait training, therapeutic activities, therapeutic exercises, neuromuscular re-education and progress toward the following goals:    Plan of Care Expires:  09/19/23    Subjective     Chief Complaint: Abdominal pain  Patient/Family Comments/goals: Pt agreeable to evaluation  Pain/Comfort:  Pain Rating 1: 8/10  Location - Side 1: Bilateral  Location 1: abdomen  Pain Addressed 1: Distraction  Pain Rating Post-Intervention 1: 8/10    Patients cultural, spiritual, Christianity conflicts given the current situation: no    Living Environment:  Pt lives in a two story apartment with the bedrooms upstairs.  She lives with her  .  Prior to admission, patients level of function was independent.  Equipment used at home: none.  DME owned (not currently used): none.  Upon discharge, patient will have assistance from her .    Objective:     Communicated with nurse prior to session.  Patient found HOB elevated with bed alarm, PureWick, peripheral IV  upon PT entry to room.    General Precautions: Standard, fall  Orthopedic Precautions:N/A   Braces: N/A  Respiratory Status: Room air    Exams:  Cognitive Exam:  Patient is oriented to Person, Place, Time, and Situation  Sensation:    -       Intact  RLE ROM: WFL  RLE Strength: Deficits: Knee flexion/extension 3/5 with pain, hip flexion 2/5 with with pain  LLE ROM: WFL  LLE Strength: Deficits: Knee flexion/extension 3/5 with pain, hip flexion 2/5 with pain    Functional Mobility:  Bed Mobility:     Rolling Right: minimum assistance  Supine to Sit: minimum assistance  Sit to Supine: minimum assistance  Transfers:     Sit to Stand:  minimum assistance with rolling walker  Gait: Pt was able to ambulate 10 ft x 2 to and from the toilet with a RW and SBA.      AM-PAC 6 CLICK MOBILITY  Total Score:18       Treatment & Education:  Pt received transfer and gait training    Patient left HOB elevated with all lines intact, call button in reach, bed alarm on, and Nursing notified.    GOALS:   Multidisciplinary Problems       Physical Therapy Goals          Problem: Physical Therapy    Goal Priority Disciplines Outcome Goal Variances Interventions   Physical Therapy Goal     PT, PT/OT Ongoing, Progressing     Description: Goals to be met by: 2023    Patient will increase functional independence with mobility by performin. Sit<>stand with supervision with RW.  2. Gait x 100 feet with RW with SBA.  3. Ascend/descend 10 step(s) with least restrictive assistive device and CGA.                           History:     Past Medical History:   Diagnosis Date    Chronic back pain     Diabetes  type 2, uncontrolled     Mild nonproliferative diabetic retinopathy of left eye without macular edema associated with type 2 diabetes mellitus 10/21/2019    Morbid obesity with BMI of 40.0-44.9, adult     MILEY on CPAP     Plantar fasciitis of left foot     Urticaria        Past Surgical History:   Procedure Laterality Date    ARTHROSCOPIC ACROMIOPLASTY OF SHOULDER Left 7/27/2020    Procedure: ACROMIOPLASTY, ARTHROSCOPIC;  Surgeon: Marcela Garza MD;  Location: [facility] OR;  Service: Orthopedics;  Laterality: Left;    ARTHROSCOPIC REPAIR OF ROTATOR CUFF OF SHOULDER Right 7/27/2020    Procedure: REPAIR, ROTATOR CUFF, ARTHROSCOPIC;  Surgeon: Marcela Garza MD;  Location: [facility] OR;  Service: Orthopedics;  Laterality: Right;  GENERAL/REGIONAL    COLONOSCOPY N/A 4/7/2022    Procedure: COLONOSCOPY;  Surgeon: Yadi Wong MD;  [facility]  Service: Endoscopy;  Laterality: N/A;  Covid test at [place] on 4/4.EC    CYST REMOVAL      ovarian cyst removal at age 12    DECOMPRESSION OF SUBACROMIAL SPACE Left 7/27/2020    Procedure: DECOMPRESSION, SUBACROMIAL SPACE;  Surgeon: Marcela Garza MD;  Location: [facility] OR;  Service: Orthopedics;  Laterality: Left;    DILATION AND CURETTAGE OF UTERUS      ESOPHAGEAL MOTILITY STUDY USING HIGH RESOLUTION MANOMETRY N/A 9/17/2019    Procedure: MOTILITY STUDY, ESOPHAGUS, USING HIGH RESOLUTION MANOMETRY;  Surgeon: Leopoldo Swanson MD;  Location: [facility] (4TH FLR);  Service: Endoscopy;  Laterality: N/A;  Pt will be taking xanax before procedure to hopefull help with anxiety.    ESOPHAGOGASTRODUODENOSCOPY N/A 6/14/2019    Procedure: EGD (ESOPHAGOGASTRODUODENOSCOPY);  Surgeon: Ced Guevara MD;  Location: [facility] (4TH FLR);  Service: Endoscopy;  Laterality: N/A;  Please make sure it is scheduled after her cardiology appt.- see cardiology note dated 6/13/19 for clearance - ERW    FOREIGN BODY REMOVAL N/A 4/17/2023    Procedure: REMOVAL, FOREIGN BODY;   Surgeon: Rom Rodriguez MD;  Location: [facility] OR;  Service: General;  Laterality: N/A;    GASTRIC BYPASS N/A 05/20/2021    INJECTION OF STEROID Left 7/27/2020    Procedure: INJECTION, STEROID LEFT THUMB TRIGGER FINGER;  Surgeon: Marcela Garza MD;  Location: [facility] OR;  Service: Orthopedics;  Laterality: Left;  LEFT THUMB, TRIGGER FINGER    LYSIS OF ADHESIONS N/A 10/21/2022    Procedure: LYSIS, ADHESIONS;  Surgeon: Bunny Obrien MD;  Location: [facility] OR;  Service: General;  Laterality: N/A;    TRANSVERSE COLECTOMY N/A 10/26/2022    Procedure: COLECTOMY, TRANSVERSE;  Surgeon: Bunny Obrien MD;  Location: [facility];  Service: General;  Laterality: N/A;    TRIGGER FINGER RELEASE Right 10/4/2019    Procedure: RELEASE, TRIGGER FINGER - right thumb;  Surgeon: Craig Tom MD;  Location: [facility];  Service: Orthopedics;  Laterality: Right;    UPPER GASTROINTESTINAL ENDOSCOPY      WISDOM TOOTH EXTRACTION         Time Tracking:     PT Received On: 09/19/23  PT Start Time: 1453     PT Stop Time: 1517  PT Total Time (min): 24 min     Billable Minutes: Evaluation 10 and Therapeutic Activity 14      08/19/2023

## 2023-08-20 PROCEDURE — 25000003 PHARM REV CODE 250: Performed by: STUDENT IN AN ORGANIZED HEALTH CARE EDUCATION/TRAINING PROGRAM

## 2023-08-20 PROCEDURE — 11000001 HC ACUTE MED/SURG PRIVATE ROOM

## 2023-08-20 PROCEDURE — 25000003 PHARM REV CODE 250: Performed by: SURGERY

## 2023-08-20 PROCEDURE — 63600175 PHARM REV CODE 636 W HCPCS: Performed by: STUDENT IN AN ORGANIZED HEALTH CARE EDUCATION/TRAINING PROGRAM

## 2023-08-20 RX ORDER — DICYCLOMINE HYDROCHLORIDE 10 MG/1
10 CAPSULE ORAL EVERY 6 HOURS PRN
Status: DISCONTINUED | OUTPATIENT
Start: 2023-08-20 | End: 2023-08-21 | Stop reason: HOSPADM

## 2023-08-20 RX ORDER — POLYETHYLENE GLYCOL 3350 17 G/17G
17 POWDER, FOR SOLUTION ORAL 2 TIMES DAILY
Status: DISCONTINUED | OUTPATIENT
Start: 2023-08-20 | End: 2023-08-21 | Stop reason: HOSPADM

## 2023-08-20 RX ADMIN — OXYCODONE HYDROCHLORIDE AND ACETAMINOPHEN 1 TABLET: 5; 325 TABLET ORAL at 08:08

## 2023-08-20 RX ADMIN — OXYCODONE HYDROCHLORIDE AND ACETAMINOPHEN 1 TABLET: 5; 325 TABLET ORAL at 01:08

## 2023-08-20 RX ADMIN — DICYCLOMINE HYDROCHLORIDE 10 MG: 10 CAPSULE ORAL at 11:08

## 2023-08-20 RX ADMIN — POLYETHYLENE GLYCOL 3350 17 G: 17 POWDER, FOR SOLUTION ORAL at 11:08

## 2023-08-20 RX ADMIN — OXYCODONE HYDROCHLORIDE AND ACETAMINOPHEN 1 TABLET: 5; 325 TABLET ORAL at 03:08

## 2023-08-20 RX ADMIN — HYDROMORPHONE HYDROCHLORIDE 1 MG: 1 INJECTION, SOLUTION INTRAMUSCULAR; INTRAVENOUS; SUBCUTANEOUS at 05:08

## 2023-08-20 RX ADMIN — POLYETHYLENE GLYCOL 3350 17 G: 17 POWDER, FOR SOLUTION ORAL at 08:08

## 2023-08-20 RX ADMIN — DOCUSATE SODIUM 100 MG: 100 CAPSULE, LIQUID FILLED ORAL at 08:08

## 2023-08-20 RX ADMIN — OXYCODONE HYDROCHLORIDE AND ACETAMINOPHEN 1 TABLET: 5; 325 TABLET ORAL at 11:08

## 2023-08-20 RX ADMIN — DICYCLOMINE HYDROCHLORIDE 10 MG: 10 CAPSULE ORAL at 08:08

## 2023-08-20 NOTE — PROGRESS NOTES
Progress Note  General Surgery    Admit Date: 8/17/2023  S/P: Procedure(s) (LRB):  LAPAROTOMY, EXPLORATORY (N/A)  LYSIS, ADHESIONS (N/A)    Post-operative Day: 2 Days Post-Op    Hospital Day: 4    SUBJECTIVE:     No acute events overnight. Patient states pain is better, +flatus, tolerated regular diet, walking some, still some distension  OBJECTIVE:     Vital Signs (Most Recent)  Temp: 98.1 °F (36.7 °C) (08/20/23 0730)  Pulse: 66 (08/20/23 0730)  Resp: 20 (08/20/23 0852)  BP: (!) 146/72 (08/20/23 0730)  SpO2: 95 % (08/20/23 0730)    Vital Signs Range (Last 24H):  Temp:  [98.1 °F (36.7 °C)-99.7 °F (37.6 °C)]   Pulse:  [65-81]   Resp:  [16-20]   BP: (120-155)/(59-99)   SpO2:  [94 %-98 %]     I & O (Last 24H):  Intake/Output Summary (Last 24 hours) at 8/20/2023 0913  Last data filed at 8/19/2023 1200  Gross per 24 hour   Intake 60 ml   Output --   Net 60 ml       Physical Exam:  Gen: NAD   HEENT: NCAT  Pulm: unlabored, symmetrical   Abd: Soft, appropriate ttp. No rebound, guarding.     Wound/Incision:  clean, dry, intact    Laboratory:  Labs within the past 24 hours have been reviewed.    ASSESSMENT/PLAN:     Assessment/Plan:  Doing well, ?D/C tomorrow    Dany Harper M.D., F.A.C.S.  [facility]Cbm[facility] and General Surgery  [facility] [place]

## 2023-08-20 NOTE — PLAN OF CARE
AAOX4. Respirations even and unlabored. Breath sounds clear. Denies N/V, SOB, distress. Continued c/o pain at incision site, PRN medication administered. Patient states PRN dicyclomine significantly decreased her pain. Regular, tolerating well. Abd soft, tender. Bowel sounds hypoactive. Abd incision CDI with dermabond. Up in room with SBA, voiding in toilet. Vital signs stable. Medications given per MAR. Bed alarm set. Call bell within reach. Instructed to call for assistance

## 2023-08-21 ENCOUNTER — CLINICAL SUPPORT (OUTPATIENT)
Dept: SMOKING CESSATION | Facility: CLINIC | Age: 48
End: 2023-08-21
Payer: COMMERCIAL

## 2023-08-21 VITALS
BODY MASS INDEX: 22.82 KG/M2 | OXYGEN SATURATION: 96 % | RESPIRATION RATE: 18 BRPM | TEMPERATURE: 100 F | DIASTOLIC BLOOD PRESSURE: 75 MMHG | SYSTOLIC BLOOD PRESSURE: 153 MMHG | WEIGHT: 142 LBS | HEIGHT: 66 IN | HEART RATE: 67 BPM

## 2023-08-21 DIAGNOSIS — F17.210 CIGARETTE SMOKER: Primary | ICD-10-CM

## 2023-08-21 LAB — POCT GLUCOSE: 67 MG/DL (ref 70–110)

## 2023-08-21 PROCEDURE — 99407 BEHAV CHNG SMOKING > 10 MIN: CPT | Mod: S$GLB,,,

## 2023-08-21 PROCEDURE — S4991 NICOTINE PATCH NONLEGEND: HCPCS | Performed by: STUDENT IN AN ORGANIZED HEALTH CARE EDUCATION/TRAINING PROGRAM

## 2023-08-21 PROCEDURE — 25000003 PHARM REV CODE 250

## 2023-08-21 PROCEDURE — 99407 PR TOBACCO USE CESSATION INTENSIVE >10 MINUTES: ICD-10-PCS | Mod: S$GLB,,,

## 2023-08-21 PROCEDURE — 25000003 PHARM REV CODE 250: Performed by: SURGERY

## 2023-08-21 PROCEDURE — 25000003 PHARM REV CODE 250: Performed by: STUDENT IN AN ORGANIZED HEALTH CARE EDUCATION/TRAINING PROGRAM

## 2023-08-21 RX ORDER — ACETAMINOPHEN 325 MG/1
650 TABLET ORAL EVERY 6 HOURS PRN
Status: DISCONTINUED | OUTPATIENT
Start: 2023-08-21 | End: 2023-08-21 | Stop reason: HOSPADM

## 2023-08-21 RX ORDER — IBUPROFEN 200 MG
1 TABLET ORAL DAILY
Status: DISCONTINUED | OUTPATIENT
Start: 2023-08-21 | End: 2023-08-21 | Stop reason: HOSPADM

## 2023-08-21 RX ORDER — OXYCODONE HYDROCHLORIDE 5 MG/1
5 TABLET ORAL EVERY 4 HOURS PRN
Qty: 10 TABLET | Refills: 0 | Status: SHIPPED | OUTPATIENT
Start: 2023-08-21

## 2023-08-21 RX ORDER — OXYCODONE HYDROCHLORIDE 5 MG/1
5 TABLET ORAL EVERY 4 HOURS PRN
Status: DISCONTINUED | OUTPATIENT
Start: 2023-08-21 | End: 2023-08-21 | Stop reason: HOSPADM

## 2023-08-21 RX ORDER — ENOXAPARIN SODIUM 100 MG/ML
40 INJECTION SUBCUTANEOUS EVERY 24 HOURS
Status: DISCONTINUED | OUTPATIENT
Start: 2023-08-21 | End: 2023-08-21 | Stop reason: HOSPADM

## 2023-08-21 RX ADMIN — POLYETHYLENE GLYCOL 3350 17 G: 17 POWDER, FOR SOLUTION ORAL at 08:08

## 2023-08-21 RX ADMIN — OXYCODONE HYDROCHLORIDE 5 MG: 5 TABLET ORAL at 12:08

## 2023-08-21 RX ADMIN — NICOTINE 1 PATCH: 21 PATCH, EXTENDED RELEASE TRANSDERMAL at 12:08

## 2023-08-21 RX ADMIN — DOCUSATE SODIUM 100 MG: 100 CAPSULE, LIQUID FILLED ORAL at 08:08

## 2023-08-21 RX ADMIN — OXYCODONE HYDROCHLORIDE AND ACETAMINOPHEN 1 TABLET: 5; 325 TABLET ORAL at 06:08

## 2023-08-21 NOTE — HOSPITAL COURSE
Pt admitted for SBO and underwent ex-lap with NESTOR. She progressed well post-operatively. Now on POD#3 she is stable and ready for discharge. She is walking, tolerating a diet without nausea or vomiting, having bowel function and her pain is controlled. She will follow-up in clinic in 2 weeks.

## 2023-08-21 NOTE — ASSESSMENT & PLAN NOTE
Velma Lopez is a 48 y.o. female with surgical history notable for gastric bypass and more recently transverse colotomy in Oct 2022 who presents with her second small bowel obstruction in the past month. Suspect her obstructions are secondary to adhesive disease. She is now s/p ex-lap with doing well.     -Regular diet  -Bowel regimen  - Prn pain and nausea meds  -DVT ppx    Dispo: Later today vs tomorrow

## 2023-08-21 NOTE — DISCHARGE SUMMARY
[facility] Surg  General Surgery  Discharge Summary      Patient Name: Velma Lopez  MRN: [id number]  Admission Date: 8/17/2023  Hospital Length of Stay: 3 days  Discharge Date and Time:  08/21/2023 11:31 AM  Attending Physician: Bunny Obrien MD   Discharging Provider: Pato Ashley MD  Primary Care Provider: Aftab Martinez MD    HPI:   Velma Lopez is a 48 y.o. 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 She presents today with one day history of nausea, dry heaving, abdominal pain and obstipation. She states the pain has been continuous since onset and has waves of increasing severity. It is worst in the lower abdomen, but involves the entire abdomen. She was admitted in July with similar symptoms and managed conservatively for a small bowel obstruction. Upon presentation she was afebrile and hemodynamically stable. Labs notable for mild leukocytosis of 13 and lactate elevated just above normal at 2.3. Imaging concerning for small bowel obstruction with transition point.         Procedure(s) (LRB):  LAPAROTOMY, EXPLORATORY (N/A)  LYSIS, ADHESIONS (N/A)      Indwelling Lines/Drains at time of discharge:   Lines/Drains/Airways     None               Hospital Course: Pt admitted for SBO and underwent ex-lap with NESTOR. She progressed well post-operatively. Now on POD#3 she is stable and ready for discharge. She is walking, tolerating a diet without nausea or vomiting, having bowel function and her pain is controlled. She will follow-up in clinic in 2 weeks.       Goals of Care Treatment Preferences:  Code Status: Full Code      Consults:     Significant Diagnostic Studies: N/A  See EMR    Pending Diagnostic Studies:     None        Final Active Diagnoses:    Diagnosis Date Noted POA    PRINCIPAL PROBLEM:  Small bowel obstruction [K56.609] 08/18/2023 Yes      Problems Resolved During  this Admission:      Discharged Condition: stable    Disposition: Home or Self Care    Follow Up:   Follow-up Information     Bunny Obrien MD Follow up in 2 week(s).    Specialties: Surgery, General Surgery  Contact information:  [address]  [address]  [place] [address] [phone]                       Patient Instructions:   No discharge procedures on file.  Medications:  Reconciled Home Medications:      Medication List      START taking these medications    oxyCODONE 5 MG immediate release tablet  Commonly known as: ROXICODONE  Take 1 tablet (5 mg total) by mouth every 4 (four) hours as needed for Pain.        CONTINUE taking these medications    acetaminophen 500 MG tablet  Commonly known as: TYLENOL  Take 2 tablets (1,000 mg total) by mouth every 6 (six) hours as needed for Pain.     amitriptyline 10 MG tablet  Commonly known as: ELAVIL  TAKE 1 TABLET BY MOUTH EVERY DAY IN THE EVENING     CENTRUM SILVER WOMEN 8 mg iron-400 mcg-50 mcg Tab  Generic drug: multivit-min-iron-FA-vit K-lut  Take 1 tablet by mouth once daily.     cetirizine 10 MG tablet  Commonly known as: ZYRTEC  Take 10 mg by mouth once daily.     medroxyPROGESTERone 150 mg/mL Syrg  Commonly known as: DEPO-PROVERA  Inject 1 mL (150 mg total) into the muscle every 3 (three) months.     meloxicam 7.5 MG tablet  Commonly known as: MOBIC  Take 7.5 mg by mouth daily as needed.     solifenacin 10 MG tablet  Commonly known as: VESICARE  Take 10 mg by mouth once daily.     tiZANidine 4 MG tablet  Commonly known as: ZANAFLEX  TAKE 1/2-1 TABLET BY MOUTH AT BEDTIME AS NEEDED FOR SPASM     VITAMIN B-12 1000 MCG tablet  Generic drug: cyanocobalamin  Take 1,000 mcg by mouth once a week.     VITAMIN D2 50,000 unit Cap  Generic drug: ergocalciferol  Take 50,000 Units by mouth every Sunday.          Time spent on the discharge of patient: 10 minutes    Pato Ashley MD  General Surgery  [facility]

## 2023-08-21 NOTE — PROGRESS NOTES
Individual Follow-Up Form    8/21/2023    Quit Date: To be determined    Clinical Status of Patient: Inpatient    Length of Service: 30 minutes    Continuing Medication: yes  Patches    Comments: Smoking cessation education note: Pt is a 0.5 to 0.75 pk/day cigarette smoker x 32 yrs. Order obtained for 21 mg nicotine patch Q day. Pt states that she is ready to quit smoking. She is enrolled in the [employer]. Ambualtory referral to Smoking Cessation clinic following hospital discharge.    Diagnosis: F17.210

## 2023-08-21 NOTE — SUBJECTIVE & OBJECTIVE
Interval History: NAEO. Passing gas and tolerating some diet. Still has pain but improving.   AF. HDS.   Medications:  Continuous Infusions:  Scheduled Meds:   docusate sodium  100 mg Oral BID    polyethylene glycol  17 g Oral BID     PRN Meds:dicyclomine, HYDROmorphone, melatonin, ondansetron, oxyCODONE-acetaminophen, prochlorperazine, sodium chloride 0.9%     Review of patient's allergies indicates:  No Known Allergies  Objective:     Vital Signs (Most Recent):  Temp: 98.4 °F (36.9 °C) (08/21/23 0348)  Pulse: 67 (08/21/23 0348)  Resp: 16 (08/21/23 0648)  BP: 139/65 (08/21/23 0348)  SpO2: 99 % (08/21/23 0348) Vital Signs (24h Range):  Temp:  [97.1 °F (36.2 °C)-98.9 °F (37.2 °C)] 98.4 °F (36.9 °C)  Pulse:  [64-67] 67  Resp:  [16-20] 16  SpO2:  [95 %-100 %] 99 %  BP: (138-179)/(65-89) 139/65     Weight: 64.4 kg (141 lb 15.6 oz)  Body mass index is 22.92 kg/m².    Intake/Output - Last 3 Shifts         08/19 0700  08/20 0659 08/20 0700  08/21 0659    P.O. 60 120    Total Intake(mL/kg) 60 (0.9) 120 (1.9)    Net +60 +120          Urine Occurrence 1 x 1 x    Stool Occurrence 0 x 0 x             Physical Exam  Vitals reviewed.   Constitutional:       General: She is not in acute distress.  HENT:      Head: Normocephalic and atraumatic.      Nose: Nose normal.   Eyes:      General:         Right eye: No discharge.         Left eye: No discharge.   Cardiovascular:      Rate and Rhythm: Normal rate and regular rhythm.   Pulmonary:      Effort: Pulmonary effort is normal. No respiratory distress.      Breath sounds: No stridor.   Abdominal:      Comments: Soft, ND, aTTP  Midline incision clear   Musculoskeletal:         General: Normal range of motion.      Cervical back: Normal range of motion.   Skin:     General: Skin is warm and dry.      Capillary Refill: Capillary refill takes 2 to 3 seconds.   Neurological:      General: No focal deficit present.      Mental Status: She is alert and oriented to person, place, and time.    Psychiatric:         Mood and Affect: Mood normal.         Behavior: Behavior normal.          Significant Labs:  I have reviewed all pertinent lab results within the past 24 hours.    Significant Diagnostics:  I have reviewed all pertinent imaging results/findings within the past 24 hours.

## 2023-08-21 NOTE — PLAN OF CARE
The sw met with the pt to complete the assessment. The pt lives with her spouse in [place]. The pt refused to list his name or contact info.The pt still drives but her spouse will transport her home at d/c. The pt list her sister Aminata Millard [phone] as her emergency contact. The pt's independent with her ADL'S and doesn't use dme. 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 The sw completed the white board in the pt's room with her name and contact info. The sw encouraged her to call if she has any further questions or concerns.The sw will continue to follow the pt throughout her transitions of care and will assist with any d/c needs.        08/21/23 8967   Discharge Planning   Assessment Type Discharge Planning Brief Assessment   Resource/Environmental Concerns none   Support Systems Spouse/significant other;Family members;Friends/neighbors   Equipment Currently Used at Home none   Current Living Arrangements apartment   Patient/Family Anticipates Transition to home with family   Patient/Family Anticipated Services at Transition none   DME Needed Upon Discharge  none   Discharge Plan A Home with family   Discharge Plan B Home Health   Physical Activity   On average, how many days per week do you engage in moderate to strenuous exercise (like a brisk walk)? 3 days   On average, how many minutes do you engage in exercise at this level? 30 min   Financial Resource Strain   How hard is it for you to pay for the very basics like food, housing, medical care, and heating? Not hard   Housing Stability   In the last 12 months, was there a time when you were not able to pay the mortgage or rent on time? N   In the last 12 months, how many places have you lived? 1   In the last 12 months, was there a time when you did not have a steady place to sleep or slept in a shelter (including now)? N   Food Insecurity   Within the past 12 months, you worried that your food would run out before you got the  money to buy more. Never true   Within the past 12 months, the food you bought just didn't last and you didn't have money to get more. Never true   Stress   Do you feel stress - tense, restless, nervous, or anxious, or unable to sleep at night because your mind is troubled all the time - these days? Not at all   Social Connections   In a typical week, how many times do you talk on the phone with family, friends, or neighbors? More than 3   How often do you get together with friends or relatives? More than 3   Are you , , , , never , or living with a partner?

## 2023-08-21 NOTE — PLAN OF CARE
AAOX4. Respirations even and unlabored. Breath sounds clear. Denies N/V, SOB, distress. Continued c/o pain at incision site, PRN medication administered. Regular diet, tolerating well. Abd soft, tender. Bowel sounds hypoactive. Abd incision CDI with dermabond. Up in room with SBA, voiding in toilet. Vital signs stable. Medications given per MAR. Bed alarm set. Call bell within reach. Instructed to call for assistance.

## 2023-08-21 NOTE — PROGRESS NOTES
[facility] Surg  General Surgery  Progress Note    Subjective:     History of Present Illness:  Velma Lopez is a 48 y.o. 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 She presents today with one day history of nausea, dry heaving, abdominal pain and obstipation. She states the pain has been continuous since onset and has waves of increasing severity. It is worst in the lower abdomen, but involves the entire abdomen. She was admitted in July with similar symptoms and managed conservatively for a small bowel obstruction. Upon presentation she was afebrile and hemodynamically stable. Labs notable for mild leukocytosis of 13 and lactate elevated just above normal at 2.3. Imaging concerning for small bowel obstruction with transition point.         Post-Op Info:  Procedure(s) (LRB):  LAPAROTOMY, EXPLORATORY (N/A)  LYSIS, ADHESIONS (N/A)   3 Days Post-Op     Interval History: NAEO. Passing gas and tolerating some diet. Still has pain but improving.   AF. HDS.   Medications:  Continuous Infusions:  Scheduled Meds:   docusate sodium  100 mg Oral BID    polyethylene glycol  17 g Oral BID     PRN Meds:dicyclomine, HYDROmorphone, melatonin, ondansetron, oxyCODONE-acetaminophen, prochlorperazine, sodium chloride 0.9%     Review of patient's allergies indicates:  No Known Allergies  Objective:     Vital Signs (Most Recent):  Temp: 98.4 °F (36.9 °C) (08/21/23 0348)  Pulse: 67 (08/21/23 0348)  Resp: 16 (08/21/23 0648)  BP: 139/65 (08/21/23 0348)  SpO2: 99 % (08/21/23 0348) Vital Signs (24h Range):  Temp:  [97.1 °F (36.2 °C)-98.9 °F (37.2 °C)] 98.4 °F (36.9 °C)  Pulse:  [64-67] 67  Resp:  [16-20] 16  SpO2:  [95 %-100 %] 99 %  BP: (138-179)/(65-89) 139/65     Weight: 64.4 kg (141 lb 15.6 oz)  Body mass index is 22.92 kg/m².    Intake/Output - Last 3 Shifts         08/19 0700 08/20 0659 08/20 0700 08/21 0659    P.O. 60 120     Total Intake(mL/kg) 60 (0.9) 120 (1.9)    Net +60 +120          Urine Occurrence 1 x 1 x    Stool Occurrence 0 x 0 x             Physical Exam  Vitals reviewed.   Constitutional:       General: She is not in acute distress.  HENT:      Head: Normocephalic and atraumatic.      Nose: Nose normal.   Eyes:      General:         Right eye: No discharge.         Left eye: No discharge.   Cardiovascular:      Rate and Rhythm: Normal rate and regular rhythm.   Pulmonary:      Effort: Pulmonary effort is normal. No respiratory distress.      Breath sounds: No stridor.   Abdominal:      Comments: Soft, ND, aTTP  Midline incision clear   Musculoskeletal:         General: Normal range of motion.      Cervical back: Normal range of motion.   Skin:     General: Skin is warm and dry.      Capillary Refill: Capillary refill takes 2 to 3 seconds.   Neurological:      General: No focal deficit present.      Mental Status: She is alert and oriented to person, place, and time.   Psychiatric:         Mood and Affect: Mood normal.         Behavior: Behavior normal.          Significant Labs:  I have reviewed all pertinent lab results within the past 24 hours.    Significant Diagnostics:  I have reviewed all pertinent imaging results/findings within the past 24 hours.    Assessment/Plan:     * Small bowel obstruction  Velma Lopez is a 48 y.o. female with surgical history notable for gastric bypass and more recently transverse colotomy in Oct 2022 who presents with her second small bowel obstruction in the past month. Suspect her obstructions are secondary to adhesive disease. She is now s/p ex-lap with doing well.     -Regular diet  -Bowel regimen  - Prn pain and nausea meds  -DVT ppx    Dispo: Later today vs tomorrow         Pato Ashley MD  General Surgery  [facility]

## 2023-08-21 NOTE — PROGRESS NOTES
The pt's spouse will transport her home at d/c. The sw stressed the importance of going to her hsp f/u's and taking her meds. The pt acknowledged understanding and states she will comply. The pt has no further Case Management needs and is clear to d/c.     Future Appointments   Date Time Provider Department Center   9/1/2023  8:20 AM Bunny Obrien MD [facility] [place] [facility]     [place] [facility] Med Surg  Discharge Final Note    Primary Care Provider: Aftab Martinez MD    Expected Discharge Date: 8/21/2023    Final Discharge Note (most recent)       Final Note - 08/21/23 1135          Final Note    Assessment Type Discharge Planning Brief Assessment                     Important Message from Medicare             Contact Info       Bunny Obrien MD   Specialty: Surgery, General Surgery    [address]  [address]  [place] [address]   Phone: [phone]       Next Steps: Follow up on 9/1/2023    Instructions: 8:20am [facility] F/U

## 2023-08-22 ENCOUNTER — PATIENT OUTREACH (OUTPATIENT)
Dept: ADMINISTRATIVE | Facility: CLINIC | Age: 48
End: 2023-08-22
Payer: MEDICAID

## 2023-08-22 ENCOUNTER — HOSPITAL ENCOUNTER (OUTPATIENT)
Facility: HOSPITAL | Age: 48
LOS: 1 days | Discharge: HOME | End: 2023-08-23
Attending: EMERGENCY MEDICINE | Admitting: SURGERY
Payer: MEDICAID

## 2023-08-22 ENCOUNTER — NURSE TRIAGE (OUTPATIENT)
Dept: ADMINISTRATIVE | Facility: CLINIC | Age: 48
End: 2023-08-22
Payer: MEDICAID

## 2023-08-22 DIAGNOSIS — K56.7 POSTOPERATIVE ILEUS: Primary | ICD-10-CM

## 2023-08-22 DIAGNOSIS — I10 HYPERTENSION, UNSPECIFIED TYPE: ICD-10-CM

## 2023-08-22 DIAGNOSIS — K91.89 POSTOPERATIVE ILEUS: Primary | ICD-10-CM

## 2023-08-22 DIAGNOSIS — E87.6 HYPOKALEMIA: ICD-10-CM

## 2023-08-22 DIAGNOSIS — K56.7 ILEUS: ICD-10-CM

## 2023-08-22 LAB
ALBUMIN SERPL BCP-MCNC: 3.2 G/DL (ref 3.5–5.2)
ALP SERPL-CCNC: 67 U/L (ref 55–135)
ALT SERPL W/O P-5'-P-CCNC: 14 U/L (ref 10–44)
ANION GAP SERPL CALC-SCNC: 12 MMOL/L (ref 8–16)
ANION GAP SERPL CALC-SCNC: 14 MMOL/L (ref 8–16)
AST SERPL-CCNC: 12 U/L (ref 10–40)
B-HCG UR QL: NEGATIVE
BASOPHILS # BLD AUTO: 0.08 K/UL (ref 0–0.2)
BASOPHILS NFR BLD: 1.5 % (ref 0–1.9)
BILIRUB SERPL-MCNC: 0.7 MG/DL (ref 0.1–1)
BILIRUB UR QL STRIP: NEGATIVE
BUN SERPL-MCNC: 5 MG/DL (ref 6–20)
BUN SERPL-MCNC: 6 MG/DL (ref 6–20)
CALCIUM SERPL-MCNC: 9.3 MG/DL (ref 8.7–10.5)
CALCIUM SERPL-MCNC: 9.3 MG/DL (ref 8.7–10.5)
CHLORIDE SERPL-SCNC: 105 MMOL/L (ref 95–110)
CHLORIDE SERPL-SCNC: 105 MMOL/L (ref 95–110)
CLARITY UR REFRACT.AUTO: CLEAR
CO2 SERPL-SCNC: 20 MMOL/L (ref 23–29)
CO2 SERPL-SCNC: 23 MMOL/L (ref 23–29)
COLOR UR AUTO: YELLOW
CREAT SERPL-MCNC: 0.6 MG/DL (ref 0.5–1.4)
CREAT SERPL-MCNC: 0.6 MG/DL (ref 0.5–1.4)
CTP QC/QA: YES
DIFFERENTIAL METHOD: ABNORMAL
EOSINOPHIL # BLD AUTO: 0.2 K/UL (ref 0–0.5)
EOSINOPHIL NFR BLD: 3 % (ref 0–8)
ERYTHROCYTE [DISTWIDTH] IN BLOOD BY AUTOMATED COUNT: 12.4 % (ref 11.5–14.5)
EST. GFR  (NO RACE VARIABLE): >60 ML/MIN/1.73 M^2
EST. GFR  (NO RACE VARIABLE): >60 ML/MIN/1.73 M^2
GLUCOSE SERPL-MCNC: 68 MG/DL (ref 70–110)
GLUCOSE SERPL-MCNC: 69 MG/DL (ref 70–110)
GLUCOSE UR QL STRIP: NEGATIVE
HCT VFR BLD AUTO: 44.3 % (ref 37–48.5)
HCV AB SERPL QL IA: NORMAL
HGB BLD-MCNC: 14.6 G/DL (ref 12–16)
HGB UR QL STRIP: NEGATIVE
HIV 1+2 AB+HIV1 P24 AG SERPL QL IA: NORMAL
IMM GRANULOCYTES # BLD AUTO: 0.01 K/UL (ref 0–0.04)
IMM GRANULOCYTES NFR BLD AUTO: 0.2 % (ref 0–0.5)
KETONES UR QL STRIP: ABNORMAL
LACTATE SERPL-SCNC: 1 MMOL/L (ref 0.5–2.2)
LEUKOCYTE ESTERASE UR QL STRIP: NEGATIVE
LIPASE SERPL-CCNC: 12 U/L (ref 4–60)
LYMPHOCYTES # BLD AUTO: 2 K/UL (ref 1–4.8)
LYMPHOCYTES NFR BLD: 36.6 % (ref 18–48)
MCH RBC QN AUTO: 31.9 PG (ref 27–31)
MCHC RBC AUTO-ENTMCNC: 33 G/DL (ref 32–36)
MCV RBC AUTO: 97 FL (ref 82–98)
MONOCYTES # BLD AUTO: 0.6 K/UL (ref 0.3–1)
MONOCYTES NFR BLD: 10.9 % (ref 4–15)
NEUTROPHILS # BLD AUTO: 2.6 K/UL (ref 1.8–7.7)
NEUTROPHILS NFR BLD: 47.8 % (ref 38–73)
NITRITE UR QL STRIP: NEGATIVE
NRBC BLD-RTO: 0 /100 WBC
PH UR STRIP: 7 [PH] (ref 5–8)
PLATELET # BLD AUTO: 330 K/UL (ref 150–450)
PMV BLD AUTO: 11.6 FL (ref 9.2–12.9)
POCT GLUCOSE: 63 MG/DL (ref 70–110)
POTASSIUM SERPL-SCNC: 3.6 MMOL/L (ref 3.5–5.1)
POTASSIUM SERPL-SCNC: 3.8 MMOL/L (ref 3.5–5.1)
PROT SERPL-MCNC: 6.5 G/DL (ref 6–8.4)
PROT UR QL STRIP: NEGATIVE
RBC # BLD AUTO: 4.57 M/UL (ref 4–5.4)
SODIUM SERPL-SCNC: 139 MMOL/L (ref 136–145)
SODIUM SERPL-SCNC: 140 MMOL/L (ref 136–145)
SP GR UR STRIP: 1.02 (ref 1–1.03)
URN SPEC COLLECT METH UR: ABNORMAL
WBC # BLD AUTO: 5.33 K/UL (ref 3.9–12.7)

## 2023-08-22 PROCEDURE — 81025 URINE PREGNANCY TEST: CPT

## 2023-08-22 PROCEDURE — 96375 TX/PRO/DX INJ NEW DRUG ADDON: CPT

## 2023-08-22 PROCEDURE — 87389 HIV-1 AG W/HIV-1&-2 AB AG IA: CPT | Performed by: PHYSICIAN ASSISTANT

## 2023-08-22 PROCEDURE — 96374 THER/PROPH/DIAG INJ IV PUSH: CPT | Mod: 59

## 2023-08-22 PROCEDURE — 81003 URINALYSIS AUTO W/O SCOPE: CPT

## 2023-08-22 PROCEDURE — 83690 ASSAY OF LIPASE: CPT

## 2023-08-22 PROCEDURE — 80053 COMPREHEN METABOLIC PANEL: CPT | Performed by: STUDENT IN AN ORGANIZED HEALTH CARE EDUCATION/TRAINING PROGRAM

## 2023-08-22 PROCEDURE — G0378 HOSPITAL OBSERVATION PER HR: HCPCS

## 2023-08-22 PROCEDURE — 80048 BASIC METABOLIC PNL TOTAL CA: CPT | Mod: XB | Performed by: STUDENT IN AN ORGANIZED HEALTH CARE EDUCATION/TRAINING PROGRAM

## 2023-08-22 PROCEDURE — 25000003 PHARM REV CODE 250: Performed by: STUDENT IN AN ORGANIZED HEALTH CARE EDUCATION/TRAINING PROGRAM

## 2023-08-22 PROCEDURE — 63600175 PHARM REV CODE 636 W HCPCS

## 2023-08-22 PROCEDURE — 83605 ASSAY OF LACTIC ACID: CPT

## 2023-08-22 PROCEDURE — 99285 EMERGENCY DEPT VISIT HI MDM: CPT | Mod: 25

## 2023-08-22 PROCEDURE — 63600175 PHARM REV CODE 636 W HCPCS: Performed by: STUDENT IN AN ORGANIZED HEALTH CARE EDUCATION/TRAINING PROGRAM

## 2023-08-22 PROCEDURE — 85025 COMPLETE CBC W/AUTO DIFF WBC: CPT

## 2023-08-22 PROCEDURE — 96361 HYDRATE IV INFUSION ADD-ON: CPT

## 2023-08-22 PROCEDURE — 25500020 PHARM REV CODE 255: Performed by: EMERGENCY MEDICINE

## 2023-08-22 PROCEDURE — 86803 HEPATITIS C AB TEST: CPT | Performed by: PHYSICIAN ASSISTANT

## 2023-08-22 RX ORDER — SODIUM CHLORIDE, SODIUM LACTATE, POTASSIUM CHLORIDE, CALCIUM CHLORIDE 600; 310; 30; 20 MG/100ML; MG/100ML; MG/100ML; MG/100ML
INJECTION, SOLUTION INTRAVENOUS CONTINUOUS
Status: DISCONTINUED | OUTPATIENT
Start: 2023-08-22 | End: 2023-08-23

## 2023-08-22 RX ORDER — BISACODYL 10 MG
10 SUPPOSITORY, RECTAL RECTAL ONCE
Status: COMPLETED | OUTPATIENT
Start: 2023-08-22 | End: 2023-08-22

## 2023-08-22 RX ORDER — ACETAMINOPHEN 500 MG
500 TABLET ORAL EVERY 6 HOURS
Status: DISCONTINUED | OUTPATIENT
Start: 2023-08-23 | End: 2023-08-23 | Stop reason: HOSPADM

## 2023-08-22 RX ORDER — OXYCODONE HYDROCHLORIDE 5 MG/1
5 TABLET ORAL EVERY 4 HOURS PRN
Status: DISCONTINUED | OUTPATIENT
Start: 2023-08-22 | End: 2023-08-23 | Stop reason: HOSPADM

## 2023-08-22 RX ORDER — GLUCAGON 1 MG
1 KIT INJECTION
Status: DISCONTINUED | OUTPATIENT
Start: 2023-08-22 | End: 2023-08-23 | Stop reason: HOSPADM

## 2023-08-22 RX ORDER — LIDOCAINE HYDROCHLORIDE 10 MG/ML
1 INJECTION, SOLUTION EPIDURAL; INFILTRATION; INTRACAUDAL; PERINEURAL ONCE AS NEEDED
Status: DISCONTINUED | OUTPATIENT
Start: 2023-08-22 | End: 2023-08-23 | Stop reason: HOSPADM

## 2023-08-22 RX ORDER — HYDRALAZINE HYDROCHLORIDE 20 MG/ML
10 INJECTION INTRAMUSCULAR; INTRAVENOUS EVERY 6 HOURS PRN
Status: DISCONTINUED | OUTPATIENT
Start: 2023-08-22 | End: 2023-08-23 | Stop reason: HOSPADM

## 2023-08-22 RX ORDER — SODIUM CHLORIDE 0.9 % (FLUSH) 0.9 %
10 SYRINGE (ML) INJECTION
Status: DISCONTINUED | OUTPATIENT
Start: 2023-08-22 | End: 2023-08-23 | Stop reason: HOSPADM

## 2023-08-22 RX ORDER — MORPHINE SULFATE 2 MG/ML
6 INJECTION, SOLUTION INTRAMUSCULAR; INTRAVENOUS
Status: COMPLETED | OUTPATIENT
Start: 2023-08-22 | End: 2023-08-22

## 2023-08-22 RX ORDER — ONDANSETRON 2 MG/ML
4 INJECTION INTRAMUSCULAR; INTRAVENOUS EVERY 6 HOURS PRN
Status: DISCONTINUED | OUTPATIENT
Start: 2023-08-22 | End: 2023-08-23 | Stop reason: HOSPADM

## 2023-08-22 RX ORDER — INSULIN ASPART 100 [IU]/ML
1-10 INJECTION, SOLUTION INTRAVENOUS; SUBCUTANEOUS EVERY 6 HOURS PRN
Status: DISCONTINUED | OUTPATIENT
Start: 2023-08-22 | End: 2023-08-23 | Stop reason: HOSPADM

## 2023-08-22 RX ORDER — HYDROMORPHONE HYDROCHLORIDE 1 MG/ML
1 INJECTION, SOLUTION INTRAMUSCULAR; INTRAVENOUS; SUBCUTANEOUS
Status: COMPLETED | OUTPATIENT
Start: 2023-08-22 | End: 2023-08-22

## 2023-08-22 RX ORDER — ONDANSETRON 2 MG/ML
4 INJECTION INTRAMUSCULAR; INTRAVENOUS
Status: COMPLETED | OUTPATIENT
Start: 2023-08-22 | End: 2023-08-22

## 2023-08-22 RX ADMIN — BISACODYL 10 MG: 10 SUPPOSITORY RECTAL at 11:08

## 2023-08-22 RX ADMIN — IOHEXOL 75 ML: 350 INJECTION, SOLUTION INTRAVENOUS at 06:08

## 2023-08-22 RX ADMIN — SODIUM CHLORIDE, POTASSIUM CHLORIDE, SODIUM LACTATE AND CALCIUM CHLORIDE: 600; 310; 30; 20 INJECTION, SOLUTION INTRAVENOUS at 11:08

## 2023-08-22 RX ADMIN — HYDROMORPHONE HYDROCHLORIDE 1 MG: 1 INJECTION, SOLUTION INTRAMUSCULAR; INTRAVENOUS; SUBCUTANEOUS at 07:08

## 2023-08-22 RX ADMIN — ONDANSETRON 4 MG: 2 INJECTION INTRAMUSCULAR; INTRAVENOUS at 05:08

## 2023-08-22 RX ADMIN — ACETAMINOPHEN 500 MG: 500 TABLET ORAL at 11:08

## 2023-08-22 RX ADMIN — OXYCODONE HYDROCHLORIDE 5 MG: 5 TABLET ORAL at 09:08

## 2023-08-22 RX ADMIN — MORPHINE SULFATE 6 MG: 2 INJECTION, SOLUTION INTRAMUSCULAR; INTRAVENOUS at 05:08

## 2023-08-22 RX ADMIN — SODIUM CHLORIDE 1000 ML: 9 INJECTION, SOLUTION INTRAVENOUS at 08:08

## 2023-08-22 RX ADMIN — HYDRALAZINE HYDROCHLORIDE 10 MG: 20 INJECTION INTRAMUSCULAR; INTRAVENOUS at 09:08

## 2023-08-22 NOTE — ED PROVIDER NOTES
Encounter Date: 8/22/2023       History     Chief Complaint   Patient presents with    Post-op Problem     Adhesion removal surg, exploratory surg at [facility]     48-year-old female with history of chronic back pain, obesity s/p gastric bypass, DM2, and multiple bowel obstructions presents to the ED regarding generalized abdominal pain onset today. Patient recently admitted for small-bowel obstruction with ex lap on 8/18 and discharged yesterday.  Patient states she was feeling much better when discharge but acutely started having abdominal pain around noon today.  States the pain is constant but becomes more severe in waves.  She also admits to mild pain to her incision.  Rates the pain a 9/10.  Has been taking oxycodone with no relief.  Has also been taking her Colace.  Her last bowel movement was last Saturday and states she is been passing a little gas today.  She also admits to a significant frontal headache.  Does not normally get headaches.  Denies blurred vision, nausea, vomiting, diarrhea, fever, chest pain, shortness of breath, or any other complaints at this time.    The history is provided by the patient and medical records.     Review of patient's allergies indicates:  No Known Allergies  Past Medical History:   Diagnosis Date    Chronic back pain     Diabetes type 2, uncontrolled     Mild nonproliferative diabetic retinopathy of left eye without macular edema associated with type 2 diabetes mellitus 10/21/2019    Morbid obesity with BMI of 40.0-44.9, adult     MILEY on CPAP     Plantar fasciitis of left foot     Urticaria      Past Surgical History:   Procedure Laterality Date    ARTHROSCOPIC ACROMIOPLASTY OF SHOULDER Left 7/27/2020    Procedure: ACROMIOPLASTY, ARTHROSCOPIC;  Surgeon: Marcela Garza MD;  Location: [facility];  Service: Orthopedics;  Laterality: Left;    ARTHROSCOPIC REPAIR OF ROTATOR CUFF OF SHOULDER Right 7/27/2020    Procedure: REPAIR, ROTATOR CUFF, ARTHROSCOPIC;  Surgeon:  Marcela Garza MD;  Location: [facility] OR;  Service: Orthopedics;  Laterality: Right;  GENERAL/REGIONAL    COLONOSCOPY N/A 4/7/2022    Procedure: COLONOSCOPY;  Surgeon: aYdi Wong MD;  [facility]  Service: Endoscopy;  Laterality: N/A;  Covid test at [place] on 4/4.EC    CYST REMOVAL      ovarian cyst removal at age 12    DECOMPRESSION OF SUBACROMIAL SPACE Left 7/27/2020    Procedure: DECOMPRESSION, SUBACROMIAL SPACE;  Surgeon: Marcela Garza MD;  Location: [facility] OR;  Service: Orthopedics;  Laterality: Left;    DILATION AND CURETTAGE OF UTERUS      ESOPHAGEAL MOTILITY STUDY USING HIGH RESOLUTION MANOMETRY N/A 9/17/2019    Procedure: MOTILITY STUDY, ESOPHAGUS, USING HIGH RESOLUTION MANOMETRY;  Surgeon: Leopoldo Swanson MD;  Location: [facility] (4TH FLR);  Service: Endoscopy;  Laterality: N/A;  Pt will be taking xanax before procedure to hopefull help with anxiety.    ESOPHAGOGASTRODUODENOSCOPY N/A 6/14/2019    Procedure: EGD (ESOPHAGOGASTRODUODENOSCOPY);  Surgeon: Ced Guevara MD;  Location: [facility] (4TH FLR);  Service: Endoscopy;  Laterality: N/A;  Please make sure it is scheduled after her cardiology appt.- see cardiology note dated 6/13/19 for clearance - ERW    FOREIGN BODY REMOVAL N/A 4/17/2023    Procedure: REMOVAL, FOREIGN BODY;  Surgeon: Rom Rodriguze MD;  Location: [facility] OR;  Service: General;  Laterality: N/A;    GASTRIC BYPASS N/A 05/20/2021    INJECTION OF STEROID Left 7/27/2020    Procedure: INJECTION, STEROID LEFT THUMB TRIGGER FINGER;  Surgeon: Marcela Garza MD;  Location: [facility] OR;  Service: Orthopedics;  Laterality: Left;  LEFT THUMB, TRIGGER FINGER    LAPAROTOMY, EXPLORATORY N/A 8/18/2023    Procedure: LAPAROTOMY, EXPLORATORY;  Surgeon: Bunny Obrien MD;  Location: [facility] OR;  Service: General;  Laterality: N/A;    LYSIS OF ADHESIONS N/A 10/21/2022    Procedure: LYSIS, ADHESIONS;  Surgeon: Bunny Obrien MD;  Location: [facility] OR;  Service:  General;  Laterality: N/A;    LYSIS OF ADHESIONS N/A 8/18/2023    Procedure: LYSIS, ADHESIONS;  Surgeon: Bunny Obrien MD;  Location: [facility] OR;  Service: General;  Laterality: N/A;    TRANSVERSE COLECTOMY N/A 10/26/2022    Procedure: COLECTOMY, TRANSVERSE;  Surgeon: Bunny Obrien MD;  Location: [facility] OR;  Service: General;  Laterality: N/A;    TRIGGER FINGER RELEASE Right 10/4/2019    Procedure: RELEASE, TRIGGER FINGER - right thumb;  Surgeon: Craig Tom MD;  Location: [facility] OR;  Service: Orthopedics;  Laterality: Right;    UPPER GASTROINTESTINAL ENDOSCOPY      WISDOM TOOTH EXTRACTION       Family History   Problem Relation Age of Onset    Cancer Maternal Grandmother     Diabetes Mother     No Known Problems Sister     No Known Problems Sister     No Known Problems Sister     Breast cancer Maternal Aunt 70    Colon cancer Neg Hx     Stomach cancer Neg Hx     Inflammatory bowel disease Neg Hx     Esophageal cancer Neg Hx      Social History     Tobacco Use    Smoking status: Every Day     Current packs/day: 0.75     Average packs/day: 0.8 packs/day for 31.6 years (23.7 ttl pk-yrs)     Types: Cigarettes     Start date: 1992    Smokeless tobacco: Never    Tobacco comments:     Enrolled in the [employer] on 8/1/18 ([facility] Member ID # [id number]) Ambulatory referral to Smoking Cessation Program.     Substance Use Topics    Alcohol use: Yes     Comment: social    Drug use: No     Review of Systems   Constitutional:  Negative for fever.   HENT:  Negative for sore throat.    Respiratory:  Negative for shortness of breath.    Cardiovascular:  Negative for chest pain.   Gastrointestinal:  Positive for abdominal pain and constipation. Negative for nausea and vomiting.   Genitourinary:  Negative for dysuria.   Musculoskeletal:  Negative for back pain.   Skin:  Negative for rash.   Neurological:  Positive for headaches. Negative for weakness.   Hematological:  Does not bruise/bleed easily.       Physical Exam      Initial Vitals [08/22/23 1545]   BP Pulse Resp Temp SpO2   (!) 173/79 73 18 99.3 °F (37.4 °C) 97 %      MAP       --         Physical Exam    Vitals reviewed.  Constitutional: She appears well-developed and well-nourished. She is not diaphoretic. No distress.   Thin, frail  Appears uncomfortable in pain   HENT:   Head: Normocephalic and atraumatic.   Mouth/Throat: Uvula is midline. Mucous membranes are dry.   Eyes: Conjunctivae and EOM are normal. Pupils are equal, round, and reactive to light.   Neck: Neck supple.   Normal range of motion.  Cardiovascular:  Normal rate, regular rhythm and normal heart sounds.     Exam reveals no gallop and no friction rub.       No murmur heard.  Pulmonary/Chest: Breath sounds normal. She has no wheezes. She has no rhonchi. She has no rales.   Abdominal: Abdomen is soft and flat. There is generalized abdominal tenderness.   Well-healing incision.  No erythema, edema, or purulence. There is guarding. There is no rebound.   Musculoskeletal:      Cervical back: Normal range of motion and neck supple.     Neurological: She is alert and oriented to person, place, and time. No cranial nerve deficit.   Psychiatric: She has a normal mood and affect.         ED Course   Procedures  Labs Reviewed   CBC W/ AUTO DIFFERENTIAL - Abnormal; Notable for the following components:       Result Value    MCH 31.9 (*)     All other components within normal limits   URINALYSIS, REFLEX TO URINE CULTURE - Abnormal; Notable for the following components:    Ketones, UA 3+ (*)     All other components within normal limits    Narrative:     Specimen Source->Urine   HEPATITIS C ANTIBODY    Narrative:     Release to patient->Immediate   HIV 1 / 2 ANTIBODY    Narrative:     Release to patient->Immediate   LIPASE   LACTIC ACID, PLASMA   COMPREHENSIVE METABOLIC PANEL   BASIC METABOLIC PANEL   POCT URINE PREGNANCY   POCT GLUCOSE MONITORING CONTINUOUS          Imaging Results               CT Abdomen Pelvis With  Contrast (Final result)  Result time 08/22/23 20:26:50      Final result by Musa Goldberg MD (08/22/23 20:26:50)                   Impression:      Several loops of mildly dilated small bowel with degree of dilation decreased slightly compared to 08/18/2023 CT.  There is suspected transition point at the mesenteric root however not as conspicuous as prior.    Markedly dilated large bowel containing air with gradual transition point at the splenic flexure.  No definite obstruction.  Finding may represent adynamic ileus versus pseudo-obstruction.    Small volume free fluid in the pelvis.    Mild anasarca.    Hepatomegaly.    Other findings above.    This report was flagged in Epic as abnormal.    Electronically signed by resident: Lenin Hobbs  Date:    08/22/2023  Time:    19:06    Electronically signed by: Musa Goldberg MD  Date:    08/22/2023  Time:    20:26               Narrative:    EXAMINATION:  CT ABDOMEN PELVIS WITH CONTRAST    CLINICAL HISTORY:  Abdominal pain, post-op;    TECHNIQUE:  Axial images of the abdomen and pelvis were acquired  after the use of 75 cc [id number] IV contrast. No oral contrast was administered.  Coronal and sagittal reconstructions were also obtained.    COMPARISON:  CT abdomen pelvis 08/18/2023, 07/13/2023    FINDINGS:  LUNG BASES: Heart and pericardium are within normal limits.  [place]-shaped bibasilar subsegmental atelectasis.  No definite pleural effusions.    HEPATOBILIARY: Liver is enlarged measuring 20 cm craniocaudad.  No focal hepatic lesions. No biliary ductal dilatation. Normal gallbladder.    SPLEEN: No splenomegaly.    PANCREAS: No focal masses or ductal dilatation.    ADRENALS: No adrenal nodules.    KIDNEYS/URETERS: Kidneys normal in size and attenuation.  No hydronephrosis, stones, or solid mass lesions.    BLADDER/PELVIC ORGANS: No bladder wall thickening.  Uterus is normal.  No adnexal masses.    PERITONEUM / RETROPERITONEUM: Small volume free fluid in the pelvis.   No intraperitoneal free air.    LYMPH NODES: No lymphadenopathy.    VESSELS: Minimal calcified atherosclerosis at the celiac origin.    GI TRACT: Postoperative changes gastric bypass.  Several loops of mildly dilated small bowel with proximal loops containing predominantly air and distal loops are slightly less dilated containing fluid (measuring up to 3.6 cm in diameter).  There is a suspected transition point at the mesenteric root as seen on axial image 90, series 2, however not as conspicuous as the previously seen transition point on 08/18/2023 CT.  Markedly dilated large bowel containing air (measuring up to 7.7 cm in diameter) with gradual transition point at the splenic flexure.    SOFT TISSUES: Laparotomy scar at the midline anterior abdominal wall.  Mild diffuse subcutaneous edema.    BONES: No fractures or focal osseous lesions.                                       Medications   sodium chloride 0.9% bolus 1,000 mL 1,000 mL (1,000 mLs Intravenous New Bag 8/22/23 2047)   acetaminophen tablet 500 mg (has no administration in time range)   oxyCODONE immediate release tablet 5 mg (has no administration in time range)   LIDOcaine (PF) 10 mg/ml (1%) injection 10 mg (has no administration in time range)   sodium chloride 0.9% flush 10 mL (has no administration in time range)   lactated ringers infusion (has no administration in time range)   ondansetron injection 4 mg (has no administration in time range)   hydrALAZINE injection 10 mg (has no administration in time range)   dextrose 50% injection 12.5 g (has no administration in time range)   glucagon (human recombinant) injection 1 mg (has no administration in time range)   insulin aspart U-100 pen 1-10 Units (has no administration in time range)   bisacodyL suppository 10 mg (has no administration in time range)   morphine injection 6 mg (6 mg Intravenous Given 8/22/23 1742)   ondansetron injection 4 mg (4 mg Intravenous Given 8/22/23 1743)   iohexoL  (OMNIPAQUE 350) injection 75 mL (75 mLs Intravenous Given 8/22/23 1852)   HYDROmorphone injection 1 mg (1 mg Intravenous Given 8/22/23 1913)     Medical Decision Making  From Op note by Dr. Obrien 8/18:   Operative Findings (including complications, if any):   Adhesions of small bowel to anterior abdominal wall  Serosal tear x2 oversewn using 2-0 vicryl  Adhesive band of proximal jejunum over the proximal irish limb lysed  Jejunojejunostomy widely patent  Colon normal  No internal hernia  All bowel healthy and viable    Amount and/or Complexity of Data Reviewed  Labs: ordered. Decision-making details documented in ED Course.  Radiology: ordered.    Risk  Prescription drug management.         APC / Resident Notes:   48-year-old female with history of chronic back pain, obesity s/p gastric bypass, DM2, and multiple bowel obstructions presents to the ED regarding generalized abdominal pain onset today s/p ex lap from bowel obstruction.  Low-grade fever, vitals stable.  Nontoxic appearing.  Moderate tenderness with guarding present.  Concerned for postoperative ileus or obstruction. Will get labs, imaging, and provide analgesia.  Also complains of significant headache, not sudden onset. Cranial nerves intact. Suspect this may be from dehydration, will reassess to see if imaging is warranted.    My differential diagnoses include but are not limited to:   Postoperative ileus, bowel obstruction, abscess, diverticulitis, colitis, enteritis, pancreatitis, UTI, electrolyte abnormality, dehydration, tension headache, subarachnoid hemorrhage    See ED course.  General surgery will admit to their service for further workup and treatment.  Patient agrees with plan.  Vitals safe for admission.  I have reviewed the patient's records and discussed with my supervising physician.        ED Course as of 08/22/23 2132 Tue Aug 22, 2023   1844 WBC: 5.33  No leukocytosis [KB]   1845 Hemoglobin: 14.6  Not anemic [KB]   1859 Pain  increasing, will give Dilaudid [KB]   1924 Preg Test, Ur: Negative [KB]   1924 Lactate, Finn: 1.0 [KB]   1930 Spoke with general surgery who will come evaluate patient [KB]   2018 Lipase: 12 [KB]   2018 Ketones, UA(!): 3+  Will give fluids [KB]   2020 NITRITE UA: Negative  No UTI [KB]   2132 CT Abdomen Pelvis With Contrast(!)  Impression:     Several loops of mildly dilated small bowel with degree of dilation decreased slightly compared to 08/18/2023 CT.  There is suspected transition point at the mesenteric root however not as conspicuous as prior.     Markedly dilated large bowel containing air with gradual transition point at the splenic flexure.  No definite obstruction.  Finding may represent adynamic ileus versus pseudo-obstruction.     Small volume free fluid in the pelvis.     Mild anasarca.     Hepatomegaly. [KB]   2132 Reassessed patient who reports significant improvement in pain.  Headache has resolved after fluids. [KB]      ED Course User Index  [KB] Barbara Hankins PA-C                    Clinical Impression:   Final diagnoses:  [K91.89, K56.7] Postoperative ileus (Primary)        ED Disposition Condition    Observation                 Barbara Hankins PA-C  08/22/23 2133

## 2023-08-22 NOTE — PROGRESS NOTES
Patient transferred to triage nurse Portia Hernandez due to symptoms below.    MRN [id number] Velma Lopez Discharged on 8/21/23. Diagnosis: Small bowel obstruction s/p exploratory laparotomy, lysis of adhesions. Patient is c/o lower abdominal pain 8/10. Patient states the pain is sharp.

## 2023-08-22 NOTE — TELEPHONE ENCOUNTER
Spoke to Ms. Lopez family and states that they are headed to the ER.  Instructed family to contact the office to follow up with patient condition.

## 2023-08-22 NOTE — TELEPHONE ENCOUNTER
TCC Pt:    Pt was discharged from the hospital yesterday after receiving surgical tx for a SBO on Friday. She describes experiencing pain similar to what she had on Thursday as well as pain at her incision site. Pt can be heard wincing and grimacing in pain over the phone. She denies any bleeding from the incision site. Pt rates her current pain level at an 8/10.    Care Advice recommends that pt be seen in ED now. Pt verbalizes understanding and is instructed to call back with any new/worsening sxs, questions, or concerns.   Reason for Disposition   SEVERE pain in the incision    Additional Information   Negative: Major abdominal surgical incision and wound gaping open with visible internal organs   Negative: Sounds like a life-threatening emergency to the triager   Negative: Bleeding from incision and won't stop after 10 minutes of direct pressure   Negative: Bleeding (more than a few drops) from incision and after tracheostomy or blood vessel surgery (e.g., carotid endarterectomy, femoral bypass graft, kidney dialysis fistula)   Negative: Bright red, wide-spread, sunburn-like rash    Protocols used: Post-Op Incision Symptoms and [id number]

## 2023-08-23 VITALS
HEIGHT: 66 IN | WEIGHT: 140 LBS | RESPIRATION RATE: 18 BRPM | SYSTOLIC BLOOD PRESSURE: 138 MMHG | BODY MASS INDEX: 22.5 KG/M2 | HEART RATE: 66 BPM | TEMPERATURE: 99 F | DIASTOLIC BLOOD PRESSURE: 82 MMHG | OXYGEN SATURATION: 98 %

## 2023-08-23 PROBLEM — I10 HYPERTENSION: Status: ACTIVE | Noted: 2023-08-23

## 2023-08-23 PROBLEM — E87.6 HYPOKALEMIA: Status: ACTIVE | Noted: 2023-08-23

## 2023-08-23 LAB
ANION GAP SERPL CALC-SCNC: 10 MMOL/L (ref 8–16)
BASOPHILS # BLD AUTO: 0.07 K/UL (ref 0–0.2)
BASOPHILS NFR BLD: 1.1 % (ref 0–1.9)
BUN SERPL-MCNC: 5 MG/DL (ref 6–20)
CALCIUM SERPL-MCNC: 9 MG/DL (ref 8.7–10.5)
CHLORIDE SERPL-SCNC: 105 MMOL/L (ref 95–110)
CO2 SERPL-SCNC: 24 MMOL/L (ref 23–29)
CREAT SERPL-MCNC: 0.5 MG/DL (ref 0.5–1.4)
DIFFERENTIAL METHOD: ABNORMAL
EOSINOPHIL # BLD AUTO: 0.2 K/UL (ref 0–0.5)
EOSINOPHIL NFR BLD: 3.3 % (ref 0–8)
ERYTHROCYTE [DISTWIDTH] IN BLOOD BY AUTOMATED COUNT: 12.3 % (ref 11.5–14.5)
EST. GFR  (NO RACE VARIABLE): >60 ML/MIN/1.73 M^2
GLUCOSE SERPL-MCNC: 72 MG/DL (ref 70–110)
HCT VFR BLD AUTO: 38.2 % (ref 37–48.5)
HGB BLD-MCNC: 12.4 G/DL (ref 12–16)
IMM GRANULOCYTES # BLD AUTO: 0.01 K/UL (ref 0–0.04)
IMM GRANULOCYTES NFR BLD AUTO: 0.2 % (ref 0–0.5)
LYMPHOCYTES # BLD AUTO: 2.5 K/UL (ref 1–4.8)
LYMPHOCYTES NFR BLD: 40.2 % (ref 18–48)
MAGNESIUM SERPL-MCNC: 1.8 MG/DL (ref 1.6–2.6)
MCH RBC QN AUTO: 31.4 PG (ref 27–31)
MCHC RBC AUTO-ENTMCNC: 32.5 G/DL (ref 32–36)
MCV RBC AUTO: 97 FL (ref 82–98)
MONOCYTES # BLD AUTO: 0.7 K/UL (ref 0.3–1)
MONOCYTES NFR BLD: 10.9 % (ref 4–15)
NEUTROPHILS # BLD AUTO: 2.7 K/UL (ref 1.8–7.7)
NEUTROPHILS NFR BLD: 44.3 % (ref 38–73)
NRBC BLD-RTO: 0 /100 WBC
PHOSPHATE SERPL-MCNC: 3.6 MG/DL (ref 2.7–4.5)
PLATELET # BLD AUTO: 421 K/UL (ref 150–450)
PMV BLD AUTO: 9.9 FL (ref 9.2–12.9)
POCT GLUCOSE: 129 MG/DL (ref 70–110)
POCT GLUCOSE: 133 MG/DL (ref 70–110)
POCT GLUCOSE: 60 MG/DL (ref 70–110)
POTASSIUM SERPL-SCNC: 3.4 MMOL/L (ref 3.5–5.1)
RBC # BLD AUTO: 3.95 M/UL (ref 4–5.4)
SODIUM SERPL-SCNC: 139 MMOL/L (ref 136–145)
WBC # BLD AUTO: 6.15 K/UL (ref 3.9–12.7)

## 2023-08-23 PROCEDURE — G0378 HOSPITAL OBSERVATION PER HR: HCPCS

## 2023-08-23 PROCEDURE — 96376 TX/PRO/DX INJ SAME DRUG ADON: CPT

## 2023-08-23 PROCEDURE — 99239 PR HOSPITAL DISCHARGE DAY,>30 MIN: ICD-10-PCS | Mod: ,,, | Performed by: STUDENT IN AN ORGANIZED HEALTH CARE EDUCATION/TRAINING PROGRAM

## 2023-08-23 PROCEDURE — 80048 BASIC METABOLIC PNL TOTAL CA: CPT | Performed by: STUDENT IN AN ORGANIZED HEALTH CARE EDUCATION/TRAINING PROGRAM

## 2023-08-23 PROCEDURE — 25000003 PHARM REV CODE 250: Performed by: STUDENT IN AN ORGANIZED HEALTH CARE EDUCATION/TRAINING PROGRAM

## 2023-08-23 PROCEDURE — 85025 COMPLETE CBC W/AUTO DIFF WBC: CPT | Performed by: STUDENT IN AN ORGANIZED HEALTH CARE EDUCATION/TRAINING PROGRAM

## 2023-08-23 PROCEDURE — 99239 HOSP IP/OBS DSCHRG MGMT >30: CPT | Mod: ,,, | Performed by: STUDENT IN AN ORGANIZED HEALTH CARE EDUCATION/TRAINING PROGRAM

## 2023-08-23 PROCEDURE — 84100 ASSAY OF PHOSPHORUS: CPT | Performed by: STUDENT IN AN ORGANIZED HEALTH CARE EDUCATION/TRAINING PROGRAM

## 2023-08-23 PROCEDURE — 83735 ASSAY OF MAGNESIUM: CPT | Performed by: STUDENT IN AN ORGANIZED HEALTH CARE EDUCATION/TRAINING PROGRAM

## 2023-08-23 PROCEDURE — 96374 THER/PROPH/DIAG INJ IV PUSH: CPT | Mod: 59

## 2023-08-23 PROCEDURE — 96361 HYDRATE IV INFUSION ADD-ON: CPT

## 2023-08-23 PROCEDURE — 36415 COLL VENOUS BLD VENIPUNCTURE: CPT | Performed by: STUDENT IN AN ORGANIZED HEALTH CARE EDUCATION/TRAINING PROGRAM

## 2023-08-23 RX ORDER — POTASSIUM CHLORIDE 20 MEQ/1
40 TABLET, EXTENDED RELEASE ORAL
Status: COMPLETED | OUTPATIENT
Start: 2023-08-23 | End: 2023-08-23

## 2023-08-23 RX ORDER — BISACODYL 10 MG
10 SUPPOSITORY, RECTAL RECTAL DAILY
Status: DISCONTINUED | OUTPATIENT
Start: 2023-08-23 | End: 2023-08-23 | Stop reason: HOSPADM

## 2023-08-23 RX ORDER — ENOXAPARIN SODIUM 100 MG/ML
40 INJECTION SUBCUTANEOUS EVERY 24 HOURS
Status: DISCONTINUED | OUTPATIENT
Start: 2023-08-23 | End: 2023-08-23 | Stop reason: HOSPADM

## 2023-08-23 RX ORDER — POLYETHYLENE GLYCOL 3350 17 G/17G
17 POWDER, FOR SOLUTION ORAL DAILY
Status: DISCONTINUED | OUTPATIENT
Start: 2023-08-23 | End: 2023-08-23 | Stop reason: HOSPADM

## 2023-08-23 RX ORDER — BISACODYL 10 MG
10 SUPPOSITORY, RECTAL RECTAL DAILY PRN
Status: DISCONTINUED | OUTPATIENT
Start: 2023-08-23 | End: 2023-08-23

## 2023-08-23 RX ADMIN — OXYCODONE HYDROCHLORIDE 5 MG: 5 TABLET ORAL at 09:08

## 2023-08-23 RX ADMIN — OXYCODONE HYDROCHLORIDE 5 MG: 5 TABLET ORAL at 01:08

## 2023-08-23 RX ADMIN — POTASSIUM CHLORIDE 40 MEQ: 1500 TABLET, EXTENDED RELEASE ORAL at 09:08

## 2023-08-23 RX ADMIN — ACETAMINOPHEN 500 MG: 500 TABLET ORAL at 12:08

## 2023-08-23 RX ADMIN — POTASSIUM CHLORIDE 40 MEQ: 1500 TABLET, EXTENDED RELEASE ORAL at 12:08

## 2023-08-23 RX ADMIN — BISACODYL 10 MG: 10 SUPPOSITORY RECTAL at 12:08

## 2023-08-23 RX ADMIN — ACETAMINOPHEN 500 MG: 500 TABLET ORAL at 06:08

## 2023-08-23 RX ADMIN — POLYETHYLENE GLYCOL 3350 17 G: 17 POWDER, FOR SOLUTION ORAL at 09:08

## 2023-08-23 RX ADMIN — OXYCODONE HYDROCHLORIDE 5 MG: 5 TABLET ORAL at 03:08

## 2023-08-23 RX ADMIN — DEXTROSE MONOHYDRATE 125 ML: 100 INJECTION, SOLUTION INTRAVENOUS at 12:08

## 2023-08-23 RX ADMIN — DEXTROSE MONOHYDRATE 125 ML: 100 INJECTION, SOLUTION INTRAVENOUS at 06:08

## 2023-08-23 NOTE — ASSESSMENT & PLAN NOTE
Patient is a 48 year old female with PMHx of DM II, HTN, MILEY, transverse colon volvulus s/p ex lap x2 in October 2020, lap RNYGB in 2021, recent admission in [place] for SBO s/p ex lap with NESTOR on 8/18/23 who presents with likely postop ileus. Clinically stable with ROBF overnight    - CLD  - IVF until tolerating PO  - Bowel regimen  - PRN pain and nausea medications  - Daily labs  - Replete electrolytes PRN   - DVT prophylaxis (SCDs and lovenox)  - OOB, ambulate    Dispo: not yet medically stable for dc

## 2023-08-23 NOTE — ED TRIAGE NOTES
The patient is having complications from a previous adhesion removal surgery. The patient describes the pain as 10 out of 10 coming in waves. The patient endorces the pain starting a few says ago

## 2023-08-23 NOTE — PLAN OF CARE
[facility] - [facility]  Initial Discharge Assessment       Primary Care Provider: Aftab Martinez MD    Admission Diagnosis: Postoperative ileus [K91.89, K56.7]    Admission Date: 8/22/2023  Expected Discharge Date: 8/24/2023    Transition of Care Barriers: None    Payor: MEDICAID / Plan: [place] [employer] / Product Type: Managed Medicaid /     Extended Emergency Contact Information  Primary Emergency Contact: [place]   [place]  Home Phone: [phone]  Relation: Sister    Discharge Plan A: Home with family  Discharge Plan B: Home with family      [id number] [place] - [address]  [address] [place] [address]  Phone: [phone] Fax: [phone]    [id number] [place] - [address] [facility] OF [address] [place] [address]  Phone: [phone] Fax: [phone]    [facility]  [address]  [place] [address]  Phone: [phone] Fax: [phone]      Initial Assessment (most recent)       Adult Discharge Assessment - 08/23/23 1308          Discharge Assessment    Assessment Type Discharge Planning Assessment     Confirmed/corrected address, phone number and insurance Yes     Confirmed Demographics Correct on Facesheet     Source of Information patient     Communicated TOM with patient/caregiver Yes     People in Home spouse     Do you expect to return to your current living situation? Yes     Do you have help at home or someone to help you manage your care at home? Yes     Who are your caregiver(s) and their phone number(s)? Spouse     Prior to hospitilization cognitive status: Alert/Oriented     Current cognitive status: Alert/Oriented     Home Layout Bathroom on 2nd floor;Bedroom on 2nd floor     Equipment Currently Used at Home none     Do you currently have service(s) that help you manage your care at home? No     Do you take prescription medications?  Yes     Do you have prescription coverage? Yes     Do you have any problems affording any of your prescribed medications? No     Is the patient taking medications as prescribed? yes     Are you on dialysis? No     Do you take coumadin? No     DME Needed Upon Discharge  none     Discharge Plan discussed with: Patient     Transition of Care Barriers None     Discharge Plan A Home with family     Discharge Plan B Home with family                   Patient lives with spouse in a townhouse with one entry step and 15 stairs to her bed/bath on second floor. Patient does not feel she will need any post acute services upon hospital discharge. Patient spouse is primary caregiver and he will provide care and transportation home.

## 2023-08-23 NOTE — HPI
Velma Lopez is a 48 y.o. female with PMHx of DM II, HTN, MILEY, transverse colon volvulus s/p ex lap x2 in October 2020, lap RNYGB in 2021 who presents to ED with abdominal pain. She was recently admitted in [place] for an SBO, and underwent ex lap with NSETOR on 8/18/23. She was discharged yesterday. Had abdominal pain today at home, describes as intermittent waves of pelvic pressure and moderate incisional pain. She has had little PO intake since arriving home. She is passing gas but has not had a BM since last Thursday. She is taking Roxicodone 5 mg at home, also Miralax and Dulcolax.She denies nausea and vomiting. She came here rather than [place] for another opinion.   She is hemodynamically stable. Labs unremarkable with WBC 5.3, Lactate 1.0. CT demonstrates diffusely dilated small bowel and colon without a transition point, significant volume of air present throughout.

## 2023-08-23 NOTE — ASSESSMENT & PLAN NOTE
48 y.o. female with PMHx of DM II, HTN, MILEY, transverse colon volvulus s/p ex lap x2 in October 2020, lap RNYGB in 2021, recent admission in [place] for SBO s/p ex lap with NESTOR on 8/18/23 who presents with likely postop ileus    - Discussed admission for ileus either here or at [place] where her operation was performed, she stated she would prefer to stay here  - Admit to obs  - NPO with sips/chips  - mIVF  - Pain meds prn  - Antiemetics prn  - Suppository now  - AM labs  - Encouraged ambulation

## 2023-08-23 NOTE — NURSING
Received to room 528A, awake, alert and oriented x4, vital signs with elevated blood pressure, c/o 3/10 abdominal pain at present, abdomen soft with hypoactive bowel sounds, states +flatus in ER, Midline incision with dermabond, s.l to right hand #20, oriented to hospital and floor, will continue to monitor.

## 2023-08-23 NOTE — NURSING
Nurses Note -- 4 Eyes      8/23/2023   3:31 AM      Skin assessed during: Admit      [] No Altered Skin Integrity Present    []Prevention Measures Documented      [x] Yes- Altered Skin Integrity Present or Discovered   [] LDA Added if Not in Epic (Describe Wound)   [x] New Altered Skin Integrity was Present on Admit and Documented in LDA   [] Wound Image Taken    Wound Care Consulted? No    Attending Nurse:  Gisselle Canseco RN    Second RN/Staff Member:  Tim Patton RN

## 2023-08-23 NOTE — DISCHARGE SUMMARY
[facility]  General Surgery  Discharge Summary      Patient Name: Velma Lopez  MRN: [id number]  Admission Date: 8/22/2023  Hospital Length of Stay: 0 days  Discharge Date and Time: No discharge date for patient encounter.  Attending Physician: Camilo Christianson MD   Discharging Provider: Imelda Jolly MD  Primary Care Provider: Aftab Martinez MD    HPI:   Velma Lopez is a 48 y.o. female with PMHx of DM II, HTN, MILEY, transverse colon volvulus s/p ex lap x2 in October 2020, lap RNYGB in 2021 who presents to ED with abdominal pain. She was recently admitted in [place] for an SBO, and underwent ex lap with NESTOR on 8/18/23. She was discharged yesterday. Had abdominal pain today at home, describes as intermittent waves of pelvic pressure and moderate incisional pain. She has had little PO intake since arriving home. She is passing gas but has not had a BM since last Thursday. She is taking Roxicodone 5 mg at home, also Miralax and Dulcolax.She denies nausea and vomiting. She came here rather than [place] for another opinion.   She is hemodynamically stable. Labs unremarkable with WBC 5.3, Lactate 1.0. CT demonstrates diffusely dilated small bowel and colon without a transition point, significant volume of air present throughout.         * No surgery found *      Indwelling Lines/Drains at time of discharge:   Lines/Drains/Airways     None               Hospital Course: Patient admitted 8/22/2023 for post operative ileus s/p ex lap 8/18 at an OSH. She began to have bowel function and was tolerating a regular diet on hospital day 1. She was discharged home in good condition. She is scheduled to follow up with her surgeon from the OSH 9/1. She does not need a follow up with us.         Goals of Care Treatment Preferences:  Code Status: Full Code      Consults:   Consults (From admission, onward)        Status Ordering Provider     Inpatient consult to General surgery  Once        Provider:   (Not yet assigned)    Completed KLEBER MCCARTHY          Significant Diagnostic Studies: N/A  See HPI and hospital course    Pending Diagnostic Studies:     None        Final Active Diagnoses:    Diagnosis Date Noted POA    PRINCIPAL PROBLEM:  Ileus [K56.7] 08/22/2023 Yes    Hypokalemia [E87.6] 08/23/2023 Yes    Hypertension [I10] 08/23/2023 Yes      Problems Resolved During this Admission:      Discharged Condition: good    Disposition: Home or Self Care    Follow Up:    Patient Instructions:      Notify your health care provider if you experience any of the following:  increased confusion or weakness     Notify your health care provider if you experience any of the following:  persistent dizziness, light-headedness, or visual disturbances     Notify your health care provider if you experience any of the following:  worsening rash     Notify your health care provider if you experience any of the following:  severe persistent headache     Notify your health care provider if you experience any of the following:  difficulty breathing or increased cough     Notify your health care provider if you experience any of the following:  redness, tenderness, or signs of infection (pain, swelling, redness, odor or green/yellow discharge around incision site)     Notify your health care provider if you experience any of the following:  severe uncontrolled pain     Notify your health care provider if you experience any of the following:  persistent nausea and vomiting or diarrhea     Notify your health care provider if you experience any of the following:  temperature >100.4     Activity as tolerated     Medications:  Reconciled Home Medications:      Medication List      CONTINUE taking these medications    acetaminophen 500 MG tablet  Commonly known as: TYLENOL  Take 2 tablets (1,000 mg total) by mouth every 6 (six) hours as needed for Pain.     amitriptyline 10 MG tablet  Commonly known as: ELAVIL  TAKE 1 TABLET BY  MOUTH EVERY DAY IN THE EVENING     CENTRUM SILVER WOMEN 8 mg iron-400 mcg-50 mcg Tab  Generic drug: multivit-min-iron-FA-vit K-lut  Take 1 tablet by mouth once daily.     cetirizine 10 MG tablet  Commonly known as: ZYRTEC  Take 10 mg by mouth once daily.     medroxyPROGESTERone 150 mg/mL Syrg  Commonly known as: DEPO-PROVERA  Inject 1 mL (150 mg total) into the muscle every 3 (three) months.     meloxicam 7.5 MG tablet  Commonly known as: MOBIC  Take 7.5 mg by mouth daily as needed.     oxyCODONE 5 MG immediate release tablet  Commonly known as: ROXICODONE  Take 1 tablet (5 mg total) by mouth every 4 (four) hours as needed for Pain.     solifenacin 10 MG tablet  Commonly known as: VESICARE  Take 10 mg by mouth once daily.     tiZANidine 4 MG tablet  Commonly known as: ZANAFLEX  TAKE 1/2-1 TABLET BY MOUTH AT BEDTIME AS NEEDED FOR SPASM     VITAMIN B-12 1000 MCG tablet  Generic drug: cyanocobalamin  Take 1,000 mcg by mouth once a week.     VITAMIN D2 50,000 unit Cap  Generic drug: ergocalciferol  Take 50,000 Units by mouth every Sunday.          Time spent on the discharge of patient: 10 minutes    Imelda Jolly MD  General Surgery  [facility] - Surgery

## 2023-08-23 NOTE — ASSESSMENT & PLAN NOTE
- Holding home meds in setting of acute disease  - PRN hydralazine  - Continue to monitor with q4 vitals and adjust regimen PRN

## 2023-08-23 NOTE — HOSPITAL COURSE
Patient admitted 8/22/2023 for post operative ileus s/p ex lap 8/18 at an OSH. She began to have bowel function and was tolerating a regular diet on hospital day 1. She was discharged home in good condition. She is scheduled to follow up with her surgeon from the OSH 9/1. She does not need a follow up with us.

## 2023-08-23 NOTE — CONSULTS
[facility] - Emergency Dept  General Surgery  Consult Note    Patient Name: Velma Lopez  MRN: [id number]  Code Status: Full Code  Admission Date: 8/22/2023  Hospital Length of Stay: 0 days  Attending Physician: Camilo Christianson MD  Primary Care Provider: Aftab Martinez MD    Patient information was obtained from patient, past medical records and ER records.     Inpatient consult to General surgery  Consult performed by: Wilfred Moreno MD  Consult ordered by: Barbara Hankins PA-C        Subjective:     Principal Problem: Ileus    History of Present Illness: Velma Lopez is a 48 y.o. female with PMHx of DM II, HTN, MILEY, transverse colon volvulus s/p ex lap x2 in October 2020, lap RNYGB in 2021 who presents to ED with abdominal pain. She was recently admitted in [place] for an SBO, and underwent ex lap with NESTOR on 8/18/23. She was discharged yesterday. Had abdominal pain today at home, describes as intermittent waves of pelvic pressure and moderate incisional pain. She has had little PO intake since arriving home. She is passing gas but has not had a BM since last Thursday. She is taking Roxicodone 5 mg at home, also Miralax and Dulcolax.She denies nausea and vomiting. She came here rather than [place] for another opinion.   She is hemodynamically stable. Labs unremarkable with WBC 5.3, Lactate 1.0. CT demonstrates diffusely dilated small bowel and colon without a transition point, significant volume of air present throughout.         Current Facility-Administered Medications on File Prior to Encounter   Medication    lidocaine (PF) 10 mg/ml (1%) injection 10 mg    midazolam (VERSED) 1 mg/mL injection 0.5 mg    ropivacaine (Naropin) 1000 mg/500 mL (2 mg/mL) [facility] Pump infusion     Current Outpatient Medications on File Prior to Encounter   Medication Sig    acetaminophen (TYLENOL) 500 MG tablet Take 2 tablets (1,000 mg total) by mouth every 6 (six) hours as needed for  Pain.    amitriptyline (ELAVIL) 10 MG tablet TAKE 1 TABLET BY MOUTH EVERY DAY IN THE EVENING (Patient taking differently: Take 10 mg by mouth every evening.)    cetirizine (ZYRTEC) 10 MG tablet Take 10 mg by mouth once daily.    cyanocobalamin (VITAMIN B-12) 1000 MCG tablet Take 1,000 mcg by mouth once a week.    ergocalciferol (VITAMIN D2) 50,000 unit Cap Take 50,000 Units by mouth every Sunday.    medroxyPROGESTERone (DEPO-PROVERA) 150 mg/mL Syrg Inject 1 mL (150 mg total) into the muscle every 3 (three) months.    meloxicam (MOBIC) 7.5 MG tablet Take 7.5 mg by mouth daily as needed.    multivit-min-iron-FA-vit K-lut (CENTRUM SILVER WOMEN) 8 mg iron-400 mcg-50 mcg Tab Take 1 tablet by mouth once daily.    oxyCODONE (ROXICODONE) 5 MG immediate release tablet Take 1 tablet (5 mg total) by mouth every 4 (four) hours as needed for Pain.    solifenacin (VESICARE) 10 MG tablet Take 10 mg by mouth once daily.    tiZANidine (ZANAFLEX) 4 MG tablet TAKE 1/2-1 TABLET BY MOUTH AT BEDTIME AS NEEDED FOR SPASM    [DISCONTINUED] atorvastatin (LIPITOR) 10 MG tablet Take 1 tablet (10 mg total) by mouth once daily.    [DISCONTINUED] blood-glucose meter kit Please provide with insurance covered meter (Patient not taking: Reported on 6/21/2022)    [DISCONTINUED] diclofenac sodium (VOLTAREN) 1 % Gel Apply 2 g topically 4 (four) times daily. (Patient not taking: No sig reported)    [DISCONTINUED] hyoscyamine (ANASPAZ,LEVSIN) 0.125 mg Tab Take 1 tablet (125 mcg total) by mouth every 4 (four) hours as needed (bladder spasms).    [DISCONTINUED] losartan (COZAAR) 25 MG tablet TAKE 1 TABLET BY MOUTH EVERY DAY    [DISCONTINUED] metFORMIN (GLUCOPHAGE) 500 MG tablet TAKE 1 TABLET BY MOUTH TWICE A DAY WITH MEALS       Review of patient's allergies indicates:  No Known Allergies    Past Medical History:   Diagnosis Date    Chronic back pain     Diabetes type 2, uncontrolled     Mild nonproliferative diabetic retinopathy of left  eye without macular edema associated with type 2 diabetes mellitus 10/21/2019    Morbid obesity with BMI of 40.0-44.9, adult     MILEY on CPAP     Plantar fasciitis of left foot     Urticaria      Past Surgical History:   Procedure Laterality Date    ARTHROSCOPIC ACROMIOPLASTY OF SHOULDER Left 7/27/2020    Procedure: ACROMIOPLASTY, ARTHROSCOPIC;  Surgeon: Marcela Garza MD;  Location: [facility] OR;  Service: Orthopedics;  Laterality: Left;    ARTHROSCOPIC REPAIR OF ROTATOR CUFF OF SHOULDER Right 7/27/2020    Procedure: REPAIR, ROTATOR CUFF, ARTHROSCOPIC;  Surgeon: Marcela Garza MD;  Location: [facility] OR;  Service: Orthopedics;  Laterality: Right;  GENERAL/REGIONAL    COLONOSCOPY N/A 4/7/2022    Procedure: COLONOSCOPY;  Surgeon: Yadi Wong MD;  [facility]  Service: Endoscopy;  Laterality: N/A;  Covid test at [facility] on 4/4.EC    CYST REMOVAL      ovarian cyst removal at age 12    DECOMPRESSION OF SUBACROMIAL SPACE Left 7/27/2020    Procedure: DECOMPRESSION, SUBACROMIAL SPACE;  Surgeon: Marcela Garza MD;  Location: [facility] OR;  Service: Orthopedics;  Laterality: Left;    DILATION AND CURETTAGE OF UTERUS      ESOPHAGEAL MOTILITY STUDY USING HIGH RESOLUTION MANOMETRY N/A 9/17/2019    Procedure: MOTILITY STUDY, ESOPHAGUS, USING HIGH RESOLUTION MANOMETRY;  Surgeon: Leopoldo Swanson MD;  [facility]  Service: Endoscopy;  Laterality: N/A;  Pt will be taking xanax before procedure to hopefull help with anxiety.    ESOPHAGOGASTRODUODENOSCOPY N/A 6/14/2019    Procedure: EGD (ESOPHAGOGASTRODUODENOSCOPY);  Surgeon: Ced Guevara MD;  [facility]  Service: Endoscopy;  Laterality: N/A;  Please make sure it is scheduled after her cardiology appt.- see cardiology note dated 6/13/19 for clearance - [facility]    FOREIGN BODY REMOVAL N/A 4/17/2023    Procedure: REMOVAL, FOREIGN BODY;  Surgeon: Rom Rodriguez MD;  [facility];  Service: General;   Laterality: N/A;    GASTRIC BYPASS N/A 05/20/2021    INJECTION OF STEROID Left 7/27/2020    Procedure: INJECTION, STEROID LEFT THUMB TRIGGER FINGER;  Surgeon: Marcela Garza MD;  Location: [facility] OR;  Service: Orthopedics;  Laterality: Left;  LEFT THUMB, TRIGGER FINGER    LAPAROTOMY, EXPLORATORY N/A 8/18/2023    Procedure: LAPAROTOMY, EXPLORATORY;  Surgeon: Bunny Obrien MD;  Location: [facility] OR;  Service: General;  Laterality: N/A;    LYSIS OF ADHESIONS N/A 10/21/2022    Procedure: LYSIS, ADHESIONS;  Surgeon: Bunny Obrien MD;  Location: [facility] OR;  Service: General;  Laterality: N/A;    LYSIS OF ADHESIONS N/A 8/18/2023    Procedure: LYSIS, ADHESIONS;  Surgeon: Bunny Obrien MD;  Location: [facility] OR;  Service: General;  Laterality: N/A;    TRANSVERSE COLECTOMY N/A 10/26/2022    Procedure: COLECTOMY, TRANSVERSE;  Surgeon: Bunny Obrien MD;  Location: [facility] OR;  Service: General;  Laterality: N/A;    TRIGGER FINGER RELEASE Right 10/4/2019    Procedure: RELEASE, TRIGGER FINGER - right thumb;  Surgeon: Craig Tom MD;  Location: [facility] OR;  Service: Orthopedics;  Laterality: Right;    UPPER GASTROINTESTINAL ENDOSCOPY      WISDOM TOOTH EXTRACTION       Family History       Problem Relation (Age of Onset)    Breast cancer Maternal Aunt (70)    Cancer Maternal Grandmother    Diabetes Mother    No Known Problems Sister, Sister, Sister          Tobacco Use    Smoking status: Every Day     Current packs/day: 0.75     Average packs/day: 0.8 packs/day for 31.6 years (23.7 ttl pk-yrs)     Types: Cigarettes     Start date: 1992    Smokeless tobacco: Never    Tobacco comments:     Enrolled in the [employer] on 8/1/18 ([facility] Member ID # [id number]) Ambulatory referral to Smoking Cessation Program.     Substance and Sexual Activity    Alcohol use: Yes     Comment: social    Drug use: No    Sexual activity: Yes     Partners: Male     Birth control/protection: None     Review of Systems    Constitutional:  Positive for appetite change. Negative for fever.   Gastrointestinal:  Positive for abdominal pain and constipation. Negative for blood in stool, diarrhea, nausea and vomiting.   Genitourinary:  Negative for difficulty urinating.     Objective:     Vital Signs (Most Recent):  Temp: 98 °F (36.7 °C) (08/22/23 2102)  Pulse: 72 (08/22/23 2102)  Resp: 18 (08/22/23 2102)  BP: (!) 170/92 (08/22/23 2102)  SpO2: 100 % (08/22/23 2102) Vital Signs (24h Range):  Temp:  [98 °F (36.7 °C)-99.3 °F (37.4 °C)] 98 °F (36.7 °C)  Pulse:  [67-74] 72  Resp:  [18] 18  SpO2:  [97 %-100 %] 100 %  BP: (143-187)/(79-99) 170/92     Weight: 63.5 kg (140 lb)  Body mass index is 22.6 kg/m².     Physical Exam  Vitals reviewed.   Constitutional:       General: She is not in acute distress.     Appearance: She is well-developed.   HENT:      Head: Normocephalic and atraumatic.   Eyes:      General:         Right eye: No discharge.         Left eye: No discharge.      Conjunctiva/sclera: Conjunctivae normal.   Cardiovascular:      Rate and Rhythm: Normal rate and regular rhythm.   Pulmonary:      Effort: Pulmonary effort is normal. No respiratory distress.   Abdominal:      General: There is distension.      Palpations: Abdomen is soft.      Tenderness: There is abdominal tenderness. There is no guarding or rebound.      Comments: Mildly distended but soft. Mild diffuse tenderness to palpation, no peritoneal signs  Midline incision c/d/I with overlying dermabond   Musculoskeletal:         General: No deformity. Normal range of motion.      Cervical back: Normal range of motion.   Skin:     General: Skin is warm and dry.   Neurological:      Mental Status: She is alert and oriented to person, place, and time.   Psychiatric:         Behavior: Behavior normal.            I have reviewed all pertinent lab results within the past 24 hours.  CBC:   Recent Labs   Lab 08/22/23  1744   WBC 5.33   RBC 4.57   HGB 14.6   HCT 44.3       MCV 97   MCH 31.9*   MCHC 33.0     CMP:   Recent Labs   Lab 08/17/23  2354 08/19/23  0624   * 95   CALCIUM 9.8 8.5*   ALBUMIN 4.3  --    PROT 7.6  --     137   K 4.0 3.6   CO2 17* 23   * 108   BUN 14 8   CREATININE 0.9 0.7   ALKPHOS 98  --    ALT 29  --    AST 16  --    BILITOT 0.5  --        Significant Diagnostics:  I have reviewed all pertinent imaging results/findings within the past 24 hours.    Imaging Results               CT Abdomen Pelvis With Contrast (Final result)  Result time 08/22/23 20:26:50      Final result by Musa Goldberg MD (08/22/23 20:26:50)                   Impression:      Several loops of mildly dilated small bowel with degree of dilation decreased slightly compared to 08/18/2023 CT.  There is suspected transition point at the mesenteric root however not as conspicuous as prior.    Markedly dilated large bowel containing air with gradual transition point at the splenic flexure.  No definite obstruction.  Finding may represent adynamic ileus versus pseudo-obstruction.    Small volume free fluid in the pelvis.    Mild anasarca.    Hepatomegaly.    Other findings above.    This report was flagged in Epic as abnormal.    Electronically signed by resident: Lenin Hobbs  Date:    08/22/2023  Time:    19:06    Electronically signed by: Musa Goldberg MD  Date:    08/22/2023  Time:    20:26               Narrative:    EXAMINATION:  CT ABDOMEN PELVIS WITH CONTRAST    CLINICAL HISTORY:  Abdominal pain, post-op;    TECHNIQUE:  Axial images of the abdomen and pelvis were acquired  after the use of 75 cc [id number] IV contrast. No oral contrast was administered.  Coronal and sagittal reconstructions were also obtained.    COMPARISON:  CT abdomen pelvis 08/18/2023, 07/13/2023    FINDINGS:  LUNG BASES: Heart and pericardium are within normal limits.  Luning-shaped bibasilar subsegmental atelectasis.  No definite pleural effusions.    HEPATOBILIARY: Liver is enlarged measuring 20 cm  craniocaudad.  No focal hepatic lesions. No biliary ductal dilatation. Normal gallbladder.    SPLEEN: No splenomegaly.    PANCREAS: No focal masses or ductal dilatation.    ADRENALS: No adrenal nodules.    KIDNEYS/URETERS: Kidneys normal in size and attenuation.  No hydronephrosis, stones, or solid mass lesions.    BLADDER/PELVIC ORGANS: No bladder wall thickening.  Uterus is normal.  No adnexal masses.    PERITONEUM / RETROPERITONEUM: Small volume free fluid in the pelvis.  No intraperitoneal free air.    LYMPH NODES: No lymphadenopathy.    VESSELS: Minimal calcified atherosclerosis at the celiac origin.    GI TRACT: Postoperative changes gastric bypass.  Several loops of mildly dilated small bowel with proximal loops containing predominantly air and distal loops are slightly less dilated containing fluid (measuring up to 3.6 cm in diameter).  There is a suspected transition point at the mesenteric root as seen on axial image 90, series 2, however not as conspicuous as the previously seen transition point on 08/18/2023 CT.  Markedly dilated large bowel containing air (measuring up to 7.7 cm in diameter) with gradual transition point at the splenic flexure.    SOFT TISSUES: Laparotomy scar at the midline anterior abdominal wall.  Mild diffuse subcutaneous edema.    BONES: No fractures or focal osseous lesions.                                          Assessment/Plan:     * Ileus  48 y.o. female with PMHx of DM II, HTN, MILEY, transverse colon volvulus s/p ex lap x2 in October 2020, lap RNYGB in 2021, recent admission in [place] for SBO s/p ex lap with NESTOR on 8/18/23 who presents with likely postop ileus    - Discussed admission for ileus either here or at [place] where her operation was performed, she stated she would prefer to stay here  - Admit to obs  - NPO with sips/chips  - mIVF  - Pain meds prn  - Antiemetics prn  - Suppository now  - AM labs  - Encouraged ambulation      VTE Risk Mitigation (From admission,  onward)         Ordered     IP VTE HIGH RISK PATIENT  Once         08/22/23 2031     Place sequential compression device  Until discontinued         08/22/23 2031                    Wilfred Moreno MD  General Surgery  [facility] - Emergency Dept

## 2023-08-23 NOTE — PROGRESS NOTES
[facility]  General Surgery  Progress Note    Subjective:     History of Present Illness:  Velma Lopez is a 48 y.o. female with PMHx of DM II, HTN, MILEY, transverse colon volvulus s/p ex lap x2 in October 2020, lap RNYGB in 2021 who presents to ED with abdominal pain. She was recently admitted in [place] for an SBO, and underwent ex lap with NESTOR on 8/18/23. She was discharged yesterday. Had abdominal pain today at home, describes as intermittent waves of pelvic pressure and moderate incisional pain. She has had little PO intake since arriving home. She is passing gas but has not had a BM since last Thursday. She is taking Roxicodone 5 mg at home, also Miralax and Dulcolax.She denies nausea and vomiting. She came here rather than [place] for another opinion.   She is hemodynamically stable. Labs unremarkable with WBC 5.3, Lactate 1.0. CT demonstrates diffusely dilated small bowel and colon without a transition point, significant volume of air present throughout.         Post-Op Info:  * No surgery found *         Interval History: NAEON. Afebrile. HDS. Reports feeling better this morning. Denies n/v. Passing flatus. Small BM overnight. Denies abdominal pain. No new symptoms or concerns this morning. All questions answered.       Medications:  Continuous Infusions:   lactated ringers 100 mL/hr at 08/22/23 2335     Scheduled Meds:   acetaminophen  500 mg Oral Q6H     PRN Meds:bisacodyL, dextrose 10%, glucagon (human recombinant), hydrALAZINE, insulin aspart U-100, LIDOcaine (PF) 10 mg/ml (1%), ondansetron, oxyCODONE, sodium chloride 0.9%     Review of patient's allergies indicates:  No Known Allergies  Objective:     Vital Signs (Most Recent):  Temp: 99 °F (37.2 °C) (08/23/23 0518)  Pulse: 65 (08/23/23 0518)  Resp: 18 (08/23/23 0518)  BP: (!) 161/77 (08/23/23 0518)  SpO2: (!) 94 % (08/23/23 0518) Vital Signs (24h Range):  Temp:  [97.4 °F (36.3 °C)-99.3 °F (37.4 °C)] 99 °F (37.2 °C)  Pulse:  [65-80]  65  Resp:  [16-18] 18  SpO2:  [93 %-100 %] 94 %  BP: (143-203)/() 161/77     Weight: 63.5 kg (139 lb 15.9 oz)  Body mass index is 22.6 kg/m².    Intake/Output - Last 3 Shifts       None             Physical Exam  Vitals and nursing note reviewed.   Constitutional:       General: She is not in acute distress.     Appearance: She is not diaphoretic.      Comments: Room air   HENT:      Head: Normocephalic and atraumatic.      Mouth/Throat:      Mouth: Mucous membranes are moist.      Pharynx: Oropharynx is clear.   Eyes:      Extraocular Movements: Extraocular movements intact.      Conjunctiva/sclera: Conjunctivae normal.   Cardiovascular:      Rate and Rhythm: Normal rate.   Pulmonary:      Effort: Pulmonary effort is normal. No respiratory distress.   Abdominal:      Palpations: Abdomen is soft.      Tenderness: There is no guarding or rebound.      Comments: Soft with mild distension. Incision is clean, dry, and intact without drainage, erythema, or increased warmth. Appropriately TTP. No peritonitic signs    Musculoskeletal:         General: No deformity.   Skin:     General: Skin is warm and dry.   Neurological:      Mental Status: She is alert and oriented to person, place, and time.          Significant Labs:  I have reviewed all pertinent lab results within the past 24 hours.    Significant Diagnostics:  I have reviewed all pertinent imaging results/findings within the past 24 hours.    Assessment/Plan:     * Ileus  Patient is a 48 year old female with PMHx of DM II, HTN, MILEY, transverse colon volvulus s/p ex lap x2 in October 2020, lap RNYGB in 2021, recent admission in [place] for SBO s/p ex lap with NESTOR on 8/18/23 who presents with likely postop ileus. Clinically stable with ROBF overnight    - CLD  - IVF until tolerating PO  - Bowel regimen  - PRN pain and nausea medications  - Daily labs  - Replete electrolytes PRN   - DVT prophylaxis (SCDs and lovenox)  - OOB, ambulate    Dispo: not yet medically  stable for dc     Hypertension  - Holding home meds in setting of acute disease  - PRN hydralazine  - Continue to monitor with q4 vitals and adjust regimen PRN    Hypokalemia  - Replete PRN  - Continue to monitor with daily CMP    Case discussed with general surgery staff.      Jamel Little PAFlorC  General Surgery  [facility]

## 2023-08-23 NOTE — SUBJECTIVE & OBJECTIVE
Current Facility-Administered Medications on File Prior to Encounter   Medication    lidocaine (PF) 10 mg/ml (1%) injection 10 mg    midazolam (VERSED) 1 mg/mL injection 0.5 mg    ropivacaine (Naropin) 1000 mg/500 mL (2 mg/mL) [facility] PainPRO Pump infusion     Current Outpatient Medications on File Prior to Encounter   Medication Sig    acetaminophen (TYLENOL) 500 MG tablet Take 2 tablets (1,000 mg total) by mouth every 6 (six) hours as needed for Pain.    amitriptyline (ELAVIL) 10 MG tablet TAKE 1 TABLET BY MOUTH EVERY DAY IN THE EVENING (Patient taking differently: Take 10 mg by mouth every evening.)    cetirizine (ZYRTEC) 10 MG tablet Take 10 mg by mouth once daily.    cyanocobalamin (VITAMIN B-12) 1000 MCG tablet Take 1,000 mcg by mouth once a week.    ergocalciferol (VITAMIN D2) 50,000 unit Cap Take 50,000 Units by mouth every Sunday.    medroxyPROGESTERone (DEPO-PROVERA) 150 mg/mL Syrg Inject 1 mL (150 mg total) into the muscle every 3 (three) months.    meloxicam (MOBIC) 7.5 MG tablet Take 7.5 mg by mouth daily as needed.    multivit-min-iron-FA-vit K-lut (CENTRUM SILVER WOMEN) 8 mg iron-400 mcg-50 mcg Tab Take 1 tablet by mouth once daily.    oxyCODONE (ROXICODONE) 5 MG immediate release tablet Take 1 tablet (5 mg total) by mouth every 4 (four) hours as needed for Pain.    solifenacin (VESICARE) 10 MG tablet Take 10 mg by mouth once daily.    tiZANidine (ZANAFLEX) 4 MG tablet TAKE 1/2-1 TABLET BY MOUTH AT BEDTIME AS NEEDED FOR SPASM    [DISCONTINUED] atorvastatin (LIPITOR) 10 MG tablet Take 1 tablet (10 mg total) by mouth once daily.    [DISCONTINUED] blood-glucose meter kit Please provide with insurance covered meter (Patient not taking: Reported on 6/21/2022)    [DISCONTINUED] diclofenac sodium (VOLTAREN) 1 % Gel Apply 2 g topically 4 (four) times daily. (Patient not taking: No sig reported)    [DISCONTINUED] hyoscyamine (ANASPAZ,LEVSIN) 0.125 mg Tab Take 1 tablet (125 mcg total) by mouth every 4 (four)  hours as needed (bladder spasms).    [DISCONTINUED] losartan (COZAAR) 25 MG tablet TAKE 1 TABLET BY MOUTH EVERY DAY    [DISCONTINUED] metFORMIN (GLUCOPHAGE) 500 MG tablet TAKE 1 TABLET BY MOUTH TWICE A DAY WITH MEALS       Review of patient's allergies indicates:  No Known Allergies    Past Medical History:   Diagnosis Date    Chronic back pain     Diabetes type 2, uncontrolled     Mild nonproliferative diabetic retinopathy of left eye without macular edema associated with type 2 diabetes mellitus 10/21/2019    Morbid obesity with BMI of 40.0-44.9, adult     MILEY on CPAP     Plantar fasciitis of left foot     Urticaria      Past Surgical History:   Procedure Laterality Date    ARTHROSCOPIC ACROMIOPLASTY OF SHOULDER Left 7/27/2020    Procedure: ACROMIOPLASTY, ARTHROSCOPIC;  Surgeon: Marcela Garza MD;  Location: [facility] OR;  Service: Orthopedics;  Laterality: Left;    ARTHROSCOPIC REPAIR OF ROTATOR CUFF OF SHOULDER Right 7/27/2020    Procedure: REPAIR, ROTATOR CUFF, ARTHROSCOPIC;  Surgeon: Marcela Garza MD;  Location: [facility] OR;  Service: Orthopedics;  Laterality: Right;  GENERAL/REGIONAL    COLONOSCOPY N/A 4/7/2022    Procedure: COLONOSCOPY;  Surgeon: Yadi Wong MD;  Location: [facility] ([address] [place] [address]);  Service: Endoscopy;  Laterality: N/A;  Covid test at [place] on 4/4.EC    CYST REMOVAL      ovarian cyst removal at age 12    DECOMPRESSION OF SUBACROMIAL SPACE Left 7/27/2020    Procedure: DECOMPRESSION, SUBACROMIAL SPACE;  Surgeon: Marcela Garza MD;  Location: [facility] OR;  Service: Orthopedics;  Laterality: Left;    DILATION AND CURETTAGE OF UTERUS      ESOPHAGEAL MOTILITY STUDY USING HIGH RESOLUTION MANOMETRY N/A 9/17/2019    Procedure: MOTILITY STUDY, ESOPHAGUS, USING HIGH RESOLUTION MANOMETRY;  Surgeon: Leopoldo Swanson MD;  [facility]  Service: Endoscopy;  Laterality: N/A;  Pt will be taking xanax before procedure to hopefull help with anxiety.    ESOPHAGOGASTRODUODENOSCOPY N/A  6/14/2019    Procedure: EGD (ESOPHAGOGASTRODUODENOSCOPY);  Surgeon: Ced Guevara MD;  [facility]  Service: Endoscopy;  Laterality: N/A;  Please make sure it is scheduled after her cardiology appt.- see cardiology note dated 6/13/19 for clearance - ERW    FOREIGN BODY REMOVAL N/A 4/17/2023    Procedure: REMOVAL, FOREIGN BODY;  Surgeon: Rom Rodriguez MD;  Location: [facility] OR;  Service: General;  Laterality: N/A;    GASTRIC BYPASS N/A 05/20/2021    INJECTION OF STEROID Left 7/27/2020    Procedure: INJECTION, STEROID LEFT THUMB TRIGGER FINGER;  Surgeon: Marcela Garza MD;  Location: [facility] OR;  Service: Orthopedics;  Laterality: Left;  LEFT THUMB, TRIGGER FINGER    LAPAROTOMY, EXPLORATORY N/A 8/18/2023    Procedure: LAPAROTOMY, EXPLORATORY;  Surgeon: Bunny Obrien MD;  Location: [facility] OR;  Service: General;  Laterality: N/A;    LYSIS OF ADHESIONS N/A 10/21/2022    Procedure: LYSIS, ADHESIONS;  Surgeon: Bunny Obrien MD;  Location: [facility] OR;  Service: General;  Laterality: N/A;    LYSIS OF ADHESIONS N/A 8/18/2023    Procedure: LYSIS, ADHESIONS;  Surgeon: Bunny Obrien MD;  Location: [facility] OR;  Service: General;  Laterality: N/A;    TRANSVERSE COLECTOMY N/A 10/26/2022    Procedure: COLECTOMY, TRANSVERSE;  Surgeon: Bunny Obrien MD;  Location: [facility] OR;  Service: General;  Laterality: N/A;    TRIGGER FINGER RELEASE Right 10/4/2019    Procedure: RELEASE, TRIGGER FINGER - right thumb;  Surgeon: Craig Tom MD;  Location: [facility] OR;  Service: Orthopedics;  Laterality: Right;    UPPER GASTROINTESTINAL ENDOSCOPY      WISDOM TOOTH EXTRACTION       Family History       Problem Relation (Age of Onset)    Breast cancer Maternal Aunt (70)    Cancer Maternal Grandmother    Diabetes Mother    No Known Problems Sister, Sister, Sister          Tobacco Use    Smoking status: Every Day     Current packs/day: 0.75     Average packs/day: 0.8 packs/day for 31.6 years (23.7 ttl pk-yrs)      Types: Cigarettes     Start date: 1992    Smokeless tobacco: Never    Tobacco comments:     Enrolled in the [employer] on 8/1/18 ([facility] Member ID # [id number]) Ambulatory referral to Smoking Cessation Program.     Substance and Sexual Activity    Alcohol use: Yes     Comment: social    Drug use: No    Sexual activity: Yes     Partners: Male     Birth control/protection: None     Review of Systems   Constitutional:  Positive for appetite change. Negative for fever.   Gastrointestinal:  Positive for abdominal pain and constipation. Negative for blood in stool, diarrhea, nausea and vomiting.   Genitourinary:  Negative for difficulty urinating.     Objective:     Vital Signs (Most Recent):  Temp: 98 °F (36.7 °C) (08/22/23 2102)  Pulse: 72 (08/22/23 2102)  Resp: 18 (08/22/23 2102)  BP: (!) 170/92 (08/22/23 2102)  SpO2: 100 % (08/22/23 2102) Vital Signs (24h Range):  Temp:  [98 °F (36.7 °C)-99.3 °F (37.4 °C)] 98 °F (36.7 °C)  Pulse:  [67-74] 72  Resp:  [18] 18  SpO2:  [97 %-100 %] 100 %  BP: (143-187)/(79-99) 170/92     Weight: 63.5 kg (140 lb)  Body mass index is 22.6 kg/m².     Physical Exam  Vitals reviewed.   Constitutional:       General: She is not in acute distress.     Appearance: She is well-developed.   HENT:      Head: Normocephalic and atraumatic.   Eyes:      General:         Right eye: No discharge.         Left eye: No discharge.      Conjunctiva/sclera: Conjunctivae normal.   Cardiovascular:      Rate and Rhythm: Normal rate and regular rhythm.   Pulmonary:      Effort: Pulmonary effort is normal. No respiratory distress.   Abdominal:      General: There is distension.      Palpations: Abdomen is soft.      Tenderness: There is abdominal tenderness. There is no guarding or rebound.      Comments: Mildly distended but soft. Mild diffuse tenderness to palpation, no peritoneal signs  Midline incision c/d/I with overlying dermabond   Musculoskeletal:         General: No deformity. Normal range of motion.       Cervical back: Normal range of motion.   Skin:     General: Skin is warm and dry.   Neurological:      Mental Status: She is alert and oriented to person, place, and time.   Psychiatric:         Behavior: Behavior normal.            I have reviewed all pertinent lab results within the past 24 hours.  CBC:   Recent Labs   Lab 08/22/23  1744   WBC 5.33   RBC 4.57   HGB 14.6   HCT 44.3      MCV 97   MCH 31.9*   MCHC 33.0     CMP:   Recent Labs   Lab 08/17/23  2354 08/19/23  0624   * 95   CALCIUM 9.8 8.5*   ALBUMIN 4.3  --    PROT 7.6  --     137   K 4.0 3.6   CO2 17* 23   * 108   BUN 14 8   CREATININE 0.9 0.7   ALKPHOS 98  --    ALT 29  --    AST 16  --    BILITOT 0.5  --        Significant Diagnostics:  I have reviewed all pertinent imaging results/findings within the past 24 hours.    Imaging Results               CT Abdomen Pelvis With Contrast (Final result)  Result time 08/22/23 20:26:50      Final result by Musa Goldberg MD (08/22/23 20:26:50)                   Impression:      Several loops of mildly dilated small bowel with degree of dilation decreased slightly compared to 08/18/2023 CT.  There is suspected transition point at the mesenteric root however not as conspicuous as prior.    Markedly dilated large bowel containing air with gradual transition point at the splenic flexure.  No definite obstruction.  Finding may represent adynamic ileus versus pseudo-obstruction.    Small volume free fluid in the pelvis.    Mild anasarca.    Hepatomegaly.    Other findings above.    This report was flagged in Epic as abnormal.    Electronically signed by resident: Lenin Hobbs  Date:    08/22/2023  Time:    19:06    Electronically signed by: Musa Goldberg MD  Date:    08/22/2023  Time:    20:26               Narrative:    EXAMINATION:  CT ABDOMEN PELVIS WITH CONTRAST    CLINICAL HISTORY:  Abdominal pain, post-op;    TECHNIQUE:  Axial images of the abdomen and pelvis were acquired  after  the use of 75 cc Exxd189 IV contrast. No oral contrast was administered.  Coronal and sagittal reconstructions were also obtained.    COMPARISON:  CT abdomen pelvis 08/18/2023, 07/13/2023    FINDINGS:  LUNG BASES: Heart and pericardium are within normal limits.  [place]-shaped bibasilar subsegmental atelectasis.  No definite pleural effusions.    HEPATOBILIARY: Liver is enlarged measuring 20 cm craniocaudad.  No focal hepatic lesions. No biliary ductal dilatation. Normal gallbladder.    SPLEEN: No splenomegaly.    PANCREAS: No focal masses or ductal dilatation.    ADRENALS: No adrenal nodules.    KIDNEYS/URETERS: Kidneys normal in size and attenuation.  No hydronephrosis, stones, or solid mass lesions.    BLADDER/PELVIC ORGANS: No bladder wall thickening.  Uterus is normal.  No adnexal masses.    PERITONEUM / RETROPERITONEUM: Small volume free fluid in the pelvis.  No intraperitoneal free air.    LYMPH NODES: No lymphadenopathy.    VESSELS: Minimal calcified atherosclerosis at the celiac origin.    GI TRACT: Postoperative changes gastric bypass.  Several loops of mildly dilated small bowel with proximal loops containing predominantly air and distal loops are slightly less dilated containing fluid (measuring up to 3.6 cm in diameter).  There is a suspected transition point at the mesenteric root as seen on axial image 90, series 2, however not as conspicuous as the previously seen transition point on 08/18/2023 CT.  Markedly dilated large bowel containing air (measuring up to 7.7 cm in diameter) with gradual transition point at the splenic flexure.    SOFT TISSUES: Laparotomy scar at the midline anterior abdominal wall.  Mild diffuse subcutaneous edema.    BONES: No fractures or focal osseous lesions.

## 2023-08-23 NOTE — SUBJECTIVE & OBJECTIVE
Interval History: NAEON. Afebrile. HDS. Reports feeling better this morning. Denies n/v. Passing flatus. Small BM overnight. Denies abdominal pain. No new symptoms or concerns this morning. All questions answered.       Medications:  Continuous Infusions:   lactated ringers 100 mL/hr at 08/22/23 2335     Scheduled Meds:   acetaminophen  500 mg Oral Q6H     PRN Meds:bisacodyL, dextrose 10%, glucagon (human recombinant), hydrALAZINE, insulin aspart U-100, LIDOcaine (PF) 10 mg/ml (1%), ondansetron, oxyCODONE, sodium chloride 0.9%     Review of patient's allergies indicates:  No Known Allergies  Objective:     Vital Signs (Most Recent):  Temp: 99 °F (37.2 °C) (08/23/23 0518)  Pulse: 65 (08/23/23 0518)  Resp: 18 (08/23/23 0518)  BP: (!) 161/77 (08/23/23 0518)  SpO2: (!) 94 % (08/23/23 0518) Vital Signs (24h Range):  Temp:  [97.4 °F (36.3 °C)-99.3 °F (37.4 °C)] 99 °F (37.2 °C)  Pulse:  [65-80] 65  Resp:  [16-18] 18  SpO2:  [93 %-100 %] 94 %  BP: (143-203)/() 161/77     Weight: 63.5 kg (139 lb 15.9 oz)  Body mass index is 22.6 kg/m².    Intake/Output - Last 3 Shifts       None             Physical Exam  Vitals and nursing note reviewed.   Constitutional:       General: She is not in acute distress.     Appearance: She is not diaphoretic.      Comments: Room air   HENT:      Head: Normocephalic and atraumatic.      Mouth/Throat:      Mouth: Mucous membranes are moist.      Pharynx: Oropharynx is clear.   Eyes:      Extraocular Movements: Extraocular movements intact.      Conjunctiva/sclera: Conjunctivae normal.   Cardiovascular:      Rate and Rhythm: Normal rate.   Pulmonary:      Effort: Pulmonary effort is normal. No respiratory distress.   Abdominal:      Palpations: Abdomen is soft.      Tenderness: There is no guarding or rebound.      Comments: Soft with mild distension. Incision is clean, dry, and intact without drainage, erythema, or increased warmth. Appropriately TTP. No peritonitic signs     Musculoskeletal:         General: No deformity.   Skin:     General: Skin is warm and dry.   Neurological:      Mental Status: She is alert and oriented to person, place, and time.          Significant Labs:  I have reviewed all pertinent lab results within the past 24 hours.    Significant Diagnostics:  I have reviewed all pertinent imaging results/findings within the past 24 hours.

## 2023-08-24 NOTE — PLAN OF CARE
[facility]  Discharge Final Note    Primary Care Provider: Aftab Martinez MD    Expected Discharge Date: 8/23/2023    Final Discharge Note (most recent)       Final Note - 08/23/23 1308          Final Note    Assessment Type Discharge Planning Assessment     Anticipated Discharge Disposition Home or Self Care     Hospital Resources/Appts/Education Provided Provided patient/caregiver with written discharge plan information;Provided education on problems/symptoms using teachback                   Future Appointments   Date Time Provider Department Center   8/29/2023 11:00 AM Otto Godinez RRT [facility]   9/1/2023  8:20 AM Bunny Orbien MD [facility]

## 2023-08-29 ENCOUNTER — TELEPHONE (OUTPATIENT)
Dept: SMOKING CESSATION | Facility: CLINIC | Age: 48
End: 2023-08-29
Payer: MEDICAID

## 2023-08-29 NOTE — TELEPHONE ENCOUNTER
2nd attempt, patient called after not checking in for today's virtual visit.  Voicemail with contact information given.  Patient has not logged into [facility] since 8/25/23.

## 2023-08-29 NOTE — TELEPHONE ENCOUNTER
1st attempt, patient called for a reminder of today's scheduled visit.  Voicemail with contact information given.

## 2023-08-30 ENCOUNTER — OFFICE VISIT (OUTPATIENT)
Dept: SURGERY | Facility: CLINIC | Age: 48
End: 2023-08-30
Payer: MEDICAID

## 2023-08-30 VITALS
HEART RATE: 87 BPM | DIASTOLIC BLOOD PRESSURE: 84 MMHG | HEIGHT: 66 IN | WEIGHT: 127.88 LBS | SYSTOLIC BLOOD PRESSURE: 143 MMHG | BODY MASS INDEX: 20.55 KG/M2

## 2023-08-30 DIAGNOSIS — R10.84 GENERALIZED ABDOMINAL PAIN: Primary | ICD-10-CM

## 2023-08-30 PROCEDURE — 99024 POSTOP FOLLOW-UP VISIT: CPT | Mod: ,,, | Performed by: STUDENT IN AN ORGANIZED HEALTH CARE EDUCATION/TRAINING PROGRAM

## 2023-08-30 PROCEDURE — 3079F PR MOST RECENT DIASTOLIC BLOOD PRESSURE 80-89 MM HG: ICD-10-PCS | Mod: CPTII,,, | Performed by: STUDENT IN AN ORGANIZED HEALTH CARE EDUCATION/TRAINING PROGRAM

## 2023-08-30 PROCEDURE — 3077F SYST BP >= 140 MM HG: CPT | Mod: CPTII,,, | Performed by: STUDENT IN AN ORGANIZED HEALTH CARE EDUCATION/TRAINING PROGRAM

## 2023-08-30 PROCEDURE — 3077F PR MOST RECENT SYSTOLIC BLOOD PRESSURE >= 140 MM HG: ICD-10-PCS | Mod: CPTII,,, | Performed by: STUDENT IN AN ORGANIZED HEALTH CARE EDUCATION/TRAINING PROGRAM

## 2023-08-30 PROCEDURE — 99999 PR PBB SHADOW E&M-EST. PATIENT-LVL IV: CPT | Mod: PBBFAC,,, | Performed by: STUDENT IN AN ORGANIZED HEALTH CARE EDUCATION/TRAINING PROGRAM

## 2023-08-30 PROCEDURE — 99999 PR PBB SHADOW E&M-EST. PATIENT-LVL IV: ICD-10-PCS | Mod: PBBFAC,,, | Performed by: STUDENT IN AN ORGANIZED HEALTH CARE EDUCATION/TRAINING PROGRAM

## 2023-08-30 PROCEDURE — 99214 OFFICE O/P EST MOD 30 MIN: CPT | Mod: PBBFAC,PO | Performed by: STUDENT IN AN ORGANIZED HEALTH CARE EDUCATION/TRAINING PROGRAM

## 2023-08-30 PROCEDURE — 3008F PR BODY MASS INDEX (BMI) DOCUMENTED: ICD-10-PCS | Mod: CPTII,,, | Performed by: STUDENT IN AN ORGANIZED HEALTH CARE EDUCATION/TRAINING PROGRAM

## 2023-08-30 PROCEDURE — 1159F MED LIST DOCD IN RCRD: CPT | Mod: CPTII,,, | Performed by: STUDENT IN AN ORGANIZED HEALTH CARE EDUCATION/TRAINING PROGRAM

## 2023-08-30 PROCEDURE — 1159F PR MEDICATION LIST DOCUMENTED IN MEDICAL RECORD: ICD-10-PCS | Mod: CPTII,,, | Performed by: STUDENT IN AN ORGANIZED HEALTH CARE EDUCATION/TRAINING PROGRAM

## 2023-08-30 PROCEDURE — 99024 PR POST-OP FOLLOW-UP VISIT: ICD-10-PCS | Mod: ,,, | Performed by: STUDENT IN AN ORGANIZED HEALTH CARE EDUCATION/TRAINING PROGRAM

## 2023-08-30 PROCEDURE — 3079F DIAST BP 80-89 MM HG: CPT | Mod: CPTII,,, | Performed by: STUDENT IN AN ORGANIZED HEALTH CARE EDUCATION/TRAINING PROGRAM

## 2023-08-30 PROCEDURE — 3008F BODY MASS INDEX DOCD: CPT | Mod: CPTII,,, | Performed by: STUDENT IN AN ORGANIZED HEALTH CARE EDUCATION/TRAINING PROGRAM

## 2023-08-30 RX ORDER — DOCUSATE SODIUM 100 MG/1
100 CAPSULE, LIQUID FILLED ORAL 2 TIMES DAILY
Qty: 60 CAPSULE | Refills: 3 | Status: SHIPPED | OUTPATIENT
Start: 2023-08-30

## 2023-08-30 NOTE — PROGRESS NOTES
S/p ex lap, Gissel two weeks ago  Went to ED at [facility] for crampy ab pain last week  No pain now, passing flatus, BM.   Reg diet, no NV  Incision good  Mild incisional soreness  Will refer to bariatric surgery for eval. Gastric bypass revision? Possible partial colon obstruction from antecolic Pola limb? Multiple ex laps by me without internal hernia. Moderate adhesions lysed

## 2023-09-08 ENCOUNTER — PATIENT MESSAGE (OUTPATIENT)
Dept: SURGERY | Facility: CLINIC | Age: 48
End: 2023-09-08
Payer: MEDICAID

## 2023-09-13 ENCOUNTER — CLINICAL SUPPORT (OUTPATIENT)
Dept: SMOKING CESSATION | Facility: CLINIC | Age: 48
End: 2023-09-13
Payer: COMMERCIAL

## 2023-09-13 DIAGNOSIS — F17.200 NICOTINE DEPENDENCE, UNCOMPLICATED: Primary | ICD-10-CM

## 2023-09-13 DIAGNOSIS — R10.13 EPIGASTRIC PAIN: Primary | ICD-10-CM

## 2023-09-13 PROCEDURE — 99407 BEHAV CHNG SMOKING > 10 MIN: CPT | Mod: S$GLB,,, | Performed by: GENERAL PRACTICE

## 2023-09-13 PROCEDURE — 99407 PR TOBACCO USE CESSATION INTENSIVE >10 MINUTES: ICD-10-PCS | Mod: S$GLB,,, | Performed by: GENERAL PRACTICE

## 2023-09-13 NOTE — PROGRESS NOTES
Spoke with patient today in regard to smoking cessation progress for 12 month follow up. She states she is not tobacco free. Patient states she does not have her mind made up to quit right now.. Informed patient of benefit period, future follow ups and contact information if any further help is needed. Will complete/ resolve smart form for 6/12 month follow up for Quit # 1.

## 2023-09-19 ENCOUNTER — PATIENT MESSAGE (OUTPATIENT)
Dept: SURGERY | Facility: CLINIC | Age: 48
End: 2023-09-19
Payer: MEDICAID

## 2023-09-19 ENCOUNTER — TELEPHONE (OUTPATIENT)
Dept: SURGERY | Facility: CLINIC | Age: 48
End: 2023-09-19
Payer: MEDICAID

## 2023-09-19 NOTE — TELEPHONE ENCOUNTER
----- Message from Yadi Nice MA sent at 9/13/2023  4:54 PM CDT -----  Regarding: Appt  Can you book this one?  I can't override... Maybe on October 17th or 19th.  I spoke with him, he is aware that she is still in her global from surgery with Dr. Obrien.  He still wants to see her.  Her last CT was done on 8/18/23.  He wants another before she sees him.  You can send it back to me for scheduling of the CT, the order is in.  Thanks!  ----- Message -----  From: Tara Avila RN  Sent: 9/11/2023   9:23 AM CDT  To: Yadi Nice MA      ----- Message -----  From: Sean Dolan MD  Sent: 9/8/2023   2:45 PM CDT  To: Jose Colorado MD; #    Absolutely.    Team, please get her on.  Please make sure we have a ct with contrast for the appointment and I am sure she has already had one.  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  She had had a bariatric procedure at [facility] in May 2021 (Dr. Mcgraw) and she has had several episodes of SBO requiring hospitalization.  She would like to see you for a consultation.    Would you be able to accommodate her?  Thank you...      Jose Colorado  [facility] Onc

## 2023-09-25 ENCOUNTER — TELEPHONE (OUTPATIENT)
Dept: BARIATRICS | Facility: CLINIC | Age: 48
End: 2023-09-25
Payer: MEDICAID

## 2023-09-25 NOTE — TELEPHONE ENCOUNTER
----- Message from Sean Dolan MD sent at 9/25/2023  1:06 PM CDT -----  Team, are we having trouble of some kind getting an appointment for this patient?    Thanks.  ----- Message -----  From: Jose Colorado MD  Sent: 9/15/2023   2:51 PM CDT  To: MD Tc Antony,  Hate to bother you again, but Mrs. Lopez has not heard from your schedulers..  Can someone reach out to her with an appointment?  Thank you..    Jose

## 2023-10-13 ENCOUNTER — HOSPITAL ENCOUNTER (OUTPATIENT)
Dept: RADIOLOGY | Facility: HOSPITAL | Age: 48
Discharge: HOME | End: 2023-10-13
Attending: SURGERY
Payer: MEDICAID

## 2023-10-13 DIAGNOSIS — R10.13 EPIGASTRIC PAIN: ICD-10-CM

## 2023-10-13 PROCEDURE — 74177 CT ABD & PELVIS W/CONTRAST: CPT | Mod: TC

## 2023-10-13 PROCEDURE — 25500020 PHARM REV CODE 255: Performed by: SURGERY

## 2023-10-13 RX ADMIN — IOHEXOL 75 ML: 350 INJECTION, SOLUTION INTRAVENOUS at 10:10

## 2023-10-17 ENCOUNTER — OFFICE VISIT (OUTPATIENT)
Dept: SURGERY | Facility: CLINIC | Age: 48
End: 2023-10-17
Payer: MEDICAID

## 2023-10-17 ENCOUNTER — LAB VISIT (OUTPATIENT)
Dept: LAB | Facility: HOSPITAL | Age: 48
End: 2023-10-17
Attending: SURGERY
Payer: MEDICAID

## 2023-10-17 VITALS
HEART RATE: 60 BPM | DIASTOLIC BLOOD PRESSURE: 66 MMHG | BODY MASS INDEX: 21.36 KG/M2 | HEIGHT: 66 IN | SYSTOLIC BLOOD PRESSURE: 142 MMHG | WEIGHT: 132.94 LBS

## 2023-10-17 DIAGNOSIS — R10.9 ABDOMINAL PAIN, UNSPECIFIED ABDOMINAL LOCATION: ICD-10-CM

## 2023-10-17 DIAGNOSIS — R10.9 ABDOMINAL PAIN, UNSPECIFIED ABDOMINAL LOCATION: Primary | ICD-10-CM

## 2023-10-17 LAB
25(OH)D3+25(OH)D2 SERPL-MCNC: 34 NG/ML (ref 30–96)
VIT B12 SERPL-MCNC: 796 PG/ML (ref 210–950)

## 2023-10-17 PROCEDURE — 99999 PR PBB SHADOW E&M-EST. PATIENT-LVL III: CPT | Mod: PBBFAC,,, | Performed by: SURGERY

## 2023-10-17 PROCEDURE — 99213 OFFICE O/P EST LOW 20 MIN: CPT | Mod: PBBFAC | Performed by: SURGERY

## 2023-10-17 PROCEDURE — 82607 VITAMIN B-12: CPT | Performed by: SURGERY

## 2023-10-17 PROCEDURE — 99999 PR PBB SHADOW E&M-EST. PATIENT-LVL III: ICD-10-PCS | Mod: PBBFAC,,, | Performed by: SURGERY

## 2023-10-17 PROCEDURE — 3008F BODY MASS INDEX DOCD: CPT | Mod: CPTII,,, | Performed by: SURGERY

## 2023-10-17 PROCEDURE — 36415 COLL VENOUS BLD VENIPUNCTURE: CPT | Performed by: SURGERY

## 2023-10-17 PROCEDURE — 3044F HG A1C LEVEL LT 7.0%: CPT | Mod: CPTII,,, | Performed by: SURGERY

## 2023-10-17 PROCEDURE — 1160F RVW MEDS BY RX/DR IN RCRD: CPT | Mod: CPTII,,, | Performed by: SURGERY

## 2023-10-17 PROCEDURE — 84425 ASSAY OF VITAMIN B-1: CPT | Performed by: SURGERY

## 2023-10-17 PROCEDURE — 1159F MED LIST DOCD IN RCRD: CPT | Mod: CPTII,,, | Performed by: SURGERY

## 2023-10-17 PROCEDURE — 82525 ASSAY OF COPPER: CPT | Performed by: SURGERY

## 2023-10-17 PROCEDURE — 84630 ASSAY OF ZINC: CPT | Performed by: SURGERY

## 2023-10-17 PROCEDURE — 99024 POSTOP FOLLOW-UP VISIT: CPT | Mod: S$PBB,,, | Performed by: SURGERY

## 2023-10-17 PROCEDURE — 3077F SYST BP >= 140 MM HG: CPT | Mod: CPTII,,, | Performed by: SURGERY

## 2023-10-17 PROCEDURE — 3078F DIAST BP <80 MM HG: CPT | Mod: CPTII,,, | Performed by: SURGERY

## 2023-10-17 PROCEDURE — 82306 VITAMIN D 25 HYDROXY: CPT | Performed by: SURGERY

## 2023-10-17 PROCEDURE — 3078F PR MOST RECENT DIASTOLIC BLOOD PRESSURE < 80 MM HG: ICD-10-PCS | Mod: CPTII,,, | Performed by: SURGERY

## 2023-10-17 PROCEDURE — 3077F PR MOST RECENT SYSTOLIC BLOOD PRESSURE >= 140 MM HG: ICD-10-PCS | Mod: CPTII,,, | Performed by: SURGERY

## 2023-10-17 PROCEDURE — 84207 ASSAY OF VITAMIN B-6: CPT | Performed by: SURGERY

## 2023-10-17 PROCEDURE — 1160F PR REVIEW ALL MEDS BY PRESCRIBER/CLIN PHARMACIST DOCUMENTED: ICD-10-PCS | Mod: CPTII,,, | Performed by: SURGERY

## 2023-10-17 PROCEDURE — 99024 PR POST-OP FOLLOW-UP VISIT: ICD-10-PCS | Mod: S$PBB,,, | Performed by: SURGERY

## 2023-10-17 PROCEDURE — 3008F PR BODY MASS INDEX (BMI) DOCUMENTED: ICD-10-PCS | Mod: CPTII,,, | Performed by: SURGERY

## 2023-10-17 PROCEDURE — 84255 ASSAY OF SELENIUM: CPT | Performed by: SURGERY

## 2023-10-17 PROCEDURE — 3044F PR MOST RECENT HEMOGLOBIN A1C LEVEL <7.0%: ICD-10-PCS | Mod: CPTII,,, | Performed by: SURGERY

## 2023-10-17 PROCEDURE — 1159F PR MEDICATION LIST DOCUMENTED IN MEDICAL RECORD: ICD-10-PCS | Mod: CPTII,,, | Performed by: SURGERY

## 2023-10-17 NOTE — LETTER
October 17, 2023        Aftab Martinez MD  [address]  [facility] [address]             [facility]  [address]  [facility] [address]  Phone: [phone]   Patient: Velma Lopez   MR Number: [id number]   YOB: 1975   Date of Visit: 10/17/2023       Dear Dr. Martinez:    Thank you for referring Velma Lopez to me for evaluation. Attached you will find relevant portions of my assessment and plan of care.    If you have questions, please do not hesitate to call me. I look forward to following Velma Lopez along with you.    Sincerely,      Sean Dolan MD            CC    No Recipients    Enclosure

## 2023-10-17 NOTE — PROGRESS NOTES
History & Physical    SUBJECTIVE:     History of Present Illness:  Mrs. Velma Lopez is a 48 y.o. female w/ PMHx of merritt, DM2, and obesity s/p bypass (2021). She presents today for a second opinion about her recurrent abdominal pain. After bypass surgery she has lost ~150lbs and was doing well. She began to experience diffuse abdominal pain until 2022 at which point she had an internal hernia repair with transverse colon resection due to volvulus. Subsequently, she has had to have an xlap w/ lysis of adhesions (8/2023). Since then she continues to have abdominal pain across the lower abdomen. This pain seems to improve after each surgery but returns a few months later. She denies n/v/f or change in bowel movements.       Review of patient's allergies indicates:  No Known Allergies    Current Outpatient Medications   Medication Sig Dispense Refill    acetaminophen (TYLENOL) 500 MG tablet Take 2 tablets (1,000 mg total) by mouth every 6 (six) hours as needed for Pain. 50 tablet 0    amitriptyline (ELAVIL) 10 MG tablet TAKE 1 TABLET BY MOUTH EVERY DAY IN THE EVENING (Patient taking differently: Take 10 mg by mouth every evening.) 90 tablet 0    cetirizine (ZYRTEC) 10 MG tablet Take 10 mg by mouth once daily.      cyanocobalamin (VITAMIN B-12) 1000 MCG tablet Take 1,000 mcg by mouth once a week.      docusate sodium (COLACE) 100 MG capsule Take 1 capsule (100 mg total) by mouth 2 (two) times daily. 60 capsule 3    ergocalciferol (VITAMIN D2) 50,000 unit Cap Take 50,000 Units by mouth every Sunday.      medroxyPROGESTERone (DEPO-PROVERA) 150 mg/mL Syrg Inject 1 mL (150 mg total) into the muscle every 3 (three) months. 1 mL 0    meloxicam (MOBIC) 7.5 MG tablet Take 7.5 mg by mouth daily as needed.      multivit-min-iron-FA-vit K-lut (CENTRUM SILVER WOMEN) 8 mg iron-400 mcg-50 mcg Tab Take 1 tablet by mouth once daily.      solifenacin (VESICARE) 10 MG tablet Take 10 mg by mouth once daily.      tiZANidine (ZANAFLEX) 4  MG tablet TAKE 1/2-1 TABLET BY MOUTH AT BEDTIME AS NEEDED FOR SPASM      oxyCODONE (ROXICODONE) 5 MG immediate release tablet Take 1 tablet (5 mg total) by mouth every 4 (four) hours as needed for Pain. (Patient not taking: Reported on 10/17/2023) 10 tablet 0     No current facility-administered medications for this visit.     Facility-Administered Medications Ordered in Other Visits   Medication Dose Route Frequency Provider Last Rate Last Admin    lidocaine (PF) 10 mg/ml (1%) injection 10 mg  1 mL Intradermal Once Taillac, Brenna Latif MD        midazolam (VERSED) 1 mg/mL injection 0.5 mg  0.5 mg Intravenous PRN Ced Patino MD   2 mg at 07/27/20 1322    ropivacaine (Naropin) 1000 mg/500 mL (2 mg/mL) Nimbus PainPRO Pump infusion  4 mL/hr Perineural Continuous Ced Patino MD 4 mL/hr at 07/27/20 1519 4 mL/hr at 07/27/20 1519       Past Medical History:   Diagnosis Date    Chronic back pain     Diabetes type 2, uncontrolled     Mild nonproliferative diabetic retinopathy of left eye without macular edema associated with type 2 diabetes mellitus 10/21/2019    Morbid obesity with BMI of 40.0-44.9, adult     MILEY on CPAP     Plantar fasciitis of left foot     Urticaria      Past Surgical History:   Procedure Laterality Date    ARTHROSCOPIC ACROMIOPLASTY OF SHOULDER Left 7/27/2020    Procedure: ACROMIOPLASTY, ARTHROSCOPIC;  Surgeon: Marcela Garza MD;  Location: [facility] OR;  Service: Orthopedics;  Laterality: Left;    ARTHROSCOPIC REPAIR OF ROTATOR CUFF OF SHOULDER Right 7/27/2020    Procedure: REPAIR, ROTATOR CUFF, ARTHROSCOPIC;  Surgeon: Marcela Garza MD;  Location: [facility] OR;  Service: Orthopedics;  Laterality: Right;  GENERAL/REGIONAL    COLONOSCOPY N/A 4/7/2022    Procedure: COLONOSCOPY;  Surgeon: Yadi Wong MD;  Location: [facility] ([address] [place] [address]);  Service: Endoscopy;  Laterality: N/A;  Covid test at [place] on 4/4.EC    CYST REMOVAL      ovarian cyst removal at age 12     DECOMPRESSION OF SUBACROMIAL SPACE Left 7/27/2020    Procedure: DECOMPRESSION, SUBACROMIAL SPACE;  Surgeon: Marcela Garza MD;  Location: [facility] OR;  Service: Orthopedics;  Laterality: Left;    DILATION AND CURETTAGE OF UTERUS      ESOPHAGEAL MOTILITY STUDY USING HIGH RESOLUTION MANOMETRY N/A 9/17/2019    Procedure: MOTILITY STUDY, ESOPHAGUS, USING HIGH RESOLUTION MANOMETRY;  Surgeon: Leopoldo Swanson MD;  Location: [facility] ENDO (4TH FLR);  Service: Endoscopy;  Laterality: N/A;  Pt will be taking xanax before procedure to hopefull help with anxiety.    ESOPHAGOGASTRODUODENOSCOPY N/A 6/14/2019    Procedure: EGD (ESOPHAGOGASTRODUODENOSCOPY);  Surgeon: Ced Guevara MD;  Location: [facility] (4TH FLR);  Service: Endoscopy;  Laterality: N/A;  Please make sure it is scheduled after her cardiology appt.- see cardiology note dated 6/13/19 for clearance - ERW    FOREIGN BODY REMOVAL N/A 4/17/2023    Procedure: REMOVAL, FOREIGN BODY;  Surgeon: Rom Rodriguez MD;  Location: [facility] OR;  Service: General;  Laterality: N/A;    GASTRIC BYPASS N/A 05/20/2021    INJECTION OF STEROID Left 7/27/2020    Procedure: INJECTION, STEROID LEFT THUMB TRIGGER FINGER;  Surgeon: Marcela Garza MD;  Location: [facility] OR;  Service: Orthopedics;  Laterality: Left;  LEFT THUMB, TRIGGER FINGER    LAPAROTOMY, EXPLORATORY N/A 8/18/2023    Procedure: LAPAROTOMY, EXPLORATORY;  Surgeon: Bunny Obrien MD;  Location: [facility] OR;  Service: General;  Laterality: N/A;    LYSIS OF ADHESIONS N/A 10/21/2022    Procedure: LYSIS, ADHESIONS;  Surgeon: Bunny Obrien MD;  Location: [facility] OR;  Service: General;  Laterality: N/A;    LYSIS OF ADHESIONS N/A 8/18/2023    Procedure: LYSIS, ADHESIONS;  Surgeon: Bunny Obrien MD;  Location: [facility] OR;  Service: General;  Laterality: N/A;    TRANSVERSE COLECTOMY N/A 10/26/2022    Procedure: COLECTOMY, TRANSVERSE;  Surgeon: Bunny Obrien MD;  Location: [facility];  Service: General;  Laterality: N/A;     TRIGGER FINGER RELEASE Right 10/4/2019    Procedure: RELEASE, TRIGGER FINGER - right thumb;  Surgeon: Craig Tom MD;  Location: [facility];  Service: Orthopedics;  Laterality: Right;    UPPER GASTROINTESTINAL ENDOSCOPY      WISDOM TOOTH EXTRACTION       Family History   Problem Relation Age of Onset    Cancer Maternal Grandmother     Diabetes Mother     No Known Problems Sister     No Known Problems Sister     No Known Problems Sister     Breast cancer Maternal Aunt 70    Colon cancer Neg Hx     Stomach cancer Neg Hx     Inflammatory bowel disease Neg Hx     Esophageal cancer Neg Hx      Social History     Tobacco Use    Smoking status: Every Day     Current packs/day: 0.50     Average packs/day: 0.5 packs/day for 31.8 years (16.1 ttl pk-yrs)     Types: Cigarettes     Start date: 1992    Smokeless tobacco: Never    Tobacco comments:     Enrolled in the [employer] on 8/1/18 ([facility] Member ID # [id number]) Ambulatory referral to Smoking Cessation Program.     Substance Use Topics    Alcohol use: Yes     Comment: social    Drug use: No        Review of Systems:  Review of Systems   Constitutional:  Positive for fatigue. Negative for appetite change, chills, fever and unexpected weight change.   HENT:  Negative for congestion, sore throat and trouble swallowing.    Eyes: Negative.    Respiratory:  Negative for chest tightness and shortness of breath.    Cardiovascular:  Negative for chest pain and leg swelling.   Gastrointestinal:  Positive for abdominal distention and abdominal pain. Negative for blood in stool, constipation, diarrhea, nausea and vomiting.   Endocrine: Negative.    Genitourinary:  Negative for decreased urine volume, difficulty urinating and dysuria.   Musculoskeletal:  Negative for back pain.   Skin: Negative.    Neurological:  Negative for dizziness, weakness, numbness and headaches.   Psychiatric/Behavioral: Negative.       CT Abdomen Pelvis With Contrast  Order: [id number]  Status: Final result      Visible to patient: Yes (seen)     Next appt: 11/01/2023 at 08:30 AM in Radiology ([facility] [id number] 350 LB LIMIT)     Dx: Epigastric pain     0 Result Notes  Details    Reading Physician Reading Date Result Priority   RADIOLOGIST, VIRTUAL 10/16/2023      Narrative & Impression  EXAMINATION:  CT Abdomen And Pelvis With Contrast     CLINICAL HISTORY:  Abdominal pain; Epigastric     TECHNIQUE:  Imaging protocol: Computed tomography of the abdomen and pelvis with contrast. Radiation optimization: All CT scans at this facility use at least one of these dose optimization techniques: automated exposure control; mA and/or kV adjustment per patient size (includes targeted exams where dose is matched to clinical indication); or iterative reconstruction. Contrast material: OMNI 350; Contrast volume: 75 ml; Contrast route: INTRAVENOUS (IV)     COMPARISON:  CT ABDOMEN PELVIS WITH CONTRAST 8/22/2023 6:46 PM     FINDINGS:  Liver: Hepatomegaly 19 cm. Gallbladder and bile ducts: Normal. No calcified stones. No ductal dilation. Pancreas: Normal. No ductal dilation. Spleen: Normal. No splenomegaly. Adrenal glands: Normal. No mass. Kidneys and ureters: Normal. No hydronephrosis. Stomach and bowel: Status post gastric bypass.. Dilated loops of small bowel in the left upper quadrant may represent ileus or obstruction. More distally the small bowel tapers.. Findings consistent with constipation throughout the colon.Impression Appendix: No evidence of appendicitis. Intraperitoneal space: Unremarkable. No free air. No significant fluid collection. Vasculature: Unremarkable. No abdominal aortic aneurysm. Lymph nodes: Unremarkable. No enlarged lymph nodes. Urinary bladder: Unremarkable as visualized. Reproductive: Unremarkable as visualized. Bones/joints: Unremarkable. No acute fracture. Soft tissues: Unremarkable     Impression:     Status post gastric bypass.. Dilated loops of small bowel in the left upper quadrant may represent ileus or  "obstruction. More distally the small bowel tapers..        Electronically signed by: Virtual Radiologist  Date:                                            10/16/2023  Time:                                           09:36           Exam Ended: 10/13/23 10:16 Last Resulted: 10/16/23 09:36           OBJECTIVE:     Vital Signs (Most Recent)  Pulse: 60 (10/17/23 0946)  BP: (!) 142/66 (10/17/23 0946)  5' 6" (1.676 m)  60.3 kg (132 lb 15 oz)     Physical Exam:  Physical Exam  Constitutional:       General: She is not in acute distress.     Appearance: Normal appearance. She is normal weight. She is not ill-appearing, toxic-appearing or diaphoretic.   HENT:      Head: Normocephalic and atraumatic.      Right Ear: External ear normal.      Left Ear: External ear normal.      Nose: Nose normal.      Mouth/Throat:      Mouth: Mucous membranes are moist.      Pharynx: Oropharynx is clear.   Eyes:      General: No scleral icterus.     Conjunctiva/sclera: Conjunctivae normal.   Cardiovascular:      Rate and Rhythm: Normal rate and regular rhythm.      Pulses: Normal pulses.   Pulmonary:      Effort: Pulmonary effort is normal. No respiratory distress.      Breath sounds: Normal breath sounds. No wheezing.   Chest:      Chest wall: No tenderness.   Abdominal:      General: Abdomen is flat. There is no distension.      Palpations: Abdomen is soft. There is no mass.      Tenderness: There is no abdominal tenderness. There is no guarding or rebound.      Hernia: No hernia is present.   Musculoskeletal:         General: No swelling, tenderness, deformity or signs of injury. Normal range of motion.   Skin:     General: Skin is warm.   Neurological:      General: No focal deficit present.      Mental Status: She is alert and oriented to person, place, and time. Mental status is at baseline.   Psychiatric:         Mood and Affect: Mood normal.         Behavior: Behavior normal.         Thought Content: Thought content normal.         " Judgment: Judgment normal.             ASSESSMENT/PLAN:     Recuurent Abdominal Pain  -UGI/SBFT  -Vitamins and micronutrients labs    Cora Baltazar, MS3   [employer]

## 2023-10-17 NOTE — PROGRESS NOTES
I have seen the patient, reviewed the Student's history and physical, assessment and plan. I have personally interviewed and examined the patient at bedside and: agree with the findings.     47y/o with bmi 21, current every day smoker, merritt on cpap (not using and not sure if she needs after her weight loss), dm2 with retinopathy (dm has resolved), s/y bypass 2021 (outside) and abdominal pain.  She lost 150lb with surgery.  She was doing well until later 2022 and had internal hernia repair and transverse colectomy due to volvulus and then xlap with lysis of adhesions later in 8/2023.  After discharge she went to the ER with pain and was observed.  She continues to have abdominal pain mid lower abdomen.  It has been improved for a few months after each operation.  There are no aggravating or relieving factors other than getting pain medication in the ER.  She has been to the ER only once since the last surgery.  She says she thinks she doesn't have constipation.  She says she is drinking well and is restricted in eating solid foods due to the bypass.    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    Cbc, cmp reviewed, basically ok  Several cts 2023, last one 10/2023 reviewed, films viewed, possible sbo (although swirling and intussusception not specifically identified), constipation  Ekg 2023 reviewed, normal  PET stress 2019 reviewed    The relative PET images are normal showing no clinically significant regional resting or stress induced perfusion defects.    Whole heart absolute myocardial perfusion (cc/min/g) averaged 1.03  at rest (which is elevated), 1.98 cc/min/g at stress (which is low normal), and 1.98 cc/min/g CFR (which is mildly reduced in part due to elevated resting flow).    Gated perfusion images showed an ejection fraction of 71.0 % at rest and 69 % during stress. Normal is greater than 51%.    Wall motion was normal at rest and during stress.    LV cavity size is normal at rest and  stress.    The EKG portion of this study is negative for ischemia.    There were no arrhythmias during stress.    The patient reported no chest pain during the stress test.    There are no prior studies for comparison.    Abdominal pain following bypass and xlaps times 3.  Obtain ugisbf, vitamins and micronutrients.

## 2023-10-18 LAB — SELENIUM SERPL-MCNC: 145 MCG/L (ref 110–165)

## 2023-10-20 LAB
COPPER SERPL-MCNC: 1132 UG/L (ref 810–1990)
ZINC SERPL-MCNC: 101 UG/DL (ref 60–130)

## 2023-10-23 LAB
PYRIDOXAL SERPL-MCNC: 10 UG/L (ref 5–50)
VIT B1 BLD-MCNC: 61 UG/L (ref 38–122)

## 2023-10-24 ENCOUNTER — PATIENT MESSAGE (OUTPATIENT)
Dept: INTERNAL MEDICINE | Facility: CLINIC | Age: 48
End: 2023-10-24
Payer: MEDICAID

## 2023-10-31 ENCOUNTER — OFFICE VISIT (OUTPATIENT)
Dept: OBSTETRICS AND GYNECOLOGY | Facility: CLINIC | Age: 48
End: 2023-10-31
Payer: MEDICAID

## 2023-10-31 ENCOUNTER — LAB VISIT (OUTPATIENT)
Dept: LAB | Facility: HOSPITAL | Age: 48
End: 2023-10-31
Attending: OBSTETRICS & GYNECOLOGY
Payer: MEDICAID

## 2023-10-31 VITALS
WEIGHT: 138.31 LBS | DIASTOLIC BLOOD PRESSURE: 77 MMHG | BODY MASS INDEX: 22.33 KG/M2 | SYSTOLIC BLOOD PRESSURE: 120 MMHG

## 2023-10-31 DIAGNOSIS — Z11.3 SCREENING FOR STDS (SEXUALLY TRANSMITTED DISEASES): ICD-10-CM

## 2023-10-31 DIAGNOSIS — D25.1 FIBROIDS, INTRAMURAL: ICD-10-CM

## 2023-10-31 DIAGNOSIS — Z30.42 ENCOUNTER FOR SURVEILLANCE OF INJECTABLE CONTRACEPTIVE: ICD-10-CM

## 2023-10-31 DIAGNOSIS — Z01.419 WELL WOMAN EXAM WITH ROUTINE GYNECOLOGICAL EXAM: Primary | ICD-10-CM

## 2023-10-31 DIAGNOSIS — N95.1 MENOPAUSAL SYMPTOMS: ICD-10-CM

## 2023-10-31 DIAGNOSIS — B97.7 HPV IN FEMALE: ICD-10-CM

## 2023-10-31 LAB — FSH SERPL-ACNC: 63.79 MIU/ML

## 2023-10-31 PROCEDURE — 3074F PR MOST RECENT SYSTOLIC BLOOD PRESSURE < 130 MM HG: ICD-10-PCS | Mod: CPTII,,, | Performed by: OBSTETRICS & GYNECOLOGY

## 2023-10-31 PROCEDURE — 3078F DIAST BP <80 MM HG: CPT | Mod: CPTII,,, | Performed by: OBSTETRICS & GYNECOLOGY

## 2023-10-31 PROCEDURE — 1159F MED LIST DOCD IN RCRD: CPT | Mod: CPTII,,, | Performed by: OBSTETRICS & GYNECOLOGY

## 2023-10-31 PROCEDURE — 87624 HPV HI-RISK TYP POOLED RSLT: CPT | Performed by: OBSTETRICS & GYNECOLOGY

## 2023-10-31 PROCEDURE — 3044F HG A1C LEVEL LT 7.0%: CPT | Mod: CPTII,,, | Performed by: OBSTETRICS & GYNECOLOGY

## 2023-10-31 PROCEDURE — 99213 OFFICE O/P EST LOW 20 MIN: CPT | Mod: PBBFAC,PO | Performed by: OBSTETRICS & GYNECOLOGY

## 2023-10-31 PROCEDURE — 99999 PR PBB SHADOW E&M-EST. PATIENT-LVL III: CPT | Mod: PBBFAC,,, | Performed by: OBSTETRICS & GYNECOLOGY

## 2023-10-31 PROCEDURE — 87591 N.GONORRHOEAE DNA AMP PROB: CPT | Performed by: OBSTETRICS & GYNECOLOGY

## 2023-10-31 PROCEDURE — 99999 PR PBB SHADOW E&M-EST. PATIENT-LVL III: ICD-10-PCS | Mod: PBBFAC,,, | Performed by: OBSTETRICS & GYNECOLOGY

## 2023-10-31 PROCEDURE — 88175 CYTOPATH C/V AUTO FLUID REDO: CPT | Performed by: OBSTETRICS & GYNECOLOGY

## 2023-10-31 PROCEDURE — 99396 PREV VISIT EST AGE 40-64: CPT | Mod: S$PBB,,, | Performed by: OBSTETRICS & GYNECOLOGY

## 2023-10-31 PROCEDURE — 3008F BODY MASS INDEX DOCD: CPT | Mod: CPTII,,, | Performed by: OBSTETRICS & GYNECOLOGY

## 2023-10-31 PROCEDURE — 3074F SYST BP LT 130 MM HG: CPT | Mod: CPTII,,, | Performed by: OBSTETRICS & GYNECOLOGY

## 2023-10-31 PROCEDURE — 1160F RVW MEDS BY RX/DR IN RCRD: CPT | Mod: CPTII,,, | Performed by: OBSTETRICS & GYNECOLOGY

## 2023-10-31 PROCEDURE — 81514 NFCT DS BV&VAGINITIS DNA ALG: CPT | Performed by: OBSTETRICS & GYNECOLOGY

## 2023-10-31 PROCEDURE — 3008F PR BODY MASS INDEX (BMI) DOCUMENTED: ICD-10-PCS | Mod: CPTII,,, | Performed by: OBSTETRICS & GYNECOLOGY

## 2023-10-31 PROCEDURE — 1159F PR MEDICATION LIST DOCUMENTED IN MEDICAL RECORD: ICD-10-PCS | Mod: CPTII,,, | Performed by: OBSTETRICS & GYNECOLOGY

## 2023-10-31 PROCEDURE — 36415 COLL VENOUS BLD VENIPUNCTURE: CPT | Performed by: OBSTETRICS & GYNECOLOGY

## 2023-10-31 PROCEDURE — 1160F PR REVIEW ALL MEDS BY PRESCRIBER/CLIN PHARMACIST DOCUMENTED: ICD-10-PCS | Mod: CPTII,,, | Performed by: OBSTETRICS & GYNECOLOGY

## 2023-10-31 PROCEDURE — 83001 ASSAY OF GONADOTROPIN (FSH): CPT | Performed by: OBSTETRICS & GYNECOLOGY

## 2023-10-31 PROCEDURE — 3044F PR MOST RECENT HEMOGLOBIN A1C LEVEL <7.0%: ICD-10-PCS | Mod: CPTII,,, | Performed by: OBSTETRICS & GYNECOLOGY

## 2023-10-31 PROCEDURE — 3078F PR MOST RECENT DIASTOLIC BLOOD PRESSURE < 80 MM HG: ICD-10-PCS | Mod: CPTII,,, | Performed by: OBSTETRICS & GYNECOLOGY

## 2023-10-31 PROCEDURE — 99396 PR PREVENTIVE VISIT,EST,40-64: ICD-10-PCS | Mod: S$PBB,,, | Performed by: OBSTETRICS & GYNECOLOGY

## 2023-10-31 RX ORDER — MEDROXYPROGESTERONE ACETATE 150 MG/ML
150 INJECTION, SUSPENSION INTRAMUSCULAR
Qty: 1 ML | Refills: 1 | Status: SHIPPED | OUTPATIENT
Start: 2023-10-31

## 2023-10-31 RX ORDER — TERCONAZOLE 8 MG/G
1 CREAM VAGINAL NIGHTLY
Qty: 20 G | Refills: 1 | Status: SHIPPED | OUTPATIENT
Start: 2023-10-31

## 2023-10-31 NOTE — PROGRESS NOTES
CC: Annual check-up    SUBJECTIVE:   48 y.o. female   for annual routine Pap and checkup. No LMP recorded. Patient is perimenopausal..  She complains of thick white dc after intercourse, and wants refill of depo, was put on that in past for AUB due to fibroids and worked well. Also helps with hot flashes. Has been off depo for a while and wants to get back onit.      No vb since being off depo and  sx's are bad  Past Medical History:   Diagnosis Date    Chronic back pain     Diabetes type 2, uncontrolled     Mild nonproliferative diabetic retinopathy of left eye without macular edema associated with type 2 diabetes mellitus 10/21/2019    Morbid obesity with BMI of 40.0-44.9, adult     MILEY on CPAP     Plantar fasciitis of left foot     Urticaria      Past Surgical History:   Procedure Laterality Date    ARTHROSCOPIC ACROMIOPLASTY OF SHOULDER Left 2020    Procedure: ACROMIOPLASTY, ARTHROSCOPIC;  Surgeon: Marcela Garza MD;  Location: [facility] OR;  Service: Orthopedics;  Laterality: Left;    ARTHROSCOPIC REPAIR OF ROTATOR CUFF OF SHOULDER Right 2020    Procedure: REPAIR, ROTATOR CUFF, ARTHROSCOPIC;  Surgeon: Marcela Garza MD;  Location: [facility] OR;  Service: Orthopedics;  Laterality: Right;  GENERAL/REGIONAL    COLONOSCOPY N/A 2022    Procedure: COLONOSCOPY;  Surgeon: Yadi Wong MD;  Location: [facility] (4TH FLR);  Service: Endoscopy;  Laterality: N/A;  Covid test at [place] on .EC    CYST REMOVAL      ovarian cyst removal at age 12    DECOMPRESSION OF SUBACROMIAL SPACE Left 2020    Procedure: DECOMPRESSION, SUBACROMIAL SPACE;  Surgeon: Marcela Garza MD;  Location: [facility] OR;  Service: Orthopedics;  Laterality: Left;    DILATION AND CURETTAGE OF UTERUS      ESOPHAGEAL MOTILITY STUDY USING HIGH RESOLUTION MANOMETRY N/A 2019    Procedure: MOTILITY STUDY, ESOPHAGUS, USING HIGH RESOLUTION MANOMETRY;  Surgeon: Leopoldo Swanson MD;  Location: [facility] (4TH FLR);   Service: Endoscopy;  Laterality: N/A;  Pt will be taking xanax before procedure to hopefull help with anxiety.    ESOPHAGOGASTRODUODENOSCOPY N/A 6/14/2019    Procedure: EGD (ESOPHAGOGASTRODUODENOSCOPY);  Surgeon: Ced Guevara MD;  Location: [address]);  Service: Endoscopy;  Laterality: N/A;  Please make sure it is scheduled after her cardiology appt.- see cardiology note dated 6/13/19 for clearance - ERW    FOREIGN BODY REMOVAL N/A 4/17/2023    Procedure: REMOVAL, FOREIGN BODY;  Surgeon: Rom Rodriguez MD;  Location: [facility];  Service: General;  Laterality: N/A;    GASTRIC BYPASS N/A 05/20/2021    INJECTION OF STEROID Left 7/27/2020    Procedure: INJECTION, STEROID LEFT THUMB TRIGGER FINGER;  Surgeon: Marcela Garza MD;  Location: [facility];  Service: Orthopedics;  Laterality: Left;  LEFT THUMB, TRIGGER FINGER    LAPAROTOMY, EXPLORATORY N/A 8/18/2023    Procedure: LAPAROTOMY, EXPLORATORY;  Surgeon: Bunny Obrien MD;  Location: [facility] OR;  Service: General;  Laterality: N/A;    LYSIS OF ADHESIONS N/A 10/21/2022    Procedure: LYSIS, ADHESIONS;  Surgeon: Bunny Obrien MD;  Location: [facility] OR;  Service: General;  Laterality: N/A;    LYSIS OF ADHESIONS N/A 8/18/2023    Procedure: LYSIS, ADHESIONS;  Surgeon: Bunny Obrien MD;  Location: [facility] OR;  Service: General;  Laterality: N/A;    TRANSVERSE COLECTOMY N/A 10/26/2022    Procedure: COLECTOMY, TRANSVERSE;  Surgeon: Bunny Obrien MD;  Location: [facility] OR;  Service: General;  Laterality: N/A;    TRIGGER FINGER RELEASE Right 10/4/2019    Procedure: RELEASE, TRIGGER FINGER - right thumb;  Surgeon: Craig Tom MD;  Location: [facility];  Service: Orthopedics;  Laterality: Right;    UPPER GASTROINTESTINAL ENDOSCOPY      WISDOM TOOTH EXTRACTION       Social History     Socioeconomic History    Marital status: Single   Tobacco Use    Smoking status: Every Day     Current packs/day: 0.50     Average packs/day: 0.5 packs/day for 31.8  years (16.2 ttl pk-yrs)     Types: Cigarettes     Start date: 1992    Smokeless tobacco: Never    Tobacco comments:     Enrolled in the [place] [employer] on 8/1/18 ([facility] Member ID # [id number]) Ambulatory referral to Smoking Cessation Program.     Substance and Sexual Activity    Alcohol use: Yes     Comment: social    Drug use: No    Sexual activity: Yes     Partners: Male     Birth control/protection: None   Social History Narrative    Single.  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      Social Determinants of Health     Financial Resource Strain: Low Risk  (8/21/2023)    Overall Financial Resource Strain (CARDIA)     Difficulty of Paying Living Expenses: Not hard at all   Food Insecurity: No Food Insecurity (8/21/2023)    Hunger Vital Sign     Worried About Running Out of Food in the Last Year: Never true     Ran Out of Food in the Last Year: Never true   Transportation Needs: No Transportation Needs (6/20/2023)    PRAPARE - Transportation     Lack of Transportation (Medical): No     Lack of Transportation (Non-Medical): No   Physical Activity: Insufficiently Active (8/21/2023)    Exercise Vital Sign     Days of Exercise per Week: 3 days     Minutes of Exercise per Session: 30 min   Stress: No Stress Concern Present (8/21/2023)    Citizen of [place] [employer] - Occupational Stress Questionnaire     Feeling of Stress : Not at all   Social Connections: Moderately Isolated (8/21/2023)    Social Connection and Isolation Panel [NHANES]     Frequency of Communication with Friends and Family: More than three times a week     Frequency of Social Gatherings with Friends and Family: More than three times a week     Attends Mosque Services: Never     Active Member of Clubs or Organizations: No     Attends Club or Organization Meetings: Never     Marital Status:    Housing Stability: Low Risk  (8/21/2023)    Housing Stability Vital Sign     Unable to Pay for Housing in the Last Year: No      Number of Places Lived in the Last Year: 1     Unstable Housing in the Last Year: No     Family History   Problem Relation Age of Onset    Cancer Maternal Grandmother     Diabetes Mother     No Known Problems Sister     No Known Problems Sister     No Known Problems Sister     Breast cancer Maternal Aunt 70    Colon cancer Neg Hx     Stomach cancer Neg Hx     Inflammatory bowel disease Neg Hx     Esophageal cancer Neg Hx      OB History    Para Term  AB Living   5 1 1   4 1   SAB IAB Ectopic Multiple Live Births   4       1      # Outcome Date GA Lbr Chucho/2nd Weight Sex Delivery Anes PTL Lv   5 Term  40w0d   M Vag-Spont   LINDSAY   4 SAB            3 SAB            2 SAB            1 SAB                  Current Outpatient Medications   Medication Sig Dispense Refill    acetaminophen (TYLENOL) 500 MG tablet Take 2 tablets (1,000 mg total) by mouth every 6 (six) hours as needed for Pain. 50 tablet 0    amitriptyline (ELAVIL) 10 MG tablet TAKE 1 TABLET BY MOUTH EVERY DAY IN THE EVENING (Patient taking differently: Take 10 mg by mouth every evening.) 90 tablet 0    cetirizine (ZYRTEC) 10 MG tablet Take 10 mg by mouth once daily.      cyanocobalamin (VITAMIN B-12) 1000 MCG tablet Take 1,000 mcg by mouth once a week.      docusate sodium (COLACE) 100 MG capsule Take 1 capsule (100 mg total) by mouth 2 (two) times daily. 60 capsule 3    ergocalciferol (VITAMIN D2) 50,000 unit Cap Take 50,000 Units by mouth every .      medroxyPROGESTERone (DEPO-PROVERA) 150 mg/mL Syrg Inject 1 mL (150 mg total) into the muscle every 3 (three) months. 1 mL 0    meloxicam (MOBIC) 7.5 MG tablet Take 7.5 mg by mouth daily as needed.      multivit-min-iron-FA-vit K-lut (CENTRUM SILVER WOMEN) 8 mg iron-400 mcg-50 mcg Tab Take 1 tablet by mouth once daily.      oxyCODONE (ROXICODONE) 5 MG immediate release tablet Take 1 tablet (5 mg total) by mouth every 4 (four) hours as needed for Pain. 10 tablet 0    solifenacin (VESICARE) 10  MG tablet Take 10 mg by mouth once daily.      tiZANidine (ZANAFLEX) 4 MG tablet TAKE 1/2-1 TABLET BY MOUTH AT BEDTIME AS NEEDED FOR SPASM      medroxyPROGESTERone (DEPO-PROVERA) 150 mg/mL Syrg Inject 1 mL (150 mg total) into the muscle every 3 (three) months. Inject 1 mL (150 mg total) into the muscle every three months- intramuscular. 1 mL 1     No current facility-administered medications for this visit.     Facility-Administered Medications Ordered in Other Visits   Medication Dose Route Frequency Provider Last Rate Last Admin    lidocaine (PF) 10 mg/ml (1%) injection 10 mg  1 mL Intradermal Once Taillac, Brenna Latif MD        midazolam (VERSED) 1 mg/mL injection 0.5 mg  0.5 mg Intravenous PRN Ced Patino MD   2 mg at 07/27/20 1322    ropivacaine (Naropin) 1000 mg/500 mL (2 mg/mL) Nimbus PainPRO Pump infusion  4 mL/hr Perineural Continuous Ced Patino MD 4 mL/hr at 07/27/20 1519 4 mL/hr at 07/27/20 1519     Allergies: Patient has no known allergies.     ROS:  Constitutional: no weight loss, weight gain, fever, fatigue  Eyes:  No vision changes, glasses/contacts  ENT/Mouth: No ulcers, sinus problems, ears ringing, headache  Cardiovascular: No inability to lie flat, chest pain, exercise intolerance, swelling, heart palpitations  Respiratory: No wheezing, coughing blood, shortness of breath, or cough  Gastrointestinal: No diarrhea, bloody stool, nausea/vomiting, constipation, gas, hemorrhoids  Genitourinary: No blood in urine, painful urination, urgency of urination, frequency of urination, incomplete emptying, incontinence, abnormal bleeding, painful periods, heavy periods, vaginal discharge, vaginal odor, painful intercourse, sexual problems, bleeding after intercourse.  Musculoskeletal: No muscle weakness  Skin/Breast: No painful breasts, nipple discharge, masses, rash, ulcers  Neurological: No passing out, seizures, numbness, headache  Endocrine: No diabetes, hypothyroid, hyperthyroid,  hot flashes, hair loss, abnormal hair growth, ance  Psychiatric: No depression, crying  Hematologic: No bruises, bleeding, swollen lymph nodes, anemia.      OBJECTIVE:   The patient appears well, alert, oriented x 3, in no distress.  /77   Wt 62.8 kg (138 lb 5.4 oz)   BMI 22.33 kg/m²   NECK: no thyromegaly, trachea midline  SKIN: no acne, striae, hirsutism  BREAST EXAM: breasts appear normal, no suspicious masses, no skin or nipple changes or axillary nodes, symmetric fibrous changes in both upper outer quadrants  ABDOMEN: no hernias, masses, or hepatosplenomegaly  GENITALIA: normal external genitalia, no erythema, no discharge  URETHRA: normal urethra, normal urethral meatus  VAGINA: vaginal discharge copious, white, and thick  CERVIX: no lesions or cervical motion tenderness  UTERUS: normal  ADNEXA: normal adnexa and no mass, fullness, tenderness      ASSESSMENT:   well woman  no contraindication to begin hormonal therapy  1. Well woman exam with routine gynecological exam    2. Fibroids, intramural    3. HPV in female    4. Screening for STDs (sexually transmitted diseases)    5. Menopausal symptoms    6. Encounter for surveillance of injectable contraceptive        PLAN:   mammogram  pap smear  return annually or prn  Orders Placed This Encounter    HPV High Risk Genotypes, PCR    Vaginosis Screen by DNA Probe    C. trachomatis/N. gonorrhoeae by AMP DNA [facility]; Cervicovaginal    Follicle Stimulating Hormone    Liquid-Based Pap Smear, Screening    medroxyPROGESTERone (DEPO-PROVERA) 150 mg/mL Syrg   Discussed if FSH elevated will need to get off depo and consider HRT for  sx control    Patient was counseled today on the increased risks of CVD, MI, VTE, CVA , and Invasive Breast Cancer on Prempro and the increased risk of CVA with Premarin as reported by the [facility] studies; the benefits of HRT/ERT; her personal risks which include; alternative therapies for vasomotor symptoms (not FDA approved)  including soy, black cohosh, Vit E and avoidance of triggers such as cigarette smoking, alcohol, humidity, stress and caffeine; alternative Rxs for treatment of menopause symptoms such as antidepressants or Clonidine.

## 2023-11-01 ENCOUNTER — HOSPITAL ENCOUNTER (OUTPATIENT)
Dept: RADIOLOGY | Facility: HOSPITAL | Age: 48
Discharge: HOME | End: 2023-11-01
Attending: SURGERY
Payer: MEDICAID

## 2023-11-01 ENCOUNTER — TELEPHONE (OUTPATIENT)
Dept: OBSTETRICS AND GYNECOLOGY | Facility: CLINIC | Age: 48
End: 2023-11-01
Payer: MEDICAID

## 2023-11-01 DIAGNOSIS — R10.9 ABDOMINAL PAIN, UNSPECIFIED ABDOMINAL LOCATION: ICD-10-CM

## 2023-11-01 LAB
BACTERIAL VAGINOSIS DNA: NEGATIVE
CANDIDA GLABRATA DNA: NEGATIVE
CANDIDA KRUSEI DNA: NEGATIVE
CANDIDA RRNA VAG QL PROBE: NEGATIVE
T VAGINALIS RRNA GENITAL QL PROBE: NEGATIVE

## 2023-11-01 PROCEDURE — 25500020 PHARM REV CODE 255: Performed by: SURGERY

## 2023-11-01 PROCEDURE — 74248 X-RAY SM INT F-THRU STD: CPT | Mod: TC

## 2023-11-01 PROCEDURE — 74240 FL UPPER GI WITH SMALL BOWEL (XPD): ICD-10-PCS | Mod: 26,,, | Performed by: RADIOLOGY

## 2023-11-01 PROCEDURE — 74240 X-RAY XM UPR GI TRC 1CNTRST: CPT | Mod: TC,FY

## 2023-11-01 PROCEDURE — 74248 FL UPPER GI WITH SMALL BOWEL (XPD): ICD-10-PCS | Mod: 26,,, | Performed by: RADIOLOGY

## 2023-11-01 PROCEDURE — 74248 X-RAY SM INT F-THRU STD: CPT | Mod: 26,,, | Performed by: RADIOLOGY

## 2023-11-01 PROCEDURE — 74240 X-RAY XM UPR GI TRC 1CNTRST: CPT | Mod: 26,,, | Performed by: RADIOLOGY

## 2023-11-01 PROCEDURE — A9698 NON-RAD CONTRAST MATERIALNOC: HCPCS | Performed by: SURGERY

## 2023-11-01 RX ADMIN — BARIUM SULFATE 100 ML: 0.81 POWDER, FOR SUSPENSION ORAL at 09:11

## 2023-11-01 RX ADMIN — BARIUM SULFATE 355 ML: 0.6 SUSPENSION ORAL at 09:11

## 2023-11-01 NOTE — TELEPHONE ENCOUNTER
Lab results confirmed she is menopausal  Recommend stopping depo and start HRT for menopausal sx's  Schedule pt if she wants to discuss further

## 2023-11-01 NOTE — TELEPHONE ENCOUNTER
Pt understood everything. Pt also says that she does not want to take the pills she wants the patch.

## 2023-11-02 LAB
C TRACH DNA SPEC QL NAA+PROBE: NOT DETECTED
N GONORRHOEA DNA SPEC QL NAA+PROBE: NOT DETECTED

## 2023-11-07 ENCOUNTER — TELEPHONE (OUTPATIENT)
Dept: OBSTETRICS AND GYNECOLOGY | Facility: CLINIC | Age: 48
End: 2023-11-07
Payer: MEDICAID

## 2023-11-07 LAB
FINAL PATHOLOGIC DIAGNOSIS: NORMAL
Lab: NORMAL

## 2023-11-07 RX ORDER — ESTRADIOL 0.03 MG/D
1 PATCH TRANSDERMAL WEEKLY
Qty: 4 PATCH | Refills: 11 | Status: SHIPPED | OUTPATIENT
Start: 2023-11-07 | End: 2024-11-06

## 2023-11-07 NOTE — TELEPHONE ENCOUNTER
Your most recent pap smear was normal. As you know, the pap smear is a screening test for cervical cancer.  Your test showed you did NOT have an HPV type that puts you at immediate risk of severe dysplasia or cancer and therefore does NOT warrant any further testing. All we should is another pap and rpt HPV test in 1yr.   Neville Stearns MD

## 2023-11-07 NOTE — TELEPHONE ENCOUNTER
----- Message from Jong Alonzo sent at 10/25/2023 10:39 AM CDT -----  Type:  RX Refill Request    Who Called: pt  Refill or New Rx:refill  RX Name and Strength:medroxyPROGESTERone (DEPO-PROVERA) 150 mg/mL Syrg  How is the patient currently taking it? (ex. 1XDay):Route: Inject 1 mL (150 mg total) into the muscle every 3 (three) months. - Intramuscular  Is this a 30 day or 90 day RX:1ml  Preferred Pharmacy with phone number:[facility]/pharmacy #[id number] - [address]   Phone: [phone]  Fax: [phone]  Local or Mail Order:local  Ordering Provider:local  Would the patient rather a call back or a response via [employer]? call  Best Call Back Number: [phone]  Additional Information: please call pt states she is pass due pt is schedule to see Neville Flores on 10/31/2023  fu/ malathio

## 2023-11-07 NOTE — TELEPHONE ENCOUNTER
Patient is aware come back in 1 year for papsmear.    She is requesting a hot flash patch. She spoke with provider in clinic regarding this.    Symptoms:   - Hot flashes x couple months    Depo vaccine:  10/31. This will be her last dose, she will switch to the patch.     Pharmacy:   [facility] on [address]

## 2024-01-04 DIAGNOSIS — E11.9 TYPE 2 DIABETES MELLITUS WITHOUT COMPLICATION: ICD-10-CM

## 2024-01-17 NOTE — TELEPHONE ENCOUNTER
Please advise if this patient can be seen in the [facility].     Thanks!   [de-identified] : L spine\par  \par  Inspection: No rash or ecchymosis \par  \par  Palpation: Midline lumbar tenderness. R lumbar paraspinal tenderness\par  \par  ROM: diminished all planes\par  \par  Strength: 5/5 bilateral hip flexors, knee extensors, ankle dorsiflexors, EHL, ankle plantarflexors \par  \par  Sensation: Sensation present to light touch bilateral L2-S1 distributions \par  \par  Provocative maneuvers: + R SLR, - L SLR. Tight hamstrings bilaterally\par  \par  \par  MRI Lumbar spine reviewed and interpreted independently.  Agree with report as follows. \par  \par  Lowest lumbar type vertebra is labeled L5, but on the axial views there is a suggestion for possible partial sacralization. If necessary, correlation with plain films is suggested.\par   \par  \par   \par  \par   \par  \par  Levoscoliosis with preservation of the lumbar lordosis.\par   \par  \par   \par  \par   \par  \par  L1-L2 facets are hypertrophic with abutment of the lateral recesses and encroachment on the posterior thecal sac along with ligamentum flavum hypertrophy.\par   \par  \par   \par  \par   \par  \par  L2-L3 annular disc bulge abutting the anterior thecal sac with associated inferior foraminal disc extensions approaching the exiting left L2 nerve root. Facets are hypertrophic with encroachment on the lateral recesses, left greater than right, and encroachment on the posterior thecal sac along with ligamentum flavum hypertrophy.\par   \par  \par   \par  \par   \par  \par  L3-L4 annular disc bulge abutting the anterior thecal sac with associated inferior foraminal disc extensions abutting the exiting L3 nerve roots and abutting the adjacent traversing proximal neural fibers. Facets are hypertrophic with impingement on the lateral recesses and encroachment on the posterior thecal sac along with ligamentum flavum hypertrophy.\par   \par  \par   \par  \par   \par  \par  L4-L5 annular disc bulge abutting the anterior thecal sac with associated inferior foraminal disc extensions encroaching on the exiting L4 nerve roots, right slightly greater than left, and abutting the adjacent traversing proximal right neural fibers. Facets are hypertrophic with impingement on the lateral recesses. Central stenosis related to the above and ligamentum flavum hypertrophy.\par   \par  \par   \par  \par   \par  \par  L5-S1 annular disc bulge abutting the anterior thecal sac with associated inferior foraminal disc extensions abutting the exiting L5 nerve roots and abutting the traversing proximal right S1 nerve root. Facets are hypertrophic with impingement on the lateral recesses. Shallow central canal related to the above.\par   \par  \par   \par  \par   \par  \par  Loss of vertebral body height of T12 without underlying edema, nonspecific, and could be related to chronic compression injury. Clinical correlation and correlation with plain films if necessary is suggested.\par   \par  \par   \par  \par   \par  \par  Lesion in the right posterior L3 vertebra, nonspecific, but consistent with a hemangioma. There may be a small hemangioma in the right sacrum as well. Periodic follow-up is suggested to assess stability.

## 2024-01-18 ENCOUNTER — PATIENT MESSAGE (OUTPATIENT)
Dept: INTERNAL MEDICINE | Facility: CLINIC | Age: 49
End: 2024-01-18
Payer: MEDICAID

## 2024-01-18 DIAGNOSIS — R05.9 COUGH, UNSPECIFIED TYPE: Primary | ICD-10-CM

## 2024-01-19 RX ORDER — PROMETHAZINE HYDROCHLORIDE 6.25 MG/5ML
25 SYRUP ORAL EVERY 8 HOURS PRN
Qty: 473 ML | Refills: 0 | Status: SHIPPED | OUTPATIENT
Start: 2024-01-19

## 2024-01-19 NOTE — TELEPHONE ENCOUNTER
Reviewed  and Velma has no discrepancies in  system and is eligible today for the prescribed controlled substances.

## 2024-01-24 ENCOUNTER — PATIENT MESSAGE (OUTPATIENT)
Dept: OBSTETRICS AND GYNECOLOGY | Facility: CLINIC | Age: 49
End: 2024-01-24
Payer: MEDICAID

## 2024-01-24 DIAGNOSIS — Z12.31 ENCOUNTER FOR SCREENING MAMMOGRAM FOR MALIGNANT NEOPLASM OF BREAST: Primary | ICD-10-CM

## 2024-01-25 ENCOUNTER — HOSPITAL ENCOUNTER (EMERGENCY)
Facility: HOSPITAL | Age: 49
Discharge: HOME | End: 2024-01-25
Payer: MEDICAID

## 2024-01-25 VITALS
WEIGHT: 135 LBS | SYSTOLIC BLOOD PRESSURE: 115 MMHG | BODY MASS INDEX: 21.69 KG/M2 | HEART RATE: 90 BPM | HEIGHT: 66 IN | OXYGEN SATURATION: 96 % | RESPIRATION RATE: 18 BRPM | TEMPERATURE: 98 F | DIASTOLIC BLOOD PRESSURE: 61 MMHG

## 2024-01-25 DIAGNOSIS — M79.671 ACUTE FOOT PAIN, RIGHT: Primary | ICD-10-CM

## 2024-01-25 PROCEDURE — 99284 EMERGENCY DEPT VISIT MOD MDM: CPT | Mod: ER

## 2024-01-25 PROCEDURE — 63600175 PHARM REV CODE 636 W HCPCS: Mod: ER | Performed by: PHYSICIAN ASSISTANT

## 2024-01-25 PROCEDURE — 96372 THER/PROPH/DIAG INJ SC/IM: CPT | Performed by: PHYSICIAN ASSISTANT

## 2024-01-25 RX ORDER — KETOROLAC TROMETHAMINE 30 MG/ML
30 INJECTION, SOLUTION INTRAMUSCULAR; INTRAVENOUS
Status: COMPLETED | OUTPATIENT
Start: 2024-01-25 | End: 2024-01-25

## 2024-01-25 RX ADMIN — KETOROLAC TROMETHAMINE 30 MG: 30 INJECTION, SOLUTION INTRAMUSCULAR; INTRAVENOUS at 02:01

## 2024-01-25 NOTE — DISCHARGE INSTRUCTIONS
Weightbear and ambulate as tolerated.  Elevate and ice for discomfort.  Take prescribed medication as directed as needed for discomfort.  Close follow-up with your PCP for continued care and management.

## 2024-01-25 NOTE — ED PROVIDER NOTES
Encounter Date: 1/25/2024       History     Chief Complaint   Patient presents with    Ankle Pain     Pt states she slipped on a rug in her house and twisted her right ankle and hurt her ankle and foot.      49-year-old female presenting to emergency department with complaints of right foot/ankle pain over last 2 hours after missed step and rolling her ankle at home after accidentally catching her foot on a rug.  Patient denies any prior traumatic injury to her right ankle/foot in the past.  Patient reports initially taking Tylenol OTC medication, elevating and icing her foot for some time although when putting foot down and beginning to weightbear she experienced increased pain prompting evaluation.  No other physical complaints this time.    The history is provided by the patient. No  was used.     Review of patient's allergies indicates:  No Known Allergies  Past Medical History:   Diagnosis Date    Chronic back pain     Diabetes type 2, uncontrolled     Mild nonproliferative diabetic retinopathy of left eye without macular edema associated with type 2 diabetes mellitus 10/21/2019    Morbid obesity with BMI of 40.0-44.9, adult     MILEY on CPAP     Plantar fasciitis of left foot     Urticaria      Past Surgical History:   Procedure Laterality Date    ARTHROSCOPIC ACROMIOPLASTY OF SHOULDER Left 7/27/2020    Procedure: ACROMIOPLASTY, ARTHROSCOPIC;  Surgeon: Marcela Garza MD;  Location: [facility] OR;  Service: Orthopedics;  Laterality: Left;    ARTHROSCOPIC REPAIR OF ROTATOR CUFF OF SHOULDER Right 7/27/2020    Procedure: REPAIR, ROTATOR CUFF, ARTHROSCOPIC;  Surgeon: Marcela Garza MD;  Location: [facility] OR;  Service: Orthopedics;  Laterality: Right;  GENERAL/REGIONAL    COLONOSCOPY N/A 4/7/2022    Procedure: COLONOSCOPY;  Surgeon: Yadi Wong MD;  Location: [facility] ENDO [address]);  Service: Endoscopy;  Laterality: N/A;  Covid test at [place] on 4/4.EC    CYST REMOVAL      ovarian cyst  removal at age 12    DECOMPRESSION OF SUBACROMIAL SPACE Left 7/27/2020    Procedure: DECOMPRESSION, SUBACROMIAL SPACE;  Surgeon: Marcela Garza MD;  Location: [facility] OR;  Service: Orthopedics;  Laterality: Left;    DILATION AND CURETTAGE OF UTERUS      ESOPHAGEAL MOTILITY STUDY USING HIGH RESOLUTION MANOMETRY N/A 9/17/2019    Procedure: MOTILITY STUDY, ESOPHAGUS, USING HIGH RESOLUTION MANOMETRY;  Surgeon: Leopoldo Swanson MD;  [facility]  Service: Endoscopy;  Laterality: N/A;  Pt will be taking xanax before procedure to hopefull help with anxiety.    ESOPHAGOGASTRODUODENOSCOPY N/A 6/14/2019    Procedure: EGD (ESOPHAGOGASTRODUODENOSCOPY);  Surgeon: Ced Guevara MD;  Location: [facility] (4TH FLR);  Service: Endoscopy;  Laterality: N/A;  Please make sure it is scheduled after her cardiology appt.- see cardiology note dated 6/13/19 for clearance - ERW    FOREIGN BODY REMOVAL N/A 4/17/2023    Procedure: REMOVAL, FOREIGN BODY;  Surgeon: Rom Rodriguez MD;  Location: [facility];  Service: General;  Laterality: N/A;    GASTRIC BYPASS N/A 05/20/2021    INJECTION OF STEROID Left 7/27/2020    Procedure: INJECTION, STEROID LEFT THUMB TRIGGER FINGER;  Surgeon: Marcela Garza MD;  Location: [facility] OR;  Service: Orthopedics;  Laterality: Left;  LEFT THUMB, TRIGGER FINGER    LAPAROTOMY, EXPLORATORY N/A 8/18/2023    Procedure: LAPAROTOMY, EXPLORATORY;  Surgeon: Bunny Obrien MD;  Location: [facility] OR;  Service: General;  Laterality: N/A;    LYSIS OF ADHESIONS N/A 10/21/2022    Procedure: LYSIS, ADHESIONS;  Surgeon: Bunny Obrien MD;  Location: [facility] OR;  Service: General;  Laterality: N/A;    LYSIS OF ADHESIONS N/A 8/18/2023    Procedure: LYSIS, ADHESIONS;  Surgeon: Bunny Obrien MD;  Location: [facility] OR;  Service: General;  Laterality: N/A;    TRANSVERSE COLECTOMY N/A 10/26/2022    Procedure: COLECTOMY, TRANSVERSE;  Surgeon: Bunny Obrien MD;  Location: [facility];  Service: General;   Laterality: N/A;    TRIGGER FINGER RELEASE Right 10/4/2019    Procedure: RELEASE, TRIGGER FINGER - right thumb;  Surgeon: Craig Tom MD;  Location: [facility];  Service: Orthopedics;  Laterality: Right;    UPPER GASTROINTESTINAL ENDOSCOPY      WISDOM TOOTH EXTRACTION       Family History   Problem Relation Age of Onset    Cancer Maternal Grandmother     Diabetes Mother     No Known Problems Sister     No Known Problems Sister     No Known Problems Sister     Breast cancer Maternal Aunt 70    Colon cancer Neg Hx     Stomach cancer Neg Hx     Inflammatory bowel disease Neg Hx     Esophageal cancer Neg Hx      Social History     Tobacco Use    Smoking status: Every Day     Current packs/day: 0.50     Average packs/day: 0.5 packs/day for 32.1 years (16.3 ttl pk-yrs)     Types: Cigarettes     Start date: 1992    Smokeless tobacco: Never    Tobacco comments:     Enrolled in the [employer] on 8/1/18 ([facility] Member ID # [id number]) Ambulatory referral to Smoking Cessation Program.     Substance Use Topics    Alcohol use: Yes     Comment: social    Drug use: No     Review of Systems   Constitutional:  Negative for chills, diaphoresis, fatigue and fever.   HENT:  Negative for congestion, ear pain, sinus pain, sore throat and trouble swallowing.    Eyes:  Negative for photophobia, pain, redness and visual disturbance.   Respiratory:  Negative for cough, shortness of breath, wheezing and stridor.    Cardiovascular:  Negative for chest pain and palpitations.   Gastrointestinal:  Negative for abdominal pain, diarrhea, nausea and vomiting.   Endocrine: Negative.    Genitourinary:  Negative for dysuria, flank pain and hematuria.   Musculoskeletal:  Positive for arthralgias. Negative for back pain, myalgias and neck pain.   Skin: Negative.    Allergic/Immunologic: Negative.    Neurological:  Negative for dizziness, weakness and headaches.   Hematological: Negative.    Psychiatric/Behavioral: Negative.     All other systems  reviewed and are negative.      Physical Exam     Initial Vitals [01/25/24 1347]   BP Pulse Resp Temp SpO2   115/61 90 18 98.3 °F (36.8 °C) 96 %      MAP       --         Physical Exam    Nursing note and vitals reviewed.  Constitutional: Vital signs are normal. She appears well-developed and well-nourished. She is not diaphoretic. No distress.   49-year-old female lying in bed in no acute distress, nontoxic, alert and oriented, breathing comfortably on room air, clinically well-appearing   HENT:   Head: Normocephalic and atraumatic.   Right Ear: Hearing and external ear normal.   Left Ear: Hearing and external ear normal.   Nose: Nose normal.   Mouth/Throat: Uvula is midline.   Eyes: Conjunctivae and EOM are normal. Pupils are equal, round, and reactive to light.   Neck: Trachea normal. Neck supple.   Normal range of motion.  Cardiovascular:  Normal rate, regular rhythm, intact distal pulses and normal pulses.           Pulmonary/Chest: Effort normal. No accessory muscle usage. No tachypnea. No respiratory distress.   Musculoskeletal:         General: Tenderness present. No edema.      Cervical back: Normal range of motion and neck supple.      Comments: Right lower extremity, no proximal tib-fib tenderness noted.  Bilateral malleoli of right ankle nontender.  Right proximal midfoot tenderness to palpation noted at the ATFL region.  No obvious bony deformity, no palpable crepitus.  Slight reduced active range of motion in regard to flexion and extension of the right ankle joint due to discomfort.  Normal sensation, distal pulses intact, mild soft tissue swelling noted.     Neurological: She is alert and oriented to person, place, and time. She has normal strength. She displays no tremor. No sensory deficit. She exhibits normal muscle tone. She displays no seizure activity. Coordination normal. GCS score is 15. GCS eye subscore is 4. GCS verbal subscore is 5. GCS motor subscore is 6.   Skin: Skin is warm and dry.  Capillary refill takes less than 2 seconds.         ED Course   Procedures  Labs Reviewed - No data to display       Imaging Results              X-Ray Foot Complete Right (Final result)  Result time 01/25/24 14:54:58      Final result by Gutierrez Rosas MD (01/25/24 14:54:58)                   Impression:      Degenerative changes.  No acute findings.      Electronically signed by: Gutierrez Rosas MD  Date:    01/25/2024  Time:    14:54               Narrative:    EXAMINATION:  XR ANKLE COMPLETE 3 VIEW RIGHT; XR FOOT COMPLETE 3 VIEW RIGHT    CLINICAL HISTORY:  - Pain in right foot.    TECHNIQUE:  Right ankle, three views    COMPARISON:  05/09/2010    FINDINGS:  Mild midfoot DJD.  Calcaneal spur.  The joint spaces are otherwise preserved.                                       X-Ray Ankle Complete Right (Final result)  Result time 01/25/24 14:54:58      Final result by Gutierrez Rosas MD (01/25/24 14:54:58)                   Impression:      Degenerative changes.  No acute findings.      Electronically signed by: Gutierrez Rosas MD  Date:    01/25/2024  Time:    14:54               Narrative:    EXAMINATION:  XR ANKLE COMPLETE 3 VIEW RIGHT; XR FOOT COMPLETE 3 VIEW RIGHT    CLINICAL HISTORY:  - Pain in right foot.    TECHNIQUE:  Right ankle, three views    COMPARISON:  05/09/2010    FINDINGS:  Mild midfoot DJD.  Calcaneal spur.  The joint spaces are otherwise preserved.                                       Medications   ketorolac injection 30 mg (30 mg Intramuscular Given 1/25/24 1430)     Medical Decision Making  Discussed care plan with patient.  X-ray imaging of right ankle and foot unremarkable, no fractures, chronic degenerative changes noted.  Patient is neurovascularly intact.  Patient educated on symptomatic management with rice protocol, weightbear as tolerated.  Patient provided with Ace wrap and crutches to assist.  Close follow-up with PCP.  All questions answered, stable and ready for discharge.    Amount and/or  Complexity of Data Reviewed  Radiology: ordered.    Risk  Prescription drug management.                                      Clinical Impression:  Final diagnoses:  [M79.671] Acute foot pain, right (Primary)          ED Disposition Condition    Discharge Stable          ED Prescriptions    None       Follow-up Information       Follow up With Specialties Details Why Contact Info    Aftab Martinez MD Family Medicine Schedule an appointment as soon as possible for a visit in 2 days If symptoms worsen [address]  [facility] [address]  [phone]      [facility] - Emergency Dept Emergency Medicine Go to  If symptoms worsen [address] [facility] [address] [phone]          PATIENT SEEN BY DOMINIC ONLY.     Ana Jung PAFlorC  01/25/24 [id number]

## 2024-02-12 ENCOUNTER — PATIENT MESSAGE (OUTPATIENT)
Dept: ORTHOPEDICS | Facility: CLINIC | Age: 49
End: 2024-02-12
Payer: MEDICAID

## 2024-02-12 ENCOUNTER — TELEPHONE (OUTPATIENT)
Dept: ORTHOPEDICS | Facility: CLINIC | Age: 49
End: 2024-02-12
Payer: MEDICAID

## 2024-02-12 DIAGNOSIS — M25.512 LEFT SHOULDER PAIN, UNSPECIFIED CHRONICITY: Primary | ICD-10-CM

## 2024-02-12 NOTE — TELEPHONE ENCOUNTER
Spoke c pt and provided information below. Pt stated she would send a message to request that Dr. Richy Joyce see her as a favor since she performed surgery on her three years ago. Advised pt to proceed with said messaging. Patient verbalized understanding and was thankful.         [facility] [phone]   [address] [place]  [address]    [facility] - Anything other than HAND  [phone]  [address] [place]  [address]

## 2024-02-17 ENCOUNTER — PATIENT MESSAGE (OUTPATIENT)
Dept: ADMINISTRATIVE | Facility: HOSPITAL | Age: 49
End: 2024-02-17
Payer: MEDICAID

## 2024-02-26 ENCOUNTER — HOSPITAL ENCOUNTER (OUTPATIENT)
Dept: RADIOLOGY | Facility: HOSPITAL | Age: 49
Discharge: HOME | End: 2024-02-26
Attending: OBSTETRICS & GYNECOLOGY
Payer: MEDICAID

## 2024-02-26 ENCOUNTER — HOSPITAL ENCOUNTER (OUTPATIENT)
Dept: RADIOLOGY | Facility: HOSPITAL | Age: 49
Discharge: HOME | End: 2024-02-26
Attending: ORTHOPAEDIC SURGERY
Payer: MEDICAID

## 2024-02-26 DIAGNOSIS — Z12.31 ENCOUNTER FOR SCREENING MAMMOGRAM FOR MALIGNANT NEOPLASM OF BREAST: ICD-10-CM

## 2024-02-26 DIAGNOSIS — M25.512 LEFT SHOULDER PAIN, UNSPECIFIED CHRONICITY: ICD-10-CM

## 2024-02-26 PROCEDURE — 73030 X-RAY EXAM OF SHOULDER: CPT | Mod: TC,FY,PN,LT

## 2024-02-26 PROCEDURE — 77063 BREAST TOMOSYNTHESIS BI: CPT | Mod: 26,,, | Performed by: RADIOLOGY

## 2024-02-26 PROCEDURE — 73030 X-RAY EXAM OF SHOULDER: CPT | Mod: 26,LT,, | Performed by: RADIOLOGY

## 2024-02-26 PROCEDURE — 77067 SCR MAMMO BI INCL CAD: CPT | Mod: 26,,, | Performed by: RADIOLOGY

## 2024-02-26 PROCEDURE — 77067 SCR MAMMO BI INCL CAD: CPT | Mod: TC,PN

## 2024-03-10 NOTE — TELEPHONE ENCOUNTER
Patient would need to contact the surgeon from recent admission  
Scheduled 8/2/23 due to her insurance. Please advise if we need to override a spot  
Pt cooks and shops for self, she eats one meal a day.  PO intake estimated < 75% ENN > one month.

## 2024-03-21 ENCOUNTER — PATIENT MESSAGE (OUTPATIENT)
Dept: ORTHOPEDICS | Facility: CLINIC | Age: 49
End: 2024-03-21
Payer: MEDICAID

## 2024-03-27 DIAGNOSIS — R35.0 FREQUENCY OF MICTURITION: ICD-10-CM

## 2024-03-27 RX ORDER — SOLIFENACIN SUCCINATE 10 MG/1
10 TABLET, FILM COATED ORAL
Qty: 30 TABLET | Refills: 11 | Status: SHIPPED | OUTPATIENT
Start: 2024-03-27
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
## 2024-04-04 ENCOUNTER — PATIENT MESSAGE (OUTPATIENT)
Dept: OBSTETRICS AND GYNECOLOGY | Facility: CLINIC | Age: 49
End: 2024-04-04
Payer: MEDICAID

## 2024-04-05 ENCOUNTER — OFFICE VISIT (OUTPATIENT)
Dept: ORTHOPEDICS | Facility: CLINIC | Age: 49
End: 2024-04-05
Payer: MEDICAID

## 2024-04-05 ENCOUNTER — HOSPITAL ENCOUNTER (OUTPATIENT)
Dept: RADIOLOGY | Facility: HOSPITAL | Age: 49
Discharge: HOME | End: 2024-04-05
Attending: PHYSICIAN ASSISTANT
Payer: MEDICAID

## 2024-04-05 DIAGNOSIS — M79.642 LEFT HAND PAIN: ICD-10-CM

## 2024-04-05 DIAGNOSIS — M79.642 LEFT HAND PAIN: Primary | ICD-10-CM

## 2024-04-05 DIAGNOSIS — M18.12 ARTHRITIS OF CARPOMETACARPAL (CMC) JOINT OF LEFT THUMB: Primary | ICD-10-CM

## 2024-04-05 PROCEDURE — 99999 PR PBB SHADOW E&M-EST. PATIENT-LVL IV: CPT | Mod: PBBFAC,,, | Performed by: PHYSICIAN ASSISTANT

## 2024-04-05 PROCEDURE — 1159F MED LIST DOCD IN RCRD: CPT | Mod: CPTII,,, | Performed by: PHYSICIAN ASSISTANT

## 2024-04-05 PROCEDURE — 99214 OFFICE O/P EST MOD 30 MIN: CPT | Mod: PBBFAC,25 | Performed by: PHYSICIAN ASSISTANT

## 2024-04-05 PROCEDURE — 73130 X-RAY EXAM OF HAND: CPT | Mod: 26,LT,, | Performed by: RADIOLOGY

## 2024-04-05 PROCEDURE — 73130 X-RAY EXAM OF HAND: CPT | Mod: TC,LT

## 2024-04-05 PROCEDURE — 99214 OFFICE O/P EST MOD 30 MIN: CPT | Mod: S$PBB,,, | Performed by: PHYSICIAN ASSISTANT

## 2024-04-05 RX ORDER — MUPIROCIN 20 MG/G
OINTMENT TOPICAL
Status: CANCELLED | OUTPATIENT
Start: 2024-04-05

## 2024-04-05 RX ORDER — CEFAZOLIN SODIUM 2 G/50ML
2 SOLUTION INTRAVENOUS
Status: CANCELLED | OUTPATIENT
Start: 2024-04-05

## 2024-04-05 NOTE — H&P (VIEW-ONLY)
[facility]  History & Physical  Orthopedics    SUBJECTIVE:      COVID-19 attestation:  This patient was treated during the COVID-19 pandemic.  This was discussed with the patient, they are aware of our current policies and procedures, were given the option of delaying their visit and or switching to a virtual visit, delaying their surgery when applicable, and they elect to proceed.    Chief Complaint: L thumb pain    Referring Provider: No ref. provider found     History of Present Illness:  Patient is a 49 y.o. right hand dominant female who presents today with complaints of L thumb pain for the past few months. 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 Injections have helped. She has history of R thumb triggering treated with surgery in 2019. She has also had a R rotator cuff repair treated surgically in 2020. She has not been bracing. She cannot take NSAIDs due to hx of gastric bypass.     Interval History 4/5/24: the patient returns for continued treatment of her left thumb pain. She previously saw Dr. Tom in March 2022 and was treated for thumb CMC arthritis with a steroid injection. She reported excellent pain relief from this injection, however only lasted a few months. Pain increased about 1 year ago, but she has been living with this. She returns to discuss other options.    Onset of symptoms/DOI was years.    Symptoms are aggravated by activity, movement and during the day.    Symptoms are alleviated by rest.    Symptoms consist of pain, swelling and decreased ROM.    The patient rates their pain as a 6/10    Attempted treatment(s) and/or interventions include activity modifications, rest, rest, activity modification and steroid injection.     The patient denies any fevers, chills, N/V, D/C and presents for evaluation.       Past Medical History:   Diagnosis Date    Chronic back pain     Diabetes type 2, uncontrolled     Mild nonproliferative diabetic retinopathy of  left eye without macular edema associated with type 2 diabetes mellitus 10/21/2019    Morbid obesity with BMI of 40.0-44.9, adult     MILEY on CPAP     Plantar fasciitis of left foot     Urticaria      Past Surgical History:   Procedure Laterality Date    ARTHROSCOPIC ACROMIOPLASTY OF SHOULDER Left 7/27/2020    Procedure: ACROMIOPLASTY, ARTHROSCOPIC;  Surgeon: Marcela Garza MD;  Location: [facility] OR;  Service: Orthopedics;  Laterality: Left;    ARTHROSCOPIC REPAIR OF ROTATOR CUFF OF SHOULDER Right 7/27/2020    Procedure: REPAIR, ROTATOR CUFF, ARTHROSCOPIC;  Surgeon: Marcela Garza MD;  Location: [facility] OR;  Service: Orthopedics;  Laterality: Right;  GENERAL/REGIONAL    COLONOSCOPY N/A 4/7/2022    Procedure: COLONOSCOPY;  Surgeon: Yadi Wong MD;  [facility]  Service: Endoscopy;  Laterality: N/A;  Covid test at [place] on 4/4.EC    CYST REMOVAL      ovarian cyst removal at age 12    DECOMPRESSION OF SUBACROMIAL SPACE Left 7/27/2020    Procedure: DECOMPRESSION, SUBACROMIAL SPACE;  Surgeon: Marcela Garza MD;  Location: [facility] OR;  Service: Orthopedics;  Laterality: Left;    DILATION AND CURETTAGE OF UTERUS      ESOPHAGEAL MOTILITY STUDY USING HIGH RESOLUTION MANOMETRY N/A 9/17/2019    Procedure: MOTILITY STUDY, ESOPHAGUS, USING HIGH RESOLUTION MANOMETRY;  Surgeon: Leopoldo Swanson MD;  Location: [facility] (4TH FLR);  Service: Endoscopy;  Laterality: N/A;  Pt will be taking xanax before procedure to hopefull help with anxiety.    ESOPHAGOGASTRODUODENOSCOPY N/A 6/14/2019    Procedure: EGD (ESOPHAGOGASTRODUODENOSCOPY);  Surgeon: Ced Guevara MD;  [facility]  Service: Endoscopy;  Laterality: N/A;  Please make sure it is scheduled after her cardiology appt.- see cardiology note dated 6/13/19 for clearance - ERW    FOREIGN BODY REMOVAL N/A 4/17/2023    Procedure: REMOVAL, FOREIGN BODY;  Surgeon: Rom Rodriguez MD;  Location: [facility];  Service: General;   Laterality: N/A;    GASTRIC BYPASS N/A 05/20/2021    INJECTION OF STEROID Left 7/27/2020    Procedure: INJECTION, STEROID LEFT THUMB TRIGGER FINGER;  Surgeon: Marcela Garza MD;  Location: [facility] OR;  Service: Orthopedics;  Laterality: Left;  LEFT THUMB, TRIGGER FINGER    LAPAROTOMY, EXPLORATORY N/A 8/18/2023    Procedure: LAPAROTOMY, EXPLORATORY;  Surgeon: Bunny Obrien MD;  Location: [facility] OR;  Service: General;  Laterality: N/A;    LYSIS OF ADHESIONS N/A 10/21/2022    Procedure: LYSIS, ADHESIONS;  Surgeon: Bunny Obrien MD;  Location: [facility] OR;  Service: General;  Laterality: N/A;    LYSIS OF ADHESIONS N/A 8/18/2023    Procedure: LYSIS, ADHESIONS;  Surgeon: Bunny Obrien MD;  Location: [facility] OR;  Service: General;  Laterality: N/A;    TRANSVERSE COLECTOMY N/A 10/26/2022    Procedure: COLECTOMY, TRANSVERSE;  Surgeon: Bunny Obrien MD;  Location: [facility] OR;  Service: General;  Laterality: N/A;    TRIGGER FINGER RELEASE Right 10/4/2019    Procedure: RELEASE, TRIGGER FINGER - right thumb;  Surgeon: Craig Tom MD;  Location: [facility];  Service: Orthopedics;  Laterality: Right;    UPPER GASTROINTESTINAL ENDOSCOPY      WISDOM TOOTH EXTRACTION       Review of patient's allergies indicates:  No Known Allergies  Social History     Social History Narrative    Single.  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      Family History   Problem Relation Age of Onset    Cancer Maternal Grandmother     Diabetes Mother     No Known Problems Sister     No Known Problems Sister     No Known Problems Sister     Breast cancer Maternal Aunt 70    Colon cancer Neg Hx     Stomach cancer Neg Hx     Inflammatory bowel disease Neg Hx     Esophageal cancer Neg Hx          Current Outpatient Medications:     acetaminophen (TYLENOL) 500 MG tablet, Take 2 tablets (1,000 mg total) by mouth every 6 (six) hours as needed for Pain., Disp: 50 tablet, Rfl: 0    amitriptyline (ELAVIL) 10 MG tablet, TAKE 1 TABLET  BY MOUTH EVERY DAY IN THE EVENING (Patient taking differently: Take 10 mg by mouth every evening.), Disp: 90 tablet, Rfl: 0    cetirizine (ZYRTEC) 10 MG tablet, Take 10 mg by mouth once daily., Disp: , Rfl:     cyanocobalamin (VITAMIN B-12) 1000 MCG tablet, Take 1,000 mcg by mouth once a week., Disp: , Rfl:     docusate sodium (COLACE) 100 MG capsule, Take 1 capsule (100 mg total) by mouth 2 (two) times daily., Disp: 60 capsule, Rfl: 3    ergocalciferol (VITAMIN D2) 50,000 unit Cap, Take 50,000 Units by mouth every Sunday., Disp: , Rfl:     estradiol 0.025 mg/24 hr 0.025 mg/24 hr, Place 1 patch onto the skin once a week., Disp: 4 patch, Rfl: 11    medroxyPROGESTERone (DEPO-PROVERA) 150 mg/mL Syrg, Inject 1 mL (150 mg total) into the muscle every 3 (three) months., Disp: 1 mL, Rfl: 0    medroxyPROGESTERone (DEPO-PROVERA) 150 mg/mL Syrg, Inject 1 mL (150 mg total) into the muscle every 3 (three) months. Inject 1 mL (150 mg total) into the muscle every three months- intramuscular., Disp: 1 mL, Rfl: 1    meloxicam (MOBIC) 7.5 MG tablet, Take 7.5 mg by mouth daily as needed., Disp: , Rfl:     multivit-min-iron-FA-vit K-lut (CENTRUM SILVER WOMEN) 8 mg iron-400 mcg-50 mcg Tab, Take 1 tablet by mouth once daily., Disp: , Rfl:     oxyCODONE (ROXICODONE) 5 MG immediate release tablet, Take 1 tablet (5 mg total) by mouth every 4 (four) hours as needed for Pain., Disp: 10 tablet, Rfl: 0    promethazine (PHENERGAN) 6.25 mg/5 mL syrup, Take 20 mLs (25 mg total) by mouth every 8 (eight) hours as needed for Nausea., Disp: 473 mL, Rfl: 0    solifenacin (VESICARE) 10 MG tablet, TAKE 1 TABLET BY MOUTH EVERY DAY, Disp: 30 tablet, Rfl: 11    terconazole (TERAZOL 3) 0.8 % vaginal cream, Place 1 applicator vaginally every evening., Disp: 20 g, Rfl: 1    tiZANidine (ZANAFLEX) 4 MG tablet, TAKE 1/2-1 TABLET BY MOUTH AT BEDTIME AS NEEDED FOR SPASM, Disp: , Rfl:   No current facility-administered medications for this  visit.    Facility-Administered Medications Ordered in Other Visits:     lidocaine (PF) 10 mg/ml (1%) injection 10 mg, 1 mL, Intradermal, Once, [place], Brenna Latif MD    midazolam (VERSED) 1 mg/mL injection 0.5 mg, 0.5 mg, Intravenous, PRN, Ced Patino MD, 2 mg at 07/27/20 1322    ropivacaine (Naropin) 1000 mg/500 mL (2 mg/mL) [facility] PainPRO Pump infusion, 4 mL/hr, Perineural, Continuous, Ced Patino MD, Last Rate: 4 mL/hr at 07/27/20 1519, 4 mL/hr at 07/27/20 1519      Review of Systems:  As per HPI otherwise noncontributory    OBJECTIVE:      Vital Signs (Most Recent):  There were no vitals filed for this visit.  There is no height or weight on file to calculate BMI.      Physical Exam:  Constitutional: The patient appears well-developed and well-nourished. No distress.   Skin: No lesions appreciated  Head: Normocephalic and atraumatic.   Nose: Nose normal.   Ears: No deformities seen  Eyes: Conjunctivae and EOM are normal.   Neck: No tracheal deviation present.   Cardiovascular: Normal rate and intact distal pulses.    Pulmonary/Chest: Effort normal. No respiratory distress.   Abdominal: There is no guarding.   Neurological: The patient is alert.   Psychiatric: The patient has a normal mood and affect.     Left Hand/Wrist Examination:    Observation/Inspection:  Swelling  none    Deformity  none  Discoloration  none     Scars   none    Atrophy  none    HAND/WRIST EXAMINATION:  Finkelstein's Test   Neg  WHAT Test    Neg  Snuff box tenderness   Neg  Pope's Test    Neg  Hook of Hamate Tenderness  Neg  CMC grind    Pos  Circumduction test   Neg  TTP at base of thumb/CMC joint    Neurovascular Exam:  Digits WWP, brisk CR < 3s throughout  NVI motor/LTS to M/R/U nerves, radial pulse 2+  Tinel's Test - Carpal Tunnel  Neg  Tinel's Test - Cubital Tunnel  Neg  Phalen's Test    Neg  Median Nerve Compression Test Neg    ROM hand full, painless except for pain with thumb ROM    ROM wrist full,  painless    ROM elbow full, painless    Abdomen not guarded  Respirations nonlabored  Perfusion intact    Diagnostic Results:     Imaging - I independently viewed the patient's imaging as well as the radiology report.  Xrays of the patient's left hand demonstrate advanced degenerative changes to the first CMC joint.    EMG - none    ASSESSMENT/PLAN:      49 y.o. yo female with L thumb CMC arthritis    Plan: The patient and I had a thorough discussion today.  We discussed the working diagnosis as well as several other potential alternative diagnoses.  Treatment options were discussed, both conservative and surgical.  Conservative treatment options would include things such as activity modifications, workplace modifications, a period of rest, oral vs topical OTC and prescription anti-inflammatory medications, occupational therapy, splinting/bracing, immobilization, corticosteroid injections, and others.  Surgical options were discussed as well.     At this time, the patient would like to proceed with surgery. She is consented for Left thumb CMC arthroplasty with Dr. Tom on 4/24/24. Case ordered for [place].    The patient has not responded to adequate non operative treatment at this time and/or non operative treatment is not indicated. Thus, the risks, benefits and alternatives to surgery were discussed with the patient in detail.  Specific risks include but are not limited to bleeding, infection, vessel and/or nerve damage, pain, numbness, tingling, complex regional pain syndrome, compartment syndrome, failure to return to pre-injury and/or preoperative functional status, scar sensitivity, delayed healing, inability to return to work, pulley injury, tendon injury, bowstringing, partial and/or incomplete relief of symptoms, weakness, persistence of and/or worsening of symptoms, surgical failure, osteomyelitis, amputation, loss of function, stiffness, functional debility, dysfunction, decreased  strength, need  for prolonged postoperative rehabilitation, need for further surgery, deep venous thrombosis, pulmonary embolism, arthritis and death.  The patient states an understanding and wishes to proceed with surgery.   All questions were answered.  No guarantees were implied or stated.  Written informed consent was obtained.    Should the patient's symptoms worsen, persist, or fail to improve they should return for reevaluation and I would be happy to see them back anytime.        Jamie Russo-DiGeorge PA-C  [facility]

## 2024-04-05 NOTE — PROGRESS NOTES
[facility]  History & Physical  Orthopedics    SUBJECTIVE:      COVID-19 attestation:  This patient was treated during the COVID-19 pandemic.  This was discussed with the patient, they are aware of our current policies and procedures, were given the option of delaying their visit and or switching to a virtual visit, delaying their surgery when applicable, and they elect to proceed.    Chief Complaint: L thumb pain    Referring Provider: No ref. provider found     History of Present Illness:  Patient is a 49 y.o. right hand dominant female who presents today with complaints of L thumb pain for the past few months. 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 Injections have helped. She has history of R thumb triggering treated with surgery in 2019. She has also had a R rotator cuff repair treated surgically in 2020. She has not been bracing. She cannot take NSAIDs due to hx of gastric bypass.     Interval History 4/5/24: the patient returns for continued treatment of her left thumb pain. She previously saw Dr. Tom in March 2022 and was treated for thumb CMC arthritis with a steroid injection. She reported excellent pain relief from this injection, however only lasted a few months. Pain increased about 1 year ago, but she has been living with this. She returns to discuss other options.    Onset of symptoms/DOI was years.    Symptoms are aggravated by activity, movement and during the day.    Symptoms are alleviated by rest.    Symptoms consist of pain, swelling and decreased ROM.    The patient rates their pain as a 6/10    Attempted treatment(s) and/or interventions include activity modifications, rest, rest, activity modification and steroid injection.     The patient denies any fevers, chills, N/V, D/C and presents for evaluation.       Past Medical History:   Diagnosis Date    Chronic back pain     Diabetes type 2, uncontrolled     Mild nonproliferative diabetic retinopathy of  left eye without macular edema associated with type 2 diabetes mellitus 10/21/2019    Morbid obesity with BMI of 40.0-44.9, adult     MILEY on CPAP     Plantar fasciitis of left foot     Urticaria      Past Surgical History:   Procedure Laterality Date    ARTHROSCOPIC ACROMIOPLASTY OF SHOULDER Left 7/27/2020    Procedure: ACROMIOPLASTY, ARTHROSCOPIC;  Surgeon: Marcela Garza MD;  Location: [facility] OR;  Service: Orthopedics;  Laterality: Left;    ARTHROSCOPIC REPAIR OF ROTATOR CUFF OF SHOULDER Right 7/27/2020    Procedure: REPAIR, ROTATOR CUFF, ARTHROSCOPIC;  Surgeon: Marcela Garza MD;  Location: [facility] OR;  Service: Orthopedics;  Laterality: Right;  GENERAL/REGIONAL    COLONOSCOPY N/A 4/7/2022    Procedure: COLONOSCOPY;  Surgeon: Yadi Wong MD;  [facility]  Service: Endoscopy;  Laterality: N/A;  Covid test at [place] on 4/4.EC    CYST REMOVAL      ovarian cyst removal at age 12    DECOMPRESSION OF SUBACROMIAL SPACE Left 7/27/2020    Procedure: DECOMPRESSION, SUBACROMIAL SPACE;  Surgeon: Marcela Garza MD;  Location: [facility] OR;  Service: Orthopedics;  Laterality: Left;    DILATION AND CURETTAGE OF UTERUS      ESOPHAGEAL MOTILITY STUDY USING HIGH RESOLUTION MANOMETRY N/A 9/17/2019    Procedure: MOTILITY STUDY, ESOPHAGUS, USING HIGH RESOLUTION MANOMETRY;  Surgeon: Leopoldo Swanson MD;  Location: [facility] (4TH FLR);  Service: Endoscopy;  Laterality: N/A;  Pt will be taking xanax before procedure to hopefull help with anxiety.    ESOPHAGOGASTRODUODENOSCOPY N/A 6/14/2019    Procedure: EGD (ESOPHAGOGASTRODUODENOSCOPY);  Surgeon: Ced Guevara MD;  [facility]  Service: Endoscopy;  Laterality: N/A;  Please make sure it is scheduled after her cardiology appt.- see cardiology note dated 6/13/19 for clearance - ERW    FOREIGN BODY REMOVAL N/A 4/17/2023    Procedure: REMOVAL, FOREIGN BODY;  Surgeon: Rom Rodriguez MD;  Location: [facility];  Service: General;   Laterality: N/A;    GASTRIC BYPASS N/A 05/20/2021    INJECTION OF STEROID Left 7/27/2020    Procedure: INJECTION, STEROID LEFT THUMB TRIGGER FINGER;  Surgeon: Marcela Garza MD;  Location: [facility] OR;  Service: Orthopedics;  Laterality: Left;  LEFT THUMB, TRIGGER FINGER    LAPAROTOMY, EXPLORATORY N/A 8/18/2023    Procedure: LAPAROTOMY, EXPLORATORY;  Surgeon: Bunny Obrien MD;  Location: [facility] OR;  Service: General;  Laterality: N/A;    LYSIS OF ADHESIONS N/A 10/21/2022    Procedure: LYSIS, ADHESIONS;  Surgeon: Bunny Obrien MD;  Location: [facility] OR;  Service: General;  Laterality: N/A;    LYSIS OF ADHESIONS N/A 8/18/2023    Procedure: LYSIS, ADHESIONS;  Surgeon: Bunny Obrien MD;  Location: [facility] OR;  Service: General;  Laterality: N/A;    TRANSVERSE COLECTOMY N/A 10/26/2022    Procedure: COLECTOMY, TRANSVERSE;  Surgeon: Bunny Obrien MD;  Location: [facility] OR;  Service: General;  Laterality: N/A;    TRIGGER FINGER RELEASE Right 10/4/2019    Procedure: RELEASE, TRIGGER FINGER - right thumb;  Surgeon: Craig Tom MD;  Location: [facility];  Service: Orthopedics;  Laterality: Right;    UPPER GASTROINTESTINAL ENDOSCOPY      WISDOM TOOTH EXTRACTION       Review of patient's allergies indicates:  No Known Allergies  Social History     Social History Narrative    Single.  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      Family History   Problem Relation Age of Onset    Cancer Maternal Grandmother     Diabetes Mother     No Known Problems Sister     No Known Problems Sister     No Known Problems Sister     Breast cancer Maternal Aunt 70    Colon cancer Neg Hx     Stomach cancer Neg Hx     Inflammatory bowel disease Neg Hx     Esophageal cancer Neg Hx          Current Outpatient Medications:     acetaminophen (TYLENOL) 500 MG tablet, Take 2 tablets (1,000 mg total) by mouth every 6 (six) hours as needed for Pain., Disp: 50 tablet, Rfl: 0    amitriptyline (ELAVIL) 10 MG tablet, TAKE 1 TABLET  BY MOUTH EVERY DAY IN THE EVENING (Patient taking differently: Take 10 mg by mouth every evening.), Disp: 90 tablet, Rfl: 0    cetirizine (ZYRTEC) 10 MG tablet, Take 10 mg by mouth once daily., Disp: , Rfl:     cyanocobalamin (VITAMIN B-12) 1000 MCG tablet, Take 1,000 mcg by mouth once a week., Disp: , Rfl:     docusate sodium (COLACE) 100 MG capsule, Take 1 capsule (100 mg total) by mouth 2 (two) times daily., Disp: 60 capsule, Rfl: 3    ergocalciferol (VITAMIN D2) 50,000 unit Cap, Take 50,000 Units by mouth every Sunday., Disp: , Rfl:     estradiol 0.025 mg/24 hr 0.025 mg/24 hr, Place 1 patch onto the skin once a week., Disp: 4 patch, Rfl: 11    medroxyPROGESTERone (DEPO-PROVERA) 150 mg/mL Syrg, Inject 1 mL (150 mg total) into the muscle every 3 (three) months., Disp: 1 mL, Rfl: 0    medroxyPROGESTERone (DEPO-PROVERA) 150 mg/mL Syrg, Inject 1 mL (150 mg total) into the muscle every 3 (three) months. Inject 1 mL (150 mg total) into the muscle every three months- intramuscular., Disp: 1 mL, Rfl: 1    meloxicam (MOBIC) 7.5 MG tablet, Take 7.5 mg by mouth daily as needed., Disp: , Rfl:     multivit-min-iron-FA-vit K-lut (CENTRUM SILVER WOMEN) 8 mg iron-400 mcg-50 mcg Tab, Take 1 tablet by mouth once daily., Disp: , Rfl:     oxyCODONE (ROXICODONE) 5 MG immediate release tablet, Take 1 tablet (5 mg total) by mouth every 4 (four) hours as needed for Pain., Disp: 10 tablet, Rfl: 0    promethazine (PHENERGAN) 6.25 mg/5 mL syrup, Take 20 mLs (25 mg total) by mouth every 8 (eight) hours as needed for Nausea., Disp: 473 mL, Rfl: 0    solifenacin (VESICARE) 10 MG tablet, TAKE 1 TABLET BY MOUTH EVERY DAY, Disp: 30 tablet, Rfl: 11    terconazole (TERAZOL 3) 0.8 % vaginal cream, Place 1 applicator vaginally every evening., Disp: 20 g, Rfl: 1    tiZANidine (ZANAFLEX) 4 MG tablet, TAKE 1/2-1 TABLET BY MOUTH AT BEDTIME AS NEEDED FOR SPASM, Disp: , Rfl:   No current facility-administered medications for this  visit.    Facility-Administered Medications Ordered in Other Visits:     lidocaine (PF) 10 mg/ml (1%) injection 10 mg, 1 mL, Intradermal, Once, [place], Brenna Latif MD    midazolam (VERSED) 1 mg/mL injection 0.5 mg, 0.5 mg, Intravenous, PRN, Ced Patino MD, 2 mg at 07/27/20 1322    ropivacaine (Naropin) 1000 mg/500 mL (2 mg/mL) [facility] PainPRO Pump infusion, 4 mL/hr, Perineural, Continuous, Ced Patino MD, Last Rate: 4 mL/hr at 07/27/20 1519, 4 mL/hr at 07/27/20 1519      Review of Systems:  As per HPI otherwise noncontributory    OBJECTIVE:      Vital Signs (Most Recent):  There were no vitals filed for this visit.  There is no height or weight on file to calculate BMI.      Physical Exam:  Constitutional: The patient appears well-developed and well-nourished. No distress.   Skin: No lesions appreciated  Head: Normocephalic and atraumatic.   Nose: Nose normal.   Ears: No deformities seen  Eyes: Conjunctivae and EOM are normal.   Neck: No tracheal deviation present.   Cardiovascular: Normal rate and intact distal pulses.    Pulmonary/Chest: Effort normal. No respiratory distress.   Abdominal: There is no guarding.   Neurological: The patient is alert.   Psychiatric: The patient has a normal mood and affect.     Left Hand/Wrist Examination:    Observation/Inspection:  Swelling  none    Deformity  none  Discoloration  none     Scars   none    Atrophy  none    HAND/WRIST EXAMINATION:  Finkelstein's Test   Neg  WHAT Test    Neg  Snuff box tenderness   Neg  Pope's Test    Neg  Hook of Hamate Tenderness  Neg  CMC grind    Pos  Circumduction test   Neg  TTP at base of thumb/CMC joint    Neurovascular Exam:  Digits WWP, brisk CR < 3s throughout  NVI motor/LTS to M/R/U nerves, radial pulse 2+  Tinel's Test - Carpal Tunnel  Neg  Tinel's Test - Cubital Tunnel  Neg  Phalen's Test    Neg  Median Nerve Compression Test Neg    ROM hand full, painless except for pain with thumb ROM    ROM wrist full,  painless    ROM elbow full, painless    Abdomen not guarded  Respirations nonlabored  Perfusion intact    Diagnostic Results:     Imaging - I independently viewed the patient's imaging as well as the radiology report.  Xrays of the patient's left hand demonstrate advanced degenerative changes to the first CMC joint.    EMG - none    ASSESSMENT/PLAN:      49 y.o. yo female with L thumb CMC arthritis    Plan: The patient and I had a thorough discussion today.  We discussed the working diagnosis as well as several other potential alternative diagnoses.  Treatment options were discussed, both conservative and surgical.  Conservative treatment options would include things such as activity modifications, workplace modifications, a period of rest, oral vs topical OTC and prescription anti-inflammatory medications, occupational therapy, splinting/bracing, immobilization, corticosteroid injections, and others.  Surgical options were discussed as well.     At this time, the patient would like to proceed with surgery. She is consented for Left thumb CMC arthroplasty with Dr. Tom on 4/24/24. Case ordered for [facility].    The patient has not responded to adequate non operative treatment at this time and/or non operative treatment is not indicated. Thus, the risks, benefits and alternatives to surgery were discussed with the patient in detail.  Specific risks include but are not limited to bleeding, infection, vessel and/or nerve damage, pain, numbness, tingling, complex regional pain syndrome, compartment syndrome, failure to return to pre-injury and/or preoperative functional status, scar sensitivity, delayed healing, inability to return to work, pulley injury, tendon injury, bowstringing, partial and/or incomplete relief of symptoms, weakness, persistence of and/or worsening of symptoms, surgical failure, osteomyelitis, amputation, loss of function, stiffness, functional debility, dysfunction, decreased  strength, need  for prolonged postoperative rehabilitation, need for further surgery, deep venous thrombosis, pulmonary embolism, arthritis and death.  The patient states an understanding and wishes to proceed with surgery.   All questions were answered.  No guarantees were implied or stated.  Written informed consent was obtained.    Should the patient's symptoms worsen, persist, or fail to improve they should return for reevaluation and I would be happy to see them back anytime.        Jamie Russo-DiGeorge PA-C  [facility]

## 2024-04-15 ENCOUNTER — ANESTHESIA EVENT (OUTPATIENT)
Dept: SURGERY | Facility: HOSPITAL | Age: 49
End: 2024-04-15
Payer: MEDICAID

## 2024-04-15 RX ORDER — MEDROXYPROGESTERONE ACETATE 150 MG/ML
INJECTION, SUSPENSION INTRAMUSCULAR
Qty: 1 EACH | Refills: 1 | Status: SHIPPED | OUTPATIENT
Start: 2024-04-15 | End: 2024-05-21 | Stop reason: ALTCHOICE

## 2024-04-15 NOTE — TELEPHONE ENCOUNTER
Refill Routing Note   Medication(s) are not appropriate for processing by [facility] for the following reason(s):        Outside of protocol    [facility] action(s):  Route        Medication Therapy Plan: Depo injection currently off protocol      Appointments  past 12m or future 3m with PCP    Date Provider   Last Visit   10/31/2023 Neville Stearns MD   Next Visit   5/21/2024 Neville Stearns MD   ED visits in past 90 days: 1        Note composed:10:55 AM 04/15/2024

## 2024-04-23 ENCOUNTER — PATIENT MESSAGE (OUTPATIENT)
Dept: ORTHOPEDICS | Facility: CLINIC | Age: 49
End: 2024-04-23
Payer: MEDICAID

## 2024-04-23 ENCOUNTER — TELEPHONE (OUTPATIENT)
Dept: ORTHOPEDICS | Facility: CLINIC | Age: 49
End: 2024-04-23
Payer: MEDICAID

## 2024-04-23 RX ORDER — OXYCODONE AND ACETAMINOPHEN 5; 325 MG/1; MG/1
1 TABLET ORAL EVERY 4 HOURS PRN
Qty: 10 TABLET | Refills: 0 | Status: SHIPPED | OUTPATIENT
Start: 2024-04-23 | End: 2024-04-26 | Stop reason: SDUPTHER

## 2024-04-23 RX ORDER — ACETAMINOPHEN 500 MG
1000 TABLET ORAL 2 TIMES DAILY
Qty: 20 TABLET | Refills: 0 | Status: SHIPPED | OUTPATIENT
Start: 2024-04-23

## 2024-04-23 RX ORDER — IBUPROFEN 600 MG/1
600 TABLET ORAL 3 TIMES DAILY
Qty: 20 TABLET | Refills: 0 | Status: SHIPPED | OUTPATIENT
Start: 2024-04-23

## 2024-04-23 NOTE — TELEPHONE ENCOUNTER
Spoke with the patient. Notified of 930 AM arrival time to the [facility].  Informed of current visitor policy.  Reminded of NPO and need for transportation. Patient verbalized understanding to all.    Cristine Ruvalcaba MS, OTC  Clinical Assistant to Dr. Ross Dunbar [facility]

## 2024-04-24 ENCOUNTER — HOSPITAL ENCOUNTER (OUTPATIENT)
Facility: HOSPITAL | Age: 49
Discharge: HOME | End: 2024-04-24
Attending: ORTHOPAEDIC SURGERY | Admitting: ORTHOPAEDIC SURGERY
Payer: MEDICAID

## 2024-04-24 ENCOUNTER — ANESTHESIA (OUTPATIENT)
Dept: SURGERY | Facility: HOSPITAL | Age: 49
End: 2024-04-24
Payer: MEDICAID

## 2024-04-24 VITALS
SYSTOLIC BLOOD PRESSURE: 125 MMHG | WEIGHT: 135 LBS | OXYGEN SATURATION: 100 % | BODY MASS INDEX: 21.69 KG/M2 | RESPIRATION RATE: 18 BRPM | DIASTOLIC BLOOD PRESSURE: 58 MMHG | TEMPERATURE: 98 F | HEART RATE: 68 BPM | HEIGHT: 66 IN

## 2024-04-24 DIAGNOSIS — M18.12 ARTHRITIS OF CARPOMETACARPAL (CMC) JOINT OF LEFT THUMB: Primary | ICD-10-CM

## 2024-04-24 LAB
B-HCG UR QL: NEGATIVE
CTP QC/QA: YES

## 2024-04-24 PROCEDURE — 88307 TISSUE EXAM BY PATHOLOGIST: CPT | Performed by: PATHOLOGY

## 2024-04-24 PROCEDURE — 25000003 PHARM REV CODE 250: Performed by: NURSE ANESTHETIST, CERTIFIED REGISTERED

## 2024-04-24 PROCEDURE — D9220A PRA ANESTHESIA: Mod: ANES,,, | Performed by: ANESTHESIOLOGY

## 2024-04-24 PROCEDURE — 64415 NJX AA&/STRD BRCH PLXS IMG: CPT | Mod: 59,LT | Performed by: ANESTHESIOLOGY

## 2024-04-24 PROCEDURE — 63600175 PHARM REV CODE 636 W HCPCS: Performed by: NURSE ANESTHETIST, CERTIFIED REGISTERED

## 2024-04-24 PROCEDURE — 36000708 HC OR TIME LEV III 1ST 15 MIN: Performed by: ORTHOPAEDIC SURGERY

## 2024-04-24 PROCEDURE — 88307 TISSUE EXAM BY PATHOLOGIST: CPT | Mod: 26,,, | Performed by: PATHOLOGY

## 2024-04-24 PROCEDURE — 88311 DECALCIFY TISSUE: CPT | Mod: 26,,, | Performed by: PATHOLOGY

## 2024-04-24 PROCEDURE — 36000709 HC OR TIME LEV III EA ADD 15 MIN: Performed by: ORTHOPAEDIC SURGERY

## 2024-04-24 PROCEDURE — 37000008 HC ANESTHESIA 1ST 15 MINUTES: Performed by: ORTHOPAEDIC SURGERY

## 2024-04-24 PROCEDURE — 63600175 PHARM REV CODE 636 W HCPCS: Performed by: ANESTHESIOLOGY

## 2024-04-24 PROCEDURE — 27201423 OPTIME MED/SURG SUP & DEVICES STERILE SUPPLY: Performed by: ORTHOPAEDIC SURGERY

## 2024-04-24 PROCEDURE — 25000003 PHARM REV CODE 250: Performed by: PHYSICIAN ASSISTANT

## 2024-04-24 PROCEDURE — 99900035 HC TECH TIME PER 15 MIN (STAT)

## 2024-04-24 PROCEDURE — 88311 DECALCIFY TISSUE: CPT | Performed by: PATHOLOGY

## 2024-04-24 PROCEDURE — 94761 N-INVAS EAR/PLS OXIMETRY MLT: CPT

## 2024-04-24 PROCEDURE — 63600175 PHARM REV CODE 636 W HCPCS: Performed by: PHYSICIAN ASSISTANT

## 2024-04-24 PROCEDURE — 25447 ARTHRP NTRCRP/CRP/MTCR NTRPS: CPT | Mod: FA,,, | Performed by: ORTHOPAEDIC SURGERY

## 2024-04-24 PROCEDURE — C1713 ANCHOR/SCREW BN/BN,TIS/BN: HCPCS | Performed by: ORTHOPAEDIC SURGERY

## 2024-04-24 PROCEDURE — 71000015 HC POSTOP RECOV 1ST HR: Performed by: ORTHOPAEDIC SURGERY

## 2024-04-24 PROCEDURE — 37000009 HC ANESTHESIA EA ADD 15 MINS: Performed by: ORTHOPAEDIC SURGERY

## 2024-04-24 PROCEDURE — D9220A PRA ANESTHESIA: Mod: CRNA,,, | Performed by: NURSE ANESTHETIST, CERTIFIED REGISTERED

## 2024-04-24 PROCEDURE — 25000003 PHARM REV CODE 250: Performed by: ANESTHESIOLOGY

## 2024-04-24 PROCEDURE — 71000033 HC RECOVERY, INTIAL HOUR: Performed by: ORTHOPAEDIC SURGERY

## 2024-04-24 DEVICE — FIBERLOCK SUSPENSION SYSTEM
Type: IMPLANTABLE DEVICE | Site: HAND | Status: FUNCTIONAL
Brand: ARTHREX®

## 2024-04-24 RX ORDER — FENTANYL CITRATE 50 UG/ML
100 INJECTION, SOLUTION INTRAMUSCULAR; INTRAVENOUS
Status: DISCONTINUED | OUTPATIENT
Start: 2024-04-24 | End: 2024-04-24 | Stop reason: HOSPADM

## 2024-04-24 RX ORDER — FENTANYL CITRATE 50 UG/ML
25 INJECTION, SOLUTION INTRAMUSCULAR; INTRAVENOUS EVERY 5 MIN PRN
Status: DISCONTINUED | OUTPATIENT
Start: 2024-04-24 | End: 2024-04-24 | Stop reason: HOSPADM

## 2024-04-24 RX ORDER — MIDAZOLAM HYDROCHLORIDE 1 MG/ML
1 INJECTION, SOLUTION INTRAMUSCULAR; INTRAVENOUS
Status: DISCONTINUED | OUTPATIENT
Start: 2024-04-24 | End: 2024-04-24 | Stop reason: HOSPADM

## 2024-04-24 RX ORDER — LIDOCAINE HYDROCHLORIDE 20 MG/ML
INJECTION INTRAVENOUS
Status: DISCONTINUED | OUTPATIENT
Start: 2024-04-24 | End: 2024-04-24

## 2024-04-24 RX ORDER — CELECOXIB 200 MG/1
400 CAPSULE ORAL
Status: COMPLETED | OUTPATIENT
Start: 2024-04-24 | End: 2024-04-24

## 2024-04-24 RX ORDER — ONDANSETRON HYDROCHLORIDE 2 MG/ML
INJECTION, SOLUTION INTRAVENOUS
Status: DISCONTINUED | OUTPATIENT
Start: 2024-04-24 | End: 2024-04-24

## 2024-04-24 RX ORDER — SODIUM CHLORIDE 0.9 % (FLUSH) 0.9 %
10 SYRINGE (ML) INJECTION
Status: DISCONTINUED | OUTPATIENT
Start: 2024-04-24 | End: 2024-04-24 | Stop reason: HOSPADM

## 2024-04-24 RX ORDER — PROPOFOL 10 MG/ML
VIAL (ML) INTRAVENOUS CONTINUOUS PRN
Status: DISCONTINUED | OUTPATIENT
Start: 2024-04-24 | End: 2024-04-24

## 2024-04-24 RX ORDER — METHOCARBAMOL 500 MG/1
1000 TABLET, FILM COATED ORAL ONCE
Status: COMPLETED | OUTPATIENT
Start: 2024-04-24 | End: 2024-04-24

## 2024-04-24 RX ORDER — OXYCODONE HYDROCHLORIDE 5 MG/1
5 TABLET ORAL
Status: DISCONTINUED | OUTPATIENT
Start: 2024-04-24 | End: 2024-04-24 | Stop reason: HOSPADM

## 2024-04-24 RX ORDER — ROPIVACAINE HYDROCHLORIDE 5 MG/ML
INJECTION, SOLUTION EPIDURAL; INFILTRATION; PERINEURAL
Status: COMPLETED | OUTPATIENT
Start: 2024-04-24 | End: 2024-04-24

## 2024-04-24 RX ORDER — PROPOFOL 10 MG/ML
VIAL (ML) INTRAVENOUS
Status: DISCONTINUED | OUTPATIENT
Start: 2024-04-24 | End: 2024-04-24

## 2024-04-24 RX ORDER — ACETAMINOPHEN 500 MG
1000 TABLET ORAL
Status: COMPLETED | OUTPATIENT
Start: 2024-04-24 | End: 2024-04-24

## 2024-04-24 RX ORDER — DEXAMETHASONE SODIUM PHOSPHATE 4 MG/ML
INJECTION, SOLUTION INTRA-ARTICULAR; INTRALESIONAL; INTRAMUSCULAR; INTRAVENOUS; SOFT TISSUE
Status: DISCONTINUED | OUTPATIENT
Start: 2024-04-24 | End: 2024-04-24

## 2024-04-24 RX ORDER — HALOPERIDOL 5 MG/ML
0.5 INJECTION INTRAMUSCULAR EVERY 10 MIN PRN
Status: DISCONTINUED | OUTPATIENT
Start: 2024-04-24 | End: 2024-04-24 | Stop reason: HOSPADM

## 2024-04-24 RX ORDER — MUPIROCIN 20 MG/G
OINTMENT TOPICAL
Status: DISCONTINUED | OUTPATIENT
Start: 2024-04-24 | End: 2024-04-24 | Stop reason: HOSPADM

## 2024-04-24 RX ADMIN — ONDANSETRON 4 MG: 2 INJECTION INTRAMUSCULAR; INTRAVENOUS at 11:04

## 2024-04-24 RX ADMIN — PROPOFOL 100 MCG/KG/MIN: 10 INJECTION, EMULSION INTRAVENOUS at 11:04

## 2024-04-24 RX ADMIN — LIDOCAINE HYDROCHLORIDE 50 MG: 20 INJECTION INTRAVENOUS at 11:04

## 2024-04-24 RX ADMIN — MUPIROCIN: 20 OINTMENT TOPICAL at 10:04

## 2024-04-24 RX ADMIN — MIDAZOLAM 2 MG: 1 INJECTION INTRAMUSCULAR; INTRAVENOUS at 10:04

## 2024-04-24 RX ADMIN — CELECOXIB 400 MG: 200 CAPSULE ORAL at 10:04

## 2024-04-24 RX ADMIN — METHOCARBAMOL 1000 MG: 500 TABLET ORAL at 12:04

## 2024-04-24 RX ADMIN — FENTANYL CITRATE 100 MCG: 50 INJECTION, SOLUTION INTRAMUSCULAR; INTRAVENOUS at 10:04

## 2024-04-24 RX ADMIN — CEFAZOLIN 2 G: 2 INJECTION, POWDER, FOR SOLUTION INTRAMUSCULAR; INTRAVENOUS at 11:04

## 2024-04-24 RX ADMIN — PROPOFOL 50 MG: 10 INJECTION, EMULSION INTRAVENOUS at 11:04

## 2024-04-24 RX ADMIN — ROPIVACAINE HYDROCHLORIDE 30 ML: 5 INJECTION EPIDURAL; INFILTRATION; PERINEURAL at 10:04

## 2024-04-24 RX ADMIN — SODIUM CHLORIDE: 0.9 INJECTION, SOLUTION INTRAVENOUS at 11:04

## 2024-04-24 RX ADMIN — ACETAMINOPHEN 1000 MG: 500 TABLET ORAL at 10:04

## 2024-04-24 RX ADMIN — DEXAMETHASONE SODIUM PHOSPHATE 4 MG: 4 INJECTION, SOLUTION INTRAMUSCULAR; INTRAVENOUS at 11:04

## 2024-04-24 NOTE — ANESTHESIA PROCEDURE NOTES
Peripheral Block    Patient location during procedure: pre-op   Block not for primary anesthetic.  Reason for block: at surgeon's request and post-op pain management   Post-op Pain Location: left arm   Start time: 4/24/2024 10:30 AM  Timeout: 4/24/2024 10:30 AM   End time: 4/24/2024 10:40 AM    Staffing  Authorizing Provider: Aminata Gregory MD  Performing Provider: Aminata Gregory MD    Staffing  Performed by: Aminata Gregory MD  Authorized by: Aminata Gregory MD    Preanesthetic Checklist  Completed: patient identified, IV checked, site marked, risks and benefits discussed, surgical consent, monitors and equipment checked, pre-op evaluation and timeout performed  Peripheral Block  Patient position: supine  Prep: ChloraPrep  Patient monitoring: heart rate, cardiac monitor, continuous pulse ox, continuous capnometry and frequent blood pressure checks  Block type: supraclavicular  Laterality: left  Injection technique: single shot  Needle  Needle type: Stimuplex   Needle gauge: 22 G  Needle length: 2 in  Needle localization: anatomical landmarks and ultrasound guidance   -ultrasound image captured on disc.  Assessment  Injection assessment: negative aspiration, negative parasthesia and local visualized surrounding nerve  Paresthesia pain: none  Heart rate change: no  Slow fractionated injection: yes    Medications:    Medications: ropivacaine (NAROPIN) injection 0.5% - Perineural   30 mL - 4/24/2024 10:25:00 AM    Additional Notes  VSS.  [facility] RN monitoring vitals throughout procedure.  Patient tolerated procedure well.

## 2024-04-24 NOTE — PLAN OF CARE
Patient is AAO and VSS.  Tolerating PO and states pain is tolerable.  Dressing CDI.  Patient states they are ready for d/c.  IV removed.  Catheter tip intact.  Caregiver at bedside.  Discharge instructions reviewed and copy given to the patient and caregiver.  Questions answered.  Both verbalized understanding.  Medication delivered to bedside. no DME needed.   Sling in place from OR. Patient wheeled to car by RN/PCT.

## 2024-04-24 NOTE — TRANSFER OF CARE
"Anesthesia Transfer of Care Note    Patient: Velma Lopez    Procedure(s) Performed: Procedure(s) (LRB):  INTERPOSITION ARTHROPLASTY, CMC JOINT - LEFT (Left)    Patient location: PACU    Anesthesia Type: MAC    Transport from OR: Transported from OR on room air with adequate spontaneous ventilation    Post pain: adequate analgesia    Post assessment: tolerated procedure well and no apparent anesthetic complications    Post vital signs: stable    Level of consciousness: awake, alert and oriented    Nausea/Vomiting: no nausea/vomiting    Complications: none    Transfer of care protocol was followed      Last vitals: Visit Vitals  /60   Pulse (!) 58   Temp 36.8 °C (98.2 °F) (Oral)   Resp 12   Ht 5' 6" (1.676 m)   Wt 61.2 kg (135 lb)   LMP 04/10/2024 (Exact Date)   SpO2 100%   Breastfeeding No   BMI 21.79 kg/m²     "

## 2024-04-24 NOTE — DISCHARGE SUMMARY
[place] - Surgery (Hospital)  Discharge Note  Short Stay    Procedure(s) (LRB):  INTERPOSITION ARTHROPLASTY, CMC JOINT - LEFT (Left)      OUTCOME: Patient tolerated treatment/procedure well without complication and is now ready for discharge.    DISPOSITION: Home or Self Care    FINAL DIAGNOSIS:  left 1st CMC joint arthritis     FOLLOWUP: In clinic    DISCHARGE INSTRUCTIONS:    Discharge Procedure Orders   Diet general     Call MD for:  temperature >100.4     Call MD for:  persistent nausea and vomiting     Call MD for:  severe uncontrolled pain     Call MD for:  difficulty breathing, headache or visual disturbances     Call MD for:  redness, tenderness, or signs of infection (pain, swelling, redness, odor or green/yellow discharge around incision site)     Call MD for:  hives     Call MD for:  persistent dizziness or light-headedness     Call MD for:  extreme fatigue     Leave dressing on - Keep it clean, dry, and intact until clinic visit        TIME SPENT ON DISCHARGE: 5 minutes

## 2024-04-24 NOTE — ANESTHESIA PREPROCEDURE EVALUATION
04/24/2024  Velma Lopez is a 49 y.o., female.  Patient Active Problem List   Diagnosis    Chronic low back pain    Severe obesity (BMI >= 40)    MILEY (obstructive sleep apnea)    Plantar fasciitis of left foot    Insomnia    Smoking    Noncompliance with CPAP treatment    Dysphagia    Trigger thumb, right thumb    Mild nonproliferative diabetic retinopathy of left eye without macular edema associated with type 2 diabetes mellitus    Decreased strength, endurance, and mobility    Right rotator cuff tear    Closed fracture of multiple ribs of left side with routine healing    Traumatic pneumothorax    Large bowel obstruction    Generalized abdominal pain    Rectal foreign body    Small bowel obstruction    Ileus    Hypokalemia    Hypertension     Past Surgical History:   Procedure Laterality Date    ARTHROSCOPIC ACROMIOPLASTY OF SHOULDER Left 7/27/2020    Procedure: ACROMIOPLASTY, ARTHROSCOPIC;  Surgeon: Marcela Garza MD;  Location: [facility] OR;  Service: Orthopedics;  Laterality: Left;    ARTHROSCOPIC REPAIR OF ROTATOR CUFF OF SHOULDER Right 7/27/2020    Procedure: REPAIR, ROTATOR CUFF, ARTHROSCOPIC;  Surgeon: Marcela Garza MD;  Location: [facility] OR;  Service: Orthopedics;  Laterality: Right;  GENERAL/REGIONAL    COLONOSCOPY N/A 4/7/2022    Procedure: COLONOSCOPY;  Surgeon: Yadi Wong MD;  Location: [address]);  Service: Endoscopy;  Laterality: N/A;  Covid test at [place] on 4/4.EC    CYST REMOVAL      ovarian cyst removal at age 12    DECOMPRESSION OF SUBACROMIAL SPACE Left 7/27/2020    Procedure: DECOMPRESSION, SUBACROMIAL SPACE;  Surgeon: Marcela Garza MD;  Location: [facility] OR;  Service: Orthopedics;  Laterality: Left;    DILATION AND CURETTAGE OF UTERUS      ESOPHAGEAL MOTILITY STUDY USING HIGH RESOLUTION MANOMETRY N/A 9/17/2019    Procedure: MOTILITY STUDY,  ESOPHAGUS, USING HIGH RESOLUTION MANOMETRY;  Surgeon: Leopoldo Swanson MD;  Location: [facility] ENDO (4TH FLR);  Service: Endoscopy;  Laterality: N/A;  Pt will be taking xanax before procedure to hopefull help with anxiety.    ESOPHAGOGASTRODUODENOSCOPY N/A 6/14/2019    Procedure: EGD (ESOPHAGOGASTRODUODENOSCOPY);  Surgeon: Ced Guevara MD;  Location: [facility] ENDO (4TH FLR);  Service: Endoscopy;  Laterality: N/A;  Please make sure it is scheduled after her cardiology appt.- see cardiology note dated 6/13/19 for clearance - ERW    FOREIGN BODY REMOVAL N/A 4/17/2023    Procedure: REMOVAL, FOREIGN BODY;  Surgeon: Rom Rodriguez MD;  Location: [facility] OR;  Service: General;  Laterality: N/A;    GASTRIC BYPASS N/A 05/20/2021    INJECTION OF STEROID Left 7/27/2020    Procedure: INJECTION, STEROID LEFT THUMB TRIGGER FINGER;  Surgeon: Marcela Garza MD;  Location: [facility] OR;  Service: Orthopedics;  Laterality: Left;  LEFT THUMB, TRIGGER FINGER    LAPAROTOMY, EXPLORATORY N/A 8/18/2023    Procedure: LAPAROTOMY, EXPLORATORY;  Surgeon: Bunny Obrien MD;  Location: [facility] OR;  Service: General;  Laterality: N/A;    LYSIS OF ADHESIONS N/A 10/21/2022    Procedure: LYSIS, ADHESIONS;  Surgeon: Bunny Obrien MD;  Location: [facility] OR;  Service: General;  Laterality: N/A;    LYSIS OF ADHESIONS N/A 8/18/2023    Procedure: LYSIS, ADHESIONS;  Surgeon: Bunny Obrien MD;  Location: [facility] OR;  Service: General;  Laterality: N/A;    TRANSVERSE COLECTOMY N/A 10/26/2022    Procedure: COLECTOMY, TRANSVERSE;  Surgeon: Bunny Obrien MD;  Location: [facility] OR;  Service: General;  Laterality: N/A;    TRIGGER FINGER RELEASE Right 10/4/2019    Procedure: RELEASE, TRIGGER FINGER - right thumb;  Surgeon: Craig Tom MD;  Location: [facility] OR;  Service: Orthopedics;  Laterality: Right;    UPPER GASTROINTESTINAL ENDOSCOPY      WISDOM TOOTH EXTRACTION           Pre-op Assessment    I have reviewed the Patient Summary Reports.     I have  reviewed the Nursing Notes. I have reviewed the NPO Status.   I have reviewed the Medications.     Review of Systems      Physical Exam  General: Well nourished    Airway:  Mallampati: II   Mouth Opening: Normal  TM Distance: Normal  Tongue: Normal  Neck ROM: Normal ROM        Anesthesia Plan  Type of Anesthesia, risks & benefits discussed:    Anesthesia Type: Gen Natural Airway, MAC, Regional  Intra-op Monitoring Plan: Standard ASA Monitors  Post Op Pain Control Plan: multimodal analgesia  Induction:  IV  Informed Consent: Patient consented to blood products? No  ASA Score: 2  Day of Surgery Review of History & Physical: H&P Update referred to the surgeon/provider.    Ready For Surgery From Anesthesia Perspective.     .

## 2024-04-24 NOTE — PLAN OF CARE
Pre op complete. Questions answered. Resting comfortably. Call light in reach. Belongings in locker. Son is 15-20 minutes away.

## 2024-04-24 NOTE — OP NOTE
DATE OF PROCEDURE:  04/24/2024     SERVICE:  Orthopedics.     SURGEON:  Craig Tom M.D.     ASSISTANT: MD CHUCK Jennings     PREOPERATIVE DIAGNOSIS:    1) Arthritis, left thumb carpometacarpal joint.     POSTOPERATIVE DIAGNOSIS:  Same     PROCEDURES PERFORMED:  1. left carpal trapeziectomy, CPT 76569  2. 1st to 2nd metacarpal tenodesis, CPT 15125      ANESTHESIA:  Regional, MAC     ESTIMATED BLOOD LOSS:  5 mL.     IMPLANTS:  Arthrex FiberTak all-suture anchor and Arthrex DX SwiveLock SL anchor with Internal Brace     EXPLANTS:  None.      TOURNIQUET TIME:  52 minutes at 250 mmHg.     PACKS AND DRAINS:  None.     COMPLICATIONS:  None.     INTRAVENOUS FLUIDS:  Crystalloid.     INDICATIONS FOR PROCEDURE:  The patient is a 50yo female who   previously presented to the [facility]ics [facility] complaining of significantly   symptomatic left thumb CMC arthritis.  This was severely limiting the patient's activities   of daily living, and thus the   risks, benefits, and alternatives to operative intervention were discussed with   the patient in detail.  To date, nonoperative treatments were  ineffective in managing the symptoms.  The risks, benefits, and alternatives of surgical intervention were explained to the patient   in detail.  Specific risks discussed included, but were not limited to   bleeding, infection, vessel and/or nerve damage, pain, numbness, tingling,   compartment syndrome, need for additional surgery, failure to return to   pre-injury and/or preoperative functional status, inability to return to work,   scar sensitivity, delayed healing, complex regional pain syndrome, weakness,   tendon injury, partial and/or incomplete relief of symptoms, persistence of   and/or worsening of symptoms, hardware and/or surgical failure, prominent and/or   symptomatic hardware possibly necessitating future removal, osteomyelitis,   amputation, loss of function, stiffness, functional debility, dysfunction,   decreased   strength, need for prolonged postoperative rehabilitation,   fracture, DVT, PE, arthritis, and death.  The patient agreed to the risks as   described and consented for the procedure.     PROCEDURE IN DETAIL:  On the date of the operative intervention, the patient was   evaluated in the preoperative holding area.  With the patient's participation, the left   arm was marked at the operative site.  The patient was then administered regional anesthesia   and taken to the OR Room where they were placed on the OR table with all   superficial bony prominences well-padded.  Nonsterile tourniquet was placed high   on the patient's left upper arm and the operative upper extremity was then prepped and   draped in the usual sterile fashion.  Timeout was taken and confirmed by all   present to confirm the correct patient, site, procedure, and administration of   preoperative antibiotics.  All were in agreement, so I proceeded.      Esmarch was utilized to exsanguinate the left upper extremity and tourniquet was inflated to 250mmHg.  I marked out   an approximately 3 cm incision dorsal radially over the patient's left thumb CMC   joint.  This was centered over the level of the trapezium.   Skin incision was made sharply with a scalpel and   dissection was carried down to skin and subcutaneous tissues. Branches of   the superficial radial nerve were identified and retracted out of harm's way for   the duration of the procedure.  Dissection was continued more deeply to the   level of the radial styloid.  The first dorsal compartment sheath was then   released.  The extensor pollicis brevis was in its own separate sub sheath and this was fully released as was the APL.  I turned my attention towards the left thumb CMC arthroplasty.  Next, the interval between the   extensor pollicis longus and the extensor pollicis brevis was then utilized to   gain access to the base of the thumb metacarpal as well as the trapezium.    Dissection  was carried more deeply to the level of the anatomic snuffbox and the   radial artery was identified as it made a dorsal turn.  This was then isolated   and retracted and protected throughout the duration of the procedure as well.    The joint capsule to the first CMC joint was then entered sharply with a scalpel   and the trapezium was circumferentially identified and freed of surrounding   adjacent soft tissues consisting of a combination of scalpel dissection, a   1/2-inch curved osteotome, curettes, and Selden elevators.  The ArthMavatar   trapeziectomy removal tool was utilized as well to facilitate removal.    Circumferential dissection was continued, taking great care not to injure the   flexor carpi radialis tendon deep in the trapezial bed.  The trapezium was then   removed en bloc, at which point the FCR tendon was visible.  There was no   remaining bone status post trapeziectomy and this was verified with fluoroscopy.  The scaphotrapezoid joint was inspected and was in good condition with mild wear.  Having completed by trapezii ectomy attention was then turned towards the 1st to 2nd metacarpal tenodesis.  An Arthrex guidewire was placed in the appropriate orientation into the base of the 2nd metacarpal.  This was verified fluoroscopically.  Once the guidewire was in appropriate position, the threaded guide was placed into the base of the 2nd metacarpal.  The guidewire was then withdrawn and the drill was utilized to drill in the trajectory of the prior K-wire.  This was all done under fluoroscopic guidance.  The drill was then removed and the Arthrex FiberTak all suture suture anchor was placed bicortically through the base of the 2nd metacarpal without difficulty.  It was very stable to tensioning.  The 1st metacarpal and thumb was then pulled to the appropriate tensioning and the guidewire was placed into the base of the radial aspect of the 1st metacarpal to position for our DX SwiveLock anchor to secure  suspension plasty and tenodesis.  This was then drilled and the internal brace and SutureTape was anchored into the base of the 1st metacarpal taking great care to tension the 1st metacarpal appropriately.  The suture tape was anchored in the saddle orientation of the 1st metacarpal base was very secure.  After final placement of all of our hardware, the suture ends were trimmed.  The 1st metacarpal was very stable in its suspension plasty.  The 1st to 2nd metacarpal tenodesis was in excellent position which was verified fluoroscopically.  Attention was then turned towards completion procedure.      The wound was then   again copiously irrigated with sterile saline and closed in layered fashion with 2-0 Vicryl, 4-0 Vicryl, 4-0 Nylon to close the capsule and skin.  Sterile dressings were then applied, consisting of   Xeroform, 4 x 4 gauze, and a thumb spica splint to the left upper extremity.  The tourniquet was   deflated, at which point brisk capillary refill ensued throughout the patient's   operative hand.  There was no   significant bleeding.    The patient was then awakened from anesthesia and returned to the Postanesthesia   Care Unit in stable condition.  There were no complications, and as the   attending surgeon, I was present and performed the critical portions of the   procedure.       PLAN FOR THE PATIENT:  The patient will be discharged home in stable condition.   The patient will remain nonweightbearing to right upper extremity at this time.  I will   see the patient back in approximately two weeks for reevaluation of the postoperative   plan.

## 2024-04-25 ENCOUNTER — PATIENT MESSAGE (OUTPATIENT)
Dept: ORTHOPEDICS | Facility: CLINIC | Age: 49
End: 2024-04-25
Payer: MEDICAID

## 2024-04-25 NOTE — ANESTHESIA POSTPROCEDURE EVALUATION
Anesthesia Post Evaluation    Patient: Velma Lopez    Procedure(s) Performed: Procedure(s) (LRB):  INTERPOSITION ARTHROPLASTY, CMC JOINT - LEFT (Left)    Final Anesthesia Type: general      Patient location during evaluation: PACU  Patient participation: Yes- Able to Participate  Level of consciousness: awake and alert and oriented  Pain management: adequate  Airway patency: patent    PONV status at discharge: No PONV  Anesthetic complications: no      Cardiovascular status: blood pressure returned to baseline and hemodynamically stable  Respiratory status: unassisted  Hydration status: euvolemic  Follow-up not needed.              Vitals Value Taken Time   /58 04/24/24 1302   Temp 36.7 °C (98 °F) 04/24/24 1300   Pulse 74 04/24/24 1313   Resp 15 04/24/24 1312   SpO2 100 % 04/24/24 1313   Vitals shown include unfiled device data.      Event Time   Out of Recovery 13:00:00         Pain/Anabell Score: Pain Rating Prior to Med Admin: 0 (4/24/2024 12:30 PM)  Pain Rating Post Med Admin: 0 (4/24/2024  1:15 PM)  Anabell Score: 10 (4/24/2024  1:15 PM)

## 2024-04-26 ENCOUNTER — OFFICE VISIT (OUTPATIENT)
Dept: ORTHOPEDICS | Facility: CLINIC | Age: 49
End: 2024-04-26
Payer: MEDICAID

## 2024-04-26 ENCOUNTER — PATIENT MESSAGE (OUTPATIENT)
Dept: ORTHOPEDICS | Facility: CLINIC | Age: 49
End: 2024-04-26
Payer: MEDICAID

## 2024-04-26 DIAGNOSIS — Z98.890 POSTOPERATIVE STATE: ICD-10-CM

## 2024-04-26 DIAGNOSIS — M18.12 ARTHRITIS OF CARPOMETACARPAL (CMC) JOINT OF LEFT THUMB: Primary | ICD-10-CM

## 2024-04-26 PROCEDURE — 99213 OFFICE O/P EST LOW 20 MIN: CPT | Mod: PBBFAC | Performed by: PHYSICIAN ASSISTANT

## 2024-04-26 PROCEDURE — 99024 POSTOP FOLLOW-UP VISIT: CPT | Mod: ,,, | Performed by: PHYSICIAN ASSISTANT

## 2024-04-26 PROCEDURE — 99499 UNLISTED E&M SERVICE: CPT | Mod: S$PBB,,, | Performed by: ORTHOPAEDIC SURGERY

## 2024-04-26 PROCEDURE — 99999 PR PBB SHADOW E&M-EST. PATIENT-LVL III: CPT | Mod: PBBFAC,,, | Performed by: PHYSICIAN ASSISTANT

## 2024-04-26 PROCEDURE — 99999PBSHW PR PBB SHADOW TECHNICAL ONLY FILED TO HB: Mod: PBBFAC,,,

## 2024-04-26 PROCEDURE — 1159F MED LIST DOCD IN RCRD: CPT | Mod: CPTII,,, | Performed by: PHYSICIAN ASSISTANT

## 2024-04-26 PROCEDURE — 29125 APPL SHORT ARM SPLINT STATIC: CPT | Mod: PBBFAC

## 2024-04-26 PROCEDURE — 29705 RMVL/BIVLV FULL ARM/LEG CAST: CPT | Mod: PBBFAC

## 2024-04-26 RX ORDER — OXYCODONE AND ACETAMINOPHEN 5; 325 MG/1; MG/1
1 TABLET ORAL EVERY 6 HOURS PRN
Qty: 15 TABLET | Refills: 0 | Status: SHIPPED | OUTPATIENT
Start: 2024-04-26 | End: 2024-04-26 | Stop reason: SDUPTHER

## 2024-04-26 RX ORDER — OXYCODONE HYDROCHLORIDE 5 MG/1
5 TABLET ORAL EVERY 6 HOURS PRN
Qty: 20 TABLET | Refills: 0 | Status: SHIPPED | OUTPATIENT
Start: 2024-04-26

## 2024-04-26 RX ORDER — OXYCODONE AND ACETAMINOPHEN 5; 325 MG/1; MG/1
1 TABLET ORAL EVERY 6 HOURS PRN
Qty: 15 TABLET | Refills: 0 | Status: SHIPPED | OUTPATIENT
Start: 2024-04-26

## 2024-04-26 RX ORDER — CELECOXIB 200 MG/1
200 CAPSULE ORAL DAILY
Qty: 30 CAPSULE | Refills: 0 | Status: SHIPPED | OUTPATIENT
Start: 2024-04-26

## 2024-04-26 NOTE — PROGRESS NOTES
Patient presented for removal of a left plaster thumb spica splint per Jamie Russo-DiGeorge, PA-C. Splint was removed, skin and incisions intact. Afterwards a brand new plaster thumb spica splint was ordered by Shiva. A well padded and well molded plaster thumb spica splint was applied to the left upper extremity. Splint care instructions were discussed, all questions were answered, patient verbalized understanding.

## 2024-04-26 NOTE — PROGRESS NOTES
Dr. Tom is the supervising physician for this encounter/patient    Velma Lopez presents for post-operative evaluation.  The patient is now 2 days s/p Left thumb CMC arthroplasty with Dr. Tom on 4/24/24.  Overall the patient reports doing well.  She is seen today for splint change.    PE:    AA&O x 4.  NAD  HEENT:  NCAT, sclera nonicteric  Lungs:  Respirations are equal and unlabored.  CV:  2+ bilateral upper and lower extremity pulses.  MSK: The wound is healing well with no signs of erythema or warmth.  There is no drainage.  No clinical signs or symptoms of infection are present.      A/P: Status post above, doing well  1) Thumb spica splint today  2) Tylenol/Ibuprofen prn  3) F/U scheduled postop for xrays and suture removal  4) Call with any questions/concerns in the interim      Jamie Russo-DiGeorge PA-C

## 2024-04-29 LAB
FINAL PATHOLOGIC DIAGNOSIS: NORMAL
GROSS: NORMAL
Lab: NORMAL

## 2024-04-30 ENCOUNTER — OFFICE VISIT (OUTPATIENT)
Dept: PODIATRY | Facility: CLINIC | Age: 49
End: 2024-04-30
Payer: MEDICAID

## 2024-04-30 ENCOUNTER — PATIENT MESSAGE (OUTPATIENT)
Dept: PODIATRY | Facility: CLINIC | Age: 49
End: 2024-04-30

## 2024-04-30 VITALS — BODY MASS INDEX: 23.27 KG/M2 | HEIGHT: 66 IN | WEIGHT: 144.81 LBS | RESPIRATION RATE: 18 BRPM

## 2024-04-30 DIAGNOSIS — B07.0 PLANTAR WARTS: ICD-10-CM

## 2024-04-30 DIAGNOSIS — M79.671 PAIN IN BOTH FEET: Primary | ICD-10-CM

## 2024-04-30 DIAGNOSIS — M79.672 PAIN IN BOTH FEET: Primary | ICD-10-CM

## 2024-04-30 DIAGNOSIS — L98.9 SKIN LESION OF FOOT: ICD-10-CM

## 2024-04-30 LAB — HM FOOT EXAM: NORMAL

## 2024-04-30 PROCEDURE — 17110 DESTRUCTION B9 LES UP TO 14: CPT | Mod: S$PBB,,, | Performed by: PODIATRIST

## 2024-04-30 PROCEDURE — 3008F BODY MASS INDEX DOCD: CPT | Mod: CPTII,,, | Performed by: PODIATRIST

## 2024-04-30 PROCEDURE — 99999 PR PBB SHADOW E&M-EST. PATIENT-LVL IV: CPT | Mod: PBBFAC,,, | Performed by: PODIATRIST

## 2024-04-30 PROCEDURE — 99213 OFFICE O/P EST LOW 20 MIN: CPT | Mod: 25,S$PBB,, | Performed by: PODIATRIST

## 2024-04-30 PROCEDURE — 17110 DESTRUCTION B9 LES UP TO 14: CPT | Mod: PBBFAC,PN | Performed by: PODIATRIST

## 2024-04-30 PROCEDURE — 1160F RVW MEDS BY RX/DR IN RCRD: CPT | Mod: CPTII,,, | Performed by: PODIATRIST

## 2024-04-30 PROCEDURE — 1159F MED LIST DOCD IN RCRD: CPT | Mod: CPTII,,, | Performed by: PODIATRIST

## 2024-04-30 PROCEDURE — 99214 OFFICE O/P EST MOD 30 MIN: CPT | Mod: PBBFAC,PN | Performed by: PODIATRIST

## 2024-04-30 NOTE — PROGRESS NOTES
[facility]  [facility] - PODIATRY  [address]    [facility] / [place] [address]  Dept: [phone]  Dept Fax: [phone]    Abdirahman Melara Jr., FUAD     Assessment:   MDM    Coding  1. Pain in both feet        2. Skin lesion of foot        3. Plantar warts            Plan:     Procedures    Diagnoses and all orders for this visit:    Pain in both feet    Skin lesion of foot    Plantar warts        - Pt seen evaluated and treated. Diagnosis as above reviewed  - Discussed surgical and conservative tx options  - All alternatives, risks, benefits of all tx options were discussed in detail  -after obtaining consent and marking and then performing timeout, I cleansed w/ alcohol prep pad the above mentioned hyperkeratosis which was then trimmed utilizing No 15 scapel, to a smooth base with pinpoint bleeding with out incident. Silver nitrate was used as needed. The skin lesion was then destroyed with application of cantherone and covered with occlusive dressing. Patient tolerated this well and reported comfort to the area.  -Warnings regarding pain and blister formation given  -OTC medications for pain  -pt instructed to keep lesion covered with bandaid+triple abx ointment - change QD as needed x 14-21 days  -Patient education regarding warts was given / Handout on warts was given  -OTC Salicylic acid to be applied daily by patient at home as needed  -rxs dispensed: none  -referrals: none  -WB: wbat      Follow up if symptoms worsen or fail to improve.    Subjective:      Patient ID: Velma Lopez is a 49 y.o. female.    Chief Complaint: No chief complaint on file.      CC - skin lesion Patient presents to the clinic complaining of painful skin lesion on both feet. Patient is inquiring about treatment options.    HPI    Last Podiatry Enc: Visit date not found  Last Enc w/ Me: 5/9/2023    Outside reports reviewed: historical medical records.  Family hx: as below  Past Medical History:   Diagnosis Date     Chronic back pain     Diabetes type 2, uncontrolled     Mild nonproliferative diabetic retinopathy of left eye without macular edema associated with type 2 diabetes mellitus 10/21/2019    Morbid obesity with BMI of 40.0-44.9, adult     MILEY on CPAP     Plantar fasciitis of left foot     Urticaria      Past Surgical History:   Procedure Laterality Date    ARTHROSCOPIC ACROMIOPLASTY OF SHOULDER Left 7/27/2020    Procedure: ACROMIOPLASTY, ARTHROSCOPIC;  Surgeon: Marcela Garza MD;  Location: [facility] OR;  Service: Orthopedics;  Laterality: Left;    ARTHROSCOPIC REPAIR OF ROTATOR CUFF OF SHOULDER Right 7/27/2020    Procedure: REPAIR, ROTATOR CUFF, ARTHROSCOPIC;  Surgeon: Marcela Garza MD;  Location: [facility] OR;  Service: Orthopedics;  Laterality: Right;  GENERAL/REGIONAL    COLONOSCOPY N/A 4/7/2022    Procedure: COLONOSCOPY;  Surgeon: Yadi Wong MD;  Location: [facility] (4TH FLR);  Service: Endoscopy;  Laterality: N/A;  Covid test at [place] on 4/4.EC    CYST REMOVAL      ovarian cyst removal at age 12    DECOMPRESSION OF SUBACROMIAL SPACE Left 7/27/2020    Procedure: DECOMPRESSION, SUBACROMIAL SPACE;  Surgeon: Marcela Garza MD;  Location: [facility] OR;  Service: Orthopedics;  Laterality: Left;    DILATION AND CURETTAGE OF UTERUS      ESOPHAGEAL MOTILITY STUDY USING HIGH RESOLUTION MANOMETRY N/A 9/17/2019    Procedure: MOTILITY STUDY, ESOPHAGUS, USING HIGH RESOLUTION MANOMETRY;  Surgeon: Leopoldo Swanson MD;  Location: [facility] (4TH FLR);  Service: Endoscopy;  Laterality: N/A;  Pt will be taking xanax before procedure to hopefull help with anxiety.    ESOPHAGOGASTRODUODENOSCOPY N/A 6/14/2019    Procedure: EGD (ESOPHAGOGASTRODUODENOSCOPY);  Surgeon: Ced Guevara MD;  Location: [facility] (4TH FLR);  Service: Endoscopy;  Laterality: N/A;  Please make sure it is scheduled after her cardiology appt.- see cardiology note dated 6/13/19 for clearance - ERW    FOREIGN BODY REMOVAL N/A  4/17/2023    Procedure: REMOVAL, FOREIGN BODY;  Surgeon: Rom Rodriguez MD;  Location: [facility] OR;  Service: General;  Laterality: N/A;    GASTRIC BYPASS N/A 05/20/2021    INJECTION OF STEROID Left 7/27/2020    Procedure: INJECTION, STEROID LEFT THUMB TRIGGER FINGER;  Surgeon: Marcela Garza MD;  Location: [facility] OR;  Service: Orthopedics;  Laterality: Left;  LEFT THUMB, TRIGGER FINGER    INTERPOSITION ARTHROPLASTY OF CARPOMETACARPAL JOINTS Left 4/24/2024    Procedure: INTERPOSITION ARTHROPLASTY, CMC JOINT - LEFT;  Surgeon: Craig Tom MD;  Location: [facility] OR;  Service: Orthopedics;  Laterality: Left;    LAPAROTOMY, EXPLORATORY N/A 8/18/2023    Procedure: LAPAROTOMY, EXPLORATORY;  Surgeon: Bunny Obrien MD;  Location: [facility] OR;  Service: General;  Laterality: N/A;    LYSIS OF ADHESIONS N/A 10/21/2022    Procedure: LYSIS, ADHESIONS;  Surgeon: Bunny Obrien MD;  Location: [facility] OR;  Service: General;  Laterality: N/A;    LYSIS OF ADHESIONS N/A 8/18/2023    Procedure: LYSIS, ADHESIONS;  Surgeon: Bunny Obrien MD;  Location: [facility] OR;  Service: General;  Laterality: N/A;    TRANSVERSE COLECTOMY N/A 10/26/2022    Procedure: COLECTOMY, TRANSVERSE;  Surgeon: Bunny Obrien MD;  Location: [facility] OR;  Service: General;  Laterality: N/A;    TRIGGER FINGER RELEASE Right 10/4/2019    Procedure: RELEASE, TRIGGER FINGER - right thumb;  Surgeon: Craig Tom MD;  Location: [facility] OR;  Service: Orthopedics;  Laterality: Right;    UPPER GASTROINTESTINAL ENDOSCOPY      WISDOM TOOTH EXTRACTION       Family History   Problem Relation Name Age of Onset    Cancer Maternal Grandmother      Diabetes Mother      No Known Problems Sister 3     No Known Problems Sister      No Known Problems Sister      Breast cancer Maternal Aunt  70    Colon cancer Neg Hx      Stomach cancer Neg Hx      Inflammatory bowel disease Neg Hx      Esophageal cancer Neg Hx       Current Outpatient Medications    Medication Sig Dispense Refill    acetaminophen (TYLENOL) 500 MG tablet Take 2 tablets (1,000 mg total) by mouth 2 (two) times a day. 20 tablet 0    amitriptyline (ELAVIL) 10 MG tablet TAKE 1 TABLET BY MOUTH EVERY DAY IN THE EVENING (Patient taking differently: Take 10 mg by mouth every evening.) 90 tablet 0    celecoxib (CELEBREX) 200 MG capsule Take 1 capsule (200 mg total) by mouth once daily. 30 capsule 0    cetirizine (ZYRTEC) 10 MG tablet Take 10 mg by mouth once daily.      cyanocobalamin (VITAMIN B-12) 1000 MCG tablet Take 1,000 mcg by mouth once a week.      docusate sodium (COLACE) 100 MG capsule Take 1 capsule (100 mg total) by mouth 2 (two) times daily. 60 capsule 3    ergocalciferol (VITAMIN D2) 50,000 unit Cap Take 50,000 Units by mouth every Sunday.      estradiol 0.025 mg/24 hr 0.025 mg/24 hr Place 1 patch onto the skin once a week. 4 patch 11    ibuprofen (ADVIL,MOTRIN) 600 MG tablet Take 1 tablet (600 mg total) by mouth 3 (three) times daily. 20 tablet 0    medroxyPROGESTERone (DEPO-PROVERA) 150 mg/mL Syrg Inject 1 mL (150 mg total) into the muscle every 3 (three) months. 1 mL 0    medroxyPROGESTERone (DEPO-PROVERA) 150 mg/mL Syrg INJECT 1 ML (150 MG) INTO THE MUSCLE EVERY 3 MONTHS. 1 each 1    meloxicam (MOBIC) 7.5 MG tablet Take 7.5 mg by mouth daily as needed.      multivit-min-iron-FA-vit K-lut (CENTRUM SILVER WOMEN) 8 mg iron-400 mcg-50 mcg Tab Take 1 tablet by mouth once daily.      oxyCODONE (ROXICODONE) 5 MG immediate release tablet Take 1 tablet (5 mg total) by mouth every 6 (six) hours as needed for Pain. 20 tablet 0    oxyCODONE-acetaminophen (PERCOCET) 5-325 mg per tablet Take 1 tablet by mouth every 6 (six) hours as needed for Pain (To be taken on post op days 1-2). 15 tablet 0    promethazine (PHENERGAN) 6.25 mg/5 mL syrup Take 20 mLs (25 mg total) by mouth every 8 (eight) hours as needed for Nausea. 473 mL 0    solifenacin (VESICARE) 10 MG tablet TAKE 1 TABLET BY  MOUTH EVERY DAY 30 tablet 11    terconazole (TERAZOL 3) 0.8 % vaginal cream Place 1 applicator vaginally every evening. 20 g 1    tiZANidine (ZANAFLEX) 4 MG tablet TAKE 1/2-1 TABLET BY MOUTH AT BEDTIME AS NEEDED FOR SPASM       No current facility-administered medications for this visit.     Facility-Administered Medications Ordered in Other Visits   Medication Dose Route Frequency Provider Last Rate Last Admin    lidocaine (PF) 10 mg/ml (1%) injection 10 mg  1 mL Intradermal Once [place], Brenna Latif MD        midazolam (VERSED) 1 mg/mL injection 0.5 mg  0.5 mg Intravenous PRN Ced Patino MD   2 mg at 07/27/20 1322    ropivacaine (Naropin) 1000 mg/500 mL (2 mg/mL) Nimbus PainPRO Pump infusion  4 mL/hr Perineural Continuous Ced Patino MD 4 mL/hr at 07/27/20 1519 4 mL/hr at 07/27/20 1519     Review of patient's allergies indicates:  No Known Allergies  Social History     Socioeconomic History    Marital status: Single   Tobacco Use    Smoking status: Every Day     Current packs/day: 0.50     Average packs/day: 0.5 packs/day for 32.3 years (16.4 ttl pk-yrs)     Types: Cigarettes     Start date: 1992    Smokeless tobacco: Never    Tobacco comments:     Enrolled in the [employer] on 8/1/18 ([facility] Member ID # [id number]) Ambulatory referral to Smoking Cessation Program.     Substance and Sexual Activity    Alcohol use: Yes     Comment: social    Drug use: No    Sexual activity: Yes     Partners: Male     Birth control/protection: None   Social History Narrative    Single.  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      Social Determinants of Health     Financial Resource Strain: Low Risk  (8/21/2023)    Overall Financial Resource Strain (CARDIA)     Difficulty of Paying Living Expenses: Not hard at all   Food Insecurity: No Food Insecurity (8/21/2023)    Hunger Vital Sign     Worried About Running Out of Food in the Last Year: Never true     Ran Out of Food in the  Last Year: Never true   Transportation Needs: No Transportation Needs (6/20/2023)    PRAPARE - Transportation     Lack of Transportation (Medical): No     Lack of Transportation (Non-Medical): No   Physical Activity: Insufficiently Active (8/21/2023)    Exercise Vital Sign     Days of Exercise per Week: 3 days     Minutes of Exercise per Session: 30 min   Stress: No Stress Concern Present (8/21/2023)    [employer] - Occupational Stress Questionnaire     Feeling of Stress : Not at all   Social Connections: Moderately Isolated (8/21/2023)    Social Connection and Isolation Panel [NHANES]     Frequency of Communication with Friends and Family: More than three times a week     Frequency of Social Gatherings with Friends and Family: More than three times a week     Attends Catholic Services: Never     Active Member of Clubs or Organizations: No     Attends Club or Organization Meetings: Never     Marital Status:    Housing Stability: Low Risk  (8/21/2023)    Housing Stability Vital Sign     Unable to Pay for Housing in the Last Year: No     Number of Places Lived in the Last Year: 1     Unstable Housing in the Last Year: No       ROS    REVIEW OF SYSTEMS: Negative as documented below as well as positive findings in bold.       Constitutional  Respiratory  Gastrointestinal  Skin   - Fever - Cough - Heartburn - Rash   - Chills - Spit blood - Nausea - Itching   - Weight Loss - Shortness of breath - Vomiting - Nail pain   - Malaise/Fatigue - Wheezing - Abdominal Pain  Wound/Ulcer   - Weight Gain   - Blood in Stool  Poor wound healing       - Diarrhea          Cardiovascular  Genitourinary  Neurological  HEENT   - Chest Pain - Dysuria - Burning Sensation of feet - Headache   - Palpitations - Hematuria - Tingling / Paresthesia - Congestion   - Pain at night in legs - Flank Pain - Dizziness - Sore Throat   - Cramping   - Tremor - Blurred Vision   - Leg Swelling   - Sensory  "Change - Double Vision   - Dizzy when standing   - Speech Change - Eye Redness       - Focal Weakness - Dry Eyes       - Loss of Consciousness          Endocrine  Musculoskeletal  Psychiatric   - Cold intolerance - Muscle Pain - Depression   - Heat intolerance - Neck Pain - Insomnia   - Anemia - Joint Pain - Memory Loss   -  Easy bruising, bleeding - Heel pain - Anxiety      Toe Pain        Leg/Ankle/Foot Pain         Objective:     Resp 18   Ht 5' 6" (1.676 m)   Wt 65.7 kg (144 lb 13.5 oz)   LMP 04/10/2024 (Exact Date)   BMI 23.38 kg/m²   Vitals:    04/30/24 1008   Resp: 18   Weight: 65.7 kg (144 lb 13.5 oz)   Height: 5' 6" (1.676 m)   PainSc:   7   PainLoc: Foot       Physical Exam    General Appearance:   Patient appears well developed, well nourished  Patient appears stated age    Psychiatric:   Patient is oriented to time, place, and person.  Patient has appropriate mood and affect    Neck:  Trachea Midline  No visible masses    Respiratory/Ears:  No distress or labored breathing.  Able to differentiate between normal talking voice and whisper.  Able to follow commands    Eyes:  Visual Acuity intact  Lids and conjunctivae normal. No discoloration noted.    Foot Exam  Physical Exam  Ortho Exam  Ortho/SPM Exam  Physical Exam  Neurologic Exam    B/l LE exam con't:  V:  DP 2/4, PT 2/4   CRT< 3s to all digits tested   Tibial and popliteal lymph nodes are w/o abnormality    N:  Patient displays normal ankle reflexes   SILT in SP/DP/T/Karissa/Saph distributions    Ortho: +Motor EHL/FHL/TA/GA   +TTP skin lesion(s)    Compartments soft/compressible. No pain on passive stretch of big toe. No calf  Pain.   no deformity noted on exam    Derm: skin intact, skin warm and dry, skin without induration, skin without ulcers, nails normal, +hyperkeratotic v verrucous lesion that breaks normal skin lines and has pinpoint bleeding upon debridement b/l foot    Imaging / Labs:    none        Note: This was dictated using a computer " transcription program. Although proofread, it may contain computer transcription errors and phonetic errors. Other human proofreading errors may also exist. Corrections may be performed at a later time. Please contact us for any clarification if needed.    Abdirahman Melara DPM  [facility]

## 2024-04-30 NOTE — PATIENT INSTRUCTIONS
Hammertoe    What Is Hammertoe?    Hammertoe is a contracture (bending) deformity of one or both joints of the second, third, fourth or fifth (little) toes. This abnormal bending can put pressure on the toe when wearing shoes, causing problems to develop.        Hammertoes usually start out as mild deformities and get progressively worse over time. In the earlier stages, hammertoes are flexible and the symptoms can often be managed with noninvasive measures. But if left untreated, hammertoes can become more rigid and will not respond to nonsurgical treatment.        Because of the progressive nature of hammertoes, they should receive early attention. Hammertoes never get better without some kind of intervention.    Causes  The most common cause of hammertoe is a muscle/tendon imbalance. This imbalance, which leads to a bending of the toe, results from mechanical (structural) or neurological changes in the foot that occur over time in some people.  Hammertoes may be aggravated by shoes that do not fit properly. A hammertoe may result if a toe is too long and is forced into a cramped position when a tight shoe is worn. Occasionally, hammertoe is the result of an earlier trauma to the toe. In some people, hammertoes are inherited.    Symptoms  Common symptoms of hammertoes include:    -Pain or irritation of the affected toe when wearing shoes.    -Corns and calluses (a buildup of skin) on the toe, between two toes or on the ball of the foot. Corns are caused by constant friction against the shoe. They may be soft or hard, depending on their location.    -Inflammation, redness or a burning sensation    -Contracture of the toe    -In more severe cases of hammertoe, open sores may form.     Diagnosis  Although hammertoes are readily apparent, to arrive at a diagnosis, the foot and ankle surgeon will obtain a thorough history of your symptoms and examine your foot. During the physical examination, the doctor may attempt to  reproduce your symptoms by manipulating your foot and will study the contractures of the toes. In addition, the foot and ankle surgeon may take x-rays to determine the degree of the deformities and assess any changes that may have occurred.    Hammertoes are progressive--they do not go away by themselves and usually they will get worse over time. However, not all cases are alike--some hammertoes progress more rapidly than others. Once your foot and ankle surgeon has evaluated your hammertoes, a treatment plan can be developed that is suited to your needs.    Nonsurgical Treatment  There is a variety of treatment options for hammertoe. The treatment your foot and ankle surgeon selects will depend on the severity of your hammertoe and other factors.    A number of nonsurgical measures can be undertaken:    -Padding corns and calluses. Your foot and ankle surgeon can provide or prescribe pads designed to shield corns from irritation. If you want to try over-the-counter pads, avoid the medicated types. Medicated pads are generally not recommended because they may contain a small amount of acid that can be harmful. Consult your surgeon about this option.    -Changes in shoewear. Avoid shoes with pointed toes, shoes that are too short, or shoes with high heels--conditions that can force your toe against the front of the shoe. Instead, choose comfortable shoes with a deep, roomy toebox and heels no higher than two inches.    -Orthotic devices. A custom orthotic device placed in your shoe may help control the muscle/tendon imbalance. Injection therapy. Corticosteroid injections are sometimes used to ease pain and inflammation caused by hammertoe.     -Medications. Oral nonsteroidal anti-inflammatory drugs (NSAIDs), such as ibuprofen, may be recommended to reduce pain and inflammation. Splinting/strapping. Splints or small straps may be applied by the surgeon to realign the bent toe.     When Is Surgery Needed?  In some  cases, usually when the hammertoe has become more rigid and painful or when an open sore has developed, surgery is needed.    Often, patients with hammertoe have bunions or other foot deformities corrected at the same time. In selecting the procedure or combination of procedures for your particular case, the foot and ankle surgeon will take into consideration the extent of your deformity, the number of toes involved, your age, your activity level and other factors. The length of the recovery period will vary, depending on the procedure or procedures performed.          What Are Mallet, Hammer, and Claw Toes?  Mallet, hammer, and claw toes are most often caused by wearing shoes that are too short or heels that are too high. This jams the toes against the front of the shoe and causes one or more joints to bend. Rarely, disease can cause the joints in the toes to bend. Mallet, hammer, and claw toes are among the most common toe problems. They occur most often in the longest of the four smaller toes.    Inside your toes  There are 3 bones in each of your 4 smaller toes. Where 2 bones connect is called a joint. Normally the toes lie flat. But pressure on the toes or the front of the foot can cause one or more joints to bend. This curls the toe. Toes that stay curled are called mallet toes, hammer toes, or claw toes, depending on which joints are bent.    Symptoms  You may feel pain in the toe or in the ball of your foot. A corn (a hard growth of skin on the top of the toe) may form where the toe rubs against the top of the shoe. Or a callus (a hard growth of skin on the bottom of the foot) may form under the tip of the toe or on the ball of the foot. Corns and calluses can also be painful.   Date Last Reviewed: 10/18/2015  © 7768-2686 [employer]. [address] [place] [address], [place] [address]. All rights reserved. This information is not intended as a substitute for professional medical care. Always follow your  healthcare professional's instructions.        Treating Mallet, Hammer, and Claw Toes  Definitions  A hammer toe has an abnormal bend in the middle joint of your toe (toe is bent upward at joint).  Mallet toe affects the joint nearest your toenail (toe is bent downward at joint).  Claw toe affects the joint at the ball of your foot (toe is bent upward at joint), as well as both toe joints (toe is bent downward at both joints).  Hammer toe and mallet toe are most likely to occur in the toe next to your big toe.  Causes  The most common cause for all 3 deformities is poorly fitting shoes and tight shoes, especially high heels for women. Trauma and nerve damage from various diseases like diabetes may also cause these deformities.  Treatment  Buying shoes with more room in the toes, filing down corns and calluses, and padding, taping, or strapping the toe most often relieve the pain. Toe stretching and exercises may also be helpful. If these steps dont work, you may need surgery to straighten your toes.  Shoes  Buy low-heeled shoes with plenty of room in the front. This keeps your toes from being jammed against the end of your shoe. It also keeps your shoe from rubbing the tops of your toes.  Corns and calluses    To file down a corn or callus, soak your foot in warm water. This softens the hard skin. Dry your foot. Then gently rub the corn or callus with a pumice stone or nail file.  Pads and splints  If you still have pain, you may need to put a pad or splint on your toe. This helps take pressure off the painful corn or callus.  For a mallet toe, you can put a gel pad on the toe. This keeps the tip of the toe from rubbing against the bottom of the shoe.  For a hammer or claw toe, you can put a felt or foam pad over the bent joint. This keeps the toe from rubbing on the top of the shoe.  For a hammer or claw toe that is still flexible, you can put a splint on the toe. This keeps it straight so it doesn't rub on the  top of the shoe.    Date Last Reviewed: 9/29/2015  © 0805-8380 [employer]. [address] [place] [address], [place] [address]. All rights reserved. This information is not intended as a substitute for professional medical care. Always follow your healthcare professional's instructions.          Foot Surgery: Flexible and Rigid Hammertoes  With hammertoes, one or more toes curl or bend abnormally. This can be caused by an inherited muscle problem, an abnormal bone length, or poor foot mechanics. The affected joints can rub inside shoes, causing corns (buildups of dead skin).  There are many nonsurgical treatments for hammertoes, but if these are not effective, you may want to consider surgery.    Flexible hammertoes  When hammertoes are flexible, you can straighten the buckled joints. Flexible hammertoes may become rigid over time.    Tendon release  This treatment helps release the buckled joint. The bottom (flexor) tendon may be repositioned to the top of the affected toe (flexor tendon transfer). Sometimes, the top or bottom tendon is released but not repositioned (tenotomy).    Rigid hammertoes  Rigid hammertoes are fixed (not flexible). You cannot straighten the buckled joints. Corns, pain, and loss of function may be more severe with rigid hammertoes than with flexible ones.    Arthroplasty  A part of the joint is removed, and the toe is straightened. In some cases, the entire joint may be replaced with an implant. When healed, the bones become connected with scar tissue, making your toe flexible.    Fusion  First, the cartilage and some bone on both sides of the joint are removed. Then, the toe is straightened, and the two bones are held together, often with a pin. The pin is removed after several weeks. Once your foot heals, the toe will be less flexible, but more stable.  Healing after surgery  The severity of your condition, number of toes involved, and type of surgery done will affect your recovery  time. Many people are able to walk right after surgery with a special surgical shoe. Full healing can take several weeks. Your healthcare provider can advise you on what to expect after surgery.     Date Last Reviewed: 10/15/2015  © 2102-4876 [employer]. [address] [place] [address]. All rights reserved. This information is not intended as a substitute for professional medical care. Always follow your healthcare professional's instructions.        Foot Surgery: Curled Fifth Toe  Hammertoes can make walking painful. With hammertoes, one or more toes curl or bend abnormally. This can be caused by an inherited muscle problem, an abnormal bone length, or poor foot mechanics. The affected joints can rub inside shoes, causing corns (buildups of dead skin).    Curled fifth toe  A curled fifth toe is most often inherited. When the fifth toe curls inward, it moves under the next toe. Then the nail of the curled toe starts to face outward. As a result, you may bear weight on the side of your toe instead of the bottom. This can cause corns and painful nails.  There are many nonsurgical treatments available. But if these are not effective, surgery is a choice.    Derotation arthroplasty  A wedge of skin and a section of bone are removed to help straighten (derotate) the toe. You can bear weight on your foot right after surgery. In some cases, you may need to wear a bandage, splint, and surgical shoe for a few weeks. When healed, the bones become connected with scar tissue.  Date Last Reviewed: 10/15/2015  © 7753-0914 [employer]. [address] [place] [address]. All rights reserved. This information is not intended as a substitute for professional medical care. Always follow your healthcare professional's instructions.          R.I.C.E.    R.I.C.E. stands for Rest, Ice, Compression, and Elevation. Doing these things helps limit pain and swelling after an injury. R.I.C.E. also  helps injuries heal faster. Use R.I.C.E. for sprains, strains, and severe bruises or bumps. Follow the tips on this handout and begin R.I.C.E. as soon as possible after an injury.     Rest  Pain is your body's way of telling you to rest an injured area. Whether you have hurt an elbow, hand, foot, or knee, limiting its use will prevent further injury and help you heal.     Ice  Applying ice right after an injury helps prevent swelling and reduce pain. Don't place ice directly on your skin.  Wrap a cold pack or bag of ice in a thin cloth. Place it over the injured area.  Ice for 10 minutes every 3 hours. Don't ice for more than 20 minutes at a time.     Compression  Putting pressure (compression) on an injury helps prevent swelling and provides support.  Wrap the injured area firmly with an elastic bandage. If your hand or foot tingles, becomes discolored, or feels cold to the touch, the bandage may be too tight. Rewrap it more loosely.  If your bandage becomes too loose, rewrap it.  Do not wear an elastic bandage overnight.    Elevation  Keeping an injury elevated helps reduce swelling, pain, and throbbing. Elevation is most effective when the injury is kept elevated higher than the heart.     Call your healthcare provider if you notice any of the following:  Fingers or toes feel numb, are cold to the touch, or change color  Skin looks shiny or tight  Pain, swelling, or bruising worsens and is not improved with elevation

## 2024-05-01 ENCOUNTER — TELEPHONE (OUTPATIENT)
Dept: PODIATRY | Facility: CLINIC | Age: 49
End: 2024-05-01
Payer: MEDICAID

## 2024-05-01 NOTE — TELEPHONE ENCOUNTER
Attempted to reach out to patient in regards to her message as she is experiencing some pain. Voicemail was left for patient to return call to further discuss.

## 2024-05-02 NOTE — TELEPHONE ENCOUNTER
Spoke with patient to remind her of her scheduled appointment on 1/5/21.The patient appreciated the phone call.  
Yes

## 2024-05-10 ENCOUNTER — HOSPITAL ENCOUNTER (OUTPATIENT)
Dept: RADIOLOGY | Facility: HOSPITAL | Age: 49
Discharge: HOME | End: 2024-05-10
Attending: PHYSICIAN ASSISTANT
Payer: MEDICAID

## 2024-05-10 ENCOUNTER — OFFICE VISIT (OUTPATIENT)
Dept: ORTHOPEDICS | Facility: CLINIC | Age: 49
End: 2024-05-10
Payer: MEDICAID

## 2024-05-10 ENCOUNTER — CLINICAL SUPPORT (OUTPATIENT)
Dept: REHABILITATION | Facility: HOSPITAL | Age: 49
End: 2024-05-10
Payer: MEDICAID

## 2024-05-10 DIAGNOSIS — M79.642 LEFT HAND PAIN: ICD-10-CM

## 2024-05-10 DIAGNOSIS — M18.12 ARTHRITIS OF CARPOMETACARPAL (CMC) JOINT OF LEFT THUMB: Primary | ICD-10-CM

## 2024-05-10 DIAGNOSIS — Z98.890 POSTOPERATIVE STATE: ICD-10-CM

## 2024-05-10 DIAGNOSIS — M79.642 LEFT HAND PAIN: Primary | ICD-10-CM

## 2024-05-10 PROCEDURE — 97760 ORTHOTIC MGMT&TRAING 1ST ENC: CPT

## 2024-05-10 PROCEDURE — 73130 X-RAY EXAM OF HAND: CPT | Mod: 26,LT,, | Performed by: INTERNAL MEDICINE

## 2024-05-10 PROCEDURE — 99024 POSTOP FOLLOW-UP VISIT: CPT | Mod: ,,, | Performed by: PHYSICIAN ASSISTANT

## 2024-05-10 PROCEDURE — 97110 THERAPEUTIC EXERCISES: CPT

## 2024-05-10 PROCEDURE — L3913 HFO W/O JOINTS CF: HCPCS

## 2024-05-10 PROCEDURE — 73130 X-RAY EXAM OF HAND: CPT | Mod: TC,LT

## 2024-05-10 NOTE — PROGRESS NOTES
Dr. Tom is the supervising physician for this encounter/patient    Velma Lopez presents for post-operative evaluation.  The patient is now 2 weeks s/p Left thumb CMC arthroplasty with Dr. Tom on 4/24/24.  Overall the patient reports doing well.  She is reports minimal pain, denies any f/c/s. She is no longer taking pain medications. She is not scheduled with OT.    PE:    AA&O x 4.  NAD  HEENT:  NCAT, sclera nonicteric  Lungs:  Respirations are equal and unlabored.  CV:  2+ bilateral upper and lower extremity pulses.  MSK: The wound is healing well with no signs of erythema or warmth.  There is no drainage.  No clinical signs or symptoms of infection are present.  SILT. DNVI.    A/P: Status post above, doing well  1) She is sent to OT for orthosis and postop rehab.  2) Sutures removed today. Wound care and signs of infection discussed. Scar massage discussed.  3) F/U 4 weeks.  4) Call with any questions/concerns in the interim      Jamie Russo-DiGeorge PA-C

## 2024-05-10 NOTE — PATIENT INSTRUCTIONS
[facility] Occupational Therapy  Orthosis Instructions and Proof of Delivery        Date: 5/10/2024  Name: Velma Lopez  MRN: [id number]  YOB: 1975  Referring Provider: Dr. Tom  Diagnosis: CMC Arthritis      [facility] Level II Code and Description      Orthosis Information  (X) Custom Fabricated                                                 (X) Left                                           (X) Static                                       Orthosis Instructions    (X) Remove for hygiene, exercises, dressing changes. (X) Wear for bedtime  (X) No heavy lifting    Cleaning and Maintenance  Keep your orthosis away from any heat sources. Do not leave in your car.  Keep your orthosis away from pets.  You may clean your orthosis with cool water and soap or a mild cleanser.  Your orthosis may need adjustments due to changes in your medical condition (swelling, dressing size, additional surgery, etc.)  Monitor your skin color and integrity.  Do not try to adjust the orthosis on your own.     If you have any questions or concerns, please contact the therapy office at [phone].     I, Velma Lopez , have personally received the above described orthosis along with instructions on the wear, care, and precautions related to this orthosis. I understand that I am responsible for payment to [facility] of my deductible, coinsurance, or other portion of my charges not paid in full by my insurance plans, including any item that is not covered under the insurance plan.     Signature: _________________________________ Date: __________________    Therapist:NEGIN Smith, OTR/L, CHT                           [facility], OCCUPATIONAL THERAPY  HOME EXERCISE PROGRAM

## 2024-05-16 ENCOUNTER — CLINICAL SUPPORT (OUTPATIENT)
Dept: REHABILITATION | Facility: HOSPITAL | Age: 49
End: 2024-05-16
Payer: MEDICAID

## 2024-05-16 DIAGNOSIS — Z98.890 POSTOPERATIVE STATE: ICD-10-CM

## 2024-05-16 DIAGNOSIS — M25.60 STIFFNESS IN JOINT: ICD-10-CM

## 2024-05-16 DIAGNOSIS — R53.1 WEAKNESS: Primary | ICD-10-CM

## 2024-05-16 DIAGNOSIS — M25.542 PAIN IN THUMB JOINT WITH MOVEMENT, LEFT: ICD-10-CM

## 2024-05-16 DIAGNOSIS — M18.12 ARTHRITIS OF CARPOMETACARPAL (CMC) JOINT OF LEFT THUMB: ICD-10-CM

## 2024-05-16 PROCEDURE — 97530 THERAPEUTIC ACTIVITIES: CPT | Mod: PO

## 2024-05-16 PROCEDURE — 97165 OT EVAL LOW COMPLEX 30 MIN: CPT | Mod: PO

## 2024-05-16 NOTE — PLAN OF CARE
OT Orthosis Only    TIME RECORD    Date:  5/10/2024    Start Time:  2pm  Stop Time:  230pm        Occupational Therapy Orthotic Note    Patient: Velma Lopez  MRN: [id number]  Date of visit: 5/10/2024  Referring Physician:  Dr. Tom  Next MD visit: 4 weeks    Treatment Diagnosis:   Encounter Diagnosis   Name Primary?    Arthritis of carpometacarpal (CMC) joint of left thumb Yes     DOS04/24/2024      Subjective:   Pt. Verbalizes good fit of orthotic following application. Pt. Able to demonstrate HEP for thumb AROM.     Objective:     Patient seen by OT this session for fabrication of orthosis per MD orders. Fabricated and applied . HEP provided for thumb AROM  Assessment:     Orthosis with good fit following fabrication. Pt. Able to javon/doff independently.      Patient Education/Response:   Pt. Instructed to wear continuous, remove for bathing or hygiene. Patient/caregiver were provided written instructions on orthosis purpose, wear schedule, care and precautions to monitor for increased pain/edema, pressure points, skin breakdown or redness/skin irritation Patient/caregiver to contact clinic for adjustments as needed.  Patient signed copy of orthosis instructions, acknowledging delivery and understanding of wear, care, and precautions     (see Media for scanned documents)    Plans and Goals:     Goal of Orthosis to protect sx site.  Orthosis Check PRN, f/u with MD at [facility]. Formal therapy eval to be completed in [place] at pt request.

## 2024-05-16 NOTE — PLAN OF CARE
"  [facility] Occupational Therapy  Initial Evaluation     Date: 5/16/2024  Patient: Velma Lopez  Chart Number: [id number]  Referring Physician: Russo-Digeorge, Jamie L*  Therapy Diagnosis:   1. Weakness        2. Arthritis of carpometacarpal (CMC) joint of left thumb  Ambulatory referral/consult to Physical/Occupational Therapy      3. Postoperative state  Ambulatory referral/consult to Physical/Occupational Therapy      4. Stiffness in joint        5. Pain in thumb joint with movement, left            Physician Orders: OT eval/treat   Medical Diagnosis: M18.12 (ICD-10-CM) - Arthritis of carpometacarpal (CMC) joint of left thumb Z98.890 (ICD-10-CM) - Postoperative state  Evaluation Date: 5/16/2024  Plan of Care Certification Date: 8/9/24  Authorization Period: 5/10/24 - 5/10/25  Surgery Date and Procedure:    1. left carpal trapeziectomy, CPT 07828  2. 1st to 2nd metacarpal tenodesis, CPT 023852/24/24  Date of Return to MD: 6/6/24    Visit #: 1 of 1  Time In: 0700  Time Out: 0800  Total Billable Time: 60    Precautions: Standard     Subjective     History of Current Condition: Pt went to the MD with left thumb pain at the base of the CMC on 4/8/24. She has a known history of CMC arthritis. Injections have helped in the past, but pt and MD decided to proceed with CMC arthroplasty. CMC arthoplasty was preformed on 4/24/25. Pt went to post op visit on 5/10/24 and received a hand based thumb spica splint. Pt arrived today wearing orthotic with mild pain.     Involved Side: L  Dominant Side: R  Date of Onset: 4/24/24  Imaging: reviewed  Previous Therapy: Y    Patient's Goals for Therapy: "To get back to 100% as soon as possible"     Pain:  Functional Pain Scale Rating 0-10:   3/10 on average  3/10 at best  8/10 at worst  Location: CMC joint   Description: aching, sharp   Aggravating Factors: random times  Easing Factors: HEP    Occupation:  SELF- EMPLOYED  Working presently: " self-employed      Functional Limitations/Social History:    Prior Level of Function: Independent   Current Level of Function:Modified Independent     Home/Living environment : lives with their family  Home Access: accessible   DME: none     Leisure: n/a    Driving: Y    Past Medical History/Physical Systems Review:   Velma Lopez  has a past medical history of Chronic back pain, Diabetes type 2, uncontrolled, Mild nonproliferative diabetic retinopathy of left eye without macular edema associated with type 2 diabetes mellitus, Morbid obesity with BMI of 40.0-44.9, adult, MILEY on CPAP, Plantar fasciitis of left foot, and Urticaria.    Velma Lopez  has a past surgical history that includes [place] tooth extraction; Cyst Removal; Dilation and curettage of uterus; Esophagogastroduodenoscopy (N/A, 6/14/2019); Upper gastrointestinal endoscopy; Esophageal motility study using high resolution manometry (N/A, 9/17/2019); Trigger finger release (Right, 10/4/2019); Arthroscopic repair of rotator cuff of shoulder (Right, 7/27/2020); Injection of steroid (Left, 7/27/2020); Decompression of subacromial space (Left, 7/27/2020); Arthroscopic acromioplasty of shoulder (Left, 7/27/2020); Gastric bypass (N/A, 05/20/2021); Colonoscopy (N/A, 4/7/2022); Lysis of adhesions (N/A, 10/21/2022); Transverse colectomy (N/A, 10/26/2022); Foreign Body Removal (N/A, 4/17/2023); laparotomy, exploratory (N/A, 8/18/2023); Lysis of adhesions (N/A, 8/18/2023); and Interposition arthroplasty of carpometacarpal joints (Left, 4/24/2024).    Velma has a current medication list which includes the following prescription(s): acetaminophen, amitriptyline, celecoxib, cetirizine, cyanocobalamin, docusate sodium, ergocalciferol, estradiol 0.025 mg/24 hr, ibuprofen, medroxyprogesterone, medroxyprogesterone, meloxicam, centrum silver women, oxycodone, oxycodone-acetaminophen, promethazine, solifenacin, terconazole, tizanidine, [DISCONTINUED]  atorvastatin, [DISCONTINUED] blood-glucose meter, [DISCONTINUED] diclofenac sodium, [DISCONTINUED] hyoscyamine, [DISCONTINUED] losartan, and [DISCONTINUED] metformin, and the following Facility-Administered Medications: lidocaine (pf) 10 mg/ml (1%), midazolam, and ropivacaine hcl/pf.    Review of patient's allergies indicates:  No Known Allergies       Objective     Mental status: alert, oriented x3    Observation:   Pt arrived in clinc wearing a hand based thumb spica splint  Pt has steri strips over incision. Wound is healing well with no signs of infection      Sensation: Pt states she has mild numbness and tingling occasionally     Wound Assessment:   Size: 3.5 cm  Location: CMC joint    Edema: Circumferential measurements: In centimters     5/16/2024 5/16/2024    Left Right   Thumb:     Prox. Phalanx 6.5 cm 6.5 cm   Distal Phalanx 6.5 cm 6.5 cm      Left   5/16/2024 Right  5/16/2024   PWC (Proximal Wrist Crease) 16.5 cm 16.5 cm       Range of Motion:   Left      Wrist Left  5/16/2024 Right  5/16/2024   Extension 50 65   Flexion 40 60   Ulnar Deviation 40 40   Radial Deviation 10 20        Left Thumb range of motion  5/16/2024 Right Thumb range of motion  5/16/2024   MP 40 65   IP 40 65   Radial Abduction 50 60   Palmar Abduction 40 60   Opposition 1 cm from tip of SF DPC        Strength: (MT Dynamometer in psi.)      5/16/2024 5/16/2024    Left Right   Rung II NT 40       Pinch Strength (Measured in psi)     5/16/2024 5/16/2024    Left Right   Key Pinch NT  10    3pt Pinch NT  10    2pt Pinch NT  11      Fine Motor Coordination: 9 Hole Peg Test  Left 5/16/2024 Right 5/16/2024         CMS Impairment/Limitation/Restriction for FOTO Hand/Wrist/Finger Survey    Therapist reviewed FOTO scores for Velma Lopez on 5/16/2024.   FOTO documents entered into [employer] - see Media section.    Intake Score: 35%  Category: Self Care             Treatment     Treatment Time In: 0745  Treatment Time Out: 0800  Total  Treatment time separate from Evaluation time:60    Velma participated in dynamic functional therapeutic activities to improve functional performance for 15  minutes, including:  -review over HEP that was given at orthotic fabrication session    Home Exercise Program/Education:  Issued HEP (see patient instructions in EMR) and educated on modality use for pain management . Exercises were reviewed and Velma was able to demonstrate them prior to the end of the session.   Pt received a written copy of exercises to perform at home. Velma demonstrated good  understanding of the education provided.  Pt was advised to perform these exercises free of pain, and to stop performing them if pain occurs.    Patient/Family Education: role of OT, goals for OT, scheduling/cancellations - pt verbalized understanding. Discussed insurance limitations with patient.      Assessment     Velma Lopez is a 49 y.o. female presents with limitations as described in problem list. Patient can benefit from Occupational Therapy services for Iontophoresis, ultrasound, moist heat, therapeutic exercises, home exercise program provied with written instructions, ice and strengthening and orthotics, if deemed necessary . The following goals were discussed with the patient and she is in agreement with them as to be addressed in the treatment plan.    The patient's rehab potential is Excellent.     Anticipated barriers to occupational therapy: none at this time  Pt has no cultural, educational or language barriers to learning provided.    Profile and History Assessment of Occupational Performance Level of Clinical Decision Making Complexity Score   Occupational Profile:   Velma Lopez is a 49 y.o. female who is currently employed Velma Lopez has difficulty with  ADLs and IADLs as listed previously, which  affecting his/her daily functional abilities.      Comorbidities:    has a past medical history of Chronic back pain,  Diabetes type 2, uncontrolled, Mild nonproliferative diabetic retinopathy of left eye without macular edema associated with type 2 diabetes mellitus, Morbid obesity with BMI of 40.0-44.9, adult, MILEY on CPAP, Plantar fasciitis of left foot, and Urticaria.    Medical and Therapy History Review:   Expanded               Performance Deficits    Physical:  Joint Mobility  Joint Stability  Muscle Power/Strength  Muscle Endurance   Strength  Pinch Strength  Pain    Cognitive:  No Deficits    Psychosocial:    Habits  Routines  Rituals     Clinical Decision Making:  low    Assessment Process:  Problem-Focused Assessments    Modification/Need for Assistance:  Minimal-Moderate Modifications/Assistance    Intervention Selection:  Several Treatment Options       low  Based on PMHX, co morbidities , data from assessments and functional level of assistance required with task and clinical presentation directly impacting function.         Goals:    LTG's (12 weeks):  1)   Increase ROM to 60 degrees in swartz abduction of thumb to increase functional hand use for ADLs, IADLs, and work tasks  2)   Increase ROM to 60 degrees in radial abduction of thumb to increase functional hand use for ADLs, IADLs, and work tasks  3)   Increase ROM to 60 degrees in thumb MP flexion to increase functional hand use for ADLs, IADLs, and work tasks  4)   Increase ROM to 60 degrees in thumb IP flexion to increase functional hand use for ADLs, IADLs, and work tasks  5)   Increase  strength to 35 lbs. to grasp for ADLs, IADLs, and work task  6)   Increase key pinch to 8 psis to increase independence with button and FM Coordination  7)   Decrease complaints of pain to  1 out of 10 at worst to increase functional hand use for ADL/work/leisure activities.  8)   Patient to score at 59% or more on FOTO to demonstrate improved perception of functional hand use.  9)   Pt will return to near to prior level of function for ADLs and household management  reporting I or Mod I with ADLs (dressing, feeding, grooming, toileting).     STG's (6 weeks)  1)   Patient to be IND with HEP and modalities for pain/edema managment.  2)   Increase ROM to 50 degrees in swartz abduction of thumb to increase functional hand use for ADLs, IADLs, and work tasks  3)   Increase ROM to 55 degrees in radial abduction of thumb to increase functional hand use for ADLs, IADLs, and work tasks  4)   Increase ROM to 50 degrees in thumb MP flexion to increase functional hand use for ADLs, IADLs, and work tasks  5)   Increase ROM to 50 degrees in thumb IP flexion to increase functional hand use for ADLs, IADLs, and work tasks  6)   Increase  strength to 15 lbs. to grasp for ADLs, IADLs, and work task  7)   Increase key pinch to 4 psis to increase independence with button and FM Coordination  8)   Patient to be IND wiht Orthotic use, wear and care precautions.   9)   Decrease complaints of pain to  4 out of 10 at worst to increase functional hand use for ADL/work/leisure activities.          Plan     Pt to be treated by Occupational Therapy 2 times per week for 12 weeks during the certification period from 5/16/2024 to 8/9/24 to achieve the established goals.     Treatment to include: Paraffin, Fluidotherapy, Manual therapy/joint mobilizations, Modalities for pain management, US 3 mhz, Therapeutic exercises/activities., Iontophoresis with 2.0 cc Dexamethasone, Strengthening, Orthotic Fabrication/Fit/Training, Edema Control, Scar Management, Wound Care, Electrical Modalities, Joint Protection, and Energy Conservation, as well as any other treatments deemed necessary based on the patient's needs or progress.     KRISHNA Huffman

## 2024-05-18 ENCOUNTER — PATIENT MESSAGE (OUTPATIENT)
Dept: ADMINISTRATIVE | Facility: HOSPITAL | Age: 49
End: 2024-05-18
Payer: MEDICAID

## 2024-05-21 ENCOUNTER — OFFICE VISIT (OUTPATIENT)
Dept: OBSTETRICS AND GYNECOLOGY | Facility: CLINIC | Age: 49
End: 2024-05-21
Payer: MEDICAID

## 2024-05-21 ENCOUNTER — CLINICAL SUPPORT (OUTPATIENT)
Dept: REHABILITATION | Facility: HOSPITAL | Age: 49
End: 2024-05-21
Payer: MEDICAID

## 2024-05-21 VITALS
DIASTOLIC BLOOD PRESSURE: 75 MMHG | HEIGHT: 66 IN | WEIGHT: 144.63 LBS | SYSTOLIC BLOOD PRESSURE: 131 MMHG | BODY MASS INDEX: 23.24 KG/M2

## 2024-05-21 DIAGNOSIS — R68.82 LOW LIBIDO: ICD-10-CM

## 2024-05-21 DIAGNOSIS — M25.60 STIFFNESS IN JOINT: ICD-10-CM

## 2024-05-21 DIAGNOSIS — N95.1 MENOPAUSAL SYMPTOMS: ICD-10-CM

## 2024-05-21 DIAGNOSIS — N95.1 POST MENOPAUSAL SYNDROME: Primary | ICD-10-CM

## 2024-05-21 DIAGNOSIS — R53.1 WEAKNESS: Primary | ICD-10-CM

## 2024-05-21 DIAGNOSIS — M25.542 PAIN IN THUMB JOINT WITH MOVEMENT, LEFT: ICD-10-CM

## 2024-05-21 PROCEDURE — 99214 OFFICE O/P EST MOD 30 MIN: CPT | Mod: S$PBB,,, | Performed by: OBSTETRICS & GYNECOLOGY

## 2024-05-21 PROCEDURE — 97530 THERAPEUTIC ACTIVITIES: CPT | Mod: PO

## 2024-05-21 PROCEDURE — 99213 OFFICE O/P EST LOW 20 MIN: CPT | Mod: PBBFAC,PO | Performed by: OBSTETRICS & GYNECOLOGY

## 2024-05-21 PROCEDURE — 99999 PR PBB SHADOW E&M-EST. PATIENT-LVL III: CPT | Mod: PBBFAC,,, | Performed by: OBSTETRICS & GYNECOLOGY

## 2024-05-21 PROCEDURE — 1160F RVW MEDS BY RX/DR IN RCRD: CPT | Mod: CPTII,,, | Performed by: OBSTETRICS & GYNECOLOGY

## 2024-05-21 PROCEDURE — 3008F BODY MASS INDEX DOCD: CPT | Mod: CPTII,,, | Performed by: OBSTETRICS & GYNECOLOGY

## 2024-05-21 PROCEDURE — 3078F DIAST BP <80 MM HG: CPT | Mod: CPTII,,, | Performed by: OBSTETRICS & GYNECOLOGY

## 2024-05-21 PROCEDURE — 1159F MED LIST DOCD IN RCRD: CPT | Mod: CPTII,,, | Performed by: OBSTETRICS & GYNECOLOGY

## 2024-05-21 PROCEDURE — 3075F SYST BP GE 130 - 139MM HG: CPT | Mod: CPTII,,, | Performed by: OBSTETRICS & GYNECOLOGY

## 2024-05-21 RX ORDER — ESTRADIOL AND LEVONORGESTREL .045; .015 MG/D; MG/D
1 PATCH TRANSDERMAL WEEKLY
Qty: 4 PATCH | Refills: 11 | Status: SHIPPED | OUTPATIENT
Start: 2024-05-21 | End: 2025-05-21

## 2024-05-21 NOTE — PROGRESS NOTES
CC: Annual check-up    SUBJECTIVE:   49 y.o. female   for complaints of low libido and post menopausal sxs. Patient's last menstrual period was 04/10/2024 (exact date)..  She was prescribed estrogen patch at last visit however she did not take it because she thought that she had to stop the Depo-Provera.  She also stopped that Depo-Provera would help with her hot flashes and perimenopausal symptoms.  Has been having irregular periods for many years due to the Depo however has only recently started having postmenopausal symptoms.  Recent FSH testing revealed grossly elevated FSH it is consistent with postmenopausal state.  More recently has been having issues with intimacy with her partner.  Has extremely low libido.  This is new for her.  Has not had any issues with libido in the past.  Denies other life stressors that would be affecting her sexual health.        Past Medical History:   Diagnosis Date    Chronic back pain     Diabetes type 2, uncontrolled     Mild nonproliferative diabetic retinopathy of left eye without macular edema associated with type 2 diabetes mellitus 10/21/2019    Morbid obesity with BMI of 40.0-44.9, adult     MILEY on CPAP     Plantar fasciitis of left foot     Urticaria      Past Surgical History:   Procedure Laterality Date    ARTHROSCOPIC ACROMIOPLASTY OF SHOULDER Left 2020    Procedure: ACROMIOPLASTY, ARTHROSCOPIC;  Surgeon: Marcela Garza MD;  Location: [facility] OR;  Service: Orthopedics;  Laterality: Left;    ARTHROSCOPIC REPAIR OF ROTATOR CUFF OF SHOULDER Right 2020    Procedure: REPAIR, ROTATOR CUFF, ARTHROSCOPIC;  Surgeon: Marcela Garza MD;  Location: [facility] OR;  Service: Orthopedics;  Laterality: Right;  GENERAL/REGIONAL    COLONOSCOPY N/A 2022    Procedure: COLONOSCOPY;  Surgeon: Yadi Wong MD;  Location: [facility] ([address] [place] [address]);  Service: Endoscopy;  Laterality: N/A;  Covid test at [place] on .EC    CYST REMOVAL      ovarian cyst removal  at age 12    DECOMPRESSION OF SUBACROMIAL SPACE Left 07/27/2020    Procedure: DECOMPRESSION, SUBACROMIAL SPACE;  Surgeon: Marcela Garza MD;  Location: [facility] OR;  Service: Orthopedics;  Laterality: Left;    DILATION AND CURETTAGE OF UTERUS      ESOPHAGEAL MOTILITY STUDY USING HIGH RESOLUTION MANOMETRY N/A 09/17/2019    Procedure: MOTILITY STUDY, ESOPHAGUS, USING HIGH RESOLUTION MANOMETRY;  Surgeon: Leopoldo Swanson MD;  Location: [facility] ENDO (4TH FLR);  Service: Endoscopy;  Laterality: N/A;  Pt will be taking xanax before procedure to hopefull help with anxiety.    ESOPHAGOGASTRODUODENOSCOPY N/A 06/14/2019    Procedure: EGD (ESOPHAGOGASTRODUODENOSCOPY);  Surgeon: Ced Guevara MD;  Location: [facility] (4TH FLR);  Service: Endoscopy;  Laterality: N/A;  Please make sure it is scheduled after her cardiology appt.- see cardiology note dated 6/13/19 for clearance - ERW    FOREIGN BODY REMOVAL N/A 04/17/2023    Procedure: REMOVAL, FOREIGN BODY;  Surgeon: Rom Rodriguez MD;  Location: [facility] OR;  Service: General;  Laterality: N/A;    GASTRIC BYPASS N/A 05/20/2021    INJECTION OF STEROID Left 07/27/2020    Procedure: INJECTION, STEROID LEFT THUMB TRIGGER FINGER;  Surgeon: Marcela Garza MD;  Location: [facility] OR;  Service: Orthopedics;  Laterality: Left;  LEFT THUMB, TRIGGER FINGER    INTERPOSITION ARTHROPLASTY OF CARPOMETACARPAL JOINTS Left 04/24/2024    Procedure: INTERPOSITION ARTHROPLASTY, CMC JOINT - LEFT;  Surgeon: Craig Tom MD;  Location: [facility] OR;  Service: Orthopedics;  Laterality: Left;    LAPAROTOMY, EXPLORATORY N/A 08/18/2023    Procedure: LAPAROTOMY, EXPLORATORY;  Surgeon: Bunny Obrien MD;  Location: [facility] OR;  Service: General;  Laterality: N/A;    LYSIS OF ADHESIONS N/A 10/21/2022    Procedure: LYSIS, ADHESIONS;  Surgeon: Bunny Obrien MD;  Location: [facility] OR;  Service: General;  Laterality: N/A;    LYSIS OF ADHESIONS N/A 08/18/2023    Procedure: LYSIS, ADHESIONS;  Surgeon:  Bunny Obrien MD;  Location: [facility];  Service: General;  Laterality: N/A;    THUMB ARTHROSCOPY Left     TRANSVERSE COLECTOMY N/A 10/26/2022    Procedure: COLECTOMY, TRANSVERSE;  Surgeon: Bunny Obrien MD;  Location: [facility] OR;  Service: General;  Laterality: N/A;    TRIGGER FINGER RELEASE Right 10/04/2019    Procedure: RELEASE, TRIGGER FINGER - right thumb;  Surgeon: Craig Tom MD;  Location: [facility] OR;  Service: Orthopedics;  Laterality: Right;    UPPER GASTROINTESTINAL ENDOSCOPY      WISDOM TOOTH EXTRACTION       Social History     Socioeconomic History    Marital status: Single   Tobacco Use    Smoking status: Every Day     Current packs/day: 0.50     Average packs/day: 0.5 packs/day for 32.4 years (16.4 ttl pk-yrs)     Types: Cigarettes     Start date: 1992    Smokeless tobacco: Never    Tobacco comments:     Enrolled in the [employer] on 8/1/18 ([facility] Member ID # [id number]) Ambulatory referral to Smoking Cessation Program.     Substance and Sexual Activity    Alcohol use: Yes     Comment: social    Drug use: No    Sexual activity: Yes     Partners: Male     Birth control/protection: None   Social History Narrative    Single.  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      Social Determinants of Health     Financial Resource Strain: Low Risk  (8/21/2023)    Overall Financial Resource Strain (CARDIA)     Difficulty of Paying Living Expenses: Not hard at all   Food Insecurity: No Food Insecurity (8/21/2023)    Hunger Vital Sign     Worried About Running Out of Food in the Last Year: Never true     Ran Out of Food in the Last Year: Never true   Transportation Needs: No Transportation Needs (6/20/2023)    PRAPARE - Transportation     Lack of Transportation (Medical): No     Lack of Transportation (Non-Medical): No   Physical Activity: Insufficiently Active (8/21/2023)    Exercise Vital Sign     Days of Exercise per Week: 3 days     Minutes of Exercise per Session: 30 min   Stress: No  Stress Concern Present (2023)    [employer] - Occupational Stress Questionnaire     Feeling of Stress : Not at all   Housing Stability: Low Risk  (2023)    Housing Stability Vital Sign     Unable to Pay for Housing in the Last Year: No     Number of Places Lived in the Last Year: 1     Unstable Housing in the Last Year: No     Family History   Problem Relation Name Age of Onset    Cancer Maternal Grandmother      Diabetes Mother      No Known Problems Sister 3     No Known Problems Sister      No Known Problems Sister      Breast cancer Maternal Aunt  70    Colon cancer Neg Hx      Stomach cancer Neg Hx      Inflammatory bowel disease Neg Hx      Esophageal cancer Neg Hx       OB History    Para Term  AB Living   5 1 1   4 1   SAB IAB Ectopic Multiple Live Births   4       1      # Outcome Date GA Lbr Chucho/2nd Weight Sex Type Anes PTL Lv   5 Term  40w0d   M Vag-Spont   LINDSAY   4 SAB            3 SAB            2 SAB            1 SAB                  Current Outpatient Medications   Medication Sig Dispense Refill    acetaminophen (TYLENOL) 500 MG tablet Take 2 tablets (1,000 mg total) by mouth 2 (two) times a day. 20 tablet 0    amitriptyline (ELAVIL) 10 MG tablet TAKE 1 TABLET BY MOUTH EVERY DAY IN THE EVENING (Patient taking differently: Take 10 mg by mouth every evening.) 90 tablet 0    cetirizine (ZYRTEC) 10 MG tablet Take 10 mg by mouth once daily.      cyanocobalamin (VITAMIN B-12) 1000 MCG tablet Take 1,000 mcg by mouth once a week.      docusate sodium (COLACE) 100 MG capsule Take 1 capsule (100 mg total) by mouth 2 (two) times daily. 60 capsule 3    ergocalciferol (VITAMIN D2) 50,000 unit Cap Take 50,000 Units by mouth every .      ibuprofen (ADVIL,MOTRIN) 600 MG tablet Take 1 tablet (600 mg total) by mouth 3 (three) times daily. 20 tablet 0    meloxicam (MOBIC) 7.5 MG tablet Take 7.5 mg by mouth daily as needed.      multivit-min-iron-FA-vit K-lut  (CENTRUM SILVER WOMEN) 8 mg iron-400 mcg-50 mcg Tab Take 1 tablet by mouth once daily.      oxyCODONE (ROXICODONE) 5 MG immediate release tablet Take 1 tablet (5 mg total) by mouth every 6 (six) hours as needed for Pain. 20 tablet 0    oxyCODONE-acetaminophen (PERCOCET) 5-325 mg per tablet Take 1 tablet by mouth every 6 (six) hours as needed for Pain (To be taken on post op days 1-2). 15 tablet 0    promethazine (PHENERGAN) 6.25 mg/5 mL syrup Take 20 mLs (25 mg total) by mouth every 8 (eight) hours as needed for Nausea. 473 mL 0    solifenacin (VESICARE) 10 MG tablet TAKE 1 TABLET BY MOUTH EVERY DAY 30 tablet 11    terconazole (TERAZOL 3) 0.8 % vaginal cream Place 1 applicator vaginally every evening. 20 g 1    tiZANidine (ZANAFLEX) 4 MG tablet TAKE 1/2-1 TABLET BY MOUTH AT BEDTIME AS NEEDED FOR SPASM      celecoxib (CELEBREX) 200 MG capsule Take 1 capsule (200 mg total) by mouth once daily. 30 capsule 0    estradioL-levonorgestreL (CLIMARA PRO) 0.045-0.015 mg/24 hr Place 1 patch onto the skin once a week. 4 patch 11     No current facility-administered medications for this visit.     Facility-Administered Medications Ordered in Other Visits   Medication Dose Route Frequency Provider Last Rate Last Admin    lidocaine (PF) 10 mg/ml (1%) injection 10 mg  1 mL Intradermal Once [place], Brenna Latif MD        midazolam (VERSED) 1 mg/mL injection 0.5 mg  0.5 mg Intravenous PRN Ced Patino MD   2 mg at 07/27/20 1322    ropivacaine (Naropin) 1000 mg/500 mL (2 mg/mL) [facility] PainPRO Pump infusion  4 mL/hr Perineural Continuous Ced Patino MD 4 mL/hr at 07/27/20 1519 4 mL/hr at 07/27/20 1519     Allergies: Patient has no known allergies.     ROS:  Constitutional: no weight loss, weight gain, fever, fatigue  Eyes:  No vision changes, glasses/contacts  ENT/Mouth: No ulcers, sinus problems, ears ringing, headache  Cardiovascular: No inability to lie flat, chest pain, exercise intolerance, swelling, heart  "palpitations  Respiratory: No wheezing, coughing blood, shortness of breath, or cough  Gastrointestinal: No diarrhea, bloody stool, nausea/vomiting, constipation, gas, hemorrhoids  Genitourinary: No blood in urine, painful urination, urgency of urination, frequency of urination, incomplete emptying, incontinence, abnormal bleeding, painful periods, heavy periods, vaginal discharge, vaginal odor, painful intercourse, sexual problems, bleeding after intercourse.  Musculoskeletal: No muscle weakness  Skin/Breast: No painful breasts, nipple discharge, masses, rash, ulcers  Neurological: No passing out, seizures, numbness, headache  Endocrine: No diabetes, hypothyroid, hyperthyroid, hot flashes, hair loss, abnormal hair growth, ance  Psychiatric: No depression, crying  Hematologic: No bruises, bleeding, swollen lymph nodes, anemia.      OBJECTIVE:   The patient appears well, alert, oriented x 3, in no distress.  /75   Ht 5' 6" (1.676 m)   Wt 65.6 kg (144 lb 10 oz)   LMP 04/10/2024 (Exact Date)   BMI 23.34 kg/m²   NECK: no thyromegaly, trachea midline  SKIN: no acne, striae, hirsutism  BREAST EXAM: breasts appear normal, no suspicious masses, no skin or nipple changes or axillary nodes, not examined  ABDOMEN: no hernias, masses, or hepatosplenomegaly  GENITALIA:  not examined    ASSESSMENT:   well woman  no contraindication to continue hormonal therapy  1. Post menopausal syndrome        PLAN:   additional lab tests per orders  return annually or prn  Informed patient to continue estrogen only patch while on Depo, last dose given in April.  After this last dose of Depo, transitioned to combined estrogen progesterone patch and discontinue Depo and estrogen only patch.  Orders Placed This Encounter    estradioL-levonorgestreL (CLIMARA PRO) 0.045-0.015 mg/24 hr         "

## 2024-05-21 NOTE — PROGRESS NOTES
"  Occupational Therapy Daily Treatment Note     Name: Velma Lopez  Clinic Number: [id number]    Therapy Diagnosis:   Encounter Diagnoses   Name Primary?    Weakness Yes    Stiffness in joint     Pain in thumb joint with movement, left      Physician: Russo-Digeorge, Jamie L*    Visit Date: 5/21/2024  Physician Orders: OT eval/treat   Medical Diagnosis: M18.12 (ICD-10-CM) - Arthritis of carpometacarpal (CMC) joint of left thumb Z98.890 (ICD-10-CM) - Postoperative state  Evaluation Date: 5/16/2024  Plan of Care Certification Date: 8/9/24  Authorization Period: 5/10/24 - 5/10/25  Surgery Date and Procedure:    1. left carpal trapeziectomy, CPT 69532  2. 1st to 2nd metacarpal tenodesis, CPT 729551/24/24  Date of Return to MD: 6/6/24  Onset Date: 4/24/24  Visit # / Visits authorized: 1 / 20  FOTO Completion: 1/3    Time In:1000  Time Out: 1100  Total Billable Time: 60 minutes    Precautions:  Standard      Subjective     Pt reports: "I have been completing my exercises"  she was compliant with home exercise program given last session.   Response to previous treatment:1st after   Functional change: 1st after     Pain: 5/10  Location: left hands      Objective       Mental status: alert, oriented x3     Observation:   Pt arrived in clinc wearing a hand based thumb spica splint  Pt has steri strips over incision. Wound is healing well with no signs of infection        Sensation: Pt states she has mild numbness and tingling occasionally      Wound Assessment:   Size: 3.5 cm  Location: CMC joint     Edema: Circumferential measurements: In centimters       5/16/2024 5/16/2024     Left Right   Thumb:       Prox. Phalanx 6.5 cm 6.5 cm   Distal Phalanx 6.5 cm 6.5 cm        Left   5/16/2024 Right  5/16/2024   PWC (Proximal Wrist Crease) 16.5 cm 16.5 cm         Range of Motion:   Left        Wrist Left  5/16/2024 Right  5/16/2024   Extension 50 65   Flexion 40 60   Ulnar Deviation 40 40   Radial Deviation 10 20           " Left Thumb range of motion  5/16/2024 Right Thumb range of motion  5/16/2024   MP 40 65   IP 40 65   Radial Abduction 50 60   Palmar Abduction 40 60   Opposition 1 cm from tip of SF DPC          Strength: (MT Dynamometer in psi.)        5/16/2024 5/16/2024     Left Right   Rung II NT 40         Pinch Strength (Measured in psi)       5/16/2024 5/16/2024     Left Right   Key Pinch NT  10    3pt Pinch NT  10    2pt Pinch NT  11          CMS Impairment/Limitation/Restriction for FOTO Hand/Wrist/Finger Survey     Therapist reviewed FOTO scores for Velma Lopez on 5/16/2024.   FOTO documents entered into [employer] - see Media section.     Intake Score: 35%  Category: Self Care                    Treatment       Velma participated in dynamic functional therapeutic activities to improve functional performance for 60  minutes, including:  -hot pack pre treatment to increase extensibility   - AROM: thumb MP flexion, thumb IP flexion, palmar abduction, radial abduction, circles CW, circles CCW  - pom pom  with thumb using opposition to each finger x 2 containers   - coin tower x 2   - isospheres x 3 minutes   - ABC tennis ball     Home Exercises and Education Provided     Education provided:   - Progress towards goals     Written Home Exercises Provided: Patient instructed to cont prior HEP.  Exercises were reviewed and Velma was able to demonstrate them prior to the end of the session.  Velma demonstrated good  understanding of the HEP provided.   .   See EMR under Patient Instructions for exercises provided prior visit.        Assessment     Pt would continue to benefit from skilled OT. Pt tolerated added AROM therapeutic activities well today without additional complaints of pain. Pt is motivated. Will progress as tolerated.     Velma is progressing well towards her goals and there are no updates to goals at this time. Pt prognosis is Excellent.     Pt will continue to benefit from skilled  outpatient occupational therapy to address the deficits listed in the problem list on initial evaluation provide pt/family education and to maximize pt's level of independence in the home and community environment.     Anticipated barriers to occupational therapy: none at this time    Pt's spiritual, cultural and educational needs considered and pt agreeable to plan of care and goals.      Goals:    LTG's (12 weeks):  1)   Increase ROM to 60 degrees in swartz abduction of thumb to increase functional hand use for ADLs, IADLs, and work tasks Ongoing  2)   Increase ROM to 60 degrees in radial abduction of thumb to increase functional hand use for ADLs, IADLs, and work tasksOngoing  3)   Increase ROM to 60 degrees in thumb MP flexion to increase functional hand use for ADLs, IADLs, and work tasksOngoing  4)   Increase ROM to 60 degrees in thumb IP flexion to increase functional hand use for ADLs, IADLs, and work tasksOngoing  5)   Increase  strength to 35 lbs. to grasp for ADLs, IADLs, and work taskOngoing  6)   Increase key pinch to 8 psis to increase independence with button and FM CoordinationOngoing  7)   Decrease complaints of pain to  1 out of 10 at worst to increase functional hand use for ADL/work/leisure activities.Ongoing  8)   Patient to score at 59% or more on FOTO to demonstrate improved perception of functional hand use.Ongoing  9)   Pt will return to near to prior level of function for ADLs and household management reporting I or Mod I with ADLs (dressing, feeding, grooming, toileting). Ongoing     STG's (6 weeks)  1)   Patient to be IND with HEP and modalities for pain/edema managment.Ongoing  2)   Increase ROM to 50 degrees in swartz abduction of thumb to increase functional hand use for ADLs, IADLs, and work tasksOngoing  3)   Increase ROM to 55 degrees in radial abduction of thumb to increase functional hand use for ADLs, IADLs, and work tasksOngoing  4)   Increase ROM to 50 degrees in thumb MP  flexion to increase functional hand use for ADLs, IADLs, and work tasksOngoing  5)   Increase ROM to 50 degrees in thumb IP flexion to increase functional hand use for ADLs, IADLs, and work tasksOngoing  6)   Increase  strength to 15 lbs. to grasp for ADLs, IADLs, and work taskOngoing  7)   Increase key pinch to 4 psis to increase independence with button and FM CoordinationOngoing  8)   Patient to be IND wiht Orthotic use, wear and care precautions. Ongoing  9)   Decrease complaints of pain to  4 out of 10 at worst to increase functional hand use for ADL/work/leisure activities.Ongoing              Plan      Pt to be treated by Occupational Therapy 2 times per week for 12 weeks during the certification period from 5/16/2024 to 8/9/24 to achieve the established goals.      Treatment to include: Paraffin, Fluidotherapy, Manual therapy/joint mobilizations, Modalities for pain management, US 3 mhz, Therapeutic exercises/activities., Iontophoresis with 2.0 cc Dexamethasone, Strengthening, Orthotic Fabrication/Fit/Training, Edema Control, Scar Management, Wound Care, Electrical Modalities, Joint Protection, and Energy Conservation, as well as any other treatments deemed necessary based on the patient's needs or progress.     Updates/Grading for next session: Continue current plan of care       Radha Sutton, OT

## 2024-05-24 ENCOUNTER — CLINICAL SUPPORT (OUTPATIENT)
Dept: REHABILITATION | Facility: HOSPITAL | Age: 49
End: 2024-05-24
Payer: MEDICAID

## 2024-05-24 DIAGNOSIS — M25.542 PAIN IN THUMB JOINT WITH MOVEMENT, LEFT: ICD-10-CM

## 2024-05-24 DIAGNOSIS — M25.60 STIFFNESS IN JOINT: ICD-10-CM

## 2024-05-24 DIAGNOSIS — R53.1 WEAKNESS: Primary | ICD-10-CM

## 2024-05-24 PROCEDURE — 97530 THERAPEUTIC ACTIVITIES: CPT | Mod: PO

## 2024-05-24 NOTE — PROGRESS NOTES
"  Occupational Therapy Daily Treatment Note     Name: Velma Lopez  Clinic Number: [id number]    Therapy Diagnosis:   Encounter Diagnoses   Name Primary?    Weakness Yes    Stiffness in joint     Pain in thumb joint with movement, left      Physician: Russo-Digeorge, Jamie L*    Visit Date: 5/24/2024  Physician Orders: OT eval/treat   Medical Diagnosis: M18.12 (ICD-10-CM) - Arthritis of carpometacarpal (CMC) joint of left thumb Z98.890 (ICD-10-CM) - Postoperative state  Evaluation Date: 5/16/2024  Plan of Care Certification Date: 8/9/24  Authorization Period: 5/10/24 - 5/10/25  Surgery Date and Procedure:    1. left carpal trapeziectomy, CPT 18240  2. 1st to 2nd metacarpal tenodesis, CPT 147455/24/24  Date of Return to MD: 6/6/24  Onset Date: 4/24/24  Visit # / Visits authorized: 2 / 20  FOTO Completion: 1/3    Time In:1000  Time Out: 1100  Total Billable Time: 60 minutes    Precautions:  Standard      Subjective     Pt reports: "I have been completing my exercises"  she was compliant with home exercise program given last session.   Response to previous treatment:increased ROM  Functional change: increased ROM    Pain: 4/10  Location: left hands      Objective       Mental status: alert, oriented x3     Observation:   Pt arrived in clinc wearing a hand based thumb spica splint  Pt has steri strips over incision. Wound is healing well with no signs of infection        Sensation: Pt states she has mild numbness and tingling occasionally      Wound Assessment:   Size: 3.5 cm  Location: CMC joint     Edema: Circumferential measurements: In centimters       5/16/2024 5/16/2024     Left Right   Thumb:       Prox. Phalanx 6.5 cm 6.5 cm   Distal Phalanx 6.5 cm 6.5 cm        Left   5/16/2024 Right  5/16/2024   PWC (Proximal Wrist Crease) 16.5 cm 16.5 cm         Range of Motion:   Left        Wrist Left  5/16/2024 Right  5/16/2024   Extension 50 65   Flexion 40 60   Ulnar Deviation 40 40   Radial Deviation 10 20       "     Left Thumb range of motion  5/16/2024 Right Thumb range of motion  5/16/2024   MP 40 65   IP 40 65   Radial Abduction 50 60   Palmar Abduction 40 60   Opposition 1 cm from tip of SF DPC          Strength: (MT Dynamometer in psi.)        5/16/2024 5/16/2024     Left Right   Rung II NT 40         Pinch Strength (Measured in psi)       5/16/2024 5/16/2024     Left Right   Key Pinch NT  10    3pt Pinch NT  10    2pt Pinch NT  11          CMS Impairment/Limitation/Restriction for FOTO Hand/Wrist/Finger Survey     Therapist reviewed FOTO scores for Velma Lopez on 5/16/2024.   FOTO documents entered into [employer] - see Media section.     Intake Score: 35%  Category: Self Care                    Treatment       Velma participated in dynamic functional therapeutic activities to improve functional performance for 60  minutes, including:  -hot pack with paraffin wax pre treatment to increase extensibility   - AROM: thumb MP flexion, thumb IP flexion, palmar abduction, radial abduction, circles CW, circles CCW  - pom pom  with thumb using opposition to each finger x 2 containers   - coin tower x 2   - isospheres x 3 minutes   - ABC tennis ball   - scar massage to incision site  - distraction of MP and IP joints     Home Exercises and Education Provided     Education provided:   - Progress towards goals     Written Home Exercises Provided: Patient instructed to cont prior HEP.  Exercises were reviewed and Velma was able to demonstrate them prior to the end of the session.  Velma demonstrated good  understanding of the HEP provided.   .   See EMR under Patient Instructions for exercises provided prior visit.        Assessment     Pt would continue to benefit from skilled OT. Pt tolerated added AROM therapeutic activities well today without additional complaints of pain. Pt is motivated. Will progress as tolerated.     Velma is progressing well towards her goals and there are no updates to goals at  this time. Pt prognosis is Excellent.     Pt will continue to benefit from skilled outpatient occupational therapy to address the deficits listed in the problem list on initial evaluation provide pt/family education and to maximize pt's level of independence in the home and community environment.     Anticipated barriers to occupational therapy: none at this time    Pt's spiritual, cultural and educational needs considered and pt agreeable to plan of care and goals.      Goals:    LTG's (12 weeks):  1)   Increase ROM to 60 degrees in swartz abduction of thumb to increase functional hand use for ADLs, IADLs, and work tasks Ongoing  2)   Increase ROM to 60 degrees in radial abduction of thumb to increase functional hand use for ADLs, IADLs, and work tasksOngoing  3)   Increase ROM to 60 degrees in thumb MP flexion to increase functional hand use for ADLs, IADLs, and work tasksOngoing  4)   Increase ROM to 60 degrees in thumb IP flexion to increase functional hand use for ADLs, IADLs, and work tasksOngoing  5)   Increase  strength to 35 lbs. to grasp for ADLs, IADLs, and work taskOngoing  6)   Increase key pinch to 8 psis to increase independence with button and FM CoordinationOngoing  7)   Decrease complaints of pain to  1 out of 10 at worst to increase functional hand use for ADL/work/leisure activities.Ongoing  8)   Patient to score at 59% or more on FOTO to demonstrate improved perception of functional hand use.Ongoing  9)   Pt will return to near to prior level of function for ADLs and household management reporting I or Mod I with ADLs (dressing, feeding, grooming, toileting). Ongoing     STG's (6 weeks)  1)   Patient to be IND with HEP and modalities for pain/edema managment.Ongoing  2)   Increase ROM to 50 degrees in swartz abduction of thumb to increase functional hand use for ADLs, IADLs, and work tasksOngoing  3)   Increase ROM to 55 degrees in radial abduction of thumb to increase functional hand use  for ADLs, IADLs, and work tasksOngoing  4)   Increase ROM to 50 degrees in thumb MP flexion to increase functional hand use for ADLs, IADLs, and work tasksOngoing  5)   Increase ROM to 50 degrees in thumb IP flexion to increase functional hand use for ADLs, IADLs, and work tasksOngoing  6)   Increase  strength to 15 lbs. to grasp for ADLs, IADLs, and work taskOngoing  7)   Increase key pinch to 4 psis to increase independence with button and FM CoordinationOngoing  8)   Patient to be IND wiht Orthotic use, wear and care precautions. Ongoing  9)   Decrease complaints of pain to  4 out of 10 at worst to increase functional hand use for ADL/work/leisure activities.Ongoing              Plan      Pt to be treated by Occupational Therapy 2 times per week for 12 weeks during the certification period from 5/16/2024 to 8/9/24 to achieve the established goals.      Treatment to include: Paraffin, Fluidotherapy, Manual therapy/joint mobilizations, Modalities for pain management, US 3 mhz, Therapeutic exercises/activities., Iontophoresis with 2.0 cc Dexamethasone, Strengthening, Orthotic Fabrication/Fit/Training, Edema Control, Scar Management, Wound Care, Electrical Modalities, Joint Protection, and Energy Conservation, as well as any other treatments deemed necessary based on the patient's needs or progress.     Updates/Grading for next session: Continue current plan of care       Radha Sutton, OT

## 2024-05-30 ENCOUNTER — CLINICAL SUPPORT (OUTPATIENT)
Dept: REHABILITATION | Facility: HOSPITAL | Age: 49
End: 2024-05-30
Payer: MEDICAID

## 2024-05-30 DIAGNOSIS — M25.60 STIFFNESS IN JOINT: ICD-10-CM

## 2024-05-30 DIAGNOSIS — M25.542 PAIN IN THUMB JOINT WITH MOVEMENT, LEFT: ICD-10-CM

## 2024-05-30 DIAGNOSIS — R53.1 WEAKNESS: Primary | ICD-10-CM

## 2024-05-30 PROCEDURE — 97530 THERAPEUTIC ACTIVITIES: CPT | Mod: PO

## 2024-05-30 NOTE — PROGRESS NOTES
"  Occupational Therapy Daily Treatment Note     Name: Velma Lopez  Clinic Number: [id number]    Therapy Diagnosis:   Encounter Diagnoses   Name Primary?    Weakness Yes    Stiffness in joint     Pain in thumb joint with movement, left      Physician: Russo-Digeorge, Jamie L*    Visit Date: 5/30/2024  Physician Orders: OT eval/treat   Medical Diagnosis: M18.12 (ICD-10-CM) - Arthritis of carpometacarpal (CMC) joint of left thumb Z98.890 (ICD-10-CM) - Postoperative state  Evaluation Date: 5/16/2024  Plan of Care Certification Date: 8/9/24  Authorization Period: 5/10/24 - 5/10/25  Surgery Date and Procedure:    1. left carpal trapeziectomy, CPT 31766  2. 1st to 2nd metacarpal tenodesis, CPT 862485/24/24  Date of Return to MD: 6/6/24  Onset Date: 4/24/24  Visit # / Visits authorized: 3/ 20  FOTO Completion: 1/3    Time In: 1100  Time Out: 1200  Total Billable Time: 60 minutes    Precautions:  Standard      Subjective     Pt reports: "I have been completing my exercises"  she was compliant with home exercise program given last session.   Response to previous treatment:increased ROM  Functional change: increased ROM    Pain: 4/10  Location: left hands      Objective       Mental status: alert, oriented x3     Observation:   Pt arrived in clinc wearing a hand based thumb spica splint  Pt has steri strips over incision. Wound is healing well with no signs of infection        Sensation: Pt states she has mild numbness and tingling occasionally      Wound Assessment:   Size: 3.5 cm  Location: CMC joint     Edema: Circumferential measurements: In centimters       5/16/2024 5/16/2024     Left Right   Thumb:       Prox. Phalanx 6.5 cm 6.5 cm   Distal Phalanx 6.5 cm 6.5 cm        Left   5/16/2024 Right  5/16/2024   PWC (Proximal Wrist Crease) 16.5 cm 16.5 cm         Range of Motion:   Left        Wrist Left  5/16/2024 Right  5/16/2024   Extension 50 65   Flexion 40 60   Ulnar Deviation 40 40   Radial Deviation 10 20       "     Left Thumb range of motion  5/16/2024 Right Thumb range of motion  5/16/2024   MP 40 65   IP 40 65   Radial Abduction 50 60   Palmar Abduction 40 60   Opposition 1 cm from tip of SF DPC          Strength: (MT Dynamometer in psi.)        5/16/2024 5/16/2024     Left Right   Rung II NT 40         Pinch Strength (Measured in psi)       5/16/2024 5/16/2024     Left Right   Key Pinch NT  10    3pt Pinch NT  10    2pt Pinch NT  11          CMS Impairment/Limitation/Restriction for FOTO Hand/Wrist/Finger Survey     Therapist reviewed FOTO scores for Velma Lopez on 5/16/2024.   FOTO documents entered into [employer] - see Media section.     Intake Score: 35%  Category: Self Care                    Treatment       Velma participated in dynamic functional therapeutic activities to improve functional performance for 60  minutes, including:  -hot pack with paraffin wax pre treatment to increase extensibility   - AROM: thumb MP flexion, thumb IP flexion, palmar abduction, radial abduction, circles CW, circles CCW  - pom pom  with thumb using opposition to each finger x 2 containers   - coin tower x 2   - isospheres x 3 minutes   - ABC tennis ball   - scar massage to incision site  - distraction of MP and IP joints     Home Exercises and Education Provided     Education provided:   - Progress towards goals     Written Home Exercises Provided: Patient instructed to cont prior HEP.  Exercises were reviewed and Velma was able to demonstrate them prior to the end of the session.  Velma demonstrated good  understanding of the HEP provided.   .   See EMR under Patient Instructions for exercises provided prior visit.        Assessment     Pt would continue to benefit from skilled OT. Pt tolerated added AROM therapeutic activities well today without additional complaints of pain. Pt is motivated. Will progress as tolerated.     Velma is progressing well towards her goals and there are no updates to goals at  this time. Pt prognosis is Excellent.     Pt will continue to benefit from skilled outpatient occupational therapy to address the deficits listed in the problem list on initial evaluation provide pt/family education and to maximize pt's level of independence in the home and community environment.     Anticipated barriers to occupational therapy: none at this time    Pt's spiritual, cultural and educational needs considered and pt agreeable to plan of care and goals.      Goals:    LTG's (12 weeks):  1)   Increase ROM to 60 degrees in swartz abduction of thumb to increase functional hand use for ADLs, IADLs, and work tasks Ongoing  2)   Increase ROM to 60 degrees in radial abduction of thumb to increase functional hand use for ADLs, IADLs, and work tasksOngoing  3)   Increase ROM to 60 degrees in thumb MP flexion to increase functional hand use for ADLs, IADLs, and work tasksOngoing  4)   Increase ROM to 60 degrees in thumb IP flexion to increase functional hand use for ADLs, IADLs, and work tasksOngoing  5)   Increase  strength to 35 lbs. to grasp for ADLs, IADLs, and work taskOngoing  6)   Increase key pinch to 8 psis to increase independence with button and FM CoordinationOngoing  7)   Decrease complaints of pain to  1 out of 10 at worst to increase functional hand use for ADL/work/leisure activities.Ongoing  8)   Patient to score at 59% or more on FOTO to demonstrate improved perception of functional hand use.Ongoing  9)   Pt will return to near to prior level of function for ADLs and household management reporting I or Mod I with ADLs (dressing, feeding, grooming, toileting). Ongoing     STG's (6 weeks)  1)   Patient to be IND with HEP and modalities for pain/edema managment.Ongoing  2)   Increase ROM to 50 degrees in swartz abduction of thumb to increase functional hand use for ADLs, IADLs, and work tasksOngoing  3)   Increase ROM to 55 degrees in radial abduction of thumb to increase functional hand use  for ADLs, IADLs, and work tasksOngoing  4)   Increase ROM to 50 degrees in thumb MP flexion to increase functional hand use for ADLs, IADLs, and work tasksOngoing  5)   Increase ROM to 50 degrees in thumb IP flexion to increase functional hand use for ADLs, IADLs, and work tasksOngoing  6)   Increase  strength to 15 lbs. to grasp for ADLs, IADLs, and work taskOngoing  7)   Increase key pinch to 4 psis to increase independence with button and FM CoordinationOngoing  8)   Patient to be IND wiht Orthotic use, wear and care precautions. Ongoing  9)   Decrease complaints of pain to  4 out of 10 at worst to increase functional hand use for ADL/work/leisure activities.Ongoing              Plan      Pt to be treated by Occupational Therapy 2 times per week for 12 weeks during the certification period from 5/16/2024 to 8/9/24 to achieve the established goals.      Treatment to include: Paraffin, Fluidotherapy, Manual therapy/joint mobilizations, Modalities for pain management, US 3 mhz, Therapeutic exercises/activities., Iontophoresis with 2.0 cc Dexamethasone, Strengthening, Orthotic Fabrication/Fit/Training, Edema Control, Scar Management, Wound Care, Electrical Modalities, Joint Protection, and Energy Conservation, as well as any other treatments deemed necessary based on the patient's needs or progress.     Updates/Grading for next session: Continue current plan of care       Radha Sutton, OT

## 2024-05-31 ENCOUNTER — CLINICAL SUPPORT (OUTPATIENT)
Dept: REHABILITATION | Facility: HOSPITAL | Age: 49
End: 2024-05-31
Payer: MEDICAID

## 2024-05-31 DIAGNOSIS — R53.1 WEAKNESS: Primary | ICD-10-CM

## 2024-05-31 DIAGNOSIS — M25.542 PAIN IN THUMB JOINT WITH MOVEMENT, LEFT: ICD-10-CM

## 2024-05-31 DIAGNOSIS — M25.60 STIFFNESS IN JOINT: ICD-10-CM

## 2024-05-31 PROCEDURE — 97530 THERAPEUTIC ACTIVITIES: CPT | Mod: PO

## 2024-05-31 NOTE — PROGRESS NOTES
"  Occupational Therapy Daily Treatment Note     Name: Velma Lopez  Clinic Number: [id number]    Therapy Diagnosis:   Encounter Diagnoses   Name Primary?    Weakness Yes    Stiffness in joint     Pain in thumb joint with movement, left      Physician: Russo-Digeorge, Jamie L*    Visit Date: 5/31/2024  Physician Orders: OT eval/treat   Medical Diagnosis: M18.12 (ICD-10-CM) - Arthritis of carpometacarpal (CMC) joint of left thumb Z98.890 (ICD-10-CM) - Postoperative state  Evaluation Date: 5/16/2024  Plan of Care Certification Date: 8/9/24  Authorization Period: 5/10/24 - 5/10/25  Surgery Date and Procedure:    1. left carpal trapeziectomy, CPT 61325  2. 1st to 2nd metacarpal tenodesis, CPT 588801/24/24  Date of Return to MD: 6/6/24  Onset Date: 4/24/24  Visit # / Visits authorized: 4/ 20  FOTO Completion: 1/3    Time In: 1000  Time Out: 1100  Total Billable Time: 60 minutes    Precautions:  Standard      Subjective     Pt reports: "I have been completing my exercises"  she was compliant with home exercise program given last session.   Response to previous treatment:increased ROM  Functional change: increased ROM    Pain: 4/10  Location: left hands      Objective       Mental status: alert, oriented x3     Observation:   Pt arrived in clinc wearing a hand based thumb spica splint  Pt has steri strips over incision. Wound is healing well with no signs of infection        Sensation: Pt states she has mild numbness and tingling occasionally      Wound Assessment:   Size: 3.5 cm  Location: CMC joint     Edema: Circumferential measurements: In centimters       5/16/2024 5/16/2024     Left Right   Thumb:       Prox. Phalanx 6.5 cm 6.5 cm   Distal Phalanx 6.5 cm 6.5 cm        Left   5/16/2024 Right  5/16/2024   PWC (Proximal Wrist Crease) 16.5 cm 16.5 cm         Range of Motion:   Left        Wrist Left  5/16/2024 Right  5/16/2024   Extension 50 65   Flexion 40 60   Ulnar Deviation 40 40   Radial Deviation 10 20       "     Left Thumb range of motion  5/16/2024 Right Thumb range of motion  5/16/2024   MP 40 65   IP 40 65   Radial Abduction 50 60   Palmar Abduction 40 60   Opposition 1 cm from tip of SF DPC          Strength: (MT Dynamometer in psi.)        5/16/2024 5/16/2024     Left Right   Rung II NT 40         Pinch Strength (Measured in psi)       5/16/2024 5/16/2024     Left Right   Key Pinch NT  10    3pt Pinch NT  10    2pt Pinch NT  11          CMS Impairment/Limitation/Restriction for FOTO Hand/Wrist/Finger Survey     Therapist reviewed FOTO scores for Velma Lopez on 5/16/2024.   FOTO documents entered into [email or web address] - see Media section.     Intake Score: 35%  Category: Self Care                    Treatment       Velma participated in dynamic functional therapeutic activities to improve functional performance for 60  minutes, including:  -hot pack with paraffin wax pre treatment to increase extensibility   - AROM: thumb MP flexion, thumb IP flexion, palmar abduction, radial abduction, circles CW, circles CCW  - pom pom  with thumb using opposition to each finger x 2 containers   - coin tower x 2   - isospheres x 3 minutes   - ABC tennis ball   - scar massage to incision site  - distraction of MP and IP joints     Home Exercises and Education Provided     Education provided:   - Progress towards goals     Written Home Exercises Provided: Patient instructed to cont prior HEP.  Exercises were reviewed and Velma was able to demonstrate them prior to the end of the session.  Velma demonstrated good  understanding of the HEP provided.   .   See EMR under Patient Instructions for exercises provided prior visit.        Assessment     Pt would continue to benefit from skilled OT. Pt tolerated added AROM therapeutic activities well today without additional complaints of pain. Pt is motivated. Will progress as tolerated.     Velma is progressing well towards her goals and there are no updates to goals at  this time. Pt prognosis is Excellent.     Pt will continue to benefit from skilled outpatient occupational therapy to address the deficits listed in the problem list on initial evaluation provide pt/family education and to maximize pt's level of independence in the home and community environment.     Anticipated barriers to occupational therapy: none at this time    Pt's spiritual, cultural and educational needs considered and pt agreeable to plan of care and goals.      Goals:    LTG's (12 weeks):  1)   Increase ROM to 60 degrees in sawrtz abduction of thumb to increase functional hand use for ADLs, IADLs, and work tasks Ongoing  2)   Increase ROM to 60 degrees in radial abduction of thumb to increase functional hand use for ADLs, IADLs, and work tasksOngoing  3)   Increase ROM to 60 degrees in thumb MP flexion to increase functional hand use for ADLs, IADLs, and work tasksOngoing  4)   Increase ROM to 60 degrees in thumb IP flexion to increase functional hand use for ADLs, IADLs, and work tasksOngoing  5)   Increase  strength to 35 lbs. to grasp for ADLs, IADLs, and work taskOngoing  6)   Increase key pinch to 8 psis to increase independence with button and FM CoordinationOngoing  7)   Decrease complaints of pain to  1 out of 10 at worst to increase functional hand use for ADL/work/leisure activities.Ongoing  8)   Patient to score at 59% or more on FOTO to demonstrate improved perception of functional hand use.Ongoing  9)   Pt will return to near to prior level of function for ADLs and household management reporting I or Mod I with ADLs (dressing, feeding, grooming, toileting). Ongoing     STG's (6 weeks)  1)   Patient to be IND with HEP and modalities for pain/edema managment.Ongoing  2)   Increase ROM to 50 degrees in swartz abduction of thumb to increase functional hand use for ADLs, IADLs, and work tasksOngoing  3)   Increase ROM to 55 degrees in radial abduction of thumb to increase functional hand use  for ADLs, IADLs, and work tasksOngoing  4)   Increase ROM to 50 degrees in thumb MP flexion to increase functional hand use for ADLs, IADLs, and work tasksOngoing  5)   Increase ROM to 50 degrees in thumb IP flexion to increase functional hand use for ADLs, IADLs, and work tasksOngoing  6)   Increase  strength to 15 lbs. to grasp for ADLs, IADLs, and work taskOngoing  7)   Increase key pinch to 4 psis to increase independence with button and FM CoordinationOngoing  8)   Patient to be IND wiht Orthotic use, wear and care precautions. Ongoing  9)   Decrease complaints of pain to  4 out of 10 at worst to increase functional hand use for ADL/work/leisure activities.Ongoing              Plan      Pt to be treated by Occupational Therapy 2 times per week for 12 weeks during the certification period from 5/16/2024 to 8/9/24 to achieve the established goals.      Treatment to include: Paraffin, Fluidotherapy, Manual therapy/joint mobilizations, Modalities for pain management, US 3 mhz, Therapeutic exercises/activities., Iontophoresis with 2.0 cc Dexamethasone, Strengthening, Orthotic Fabrication/Fit/Training, Edema Control, Scar Management, Wound Care, Electrical Modalities, Joint Protection, and Energy Conservation, as well as any other treatments deemed necessary based on the patient's needs or progress.     Updates/Grading for next session: Continue current plan of care       Radha Sutton, OT

## 2024-06-04 ENCOUNTER — CLINICAL SUPPORT (OUTPATIENT)
Dept: REHABILITATION | Facility: HOSPITAL | Age: 49
End: 2024-06-04
Payer: MEDICAID

## 2024-06-04 DIAGNOSIS — M25.60 STIFFNESS IN JOINT: ICD-10-CM

## 2024-06-04 DIAGNOSIS — R53.1 WEAKNESS: Primary | ICD-10-CM

## 2024-06-04 DIAGNOSIS — M25.542 PAIN IN THUMB JOINT WITH MOVEMENT, LEFT: ICD-10-CM

## 2024-06-04 PROCEDURE — 97530 THERAPEUTIC ACTIVITIES: CPT | Mod: PO

## 2024-06-04 NOTE — PROGRESS NOTES
"  Occupational Therapy Daily Treatment Note     Name: Velma Lopez  Clinic Number: [id number]    Therapy Diagnosis:   Encounter Diagnoses   Name Primary?    Weakness Yes    Stiffness in joint     Pain in thumb joint with movement, left      Physician: Russo-Digeorge, Jamie L*    Visit Date: 6/4/2024  Physician Orders: OT eval/treat   Medical Diagnosis: M18.12 (ICD-10-CM) - Arthritis of carpometacarpal (CMC) joint of left thumb Z98.890 (ICD-10-CM) - Postoperative state  Evaluation Date: 5/16/2024  Plan of Care Certification Date: 8/9/24  Authorization Period: 5/10/24 - 5/10/25  Surgery Date and Procedure:    1. left carpal trapeziectomy, CPT 17466  2. 1st to 2nd metacarpal tenodesis, CPT 316686/24/24  Date of Return to MD: 6/6/24  Onset Date: 4/24/24  Visit # / Visits authorized: 5/ 20  FOTO Completion: 1/3    Time In: 1500  Time Out: 1600  Total Billable Time: 60 minutes    Precautions:  Standard      Subjective     Pt reports: "I have been completing my exercises"  she was compliant with home exercise program given last session.   Response to previous treatment:increased ROM  Functional change: increased ROM    Pain: 4/10  Location: left hands      Objective       Mental status: alert, oriented x3     Observation:   Pt arrived in clinc wearing a hand based thumb spica splint  Pt has steri strips over incision. Wound is healing well with no signs of infection        Sensation: Pt states she has mild numbness and tingling occasionally      Wound Assessment:   Size: 3.5 cm  Location: CMC joint     Edema: Circumferential measurements: In centimters       5/16/2024 5/16/2024     Left Right   Thumb:       Prox. Phalanx 6.5 cm 6.5 cm   Distal Phalanx 6.5 cm 6.5 cm        Left   5/16/2024 Right  5/16/2024   PWC (Proximal Wrist Crease) 16.5 cm 16.5 cm         Range of Motion:   Left        Wrist Left  5/16/2024 Right  5/16/2024   Extension 50 65   Flexion 40 60   Ulnar Deviation 40 40   Radial Deviation 10 20          "  Left Thumb range of motion  5/16/2024 Right Thumb range of motion  5/16/2024   MP 40 65   IP 40 65   Radial Abduction 50 60   Palmar Abduction 40 60   Opposition 1 cm from tip of SF DPC          Strength: (MT Dynamometer in psi.)        5/16/2024 5/16/2024     Left Right   Rung II NT 40         Pinch Strength (Measured in psi)       5/16/2024 5/16/2024     Left Right   Key Pinch NT  10    3pt Pinch NT  10    2pt Pinch NT  11          CMS Impairment/Limitation/Restriction for FOTO Hand/Wrist/Finger Survey     Therapist reviewed FOTO scores for Velma Lopez on 5/16/2024.   FOTO documents entered into [employer] - see Media section.     Intake Score: 35%  Category: Self Care                    Treatment       Velma participated in dynamic functional therapeutic activities to improve functional performance for 60  minutes, including:  -hot pack with paraffin wax pre treatment to increase extensibility   - AROM: thumb MP flexion, thumb IP flexion, palmar abduction, radial abduction, circles CW, circles CCW  - pom pom  with thumb using opposition to each finger x 2 containers   - coin tower x 2   - isospheres x 3 minutes   - ABC tennis ball   - scar massage to incision site  - distraction of MP and IP joints     Home Exercises and Education Provided     Education provided:   - Progress towards goals     Written Home Exercises Provided: Patient instructed to cont prior HEP.  Exercises were reviewed and Velma was able to demonstrate them prior to the end of the session.  Velma demonstrated good  understanding of the HEP provided.   .   See EMR under Patient Instructions for exercises provided prior visit.        Assessment     Pt would continue to benefit from skilled OT. Pt tolerated added AROM therapeutic activities well today without additional complaints of pain. Pt is motivated. Will progress as tolerated.     Velma is progressing well towards her goals and there are no updates to goals at  this time. Pt prognosis is Excellent.     Pt will continue to benefit from skilled outpatient occupational therapy to address the deficits listed in the problem list on initial evaluation provide pt/family education and to maximize pt's level of independence in the home and community environment.     Anticipated barriers to occupational therapy: none at this time    Pt's spiritual, cultural and educational needs considered and pt agreeable to plan of care and goals.      Goals:    LTG's (12 weeks):  1)   Increase ROM to 60 degrees in swartz abduction of thumb to increase functional hand use for ADLs, IADLs, and work tasks Ongoing  2)   Increase ROM to 60 degrees in radial abduction of thumb to increase functional hand use for ADLs, IADLs, and work tasksOngoing  3)   Increase ROM to 60 degrees in thumb MP flexion to increase functional hand use for ADLs, IADLs, and work tasksOngoing  4)   Increase ROM to 60 degrees in thumb IP flexion to increase functional hand use for ADLs, IADLs, and work tasksOngoing  5)   Increase  strength to 35 lbs. to grasp for ADLs, IADLs, and work taskOngoing  6)   Increase key pinch to 8 psis to increase independence with button and FM CoordinationOngoing  7)   Decrease complaints of pain to  1 out of 10 at worst to increase functional hand use for ADL/work/leisure activities.Ongoing  8)   Patient to score at 59% or more on FOTO to demonstrate improved perception of functional hand use.Ongoing  9)   Pt will return to near to prior level of function for ADLs and household management reporting I or Mod I with ADLs (dressing, feeding, grooming, toileting). Ongoing     STG's (6 weeks)  1)   Patient to be IND with HEP and modalities for pain/edema managment.Ongoing  2)   Increase ROM to 50 degrees in swartz abduction of thumb to increase functional hand use for ADLs, IADLs, and work tasksOngoing  3)   Increase ROM to 55 degrees in radial abduction of thumb to increase functional hand use  for ADLs, IADLs, and work tasksOngoing  4)   Increase ROM to 50 degrees in thumb MP flexion to increase functional hand use for ADLs, IADLs, and work tasksOngoing  5)   Increase ROM to 50 degrees in thumb IP flexion to increase functional hand use for ADLs, IADLs, and work tasksOngoing  6)   Increase  strength to 15 lbs. to grasp for ADLs, IADLs, and work taskOngoing  7)   Increase key pinch to 4 psis to increase independence with button and FM CoordinationOngoing  8)   Patient to be IND wiht Orthotic use, wear and care precautions. Ongoing  9)   Decrease complaints of pain to  4 out of 10 at worst to increase functional hand use for ADL/work/leisure activities.Ongoing              Plan      Pt to be treated by Occupational Therapy 2 times per week for 12 weeks during the certification period from 5/16/2024 to 8/9/24 to achieve the established goals.      Treatment to include: Paraffin, Fluidotherapy, Manual therapy/joint mobilizations, Modalities for pain management, US 3 mhz, Therapeutic exercises/activities., Iontophoresis with 2.0 cc Dexamethasone, Strengthening, Orthotic Fabrication/Fit/Training, Edema Control, Scar Management, Wound Care, Electrical Modalities, Joint Protection, and Energy Conservation, as well as any other treatments deemed necessary based on the patient's needs or progress.     Updates/Grading for next session: Continue current plan of care       Radha Sutton, OT

## 2024-06-05 ENCOUNTER — CLINICAL SUPPORT (OUTPATIENT)
Dept: REHABILITATION | Facility: HOSPITAL | Age: 49
End: 2024-06-05
Payer: MEDICAID

## 2024-06-05 DIAGNOSIS — R53.1 WEAKNESS: Primary | ICD-10-CM

## 2024-06-05 DIAGNOSIS — M25.542 PAIN IN THUMB JOINT WITH MOVEMENT, LEFT: ICD-10-CM

## 2024-06-05 DIAGNOSIS — M25.60 STIFFNESS IN JOINT: ICD-10-CM

## 2024-06-05 PROCEDURE — 97530 THERAPEUTIC ACTIVITIES: CPT | Mod: PO

## 2024-06-05 NOTE — PROGRESS NOTES
"  Occupational Therapy Daily Treatment Note     Name: Velma Lopez  Clinic Number: [id number]    Therapy Diagnosis:   Encounter Diagnoses   Name Primary?    Weakness Yes    Stiffness in joint     Pain in thumb joint with movement, left      Physician: Russo-Digeorge, Jamie L*    Visit Date: 6/5/2024  Physician Orders: OT eval/treat   Medical Diagnosis: M18.12 (ICD-10-CM) - Arthritis of carpometacarpal (CMC) joint of left thumb Z98.890 (ICD-10-CM) - Postoperative state  Evaluation Date: 5/16/2024  Plan of Care Certification Date: 8/9/24  Authorization Period: 5/10/24 - 5/10/25  Surgery Date and Procedure:    1. left carpal trapeziectomy, CPT 10289  2. 1st to 2nd metacarpal tenodesis, CPT 124456/24/24  Date of Return to MD: 6/6/24  Onset Date: 4/24/24  Visit # / Visits authorized: 6/ 20  FOTO Completion: 1/3    Time In: 1300  Time Out: 1400  Total Billable Time: 60 minutes    Precautions:  Standard      Subjective     Pt reports: "I've had a few zingers the past few days"  she was compliant with home exercise program given last session.   Response to previous treatment:increased ROM  Functional change: increased ROM    Pain: 4/10  Location: left hands      Objective       Mental status: alert, oriented x3     Observation:   Pt arrived in clinc wearing a hand based thumb spica splint  Pt has steri strips over incision. Wound is healing well with no signs of infection        Sensation: Pt states she has mild numbness and tingling occasionally      Wound Assessment:   Size: 3.5 cm  Location: CMC joint     Edema: Circumferential measurements: In centimters       5/16/2024 5/16/2024     Left Right   Thumb:       Prox. Phalanx 6.5 cm 6.5 cm   Distal Phalanx 6.5 cm 6.5 cm        Left   5/16/2024 Right  5/16/2024   PWC (Proximal Wrist Crease) 16.5 cm 16.5 cm         Range of Motion:   Left        Wrist Left  5/16/2024 Right  5/16/2024 Left  6/5/24   Extension 50 65 60   Flexion 40 60 60   Ulnar Deviation 40 40 45 "   Radial Deviation 10 20 12           Left Thumb range of motion  5/16/2024 Right Thumb range of motion  5/16/2024 Right Thumb Range of Motion  6/5/24   MP 40 65 55   IP 40 65 60   Radial Abduction 50 60 62   Palmar Abduction 40 60 50   Opposition 1 cm from tip of SF DPC Base of SF          Strength: (MT Dynamometer in psi.)        5/16/2024 5/16/2024     Left Right   Rung II NT 40         Pinch Strength (Measured in psi)       5/16/2024 5/16/2024     Left Right   Key Pinch NT  10    3pt Pinch NT  10    2pt Pinch NT  11          CMS Impairment/Limitation/Restriction for FOTO Hand/Wrist/Finger Survey     Therapist reviewed FOTO scores for Velma Lopez on 5/16/2024.   FOTO documents entered into [employer] - see Media section.     Intake Score: 35%  Category: Self Care                    Treatment       Velma participated in dynamic functional therapeutic activities to improve functional performance for 60  minutes, including:  -hot pack with paraffin wax pre treatment to increase extensibility   - AROM: thumb MP flexion, thumb IP flexion, palmar abduction, radial abduction, circles CW, circles CCW  - pom pom  with yellow clothespin x 2  - coin tower x 2   - isospheres x 3 minutes   - ABC tennis ball   - scar massage to incision site  - distraction of MP and IP joints   -reassessment of objective measures      Home Exercises and Education Provided     Education provided:   - Progress towards goals     Written Home Exercises Provided: Patient instructed to cont prior HEP.  Exercises were reviewed and Velma was able to demonstrate them prior to the end of the session.  Velma demonstrated good  understanding of the HEP provided.   .   See EMR under Patient Instructions for exercises provided prior visit.        Assessment     Pt would continue to benefit from skilled OT. Pt tolerated added AROM therapeutic activities well today without additional complaints of pain. Reassessment of objective  measures were taken and patient has MET 7/9 STGs and 2/9 LTGs. Pt is motivated. Will progress as tolerated.     Velma is progressing well towards her goals and there are no updates to goals at this time. Pt prognosis is Excellent.     Pt will continue to benefit from skilled outpatient occupational therapy to address the deficits listed in the problem list on initial evaluation provide pt/family education and to maximize pt's level of independence in the home and community environment.     Anticipated barriers to occupational therapy: none at this time    Pt's spiritual, cultural and educational needs considered and pt agreeable to plan of care and goals.      Goals:    LTG's (12 weeks):  1)   Increase ROM to 60 degrees in swartz abduction of thumb to increase functional hand use for ADLs, IADLs, and work tasks Ongoing  2)   Increase ROM to 60 degrees in radial abduction of thumb to increase functional hand use for ADLs, IADLs, and work tasks MET 6/5/24  3)   Increase ROM to 60 degrees in thumb MP flexion to increase functional hand use for ADLs, IADLs, and work tasksOngoing  4)   Increase ROM to 60 degrees in thumb IP flexion to increase functional hand use for ADLs, IADLs, and work tasksMET 6/5/24  5)   Increase  strength to 35 lbs. to grasp for ADLs, IADLs, and work taskOngoing  6)   Increase key pinch to 8 psis to increase independence with button and FM CoordinationOngoing  7)   Decrease complaints of pain to  1 out of 10 at worst to increase functional hand use for ADL/work/leisure activities.Ongoing  8)   Patient to score at 59% or more on FOTO to demonstrate improved perception of functional hand use.Ongoing  9)   Pt will return to near to prior level of function for ADLs and household management reporting I or Mod I with ADLs (dressing, feeding, grooming, toileting). Ongoing     STG's (6 weeks)  1)   Patient to be IND with HEP and modalities for pain/edema managment.MET 6/5/24  2)   Increase ROM to  50 degrees in swartz abduction of thumb to increase functional hand use for ADLs, IADLs, and work tasksMET 6/5/24  3)   Increase ROM to 55 degrees in radial abduction of thumb to increase functional hand use for ADLs, IADLs, and work tasksMET 6/5/24  4)   Increase ROM to 50 degrees in thumb MP flexion to increase functional hand use for ADLs, IADLs, and work tasksMET 6/5/24  5)   Increase ROM to 50 degrees in thumb IP flexion to increase functional hand use for ADLs, IADLs, and work tasksMET 6/5/24  6)   Increase  strength to 15 lbs. to grasp for ADLs, IADLs, and work taskOngoing  7)   Increase key pinch to 4 psis to increase independence with button and FM CoordinationOngoing  8)   Patient to be IND wiht Orthotic use, wear and care precautions.MET 6/5/24  9)   Decrease complaints of pain to  4 out of 10 at worst to increase functional hand use for ADL/work/leisure activities.MET 6/5/24        Plan      Pt to be treated by Occupational Therapy 2 times per week for 12 weeks during the certification period from 5/16/2024 to 8/9/24 to achieve the established goals.      Treatment to include: Paraffin, Fluidotherapy, Manual therapy/joint mobilizations, Modalities for pain management, US 3 mhz, Therapeutic exercises/activities., Iontophoresis with 2.0 cc Dexamethasone, Strengthening, Orthotic Fabrication/Fit/Training, Edema Control, Scar Management, Wound Care, Electrical Modalities, Joint Protection, and Energy Conservation, as well as any other treatments deemed necessary based on the patient's needs or progress.     Updates/Grading for next session: Continue current plan of care       Radha Sutton, OT

## 2024-06-06 ENCOUNTER — OFFICE VISIT (OUTPATIENT)
Dept: ORTHOPEDICS | Facility: CLINIC | Age: 49
End: 2024-06-06
Payer: MEDICAID

## 2024-06-06 VITALS — HEIGHT: 66 IN | BODY MASS INDEX: 23.24 KG/M2 | WEIGHT: 144.63 LBS

## 2024-06-06 DIAGNOSIS — M18.12 ARTHRITIS OF CARPOMETACARPAL (CMC) JOINT OF LEFT THUMB: Primary | ICD-10-CM

## 2024-06-06 PROCEDURE — 99211 OFF/OP EST MAY X REQ PHY/QHP: CPT | Mod: PBBFAC | Performed by: ORTHOPAEDIC SURGERY

## 2024-06-06 PROCEDURE — 99999 PR PBB SHADOW E&M-EST. PATIENT-LVL I: CPT | Mod: PBBFAC,,, | Performed by: ORTHOPAEDIC SURGERY

## 2024-06-06 PROCEDURE — 99024 POSTOP FOLLOW-UP VISIT: CPT | Mod: ,,, | Performed by: ORTHOPAEDIC SURGERY

## 2024-06-06 NOTE — PROGRESS NOTES
Velma Lopez presents for post-operative evaluation.  The patient is now 6 weeks s/p Left thumb CMC arthroplasty with Dr. Tom on 4/24/24.  Overall the patient reports doing well.  She is reports moderate pain and throbbing to her left wrist and base of her left thumb, but nothing too limiting to daily activities. She is still immobilized in her custom orthosis today. She denies any f/c/s. She is no longer taking pain medications.     PE:    AA&O x 4.  NAD  HEENT:  NCAT, sclera nonicteric  Lungs:  Respirations are equal and unlabored.  CV:  2+ bilateral upper and lower extremity pulses.  MSK: The wound is healing well with no signs of erythema or warmth.  There is no drainage.  No clinical signs or symptoms of infection are present.  SILT. DNVI.    ROM wrist, hand and fingers full   With mild pain today    A/P: Status post above, doing well  1) Continue aggressive ROM   2) wean from brace.  Follow up in 6 weeks for re-evaluation or sooner for any problems.  Continue OT.

## 2024-06-18 ENCOUNTER — CLINICAL SUPPORT (OUTPATIENT)
Dept: REHABILITATION | Facility: HOSPITAL | Age: 49
End: 2024-06-18
Payer: MEDICAID

## 2024-06-18 DIAGNOSIS — R53.1 WEAKNESS: Primary | ICD-10-CM

## 2024-06-18 DIAGNOSIS — M25.542 PAIN IN THUMB JOINT WITH MOVEMENT, LEFT: ICD-10-CM

## 2024-06-18 DIAGNOSIS — M25.60 STIFFNESS IN JOINT: ICD-10-CM

## 2024-06-18 PROCEDURE — 97530 THERAPEUTIC ACTIVITIES: CPT | Mod: PO

## 2024-06-18 NOTE — PROGRESS NOTES
"  Occupational Therapy Daily Treatment Note     Name: Velma Lopez  Clinic Number: [id number]    Therapy Diagnosis:   Encounter Diagnoses   Name Primary?    Weakness Yes    Stiffness in joint     Pain in thumb joint with movement, left      Physician: Russo-Digeorge, Jamie L*    Visit Date: 6/18/2024  Physician Orders: OT eval/treat   Medical Diagnosis: M18.12 (ICD-10-CM) - Arthritis of carpometacarpal (CMC) joint of left thumb Z98.890 (ICD-10-CM) - Postoperative state  Evaluation Date: 5/16/2024  Plan of Care Certification Date: 8/9/24  Authorization Period: 5/10/24 - 5/10/25  Surgery Date and Procedure:    1. left carpal trapeziectomy, CPT 83273  2. 1st to 2nd metacarpal tenodesis, CPT 679454/24/24  Date of Return to MD: 6/6/24  Onset Date: 4/24/24  Visit # / Visits authorized: 7/ 20  FOTO Completion: 1/3    Time In: 0700  Time Out: 0755  Total Billable Time: 55 minutes    Precautions:  Standard      Subjective     Pt reports: "I've been using it more and I can tell a difference"   she was compliant with home exercise program given last session.   Response to previous treatment:increased ROM  Functional change: increased ROM    Pain: 4/10  Location: left hands      Objective       Mental status: alert, oriented x3     Observation:   Pt arrived in clinc wearing a hand based thumb spica splint  Pt has steri strips over incision. Wound is healing well with no signs of infection        Sensation: Pt states she has mild numbness and tingling occasionally      Wound Assessment:   Size: 3.5 cm  Location: CMC joint     Edema: Circumferential measurements: In centimters       5/16/2024 5/16/2024     Left Right   Thumb:       Prox. Phalanx 6.5 cm 6.5 cm   Distal Phalanx 6.5 cm 6.5 cm        Left   5/16/2024 Right  5/16/2024   PWC (Proximal Wrist Crease) 16.5 cm 16.5 cm         Range of Motion:   Left        Wrist Left  5/16/2024 Right  5/16/2024 Left  6/5/24   Extension 50 65 60   Flexion 40 60 60   Ulnar Deviation 40 " 40 45   Radial Deviation 10 20 12           Left Thumb range of motion  5/16/2024 Right Thumb range of motion  5/16/2024 Right Thumb Range of Motion  6/5/24   MP 40 65 55   IP 40 65 60   Radial Abduction 50 60 62   Palmar Abduction 40 60 50   Opposition 1 cm from tip of [employer] [place]          Strength: (MT Dynamometer in psi.)        5/16/2024 5/16/2024     Left Right   Rung II NT 40         Pinch Strength (Measured in psi)       5/16/2024 5/16/2024     Left Right   Key Pinch NT  10    3pt Pinch NT  10    2pt Pinch NT  11          CMS Impairment/Limitation/Restriction for FOTO Hand/Wrist/Finger Survey     Therapist reviewed FOTO scores for Velma Lopez on 5/16/2024.   FOTO documents entered into [employer] - see Media section.     Intake Score: 35%  Category: Self Care                    Treatment       Velma participated in dynamic functional therapeutic activities to improve functional performance for 55 minutes, including:  -hot pack with paraffin wax pre treatment to increase extensibility   - AROM: thumb MP flexion, thumb IP flexion, palmar abduction, radial abduction, circles CW, circles CCW  - pom pom  with yellow clothespin x 2  - coin tower x 2   - isospheres x 3 minutes   - ABC tennis ball   - scar massage to incision site  - distraction of MP and IP joints   - pink putty dig outs   - pink putty squeezes  - nuts and bolts     Home Exercises and Education Provided     Education provided:   - Progress towards goals     Written Home Exercises Provided: Patient instructed to cont prior HEP.  Exercises were reviewed and Velma was able to demonstrate them prior to the end of the session.  Velma demonstrated good  understanding of the HEP provided.   .   See EMR under Patient Instructions for exercises provided prior visit.        Assessment     Pt would continue to benefit from skilled OT. Pt tolerated added AROM therapeutic activities well today without additional complaints of pain.  Pt went back to the MD who stated that she could wean out of the brace and start using her hand more. Pt is motivated. Will progress as tolerated.     Velma is progressing well towards her goals and there are no updates to goals at this time. Pt prognosis is Excellent.     Pt will continue to benefit from skilled outpatient occupational therapy to address the deficits listed in the problem list on initial evaluation provide pt/family education and to maximize pt's level of independence in the home and community environment.     Anticipated barriers to occupational therapy: none at this time    Pt's spiritual, cultural and educational needs considered and pt agreeable to plan of care and goals.      Goals:    LTG's (12 weeks):  1)   Increase ROM to 60 degrees in swartz abduction of thumb to increase functional hand use for ADLs, IADLs, and work tasks Ongoing  2)   Increase ROM to 60 degrees in radial abduction of thumb to increase functional hand use for ADLs, IADLs, and work tasks MET 6/5/24  3)   Increase ROM to 60 degrees in thumb MP flexion to increase functional hand use for ADLs, IADLs, and work tasksOngoing  4)   Increase ROM to 60 degrees in thumb IP flexion to increase functional hand use for ADLs, IADLs, and work tasksMET 6/5/24  5)   Increase  strength to 35 lbs. to grasp for ADLs, IADLs, and work taskOngoing  6)   Increase key pinch to 8 psis to increase independence with button and FM CoordinationOngoing  7)   Decrease complaints of pain to  1 out of 10 at worst to increase functional hand use for ADL/work/leisure activities.Ongoing  8)   Patient to score at 59% or more on FOTO to demonstrate improved perception of functional hand use.Ongoing  9)   Pt will return to near to prior level of function for ADLs and household management reporting I or Mod I with ADLs (dressing, feeding, grooming, toileting). Ongoing     STG's (6 weeks)  1)   Patient to be IND with HEP and modalities for pain/edema  managment.MET 6/5/24  2)   Increase ROM to 50 degrees in swartz abduction of thumb to increase functional hand use for ADLs, IADLs, and work tasksMET 6/5/24  3)   Increase ROM to 55 degrees in radial abduction of thumb to increase functional hand use for ADLs, IADLs, and work tasksMET 6/5/24  4)   Increase ROM to 50 degrees in thumb MP flexion to increase functional hand use for ADLs, IADLs, and work tasksMET 6/5/24  5)   Increase ROM to 50 degrees in thumb IP flexion to increase functional hand use for ADLs, IADLs, and work tasksMET 6/5/24  6)   Increase  strength to 15 lbs. to grasp for ADLs, IADLs, and work taskOngoing  7)   Increase key pinch to 4 psis to increase independence with button and FM CoordinationOngoing  8)   Patient to be IND wiht Orthotic use, wear and care precautions.MET 6/5/24  9)   Decrease complaints of pain to  4 out of 10 at worst to increase functional hand use for ADL/work/leisure activities.MET 6/5/24        Plan      Pt to be treated by Occupational Therapy 2 times per week for 12 weeks during the certification period from 5/16/2024 to 8/9/24 to achieve the established goals.      Treatment to include: Paraffin, Fluidotherapy, Manual therapy/joint mobilizations, Modalities for pain management, US 3 mhz, Therapeutic exercises/activities., Iontophoresis with 2.0 cc Dexamethasone, Strengthening, Orthotic Fabrication/Fit/Training, Edema Control, Scar Management, Wound Care, Electrical Modalities, Joint Protection, and Energy Conservation, as well as any other treatments deemed necessary based on the patient's needs or progress.     Updates/Grading for next session: Continue current plan of care       Radha Sutton, OT

## 2024-06-19 ENCOUNTER — CLINICAL SUPPORT (OUTPATIENT)
Dept: REHABILITATION | Facility: HOSPITAL | Age: 49
End: 2024-06-19
Payer: MEDICAID

## 2024-06-19 ENCOUNTER — PATIENT MESSAGE (OUTPATIENT)
Dept: UROLOGY | Facility: CLINIC | Age: 49
End: 2024-06-19
Payer: MEDICAID

## 2024-06-19 DIAGNOSIS — R53.1 WEAKNESS: Primary | ICD-10-CM

## 2024-06-19 DIAGNOSIS — M25.60 STIFFNESS IN JOINT: ICD-10-CM

## 2024-06-19 DIAGNOSIS — M25.542 PAIN IN THUMB JOINT WITH MOVEMENT, LEFT: ICD-10-CM

## 2024-06-19 PROCEDURE — 97530 THERAPEUTIC ACTIVITIES: CPT | Mod: PO

## 2024-06-19 NOTE — PROGRESS NOTES
"  Occupational Therapy Daily Treatment Note     Name: Velma Lopez  Clinic Number: [id number]    Therapy Diagnosis:   Encounter Diagnoses   Name Primary?    Weakness Yes    Stiffness in joint     Pain in thumb joint with movement, left      Physician: Russo-Digeorge, Jamie L*    Visit Date: 6/19/2024  Physician Orders: OT eval/treat   Medical Diagnosis: M18.12 (ICD-10-CM) - Arthritis of carpometacarpal (CMC) joint of left thumb Z98.890 (ICD-10-CM) - Postoperative state  Evaluation Date: 5/16/2024  Plan of Care Certification Date: 8/9/24  Authorization Period: 5/10/24 - 5/10/25  Surgery Date and Procedure:    1. left carpal trapeziectomy, CPT 76092  2. 1st to 2nd metacarpal tenodesis, CPT 890129/24/24  Date of Return to MD: 6/6/24  Onset Date: 4/24/24  Visit # / Visits authorized: 8/ 20  FOTO Completion: 1/3    Time In: 1000  Time Out: 1100  Total Billable Time: 60 minutes    Precautions:  Standard      Subjective     Pt reports: "I've been using it more and I can tell a difference"   she was compliant with home exercise program given last session.   Response to previous treatment:increased ROM  Functional change: increased ROM    Pain: 4/10  Location: left hands      Objective       Mental status: alert, oriented x3     Observation:   Pt arrived in clinc wearing a hand based thumb spica splint  Pt has steri strips over incision. Wound is healing well with no signs of infection        Sensation: Pt states she has mild numbness and tingling occasionally      Wound Assessment:   Size: 3.5 cm  Location: CMC joint     Edema: Circumferential measurements: In centimters       5/16/2024 5/16/2024     Left Right   Thumb:       Prox. Phalanx 6.5 cm 6.5 cm   Distal Phalanx 6.5 cm 6.5 cm        Left   5/16/2024 Right  5/16/2024   PWC (Proximal Wrist Crease) 16.5 cm 16.5 cm         Range of Motion:   Left        Wrist Left  5/16/2024 Right  5/16/2024 Left  6/5/24   Extension 50 65 60   Flexion 40 60 60   Ulnar Deviation 40 " 40 45   Radial Deviation 10 20 12           Left Thumb range of motion  5/16/2024 Right Thumb range of motion  5/16/2024 Right Thumb Range of Motion  6/5/24   MP 40 65 55   IP 40 65 60   Radial Abduction 50 60 62   Palmar Abduction 40 60 50   Opposition 1 cm from tip of [employer] [place]          Strength: (MT Dynamometer in psi.)        5/16/2024 5/16/2024     Left Right   Rung II NT 40         Pinch Strength (Measured in psi)       5/16/2024 5/16/2024     Left Right   Key Pinch NT  10    3pt Pinch NT  10    2pt Pinch NT  11          CMS Impairment/Limitation/Restriction for FOTO Hand/Wrist/Finger Survey     Therapist reviewed FOTO scores for Velma Lopez on 5/16/2024.   FOTO documents entered into [employer] - see Media section.     Intake Score: 35%  Category: Self Care                    Treatment       Velma participated in dynamic functional therapeutic activities to improve functional performance for 55 minutes, including:  -hot pack with paraffin wax pre treatment to increase extensibility   - AROM: thumb MP flexion, thumb IP flexion, palmar abduction, radial abduction, circles CW, circles CCW  - pom pom  with red clothespin x 2  - coin tower x 2   - isospheres x 3 minutes   - ABC tennis ball   - scar massage to incision site  - distraction of MP and IP joints   - pink putty dig outs   - pink putty squeezes  - pink putty puttyciser soft   - pink putty twist tops  - nuts and bolts   -wrist 3 ways with 1# weight over wedge 1 min each     Home Exercises and Education Provided     Education provided:   - Progress towards goals     Written Home Exercises Provided: Patient instructed to cont prior HEP.  Exercises were reviewed and Velma was able to demonstrate them prior to the end of the session.  Velma demonstrated good  understanding of the HEP provided.   .   See EMR under Patient Instructions for exercises provided prior visit.        Assessment     Pt would continue to benefit from  skilled OT. Pt tolerated added AROM therapeutic activities well today without additional complaints of pain.  Pt is motivated. Will progress as tolerated.     Velma is progressing well towards her goals and there are no updates to goals at this time. Pt prognosis is Excellent.     Pt will continue to benefit from skilled outpatient occupational therapy to address the deficits listed in the problem list on initial evaluation provide pt/family education and to maximize pt's level of independence in the home and community environment.     Anticipated barriers to occupational therapy: none at this time    Pt's spiritual, cultural and educational needs considered and pt agreeable to plan of care and goals.      Goals:    LTG's (12 weeks):  1)   Increase ROM to 60 degrees in swartz abduction of thumb to increase functional hand use for ADLs, IADLs, and work tasks Ongoing  2)   Increase ROM to 60 degrees in radial abduction of thumb to increase functional hand use for ADLs, IADLs, and work tasks MET 6/5/24  3)   Increase ROM to 60 degrees in thumb MP flexion to increase functional hand use for ADLs, IADLs, and work tasksOngoing  4)   Increase ROM to 60 degrees in thumb IP flexion to increase functional hand use for ADLs, IADLs, and work tasksMET 6/5/24  5)   Increase  strength to 35 lbs. to grasp for ADLs, IADLs, and work taskOngoing  6)   Increase key pinch to 8 psis to increase independence with button and FM CoordinationOngoing  7)   Decrease complaints of pain to  1 out of 10 at worst to increase functional hand use for ADL/work/leisure activities.Ongoing  8)   Patient to score at 59% or more on FOTO to demonstrate improved perception of functional hand use.Ongoing  9)   Pt will return to near to prior level of function for ADLs and household management reporting I or Mod I with ADLs (dressing, feeding, grooming, toileting). Ongoing     STG's (6 weeks)  1)   Patient to be IND with HEP and modalities for  pain/edema managment.MET 6/5/24  2)   Increase ROM to 50 degrees in swartz abduction of thumb to increase functional hand use for ADLs, IADLs, and work tasksMET 6/5/24  3)   Increase ROM to 55 degrees in radial abduction of thumb to increase functional hand use for ADLs, IADLs, and work tasksMET 6/5/24  4)   Increase ROM to 50 degrees in thumb MP flexion to increase functional hand use for ADLs, IADLs, and work tasksMET 6/5/24  5)   Increase ROM to 50 degrees in thumb IP flexion to increase functional hand use for ADLs, IADLs, and work tasksMET 6/5/24  6)   Increase  strength to 15 lbs. to grasp for ADLs, IADLs, and work taskOngoing  7)   Increase key pinch to 4 psis to increase independence with button and FM CoordinationOngoing  8)   Patient to be IND wiht Orthotic use, wear and care precautions.MET 6/5/24  9)   Decrease complaints of pain to  4 out of 10 at worst to increase functional hand use for ADL/work/leisure activities.MET 6/5/24        Plan      Pt to be treated by Occupational Therapy 2 times per week for 12 weeks during the certification period from 5/16/2024 to 8/9/24 to achieve the established goals.      Treatment to include: Paraffin, Fluidotherapy, Manual therapy/joint mobilizations, Modalities for pain management, US 3 mhz, Therapeutic exercises/activities., Iontophoresis with 2.0 cc Dexamethasone, Strengthening, Orthotic Fabrication/Fit/Training, Edema Control, Scar Management, Wound Care, Electrical Modalities, Joint Protection, and Energy Conservation, as well as any other treatments deemed necessary based on the patient's needs or progress.     Updates/Grading for next session: Tiffanie Sutton, OT

## 2024-06-25 ENCOUNTER — CLINICAL SUPPORT (OUTPATIENT)
Dept: REHABILITATION | Facility: HOSPITAL | Age: 49
End: 2024-06-25
Payer: MEDICAID

## 2024-06-25 DIAGNOSIS — R53.1 WEAKNESS: Primary | ICD-10-CM

## 2024-06-25 DIAGNOSIS — M25.542 PAIN IN THUMB JOINT WITH MOVEMENT, LEFT: ICD-10-CM

## 2024-06-25 DIAGNOSIS — M25.60 STIFFNESS IN JOINT: ICD-10-CM

## 2024-06-25 PROCEDURE — 97530 THERAPEUTIC ACTIVITIES: CPT | Mod: PO

## 2024-06-25 NOTE — PROGRESS NOTES
"  Occupational Therapy Daily Treatment Note     Name: Velma Lopez  Clinic Number: [id number]    Therapy Diagnosis:   Encounter Diagnoses   Name Primary?    Weakness Yes    Stiffness in joint     Pain in thumb joint with movement, left      Physician: Russo-Digeorge, Jamie L*    Visit Date: 6/25/2024  Physician Orders: OT eval/treat   Medical Diagnosis: M18.12 (ICD-10-CM) - Arthritis of carpometacarpal (CMC) joint of left thumb Z98.890 (ICD-10-CM) - Postoperative state  Evaluation Date: 5/16/2024  Plan of Care Certification Date: 8/9/24  Authorization Period: 5/10/24 - 5/10/25  Surgery Date and Procedure:    1. left carpal trapeziectomy, CPT 01237  2. 1st to 2nd metacarpal tenodesis, CPT 314467/24/24  Date of Return to MD: 6/6/24  Onset Date: 4/24/24  Visit # / Visits authorized: 9/ 20  FOTO Completion: 1/3    Time In: 0800  Time Out: 0900  Total Billable Time: 60 minutes    Precautions:  Standard      Subjective     Pt reports: "It's been a lot better"   she was compliant with home exercise program given last session.   Response to previous treatment:increased ROM  Functional change: increased ROM    Pain: 2/10  Location: left hands      Objective       Mental status: alert, oriented x3     Observation:   Pt arrived in clinc wearing a hand based thumb spica splint  Pt has steri strips over incision. Wound is healing well with no signs of infection        Sensation: Pt states she has mild numbness and tingling occasionally      Wound Assessment:   Size: 3.5 cm  Location: CMC joint     Edema: Circumferential measurements: In centimters       5/16/2024 5/16/2024     Left Right   Thumb:       Prox. Phalanx 6.5 cm 6.5 cm   Distal Phalanx 6.5 cm 6.5 cm        Left   5/16/2024 Right  5/16/2024   PWC (Proximal Wrist Crease) 16.5 cm 16.5 cm         Range of Motion:   Left        Wrist Left  5/16/2024 Right  5/16/2024 Left  6/5/24   Extension 50 65 60   Flexion 40 60 60   Ulnar Deviation 40 40 45   Radial Deviation 10 " 20 12           Left Thumb range of motion  5/16/2024 Right Thumb range of motion  5/16/2024 Right Thumb Range of Motion  6/5/24   MP 40 65 55   IP 40 65 60   Radial Abduction 50 60 62   Palmar Abduction 40 60 50   Opposition 1 cm from tip of [employer] [place]          Strength: (MT Dynamometer in psi.)        5/16/2024 5/16/2024     Left Right   Rung II NT 40         Pinch Strength (Measured in psi)       5/16/2024 5/16/2024     Left Right   Key Pinch NT  10    3pt Pinch NT  10    2pt Pinch NT  11          CMS Impairment/Limitation/Restriction for FOTO Hand/Wrist/Finger Survey     Therapist reviewed FOTO scores for Velma Lopez on 5/16/2024.   FOTO documents entered into [employer] - see Media section.     Intake Score: 35%  Category: Self Care                    Treatment       Velma participated in dynamic functional therapeutic activities to improve functional performance for 45 minutes, including:  -hot pack with paraffin wax pre treatment to increase extensibility   - AROM: thumb MP flexion, thumb IP flexion, palmar abduction, radial abduction, circles CW, circles CCW  - pom pom  with red clothespin x 2  - coin tower x 2   - isospheres x 3 minutes   - ABC tennis ball   - scar massage to incision site  - distraction of MP and IP joints   - pink putty dig outs   - pink putty squeezes  - pink putty puttyciser soft   - pink putty twist tops  - nuts and bolts   -wrist 3 ways with 1# weight over wedge 1 min each     Home Exercises and Education Provided     Education provided:   - Progress towards goals     Written Home Exercises Provided: Patient instructed to cont prior HEP.  Exercises were reviewed and Velma was able to demonstrate them prior to the end of the session.  Velma demonstrated good  understanding of the HEP provided.   .   See EMR under Patient Instructions for exercises provided prior visit.        Assessment     Pt would continue to benefit from skilled OT. Pt tolerated added  AROM therapeutic activities well today without additional complaints of pain.  Pt is motivated. Will progress as tolerated.     Velma is progressing well towards her goals and there are no updates to goals at this time. Pt prognosis is Excellent.     Pt will continue to benefit from skilled outpatient occupational therapy to address the deficits listed in the problem list on initial evaluation provide pt/family education and to maximize pt's level of independence in the home and community environment.     Anticipated barriers to occupational therapy: none at this time    Pt's spiritual, cultural and educational needs considered and pt agreeable to plan of care and goals.      Goals:    LTG's (12 weeks):  1)   Increase ROM to 60 degrees in swartz abduction of thumb to increase functional hand use for ADLs, IADLs, and work tasks Ongoing  2)   Increase ROM to 60 degrees in radial abduction of thumb to increase functional hand use for ADLs, IADLs, and work tasks MET 6/5/24  3)   Increase ROM to 60 degrees in thumb MP flexion to increase functional hand use for ADLs, IADLs, and work tasksOngoing  4)   Increase ROM to 60 degrees in thumb IP flexion to increase functional hand use for ADLs, IADLs, and work tasksMET 6/5/24  5)   Increase  strength to 35 lbs. to grasp for ADLs, IADLs, and work taskOngoing  6)   Increase key pinch to 8 psis to increase independence with button and FM CoordinationOngoing  7)   Decrease complaints of pain to  1 out of 10 at worst to increase functional hand use for ADL/work/leisure activities.Ongoing  8)   Patient to score at 59% or more on FOTO to demonstrate improved perception of functional hand use.Ongoing  9)   Pt will return to near to prior level of function for ADLs and household management reporting I or Mod I with ADLs (dressing, feeding, grooming, toileting). Ongoing     STG's (6 weeks)  1)   Patient to be IND with HEP and modalities for pain/edema managment.MET 6/5/24  2)    Increase ROM to 50 degrees in swartz abduction of thumb to increase functional hand use for ADLs, IADLs, and work tasksMET 6/5/24  3)   Increase ROM to 55 degrees in radial abduction of thumb to increase functional hand use for ADLs, IADLs, and work tasksMET 6/5/24  4)   Increase ROM to 50 degrees in thumb MP flexion to increase functional hand use for ADLs, IADLs, and work tasksMET 6/5/24  5)   Increase ROM to 50 degrees in thumb IP flexion to increase functional hand use for ADLs, IADLs, and work tasksMET 6/5/24  6)   Increase  strength to 15 lbs. to grasp for ADLs, IADLs, and work taskOngoing  7)   Increase key pinch to 4 psis to increase independence with button and FM CoordinationOngoing  8)   Patient to be IND wiht Orthotic use, wear and care precautions.MET 6/5/24  9)   Decrease complaints of pain to  4 out of 10 at worst to increase functional hand use for ADL/work/leisure activities.MET 6/5/24        Plan      Pt to be treated by Occupational Therapy 2 times per week for 12 weeks during the certification period from 5/16/2024 to 8/9/24 to achieve the established goals.      Treatment to include: Paraffin, Fluidotherapy, Manual therapy/joint mobilizations, Modalities for pain management, US 3 mhz, Therapeutic exercises/activities., Iontophoresis with 2.0 cc Dexamethasone, Strengthening, Orthotic Fabrication/Fit/Training, Edema Control, Scar Management, Wound Care, Electrical Modalities, Joint Protection, and Energy Conservation, as well as any other treatments deemed necessary based on the patient's needs or progress.     Updates/Grading for next session: Tiffanie Sutton, OT

## 2024-06-26 ENCOUNTER — CLINICAL SUPPORT (OUTPATIENT)
Dept: REHABILITATION | Facility: HOSPITAL | Age: 49
End: 2024-06-26
Payer: MEDICAID

## 2024-06-26 DIAGNOSIS — M25.60 STIFFNESS IN JOINT: ICD-10-CM

## 2024-06-26 DIAGNOSIS — M25.542 PAIN IN THUMB JOINT WITH MOVEMENT, LEFT: ICD-10-CM

## 2024-06-26 DIAGNOSIS — R53.1 WEAKNESS: Primary | ICD-10-CM

## 2024-06-26 PROCEDURE — 97530 THERAPEUTIC ACTIVITIES: CPT | Mod: PO

## 2024-06-26 NOTE — PROGRESS NOTES
"  Occupational Therapy Daily Treatment Note     Name: Velma Lopez  Clinic Number: [id number]    Therapy Diagnosis:   Encounter Diagnoses   Name Primary?    Weakness Yes    Stiffness in joint     Pain in thumb joint with movement, left        Physician: Russo-Digeorge, Jamie L*    Visit Date: 6/26/2024  Physician Orders: OT eval/treat   Medical Diagnosis: M18.12 (ICD-10-CM) - Arthritis of carpometacarpal (CMC) joint of left thumb Z98.890 (ICD-10-CM) - Postoperative state  Evaluation Date: 5/16/2024  Plan of Care Certification Date: 8/9/24  Authorization Period: 5/10/24 - 5/10/25  Surgery Date and Procedure:    1. left carpal trapeziectomy, CPT 42353  2. 1st to 2nd metacarpal tenodesis, CPT 108423/24/24  Date of Return to MD: 6/6/24  Onset Date: 4/24/24  Visit # / Visits authorized: 10/ 20  FOTO Completion: 1/3    Time In: 1300  Time Out: 1345  Total Billable Time: 60 minutes    Precautions:  Standard      Subjective     Pt reports: "It's been a lot better"   she was compliant with home exercise program given last session.   Response to previous treatment:increased ROM  Functional change: increased ROM    Pain: 2/10  Location: left hands      Objective       Mental status: alert, oriented x3     Observation:   Pt arrived in clinc wearing a hand based thumb spica splint  Pt has steri strips over incision. Wound is healing well with no signs of infection        Sensation: Pt states she has mild numbness and tingling occasionally      Wound Assessment:   Size: 3.5 cm  Location: CMC joint     Edema: Circumferential measurements: In centimters       5/16/2024 5/16/2024     Left Right   Thumb:       Prox. Phalanx 6.5 cm 6.5 cm   Distal Phalanx 6.5 cm 6.5 cm        Left   5/16/2024 Right  5/16/2024   PWC (Proximal Wrist Crease) 16.5 cm 16.5 cm         Range of Motion:   Left        Wrist Left  5/16/2024 Right  5/16/2024 Left  6/5/24   Extension 50 65 60   Flexion 40 60 60   Ulnar Deviation 40 40 45   Radial Deviation " 10 20 12           Left Thumb range of motion  5/16/2024 Right Thumb range of motion  5/16/2024 Right Thumb Range of Motion  6/5/24   MP 40 65 55   IP 40 65 60   Radial Abduction 50 60 62   Palmar Abduction 40 60 50   Opposition 1 cm from tip of [employer] [place]          Strength: (MT Dynamometer in psi.)        5/16/2024 5/16/2024     Left Right   Rung II NT 40         Pinch Strength (Measured in psi)       5/16/2024 5/16/2024     Left Right   Key Pinch NT  10    3pt Pinch NT  10    2pt Pinch NT  11          CMS Impairment/Limitation/Restriction for FOTO Hand/Wrist/Finger Survey     Therapist reviewed FOTO scores for Velma Lopez on 5/16/2024.   FOTO documents entered into [employer] - see Media section.     Intake Score: 35%  Category: Self Care      6/26/24: 56%           Treatment       Velma participated in dynamic functional therapeutic activities to improve functional performance for 45 minutes, including:  -hot pack with paraffin wax pre treatment to increase extensibility   - AROM: thumb MP flexion, thumb IP flexion, palmar abduction, radial abduction, circles CW, circles CCW  - pom pom  with red clothespin x 2  - coin tower x 2   - isospheres x 3 minutes   - ABC tennis ball   - scar massage to incision site  - distraction of MP and IP joints   - pink putty dig outs   - pink putty squeezes  - pink putty puttyciser soft   - pink putty twist tops  - nuts and bolts   -wrist 3 ways with 1# weight over wedge 1 min each   -reissued FOTO    Home Exercises and Education Provided     Education provided:   - Progress towards goals     Written Home Exercises Provided: Patient instructed to cont prior HEP.  Exercises were reviewed and Velma was able to demonstrate them prior to the end of the session.  Velma demonstrated good  understanding of the HEP provided.   .   See EMR under Patient Instructions for exercises provided prior visit.        Assessment     Pt would continue to benefit from  skilled OT. Pt tolerated added AROM therapeutic activities well today without additional complaints of pain.  Reissued FOTO and patient has progressed from 35% at intake to 56%. Pt is motivated. Will progress as tolerated.     Velma is progressing well towards her goals and there are no updates to goals at this time. Pt prognosis is Excellent.     Pt will continue to benefit from skilled outpatient occupational therapy to address the deficits listed in the problem list on initial evaluation provide pt/family education and to maximize pt's level of independence in the home and community environment.     Anticipated barriers to occupational therapy: none at this time    Pt's spiritual, cultural and educational needs considered and pt agreeable to plan of care and goals.      Goals:    LTG's (12 weeks):  1)   Increase ROM to 60 degrees in swartz abduction of thumb to increase functional hand use for ADLs, IADLs, and work tasks Ongoing  2)   Increase ROM to 60 degrees in radial abduction of thumb to increase functional hand use for ADLs, IADLs, and work tasks MET 6/5/24  3)   Increase ROM to 60 degrees in thumb MP flexion to increase functional hand use for ADLs, IADLs, and work tasksOngoing  4)   Increase ROM to 60 degrees in thumb IP flexion to increase functional hand use for ADLs, IADLs, and work tasksMET 6/5/24  5)   Increase  strength to 35 lbs. to grasp for ADLs, IADLs, and work taskOngoing  6)   Increase key pinch to 8 psis to increase independence with button and FM CoordinationOngoing  7)   Decrease complaints of pain to  1 out of 10 at worst to increase functional hand use for ADL/work/leisure activities.Ongoing  8)   Patient to score at 59% or more on FOTO to demonstrate improved perception of functional hand use.Ongoing  9)   Pt will return to near to prior level of function for ADLs and household management reporting I or Mod I with ADLs (dressing, feeding, grooming, toileting). Ongoing     STG's (6  weeks)  1)   Patient to be IND with HEP and modalities for pain/edema managment.MET 6/5/24  2)   Increase ROM to 50 degrees in swartz abduction of thumb to increase functional hand use for ADLs, IADLs, and work tasksMET 6/5/24  3)   Increase ROM to 55 degrees in radial abduction of thumb to increase functional hand use for ADLs, IADLs, and work tasksMET 6/5/24  4)   Increase ROM to 50 degrees in thumb MP flexion to increase functional hand use for ADLs, IADLs, and work tasksMET 6/5/24  5)   Increase ROM to 50 degrees in thumb IP flexion to increase functional hand use for ADLs, IADLs, and work tasksMET 6/5/24  6)   Increase  strength to 15 lbs. to grasp for ADLs, IADLs, and work taskOngoing  7)   Increase key pinch to 4 psis to increase independence with button and FM CoordinationOngoing  8)   Patient to be IND wiht Orthotic use, wear and care precautions.MET 6/5/24  9)   Decrease complaints of pain to  4 out of 10 at worst to increase functional hand use for ADL/work/leisure activities.MET 6/5/24        Plan      Pt to be treated by Occupational Therapy 2 times per week for 12 weeks during the certification period from 5/16/2024 to 8/9/24 to achieve the established goals.      Treatment to include: Paraffin, Fluidotherapy, Manual therapy/joint mobilizations, Modalities for pain management, US 3 mhz, Therapeutic exercises/activities., Iontophoresis with 2.0 cc Dexamethasone, Strengthening, Orthotic Fabrication/Fit/Training, Edema Control, Scar Management, Wound Care, Electrical Modalities, Joint Protection, and Energy Conservation, as well as any other treatments deemed necessary based on the patient's needs or progress.     Updates/Grading for next session: Continue with current plan of care       Radha Sutton, OT

## 2024-07-02 ENCOUNTER — CLINICAL SUPPORT (OUTPATIENT)
Dept: REHABILITATION | Facility: HOSPITAL | Age: 49
End: 2024-07-02
Payer: MEDICAID

## 2024-07-02 DIAGNOSIS — M18.12 ARTHRITIS OF CARPOMETACARPAL (CMC) JOINT OF LEFT THUMB: ICD-10-CM

## 2024-07-02 PROCEDURE — 97530 THERAPEUTIC ACTIVITIES: CPT | Mod: PO

## 2024-07-02 NOTE — PROGRESS NOTES
"  Occupational Therapy Daily Treatment Note     Name: Velma Lopez  Clinic Number: [id number]    Therapy Diagnosis:   Encounter Diagnosis   Name Primary?    Arthritis of carpometacarpal (CMC) joint of left thumb        Physician: Russo-Digeorge, Jamie L*    Visit Date: 7/2/2024  Physician Orders: OT eval/treat   Medical Diagnosis: M18.12 (ICD-10-CM) - Arthritis of carpometacarpal (CMC) joint of left thumb Z98.890 (ICD-10-CM) - Postoperative state  Evaluation Date: 5/16/2024  Plan of Care Certification Date: 8/9/24  Authorization Period: 5/10/24 - 5/10/25  Surgery Date and Procedure:    1. left carpal trapeziectomy, CPT 91725  2. 1st to 2nd metacarpal tenodesis, CPT 610855/24/24  Date of Return to MD: 6/6/24  Onset Date: 4/24/24  Visit # / Visits authorized: 10/ 20  FOTO Completion: 1/3    Time In: 1400  Time Out: 1500  Total Billable Time: 60 minutes    Precautions:  Standard      Subjective     Pt reports: "It's been a lot better"   she was compliant with home exercise program given last session.   Response to previous treatment:increased ROM  Functional change: increased ROM    Pain: 2/10  Location: left hands      Objective       Mental status: alert, oriented x3     Observation:   Pt arrived in clinc wearing a hand based thumb spica splint  Pt has steri strips over incision. Wound is healing well with no signs of infection        Sensation: Pt states she has mild numbness and tingling occasionally      Wound Assessment:   Size: 3.5 cm  Location: CMC joint     Edema: Circumferential measurements: In centimters       5/16/2024 5/16/2024     Left Right   Thumb:       Prox. Phalanx 6.5 cm 6.5 cm   Distal Phalanx 6.5 cm 6.5 cm        Left   5/16/2024 Right  5/16/2024   PWC (Proximal Wrist Crease) 16.5 cm 16.5 cm         Range of Motion:   Left        Wrist Left  5/16/2024 Right  5/16/2024 Left  6/5/24   Extension 50 65 60   Flexion 40 60 60   Ulnar Deviation 40 40 45   Radial Deviation 10 20 12           Left " Thumb range of motion  5/16/2024 Right Thumb range of motion  5/16/2024 Right Thumb Range of Motion  6/5/24   MP 40 65 55   IP 40 65 60   Radial Abduction 50 60 62   Palmar Abduction 40 60 50   Opposition 1 cm from tip of [employer] [place]          Strength: (MT Dynamometer in psi.)        5/16/2024 5/16/2024     Left Right   Rung II NT 40         Pinch Strength (Measured in psi)       5/16/2024 5/16/2024     Left Right   Key Pinch NT  10    3pt Pinch NT  10    2pt Pinch NT  11          CMS Impairment/Limitation/Restriction for FOTO Hand/Wrist/Finger Survey     Therapist reviewed FOTO scores for Velma Lopez on 5/16/2024.   FOTO documents entered into [employer] - see Media section.     Intake Score: 35%  Category: Self Care      6/26/24: 56%           Treatment       Velma participated in dynamic functional therapeutic activities to improve functional performance for 60 minutes, including:  -hot pack with paraffin wax pre treatment to increase extensibility   - AROM: thumb MP flexion, thumb IP flexion, palmar abduction, radial abduction, circles CW, circles CCW  - pom pom  with red clothespin x 2  - coin tower x 2   - isospheres x 3 minutes   - ABC tennis ball   - scar massage to incision site  - distraction of MP and IP joints   - pink putty dig outs   - pink putty squeezes  - pink putty puttyciser soft   - pink putty twist tops  - nuts and bolts   -wrist 3 ways with 1# weight over wedge 1 min each   -reissued FOTO    Home Exercises and Education Provided     Education provided:   - Progress towards goals     Written Home Exercises Provided: Patient instructed to cont prior HEP.  Exercises were reviewed and Velma was able to demonstrate them prior to the end of the session.  Velma demonstrated good  understanding of the HEP provided.   .   See EMR under Patient Instructions for exercises provided prior visit.        Assessment     Pt would continue to benefit from skilled OT. Pt tolerated  added AROM therapeutic activities well today without additional complaints of pain.  Reissued FOTO and patient has progressed from 35% at intake to 56%. Pt is motivated. Will progress as tolerated.     Velma is progressing well towards her goals and there are no updates to goals at this time. Pt prognosis is Excellent.     Pt will continue to benefit from skilled outpatient occupational therapy to address the deficits listed in the problem list on initial evaluation provide pt/family education and to maximize pt's level of independence in the home and community environment.     Anticipated barriers to occupational therapy: none at this time    Pt's spiritual, cultural and educational needs considered and pt agreeable to plan of care and goals.      Goals:    LTG's (12 weeks):  1)   Increase ROM to 60 degrees in swartz abduction of thumb to increase functional hand use for ADLs, IADLs, and work tasks Ongoing  2)   Increase ROM to 60 degrees in radial abduction of thumb to increase functional hand use for ADLs, IADLs, and work tasks MET 6/5/24  3)   Increase ROM to 60 degrees in thumb MP flexion to increase functional hand use for ADLs, IADLs, and work tasksOngoing  4)   Increase ROM to 60 degrees in thumb IP flexion to increase functional hand use for ADLs, IADLs, and work tasksMET 6/5/24  5)   Increase  strength to 35 lbs. to grasp for ADLs, IADLs, and work taskOngoing  6)   Increase key pinch to 8 psis to increase independence with button and FM CoordinationOngoing  7)   Decrease complaints of pain to  1 out of 10 at worst to increase functional hand use for ADL/work/leisure activities.Ongoing  8)   Patient to score at 59% or more on FOTO to demonstrate improved perception of functional hand use.Ongoing  9)   Pt will return to near to prior level of function for ADLs and household management reporting I or Mod I with ADLs (dressing, feeding, grooming, toileting). Ongoing     STG's (6 weeks)  1)   Patient to  be IND with HEP and modalities for pain/edema managment.MET 6/5/24  2)   Increase ROM to 50 degrees in swartz abduction of thumb to increase functional hand use for ADLs, IADLs, and work tasksMET 6/5/24  3)   Increase ROM to 55 degrees in radial abduction of thumb to increase functional hand use for ADLs, IADLs, and work tasksMET 6/5/24  4)   Increase ROM to 50 degrees in thumb MP flexion to increase functional hand use for ADLs, IADLs, and work tasksMET 6/5/24  5)   Increase ROM to 50 degrees in thumb IP flexion to increase functional hand use for ADLs, IADLs, and work tasksMET 6/5/24  6)   Increase  strength to 15 lbs. to grasp for ADLs, IADLs, and work taskOngoing  7)   Increase key pinch to 4 psis to increase independence with button and FM CoordinationOngoing  8)   Patient to be IND wiht Orthotic use, wear and care precautions.MET 6/5/24  9)   Decrease complaints of pain to  4 out of 10 at worst to increase functional hand use for ADL/work/leisure activities.MET 6/5/24        Plan      Pt to be treated by Occupational Therapy 2 times per week for 12 weeks during the certification period from 5/16/2024 to 8/9/24 to achieve the established goals.      Treatment to include: Paraffin, Fluidotherapy, Manual therapy/joint mobilizations, Modalities for pain management, US 3 mhz, Therapeutic exercises/activities., Iontophoresis with 2.0 cc Dexamethasone, Strengthening, Orthotic Fabrication/Fit/Training, Edema Control, Scar Management, Wound Care, Electrical Modalities, Joint Protection, and Energy Conservation, as well as any other treatments deemed necessary based on the patient's needs or progress.     Updates/Grading for next session: Continue with current plan of care       Radha Sutton, OT

## 2024-07-03 ENCOUNTER — CLINICAL SUPPORT (OUTPATIENT)
Dept: REHABILITATION | Facility: HOSPITAL | Age: 49
End: 2024-07-03
Payer: MEDICAID

## 2024-07-03 DIAGNOSIS — M25.60 STIFFNESS IN JOINT: Primary | ICD-10-CM

## 2024-07-03 DIAGNOSIS — M25.542 PAIN IN THUMB JOINT WITH MOVEMENT, LEFT: ICD-10-CM

## 2024-07-03 DIAGNOSIS — R53.1 WEAKNESS: ICD-10-CM

## 2024-07-03 PROCEDURE — 97530 THERAPEUTIC ACTIVITIES: CPT | Mod: PO

## 2024-07-03 NOTE — PROGRESS NOTES
"  Occupational Therapy Daily Treatment Note     Name: Velma Lopez  Clinic Number: [id number]    Therapy Diagnosis:   Encounter Diagnoses   Name Primary?    Stiffness in joint Yes    Pain in thumb joint with movement, left     Weakness          Physician: Russo-Digeorge, Jamie L*    Visit Date: 7/3/2024  Physician Orders: OT eval/treat   Medical Diagnosis: M18.12 (ICD-10-CM) - Arthritis of carpometacarpal (CMC) joint of left thumb Z98.890 (ICD-10-CM) - Postoperative state  Evaluation Date: 5/16/2024  Plan of Care Certification Date: 8/9/24  Authorization Period: 5/10/24 - 5/10/25  Surgery Date and Procedure:    1. left carpal trapeziectomy, CPT 29602  2. 1st to 2nd metacarpal tenodesis, CPT 723837/24/24  Date of Return to MD: 6/6/24  Onset Date: 4/24/24  Visit # / Visits authorized: 10/ 20  FOTO Completion: 1/3    Time In: 1000  Time Out: 1100  Total Billable Time: 60 minutes    Precautions:  Standard      Subjective     Pt reports: "It's been a lot better"   she was compliant with home exercise program given last session.   Response to previous treatment:increased ROM  Functional change: increased ROM    Pain: 2/10  Location: left hands      Objective       Mental status: alert, oriented x3     Observation:   Pt arrived in clinc wearing a hand based thumb spica splint  Pt has steri strips over incision. Wound is healing well with no signs of infection        Sensation: Pt states she has mild numbness and tingling occasionally      Wound Assessment:   Size: 3.5 cm  Location: CMC joint     Edema: Circumferential measurements: In centimters       5/16/2024 5/16/2024     Left Right   Thumb:       Prox. Phalanx 6.5 cm 6.5 cm   Distal Phalanx 6.5 cm 6.5 cm        Left   5/16/2024 Right  5/16/2024   PWC (Proximal Wrist Crease) 16.5 cm 16.5 cm         Range of Motion:   Left        Wrist Left  5/16/2024 Right  5/16/2024 Left  6/5/24   Extension 50 65 60   Flexion 40 60 60   Ulnar Deviation 40 40 45   Radial Deviation " 10 20 12           Left Thumb range of motion  5/16/2024 Right Thumb range of motion  5/16/2024 Right Thumb Range of Motion  6/5/24   MP 40 65 55   IP 40 65 60   Radial Abduction 50 60 62   Palmar Abduction 40 60 50   Opposition 1 cm from tip of [employer] [place]          Strength: (MT Dynamometer in psi.)        5/16/2024 5/16/2024     Left Right   Rung II NT 40         Pinch Strength (Measured in psi)       5/16/2024 5/16/2024     Left Right   Key Pinch NT  10    3pt Pinch NT  10    2pt Pinch NT  11          CMS Impairment/Limitation/Restriction for FOTO Hand/Wrist/Finger Survey     Therapist reviewed FOTO scores for Velma Lopez on 5/16/2024.   FOTO documents entered into [employer] - see Media section.     Intake Score: 35%  Category: Self Care      6/26/24: 56%           Treatment       Velma participated in dynamic functional therapeutic activities to improve functional performance for 60 minutes, including:  -hot pack with paraffin wax pre treatment to increase extensibility   - AROM: thumb MP flexion, thumb IP flexion, palmar abduction, radial abduction, circles CW, circles CCW  - pom pom  with red clothespin x 2  - coin tower x 2   - isospheres x 3 minutes   - ABC tennis ball   - scar massage to incision site  - distraction of MP and IP joints   - pink putty dig outs   - pink putty squeezes  - pink putty puttyciser soft   - pink putty twist tops  - nuts and bolts   -wrist maze     Home Exercises and Education Provided     Education provided:   - Progress towards goals     Written Home Exercises Provided: Patient instructed to cont prior HEP.  Exercises were reviewed and Velma was able to demonstrate them prior to the end of the session.  Velma demonstrated good  understanding of the HEP provided.   .   See EMR under Patient Instructions for exercises provided prior visit.        Assessment     Pt would continue to benefit from skilled OT. Pt tolerated added AROM therapeutic activities  well today without additional complaints of pain.  Pt is motivated. Will progress as tolerated.     Velma is progressing well towards her goals and there are no updates to goals at this time. Pt prognosis is Excellent.     Pt will continue to benefit from skilled outpatient occupational therapy to address the deficits listed in the problem list on initial evaluation provide pt/family education and to maximize pt's level of independence in the home and community environment.     Anticipated barriers to occupational therapy: none at this time    Pt's spiritual, cultural and educational needs considered and pt agreeable to plan of care and goals.      Goals:    LTG's (12 weeks):  1)   Increase ROM to 60 degrees in swartz abduction of thumb to increase functional hand use for ADLs, IADLs, and work tasks Ongoing  2)   Increase ROM to 60 degrees in radial abduction of thumb to increase functional hand use for ADLs, IADLs, and work tasks MET 6/5/24  3)   Increase ROM to 60 degrees in thumb MP flexion to increase functional hand use for ADLs, IADLs, and work tasksOngoing  4)   Increase ROM to 60 degrees in thumb IP flexion to increase functional hand use for ADLs, IADLs, and work tasksMET 6/5/24  5)   Increase  strength to 35 lbs. to grasp for ADLs, IADLs, and work taskOngoing  6)   Increase key pinch to 8 psis to increase independence with button and FM CoordinationOngoing  7)   Decrease complaints of pain to  1 out of 10 at worst to increase functional hand use for ADL/work/leisure activities.Ongoing  8)   Patient to score at 59% or more on FOTO to demonstrate improved perception of functional hand use.Ongoing  9)   Pt will return to near to prior level of function for ADLs and household management reporting I or Mod I with ADLs (dressing, feeding, grooming, toileting). Ongoing     STG's (6 weeks)  1)   Patient to be IND with HEP and modalities for pain/edema managment.MET 6/5/24  2)   Increase ROM to 50 degrees in  swartz abduction of thumb to increase functional hand use for ADLs, IADLs, and work tasksMET 6/5/24  3)   Increase ROM to 55 degrees in radial abduction of thumb to increase functional hand use for ADLs, IADLs, and work tasksMET 6/5/24  4)   Increase ROM to 50 degrees in thumb MP flexion to increase functional hand use for ADLs, IADLs, and work tasksMET 6/5/24  5)   Increase ROM to 50 degrees in thumb IP flexion to increase functional hand use for ADLs, IADLs, and work tasksMET 6/5/24  6)   Increase  strength to 15 lbs. to grasp for ADLs, IADLs, and work taskOngoing  7)   Increase key pinch to 4 psis to increase independence with button and FM CoordinationOngoing  8)   Patient to be IND wiht Orthotic use, wear and care precautions.MET 6/5/24  9)   Decrease complaints of pain to  4 out of 10 at worst to increase functional hand use for ADL/work/leisure activities.MET 6/5/24        Plan      Pt to be treated by Occupational Therapy 2 times per week for 12 weeks during the certification period from 5/16/2024 to 8/9/24 to achieve the established goals.      Treatment to include: Paraffin, Fluidotherapy, Manual therapy/joint mobilizations, Modalities for pain management, US 3 mhz, Therapeutic exercises/activities., Iontophoresis with 2.0 cc Dexamethasone, Strengthening, Orthotic Fabrication/Fit/Training, Edema Control, Scar Management, Wound Care, Electrical Modalities, Joint Protection, and Energy Conservation, as well as any other treatments deemed necessary based on the patient's needs or progress.     Updates/Grading for next session: Continue with current plan of care       Radha Sutton, OT

## 2024-07-09 ENCOUNTER — PATIENT MESSAGE (OUTPATIENT)
Dept: REHABILITATION | Facility: HOSPITAL | Age: 49
End: 2024-07-09
Payer: MEDICAID

## 2024-07-17 ENCOUNTER — CLINICAL SUPPORT (OUTPATIENT)
Dept: REHABILITATION | Facility: HOSPITAL | Age: 49
End: 2024-07-17
Payer: MEDICAID

## 2024-07-17 DIAGNOSIS — M25.542 PAIN IN THUMB JOINT WITH MOVEMENT, LEFT: ICD-10-CM

## 2024-07-17 DIAGNOSIS — M25.60 STIFFNESS IN JOINT: ICD-10-CM

## 2024-07-17 DIAGNOSIS — R53.1 WEAKNESS: Primary | ICD-10-CM

## 2024-07-17 PROCEDURE — 97530 THERAPEUTIC ACTIVITIES: CPT | Mod: PO

## 2024-07-17 NOTE — PROGRESS NOTES
"  Occupational Therapy Daily Treatment Note     Name: Velma Lopez  Clinic Number: [id number]    Therapy Diagnosis:   Encounter Diagnoses   Name Primary?    Weakness Yes    Pain in thumb joint with movement, left     Stiffness in joint            Physician: Russo-Digeorge, Jamie L*    Visit Date: 7/17/2024  Physician Orders: OT eval/treat   Medical Diagnosis: M18.12 (ICD-10-CM) - Arthritis of carpometacarpal (CMC) joint of left thumb Z98.890 (ICD-10-CM) - Postoperative state  Evaluation Date: 5/16/2024  Plan of Care Certification Date: 8/9/24  Authorization Period: 5/10/24 - 5/10/25  Surgery Date and Procedure:    1. left carpal trapeziectomy, CPT 65832  2. 1st to 2nd metacarpal tenodesis, CPT 336322/24/24  Date of Return to MD: 6/6/24  Onset Date: 4/24/24  Visit # / Visits authorized: 12/ 20  FOTO Completion: 2/3    Time In: 0800  Time Out: 0900  Total Billable Time: 60 minutes    Precautions:  Standard      Subjective     Pt reports: "It's been a lot better, but it still hurts"  she was compliant with home exercise program given last session.   Response to previous treatment:increased ROM  Functional change: increased ROM    Pain: 2/10  Location: left hands      Objective       Mental status: alert, oriented x3     Observation:   Pt incision has healed well        Sensation: Pt states she has mild numbness and tingling occasionally      Wound Assessment:   Size: 3.5 cm  Location: CMC joint     Edema: Circumferential measurements: In centimters       5/16/2024 5/16/2024     Left Right   Thumb:       Prox. Phalanx 6.5 cm 6.5 cm   Distal Phalanx 6.5 cm 6.5 cm        Left   5/16/2024 Right  5/16/2024   PWC (Proximal Wrist Crease) 16.5 cm 16.5 cm         Range of Motion:   Left        Wrist Left  5/16/2024 Right  5/16/2024 Left  6/5/24 Left   7/17/24   Extension 50 65 60 65   Flexion 40 60 60 60   Ulnar Deviation 40 40 45 45   Radial Deviation 10 20 12 15           Left Thumb range of motion  5/16/2024 Right " Thumb range of motion  5/16/2024 Left Thumb Range of Motion  6/5/24 Left Thumb Range of Motion   7/17/24   MP 40 65 55 55   IP 40 65 60 60   Radial Abduction 50 60 62 63   Palmar Abduction 40 60 50 60   Opposition 1 cm from tip of SF DPC Base of SF DPC          Strength: (MT Dynamometer in psi.)        5/16/2024 5/16/2024 7/17/24     Left Right Left   Rung II NT 40 20         Pinch Strength (Measured in psi)       5/16/2024 5/16/2024 7/17/24     Left Right Left   Key Pinch NT  10  3   3pt Pinch NT  10  2   2pt Pinch NT  11  1         CMS Impairment/Limitation/Restriction for FOTO Hand/Wrist/Finger Survey     Therapist reviewed FOTO scores for Velma Lopez on 5/16/2024.   FOTO documents entered into [employer] - see Media section.     Intake Score: 35%  Category: Self Care      6/26/24: 56%             Treatment       Velma participated in dynamic functional therapeutic activities to improve functional performance for 60 minutes, including:  -hot pack with paraffin wax pre treatment to increase extensibility   - AROM: thumb MP flexion, thumb IP flexion, palmar abduction, radial abduction, circles CW, circles CCW  - pom pom  with red clothespin x 2  - coin tower x 2   - isospheres x 3 minutes   - ABC tennis ball   - scar massage to incision site  - distraction of MP and IP joints   - pink putty dig outs   - pink putty squeezes  - pink putty puttyciser soft   - pink putty twist tops  -wrist maze   - wrist 3 ways 2/15. 1# wrist flexion, 2# for RD and extension  -reassessment of objective measures     Home Exercises and Education Provided     Education provided:   - Progress towards goals     Written Home Exercises Provided: Patient instructed to cont prior HEP.  Exercises were reviewed and Velma was able to demonstrate them prior to the end of the session.  Velma demonstrated good  understanding of the HEP provided.   .   See EMR under Patient Instructions for exercises provided prior visit.         Assessment     Pt would continue to benefit from skilled OT. Pt tolerated added AROM therapeutic activities well today without additional complaints of pain.  Reassessment of objective measures were taken and patient has MET 3/9 LTGs and 8/9 STGs. Pt is motivated. Will progress as tolerated.     Velma is progressing well towards her goals and there are no updates to goals at this time. Pt prognosis is Excellent.     Pt will continue to benefit from skilled outpatient occupational therapy to address the deficits listed in the problem list on initial evaluation provide pt/family education and to maximize pt's level of independence in the home and community environment.     Anticipated barriers to occupational therapy: none at this time    Pt's spiritual, cultural and educational needs considered and pt agreeable to plan of care and goals.      Goals:    LTG's (12 weeks):  1)   Increase ROM to 60 degrees in swartz abduction of thumb to increase functional hand use for ADLs, IADLs, and work tasks MET 7/17/24  2)   Increase ROM to 60 degrees in radial abduction of thumb to increase functional hand use for ADLs, IADLs, and work tasks MET 6/5/24  3)   Increase ROM to 60 degrees in thumb MP flexion to increase functional hand use for ADLs, IADLs, and work tasksOngoing  4)   Increase ROM to 60 degrees in thumb IP flexion to increase functional hand use for ADLs, IADLs, and work tasksMET 6/5/24  5)   Increase  strength to 35 lbs. to grasp for ADLs, IADLs, and work taskOngoing  6)   Increase key pinch to 8 psis to increase independence with button and FM CoordinationOngoing  7)   Decrease complaints of pain to  1 out of 10 at worst to increase functional hand use for ADL/work/leisure activities.Ongoing  8)   Patient to score at 59% or more on FOTO to demonstrate improved perception of functional hand use.Ongoing  9)   Pt will return to near to prior level of function for ADLs and household management reporting I or  Mod I with ADLs (dressing, feeding, grooming, toileting). Ongoing     STG's (6 weeks)  1)   Patient to be IND with HEP and modalities for pain/edema managment.MET 6/5/24  2)   Increase ROM to 50 degrees in swartz abduction of thumb to increase functional hand use for ADLs, IADLs, and work tasksMET 6/5/24  3)   Increase ROM to 55 degrees in radial abduction of thumb to increase functional hand use for ADLs, IADLs, and work tasksMET 6/5/24  4)   Increase ROM to 50 degrees in thumb MP flexion to increase functional hand use for ADLs, IADLs, and work tasksMET 6/5/24  5)   Increase ROM to 50 degrees in thumb IP flexion to increase functional hand use for ADLs, IADLs, and work tasksMET 6/5/24  6)   Increase  strength to 15 lbs. to grasp for ADLs, IADLs, and work task MET 7/17/24  7)   Increase key pinch to 4 psis to increase independence with button and FM CoordinationOngoing  8)   Patient to be IND wiht Orthotic use, wear and care precautions.MET 6/5/24  9)   Decrease complaints of pain to  4 out of 10 at worst to increase functional hand use for ADL/work/leisure activities.MET 6/5/24        Plan      Pt to be treated by Occupational Therapy 2 times per week for 12 weeks during the certification period from 5/16/2024 to 8/9/24 to achieve the established goals.      Treatment to include: Paraffin, Fluidotherapy, Manual therapy/joint mobilizations, Modalities for pain management, US 3 mhz, Therapeutic exercises/activities., Iontophoresis with 2.0 cc Dexamethasone, Strengthening, Orthotic Fabrication/Fit/Training, Edema Control, Scar Management, Wound Care, Electrical Modalities, Joint Protection, and Energy Conservation, as well as any other treatments deemed necessary based on the patient's needs or progress.     Updates/Grading for next session: Continue with current plan of care       Radha Sutton OT

## 2024-07-18 ENCOUNTER — OFFICE VISIT (OUTPATIENT)
Dept: ORTHOPEDICS | Facility: CLINIC | Age: 49
End: 2024-07-18
Payer: MEDICAID

## 2024-07-18 VITALS — WEIGHT: 144.63 LBS | HEIGHT: 66 IN | BODY MASS INDEX: 23.24 KG/M2

## 2024-07-18 DIAGNOSIS — M18.12 ARTHRITIS OF CARPOMETACARPAL (CMC) JOINT OF LEFT THUMB: Primary | ICD-10-CM

## 2024-07-18 PROCEDURE — 99213 OFFICE O/P EST LOW 20 MIN: CPT | Mod: PBBFAC | Performed by: ORTHOPAEDIC SURGERY

## 2024-07-18 PROCEDURE — 3044F HG A1C LEVEL LT 7.0%: CPT | Mod: CPTII,,, | Performed by: ORTHOPAEDIC SURGERY

## 2024-07-18 PROCEDURE — 1159F MED LIST DOCD IN RCRD: CPT | Mod: CPTII,,, | Performed by: ORTHOPAEDIC SURGERY

## 2024-07-18 PROCEDURE — 99024 POSTOP FOLLOW-UP VISIT: CPT | Mod: ,,, | Performed by: ORTHOPAEDIC SURGERY

## 2024-07-18 PROCEDURE — 99999 PR PBB SHADOW E&M-EST. PATIENT-LVL III: CPT | Mod: PBBFAC,,, | Performed by: ORTHOPAEDIC SURGERY

## 2024-07-18 NOTE — PROGRESS NOTES
Velma Lopez presents for post-operative evaluation.  The patient is now 12 weeks s/p Left thumb CMC arthroplasty with Dr. Tom on 4/24/24.   She is coming along.  Still having some soreness but this is improving.  She continues to attend therapy.  No other complaints.          PE:    AA&O x 4.  NAD  HEENT:  NCAT, sclera nonicteric  Lungs:  Respirations are equal and unlabored.  CV:  2+ bilateral upper and lower extremity pulses.  MSK: The wound is healing well with no signs of erythema or warmth.  There is no drainage.  No clinical signs or symptoms of infection are present.  SILT. DNVI.Full painless range of motion left upper extremity.      A/P: Status post above, doing well  1) Continue aggressive ROM   And OT  2)  discontinue brace.  Activities as tolerated no restrictions.  Follow-up in 2 months for re-evaluation or sooner for any problems.

## 2024-07-23 ENCOUNTER — CLINICAL SUPPORT (OUTPATIENT)
Dept: REHABILITATION | Facility: HOSPITAL | Age: 49
End: 2024-07-23
Payer: MEDICAID

## 2024-07-23 DIAGNOSIS — M25.542 PAIN IN THUMB JOINT WITH MOVEMENT, LEFT: ICD-10-CM

## 2024-07-23 DIAGNOSIS — M25.60 STIFFNESS IN JOINT: ICD-10-CM

## 2024-07-23 DIAGNOSIS — R53.1 WEAKNESS: Primary | ICD-10-CM

## 2024-07-23 PROCEDURE — 97530 THERAPEUTIC ACTIVITIES: CPT | Mod: PO

## 2024-07-23 NOTE — PROGRESS NOTES
"  Occupational Therapy Daily Treatment Note     Name: Velma Lopez  Clinic Number: [id number]    Therapy Diagnosis:   Encounter Diagnoses   Name Primary?    Weakness Yes    Pain in thumb joint with movement, left     Stiffness in joint           Physician: Russo-Digeorge, Jamie L*    Visit Date: 7/23/2024  Physician Orders: OT eval/treat   Medical Diagnosis: M18.12 (ICD-10-CM) - Arthritis of carpometacarpal (CMC) joint of left thumb Z98.890 (ICD-10-CM) - Postoperative state  Evaluation Date: 5/16/2024  Plan of Care Certification Date: 8/9/24  Authorization Period: 5/10/24 - 5/10/25  Surgery Date and Procedure:    1. left carpal trapeziectomy, CPT 10894  2. 1st to 2nd metacarpal tenodesis, CPT 194956/24/24  Date of Return to MD: 6/6/24  Onset Date: 4/24/24  Visit # / Visits authorized: 13/ 20  FOTO Completion: 2/3    Time In: 0800  Time Out: 0900  Total Billable Time: 60 minutes    Precautions:  Standard      Subjective     Pt reports: "It's been a lot better, but it still hurts"  she was compliant with home exercise program given last session.   Response to previous treatment:increased ROM  Functional change: increased ROM    Pain: 2/10  Location: left hands      Objective       Mental status: alert, oriented x3     Observation:   Pt incision has healed well        Sensation: Pt states she has mild numbness and tingling occasionally      Wound Assessment:   Size: 3.5 cm  Location: CMC joint     Edema: Circumferential measurements: In centimters       5/16/2024 5/16/2024     Left Right   Thumb:       Prox. Phalanx 6.5 cm 6.5 cm   Distal Phalanx 6.5 cm 6.5 cm        Left   5/16/2024 Right  5/16/2024   PWC (Proximal Wrist Crease) 16.5 cm 16.5 cm         Range of Motion:   Left        Wrist Left  5/16/2024 Right  5/16/2024 Left  6/5/24 Left   7/17/24   Extension 50 65 60 65   Flexion 40 60 60 60   Ulnar Deviation 40 40 45 45   Radial Deviation 10 20 12 15           Left Thumb range of motion  5/16/2024 Right Thumb " range of motion  5/16/2024 Left Thumb Range of Motion  6/5/24 Left Thumb Range of Motion   7/17/24   MP 40 65 55 55   IP 40 65 60 60   Radial Abduction 50 60 62 63   Palmar Abduction 40 60 50 60   Opposition 1 cm from tip of SF D[employer] Base of SF DPC          Strength: (MT Dynamometer in psi.)        5/16/2024 5/16/2024 7/17/24     Left Right Left   Rung II NT 40 20         Pinch Strength (Measured in psi)       5/16/2024 5/16/2024 7/17/24     Left Right Left   Key Pinch NT  10  3   3pt Pinch NT  10  2   2pt Pinch NT  11  1         CMS Impairment/Limitation/Restriction for FOTO Hand/Wrist/Finger Survey     Therapist reviewed FOTO scores for Velma Lopez on 5/16/2024.   FOTO documents entered into [employer] - see Media section.     Intake Score: 35%  Category: Self Care      6/26/24: 56%             Treatment       Velma participated in dynamic functional therapeutic activities to improve functional performance for 60 minutes, including:  -hot pack with paraffin wax pre treatment to increase extensibility   - AROM: thumb MP flexion, thumb IP flexion, palmar abduction, radial abduction, circles CW, circles CCW  - pom pom  with red clothespin x 2  - coin tower x 2   - isospheres x 3 minutes   - ABC tennis ball   - scar massage to incision site  - distraction of MP and IP joints   - red flex bar smiles/frowns/twist   -wrist maze   - wrist 3 ways 2/15. 1# wrist flexion, 2# for RD and extension      Home Exercises and Education Provided     Education provided:   - Progress towards goals     Written Home Exercises Provided: Patient instructed to cont prior HEP.  Exercises were reviewed and Velma was able to demonstrate them prior to the end of the session.  Velma demonstrated good  understanding of the HEP provided.   .   See EMR under Patient Instructions for exercises provided prior visit.        Assessment     Pt would continue to benefit from skilled OT. Pt tolerated added AROM therapeutic  activities well today without additional complaints of pain.   Pt is motivated. Will progress as tolerated.     Velma is progressing well towards her goals and there are no updates to goals at this time. Pt prognosis is Excellent.     Pt will continue to benefit from skilled outpatient occupational therapy to address the deficits listed in the problem list on initial evaluation provide pt/family education and to maximize pt's level of independence in the home and community environment.     Anticipated barriers to occupational therapy: none at this time    Pt's spiritual, cultural and educational needs considered and pt agreeable to plan of care and goals.      Goals:    LTG's (12 weeks):  1)   Increase ROM to 60 degrees in swartz abduction of thumb to increase functional hand use for ADLs, IADLs, and work tasks MET 7/17/24  2)   Increase ROM to 60 degrees in radial abduction of thumb to increase functional hand use for ADLs, IADLs, and work tasks MET 6/5/24  3)   Increase ROM to 60 degrees in thumb MP flexion to increase functional hand use for ADLs, IADLs, and work tasksOngoing  4)   Increase ROM to 60 degrees in thumb IP flexion to increase functional hand use for ADLs, IADLs, and work tasksMET 6/5/24  5)   Increase  strength to 35 lbs. to grasp for ADLs, IADLs, and work taskOngoing  6)   Increase key pinch to 8 psis to increase independence with button and FM CoordinationOngoing  7)   Decrease complaints of pain to  1 out of 10 at worst to increase functional hand use for ADL/work/leisure activities.Ongoing  8)   Patient to score at 59% or more on FOTO to demonstrate improved perception of functional hand use.Ongoing  9)   Pt will return to near to prior level of function for ADLs and household management reporting I or Mod I with ADLs (dressing, feeding, grooming, toileting). Ongoing     STG's (6 weeks)  1)   Patient to be IND with HEP and modalities for pain/edema managment.MET 6/5/24  2)   Increase ROM  to 50 degrees in swartz abduction of thumb to increase functional hand use for ADLs, IADLs, and work tasksMET 6/5/24  3)   Increase ROM to 55 degrees in radial abduction of thumb to increase functional hand use for ADLs, IADLs, and work tasksMET 6/5/24  4)   Increase ROM to 50 degrees in thumb MP flexion to increase functional hand use for ADLs, IADLs, and work tasksMET 6/5/24  5)   Increase ROM to 50 degrees in thumb IP flexion to increase functional hand use for ADLs, IADLs, and work tasksMET 6/5/24  6)   Increase  strength to 15 lbs. to grasp for ADLs, IADLs, and work task MET 7/17/24  7)   Increase key pinch to 4 psis to increase independence with button and FM CoordinationOngoing  8)   Patient to be IND wiht Orthotic use, wear and care precautions.MET 6/5/24  9)   Decrease complaints of pain to  4 out of 10 at worst to increase functional hand use for ADL/work/leisure activities.MET 6/5/24        Plan      Pt to be treated by Occupational Therapy 2 times per week for 12 weeks during the certification period from 5/16/2024 to 8/9/24 to achieve the established goals.      Treatment to include: Paraffin, Fluidotherapy, Manual therapy/joint mobilizations, Modalities for pain management, US 3 mhz, Therapeutic exercises/activities., Iontophoresis with 2.0 cc Dexamethasone, Strengthening, Orthotic Fabrication/Fit/Training, Edema Control, Scar Management, Wound Care, Electrical Modalities, Joint Protection, and Energy Conservation, as well as any other treatments deemed necessary based on the patient's needs or progress.     Updates/Grading for next session: Continue with current plan of care       Radha Sutton, OT

## 2024-07-31 ENCOUNTER — CLINICAL SUPPORT (OUTPATIENT)
Dept: REHABILITATION | Facility: HOSPITAL | Age: 49
End: 2024-07-31
Payer: MEDICAID

## 2024-07-31 DIAGNOSIS — M25.60 STIFFNESS IN JOINT: Primary | ICD-10-CM

## 2024-07-31 DIAGNOSIS — M25.542 PAIN IN THUMB JOINT WITH MOVEMENT, LEFT: ICD-10-CM

## 2024-07-31 DIAGNOSIS — R53.1 WEAKNESS: ICD-10-CM

## 2024-07-31 PROCEDURE — 97530 THERAPEUTIC ACTIVITIES: CPT | Mod: PO

## 2024-07-31 NOTE — PROGRESS NOTES
"  Occupational Therapy Daily Treatment Note     Name: Velma Lopez  Clinic Number: [id number]    Therapy Diagnosis:   Encounter Diagnoses   Name Primary?    Stiffness in joint Yes    Pain in thumb joint with movement, left     Weakness           Physician: Russo-Digeorge, Jamie L*    Visit Date: 7/31/2024  Physician Orders: OT eval/treat   Medical Diagnosis: M18.12 (ICD-10-CM) - Arthritis of carpometacarpal (CMC) joint of left thumb Z98.890 (ICD-10-CM) - Postoperative state  Evaluation Date: 5/16/2024  Plan of Care Certification Date: 8/9/24  Authorization Period: 5/10/24 - 5/10/25  Surgery Date and Procedure:    1. left carpal trapeziectomy, CPT 71368  2. 1st to 2nd metacarpal tenodesis, CPT 068100/24/24  Date of Return to MD: 6/6/24  Onset Date: 4/24/24  Visit # / Visits authorized: 14/ 20  FOTO Completion: 2/3    Time In: 1000  Time Out: 1045  Total Billable Time: 45 minutes    Precautions:  Standard      Subjective     Pt reports: "It's been a lot better, but it still hurts when I try to twist a knob"  she was compliant with home exercise program given last session.   Response to previous treatment:increased ROM  Functional change: increased ROM    Pain: 2/10  Location: left hands      Objective       Mental status: alert, oriented x3     Observation:   Pt incision has healed well        Sensation: Pt states she has mild numbness and tingling occasionally      Wound Assessment:   Size: 3.5 cm  Location: CMC joint     Edema: Circumferential measurements: In centimters       5/16/2024 5/16/2024     Left Right   Thumb:       Prox. Phalanx 6.5 cm 6.5 cm   Distal Phalanx 6.5 cm 6.5 cm        Left   5/16/2024 Right  5/16/2024   PWC (Proximal Wrist Crease) 16.5 cm 16.5 cm         Range of Motion:   Left        Wrist Left  5/16/2024 Right  5/16/2024 Left  6/5/24 Left   7/17/24   Extension 50 65 60 65   Flexion 40 60 60 60   Ulnar Deviation 40 40 45 45   Radial Deviation 10 20 12 15           Left Thumb range of " motion  5/16/2024 Right Thumb range of motion  5/16/2024 Left Thumb Range of Motion  6/5/24 Left Thumb Range of Motion   7/17/24   MP 40 65 55 55   IP 40 65 60 60   Radial Abduction 50 60 62 63   Palmar Abduction 40 60 50 60   Opposition 1 cm from tip of SF D[employer] Base of SF DPC          Strength: (MT Dynamometer in psi.)        5/16/2024 5/16/2024 7/17/24     Left Right Left   Rung II NT 40 20         Pinch Strength (Measured in psi)       5/16/2024 5/16/2024 7/17/24     Left Right Left   Key Pinch NT  10  3   3pt Pinch NT  10  2   2pt Pinch NT  11  1         CMS Impairment/Limitation/Restriction for FOTO Hand/Wrist/Finger Survey     Therapist reviewed FOTO scores for Velma Lopez on 5/16/2024.   FOTO documents entered into [employer] - see Media section.     Intake Score: 35%  Category: Self Care      6/26/24: 56%             Treatment       Velma participated in dynamic functional therapeutic activities to improve functional performance for 45 minutes, including:  -hot pack with paraffin wax pre treatment to increase extensibility   - AROM: thumb MP flexion, thumb IP flexion, palmar abduction, radial abduction, circles CW, circles CCW  - isospheres x 3 minutes   - scar massage to incision site  - distraction of MP and IP joints   - red flex bar smiles/frowns/twist   - pink putty puttyciser x 3  - pink putty pill top x3      Home Exercises and Education Provided     Education provided:   - Progress towards goals     Written Home Exercises Provided: Patient instructed to cont prior HEP.  Exercises were reviewed and Velma was able to demonstrate them prior to the end of the session.  Velma demonstrated good  understanding of the HEP provided.   .   See EMR under Patient Instructions for exercises provided prior visit.        Assessment     Pt would continue to benefit from skilled OT. Pt tolerated added AROM therapeutic activities well today without additional complaints of pain. Focus on strengthening  thumb in twisting motion so she can open doors and pill tops with more ease.  Pt is motivated. Will progress as tolerated.     Velma is progressing well towards her goals and there are no updates to goals at this time. Pt prognosis is Excellent.     Pt will continue to benefit from skilled outpatient occupational therapy to address the deficits listed in the problem list on initial evaluation provide pt/family education and to maximize pt's level of independence in the home and community environment.     Anticipated barriers to occupational therapy: none at this time    Pt's spiritual, cultural and educational needs considered and pt agreeable to plan of care and goals.      Goals:    LTG's (12 weeks):  1)   Increase ROM to 60 degrees in swartz abduction of thumb to increase functional hand use for ADLs, IADLs, and work tasks MET 7/17/24  2)   Increase ROM to 60 degrees in radial abduction of thumb to increase functional hand use for ADLs, IADLs, and work tasks MET 6/5/24  3)   Increase ROM to 60 degrees in thumb MP flexion to increase functional hand use for ADLs, IADLs, and work tasksOngoing  4)   Increase ROM to 60 degrees in thumb IP flexion to increase functional hand use for ADLs, IADLs, and work tasksMET 6/5/24  5)   Increase  strength to 35 lbs. to grasp for ADLs, IADLs, and work taskOngoing  6)   Increase key pinch to 8 psis to increase independence with button and FM CoordinationOngoing  7)   Decrease complaints of pain to  1 out of 10 at worst to increase functional hand use for ADL/work/leisure activities.Ongoing  8)   Patient to score at 59% or more on FOTO to demonstrate improved perception of functional hand use.Ongoing  9)   Pt will return to near to prior level of function for ADLs and household management reporting I or Mod I with ADLs (dressing, feeding, grooming, toileting). Ongoing     STG's (6 weeks)  1)   Patient to be IND with HEP and modalities for pain/edema managment.MET 6/5/24  2)    Increase ROM to 50 degrees in swartz abduction of thumb to increase functional hand use for ADLs, IADLs, and work tasksMET 6/5/24  3)   Increase ROM to 55 degrees in radial abduction of thumb to increase functional hand use for ADLs, IADLs, and work tasksMET 6/5/24  4)   Increase ROM to 50 degrees in thumb MP flexion to increase functional hand use for ADLs, IADLs, and work tasksMET 6/5/24  5)   Increase ROM to 50 degrees in thumb IP flexion to increase functional hand use for ADLs, IADLs, and work tasksMET 6/5/24  6)   Increase  strength to 15 lbs. to grasp for ADLs, IADLs, and work task MET 7/17/24  7)   Increase key pinch to 4 psis to increase independence with button and FM CoordinationOngoing  8)   Patient to be IND wiht Orthotic use, wear and care precautions.MET 6/5/24  9)   Decrease complaints of pain to  4 out of 10 at worst to increase functional hand use for ADL/work/leisure activities.MET 6/5/24        Plan      Pt to be treated by Occupational Therapy 2 times per week for 12 weeks during the certification period from 5/16/2024 to 8/9/24 to achieve the established goals.      Treatment to include: Paraffin, Fluidotherapy, Manual therapy/joint mobilizations, Modalities for pain management, US 3 mhz, Therapeutic exercises/activities., Iontophoresis with 2.0 cc Dexamethasone, Strengthening, Orthotic Fabrication/Fit/Training, Edema Control, Scar Management, Wound Care, Electrical Modalities, Joint Protection, and Energy Conservation, as well as any other treatments deemed necessary based on the patient's needs or progress.     Updates/Grading for next session: Continue with current plan of care       Radha Sutton, OT

## 2024-08-07 ENCOUNTER — CLINICAL SUPPORT (OUTPATIENT)
Dept: REHABILITATION | Facility: HOSPITAL | Age: 49
End: 2024-08-07
Payer: MEDICAID

## 2024-08-07 ENCOUNTER — PATIENT OUTREACH (OUTPATIENT)
Dept: ADMINISTRATIVE | Facility: HOSPITAL | Age: 49
End: 2024-08-07
Payer: MEDICAID

## 2024-08-07 DIAGNOSIS — E11.9 DIABETES MELLITUS WITHOUT COMPLICATION: Primary | ICD-10-CM

## 2024-08-07 DIAGNOSIS — R53.1 WEAKNESS: Primary | ICD-10-CM

## 2024-08-07 DIAGNOSIS — M25.60 STIFFNESS IN JOINT: ICD-10-CM

## 2024-08-07 DIAGNOSIS — M25.542 PAIN IN THUMB JOINT WITH MOVEMENT, LEFT: ICD-10-CM

## 2024-08-07 PROCEDURE — 97530 THERAPEUTIC ACTIVITIES: CPT | Mod: PO

## 2024-08-08 ENCOUNTER — PATIENT OUTREACH (OUTPATIENT)
Dept: ADMINISTRATIVE | Facility: HOSPITAL | Age: 49
End: 2024-08-08
Payer: MEDICAID

## 2024-08-14 ENCOUNTER — CLINICAL SUPPORT (OUTPATIENT)
Dept: REHABILITATION | Facility: HOSPITAL | Age: 49
End: 2024-08-14
Payer: MEDICAID

## 2024-08-14 DIAGNOSIS — R53.1 WEAKNESS: ICD-10-CM

## 2024-08-14 DIAGNOSIS — M25.60 STIFFNESS IN JOINT: Primary | ICD-10-CM

## 2024-08-14 DIAGNOSIS — M25.542 PAIN IN THUMB JOINT WITH MOVEMENT, LEFT: ICD-10-CM

## 2024-08-14 PROCEDURE — 97530 THERAPEUTIC ACTIVITIES: CPT | Mod: PO

## 2024-08-14 NOTE — ED NOTES
Reassessed pt.   Abdomen not distended, pt states pain still a 0/10  Denies any N/V  Warm blanket provided for comfort, HOB lowered for comfort.   Pt reminded to let us know if any symptoms of nausea or pain come back.   IV site CDI   Detail Level: Zone The defect was left to heal by second intention. Alternative options include skin graft, though I think this is unlikely to subdivide in this area. We discussed healing time will be at least 8 weeks and risk of infection. Patient was provided instructions for cleaning area, applying mupirocin ointment, and keeping covered with dressing. She will schedule a wound check with me in the [place] office.

## 2024-08-14 NOTE — PROGRESS NOTES
"  Occupational Therapy Daily Treatment Note     Name: Velma Lopez  Clinic Number: [id number]    Therapy Diagnosis:   Encounter Diagnoses   Name Primary?    Stiffness in joint Yes    Pain in thumb joint with movement, left     Weakness           Physician: Russo-Digeorge, Jamie L*    Visit Date: 8/14/2024  Physician Orders: OT eval/treat   Medical Diagnosis: M18.12 (ICD-10-CM) - Arthritis of carpometacarpal (CMC) joint of left thumb Z98.890 (ICD-10-CM) - Postoperative state  Evaluation Date: 5/16/2024  Plan of Care Certification Date: 8/9/24  Authorization Period: 5/10/24 - 5/10/25  Surgery Date and Procedure:    1. left carpal trapeziectomy, CPT 27170  2. 1st to 2nd metacarpal tenodesis, CPT 583992/24/24  Date of Return to MD: 6/6/24  Onset Date: 4/24/24  Visit # / Visits authorized: 15/ 20  FOTO Completion: 2/3    Time In: 1300  Time Out: 1345  Total Billable Time: 45 minutes    Precautions:  Standard      Subjective     Pt reports: "There has been a lot of improvement, but I still have some pain at night or when I am sitting around not doing anything"  she was compliant with home exercise program given last session.   Response to previous treatment:increased ROM  Functional change: increased ROM    Pain: 1/10  Location: left hands      Objective       Mental status: alert, oriented x3     Observation:   Pt incision has healed well        Sensation: Pt states she has mild numbness and tingling occasionally      Wound Assessment:   Size: 3.5 cm  Location: CMC joint     Edema: Circumferential measurements: In centimters       5/16/2024 5/16/2024     Left Right   Thumb:       Prox. Phalanx 6.5 cm 6.5 cm   Distal Phalanx 6.5 cm 6.5 cm        Left   5/16/2024 Right  5/16/2024   PWC (Proximal Wrist Crease) 16.5 cm 16.5 cm         Range of Motion:   Left        Wrist Left  5/16/2024 Right  5/16/2024 Left  6/5/24 Left   7/17/24   Extension 50 65 60 65   Flexion 40 60 60 60   Ulnar Deviation 40 40 45 45   Radial " Deviation 10 20 12 15           Left Thumb range of motion  5/16/2024 Right Thumb range of motion  5/16/2024 Left Thumb Range of Motion  6/5/24 Left Thumb Range of Motion   7/17/24   MP 40 65 55 55   IP 40 65 60 60   Radial Abduction 50 60 62 63   Palmar Abduction 40 60 50 60   Opposition 1 cm from tip of SF DPC Base of SF DPC          Strength: (MT Dynamometer in psi.)        5/16/2024 5/16/2024 7/17/24     Left Right Left   Rung II NT 40 20         Pinch Strength (Measured in psi)       5/16/2024 5/16/2024 7/17/24     Left Right Left   Key Pinch NT  10  3   3pt Pinch NT  10  2   2pt Pinch NT  11  1         CMS Impairment/Limitation/Restriction for FOTO Hand/Wrist/Finger Survey     Therapist reviewed FOTO scores for Velma Lopez on 5/16/2024.   FOTO documents entered into [facility] - see Media section.     Intake Score: 35%  Category: Self Care      6/26/24: 56%             Treatment       Velma participated in dynamic functional therapeutic activities to improve functional performance for 45 minutes, including:  -hot pack with paraffin wax pre treatment to increase extensibility   - AROM: thumb MP flexion, thumb IP flexion, palmar abduction, radial abduction, circles CW, circles CCW  - isospheres x 3 minutes   - scar massage to incision site  - distraction of MP and IP joints   - red flex bar smiles/frowns/twist   - pink putty puttyciser x 3  - pink putty pill top x3  - varigrip pumps and pulls x 20  - pom pom  with gripper on 2nd rung  - pink putty dig outs       Home Exercises and Education Provided     Education provided:   - Progress towards goals     Written Home Exercises Provided: Patient instructed to cont prior HEP.  Exercises were reviewed and Velma was able to demonstrate them prior to the end of the session.  Velma demonstrated good  understanding of the HEP provided.   .   See EMR under Patient Instructions for exercises provided prior visit.        Assessment     Pt would  continue to benefit from skilled OT. Pt tolerated added AROM therapeutic activities well today without additional complaints of pain. Focus on strengthening thumb in twisting motion so she can open doors and pill tops with more ease.  Pt is motivated. Will progress as tolerated.     Velma is progressing well towards her goals and there are no updates to goals at this time. Pt prognosis is Excellent.     Pt will continue to benefit from skilled outpatient occupational therapy to address the deficits listed in the problem list on initial evaluation provide pt/family education and to maximize pt's level of independence in the home and community environment.     Anticipated barriers to occupational therapy: none at this time    Pt's spiritual, cultural and educational needs considered and pt agreeable to plan of care and goals.      Goals:    LTG's (12 weeks):  1)   Increase ROM to 60 degrees in swartz abduction of thumb to increase functional hand use for ADLs, IADLs, and work tasks MET 7/17/24  2)   Increase ROM to 60 degrees in radial abduction of thumb to increase functional hand use for ADLs, IADLs, and work tasks MET 6/5/24  3)   Increase ROM to 60 degrees in thumb MP flexion to increase functional hand use for ADLs, IADLs, and work tasksOngoing  4)   Increase ROM to 60 degrees in thumb IP flexion to increase functional hand use for ADLs, IADLs, and work tasksMET 6/5/24  5)   Increase  strength to 35 lbs. to grasp for ADLs, IADLs, and work taskOngoing  6)   Increase key pinch to 8 psis to increase independence with button and FM CoordinationOngoing  7)   Decrease complaints of pain to  1 out of 10 at worst to increase functional hand use for ADL/work/leisure activities.Ongoing  8)   Patient to score at 59% or more on FOTO to demonstrate improved perception of functional hand use.Ongoing  9)   Pt will return to near to prior level of function for ADLs and household management reporting I or Mod I with ADLs  (dressing, feeding, grooming, toileting). Ongoing     STG's (6 weeks)  1)   Patient to be IND with HEP and modalities for pain/edema managment.MET 6/5/24  2)   Increase ROM to 50 degrees in swartz abduction of thumb to increase functional hand use for ADLs, IADLs, and work tasksMET 6/5/24  3)   Increase ROM to 55 degrees in radial abduction of thumb to increase functional hand use for ADLs, IADLs, and work tasksMET 6/5/24  4)   Increase ROM to 50 degrees in thumb MP flexion to increase functional hand use for ADLs, IADLs, and work tasksMET 6/5/24  5)   Increase ROM to 50 degrees in thumb IP flexion to increase functional hand use for ADLs, IADLs, and work tasksMET 6/5/24  6)   Increase  strength to 15 lbs. to grasp for ADLs, IADLs, and work task MET 7/17/24  7)   Increase key pinch to 4 psis to increase independence with button and FM CoordinationOngoing  8)   Patient to be IND wiht Orthotic use, wear and care precautions.MET 6/5/24  9)   Decrease complaints of pain to  4 out of 10 at worst to increase functional hand use for ADL/work/leisure activities.MET 6/5/24        Plan      Pt to be treated by Occupational Therapy 2 times per week for 12 weeks during the certification period from 5/16/2024 to 8/9/24 to achieve the established goals.      Treatment to include: Paraffin, Fluidotherapy, Manual therapy/joint mobilizations, Modalities for pain management, US 3 mhz, Therapeutic exercises/activities., Iontophoresis with 2.0 cc Dexamethasone, Strengthening, Orthotic Fabrication/Fit/Training, Edema Control, Scar Management, Wound Care, Electrical Modalities, Joint Protection, and Energy Conservation, as well as any other treatments deemed necessary based on the patient's needs or progress.     Updates/Grading for next session: Continue with current plan of care       Radha Sutton OT

## 2024-08-21 ENCOUNTER — CLINICAL SUPPORT (OUTPATIENT)
Dept: INTERNAL MEDICINE | Facility: CLINIC | Age: 49
End: 2024-08-21
Attending: FAMILY MEDICINE
Payer: MEDICAID

## 2024-08-21 ENCOUNTER — CLINICAL SUPPORT (OUTPATIENT)
Dept: REHABILITATION | Facility: HOSPITAL | Age: 49
End: 2024-08-21
Payer: MEDICAID

## 2024-08-21 ENCOUNTER — PATIENT OUTREACH (OUTPATIENT)
Dept: ADMINISTRATIVE | Facility: HOSPITAL | Age: 49
End: 2024-08-21
Payer: MEDICAID

## 2024-08-21 VITALS
WEIGHT: 151.69 LBS | SYSTOLIC BLOOD PRESSURE: 92 MMHG | DIASTOLIC BLOOD PRESSURE: 78 MMHG | OXYGEN SATURATION: 100 % | BODY MASS INDEX: 24.48 KG/M2 | HEART RATE: 65 BPM

## 2024-08-21 DIAGNOSIS — I10 PRIMARY HYPERTENSION: ICD-10-CM

## 2024-08-21 DIAGNOSIS — M54.50 CHRONIC MIDLINE LOW BACK PAIN WITHOUT SCIATICA: Primary | ICD-10-CM

## 2024-08-21 DIAGNOSIS — M25.542 PAIN IN THUMB JOINT WITH MOVEMENT, LEFT: ICD-10-CM

## 2024-08-21 DIAGNOSIS — M25.60 STIFFNESS IN JOINT: Primary | ICD-10-CM

## 2024-08-21 DIAGNOSIS — E11.9 DIABETES MELLITUS WITHOUT COMPLICATION: ICD-10-CM

## 2024-08-21 DIAGNOSIS — G89.29 CHRONIC MIDLINE LOW BACK PAIN WITHOUT SCIATICA: Primary | ICD-10-CM

## 2024-08-21 DIAGNOSIS — E11.9 DIABETES MELLITUS WITHOUT COMPLICATION: Primary | ICD-10-CM

## 2024-08-21 DIAGNOSIS — R53.1 WEAKNESS: ICD-10-CM

## 2024-08-21 PROCEDURE — 92228 IMG RTA DETC/MNTR DS PHY/QHP: CPT | Mod: PBBFAC

## 2024-08-21 PROCEDURE — 99999 PR PBB SHADOW E&M-EST. PATIENT-LVL III: CPT | Mod: PBBFAC,,, | Performed by: FAMILY MEDICINE

## 2024-08-21 PROCEDURE — 99213 OFFICE O/P EST LOW 20 MIN: CPT | Mod: PBBFAC | Performed by: FAMILY MEDICINE

## 2024-08-21 PROCEDURE — 3078F DIAST BP <80 MM HG: CPT | Mod: CPTII,,, | Performed by: FAMILY MEDICINE

## 2024-08-21 PROCEDURE — 97530 THERAPEUTIC ACTIVITIES: CPT | Mod: PO

## 2024-08-21 PROCEDURE — 3044F HG A1C LEVEL LT 7.0%: CPT | Mod: CPTII,,, | Performed by: FAMILY MEDICINE

## 2024-08-21 PROCEDURE — 92228 IMG RTA DETC/MNTR DS PHY/QHP: CPT | Mod: 26,S$PBB,, | Performed by: OPTOMETRIST

## 2024-08-21 PROCEDURE — 3008F BODY MASS INDEX DOCD: CPT | Mod: CPTII,,, | Performed by: FAMILY MEDICINE

## 2024-08-21 PROCEDURE — 99214 OFFICE O/P EST MOD 30 MIN: CPT | Mod: S$PBB,,, | Performed by: FAMILY MEDICINE

## 2024-08-21 PROCEDURE — 3074F SYST BP LT 130 MM HG: CPT | Mod: CPTII,,, | Performed by: FAMILY MEDICINE

## 2024-08-21 PROCEDURE — 2024F 7 FLD RTA PHOTO EVC RTNOPTHY: CPT | Mod: CPTII,,, | Performed by: OPTOMETRIST

## 2024-08-21 PROCEDURE — 1160F RVW MEDS BY RX/DR IN RCRD: CPT | Mod: CPTII,,, | Performed by: FAMILY MEDICINE

## 2024-08-21 PROCEDURE — 1159F MED LIST DOCD IN RCRD: CPT | Mod: CPTII,,, | Performed by: FAMILY MEDICINE

## 2024-08-21 RX ORDER — CELECOXIB 200 MG/1
200 CAPSULE ORAL DAILY
Qty: 30 CAPSULE | Refills: 5 | Status: SHIPPED | OUTPATIENT
Start: 2024-08-21

## 2024-08-21 NOTE — PROGRESS NOTES
CHIEF COMPLAINT:  T2 DM and right shoulder pain    HISTORY OF PRESENT ILLNESS: The patient is a very nice 49 year-old black female.  Months of right shoulder pain    She recently had a good A1c.    She is seeing bariatric surgery.  She underwent successful gastric bypass.      REVIEW OF SYSTEMS:  GENERAL: No fever, chills, fatigability.  SKIN: No rashes, itching or changes in color or texture of skin.  HEAD: No headaches or recent head trauma.  EYES: Visual acuity fine. No photophobia, ocular pain or diplopia.  EARS: Denies ear pain, discharge or vertigo.  NOSE: No loss of smell, no epistaxis or postnasal drip.  MOUTH & THROAT: No hoarseness or change in voice. No excessive gum bleeding.  NODES: Denies swollen glands.  CHEST: Denies JONES, cyanosis, wheezing, cough and sputum production.  CARDIOVASCULAR: Denies chest pain, PND, orthopnea or reduced exercise tolerance.  ABDOMEN: Appetite fine. No weight loss. Denies diarrhea, abdominal pain, hematemesis or blood in stool.  URINARY: No flank pain, dysuria or hematuria.  PERIPHERAL VASCULAR: No claudication or cyanosis.  MUSCULOSKELETAL: No joint stiffness or swelling. She does have chronic intermittent back pain.  NEUROLOGIC: No history of seizures, paralysis, alteration of gait or coordination.    SOCIAL HISTORY: The patient does not smoke.  The patient consumes alcohol socially.     PHYSICAL EXAMINATION:   Blood pressure 92/78, pulse 65, weight 68.8 kg (151 lb 10.8 oz), SpO2 100%.    APPEARANCE: Well nourished, well developed, in no acute distress.    HEAD: Normocephalic, atraumatic.  EYES: PERRL. EOMI.  Conjunctivae without injection and  anicteric   NOSE: Mucosa pink. Airway clear.  MOUTH & THROAT: No tonsillar enlargement. No pharyngeal erythema or exudate. No stridor.  NECK: Supple.   NODES: No cervical, axillary or inguinal lymph node enlargement.  CHEST: Lungs clear to auscultation.  No retractions are noted.  No rales or rhonchi are present.  CARDIOVASCULAR:  Normal S1, S2. No rubs, murmurs or gallops.  ABDOMEN: Bowel sounds normal. Not distended. Soft. No tenderness or masses.  No ascites is noted.  MUSCULOSKELETAL:  There is no clubbing, cyanosis, or edema of the extremities x4.  There is full range of motion of the lumbar spine.  There is full range of motion of the extremities x4.  There is no deformity noted.    NEUROLOGIC:       Normal speech development.      Hearing normal.      Normal gait.      Motor and sensory exams grossly normal.  PSYCHIATRIC: Patient is alert and oriented x3.  Thought processes are all normal.  There is no homicidality.  There is no suicidality.  There is no evidence of psychosis.    LABORATORY/RADIOLOGY:   Chart reviewed.      ASSESSMENT:   Chronic LBP    T2 DM, well controlled  MILEY  GERD  Status post bariatric surgery  Chronic low back pain    PLAN:  Celebrex    She had a good A1c at [facility]  We reviewed her recent blood work.    Metformin and diabetic edu  lipitor  losartan  Return to clinic in 6 months with A1c prior

## 2024-08-21 NOTE — TELEPHONE ENCOUNTER
Pt stated received a text from pharmacy regarding changing her prescription. Phone pharmacy automated system answered unable to leave message.

## 2024-08-21 NOTE — PROGRESS NOTES
"  Occupational Therapy Daily Treatment Note     Name: Velma Lopez  Clinic Number: [id number]    Therapy Diagnosis:   Encounter Diagnoses   Name Primary?    Stiffness in joint Yes    Pain in thumb joint with movement, left     Weakness             Physician: Russo-Digeorge, Jamie L*    Visit Date: 8/21/2024  Physician Orders: OT eval/treat   Medical Diagnosis: M18.12 (ICD-10-CM) - Arthritis of carpometacarpal (CMC) joint of left thumb Z98.890 (ICD-10-CM) - Postoperative state  Evaluation Date: 5/16/2024  Plan of Care Certification Date: 10/4/24  Authorization Period: 5/10/24 - 5/10/25  Surgery Date and Procedure:    1. left carpal trapeziectomy, CPT 77408  2. 1st to 2nd metacarpal tenodesis, CPT 029416/24/24  Date of Return to MD: 9/10/24  Onset Date: 4/24/24  Visit # / Visits authorized: 17/ 20  FOTO Completion: 2/3    Time In: 0800  Time Out: 0900  Total Billable Time: 60 minutes    Precautions:  Standard      Subjective     Pt reports: "There has been a lot of improvement, but I still have some pain at night or when I am sitting around not doing anything"  she was compliant with home exercise program given last session.   Response to previous treatment:increased ROM  Functional change: increased ROM    Pain: 1/10  Location: left hands      Objective       Mental status: alert, oriented x3     Observation:   Pt incision has healed well        Sensation: Pt states she has mild numbness and tingling occasionally      Wound Assessment:   Size: 3.5 cm  Location: CMC joint     Edema: Circumferential measurements: In centimters       5/16/2024 5/16/2024     Left Right   Thumb:       Prox. Phalanx 6.5 cm 6.5 cm   Distal Phalanx 6.5 cm 6.5 cm        Left   5/16/2024 Right  5/16/2024   PWC (Proximal Wrist Crease) 16.5 cm 16.5 cm         Range of Motion:   Left        Wrist Left  5/16/2024 Right  5/16/2024 Left  6/5/24 Left   7/17/24   Extension 50 65 60 65   Flexion 40 60 60 60   Ulnar Deviation 40 40 45 45   Radial " Deviation 10 20 12 15           Left Thumb range of motion  5/16/2024 Right Thumb range of motion  5/16/2024 Left Thumb Range of Motion  6/5/24 Left Thumb Range of Motion   7/17/24 Left Thumb Range of Motion   8/21/24   MP 40 65 55 55 60   IP 40 65 60 60 60   Radial Abduction 50 60 62 63 75   Palmar Abduction 40 60 50 60 75   Opposition 1 cm from tip of SF DPC Base of SF DPC DPC          Strength: (MT Dynamometer in psi.)        5/16/2024 5/16/2024 7/17/24 8/21/24     Left Right Left Left   Rung II NT 40 20 30         Pinch Strength (Measured in psi)       5/16/2024 5/16/2024 7/17/24 8/21/24     Left Right Left Left   Key Pinch NT  10  3 5   3pt Pinch NT  10  2 2   2pt Pinch NT  11  1 3         CMS Impairment/Limitation/Restriction for FOTO Hand/Wrist/Finger Survey     Therapist reviewed FOTO scores for Velma Lopez on 5/16/2024.   FOTO documents entered into [employer] - see Media section.     Intake Score: 35%  Category: Self Care      6/26/24: 56%             Treatment       Velma participated in dynamic functional therapeutic activities to improve functional performance for 45 minutes, including:  -hot pack with paraffin wax pre treatment to increase extensibility   - AROM: thumb MP flexion, thumb IP flexion, palmar abduction, radial abduction, circles CW, circles CCW  - isospheres x 3 minutes   - scar massage to incision site  - distraction of MP and IP joints   - red flex bar smiles/frowns/twist   - pink putty puttyciser x 3  - pink putty pill top x3  - varigrip pumps and pulls x 20  - pom pom  with gripper on 2nd rung  - pink putty dig outs       Home Exercises and Education Provided     Education provided:   - Progress towards goals     Written Home Exercises Provided: Patient instructed to cont prior HEP.  Exercises were reviewed and Velma was able to demonstrate them prior to the end of the session.  Velma demonstrated good  understanding of the HEP provided.   .   See EMR under  Patient Instructions for exercises provided prior visit.        Assessment     Pt would continue to benefit from skilled OT. Pt tolerated added AROM therapeutic activities well today without additional complaints of pain. Focus on strengthening thumb in twisting motion so she can open doors and pill tops with more ease.  Pt is motivated. Will progress as tolerated.     Velma is progressing well towards her goals and there are no updates to goals at this time. Pt prognosis is Excellent.     Pt will continue to benefit from skilled outpatient occupational therapy to address the deficits listed in the problem list on initial evaluation provide pt/family education and to maximize pt's level of independence in the home and community environment.     Anticipated barriers to occupational therapy: none at this time    Pt's spiritual, cultural and educational needs considered and pt agreeable to plan of care and goals.    UPDATED GOALS:  1)   Increase  strength to 35 lbs. to grasp for ADLs, IADLs, and work taskOngoing  2)   Increase key pinch to 8 psis to increase independence with button and FM CoordinationOngoing  3)   Decrease complaints of pain to  1 out of 10 at worst to increase functional hand use for ADL/work/leisure activities.Ongoing  4)   Patient to score at 59% or more on FOTO to demonstrate improved perception of functional hand use.Ongoing  5)   Pt will return to near to prior level of function for ADLs and household management reporting I or Mod I with ADLs (dressing, feeding, grooming, toileting). Ongoing     Plan      Pt to be treated by Occupational Therapy 2 times per week for 12 weeks during the certification period from 8/9/24 - 10/4/24 to achieve the established goals.      Treatment to include: Paraffin, Fluidotherapy, Manual therapy/joint mobilizations, Modalities for pain management, US 3 mhz, Therapeutic exercises/activities., Iontophoresis with 2.0 cc Dexamethasone, Strengthening, Orthotic  Fabrication/Fit/Training, Edema Control, Scar Management, Wound Care, Electrical Modalities, Joint Protection, and Energy Conservation, as well as any other treatments deemed necessary based on the patient's needs or progress.     Updates/Grading for next session: Continue with current plan of care       Radha Sutton, OT

## 2024-08-21 NOTE — PROGRESS NOTES
Velma Lopez is a 49 y.o. female here for a diabetic eye screening with non-dilated fundus photos per Dr. Martinez.    Patient cooperative?: Yes  Small pupils?: No  Last eye exam: 1 year ago    For exam results, see Encounter Report.

## 2024-08-27 ENCOUNTER — PATIENT MESSAGE (OUTPATIENT)
Dept: INTERNAL MEDICINE | Facility: CLINIC | Age: 49
End: 2024-08-27
Payer: MEDICAID

## 2024-08-27 NOTE — TELEPHONE ENCOUNTER
Working on completing PA for CELEBREX the following question needs a reason.     Please provide details of why a cyclooxygenase-2 (ARCINIEGA-2) selective drug is used instead of a non-selective nonsteroidal anti-inflammatory drug (NSAID).(ID. [id number])     Please advise.

## 2024-08-28 ENCOUNTER — CLINICAL SUPPORT (OUTPATIENT)
Dept: REHABILITATION | Facility: HOSPITAL | Age: 49
End: 2024-08-28
Payer: MEDICAID

## 2024-08-28 DIAGNOSIS — M25.60 STIFFNESS IN JOINT: ICD-10-CM

## 2024-08-28 DIAGNOSIS — M25.542 PAIN IN THUMB JOINT WITH MOVEMENT, LEFT: ICD-10-CM

## 2024-08-28 DIAGNOSIS — R53.1 WEAKNESS: Primary | ICD-10-CM

## 2024-08-28 PROCEDURE — 97530 THERAPEUTIC ACTIVITIES: CPT | Mod: PO

## 2024-08-28 NOTE — PROGRESS NOTES
"  Occupational Therapy Daily Treatment Note     Name: Velma Lopez  Clinic Number: [id number]    Therapy Diagnosis:   Encounter Diagnoses   Name Primary?    Weakness Yes    Stiffness in joint     Pain in thumb joint with movement, left             Physician: Russo-Digeorge, Jamie L*    Visit Date: 8/28/2024  Physician Orders: OT eval/treat   Medical Diagnosis: M18.12 (ICD-10-CM) - Arthritis of carpometacarpal (CMC) joint of left thumb Z98.890 (ICD-10-CM) - Postoperative state  Evaluation Date: 5/16/2024  Plan of Care Certification Date: 10/4/24  Authorization Period: 5/10/24 - 5/10/25  Surgery Date and Procedure:    1. left carpal trapeziectomy, CPT 38625  2. 1st to 2nd metacarpal tenodesis, CPT 787780/24/24  Date of Return to MD: 9/10/24  Onset Date: 4/24/24  Visit # / Visits authorized: 18/ 20  FOTO Completion: 2/3    Time In: 0800  Time Out: 0900  Total Billable Time: 60 minutes    Precautions:  Standard      Subjective     Pt reports: "It definitely feels a lot better and I don't have trouble opening doors like I used to"  she was compliant with home exercise program given last session.   Response to previous treatment:increased ROM  Functional change: increased ROM    Pain: 1/10  Location: left hands      Objective       Mental status: alert, oriented x3     Observation:   Pt incision has healed well        Sensation: Pt states she has mild numbness and tingling occasionally      Wound Assessment:   Size: 3.5 cm  Location: CMC joint     Edema: Circumferential measurements: In centimters       5/16/2024 5/16/2024     Left Right   Thumb:       Prox. Phalanx 6.5 cm 6.5 cm   Distal Phalanx 6.5 cm 6.5 cm        Left   5/16/2024 Right  5/16/2024   PWC (Proximal Wrist Crease) 16.5 cm 16.5 cm         Range of Motion:   Left        Wrist Left  5/16/2024 Right  5/16/2024 Left  6/5/24 Left   7/17/24   Extension 50 65 60 65   Flexion 40 60 60 60   Ulnar Deviation 40 40 45 45   Radial Deviation 10 20 12 15           " Left Thumb range of motion  5/16/2024 Right Thumb range of motion  5/16/2024 Left Thumb Range of Motion  6/5/24 Left Thumb Range of Motion   7/17/24 Left Thumb Range of Motion   8/21/24   MP 40 65 55 55 60   IP 40 65 60 60 60   Radial Abduction 50 60 62 63 75   Palmar Abduction 40 60 50 60 75   Opposition 1 cm from tip of SF DPC Base of SF DPC DPC          Strength: (MT Dynamometer in psi.)        5/16/2024 5/16/2024 7/17/24 8/21/24     Left Right Left Left   Rung II NT 40 20 30         Pinch Strength (Measured in psi)       5/16/2024 5/16/2024 7/17/24 8/21/24     Left Right Left Left   Key Pinch NT  10  3 5   3pt Pinch NT  10  2 2   2pt Pinch NT  11  1 3         CMS Impairment/Limitation/Restriction for FOTO Hand/Wrist/Finger Survey     Therapist reviewed FOTO scores for Velma Lopez on 5/16/2024.   FOTO documents entered into [employer] - see Media section.     Intake Score: 35%  Category: Self Care      6/26/24: 56%             Treatment       Velma participated in dynamic functional therapeutic activities to improve functional performance for 45 minutes, including:  -hot pack with paraffin wax pre treatment to increase extensibility   - AROM: thumb MP flexion, thumb IP flexion, palmar abduction, radial abduction, circles CW, circles CCW  - isospheres x 3 minutes   - scar massage to incision site  - distraction of MP and IP joints   - red flex bar smiles/frowns/twist   - pink putty puttyciser x 3  - pink putty pill top x3  - varigrip pumps and pulls x 20  - pom pom  with gripper on 2nd rung  - pink putty dig outs       Home Exercises and Education Provided     Education provided:   - Progress towards goals     Written Home Exercises Provided: Patient instructed to cont prior HEP.  Exercises were reviewed and Velma was able to demonstrate them prior to the end of the session.  Velma demonstrated good  understanding of the HEP provided.   .   See EMR under Patient Instructions for exercises  provided prior visit.        Assessment     Pt would continue to benefit from skilled OT. Pt tolerated added AROM therapeutic activities well today without additional complaints of pain. Focus on strengthening thumb in twisting motion so she can open doors and pill tops with more ease.  Pt is motivated. Will progress as tolerated.     Velma is progressing well towards her goals and there are no updates to goals at this time. Pt prognosis is Excellent.     Pt will continue to benefit from skilled outpatient occupational therapy to address the deficits listed in the problem list on initial evaluation provide pt/family education and to maximize pt's level of independence in the home and community environment.     Anticipated barriers to occupational therapy: none at this time    Pt's spiritual, cultural and educational needs considered and pt agreeable to plan of care and goals.    UPDATED GOALS:  1)   Increase  strength to 35 lbs. to grasp for ADLs, IADLs, and work taskOngoing  2)   Increase key pinch to 8 psis to increase independence with button and FM CoordinationOngoing  3)   Decrease complaints of pain to  1 out of 10 at worst to increase functional hand use for ADL/work/leisure activities.Ongoing  4)   Patient to score at 59% or more on FOTO to demonstrate improved perception of functional hand use.Ongoing  5)   Pt will return to near to prior level of function for ADLs and household management reporting I or Mod I with ADLs (dressing, feeding, grooming, toileting). Ongoing     Plan      Pt to be treated by Occupational Therapy 2 times per week for 12 weeks during the certification period from 8/9/24 - 10/4/24 to achieve the established goals.      Treatment to include: Paraffin, Fluidotherapy, Manual therapy/joint mobilizations, Modalities for pain management, US 3 mhz, Therapeutic exercises/activities., Iontophoresis with 2.0 cc Dexamethasone, Strengthening, Orthotic Fabrication/Fit/Training, Edema  Control, Scar Management, Wound Care, Electrical Modalities, Joint Protection, and Energy Conservation, as well as any other treatments deemed necessary based on the patient's needs or progress.     Updates/Grading for next session: Continue with current plan of care       Radha Sutton OT

## 2024-09-04 ENCOUNTER — PATIENT MESSAGE (OUTPATIENT)
Dept: INTERNAL MEDICINE | Facility: CLINIC | Age: 49
End: 2024-09-04
Payer: MEDICAID

## 2024-09-04 DIAGNOSIS — G89.29 CHRONIC MIDLINE LOW BACK PAIN WITHOUT SCIATICA: Primary | ICD-10-CM

## 2024-09-04 DIAGNOSIS — M54.50 CHRONIC MIDLINE LOW BACK PAIN WITHOUT SCIATICA: Primary | ICD-10-CM

## 2024-09-04 RX ORDER — NABUMETONE 500 MG/1
500 TABLET, FILM COATED ORAL DAILY
Qty: 30 TABLET | Refills: 1 | Status: SHIPPED | OUTPATIENT
Start: 2024-09-04

## 2024-09-04 RX ORDER — OMEPRAZOLE 40 MG/1
40 CAPSULE, DELAYED RELEASE ORAL DAILY
Qty: 90 CAPSULE | Refills: 0 | Status: SHIPPED | OUTPATIENT
Start: 2024-09-04 | End: 2025-09-04

## 2024-09-04 NOTE — TELEPHONE ENCOUNTER
Care Due:                  Date            Visit Type   Department     Provider  --------------------------------------------------------------------------------                                EP -                              PRIMARY      [facility] INTERNAL  Aftab Tolliver  Last Visit: 08-      CARE ([facility])   [facility]  Next Visit: None Scheduled  None         None Found                                                            Last  Test          Frequency    Reason                     Performed    Due Date  --------------------------------------------------------------------------------    CBC.........  12 months..  celecoxib................  08- 08-    CMP.........  12 months..  celecoxib................  08- 08-    [facility] Embedded Care Due Messages. Reference number: [id number].   9/04/2024 3:35:49 PM CDT

## 2024-09-10 ENCOUNTER — PATIENT MESSAGE (OUTPATIENT)
Dept: ORTHOPEDICS | Facility: CLINIC | Age: 49
End: 2024-09-10
Payer: MEDICAID

## 2024-09-20 ENCOUNTER — PATIENT OUTREACH (OUTPATIENT)
Dept: ADMINISTRATIVE | Facility: HOSPITAL | Age: 49
End: 2024-09-20
Payer: MEDICAID

## 2024-09-20 NOTE — PROGRESS NOTES
Health Maintenance Due   Topic Date Due    Diabetes Urine Screening  Never done    Low Dose Statin  Never done    Pneumococcal Vaccines (Age 0-64) (2 of 2 - PCV) 09/11/2020    Shingles Vaccine (1 of 2) Never done    Influenza Vaccine (1) 09/01/2024    COVID-19 Vaccine (1 - 2023-24 season) Never done    Health Maintenance Reviewed, updated and links triggered HM'S addressed already . A message to Dr. Martinez about Statins   Dx code. Dx code for high Cholesterol needed Medication active for increased lipid results(Fford) 9/20/24    HDL 40 - 59 mg/dL 98 High  72 High  64 High                                                                  Exclusion Documentation Tips:  During the measurement year: (myalgias, myositis and myopathies must be documented every year for pt to be excluded)     Myalgia, Myositis, Myopathy, Rhabdomyolysis, Hospice, Palliative Care, >= 66yrs with Frailty & Advanced Illness, >=66yrs who live in long-term institution or enrolled in institutional SNP for more 90 days

## 2024-09-24 ENCOUNTER — OFFICE VISIT (OUTPATIENT)
Dept: ORTHOPEDICS | Facility: CLINIC | Age: 49
End: 2024-09-24
Payer: MEDICAID

## 2024-09-24 VITALS — HEIGHT: 66 IN | BODY MASS INDEX: 24.38 KG/M2 | WEIGHT: 151.69 LBS

## 2024-09-24 DIAGNOSIS — M18.12 ARTHRITIS OF CARPOMETACARPAL (CMC) JOINT OF LEFT THUMB: Primary | ICD-10-CM

## 2024-09-24 PROCEDURE — 3044F HG A1C LEVEL LT 7.0%: CPT | Mod: CPTII,,, | Performed by: ORTHOPAEDIC SURGERY

## 2024-09-24 PROCEDURE — 1159F MED LIST DOCD IN RCRD: CPT | Mod: CPTII,,, | Performed by: ORTHOPAEDIC SURGERY

## 2024-09-24 PROCEDURE — 99213 OFFICE O/P EST LOW 20 MIN: CPT | Mod: PBBFAC | Performed by: ORTHOPAEDIC SURGERY

## 2024-09-24 PROCEDURE — 99999 PR PBB SHADOW E&M-EST. PATIENT-LVL III: CPT | Mod: PBBFAC,,, | Performed by: ORTHOPAEDIC SURGERY

## 2024-09-24 PROCEDURE — 3008F BODY MASS INDEX DOCD: CPT | Mod: CPTII,,, | Performed by: ORTHOPAEDIC SURGERY

## 2024-09-24 PROCEDURE — 99213 OFFICE O/P EST LOW 20 MIN: CPT | Mod: S$PBB,,, | Performed by: ORTHOPAEDIC SURGERY

## 2024-09-24 NOTE — PROGRESS NOTES
Velma Lopez presents for post-operative evaluation.  The patient is now 4.5 mos s/p Left thumb CMC arthroplasty with Dr. Tom on 4/24/24.   She is doing great.  She reports pain is 0/10.  She is back to full unrestricted activities without any pain or difficulty.  She is very pleased with her surgical result and returns for re-evaluation today.        PE:    AA&O x 4.  NAD  HEENT:  NCAT, sclera nonicteric  Lungs:  Respirations are equal and unlabored.  CV:  2+ bilateral upper and lower extremity pulses.  MSK: The wound is healing well with no signs of erythema or warmth.  There is no drainage.  No clinical signs or symptoms of infection are present.  SILT. DNVI.Full painless range of motion left upper extremity.      A/P: Status post above, doing well  1)   Patient doing great.  Excellent surgical result.  Follow up as needed.  Activities unrestricted and as tolerated.

## 2024-10-14 ENCOUNTER — PATIENT MESSAGE (OUTPATIENT)
Dept: INTERNAL MEDICINE | Facility: CLINIC | Age: 49
End: 2024-10-14
Payer: MEDICAID

## 2024-10-14 DIAGNOSIS — G89.29 CHRONIC MIDLINE LOW BACK PAIN WITHOUT SCIATICA: Primary | ICD-10-CM

## 2024-10-14 DIAGNOSIS — M54.50 CHRONIC MIDLINE LOW BACK PAIN WITHOUT SCIATICA: Primary | ICD-10-CM

## 2024-10-14 DIAGNOSIS — M25.519 SHOULDER PAIN, UNSPECIFIED CHRONICITY, UNSPECIFIED LATERALITY: ICD-10-CM

## 2024-10-16 ENCOUNTER — PATIENT OUTREACH (OUTPATIENT)
Dept: ADMINISTRATIVE | Facility: HOSPITAL | Age: 49
End: 2024-10-16
Payer: MEDICAID

## 2024-10-16 NOTE — PROGRESS NOTES
Health Maintenance Due   Topic Date Due    Diabetes Urine Screening  Never done    Low Dose Statin  Never done    Pneumococcal Vaccines (Age 0-64) (2 of 2 - PCV) 09/11/2020    Shingles Vaccine (1 of 2) Never done    Influenza Vaccine (1) 09/01/2024    COVID-19 Vaccine (1 - 2024-25 season) Never done   Health Maintenance Reviewed, updated and links triggered HM'S addressed already. Message sent for scheduling for DM urine    (Fford) 10/16/24

## 2024-10-31 DIAGNOSIS — Z79.891 ADMISSION FOR LONG-TERM OPIATE ANALGESIC USE: ICD-10-CM

## 2024-10-31 DIAGNOSIS — M47.896 OTHER SPONDYLOSIS, LUMBAR REGION: Primary | ICD-10-CM

## 2024-10-31 DIAGNOSIS — M25.519 PAIN IN JOINT, SHOULDER REGION: ICD-10-CM

## 2024-10-31 DIAGNOSIS — M47.27 OTHER SPONDYLOSIS WITH RADICULOPATHY, LUMBOSACRAL REGION: ICD-10-CM

## 2024-11-01 ENCOUNTER — HOSPITAL ENCOUNTER (OUTPATIENT)
Dept: RADIOLOGY | Facility: HOSPITAL | Age: 49
Discharge: HOME | End: 2024-11-01
Attending: PHYSICAL MEDICINE & REHABILITATION
Payer: MEDICAID

## 2024-11-01 DIAGNOSIS — M47.896 OTHER SPONDYLOSIS, LUMBAR REGION: ICD-10-CM

## 2024-11-01 DIAGNOSIS — M47.896 OTHER SPONDYLOSIS, LUMBAR REGION: Primary | ICD-10-CM

## 2024-11-01 DIAGNOSIS — M25.519 SHOULDER PAIN: ICD-10-CM

## 2024-11-01 DIAGNOSIS — M25.519 SHOULDER PAIN: Primary | ICD-10-CM

## 2024-11-01 PROCEDURE — 73030 X-RAY EXAM OF SHOULDER: CPT | Mod: 26,RT,, | Performed by: RADIOLOGY

## 2024-11-01 PROCEDURE — 72110 X-RAY EXAM L-2 SPINE 4/>VWS: CPT | Mod: TC,FY,PN

## 2024-11-01 PROCEDURE — 72110 X-RAY EXAM L-2 SPINE 4/>VWS: CPT | Mod: 26,,, | Performed by: RADIOLOGY

## 2024-11-01 PROCEDURE — 73030 X-RAY EXAM OF SHOULDER: CPT | Mod: TC,FY,PN,RT

## 2024-11-02 DIAGNOSIS — M54.50 CHRONIC MIDLINE LOW BACK PAIN WITHOUT SCIATICA: ICD-10-CM

## 2024-11-02 DIAGNOSIS — G89.29 CHRONIC MIDLINE LOW BACK PAIN WITHOUT SCIATICA: ICD-10-CM

## 2024-11-02 NOTE — TELEPHONE ENCOUNTER
No care due was identified.  [facility] Embedded Care Due Messages. Reference number: [id number].   11/02/2024 3:19:57 PM CDT

## 2024-11-04 RX ORDER — NABUMETONE 500 MG/1
500 TABLET, FILM COATED ORAL
Qty: 30 TABLET | Refills: 1 | Status: SHIPPED | OUTPATIENT
Start: 2024-11-04

## 2024-11-04 NOTE — TELEPHONE ENCOUNTER
Refill Routing Note   Medication(s) are not appropriate for processing by [facility] for the following reason(s):        Outside of protocol    [facility] action(s):  Route             Appointments  past 12m or future 3m with PCP    Date Provider   Last Visit   8/21/2024 Aftab Martinez MD   Next Visit   Visit date not found Aftab Martinez MD   ED visits in past 90 days: 0        Note composed:10:18 AM 11/04/2024

## 2024-11-05 NOTE — PROGRESS NOTES
[facility] (PT) Initial Evaluation- LUMBAR/SACROILIAC     Name: Velma Lopez  Clinic Number: [id number]    Therapy Diagnosis: No diagnosis found.  Physician: Damián Alonzo MD    Physician Orders: ***  Medical Diagnosis: ***  Surgical Procedure and Date: ***, ***  Evaluation Date: 11/7/2024  Insurance Authorization Period Expiration: ***  Plan of Care Certification Period: ***  Progress Note Due: ***   Date of Return to MD: ***  Visit # / Visits authorized: *** / ***    Precautions:  {IP WOUND PRECAUTIONS [facility]:98271}    Time In:***  Time Out: ***  Total Appointment Time (timed & untimed codes): *** minutes    Subjective   Date of onset: ***  History of current condition - Velma reports: ***     Medical History:   Past Medical History:   Diagnosis Date    Chronic back pain     Diabetes type 2, uncontrolled     Mild nonproliferative diabetic retinopathy of left eye without macular edema associated with type 2 diabetes mellitus 10/21/2019    Morbid obesity with BMI of 40.0-44.9, adult     MILEY on CPAP     Plantar fasciitis of left foot     Urticaria        Surgical History:   Velma Lopez  has a past surgical history that includes [place] tooth extraction; Cyst Removal; Dilation and curettage of uterus; Esophagogastroduodenoscopy (N/A, 06/14/2019); Upper gastrointestinal endoscopy; Esophageal motility study using high resolution manometry (N/A, 09/17/2019); Trigger finger release (Right, 10/04/2019); Arthroscopic repair of rotator cuff of shoulder (Right, 07/27/2020); Injection of steroid (Left, 07/27/2020); Decompression of subacromial space (Left, 07/27/2020); Arthroscopic acromioplasty of shoulder (Left, 07/27/2020); Gastric bypass (N/A, 05/20/2021); Colonoscopy (N/A, 04/07/2022); Lysis of adhesions (N/A, 10/21/2022); Transverse colectomy (N/A, 10/26/2022); Foreign Body Removal (N/A, 04/17/2023); laparotomy, exploratory (N/A, 08/18/2023); Lysis of adhesions (N/A,  "08/18/2023); Interposition arthroplasty of carpometacarpal joints (Left, 04/24/2024); and Thumb arthroscopy (Left).    Medications:   Velma has a current medication list which includes the following prescription(s): acetaminophen, amitriptyline, celecoxib, cetirizine, cyanocobalamin, docusate sodium, ergocalciferol, climara pro, ibuprofen, meloxicam, centrum silver women, nabumetone, omeprazole, oxycodone, oxycodone-acetaminophen, promethazine, solifenacin, terconazole, tizanidine, [DISCONTINUED] atorvastatin, [DISCONTINUED] blood-glucose meter, [DISCONTINUED] diclofenac sodium, [DISCONTINUED] hyoscyamine, [DISCONTINUED] losartan, and [DISCONTINUED] metformin, and the following Facility-Administered Medications: lidocaine (pf) 10 mg/ml (1%), midazolam, and ropivacaine hcl/pf.    Allergies:   Review of patient's allergies indicates:  No Known Allergies     Prior Therapy: ***  Social History: *** {LIVES WITH:[address]22}  Occupation: ***  Prior Level of Function: ***  Current Level of Function: ***  Patient's goals: ***    Suicide Prevention Screening:  Do you ever have thoughts of injuring or harming yourself? []Yes   [x]No If yes, explain/actions taken:   Do you feel threatened by anyone or feel unsafe at home?  []Yes   [x]No If yes, explain/actions taken:  Any physical signs of abuse present?               []Yes   [x]No If yes, explain/actions taken:     Oswestry Low Back Pain Disability Questionnaire: ***%  0-20% Minimal Disability  21-40% Moderate Disability  41-60% Severe Disability  61-80% Crippled  % Bed bound/exaggerating    CMS Impairment/Limitation/Restriction for FOTO Lumbar Survey    Therapist reviewed FOTO scores for Velma Lopez on 11/7/2024.   FOTO documents entered into [employer] - see Media section.    Limitation Score: ***%  Predicted Score: ***%  Category: {Blank single:00498::"Other","Self Care","Body Position","Carrying","Mobility"}     Pain:  Current {0-10:20507::"0"}/10, worst " "{0-10:20507::"0"}/10, best {0-10:20507::"0"}/10   Location: {RIGHT LEFT BILATERAL:02602} {LOCATION ON BODY:14871}  Description: {Pain Description:80939}  Aggravating Factors: {Causes; Pain:74862}  Easing Factors: {Pain (activities that relieve):62969}      Objective   Palpation: Tenderness to palpation (TTP) of ***. Iliac crests appear {Level_Unlevel:98224}    Posture: ***    Gait: ***     Functional Movement Screen:   - Bilateral Squats (L2-L4): *** reps  - Heel Walking (L4-L5): *** feet  - Toe Walking (L5-S1): *** feet  - Single Leg Heel Raises: Right - *** reps, Left -*** reps    Sensation: Light touch: {Roanlc-Ziclltgsw-Hactzh:55236::"Intact"}           Deep Pressure: {Mtqshx-Biunwaknd-Sgphvt:68832::"Intact"}    Deep Tendon Reflexes:   LEFT RIGHT   Patellar (L3-4) {Reflexes:05791} {Reflexes:10609}   Achilles (S1) {Reflexes:62680} {Reflexes:44107}     Range of Motion (ROM)  Hip   LEFT RIGHT   Flexion *** ***   Extension *** ***   Abduction *** ***   Adduction *** ***     Lumbar            Pain/Symptoms  Flexion (4"- fingertips to floor) *** (***)   Extension (20-30 °) *** (***)   Left (L) Rotation  (45 °)  *** (***)   Right (R) Rotation  (45 °)  *** (***)   Right Side Bending *** (***)   Left Side Bending *** (***)     MMT   LEFT RIGHT   Hip flexion ***/5 ***/5   Hip extension  ***/5 ***/5   Hip abduction ***/5 ***/5   Hip adduction ***/5 ***/5   Knee flexion ***/5 ***/5   Knee extension ***/5 ***/5   Ankle dorsiflexion (DF) ***/5 ***/5   Ankle plantarflexion (PF) ***/5 ***/5     FLEXIBILITY   SLR (0-70 °) Allegra's Hair Slaughter   Right *** *** *** ***   Left *** *** *** ***     SPECIAL TESTS   STRAIGHT LEG RAISE  Slump SI sheer (P4) ASIS compression ASIS distraction Cindy PEREIRA   Right  *** *** *** *** *** *** ***   Left *** *** *** *** *** *** ***     Treatment   Treatment Time In: ***  Treatment Time Out: ***  Total Treatment time (time-based codes) separate from Evaluation: *** minutes    Velma participated " "in dynamic functional therapeutic activities to improve functional performance for ***  minutes, including:  Patient was educated on the exercises for their HEP and the importance of the HEP compliance. Patient HEP can be found in patient instructions.       Home Exercises and Patient Education   Education provided:   Patient provided education on the importance of compliance with HEP, diagnosis and prognosis, and intended duration/frequency of physical therapy plan of care. Shared-decision making between evaluating therapist and patient utilized to determine therapeutic intervention selection, plan of care parameters, and continued monitoring of signs/symptoms.    Written Home Exercise Program (HEP) Provided: {Nichole single:56567::"yes","Patient instructed to cont prior HEP"}.  Exercises were reviewed and Velma was able to demonstrate them prior to the end of the session.  Velma demonstrated {Desc; good/fair/poor:57770} understanding of the education provided.     See Electronic Medical Record under {Blank single:54575::"Media","Patient Instructions"} for exercises provided {Nichole single:05599::"11/7/2024","prior visit"}.    Assessment     Velma is a 49 y.o. female referred to outpatient Physical Therapy with a medical diagnosis of ***. Patient presents with signs and symptoms consistent with their medical diagnosis. Patient presents with impaired *** mobility, range of motion, gait, and strength. Patient also presents with increased pain that occurs with ***. At this time, the patients limitations are preventing them from performing hobbies and ADLs around their house without increased difficulty, pain, and discomfort.       Patient prognosis is {REHAB PROGNOSIS [facility]:88894}.   Patient will benefit from skilled outpatient Physical Therapy to address the deficits stated above and in the chart below, provide patient/family education, and to maximize patient's level of independence.     Plan of care discussed with " patient: YES  Patient's spiritual, cultural and educational needs considered and patient is agreeable to the plan of care and goals as stated below:     Anticipated Barriers for Therapy: none    Medical necessity is demonstrated by the following IMPAIRMENTS:  {AMB PT PROGRESS WILL BENEFIT:39610}    Medical Necessity is demonstrated by the following  History  Co-morbidities and personal factors that may impact the plan of care [] LOW: no personal factors / co-morbidities  [] MODERATE: 1-2 personal factors / co-morbidities  [] HIGH: 3+ personal factors / co-morbidities    Moderate / High Support Documentation: see PMHx     Examination  Body Structures and Functions, activity limitations and participation restrictions that may impact the plan of care [] LOW: addressing 1-2 elements  [] MODERATE: 3+ elements  [] HIGH: 4+ elements (please support below)    Moderate / High Support Documentation: see objective     Clinical Presentation [] LOW: stable  [] MODERATE: Evolving  [] HIGH: Unstable     Decision Making/ Complexity Score: {Desc; low/moderate/high:827743}       Goals  Short Term Goals (*** weeks)  1. Patient will report < ***/10 pain at rest and < ***/10 pain with activity to demonstrate improved tolerance to activities of daily living.   2. Patient will demonstrate full, symmetrical pain free lumbar range of motion in all planes to demonstrate improved mobility of the lumbar spine for progression to return to prior level of function.   3. Patient will demonstrate hip hinge with dowel in standing position with appropriate motor control of the spine to progress to lifting from the floor for activities of daily living.    Long Term Goals (*** weeks)  1. Patient will be independent and compliant with HEP for continued functional stability and core endurance gains for improved quality of life and participation in recreational/leisure activities.   2. Patient will demonstrate proper lifting technique 3/3 trials for  decreased risk of injury when performing daily tasks.  3. Patient will demonstrate correct sitting and standing posture for increased ability to tolerate sustained positions.  4. Patient will have increased strength of bilateral gluteus danis/medius to ***/5 to improve performance of activities of daily living.   5. Patient will have overall improvement in condition to have decreased FOTO Limitation Score to ***% to demonstrate clinically significant improvement in low back pain and disability for return to prior level of participation.     Plan   Plan of care Certification: 11/7/2024 to ***.    Outpatient Physical Therapy 2 times weekly for 6 weeks to include the following interventions: Aquatic Therapy, Cervical/Lumbar Traction, Electrical Stimulation as needed, Gait Training, Manual Therapy, Moist Heat/ Ice, Neuromuscular Re-ed, Orthotic Management and Training, Patient Education, Self Care, Therapeutic Activities, Therapeutic Exercise, and Ultrasound.     Lamonte Noland, PT  11/5/2024    Therapeutic Exercise:  Nustep/recumbent bike  Heel raises  Hip 3 way  SLR  LTR  [employer]    Therapeutic Activity:  Mini squats  Side steps  Step ups  Sit to stands  TB/CC walkouts  Box lifts    Neuromuscular reeducation   Abdominal isometric  Deadbugs  Bird dogs  Shoulder taps on wall   Stir the pot  Paloff presses  TA oscillations    Manual Therapy:  PA lumbar mobilizations  SL lumbar gapping mobilizations    Plan Next Visit   Date:     Visit #           Therapeutic Exercise     Nustep     Total gym    Heel raises     Hip 3 way     SLR     LTR     [employer]           Therapeutic Activity     Mini squats     Box lifts     Side steps     CC walkouts     Step ups           Neuromuscular Re-ed     Abdominal isometric    deadbugs    Shoulder taps with high plank    Stir the pot     Paloff Press      TA oscillations        Manual Therapy    PA Lumbar mobilizations     SL lumbar mobilizations            Total time

## 2024-11-07 ENCOUNTER — CLINICAL SUPPORT (OUTPATIENT)
Dept: REHABILITATION | Facility: HOSPITAL | Age: 49
End: 2024-11-07
Payer: MEDICAID

## 2024-11-07 DIAGNOSIS — Z74.09 IMPAIRED FUNCTIONAL MOBILITY AND ACTIVITY TOLERANCE: Primary | ICD-10-CM

## 2024-11-07 PROCEDURE — 97162 PT EVAL MOD COMPLEX 30 MIN: CPT | Mod: PO

## 2024-11-07 NOTE — PROGRESS NOTES
[facility] Physical Therapy (PT) Initial Evaluation- LUMBAR/SACROILIAC     Name: Velma Lopez  Clinic Number: [id number]    Therapy Diagnosis:   Encounter Diagnosis   Name Primary?    Impaired functional mobility and activity tolerance Yes     Physician: Damián Alonzo MD    Physician Orders: Evaluate and treat  Medical Diagnosis: Other spondylosis, lumbar region [M47.896], Other spondylosis with radiculopathy, lumbosacral region [M47.27], Admission for long-term opiate analgesic use [Z79.891]   Surgical Procedure and Date: None for back  Evaluation Date: 11/7/2024  Insurance Authorization Period Expiration: 10/31/2024 - 12/31/2024   Plan of Care Certification Period: 11/7/2024 - 2/7/2025  Progress Note Due: 12/7/2024 or 6th visit   Date of Return to MD: MALEAN  Visit # / Visits authorized: 1 / 1    Precautions:  Standard and Diabetes    Time In:3:55  Time Out: 4:45  Total Appointment Time (timed & untimed codes): 50 minutes    Subjective   Date of onset: 10 years ago but became worse in the past 6 months.  History of current condition - Velma reports: she has a history of lumbar pain and bulging disc. She is struggling with back and shoulder pain at this time. She reports that she believes her shoulder have arhtitis and she is three years post op rotator cuff and experiencing some more pain with her shoulders. Her back is in a lot of pain constantly and reports she feels numbness in the morning. She reports her back hurts sitting, standing, walking, and even lying down. She as always had back pain in the past 10 years ut reports it as been un tolerable in the past 6 months. She I hopeful to improve her pain to how it was 6 months ago and live a life with minimal restriction.      Medical History:   Past Medical History:   Diagnosis Date    Chronic back pain     Diabetes type 2, uncontrolled     Mild nonproliferative diabetic retinopathy of left eye without macular edema associated with type 2  diabetes mellitus 10/21/2019    Morbid obesity with BMI of 40.0-44.9, adult     MILEY on CPAP     Plantar fasciitis of left foot     Urticaria        Surgical History:   Velma Lopez  has a past surgical history that includes [place] tooth extraction; Cyst Removal; Dilation and curettage of uterus; Esophagogastroduodenoscopy (N/A, 06/14/2019); Upper gastrointestinal endoscopy; Esophageal motility study using high resolution manometry (N/A, 09/17/2019); Trigger finger release (Right, 10/04/2019); Arthroscopic repair of rotator cuff of shoulder (Right, 07/27/2020); Injection of steroid (Left, 07/27/2020); Decompression of subacromial space (Left, 07/27/2020); Arthroscopic acromioplasty of shoulder (Left, 07/27/2020); Gastric bypass (N/A, 05/20/2021); Colonoscopy (N/A, 04/07/2022); Lysis of adhesions (N/A, 10/21/2022); Transverse colectomy (N/A, 10/26/2022); Foreign Body Removal (N/A, 04/17/2023); laparotomy, exploratory (N/A, 08/18/2023); Lysis of adhesions (N/A, 08/18/2023); Interposition arthroplasty of carpometacarpal joints (Left, 04/24/2024); and Thumb arthroscopy (Left).    Medications:   Velma has a current medication list which includes the following prescription(s): acetaminophen, amitriptyline, celecoxib, cetirizine, cyanocobalamin, docusate sodium, ergocalciferol, climara pro, ibuprofen, meloxicam, centrum silver women, nabumetone, omeprazole, oxycodone, oxycodone-acetaminophen, promethazine, solifenacin, terconazole, tizanidine, [DISCONTINUED] atorvastatin, [DISCONTINUED] blood-glucose meter, [DISCONTINUED] diclofenac sodium, [DISCONTINUED] hyoscyamine, [DISCONTINUED] losartan, and [DISCONTINUED] metformin, and the following Facility-Administered Medications: lidocaine (pf) 10 mg/ml (1%), midazolam, and ropivacaine hcl/pf.    Allergies:   Review of patient's allergies indicates:  No Known Allergies     Prior Therapy: Yes for rotator cuff  Social History:  Has no stirs and difficulty performing all  "activities around the home.   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   Prior Level of Function: able to manage with moderate pain levels.  Current Level of Function: unable to do anything because of increased pain.  Patient's goals: Decrease pain and return to prior level of function.     Suicide Prevention Screening:  Do you ever have thoughts of injuring or harming yourself? []Yes   [x]No If yes, explain/actions taken:   Do you feel threatened by anyone or feel unsafe at home?  []Yes   [x]No If yes, explain/actions taken:  Any physical signs of abuse present?               []Yes   [x]No If yes, explain/actions taken:       CMS Impairment/Limitation/Restriction for FOTO Lumbar Survey    Therapist reviewed FOTO scores for Velma Lopez on 11/7/2024.   FOTO documents entered into [employer] - see Media section.    Limitation Score: 31%  Predicted Score: 48%  Category: Mobility     Pain:  Current 7/10, worst 10/10, best 4/10   Location: bilateral lower back and right shoulder   Description: sharp, numb  Aggravating Factors: Sitting, Standing, Laying, Bending, Walking, Night Time, Morning, Extension, and Flexing  Easing Factors: nothing      Objective   Palpation: Tenderness to palpation (TTP) of Patient is tedner to palpation of T8-T12. Iliac crests appear Level    Posture: patient weight shifts often due to increased pain.     Gait: antalgic gait pattern with bilateral hip drop. Decreased arm swing through right upper extremity.      Functional Movement Screen:     Balance: 10 seconds bilaterally    - Single Leg Heel Raises: Right - 5 reps, Left -5 reps    Range of Motion (ROM)    Lumbar            Pain/Symptoms  Flexion (4"- fingertips to floor) Top of shoe laces (++)   Extension (20-30 °) 5 (++)   Left (L) Rotation  (45 °)  10 (+)   Right (R) Rotation  (45 °)  10 (+)   Right Side Bending 15 (+)   Left Side Bending 15 (+)     MMT   LEFT RIGHT   Hip flexion 4/5 4/5   Hip " extension  4/5 4/5   Hip abduction 3/5 3/5   Hip adduction 4/5 4/5   Knee flexion 4/5 4/5   Knee extension 4/5 4/5   Ankle dorsiflexion (DF) 4/5 5/5   Ankle plantarflexion (PF) 4/5 5/5     FLEXIBILITY   SLR (0-70 °) [place]   Right 45 ++++   Left 45 ++++       Treatment   Treatment Time In: 4:34  Treatment Time Out: 4:45  Total Treatment time (time-based codes) separate from Evaluation: 11 minutes    Velma participated in dynamic functional therapeutic activities to improve functional performance for 11  minutes, including:  Patient was educated on the exercises for their HEP and the importance of the HEP compliance. Patient HEP can be found in patient instructions.       Home Exercises and Patient Education   Education provided:   Patient provided education on the importance of compliance with HEP, diagnosis and prognosis, and intended duration/frequency of physical therapy plan of care. Shared-decision making between evaluating therapist and patient utilized to determine therapeutic intervention selection, plan of care parameters, and continued monitoring of signs/symptoms.    Written Home Exercise Program (HEP) Provided: yes.  Exercises were reviewed and Velma was able to demonstrate them prior to the end of the session.  Velma demonstrated good  understanding of the education provided.     See Electronic Medical Record under Patient Instructions for exercises provided 11/7/2024.    Assessment     Velma is a 49 y.o. female referred to outpatient Physical Therapy with a medical diagnosis of Other spondylosis, lumbar region [M47.896], Other spondylosis with radiculopathy, lumbosacral region [M47.27], Admission for long-term opiate analgesic use [Z79.891] . Patient presents with signs and symptoms consistent with their medical diagnosis. Patient presents with impaired lumbar and functional mobility, range of motion, gait, and strength. Patient also presents with increased pain that occurs with most activities.  At this time, the patients limitations are preventing them from performing hobbies and ADLs around their house without increased difficulty, pain, and discomfort.       Patient prognosis is Good.   Patient will benefit from skilled outpatient Physical Therapy to address the deficits stated above and in the chart below, provide patient/family education, and to maximize patient's level of independence.     Plan of care discussed with patient: YES  Patient's spiritual, cultural and educational needs considered and patient is agreeable to the plan of care and goals as stated below:     Anticipated Barriers for Therapy: none    Medical necessity is demonstrated by the following IMPAIRMENTS:  Fall Risk, Unable to participate in daily activities, Continued inability to participate in vocational pursuits, Pain limits function of effected part for some activities, Unable to participate fully in daily activities, Requires skilled supervision to complete and progress HEP, Weakness, and Edema    Medical Necessity is demonstrated by the following  History  Co-morbidities and personal factors that may impact the plan of care [] LOW: no personal factors / co-morbidities  [x] MODERATE: 1-2 personal factors / co-morbidities  [] HIGH: 3+ personal factors / co-morbidities    Moderate / High Support Documentation: see PMHx     Examination  Body Structures and Functions, activity limitations and participation restrictions that may impact the plan of care [] LOW: addressing 1-2 elements  [x] MODERATE: 3+ elements  [] HIGH: 4+ elements (please support below)    Moderate / High Support Documentation: see objective     Clinical Presentation [] LOW: stable  [x] MODERATE: Evolving  [] HIGH: Unstable     Decision Making/ Complexity Score: moderate       Goals  Short Term Goals (3 weeks)  1. Patient will report < 3/10 pain at rest and < 7/10 pain with activity to demonstrate improved tolerance to activities of daily living.  In progress  2.  Patient will demonstrate full, symmetrical pain free lumbar range of motion in all planes to demonstrate improved mobility of the lumbar spine for progression to return to prior level of function.  In progress  3. Patient will demonstrate hip hinge with dowel in standing position with appropriate motor control of the spine to progress to lifting from the floor for activities of daily living. In progress    Long Term Goals (6 weeks)  1. Patient will be independent and compliant with HEP for continued functional stability and core endurance gains for improved quality of life and participation in recreational/leisure activities.  In progress  2. Patient will demonstrate proper lifting technique 3/3 trials for decreased risk of injury when performing daily tasks. In progress  3. Patient will demonstrate correct sitting and standing posture for increased ability to tolerate sustained positions. In progress  4. Patient will have increased strength of bilateral gluteus danis/medius to 4+/5 to improve performance of activities of daily living.  In progress  5. Patient will have overall improvement in condition to have decreased FOTO Limitation Score to 48% to demonstrate clinically significant improvement in low back pain and disability for return to prior level of participation.  In progress    Plan   Plan of care Certification: 11/7/2024 to 2/7/2025.    Outpatient Physical Therapy 2 times weekly for 6 weeks to include the following interventions: Aquatic Therapy, Cervical/Lumbar Traction, Electrical Stimulation as needed, Gait Training, Manual Therapy, Moist Heat/ Ice, Neuromuscular Re-ed, Orthotic Management and Training, Patient Education, Self Care, Therapeutic Activities, Therapeutic Exercise, and Ultrasound.     Lamonte Noland, PT  11/7/2024    Therapeutic Exercise:  Nustep/recumbent bike  Heel raises  Hip 3 way  SLR  [facility]  FELA  Manuel Boyer  open books    Therapeutic Activity:  Mini squats  Side  steps  Step ups  Sit to stands  TB/CC walkouts  Box lifts    Neuromuscular reeducation   Abdominal isometric  Deadbugs  Bird dogs  Shoulder taps on wall   Stir the pot  Paloff presses  TA oscillations    Manual Therapy:  PA thoracic mobilizations  SL lumbar gapping mobilizations

## 2024-11-07 NOTE — PLAN OF CARE
[facility] Physical Therapy (PT) Initial Evaluation- LUMBAR/SACROILIAC     Name: Velma Lopez  Clinic Number: [id number]    Therapy Diagnosis:   Encounter Diagnosis   Name Primary?    Impaired functional mobility and activity tolerance Yes     Physician: Damián Alonzo MD    Physician Orders: Evaluate and treat  Medical Diagnosis: Other spondylosis, lumbar region [M47.896], Other spondylosis with radiculopathy, lumbosacral region [M47.27], Admission for long-term opiate analgesic use [Z79.891]   Surgical Procedure and Date: None for back  Evaluation Date: 11/7/2024  Insurance Authorization Period Expiration: 10/31/2024 - 12/31/2024   Plan of Care Certification Period: 11/7/2024 - 2/7/2025  Progress Note Due: 12/7/2024 or 6th visit   Date of Return to MD: MALENA  Visit # / Visits authorized: 1 / 1    Precautions:  Standard and Diabetes    Time In:3:55  Time Out: 4:45  Total Appointment Time (timed & untimed codes): 50 minutes    Subjective   Date of onset: 10 years ago but became worse in the past 6 months.  History of current condition - Velma reports: she has a history of lumbar pain and bulging disc. She is struggling with back and shoulder pain at this time. She reports that she believes her shoulder have arhtitis and she is three years post op rotator cuff and experiencing some more pain with her shoulders. Her back is in a lot of pain constantly and reports she feels numbness in the morning. She reports her back hurts sitting, standing, walking, and even lying down. She as always had back pain in the past 10 years ut reports it as been un tolerable in the past 6 months. She I hopeful to improve her pain to how it was 6 months ago and live a life with minimal restriction.      Medical History:   Past Medical History:   Diagnosis Date    Chronic back pain     Diabetes type 2, uncontrolled     Mild nonproliferative diabetic retinopathy of left eye without macular edema associated with type 2  diabetes mellitus 10/21/2019    Morbid obesity with BMI of 40.0-44.9, adult     MILEY on CPAP     Plantar fasciitis of left foot     Urticaria        Surgical History:   Velma Lopez  has a past surgical history that includes [place] tooth extraction; Cyst Removal; Dilation and curettage of uterus; Esophagogastroduodenoscopy (N/A, 06/14/2019); Upper gastrointestinal endoscopy; Esophageal motility study using high resolution manometry (N/A, 09/17/2019); Trigger finger release (Right, 10/04/2019); Arthroscopic repair of rotator cuff of shoulder (Right, 07/27/2020); Injection of steroid (Left, 07/27/2020); Decompression of subacromial space (Left, 07/27/2020); Arthroscopic acromioplasty of shoulder (Left, 07/27/2020); Gastric bypass (N/A, 05/20/2021); Colonoscopy (N/A, 04/07/2022); Lysis of adhesions (N/A, 10/21/2022); Transverse colectomy (N/A, 10/26/2022); Foreign Body Removal (N/A, 04/17/2023); laparotomy, exploratory (N/A, 08/18/2023); Lysis of adhesions (N/A, 08/18/2023); Interposition arthroplasty of carpometacarpal joints (Left, 04/24/2024); and Thumb arthroscopy (Left).    Medications:   Velma has a current medication list which includes the following prescription(s): acetaminophen, amitriptyline, celecoxib, cetirizine, cyanocobalamin, docusate sodium, ergocalciferol, climara pro, ibuprofen, meloxicam, centrum silver women, nabumetone, omeprazole, oxycodone, oxycodone-acetaminophen, promethazine, solifenacin, terconazole, tizanidine, [DISCONTINUED] atorvastatin, [DISCONTINUED] blood-glucose meter, [DISCONTINUED] diclofenac sodium, [DISCONTINUED] hyoscyamine, [DISCONTINUED] losartan, and [DISCONTINUED] metformin, and the following Facility-Administered Medications: lidocaine (pf) 10 mg/ml (1%), midazolam, and ropivacaine hcl/pf.    Allergies:   Review of patient's allergies indicates:  No Known Allergies     Prior Therapy: Yes for rotator cuff  Social History:  Has no stirs and difficulty performing all  "activities around the home.   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   Prior Level of Function: able to manage with moderate pain levels.  Current Level of Function: unable to do anything because of increased pain.  Patient's goals: Decrease pain and return to prior level of function.     Suicide Prevention Screening:  Do you ever have thoughts of injuring or harming yourself? []Yes   [x]No If yes, explain/actions taken:   Do you feel threatened by anyone or feel unsafe at home?  []Yes   [x]No If yes, explain/actions taken:  Any physical signs of abuse present?               []Yes   [x]No If yes, explain/actions taken:       CMS Impairment/Limitation/Restriction for FOTO Lumbar Survey    Therapist reviewed FOTO scores for Velma Lopez on 11/7/2024.   FOTO documents entered into [employer] - see Media section.    Limitation Score: 31%  Predicted Score: 48%  Category: Mobility     Pain:  Current 7/10, worst 10/10, best 4/10   Location: bilateral lower back and right shoulder   Description: sharp, numb  Aggravating Factors: Sitting, Standing, Laying, Bending, Walking, Night Time, Morning, Extension, and Flexing  Easing Factors: nothing      Objective   Palpation: Tenderness to palpation (TTP) of Patient is tedner to palpation of T8-T12. Iliac crests appear Level    Posture: patient weight shifts often due to increased pain.     Gait: antalgic gait pattern with bilateral hip drop. Decreased arm swing through right upper extremity.      Functional Movement Screen:     Balance: 10 seconds bilaterally    - Single Leg Heel Raises: Right - 5 reps, Left -5 reps    Range of Motion (ROM)    Lumbar            Pain/Symptoms  Flexion (4"- fingertips to floor) Top of shoe laces (++)   Extension (20-30 °) 5 (++)   Left (L) Rotation  (45 °)  10 (+)   Right (R) Rotation  (45 °)  10 (+)   Right Side Bending 15 (+)   Left Side Bending 15 (+)     MMT   LEFT RIGHT   Hip flexion 4/5 4/5   Hip " extension  4/5 4/5   Hip abduction 3/5 3/5   Hip adduction 4/5 4/5   Knee flexion 4/5 4/5   Knee extension 4/5 4/5   Ankle dorsiflexion (DF) 4/5 5/5   Ankle plantarflexion (PF) 4/5 5/5     FLEXIBILITY   SLR (0-70 °) [place]   Right 45 ++++   Left 45 ++++       Treatment   Treatment Time In: 4:34  Treatment Time Out: 4:45  Total Treatment time (time-based codes) separate from Evaluation: 11 minutes    Velma participated in dynamic functional therapeutic activities to improve functional performance for 11  minutes, including:  Patient was educated on the exercises for their HEP and the importance of the HEP compliance. Patient HEP can be found in patient instructions.       Home Exercises and Patient Education   Education provided:   Patient provided education on the importance of compliance with HEP, diagnosis and prognosis, and intended duration/frequency of physical therapy plan of care. Shared-decision making between evaluating therapist and patient utilized to determine therapeutic intervention selection, plan of care parameters, and continued monitoring of signs/symptoms.    Written Home Exercise Program (HEP) Provided: yes.  Exercises were reviewed and Velma was able to demonstrate them prior to the end of the session.  Velma demonstrated good  understanding of the education provided.     See Electronic Medical Record under Patient Instructions for exercises provided 11/7/2024.    Assessment     Velma is a 49 y.o. female referred to outpatient Physical Therapy with a medical diagnosis of Other spondylosis, lumbar region [M47.896], Other spondylosis with radiculopathy, lumbosacral region [M47.27], Admission for long-term opiate analgesic use [Z79.891] . Patient presents with signs and symptoms consistent with their medical diagnosis. Patient presents with impaired lumbar and functional mobility, range of motion, gait, and strength. Patient also presents with increased pain that occurs with most activities.  At this time, the patients limitations are preventing them from performing hobbies and ADLs around their house without increased difficulty, pain, and discomfort.       Patient prognosis is Good.   Patient will benefit from skilled outpatient Physical Therapy to address the deficits stated above and in the chart below, provide patient/family education, and to maximize patient's level of independence.     Plan of care discussed with patient: YES  Patient's spiritual, cultural and educational needs considered and patient is agreeable to the plan of care and goals as stated below:     Anticipated Barriers for Therapy: none    Medical necessity is demonstrated by the following IMPAIRMENTS:  Fall Risk, Unable to participate in daily activities, Continued inability to participate in vocational pursuits, Pain limits function of effected part for some activities, Unable to participate fully in daily activities, Requires skilled supervision to complete and progress HEP, Weakness, and Edema    Medical Necessity is demonstrated by the following  History  Co-morbidities and personal factors that may impact the plan of care [] LOW: no personal factors / co-morbidities  [x] MODERATE: 1-2 personal factors / co-morbidities  [] HIGH: 3+ personal factors / co-morbidities    Moderate / High Support Documentation: see PMHx     Examination  Body Structures and Functions, activity limitations and participation restrictions that may impact the plan of care [] LOW: addressing 1-2 elements  [x] MODERATE: 3+ elements  [] HIGH: 4+ elements (please support below)    Moderate / High Support Documentation: see objective     Clinical Presentation [] LOW: stable  [x] MODERATE: Evolving  [] HIGH: Unstable     Decision Making/ Complexity Score: moderate       Goals  Short Term Goals (3 weeks)  1. Patient will report < 3/10 pain at rest and < 7/10 pain with activity to demonstrate improved tolerance to activities of daily living.  In progress  2.  Patient will demonstrate full, symmetrical pain free lumbar range of motion in all planes to demonstrate improved mobility of the lumbar spine for progression to return to prior level of function.  In progress  3. Patient will demonstrate hip hinge with dowel in standing position with appropriate motor control of the spine to progress to lifting from the floor for activities of daily living. In progress    Long Term Goals (6 weeks)  1. Patient will be independent and compliant with HEP for continued functional stability and core endurance gains for improved quality of life and participation in recreational/leisure activities.  In progress  2. Patient will demonstrate proper lifting technique 3/3 trials for decreased risk of injury when performing daily tasks. In progress  3. Patient will demonstrate correct sitting and standing posture for increased ability to tolerate sustained positions. In progress  4. Patient will have increased strength of bilateral gluteus danis/medius to 4+/5 to improve performance of activities of daily living.  In progress  5. Patient will have overall improvement in condition to have decreased FOTO Limitation Score to 48% to demonstrate clinically significant improvement in low back pain and disability for return to prior level of participation.  In progress    Plan   Plan of care Certification: 11/7/2024 to 2/7/2025.    Outpatient Physical Therapy 2 times weekly for 6 weeks to include the following interventions: Aquatic Therapy, Cervical/Lumbar Traction, Electrical Stimulation as needed, Gait Training, Manual Therapy, Moist Heat/ Ice, Neuromuscular Re-ed, Orthotic Management and Training, Patient Education, Self Care, Therapeutic Activities, Therapeutic Exercise, and Ultrasound.     Lamonte Noland, PT  11/7/2024    Therapeutic Exercise:  Nustep/recumbent bike  Heel raises  Hip 3 way  SLR  [facility]  FELA  Manuel Boyer  open books    Therapeutic Activity:  Mini squats  Side  steps  Step ups  Sit to stands  TB/CC walkouts  Box lifts    Neuromuscular reeducation   Abdominal isometric  Deadbugs  Bird dogs  Shoulder taps on wall   Stir the pot  Paloff presses  TA oscillations    Manual Therapy:  PA thoracic mobilizations  SL lumbar gapping mobilizations

## 2024-11-12 ENCOUNTER — CLINICAL SUPPORT (OUTPATIENT)
Dept: REHABILITATION | Facility: HOSPITAL | Age: 49
End: 2024-11-12
Payer: MEDICAID

## 2024-11-12 DIAGNOSIS — Z74.09 IMPAIRED FUNCTIONAL MOBILITY AND ACTIVITY TOLERANCE: Primary | ICD-10-CM

## 2024-11-12 PROCEDURE — 97110 THERAPEUTIC EXERCISES: CPT | Mod: PO,CQ

## 2024-11-12 NOTE — PROGRESS NOTES
[facility]   Physical Therapy Treatment Note      Name: Velma Lopez  Clinic Number: [id number]    Therapy Diagnosis: No diagnosis found.  Physician: Damián Alonzo MD    Visit Date: 11/12/2024    Physician Orders: Evaluate and treat  Medical Diagnosis: Other spondylosis, lumbar region [M47.896], Other spondylosis with radiculopathy, lumbosacral region [M47.27], Admission for long-term opiate analgesic use [Z79.891]   Surgical Procedure and Date: None for back  Evaluation Date: 11/7/2024  Insurance Authorization Period Expiration: 10/31/2024 - 12/31/2024   Plan of Care Certification Period: 11/7/2024 - 2/7/2025  Progress Note Due: 12/7/2024 or 6th visit   Date of Return to MD: MALENA  Visit # / Visits authorized: 1 / 1; 1/12     Precautions:  Standard and Diabetes     Time In:7:05 am  Time Out: 7:55 am  Total Appointment Time (timed & untimed codes): 50 minutes    PTA Visit #: 1/5       Subjective     Patient reports: pain in both shoulders and low back, was at a 10/10 this morning before she started moving around. Also, states she is getting a shot in both shoulders today.  She was compliant with home exercise program.  Response to previous treatment: mild soreness  Functional change: none yet    Pain: 7/10  Location: bilateral back      Objective      Objective Measures updated at progress report unless specified.     Treatment     Velma received the treatments listed below:      therapeutic exercises to develop strength, endurance, ROM, flexibility, posture, and core stabilization for 35 minutes including:  +Nustep/recumbent bike 8'  Heel raises  Hip 3 way  +SLR   +LTR  +DKTC  +Bridges  +Clamshells  open books    manual therapy techniques: Joint mobilizations, Manual traction, Myofacial release, Manual Lymphatic Drainage, Soft tissue Mobilization, and Friction Massage were applied for 15 minutes, including:  PA thoracic mobilizations  SL lumbar gapping  mobilizations    neuromuscular re-education activities to improve: Balance, Coordination, Kinesthetic, Sense, Proprioception, and Posture for 5 minutes. The following activities were included:  +Abdominal isometric  Deadbugs  Bird dogs  Shoulder taps on wall   Stir the pot  Paloff presses  TA oscillations    therapeutic activities to improve functional performance for 00  minutes, including:  Mini squats  Side steps  Step ups  Sit to stands  TB/CC walkouts  Box lifts    Patient Education and Home Exercises       Education provided:   -     Written Home Exercises Provided: Yes. Exercises were reviewed and Velma was able to demonstrate them prior to the end of the session.  Velma demonstrated good  understanding of the education provided. See Electronic Medical Record under Patient Instructions for exercises provided during therapy sessions    Assessment     Pt tolerated therapy session well and completed exercise program w/ goals to improve ROM, core/LE strength, and functional mobility w/ mod discomfort and fatigue. Added many exercises (see treatment) and all exercises were completed w/ mod fatigue and min discomfort. Attempted ab isometrics and open books, however pt unable to perform due to pain in arnulfo shoulders. Mod fatigue noted during clamshells that required short rest breaks to complete. Pt receiving shot in arnulfo shoulder today 11/12. Exercises challenged appropriately. Will continue to progress as tolerated.    Velma Is progressing well towards her goals.   Patient prognosis is Good.     Patient will continue to benefit from skilled outpatient physical therapy to address the deficits listed in the problem list box on initial evaluation, provide pt/family education and to maximize pt's level of independence in the home and community environment.     Patient's spiritual, cultural and educational needs considered and pt agreeable to plan of care and goals.     Anticipated barriers to physical therapy:  none    Short Term Goals (3 weeks)  1. Patient will report < 3/10 pain at rest and < 7/10 pain with activity to demonstrate improved tolerance to activities of daily living.  In progress  2. Patient will demonstrate full, symmetrical pain free lumbar range of motion in all planes to demonstrate improved mobility of the lumbar spine for progression to return to prior level of function.  In progress  3. Patient will demonstrate hip hinge with dowel in standing position with appropriate motor control of the spine to progress to lifting from the floor for activities of daily living. In progress     Long Term Goals (6 weeks)  1. Patient will be independent and compliant with HEP for continued functional stability and core endurance gains for improved quality of life and participation in recreational/leisure activities.  In progress  2. Patient will demonstrate proper lifting technique 3/3 trials for decreased risk of injury when performing daily tasks. In progress  3. Patient will demonstrate correct sitting and standing posture for increased ability to tolerate sustained positions. In progress  4. Patient will have increased strength of bilateral gluteus danis/medius to 4+/5 to improve performance of activities of daily living.  In progress  5. Patient will have overall improvement in condition to have decreased FOTO Limitation Score to 48% to demonstrate clinically significant improvement in low back pain and disability for return to prior level of participation.  In progress    Plan     Continue per DANNY Darnell PTA

## 2024-11-13 DIAGNOSIS — M19.019: ICD-10-CM

## 2024-11-13 DIAGNOSIS — M25.519 PAIN IN JOINT, SHOULDER REGION: Primary | ICD-10-CM

## 2024-11-13 DIAGNOSIS — S69.90XA HAND INJURIES: ICD-10-CM

## 2024-11-13 DIAGNOSIS — M47.27 LUMBOSACRAL RADICULOPATHY DUE TO DEGENERATIVE JOINT DISEASE OF SPINE: ICD-10-CM

## 2024-11-13 DIAGNOSIS — M47.896 OTHER OSTEOARTHRITIS OF SPINE, LUMBAR REGION: ICD-10-CM

## 2024-11-14 ENCOUNTER — CLINICAL SUPPORT (OUTPATIENT)
Dept: REHABILITATION | Facility: HOSPITAL | Age: 49
End: 2024-11-14
Payer: MEDICAID

## 2024-11-14 ENCOUNTER — HOSPITAL ENCOUNTER (OUTPATIENT)
Dept: RADIOLOGY | Facility: HOSPITAL | Age: 49
Discharge: HOME | End: 2024-11-14
Attending: PHYSICAL MEDICINE & REHABILITATION
Payer: MEDICAID

## 2024-11-14 DIAGNOSIS — Z74.09 IMPAIRED FUNCTIONAL MOBILITY AND ACTIVITY TOLERANCE: Primary | ICD-10-CM

## 2024-11-14 DIAGNOSIS — S69.90XA WRIST INJURY: ICD-10-CM

## 2024-11-14 DIAGNOSIS — S69.90XA WRIST INJURY: Primary | ICD-10-CM

## 2024-11-14 PROCEDURE — 73110 X-RAY EXAM OF WRIST: CPT | Mod: 26,LT,, | Performed by: RADIOLOGY

## 2024-11-14 PROCEDURE — 73130 X-RAY EXAM OF HAND: CPT | Mod: TC,FY,PN,LT

## 2024-11-14 PROCEDURE — 73110 X-RAY EXAM OF WRIST: CPT | Mod: TC,FY,PN,LT

## 2024-11-14 PROCEDURE — 97110 THERAPEUTIC EXERCISES: CPT | Mod: PO,CQ

## 2024-11-14 PROCEDURE — 73130 X-RAY EXAM OF HAND: CPT | Mod: 26,LT,, | Performed by: RADIOLOGY

## 2024-11-14 NOTE — PROGRESS NOTES
[facility]   Physical Therapy Treatment Note      Name: Velma Lopez  Clinic Number: [id number]    Therapy Diagnosis:   Encounter Diagnosis   Name Primary?    Impaired functional mobility and activity tolerance Yes     Physician: Damián Alonzo MD    Visit Date: 11/14/2024    Physician Orders: Evaluate and treat  Medical Diagnosis: Other spondylosis, lumbar region [M47.896], Other spondylosis with radiculopathy, lumbosacral region [M47.27], Admission for long-term opiate analgesic use [Z79.891]   Surgical Procedure and Date: None for back  Evaluation Date: 11/7/2024  Insurance Authorization Period Expiration: 10/31/2024 - 12/31/2024   Plan of Care Certification Period: 11/7/2024 - 2/7/2025  Progress Note Due: 12/7/2024 or 6th visit   Date of Return to MD: MALENA  Visit # / Visits authorized: 1 / 1; 2/12     Precautions:  Standard and Diabetes     Time In: 8:00 am    Time Out: 8:58 am   Total Appointment Time (timed & untimed codes): 58 minutes - 4 units     PTA Visit #: 2/5       Subjective     Patient reports: having pain in both shoulders and low back today  She was compliant with home exercise program.  Response to previous treatment: sore  Functional change: none yet    Pain: 7/10  Location: bilateral back      Objective      Objective Measures updated at progress report unless specified.     Treatment     Velma received the treatments listed below:      therapeutic exercises to develop strength, endurance, ROM, flexibility, posture, and core stabilization for 25 minutes including:    +Nustep/recumbent bike 10'  - Heel raises  - Hip 3 way  -SLR: 2 x 10  -LTR: 2'  -DKTC: 2'  -Bridges: 3 x10   -Clamshells  open books: 10x5'    manual therapy techniques: Joint mobilizations, Manual traction, Myofacial release, Manual Lymphatic Drainage, Soft tissue Mobilization, and Friction Massage were applied for 8 minutes, including:    - PA thoracic mobilizations  - SL lumbar gapping  mobilizations    neuromuscular re-education activities to improve: Balance, Coordination, Kinesthetic, Sense, Proprioception, and Posture for 25 minutes. The following activities were included:    Abdominal isometric: 2 x 10  Deadbugs: 2 x 10  Bird dogs: 2 x 10  Shoulder taps on wall: 2 x 10  Stir the pot: 2 x 15 ea RTB  Paloff presses: 2 x 15 ea RTB  TA oscillations  Wallslides: 2 x 10  Shoulder flx iso w/band: 10x10''    therapeutic activities to improve functional performance for 00  minutes, including:    Mini squats  Side steps  Step ups  Sit to stands  TB/CC walkouts  Box lifts    Patient Education and Home Exercises       Education provided:   -     Written Home Exercises Provided: Yes. Exercises were reviewed and Velma was able to demonstrate them prior to the end of the session.  Velma demonstrated good  understanding of the education provided. See Electronic Medical Record under Patient Instructions for exercises provided during therapy sessions    Assessment   Pt had good tolerance tx including new exercises added to address shoulder pain. Pt demonstrated fatigue when performing SLR but was able to complete sets. Pt required moderate verbal cues regarding proper form when performing exercises. Pt had notable fatigue by end of session but was able to complete all exercises given. Pt will continue to benefit from skilled therapy. Will progress as tolerated.     Velma Is progressing well towards her goals.   Patient prognosis is Good.     Patient will continue to benefit from skilled outpatient physical therapy to address the deficits listed in the problem list box on initial evaluation, provide pt/family education and to maximize pt's level of independence in the home and community environment.     Patient's spiritual, cultural and educational needs considered and pt agreeable to plan of care and goals.     Anticipated barriers to physical therapy: none    Short Term Goals (3 weeks)  1. Patient will  report < 3/10 pain at rest and < 7/10 pain with activity to demonstrate improved tolerance to activities of daily living.  In progress  2. Patient will demonstrate full, symmetrical pain free lumbar range of motion in all planes to demonstrate improved mobility of the lumbar spine for progression to return to prior level of function.  In progress  3. Patient will demonstrate hip hinge with dowel in standing position with appropriate motor control of the spine to progress to lifting from the floor for activities of daily living. In progress     Long Term Goals (6 weeks)  1. Patient will be independent and compliant with HEP for continued functional stability and core endurance gains for improved quality of life and participation in recreational/leisure activities.  In progress  2. Patient will demonstrate proper lifting technique 3/3 trials for decreased risk of injury when performing daily tasks. In progress  3. Patient will demonstrate correct sitting and standing posture for increased ability to tolerate sustained positions. In progress  4. Patient will have increased strength of bilateral gluteus danis/medius to 4+/5 to improve performance of activities of daily living.  In progress  5. Patient will have overall improvement in condition to have decreased FOTO Limitation Score to 48% to demonstrate clinically significant improvement in low back pain and disability for return to prior level of participation.  In progress    Plan     Continue per DANNY Miller, PTA

## 2024-11-19 ENCOUNTER — CLINICAL SUPPORT (OUTPATIENT)
Dept: REHABILITATION | Facility: HOSPITAL | Age: 49
End: 2024-11-19
Payer: MEDICAID

## 2024-11-19 DIAGNOSIS — Z74.09 IMPAIRED FUNCTIONAL MOBILITY AND ACTIVITY TOLERANCE: Primary | ICD-10-CM

## 2024-11-19 PROCEDURE — 97110 THERAPEUTIC EXERCISES: CPT | Mod: PO

## 2024-11-19 NOTE — PROGRESS NOTES
[facility]   Physical Therapy Treatment Note      Name: Velma Lopez  Clinic Number: [id number]    Therapy Diagnosis:   Encounter Diagnosis   Name Primary?    Impaired functional mobility and activity tolerance Yes       Physician: Damián Alonzo MD    Visit Date: 11/19/2024    Physician Orders: Evaluate and treat  Medical Diagnosis: Other spondylosis, lumbar region [M47.896], Other spondylosis with radiculopathy, lumbosacral region [M47.27], Admission for long-term opiate analgesic use [Z79.891]   Surgical Procedure and Date: None for back  Evaluation Date: 11/7/2024  Insurance Authorization Period Expiration: 10/31/2024 - 12/31/2024   Plan of Care Certification Period: 11/7/2024 - 2/7/2025  Progress Note Due: 12/7/2024 or 6th visit   Date of Return to MD: MALENA  Visit # / Visits authorized: 1 / 1; 3/12     Precautions:  Standard and Diabetes     Time In: 9:00 am    Time Out: 9:54 am   Total Appointment Time (timed & untimed codes): 58 minutes - 4 units     PTA Visit #: 0/5       Subjective     Patient reports: her left shoulder is a little bit better but her right shoulder is really bothering her.   She was compliant with home exercise program.  Response to previous treatment: sore  Functional change: none yet    Pain: 7/10  Location: bilateral back      Objective      Objective Measures updated at progress report unless specified.     Treatment     Velma received the treatments listed below:      therapeutic exercises to develop strength, endurance, ROM, flexibility, posture, and core stabilization for 54 minutes including:    +Nustep/recumbent bike 10'  - Heel raises  - Hip 3 way  -SLR: 2 x 10 with 2# weights  -LTR: 2' with red ball  -DKTC: 2' with red ball  -Bridges: 3 x 10   -Clamshells: 2 x 15 with red band  open books: 10x5'    manual therapy techniques:   - PA thoracic mobilizations  - SL lumbar gapping mobilizations    neuromuscular re-education     Abdominal isometric: 2 x  10  Deadbugs: 2 x 10  Bird dogs: 2 x 10  Shoulder taps on wall: 2 x 10  Stir the pot: 2 x 15 ea RTB  Paloff presses: 2 x 15 ea RTB  TA oscillations  Wallslides: 2 x 8  Shoulder flx iso w/band: 10x10''  No money:   TB ROWS: 2 x 10 RTB  TB extensions: 2 x 10 RTB    therapeutic activities    Mini squats  Side steps  Step ups  Sit to stands with ball: 3 x 5 reps   TB/CC walkouts: 5 laps each way RED   rows: 2# db 3 x 6 reps  Box lifts    Patient Education and Home Exercises       Education provided:   Patient provided education on the importance of compliance with HEP, diagnosis and prognosis, and intended duration/frequency of physical therapy plan of care. Shared-decision making between evaluating therapist and patient utilized to determine therapeutic intervention selection, plan of care parameters, and continued monitoring of signs/symptoms.    Written Home Exercises Provided: Yes. Exercises were reviewed and Velma was able to demonstrate them prior to the end of the session.  Velma demonstrated good  understanding of the education provided. See Electronic Medical Record under Patient Instructions for exercises provided during therapy sessions    Assessment     Velma reports for follow up treatment with slight decrease in complaint of symptoms. Patient tolerated today's session with minimal complaints of muscle burns and fatigue with exercise. Today's treatment included strengthening, range of motion, and cardiovascular endurance interventions. Patient tolerated progressions well and included theraband rows and extensions.  She remains limited in lower extremity strength, upper extremity strength, core strength/stability, functional mobility without pain, and trunk mobility. Velma is progressing well towards her goals. Patient will continue to benefit from skilled outpatient physical therapy to address the deficits listed in the problem list box on initial evaluation, provide patient/family  education and to maximize patient's level of independence in the home and community environment.     Velma Is progressing well towards her goals.   Patient prognosis is Good.     Patient will continue to benefit from skilled outpatient physical therapy to address the deficits listed in the problem list box on initial evaluation, provide pt/family education and to maximize pt's level of independence in the home and community environment.     Patient's spiritual, cultural and educational needs considered and pt agreeable to plan of care and goals.     Anticipated barriers to physical therapy: none    Short Term Goals (3 weeks)  1. Patient will report < 3/10 pain at rest and < 7/10 pain with activity to demonstrate improved tolerance to activities of daily living.  In progress  2. Patient will demonstrate full, symmetrical pain free lumbar range of motion in all planes to demonstrate improved mobility of the lumbar spine for progression to return to prior level of function.  In progress  3. Patient will demonstrate hip hinge with dowel in standing position with appropriate motor control of the spine to progress to lifting from the floor for activities of daily living. In progress     Long Term Goals (6 weeks)  1. Patient will be independent and compliant with HEP for continued functional stability and core endurance gains for improved quality of life and participation in recreational/leisure activities.  In progress  2. Patient will demonstrate proper lifting technique 3/3 trials for decreased risk of injury when performing daily tasks. In progress  3. Patient will demonstrate correct sitting and standing posture for increased ability to tolerate sustained positions. In progress  4. Patient will have increased strength of bilateral gluteus danis/medius to 4+/5 to improve performance of activities of daily living.  In progress  5. Patient will have overall improvement in condition to have decreased FOTO Limitation  Score to 48% to demonstrate clinically significant improvement in low back pain and disability for return to prior level of participation.  In progress    Plan     Continue per DANNY Noland, PT

## 2024-12-03 ENCOUNTER — CLINICAL SUPPORT (OUTPATIENT)
Dept: REHABILITATION | Facility: HOSPITAL | Age: 49
End: 2024-12-03
Payer: MEDICAID

## 2024-12-03 DIAGNOSIS — Z74.09 IMPAIRED FUNCTIONAL MOBILITY AND ACTIVITY TOLERANCE: Primary | ICD-10-CM

## 2024-12-03 PROCEDURE — 97110 THERAPEUTIC EXERCISES: CPT | Mod: PO

## 2024-12-03 NOTE — PROGRESS NOTES
"[facility]   Physical Therapy Treatment Note and Reassessment     Name: Velma Lopez  Clinic Number: [id number]    Therapy Diagnosis:   Encounter Diagnosis   Name Primary?    Impaired functional mobility and activity tolerance Yes         Physician: Damián Alonzo MD    Visit Date: 12/3/2024    Physician Orders: Evaluate and treat  Medical Diagnosis: Other spondylosis, lumbar region [M47.896], Other spondylosis with radiculopathy, lumbosacral region [M47.27], Admission for long-term opiate analgesic use [Z79.891]   Surgical Procedure and Date: None for back  Evaluation Date: 11/7/2024  Insurance Authorization Period Expiration: 10/31/2024 - 12/31/2024   Plan of Care Certification Period: 11/7/2024 - 2/7/2025  Progress Note Due: 12/7/2024 or 6th visit   Date of Return to MD: MALENA  Visit # / Visits authorized: 1 / 1; 4/12     Precautions:  Standard and Diabetes     Time In: 7:00 am    Time Out:  am   Total Appointment Time (timed & untimed codes): 58 minutes - 4 units     PTA Visit #: 0/5       Subjective     Patient reports: She feels like her back is getting there but her shoulder is giving her a lot of trouble.   She was compliant with home exercise program.  Response to previous treatment: sore  Functional change: none yet    Pain: 5/10 in back and 9/10 in shoulder  Location: bilateral back      Objective      Reassessment    Palpation: Tenderness to palpation (TTP) of Patient is tedner to palpation of T8-T12. Iliac crests appear Level     Posture: patient weight shifts often due to increased pain.      Gait: antalgic gait pattern with bilateral hip drop. Decreased arm swing through right upper extremity.       Functional Movement Screen:      Balance: 10 seconds bilaterally     - Single Leg Heel Raises: Right - 5 reps, Left -5 reps     Range of Motion (ROM)     Lumbar                                            Pain/Symptoms  Flexion (4"- fingertips to floor) Top of shoe laces (++) "   Extension (20-30 °) 5 (++)   Left (L) Rotation  (45 °)  10 (+)   Right (R) Rotation  (45 °)  10 (+)   Right Side Bending 15 (+)   Left Side Bending 15 (+)      MMT    LEFT RIGHT Left 12/3 Right 12/3   Hip flexion 4/5 4/5 4/5 4/5   Hip extension  4/5 4/5 4/5 4/5   Hip abduction 3/5 3/5 4/5 4/5   Hip adduction 4/5 4/5 5/5 5/5   Knee flexion 4/5 4/5 5/5 5/5   Knee extension 4/5 4/5 5/5 5/5   Ankle dorsiflexion (DF) 4/5 5/5 5/5 5/5   Ankle plantarflexion (PF) 4/5 5/5 5/5 5/5      FLEXIBILITY    SLR (0-70 °) [place]   Right 45 ++++   Left 45 ++++     Right shoulder Strength:  Shoulder flexion: 2/5  Shoulder abduction: 2/5  Shoulder ER: 3/5  Shoulder IR: 3/5    Right shoulder ROM:  Shoulder flexion: 120  Shoulder abduction: 98  Shoulder ER: occiput  Shoulder IR: Sacrum    Treatment     Velma received the treatments listed below:      therapeutic exercises to develop strength, endurance, ROM, flexibility, posture, and core stabilization for 60 minutes including:    +Nustep/recumbent bike 10'  - Heel raises  - Hip 3 way  -SLR: 2 x 10 with 2# weights  -LTR: 2' with red ball  -DKTC: 2' with red ball  -Bridges: 3 x 10   -Clamshells: 2 x 15 with red band  open books: 10x5'    manual therapy techniques:   - PA thoracic mobilizations  - SL lumbar gapping mobilizations    neuromuscular re-education     Abdominal isometric: 2 x 10  Deadbugs: 2 x 10  Bird dogs: 2 x 10  Shoulder taps on wall: 3 x 10 with left hand  Stir the pot: 2 x 15 ea RTB  Paloff presses: 2 x 15 ea RTB  TA oscillations  Wallslides: 2 x 8  Shoulder flx iso w/band: 10x10''  No money:   TB ROWS: 2 x 10 RTB  TB extensions: 2 x 10 RTB    therapeutic activities    Mini squats: 2 x 10  Side steps  Step ups: 15x each leg, 6 inch step  Sit to stands with ball: 3 x 5 reps   TB/CC walkouts: 5 laps each way RED   rows: 2# db 3 x 6 reps  Box lifts    Patient Education and Home Exercises       Education provided:   Patient provided education on the importance of  compliance with HEP, diagnosis and prognosis, and intended duration/frequency of physical therapy plan of care. Shared-decision making between evaluating therapist and patient utilized to determine therapeutic intervention selection, plan of care parameters, and continued monitoring of signs/symptoms.    Written Home Exercises Provided: Yes. Exercises were reviewed and Velma was able to demonstrate them prior to the end of the session.  Velma demonstrated good  understanding of the education provided. See Electronic Medical Record under Patient Instructions for exercises provided during therapy sessions    Assessment     Velma reports for re-assessment and follow up treatment. Patient reports some improvement in lower back/half symptoms since start of care and has met 1/3 STG and 1/5 LTG. Patient demonstrates improvement in lower extremity strength and back pain leels but continues to demonstrate difficulty with shoulder pain that is significantly limiting her and giving her trouble with her sleep. Patient will continue to benefit from skilled physical therapy services for 2x/week for 3 weeks. Today's treatment included strengthening, range of motion, cardiovascular endurance, and manual therapy interventions and progressions which were tolerated well. Patient will continue to benefit from skilled PT to address deficits and maximize function.      Velma Is progressing well towards her goals.   Patient prognosis is Good.     Patient will continue to benefit from skilled outpatient physical therapy to address the deficits listed in the problem list box on initial evaluation, provide pt/family education and to maximize pt's level of independence in the home and community environment.     Patient's spiritual, cultural and educational needs considered and pt agreeable to plan of care and goals.     Anticipated barriers to physical therapy: none    Short Term Goals (3 weeks)  1. Patient will report < 3/10 pain at  rest and < 7/10 pain with activity to demonstrate improved tolerance to activities of daily living.  In progress  2. Patient will demonstrate full, symmetrical pain free lumbar range of motion in all planes to demonstrate improved mobility of the lumbar spine for progression to return to prior level of function.  In progress  3. Patient will demonstrate hip hinge with dowel in standing position with appropriate motor control of the spine to progress to lifting from the floor for activities of daily living. GOAL MET     Long Term Goals (6 weeks)  1. Patient will be independent and compliant with HEP for continued functional stability and core endurance gains for improved quality of life and participation in recreational/leisure activities.  GOAL MET  2. Patient will demonstrate proper lifting technique 3/3 trials for decreased risk of injury when performing daily tasks. In progress  3. Patient will demonstrate correct sitting and standing posture for increased ability to tolerate sustained positions. In progress  4. Patient will have increased strength of bilateral gluteus danis/medius to 4+/5 to improve performance of activities of daily living.  In progress  5. Patient will have overall improvement in condition to have decreased FOTO Limitation Score to 48% to demonstrate clinically significant improvement in low back pain and disability for return to prior level of participation.  In progress    Plan     Continue per DANNY Noland, PT

## 2024-12-05 ENCOUNTER — CLINICAL SUPPORT (OUTPATIENT)
Dept: REHABILITATION | Facility: HOSPITAL | Age: 49
End: 2024-12-05
Payer: MEDICAID

## 2024-12-05 DIAGNOSIS — Z74.09 IMPAIRED FUNCTIONAL MOBILITY AND ACTIVITY TOLERANCE: Primary | ICD-10-CM

## 2024-12-05 PROCEDURE — 97110 THERAPEUTIC EXERCISES: CPT | Mod: PO,CQ

## 2024-12-05 RX ORDER — OMEPRAZOLE 40 MG/1
40 CAPSULE, DELAYED RELEASE ORAL
Qty: 90 CAPSULE | Refills: 2 | Status: SHIPPED | OUTPATIENT
Start: 2024-12-05

## 2024-12-05 NOTE — TELEPHONE ENCOUNTER
Care Due:                  Date            Visit Type   Department     Provider  --------------------------------------------------------------------------------                                EP -                              PRIMARY      [facility] INTERNAL  Manjinderjupaulina Unruly  Last Visit: 08-      CARE ([facility])   [facility]  Next Visit: None Scheduled  None         None Found                                                            Last  Test          Frequency    Reason                     Performed    Due Date  --------------------------------------------------------------------------------    CBC.........  12 months..  celecoxib, nabumetone....  08- 08-    CMP.........  12 months..  celecoxib, nabumetone....  08- 08-    [employer] Embedded Care Due Messages. Reference number: [id number].   12/05/2024 12:08:27 AM CST

## 2024-12-05 NOTE — PROGRESS NOTES
[facility]   Physical Therapy Treatment Note and Reassessment     Name: Velma Lopez  Clinic Number: [id number]    Therapy Diagnosis:   Encounter Diagnosis   Name Primary?    Impaired functional mobility and activity tolerance Yes         Physician: Damián Alonzo MD    Visit Date: 12/5/2024    Physician Orders: Evaluate and treat  Medical Diagnosis: Other spondylosis, lumbar region [M47.896], Other spondylosis with radiculopathy, lumbosacral region [M47.27], Admission for long-term opiate analgesic use [Z79.891]   Surgical Procedure and Date: None for back  Evaluation Date: 11/7/2024  Insurance Authorization Period Expiration: 10/31/2024 - 12/31/2024   Plan of Care Certification Period: 11/7/2024 - 2/7/2025  Progress Note Due: 12/7/2024 or 6th visit   Date of Return to MD: MALENA  Visit # / Visits authorized: 1 / 1; 5/12     Precautions:  Standard and Diabetes     Time In: 7:00 am    Time Out:  7:53 am   Total Appointment Time (timed & untimed codes): 53 minutes - 4 units     PTA Visit #: 1/5       Subjective     Patient reports: her back is feeling good but her R shoulder is still painful. Also states she has a doctors appointment next week for her shoulder.  She was compliant with home exercise program.  Response to previous treatment: sore  Functional change: none yet    Pain: 5/10 in back and 9/10 in shoulder  Location: bilateral back      Objective      Reassessment    Palpation: Tenderness to palpation (TTP) of Patient is tedner to palpation of T8-T12. Iliac crests appear Level     Posture: patient weight shifts often due to increased pain.      Gait: antalgic gait pattern with bilateral hip drop. Decreased arm swing through right upper extremity.       Functional Movement Screen:      Balance: 10 seconds bilaterally     - Single Leg Heel Raises: Right - 5 reps, Left -5 reps     Range of Motion (ROM)     Lumbar                                            Pain/Symptoms  Flexion  "(4"- fingertips to floor) Top of shoe laces (++)   Extension (20-30 °) 5 (++)   Left (L) Rotation  (45 °)  10 (+)   Right (R) Rotation  (45 °)  10 (+)   Right Side Bending 15 (+)   Left Side Bending 15 (+)      MMT    LEFT RIGHT Left 12/3 Right 12/3   Hip flexion 4/5 4/5 4/5 4/5   Hip extension  4/5 4/5 4/5 4/5   Hip abduction 3/5 3/5 4/5 4/5   Hip adduction 4/5 4/5 5/5 5/5   Knee flexion 4/5 4/5 5/5 5/5   Knee extension 4/5 4/5 5/5 5/5   Ankle dorsiflexion (DF) 4/5 5/5 5/5 5/5   Ankle plantarflexion (PF) 4/5 5/5 5/5 5/5      FLEXIBILITY    SLR (0-70 °) [place]   Right 45 ++++   Left 45 ++++     Right shoulder Strength:  Shoulder flexion: 2/5  Shoulder abduction: 2/5  Shoulder ER: 3/5  Shoulder IR: 3/5    Right shoulder ROM:  Shoulder flexion: 120  Shoulder abduction: 98  Shoulder ER: occiput  Shoulder IR: Sacrum    Treatment     Velma received the treatments listed below:      therapeutic exercises to develop strength, endurance, ROM, flexibility, posture, and core stabilization for 60 minutes including:    +Nustep/recumbent bike 10'  - Heel raises  + Hip 3 way 2x10  -SLR: 2 x 10 with 2# weights  -LTR: 2' with red ball  -DKTC: 2' with red ball  -Bridges: 3 x 10   -Clamshells: 2 x 15 with red band  open books: 10x5'    manual therapy techniques:   - PA thoracic mobilizations  - SL lumbar gapping mobilizations  - gentle shoulder oscillations     neuromuscular re-education     Abdominal isometric: 2 x 10  Deadbugs: 2 x 10  Bird dogs: 2 x 10  Shoulder taps on wall: 3 x 10 with left hand  Stir the pot: 2 x 15 ea RTB  Paloff presses: 2 x 15 ea RTB  TA oscillations  Wallslides: 2 x 8  Shoulder flx iso w/band: 10x10''  No money:   TB ROWS: 2 x 10 RTB  TB extensions: 2 x 10 RTB    therapeutic activities    Mini squats: 2 x 10  Side steps  Step ups: 15x each leg, 6 inch step  Sit to stands with ball: 3 x 5 reps   TB/CC walkouts: 5 laps each way RED   rows: 2# db 3 x 6 reps  Box lifts    Patient Education and Home " Exercises       Education provided:   Patient provided education on the importance of compliance with HEP, diagnosis and prognosis, and intended duration/frequency of physical therapy plan of care. Shared-decision making between evaluating therapist and patient utilized to determine therapeutic intervention selection, plan of care parameters, and continued monitoring of signs/symptoms.    Written Home Exercises Provided: Yes. Exercises were reviewed and Velma was able to demonstrate them prior to the end of the session.  Velma demonstrated good  understanding of the education provided. See Electronic Medical Record under Patient Instructions for exercises provided during therapy sessions    Assessment     Pt tolerated therapy session well and was able to complete exercise program w/ goals to improve core stability, scapular mobility, and scapular strength. Throughout session pt began getting increasing discomfort in RUE, so performed gentle shoulder oscillations to try and reduce pain w/ pt verbalizing some relief after intervention. Mod fatigue noted during clamshells and hip ABD w/ pt amenable to completing set after short rest breaks. Exercises challenged appropriately. Will continue to progress as tolerated.      Velma Is progressing well towards her goals.   Patient prognosis is Good.     Patient will continue to benefit from skilled outpatient physical therapy to address the deficits listed in the problem list box on initial evaluation, provide pt/family education and to maximize pt's level of independence in the home and community environment.     Patient's spiritual, cultural and educational needs considered and pt agreeable to plan of care and goals.     Anticipated barriers to physical therapy: none    Short Term Goals (3 weeks)  1. Patient will report < 3/10 pain at rest and < 7/10 pain with activity to demonstrate improved tolerance to activities of daily living.  In progress  2. Patient will  demonstrate full, symmetrical pain free lumbar range of motion in all planes to demonstrate improved mobility of the lumbar spine for progression to return to prior level of function.  In progress  3. Patient will demonstrate hip hinge with dowel in standing position with appropriate motor control of the spine to progress to lifting from the floor for activities of daily living. GOAL MET     Long Term Goals (6 weeks)  1. Patient will be independent and compliant with HEP for continued functional stability and core endurance gains for improved quality of life and participation in recreational/leisure activities.  GOAL MET  2. Patient will demonstrate proper lifting technique 3/3 trials for decreased risk of injury when performing daily tasks. In progress  3. Patient will demonstrate correct sitting and standing posture for increased ability to tolerate sustained positions. In progress  4. Patient will have increased strength of bilateral gluteus danis/medius to 4+/5 to improve performance of activities of daily living.  In progress  5. Patient will have overall improvement in condition to have decreased FOTO Limitation Score to 48% to demonstrate clinically significant improvement in low back pain and disability for return to prior level of participation.  In progress    Plan     Continue per DANNY Darnell PTA

## 2024-12-05 NOTE — TELEPHONE ENCOUNTER
Provider Staff:  Action required for this patient    Requires labs      Please see care gap opportunities below in Care Due Message.    Thanks!  [facility]     Appointments      Date Provider   Last Visit   8/21/2024 Aftab Martinez MD   Next Visit   Visit date not found Aftab Martinez MD     Refill Decision Note   Velma Lopez  is requesting a refill authorization.  Brief Assessment and Rationale for Refill:  Approve     Medication Therapy Plan:         Comments:     Note composed:8:46 AM 12/05/2024

## 2024-12-12 ENCOUNTER — CLINICAL SUPPORT (OUTPATIENT)
Dept: REHABILITATION | Facility: HOSPITAL | Age: 49
End: 2024-12-12
Payer: MEDICAID

## 2024-12-12 DIAGNOSIS — Z74.09 IMPAIRED FUNCTIONAL MOBILITY AND ACTIVITY TOLERANCE: Primary | ICD-10-CM

## 2024-12-12 PROCEDURE — 97110 THERAPEUTIC EXERCISES: CPT | Mod: PO,CQ

## 2024-12-12 NOTE — PROGRESS NOTES
"[facility]   Physical Therapy Treatment Note      Name: Velma Lopez  Clinic Number: [id number]    Therapy Diagnosis:   No diagnosis found.        Physician: Damián Alonzo MD    Visit Date: 12/12/2024    Physician Orders: Evaluate and treat  Medical Diagnosis: Other spondylosis, lumbar region [M47.896], Other spondylosis with radiculopathy, lumbosacral region [M47.27], Admission for long-term opiate analgesic use [Z79.891]   Surgical Procedure and Date: None for back  Evaluation Date: 11/7/2024  Insurance Authorization Period Expiration: 10/31/2024 - 12/31/2024   Plan of Care Certification Period: 11/7/2024 - 2/7/2025  Progress Note Due: 12/7/2024 or 6th visit   Date of Return to MD: MALENA  Visit # / Visits authorized: 1 / 1; 6/12     Precautions:  Standard and Diabetes     Time In: 9:00 am    Time Out:  9:40 am (pt had to leave early)  Total Appointment Time (timed & untimed codes): 40 minutes - Medicaid 3 units     PTA Visit #: 5/5- PT NEXT VISIT      Subjective     Patient reports: that her shoulder is hurting more than her back at this time.  She was compliant with home exercise program.  Response to previous treatment: sore  Functional change: ongoing    Pain:   Location: bilateral back      Objective        Palpation: Tenderness to palpation (TTP) of Patient is tedner to palpation of T8-T12. Iliac crests appear Level     Posture: patient weight shifts often due to increased pain.      Gait: antalgic gait pattern with bilateral hip drop. Decreased arm swing through right upper extremity.       Functional Movement Screen:      Balance: 10 seconds bilaterally     - Single Leg Heel Raises: Right - 5 reps, Left -5 reps     Range of Motion (ROM)     Lumbar                                            Pain/Symptoms  Flexion (4"- fingertips to floor) Top of shoe laces (++)   Extension (20-30 °) 5 (++)   Left (L) Rotation  (45 °)  10 (+)   Right (R) Rotation  (45 °)  10 (+)   Right Side " Bending 15 (+)   Left Side Bending 15 (+)      MMT    LEFT RIGHT Left 12/3 Right 12/3   Hip flexion 4/5 4/5 4/5 4/5   Hip extension  4/5 4/5 4/5 4/5   Hip abduction 3/5 3/5 4/5 4/5   Hip adduction 4/5 4/5 5/5 5/5   Knee flexion 4/5 4/5 5/5 5/5   Knee extension 4/5 4/5 5/5 5/5   Ankle dorsiflexion (DF) 4/5 5/5 5/5 5/5   Ankle plantarflexion (PF) 4/5 5/5 5/5 5/5      FLEXIBILITY    SLR (0-70 °) [place]   Right 45 ++++   Left 45 ++++     Right shoulder Strength:  Shoulder flexion: 2/5  Shoulder abduction: 2/5  Shoulder ER: 3/5  Shoulder IR: 3/5    Right shoulder ROM:  Shoulder flexion: 120  Shoulder abduction: 98  Shoulder ER: occiput  Shoulder IR: Sacrum    Treatment     Velma received the treatments listed below:      therapeutic exercises to develop strength, endurance, ROM, flexibility, posture, and core stabilization for 60 minutes including:    Nustep/recumbent bike 10'- emphasis on arm movement  - Heel raises  + Hip 3 way 2x10  -SLR: 2 x 10 with 2# weights  -LTR: 2' with red ball  -DKTC: 2' with red ball  -Bridges: 3 x 10   -Clamshells: 2 x 15 with red band  open books: 10x5'    manual therapy techniques:   - PA thoracic mobilizations  - SL lumbar gapping mobilizations  - gentle shoulder oscillations   - PROM FLX/ABD  - gentle inferior joint mobs    neuromuscular re-education     Abdominal isometric: 2 x 10  Deadbugs: 2 x 10  Bird dogs: 2 x 10  Shoulder taps on wall: 3 x 10 with left hand  Stir the pot: 2 x 15 ea RTB  Paloff presses: 2 x 15 ea RTB  TA oscillations  Wallslides: 2 x 8  Shoulder flx iso w/band: 10x10''  No money:   TB ROWS: 2 x 10 RTB  TB extensions: 2 x 10 RTB    therapeutic activities    Mini squats: 2 x 10  Side steps  Step ups: 15x each leg, 6 inch step  Sit to stands with ball: 3 x 5 reps   TB/CC walkouts: 5 laps each way RED   rows: 2# db 3 x 6 reps  Box lifts    Patient Education and Home Exercises       Education provided:   Patient provided education on the importance of compliance  with HEP, diagnosis and prognosis, and intended duration/frequency of physical therapy plan of care. Shared-decision making between evaluating therapist and patient utilized to determine therapeutic intervention selection, plan of care parameters, and continued monitoring of signs/symptoms.    Written Home Exercises Provided: Yes. Exercises were reviewed and Velma was able to demonstrate them prior to the end of the session.  Velma demonstrated good  understanding of the education provided. See Electronic Medical Record under Patient Instructions for exercises provided during therapy sessions    Assessment     Pt enters therapy session w/ reports mod shoulder pain and min back pain. Performed gentle PROM and joint mobs to R shoulder after which pt verbalized some relief. Added SPC FLX w/ pt able to complete exercise w/ min/mod discomfort. Mod fatigue noted throughout all exercises. Pt had to leave early due to a prior appointment. Will continue to progress as tolerated.      Velma Is progressing well towards her goals.   Patient prognosis is Good.     Patient will continue to benefit from skilled outpatient physical therapy to address the deficits listed in the problem list box on initial evaluation, provide pt/family education and to maximize pt's level of independence in the home and community environment.     Patient's spiritual, cultural and educational needs considered and pt agreeable to plan of care and goals.     Anticipated barriers to physical therapy: none    Short Term Goals (3 weeks)  1. Patient will report < 3/10 pain at rest and < 7/10 pain with activity to demonstrate improved tolerance to activities of daily living.  In progress  2. Patient will demonstrate full, symmetrical pain free lumbar range of motion in all planes to demonstrate improved mobility of the lumbar spine for progression to return to prior level of function.  In progress  3. Patient will demonstrate hip hinge with dowel in  standing position with appropriate motor control of the spine to progress to lifting from the floor for activities of daily living. GOAL MET     Long Term Goals (6 weeks)  1. Patient will be independent and compliant with HEP for continued functional stability and core endurance gains for improved quality of life and participation in recreational/leisure activities.  GOAL MET  2. Patient will demonstrate proper lifting technique 3/3 trials for decreased risk of injury when performing daily tasks. In progress  3. Patient will demonstrate correct sitting and standing posture for increased ability to tolerate sustained positions. In progress  4. Patient will have increased strength of bilateral gluteus danis/medius to 4+/5 to improve performance of activities of daily living.  In progress  5. Patient will have overall improvement in condition to have decreased FOTO Limitation Score to 48% to demonstrate clinically significant improvement in low back pain and disability for return to prior level of participation.  In progress    Plan     Continue per DANNY Darnell, PTA

## 2024-12-18 ENCOUNTER — TELEPHONE (OUTPATIENT)
Dept: ORTHOPEDICS | Facility: CLINIC | Age: 49
End: 2024-12-18
Payer: MEDICAID

## 2024-12-18 NOTE — TELEPHONE ENCOUNTER
Informed patient we do not have any medicaid openings at this time----- Message from James sent at 12/18/2024  1:08 PM CST -----  Regarding: Appointment  Contact: [phone]  Pt is calling in ref to scheduling an appt with provider for right shoulder surgery performed 7/2020  with provider.  Pt was told anything longer than 3 yrs is new pt. Patient Requesting Call Back @  [phone]

## 2025-01-03 ENCOUNTER — PATIENT MESSAGE (OUTPATIENT)
Dept: OBSTETRICS AND GYNECOLOGY | Facility: CLINIC | Age: 50
End: 2025-01-03
Payer: MEDICAID

## 2025-01-13 ENCOUNTER — OFFICE VISIT (OUTPATIENT)
Dept: OBSTETRICS AND GYNECOLOGY | Facility: CLINIC | Age: 50
End: 2025-01-13
Payer: MEDICAID

## 2025-01-13 ENCOUNTER — PATIENT MESSAGE (OUTPATIENT)
Dept: INTERNAL MEDICINE | Facility: CLINIC | Age: 50
End: 2025-01-13
Payer: MEDICAID

## 2025-01-13 VITALS
HEART RATE: 74 BPM | HEIGHT: 66 IN | BODY MASS INDEX: 24.87 KG/M2 | SYSTOLIC BLOOD PRESSURE: 129 MMHG | WEIGHT: 154.75 LBS | DIASTOLIC BLOOD PRESSURE: 79 MMHG

## 2025-01-13 DIAGNOSIS — Z79.890 HORMONE REPLACEMENT THERAPY (HRT): ICD-10-CM

## 2025-01-13 DIAGNOSIS — Z01.419 WELL WOMAN EXAM WITH ROUTINE GYNECOLOGICAL EXAM: ICD-10-CM

## 2025-01-13 DIAGNOSIS — N95.1 POST MENOPAUSAL SYNDROME: ICD-10-CM

## 2025-01-13 DIAGNOSIS — N95.1 MENOPAUSAL SYMPTOMS: Primary | ICD-10-CM

## 2025-01-13 DIAGNOSIS — R05.9 COUGH, UNSPECIFIED TYPE: Primary | ICD-10-CM

## 2025-01-13 PROCEDURE — 99999 PR PBB SHADOW E&M-EST. PATIENT-LVL IV: CPT | Mod: PBBFAC,,, | Performed by: OBSTETRICS & GYNECOLOGY

## 2025-01-13 PROCEDURE — 99396 PREV VISIT EST AGE 40-64: CPT | Mod: S$PBB,,, | Performed by: OBSTETRICS & GYNECOLOGY

## 2025-01-13 PROCEDURE — 99214 OFFICE O/P EST MOD 30 MIN: CPT | Mod: PBBFAC,PO | Performed by: OBSTETRICS & GYNECOLOGY

## 2025-01-13 PROCEDURE — 3074F SYST BP LT 130 MM HG: CPT | Mod: CPTII,,, | Performed by: OBSTETRICS & GYNECOLOGY

## 2025-01-13 PROCEDURE — 1159F MED LIST DOCD IN RCRD: CPT | Mod: CPTII,,, | Performed by: OBSTETRICS & GYNECOLOGY

## 2025-01-13 PROCEDURE — 3008F BODY MASS INDEX DOCD: CPT | Mod: CPTII,,, | Performed by: OBSTETRICS & GYNECOLOGY

## 2025-01-13 PROCEDURE — 3078F DIAST BP <80 MM HG: CPT | Mod: CPTII,,, | Performed by: OBSTETRICS & GYNECOLOGY

## 2025-01-13 PROCEDURE — 1160F RVW MEDS BY RX/DR IN RCRD: CPT | Mod: CPTII,,, | Performed by: OBSTETRICS & GYNECOLOGY

## 2025-01-13 RX ORDER — ESTRADIOL AND LEVONORGESTREL .045; .015 MG/D; MG/D
1 PATCH TRANSDERMAL WEEKLY
Qty: 12 PATCH | Refills: 3 | Status: SHIPPED | OUTPATIENT
Start: 2025-01-13 | End: 2026-01-13

## 2025-01-13 NOTE — PROGRESS NOTES
CC:No chief complaint on file.      HPI:    49 y.o.   OB History          5    Para   1    Term   1            AB   4    Living   1         SAB   4    IAB        Ectopic        Multiple        Live Births   1               Complaining of: hever kat helps with  sx's but still has sx's. Has been off patch since Fri and has dvlpd light vb and worsening  sx's  Has problems with patch coming off in shower  (Not in a hospital admission)      Review of patient's allergies indicates:  No Known Allergies     Past Medical History:   Diagnosis Date    Chronic back pain     Diabetes type 2, uncontrolled     Mild nonproliferative diabetic retinopathy of left eye without macular edema associated with type 2 diabetes mellitus 10/21/2019    Morbid obesity with BMI of 40.0-44.9, adult     MILEY on CPAP     Plantar fasciitis of left foot     Urticaria      Past Surgical History:   Procedure Laterality Date    ARTHROSCOPIC ACROMIOPLASTY OF SHOULDER Left 2020    Procedure: ACROMIOPLASTY, ARTHROSCOPIC;  Surgeon: Marcela Garza MD;  Location: [facility] OR;  Service: Orthopedics;  Laterality: Left;    ARTHROSCOPIC REPAIR OF ROTATOR CUFF OF SHOULDER Right 2020    Procedure: REPAIR, ROTATOR CUFF, ARTHROSCOPIC;  Surgeon: Marcela Garza MD;  Location: [facility] OR;  Service: Orthopedics;  Laterality: Right;  GENERAL/REGIONAL    COLONOSCOPY N/A 2022    Procedure: COLONOSCOPY;  Surgeon: Yadi Wong MD;  Location: [address]);  Service: Endoscopy;  Laterality: N/A;  Covid test at [place] on .EC    CYST REMOVAL      ovarian cyst removal at age 12    DECOMPRESSION OF SUBACROMIAL SPACE Left 2020    Procedure: DECOMPRESSION, SUBACROMIAL SPACE;  Surgeon: Marcela Garza MD;  Location: [facility];  Service: Orthopedics;  Laterality: Left;    DILATION AND CURETTAGE OF UTERUS      ESOPHAGEAL MOTILITY STUDY USING HIGH RESOLUTION MANOMETRY N/A 2019    Procedure: MOTILITY STUDY,  ESOPHAGUS, USING HIGH RESOLUTION MANOMETRY;  Surgeon: Leopoldo Swanson MD;  Location: [facility] ENDO (4TH FLR);  Service: Endoscopy;  Laterality: N/A;  Pt will be taking xanax before procedure to hopefull help with anxiety.    ESOPHAGOGASTRODUODENOSCOPY N/A 06/14/2019    Procedure: EGD (ESOPHAGOGASTRODUODENOSCOPY);  Surgeon: Ced Guevara MD;  Location: [facility] (4TH FLR);  Service: Endoscopy;  Laterality: N/A;  Please make sure it is scheduled after her cardiology appt.- see cardiology note dated 6/13/19 for clearance - ERW    FOREIGN BODY REMOVAL N/A 04/17/2023    Procedure: REMOVAL, FOREIGN BODY;  Surgeon: Rom Rodriguez MD;  Location: [facility] OR;  Service: General;  Laterality: N/A;    GASTRIC BYPASS N/A 05/20/2021    INJECTION OF STEROID Left 07/27/2020    Procedure: INJECTION, STEROID LEFT THUMB TRIGGER FINGER;  Surgeon: Marcela Garza MD;  Location: [facility] OR;  Service: Orthopedics;  Laterality: Left;  LEFT THUMB, TRIGGER FINGER    INTERPOSITION ARTHROPLASTY OF CARPOMETACARPAL JOINTS Left 04/24/2024    Procedure: INTERPOSITION ARTHROPLASTY, CMC JOINT - LEFT;  Surgeon: Craig Tom MD;  Location: [facility] OR;  Service: Orthopedics;  Laterality: Left;    LAPAROTOMY, EXPLORATORY N/A 08/18/2023    Procedure: LAPAROTOMY, EXPLORATORY;  Surgeon: Bunny Obrien MD;  Location: [facility] OR;  Service: General;  Laterality: N/A;    LYSIS OF ADHESIONS N/A 10/21/2022    Procedure: LYSIS, ADHESIONS;  Surgeon: Bunny Obrien MD;  Location: [facility] OR;  Service: General;  Laterality: N/A;    LYSIS OF ADHESIONS N/A 08/18/2023    Procedure: LYSIS, ADHESIONS;  Surgeon: Bunny Obrien MD;  Location: [facility] OR;  Service: General;  Laterality: N/A;    THUMB ARTHROSCOPY Left     TRANSVERSE COLECTOMY N/A 10/26/2022    Procedure: COLECTOMY, TRANSVERSE;  Surgeon: Bunny Obrien MD;  Location: [facility] OR;  Service: General;  Laterality: N/A;    TRIGGER FINGER RELEASE Right 10/04/2019    Procedure: RELEASE, TRIGGER FINGER -  "right thumb;  Surgeon: Craig Tom MD;  Location: [facility];  Service: Orthopedics;  Laterality: Right;    UPPER GASTROINTESTINAL ENDOSCOPY      WISDOM TOOTH EXTRACTION       Family History   Problem Relation Name Age of Onset    Cancer Maternal Grandmother      Diabetes Mother      No Known Problems Sister 3     No Known Problems Sister      No Known Problems Sister      Breast cancer Maternal Aunt  70    Colon cancer Neg Hx      Stomach cancer Neg Hx      Inflammatory bowel disease Neg Hx      Esophageal cancer Neg Hx       Social History     Tobacco Use    Smoking status: Every Day     Current packs/day: 0.50     Average packs/day: 0.5 packs/day for 33.0 years (16.8 ttl pk-yrs)     Types: Cigarettes     Start date: 1992    Smokeless tobacco: Never    Tobacco comments:     Enrolled in the [employer] on 8/1/18 ([facility] Member ID # [id number]) Ambulatory referral to Smoking Cessation Program.     Substance Use Topics    Alcohol use: Yes     Comment: social    Drug use: No     ROS:  GENERAL: Feeling well overall. Denies fever or chills.   SKIN: Denies rash or lesions.   HEAD: Denies head injury or headache.   NODES: Denies enlarged lymph nodes.   CHEST: Denies chest pain or shortness of breath.   CARDIOVASCULAR: Denies palpitations or left sided chest pain.    ABDOMEN: Denies diarrhea, nausea, vomiting or rectal bleeding.   URINARY: No dysuria, hematuria, or burning on urination.  REPRODUCTIVE: See HPI.   BREASTS: Denies pain, lumps, or nipple discharge.   HEMATOLOGIC: No easy bruisability or excessive bleeding.   MUSCULOSKELETAL: Denies joint pain or swelling.   NEUROLOGIC: Denies syncope or weakness.   PSYCHIATRIC: Denies depression, anxiety or mood swings.      PE: /79 (Patient Position: Sitting)   Pulse 74   Ht 5' 6" (1.676 m)   Wt 70.2 kg (154 lb 12.2 oz)   BMI 24.98 kg/m²      APPEARANCE: Well nourished, well developed, in no acute distress.  SKIN: Normal skin turgor, no lesions.  NECK: Neck " symmetric without masses or thyromegaly.  NODES: No inguinal, cervical, axillary or femoral lymph node enlargement.  CARDIOVASCULAR: Normal S1, S2. No rubs, murmurs or gallops.  NEUROLOGIC: Normal mood and affect. No depression or anxiety.   ABDOMEN: Soft. No tenderness or masses. No hepatosplenomegaly. No hernias.  RESPIRATORY: Normal respiratory effort with no retractions or use of accessory muscles.  BREASTS: Symmetrical, no skin changes or visible lesions. No palpable masses, nipple discharge, or adenopathy bilaterally.   BLADDER: Non-tender  GENITALIA: normal external genitalia, no erythema, no discharge  URETHRA: normal urethra, normal urethral meatus  VAGINA: old blood present in vault, no active vb  CERVIX: no lesions or cervical motion tenderness  UTERUS: normal  ADNEXA: normal adnexa and no mass, fullness, tenderness       Component Ref Range & Units 1 yr ago   Follicle Stimulating Hormone See Text mIU/mL 63.79     ASSESSMENT/ PLAN    Diagnoses and all orders for this visit:    Menopausal symptoms    Hormone replacement therapy (HRT)    Well woman exam with routine gynecological exam      Plcae tegaderm on top of ptach  Monitor vb, if persists with consistent use of patch then return for re evaluation      Neville Stearns MD

## 2025-01-14 ENCOUNTER — PATIENT MESSAGE (OUTPATIENT)
Dept: OBSTETRICS AND GYNECOLOGY | Facility: CLINIC | Age: 50
End: 2025-01-14
Payer: MEDICAID

## 2025-01-14 DIAGNOSIS — Z12.31 ENCOUNTER FOR SCREENING MAMMOGRAM FOR MALIGNANT NEOPLASM OF BREAST: Primary | ICD-10-CM

## 2025-01-14 RX ORDER — PROMETHAZINE HYDROCHLORIDE AND DEXTROMETHORPHAN HYDROBROMIDE 6.25; 15 MG/5ML; MG/5ML
5 SYRUP ORAL EVERY 4 HOURS PRN
Qty: 118 ML | Refills: 0 | Status: SHIPPED | OUTPATIENT
Start: 2025-01-14 | End: 2025-01-24

## 2025-02-07 DIAGNOSIS — M47.22 CERVICAL SPONDYLOSIS WITH RADICULOPATHY: Primary | ICD-10-CM

## 2025-02-07 DIAGNOSIS — M25.519 SHOULDER PAIN: ICD-10-CM

## 2025-02-12 DIAGNOSIS — M54.2 CERVICAL PAIN (NECK): Primary | ICD-10-CM

## 2025-02-12 DIAGNOSIS — M54.2 CERVICAL PAIN: Primary | ICD-10-CM

## 2025-02-20 ENCOUNTER — HOSPITAL ENCOUNTER (OUTPATIENT)
Dept: RADIOLOGY | Facility: HOSPITAL | Age: 50
Discharge: HOME | End: 2025-02-20
Attending: PHYSICAL MEDICINE & REHABILITATION
Payer: MEDICAID

## 2025-02-20 DIAGNOSIS — M54.2 CERVICAL PAIN (NECK): ICD-10-CM

## 2025-02-20 PROCEDURE — 72141 MRI NECK SPINE W/O DYE: CPT | Mod: TC,PN

## 2025-03-20 ENCOUNTER — PATIENT MESSAGE (OUTPATIENT)
Dept: ADMINISTRATIVE | Facility: HOSPITAL | Age: 50
End: 2025-03-20
Payer: MEDICAID

## 2025-03-27 ENCOUNTER — HOSPITAL ENCOUNTER (OUTPATIENT)
Dept: RADIOLOGY | Facility: HOSPITAL | Age: 50
Discharge: HOME | End: 2025-03-27
Attending: OBSTETRICS & GYNECOLOGY
Payer: MEDICAID

## 2025-03-27 DIAGNOSIS — Z12.31 ENCOUNTER FOR SCREENING MAMMOGRAM FOR MALIGNANT NEOPLASM OF BREAST: ICD-10-CM

## 2025-03-27 PROCEDURE — 77063 BREAST TOMOSYNTHESIS BI: CPT | Mod: 26,,, | Performed by: RADIOLOGY

## 2025-03-27 PROCEDURE — 77067 SCR MAMMO BI INCL CAD: CPT | Mod: 26,,, | Performed by: RADIOLOGY

## 2025-03-27 PROCEDURE — 77063 BREAST TOMOSYNTHESIS BI: CPT | Mod: TC,PN

## 2025-03-29 ENCOUNTER — HOSPITAL ENCOUNTER (EMERGENCY)
Facility: HOSPITAL | Age: 50
Discharge: HOME | End: 2025-03-29
Attending: FAMILY MEDICINE
Payer: MEDICAID

## 2025-03-29 VITALS
HEART RATE: 70 BPM | DIASTOLIC BLOOD PRESSURE: 62 MMHG | RESPIRATION RATE: 19 BRPM | OXYGEN SATURATION: 98 % | WEIGHT: 150 LBS | HEIGHT: 66 IN | BODY MASS INDEX: 24.11 KG/M2 | SYSTOLIC BLOOD PRESSURE: 119 MMHG | TEMPERATURE: 98 F

## 2025-03-29 DIAGNOSIS — T19.2XXA FOREIGN BODY IN VAGINA, INITIAL ENCOUNTER: Primary | ICD-10-CM

## 2025-03-29 PROCEDURE — 99282 EMERGENCY DEPT VISIT SF MDM: CPT | Mod: ER

## 2025-03-29 NOTE — ED PROVIDER NOTES
"Encounter Date: 3/29/2025       History     Chief Complaint   Patient presents with    Foreign Body in Vagina     Pt C/O possible "lost" tampon in vagina X 1 day. NADN.      50-year-old female placed a tampon which he could not find and got concerned and presents to ED.      The history is provided by the patient.     Review of patient's allergies indicates:  No Known Allergies  Past Medical History:   Diagnosis Date    Chronic back pain     Diabetes type 2, uncontrolled     Mild nonproliferative diabetic retinopathy of left eye without macular edema associated with type 2 diabetes mellitus 10/21/2019    Morbid obesity with BMI of 40.0-44.9, adult     MILEY on CPAP     Plantar fasciitis of left foot     Urticaria      Past Surgical History:   Procedure Laterality Date    ARTHROSCOPIC ACROMIOPLASTY OF SHOULDER Left 07/27/2020    Procedure: ACROMIOPLASTY, ARTHROSCOPIC;  Surgeon: Marcela Garza MD;  Location: [facility] OR;  Service: Orthopedics;  Laterality: Left;    ARTHROSCOPIC REPAIR OF ROTATOR CUFF OF SHOULDER Right 07/27/2020    Procedure: REPAIR, ROTATOR CUFF, ARTHROSCOPIC;  Surgeon: Marcela Garza MD;  Location: [facility] OR;  Service: Orthopedics;  Laterality: Right;  GENERAL/REGIONAL    COLONOSCOPY N/A 04/07/2022    Procedure: COLONOSCOPY;  Surgeon: Yadi Wong MD;  Location: [facility] ([address] [place] [address]);  Service: Endoscopy;  Laterality: N/A;  Covid test at [place] on 4/4.EC    CYST REMOVAL      ovarian cyst removal at age 12    DECOMPRESSION OF SUBACROMIAL SPACE Left 07/27/2020    Procedure: DECOMPRESSION, SUBACROMIAL SPACE;  Surgeon: Marcela Garza MD;  Location: [facility] OR;  Service: Orthopedics;  Laterality: Left;    DILATION AND CURETTAGE OF UTERUS      ESOPHAGEAL MOTILITY STUDY USING HIGH RESOLUTION MANOMETRY N/A 09/17/2019    Procedure: MOTILITY STUDY, ESOPHAGUS, USING HIGH RESOLUTION MANOMETRY;  Surgeon: Leopoldo Swanson MD;  Location: [facility] ([address] [place] [address]);  Service: Endoscopy;  Laterality: N/A;  Pt " will be taking xanax before procedure to hopefull help with anxiety.    ESOPHAGOGASTRODUODENOSCOPY N/A 06/14/2019    Procedure: EGD (ESOPHAGOGASTRODUODENOSCOPY);  Surgeon: Ced Guevara MD;  Location: [address]);  Service: Endoscopy;  Laterality: N/A;  Please make sure it is scheduled after her cardiology appt.- see cardiology note dated 6/13/19 for clearance - ERW    FOREIGN BODY REMOVAL N/A 04/17/2023    Procedure: REMOVAL, FOREIGN BODY;  Surgeon: Rom Rodriguez MD;  Location: [facility] OR;  Service: General;  Laterality: N/A;    GASTRIC BYPASS N/A 05/20/2021    INJECTION OF STEROID Left 07/27/2020    Procedure: INJECTION, STEROID LEFT THUMB TRIGGER FINGER;  Surgeon: Marcela Garza MD;  Location: [facility] OR;  Service: Orthopedics;  Laterality: Left;  LEFT THUMB, TRIGGER FINGER    INTERPOSITION ARTHROPLASTY OF CARPOMETACARPAL JOINTS Left 04/24/2024    Procedure: INTERPOSITION ARTHROPLASTY, CMC JOINT - LEFT;  Surgeon: Craig Tom MD;  Location: [facility] OR;  Service: Orthopedics;  Laterality: Left;    LAPAROTOMY, EXPLORATORY N/A 08/18/2023    Procedure: LAPAROTOMY, EXPLORATORY;  Surgeon: Bunny Obrien MD;  Location: [facility] OR;  Service: General;  Laterality: N/A;    LYSIS OF ADHESIONS N/A 10/21/2022    Procedure: LYSIS, ADHESIONS;  Surgeon: Bunny Obrien MD;  Location: [facility] OR;  Service: General;  Laterality: N/A;    LYSIS OF ADHESIONS N/A 08/18/2023    Procedure: LYSIS, ADHESIONS;  Surgeon: Bunny Obrien MD;  Location: [facility] OR;  Service: General;  Laterality: N/A;    THUMB ARTHROSCOPY Left     TRANSVERSE COLECTOMY N/A 10/26/2022    Procedure: COLECTOMY, TRANSVERSE;  Surgeon: Bunny Obrien MD;  Location: [facility] OR;  Service: General;  Laterality: N/A;    TRIGGER FINGER RELEASE Right 10/04/2019    Procedure: RELEASE, TRIGGER FINGER - right thumb;  Surgeon: Craig Tom MD;  Location: [facility] OR;  Service: Orthopedics;  Laterality: Right;    UPPER GASTROINTESTINAL ENDOSCOPY       WISDOM TOOTH EXTRACTION       Family History   Problem Relation Name Age of Onset    Cancer Maternal Grandmother      Diabetes Mother      No Known Problems Sister 3     No Known Problems Sister      No Known Problems Sister      Breast cancer Maternal Aunt  70    Colon cancer Neg Hx      Stomach cancer Neg Hx      Inflammatory bowel disease Neg Hx      Esophageal cancer Neg Hx       Social History[1]  Review of Systems    Physical Exam     Initial Vitals [03/29/25 0736]   BP Pulse Resp Temp SpO2   119/62 70 19 98.4 °F (36.9 °C) 98 %      MAP       --         Physical Exam    Nursing note and vitals reviewed.  Constitutional: Vital signs are normal. She appears well-developed and well-nourished. She is active. No distress.   HENT:   Head: Normocephalic.   Nose: Nose normal. Mouth/Throat: Oropharynx is clear and moist and mucous membranes are normal.   Eyes: Conjunctivae, EOM and lids are normal.   Neck: Neck supple.   Normal range of motion.  Cardiovascular:  Normal rate, regular rhythm, S1 normal, S2 normal and normal heart sounds.           Abdominal: Abdomen is soft. There is no abdominal tenderness. There is no rebound and no guarding.   Genitourinary:    Pelvic exam was performed with patient supine.      Genitourinary Comments: Slight brownish stain to vaginal mucosa.  NO FOREIGN BODY DETECTED.  MANUAL EXAM WITHOUT ANY MASS OR TUMORS.  NO CERVICAL MOTION TENDERNESS     Musculoskeletal:      Right upper arm: Normal.      Left upper arm: Normal.      Cervical back: Normal range of motion and neck supple.      Right lower leg: Normal.      Left lower leg: Normal.     Neurological: She is alert and oriented to person, place, and time. She has normal strength. GCS eye subscore is 4. GCS verbal subscore is 5. GCS motor subscore is 6.   Skin: Skin is warm. Capillary refill takes less than 2 seconds.   Psychiatric: She has a normal mood and affect. Her speech is normal and behavior is normal. Thought content normal.  Cognition and memory are normal.         ED Course   Procedures  Labs Reviewed - No data to display       Imaging Results    None          Medications - No data to display  Medical Decision Making  DIFFERENTIAL DIAGNOSIS INCLUDE NOT LIMITED TO VAGINAL FOREIGN BODY, MASS, RETENTION OF TAMPON    VAGINAL EXAMINATION WITH SPECULUM AND MENTAL EXAMINATION NO FOREIGN BODY.  PATIENT NOTIFIED NO FOREIGN BODY.  ABDOMINAL EXAMINATION NORMAL.                                        Clinical Impression:  Final diagnoses:  [T19.2XXA] Foreign body in vagina, initial encounter (Primary)          ED Disposition Condition    Discharge Stable          ED Prescriptions    None       Follow-up Information       Follow up With Specialties Details Why Contact Info    Aftab Martinez MD Family Medicine   [address]  [facility] [address]  [phone]                 [1]   Social History  Tobacco Use    Smoking status: Every Day     Current packs/day: 0.50     Average packs/day: 0.5 packs/day for 33.2 years (16.9 ttl pk-yrs)     Types: Cigarettes     Start date: 1992    Smokeless tobacco: Never    Tobacco comments:     Enrolled in the [place] [employer] on 8/1/18 ([facility] Member ID # [id number]) Ambulatory referral to Smoking Cessation Program.     Substance Use Topics    Alcohol use: Yes     Comment: social    Drug use: No        Irvin Brandon MD  03/29/25 0757

## 2025-04-08 ENCOUNTER — PATIENT MESSAGE (OUTPATIENT)
Dept: OBSTETRICS AND GYNECOLOGY | Facility: CLINIC | Age: 50
End: 2025-04-08
Payer: COMMERCIAL

## 2025-04-09 ENCOUNTER — PATIENT MESSAGE (OUTPATIENT)
Dept: OBSTETRICS AND GYNECOLOGY | Facility: CLINIC | Age: 50
End: 2025-04-09
Payer: COMMERCIAL

## 2025-04-10 ENCOUNTER — PATIENT OUTREACH (OUTPATIENT)
Dept: ADMINISTRATIVE | Facility: HOSPITAL | Age: 50
End: 2025-04-10
Payer: COMMERCIAL

## 2025-04-10 DIAGNOSIS — E11.9 TYPE 2 DIABETES MELLITUS WITHOUT COMPLICATION, WITHOUT LONG-TERM CURRENT USE OF INSULIN: Primary | ICD-10-CM

## 2025-04-10 NOTE — PROGRESS NOTES
Health Maintenance Reviewed, updated and links triggered. Patient appt with Dr. Bailey is 4/17/25 and   Labs appt 4/11/25 @ 1120 ([place]ord) 4/10/25

## 2025-04-11 ENCOUNTER — APPOINTMENT (OUTPATIENT)
Dept: LAB | Facility: HOSPITAL | Age: 50
End: 2025-04-11
Attending: FAMILY MEDICINE
Payer: COMMERCIAL

## 2025-04-14 ENCOUNTER — RESULTS FOLLOW-UP (OUTPATIENT)
Dept: INTERNAL MEDICINE | Facility: CLINIC | Age: 50
End: 2025-04-14

## 2025-04-14 ENCOUNTER — OFFICE VISIT (OUTPATIENT)
Dept: OBSTETRICS AND GYNECOLOGY | Facility: CLINIC | Age: 50
End: 2025-04-14
Payer: COMMERCIAL

## 2025-04-14 ENCOUNTER — HOSPITAL ENCOUNTER (OUTPATIENT)
Dept: RADIOLOGY | Facility: HOSPITAL | Age: 50
Discharge: HOME | End: 2025-04-14
Attending: OBSTETRICS & GYNECOLOGY
Payer: COMMERCIAL

## 2025-04-14 VITALS
DIASTOLIC BLOOD PRESSURE: 83 MMHG | SYSTOLIC BLOOD PRESSURE: 139 MMHG | BODY MASS INDEX: 25.51 KG/M2 | WEIGHT: 158.06 LBS

## 2025-04-14 DIAGNOSIS — Z79.890 HORMONE REPLACEMENT THERAPY (HRT): ICD-10-CM

## 2025-04-14 DIAGNOSIS — N95.0 PMB (POSTMENOPAUSAL BLEEDING): ICD-10-CM

## 2025-04-14 DIAGNOSIS — N95.1 MENOPAUSAL SYMPTOMS: Primary | ICD-10-CM

## 2025-04-14 PROCEDURE — 1160F RVW MEDS BY RX/DR IN RCRD: CPT | Mod: CPTII,S$GLB,, | Performed by: OBSTETRICS & GYNECOLOGY

## 2025-04-14 PROCEDURE — 3075F SYST BP GE 130 - 139MM HG: CPT | Mod: CPTII,S$GLB,, | Performed by: OBSTETRICS & GYNECOLOGY

## 2025-04-14 PROCEDURE — 58100 BIOPSY OF UTERUS LINING: CPT | Mod: S$GLB,,, | Performed by: OBSTETRICS & GYNECOLOGY

## 2025-04-14 PROCEDURE — 3008F BODY MASS INDEX DOCD: CPT | Mod: CPTII,S$GLB,, | Performed by: OBSTETRICS & GYNECOLOGY

## 2025-04-14 PROCEDURE — 76830 TRANSVAGINAL US NON-OB: CPT | Mod: 26,,, | Performed by: RADIOLOGY

## 2025-04-14 PROCEDURE — 3066F NEPHROPATHY DOC TX: CPT | Mod: CPTII,S$GLB,, | Performed by: OBSTETRICS & GYNECOLOGY

## 2025-04-14 PROCEDURE — 76830 TRANSVAGINAL US NON-OB: CPT | Mod: TC,PN

## 2025-04-14 PROCEDURE — 76856 US EXAM PELVIC COMPLETE: CPT | Mod: 26,,, | Performed by: RADIOLOGY

## 2025-04-14 PROCEDURE — 3079F DIAST BP 80-89 MM HG: CPT | Mod: CPTII,S$GLB,, | Performed by: OBSTETRICS & GYNECOLOGY

## 2025-04-14 PROCEDURE — 88305 TISSUE EXAM BY PATHOLOGIST: CPT | Mod: TC | Performed by: OBSTETRICS & GYNECOLOGY

## 2025-04-14 PROCEDURE — 3044F HG A1C LEVEL LT 7.0%: CPT | Mod: CPTII,S$GLB,, | Performed by: OBSTETRICS & GYNECOLOGY

## 2025-04-14 PROCEDURE — 99214 OFFICE O/P EST MOD 30 MIN: CPT | Mod: 25,S$GLB,, | Performed by: OBSTETRICS & GYNECOLOGY

## 2025-04-14 PROCEDURE — 3061F NEG MICROALBUMINURIA REV: CPT | Mod: CPTII,S$GLB,, | Performed by: OBSTETRICS & GYNECOLOGY

## 2025-04-14 PROCEDURE — 1159F MED LIST DOCD IN RCRD: CPT | Mod: CPTII,S$GLB,, | Performed by: OBSTETRICS & GYNECOLOGY

## 2025-04-14 PROCEDURE — 99999 PR PBB SHADOW E&M-EST. PATIENT-LVL IV: CPT | Mod: PBBFAC,,, | Performed by: OBSTETRICS & GYNECOLOGY

## 2025-04-14 RX ORDER — ESTRADIOL 1 MG/1
1 TABLET ORAL DAILY
Qty: 30 TABLET | Refills: 11 | Status: SHIPPED | OUTPATIENT
Start: 2025-04-14 | End: 2026-04-14

## 2025-04-14 RX ORDER — PROGESTERONE 100 MG/1
200 CAPSULE ORAL NIGHTLY
Qty: 90 CAPSULE | Refills: 3 | Status: SHIPPED | OUTPATIENT
Start: 2025-04-14 | End: 2026-04-14

## 2025-04-14 NOTE — PROGRESS NOTES
CC:  Chief Complaint   Patient presents with    Vaginal Bleeding     Bleeding for 5 weeks. Goes from darkish brown to bright red. Was on patch for hot flashes. No pain       HPI:    50 y.o.   OB History          5    Para   1    Term   1            AB   4    Living   1         SAB   4    IAB        Ectopic        Multiple        Live Births   1               Complaining of: was having trouble keeping patches on, has  sx's but patches have helped and tegaderm has worked and feels great but has been bleeding lightly for 5 wks and now can't afford patches and wants to try po HRT  Also pap hasn't resulted from ???    See not from last ov          Component Ref Range & Units 1 yr ago   Follicle Stimulating Hormone See Text mIU/mL 63.79      ASSESSMENT/ PLAN     Diagnoses and all orders for this visit:     Menopausal symptoms     Hormone replacement therapy (HRT)     Well woman exam with routine gynecological exam        Plcae tegaderm on top of ptach  Monitor vb, if persists with consistent use of patch then return for re evaluation        Neville Stearns MD         Electronically signed by Neville Stearns MD at 2025  1:52 PM       (Not in a hospital admission)      Review of patient's allergies indicates:  No Known Allergies     Past Medical History:   Diagnosis Date    Chronic back pain     Diabetes type 2, uncontrolled     Mild nonproliferative diabetic retinopathy of left eye without macular edema associated with type 2 diabetes mellitus 10/21/2019    Morbid obesity with BMI of 40.0-44.9, adult     MILEY on CPAP     Plantar fasciitis of left foot     Urticaria      Past Surgical History:   Procedure Laterality Date    ARTHROSCOPIC ACROMIOPLASTY OF SHOULDER Left 2020    Procedure: ACROMIOPLASTY, ARTHROSCOPIC;  Surgeon: Marcela Garza MD;  Location: [facility];  Service: Orthopedics;  Laterality: Left;    ARTHROSCOPIC REPAIR OF ROTATOR CUFF OF SHOULDER Right 2020    Procedure:  REPAIR, ROTATOR CUFF, ARTHROSCOPIC;  Surgeon: Marcela Garza MD;  Location: [facility] OR;  Service: Orthopedics;  Laterality: Right;  GENERAL/REGIONAL    COLONOSCOPY N/A 04/07/2022    Procedure: COLONOSCOPY;  Surgeon: Yadi Wong MD;  Location: [facility] (4TH FLR);  Service: Endoscopy;  Laterality: N/A;  Covid test at [place] on 4/4.EC    CYST REMOVAL      ovarian cyst removal at age 12    DECOMPRESSION OF SUBACROMIAL SPACE Left 07/27/2020    Procedure: DECOMPRESSION, SUBACROMIAL SPACE;  Surgeon: Marcela Garza MD;  Location: [facility] OR;  Service: Orthopedics;  Laterality: Left;    DILATION AND CURETTAGE OF UTERUS      ESOPHAGEAL MOTILITY STUDY USING HIGH RESOLUTION MANOMETRY N/A 09/17/2019    Procedure: MOTILITY STUDY, ESOPHAGUS, USING HIGH RESOLUTION MANOMETRY;  Surgeon: Leopoldo Swanson MD;  Location: [facility] (4TH FLR);  Service: Endoscopy;  Laterality: N/A;  Pt will be taking xanax before procedure to hopefull help with anxiety.    ESOPHAGOGASTRODUODENOSCOPY N/A 06/14/2019    Procedure: EGD (ESOPHAGOGASTRODUODENOSCOPY);  Surgeon: Ced Guevara MD;  Location: [facility] (4TH FLR);  Service: Endoscopy;  Laterality: N/A;  Please make sure it is scheduled after her cardiology appt.- see cardiology note dated 6/13/19 for clearance - ERW    FOREIGN BODY REMOVAL N/A 04/17/2023    Procedure: REMOVAL, FOREIGN BODY;  Surgeon: Rom Rodriguez MD;  Location: [facility] OR;  Service: General;  Laterality: N/A;    GASTRIC BYPASS N/A 05/20/2021    INJECTION OF STEROID Left 07/27/2020    Procedure: INJECTION, STEROID LEFT THUMB TRIGGER FINGER;  Surgeon: Marcela Garza MD;  Location: [facility] OR;  Service: Orthopedics;  Laterality: Left;  LEFT THUMB, TRIGGER FINGER    INTERPOSITION ARTHROPLASTY OF CARPOMETACARPAL JOINTS Left 04/24/2024    Procedure: INTERPOSITION ARTHROPLASTY, CMC JOINT - LEFT;  Surgeon: Craig Tom MD;  Location: [facility] OR;  Service: Orthopedics;  Laterality: Left;    LAPAROTOMY, EXPLORATORY  N/A 08/18/2023    Procedure: LAPAROTOMY, EXPLORATORY;  Surgeon: Bunny Obrien MD;  Location: [facility] OR;  Service: General;  Laterality: N/A;    LYSIS OF ADHESIONS N/A 10/21/2022    Procedure: LYSIS, ADHESIONS;  Surgeon: Bunny Obrien MD;  Location: [facility] OR;  Service: General;  Laterality: N/A;    LYSIS OF ADHESIONS N/A 08/18/2023    Procedure: LYSIS, ADHESIONS;  Surgeon: Bunny Obrien MD;  Location: [facility] OR;  Service: General;  Laterality: N/A;    THUMB ARTHROSCOPY Left     TRANSVERSE COLECTOMY N/A 10/26/2022    Procedure: COLECTOMY, TRANSVERSE;  Surgeon: Bunny Obrien MD;  Location: [facility] OR;  Service: General;  Laterality: N/A;    TRIGGER FINGER RELEASE Right 10/04/2019    Procedure: RELEASE, TRIGGER FINGER - right thumb;  Surgeon: Craig Tom MD;  Location: [facility] OR;  Service: Orthopedics;  Laterality: Right;    UPPER GASTROINTESTINAL ENDOSCOPY      WISDOM TOOTH EXTRACTION       Family History   Problem Relation Name Age of Onset    Cancer Maternal Grandmother      Diabetes Mother      No Known Problems Sister 3     No Known Problems Sister      No Known Problems Sister      Breast cancer Maternal Aunt  70    Colon cancer Neg Hx      Stomach cancer Neg Hx      Inflammatory bowel disease Neg Hx      Esophageal cancer Neg Hx       Social History[1]  ROS:  GENERAL: Feeling well overall. Denies fever or chills.   SKIN: Denies rash or lesions.   HEAD: Denies head injury or headache.   NODES: Denies enlarged lymph nodes.   CHEST: Denies chest pain or shortness of breath.   CARDIOVASCULAR: Denies palpitations or left sided chest pain.    ABDOMEN: Denies diarrhea, nausea, vomiting or rectal bleeding.   URINARY: No dysuria, hematuria, or burning on urination.  REPRODUCTIVE: See HPI.   BREASTS: Denies pain, lumps, or nipple discharge.   HEMATOLOGIC: No easy bruisability or excessive bleeding.   MUSCULOSKELETAL: Denies joint pain or swelling.   NEUROLOGIC: Denies syncope or weakness.   PSYCHIATRIC:  Denies depression, anxiety or mood swings.      PE: /83   Wt 71.7 kg (158 lb 1.1 oz)   BMI 25.51 kg/m²      APPEARANCE: Well nourished, well developed, in no acute distress.  SKIN: Normal skin turgor, no lesions.  NECK: Neck symmetric without masses or thyromegaly.  NODES: No inguinal, cervical, axillary or femoral lymph node enlargement.  CARDIOVASCULAR: Normal S1, S2. No rubs, murmurs or gallops.  NEUROLOGIC: Normal mood and affect. No depression or anxiety.   ABDOMEN: Soft. No tenderness or masses. No hepatosplenomegaly. No hernias.  RESPIRATORY: Normal respiratory effort with no retractions or use of accessory muscles.  BREASTS: Symmetrical, no skin changes or visible lesions. No palpable masses, nipple discharge, or adenopathy bilaterally.   BLADDER: Non-tender  GENITALIA: normal external genitalia, no erythema, no discharge  URETHRA: normal urethra, normal urethral meatus  VAGINA: blood present  CERVIX: no lesions or cervical motion tenderness  UTERUS: not examined  ADNEXA: not evaluated  EMBX  Consent signed  Cx vagina prepped with betadine  Pipelle passed x 2 to 8cm with minimal tissue obtained  Pt nany ok  Specimen sent  No complications  Pap was done    ASSESSMENT/ PLAN    Velma was seen today for vaginal bleeding.    Diagnoses and all orders for this visit:    Menopausal symptoms    PMB (postmenopausal bleeding)  -     Liquid-Based Pap Smear, Screening  -     US Pelvis Comp with Transvag NON-OB (xpd; Future  -     Specimen to Pathology Gynecology and Obstetrics    Hormone replacement therapy (HRT)    Other orders  -     estradioL (ESTRACE) 1 MG tablet; Take 1 tablet (1 mg total) by mouth once daily.  -     progesterone (PROMETRIUM) 100 MG capsule; Take 2 capsules (200 mg total) by mouth nightly.            Neville Stearns MD         [1]   Social History  Tobacco Use    Smoking status: Every Day     Current packs/day: 0.50     Average packs/day: 0.5 packs/day for 33.3 years (16.9 ttl pk-yrs)      Types: Cigarettes     Start date: 1992    Smokeless tobacco: Never    Tobacco comments:     Enrolled in the [employer] on 8/1/18 ([employer] Member ID # [id number]) Ambulatory referral to Smoking Cessation Program.     Substance Use Topics    Alcohol use: Yes     Comment: social    Drug use: No

## 2025-04-16 LAB
ESTROGEN SERPL-MCNC: NORMAL PG/ML
INSULIN SERPL-ACNC: NORMAL U[IU]/ML
LAB AP DIAGNOSIS CATEGORY: NORMAL
LAB AP GROSS DESCRIPTION: NORMAL
LAB AP PERFORMING LOCATION(S): NORMAL
LAB AP REPORT FOOTNOTES: NORMAL

## 2025-04-28 ENCOUNTER — PATIENT MESSAGE (OUTPATIENT)
Dept: OBSTETRICS AND GYNECOLOGY | Facility: CLINIC | Age: 50
End: 2025-04-28
Payer: COMMERCIAL

## 2025-04-28 RX ORDER — ESTRADIOL 0.5 MG/1
0.5 TABLET ORAL DAILY
Qty: 90 TABLET | Refills: 3 | Status: SHIPPED | OUTPATIENT
Start: 2025-04-28 | End: 2026-04-28

## 2025-04-29 ENCOUNTER — PATIENT MESSAGE (OUTPATIENT)
Dept: OBSTETRICS AND GYNECOLOGY | Facility: CLINIC | Age: 50
End: 2025-04-29
Payer: COMMERCIAL

## 2025-05-14 ENCOUNTER — TELEPHONE (OUTPATIENT)
Dept: SLEEP MEDICINE | Facility: CLINIC | Age: 50
End: 2025-05-14
Payer: COMMERCIAL

## 2025-05-14 NOTE — TELEPHONE ENCOUNTER
----- Message from Deja sent at 5/14/2025 11:02 AM CDT -----  Regarding: Appointment  .Type:  Sooner Apoointment RequestCaller is requesting a sooner appointment. Caller declined first available appointment listed below.Name of Caller:PTWhen is the first available appointment?9/2/2025Symptoms:Trouble sleeping Would the patient rather a call back or a response via [facility]? Call back Best Call Back Number: [phone] Information: Pt requesting [place] [facility]  Pt decline appts in [facility] that was offered sooner

## (undated) DEVICE — SUT SILK 2-0 STRANDS 30IN

## (undated) DEVICE — TOURNIQUET SB QC DP 18X4IN

## (undated) DEVICE — GAUZE DERMACEA LOW PLY 2X4YRDS

## (undated) DEVICE — SUT CTD VICRYL 3-0 CR/SH

## (undated) DEVICE — BANDAGE MATRIX HK LOOP 2IN 5YD

## (undated) DEVICE — APPLICATOR CHLORAPREP ORN 26ML

## (undated) DEVICE — DRESSING N ADH OIL EMUL 3X3

## (undated) DEVICE — SPONGE GAUZE 16PLY 4X4

## (undated) DEVICE — STOCKINET 4INX48

## (undated) DEVICE — GOWN POLY REINF BRTH SLV LG

## (undated) DEVICE — GAUZE SPONGE 4X4 12PLY

## (undated) DEVICE — DRESSING XEROFORM 1X8IN

## (undated) DEVICE — Device

## (undated) DEVICE — COVER OVERHEAD SURG LT BLUE

## (undated) DEVICE — SUT MONOCRYL 4-0 UD P-3 18

## (undated) DEVICE — ELECTRODE REM PLYHSV RETURN 9

## (undated) DEVICE — SEE MEDLINE ITEM 154981

## (undated) DEVICE — SPONGE LAP 18X18 PREWASHED

## (undated) DEVICE — PAD PREP CUFFED NS 24X48IN

## (undated) DEVICE — SUPPORT ULNA NERVE PROTECTOR

## (undated) DEVICE — DRESSING TRANS 4X4 3/4

## (undated) DEVICE — PAD ABD 8X10 STERILE

## (undated) DEVICE — DRAPE LAP T SHT W/ INSTR PAD

## (undated) DEVICE — SWABSTICK BENZOIN 4 IN

## (undated) DEVICE — ADHESIVE DERMABOND ADVANCED

## (undated) DEVICE — PACK SURGERY START

## (undated) DEVICE — STAPLER SKIN ROTATING HEAD

## (undated) DEVICE — TUBE SET INFLOW/OUTFLOW

## (undated) DEVICE — DRESSING XEROFORM FOIL PK 1X8

## (undated) DEVICE — DRESSING AQUACEL SACRAL 9 X 9

## (undated) DEVICE — SUT 2/0 30IN SILK BLK BRAI

## (undated) DEVICE — SYR 30CC LUER LOCK

## (undated) DEVICE — TRAY FOLEY 16FR INFECTION CONT

## (undated) DEVICE — SUT 1 48IN PDS II VIO MONO

## (undated) DEVICE — SUT FIBERWIRE FIBER 2M

## (undated) DEVICE — GOWN POLY REINF BRTH SLV XL

## (undated) DEVICE — BLADE SHAVER 4.5 6/BX

## (undated) DEVICE — CORD BIPOLAR 12 FOOT

## (undated) DEVICE — SUT SILK 0 STRANDS 30IN BLK

## (undated) DEVICE — DRAPE FLUID WARMER ORS 44X44IN

## (undated) DEVICE — SOL IRR SOD CHL .9% POUR

## (undated) DEVICE — NDL 22GA X1 1/2 REG BEVEL

## (undated) DEVICE — DRAPE UNDER BUTTOCKS WITH POUCH

## (undated) DEVICE — SEE MEDLINE ITEM 157160

## (undated) DEVICE — NDL SPINAL 18GX3.5 SPINOCAN

## (undated) DEVICE — COVER CAMERA OPERATING ROOM

## (undated) DEVICE — MANIFOLD 4 PORT

## (undated) DEVICE — SEE MEDLINE ITEM 157216

## (undated) DEVICE — SUT VICRYL 3-0 27 RB-1

## (undated) DEVICE — DRG DOC 8.0 X 85MM

## (undated) DEVICE — GLOVE BIOGEL SKINSENSE PI 7.0

## (undated) DEVICE — GAUZE VASELINE STERILE 3X9

## (undated) DEVICE — SEALER LIGASURE IMPACT 18CM

## (undated) DEVICE — SYR B-D DISP CONTROL 10CC100/C

## (undated) DEVICE — UNDERGLOVES BIOGEL PI SIZE 8

## (undated) DEVICE — GLOVE BIOGEL PI MICRO INDIC 7

## (undated) DEVICE — SEE MEDLINE ITEM 152529

## (undated) DEVICE — TRAY MINOR ORTHO

## (undated) DEVICE — SPONGE COTTON TRAY 4X4IN

## (undated) DEVICE — NDL ARTHSCP MF SCORPION

## (undated) DEVICE — PACK BASIC

## (undated) DEVICE — SOL IRR NACL .9% 3000ML

## (undated) DEVICE — SEE MEDLINE ITEM 146313

## (undated) DEVICE — CONTAINERS 32OZ

## (undated) DEVICE — TOOL TRAPEZIECTOMY W/ HANDLE

## (undated) DEVICE — DRAPE STERI U-SHAPED 47X51IN

## (undated) DEVICE — DRAPE PLASTIC U 60X72

## (undated) DEVICE — PAD ABDOMINAL 5X9 STERILE

## (undated) DEVICE — DRESSING XEROFORM NONADH 1X8IN

## (undated) DEVICE — SUT 4-0 ETHILON 18 PS-2

## (undated) DEVICE — SEE MEDLINE ITEM 157117

## (undated) DEVICE — COVER LIGHT HANDLE 80/CA

## (undated) DEVICE — PAD SHOULDER POLAR CARE XL

## (undated) DEVICE — PROBE ARTHO ENERGY 90 DEG

## (undated) DEVICE — BLADE SURG CARBON STEEL SZ11

## (undated) DEVICE — DRAPE STERI-DRAPE 1000 17X11IN

## (undated) DEVICE — BANDAGE ELASTIC ACE 2IN 10/CA

## (undated) DEVICE — FIBERTAPE COLLAGEN COATED

## (undated) DEVICE — BLADE SHAVER LANZA 4.2X13CM

## (undated) DEVICE — GLOVE SURGICAL LATEX SZ 7

## (undated) DEVICE — GLOVE BIOGEL ECLIPSE SZ 7.5

## (undated) DEVICE — JELLY LUBRICANT STERILE 4 OZ

## (undated) DEVICE — SUT VICRYL CTD 2-0 GI 27 SH

## (undated) DEVICE — ELECTRODE BLD EXT 6.50 ST DISP

## (undated) DEVICE — SUT PROLENE 3-0 FS-2 18

## (undated) DEVICE — SUT CHROMIC 2-0 SH 27IN BRN

## (undated) DEVICE — DRAPE MINI C-ARM

## (undated) DEVICE — TOWEL OR DISP STRL BLUE 4/PK

## (undated) DEVICE — GLOVE PI ULTRA TOUCH G SURGEON

## (undated) DEVICE — STAPLER SKIN 35 WIDE